# Patient Record
Sex: MALE | Race: WHITE | Employment: FULL TIME | URBAN - NONMETROPOLITAN AREA
[De-identification: names, ages, dates, MRNs, and addresses within clinical notes are randomized per-mention and may not be internally consistent; named-entity substitution may affect disease eponyms.]

---

## 2017-09-27 ENCOUNTER — HOSPITAL ENCOUNTER (INPATIENT)
Age: 47
LOS: 5 days | Discharge: HOME | DRG: 885 | End: 2017-10-02
Attending: FAMILY MEDICINE | Admitting: PSYCHIATRY & NEUROLOGY
Payer: COMMERCIAL

## 2017-09-27 DIAGNOSIS — T42.4X2A BENZODIAZEPINE OVERDOSE, INTENTIONAL SELF-HARM, INITIAL ENCOUNTER (HCC): Primary | ICD-10-CM

## 2017-09-27 DIAGNOSIS — F32.A DEPRESSION, UNSPECIFIED DEPRESSION TYPE: ICD-10-CM

## 2017-09-27 DIAGNOSIS — F10.920 ACUTE ALCOHOLIC INTOXICATION, UNCOMPLICATED (HCC): ICD-10-CM

## 2017-09-27 LAB
ACETAMINOPHEN LEVEL: <15 UG/ML
ALBUMIN SERPL-MCNC: 3.8 G/DL (ref 3.5–5.2)
ALP BLD-CCNC: 89 U/L (ref 40–130)
ALT SERPL-CCNC: 77 U/L (ref 5–41)
AMPHETAMINE SCREEN, URINE: NEGATIVE
ANION GAP SERPL CALCULATED.3IONS-SCNC: 10 MMOL/L (ref 7–19)
APTT: 32.7 SEC (ref 26–36.2)
AST SERPL-CCNC: 59 U/L (ref 5–40)
BARBITURATE SCREEN URINE: NEGATIVE
BASOPHILS ABSOLUTE: 0 K/UL (ref 0–0.2)
BASOPHILS RELATIVE PERCENT: 0.6 % (ref 0–1)
BENZODIAZEPINE SCREEN, URINE: POSITIVE
BILIRUB SERPL-MCNC: 0.3 MG/DL (ref 0.2–1.2)
BILIRUBIN URINE: NEGATIVE
BLOOD, URINE: NEGATIVE
BUN BLDV-MCNC: 12 MG/DL (ref 6–20)
CALCIUM SERPL-MCNC: 8.3 MG/DL (ref 8.6–10)
CANNABINOID SCREEN URINE: NEGATIVE
CHLORIDE BLD-SCNC: 106 MMOL/L (ref 98–111)
CLARITY: ABNORMAL
CO2: 26 MMOL/L (ref 22–29)
COCAINE METABOLITE SCREEN URINE: NEGATIVE
COLOR: YELLOW
CREAT SERPL-MCNC: 0.9 MG/DL (ref 0.5–1.2)
EOSINOPHILS ABSOLUTE: 0.1 K/UL (ref 0–0.6)
EOSINOPHILS RELATIVE PERCENT: 2.4 % (ref 0–5)
ETHANOL: 118 MG/DL (ref 0–0.08)
ETHANOL: 177 MG/DL (ref 0–0.08)
ETHANOL: 56 MG/DL (ref 0–0.08)
GFR NON-AFRICAN AMERICAN: >60
GLUCOSE BLD-MCNC: 105 MG/DL (ref 74–109)
GLUCOSE URINE: NEGATIVE MG/DL
HCT VFR BLD CALC: 39.7 % (ref 42–52)
HEMOGLOBIN: 13.3 G/DL (ref 14–18)
INR BLD: 1.05 (ref 0.88–1.18)
KETONES, URINE: NEGATIVE MG/DL
LEUKOCYTE ESTERASE, URINE: NEGATIVE
LYMPHOCYTES ABSOLUTE: 2.4 K/UL (ref 1.1–4.5)
LYMPHOCYTES RELATIVE PERCENT: 44.9 % (ref 20–40)
Lab: ABNORMAL
MCH RBC QN AUTO: 30.2 PG (ref 27–31)
MCHC RBC AUTO-ENTMCNC: 33.5 G/DL (ref 33–37)
MCV RBC AUTO: 90.2 FL (ref 80–94)
MONOCYTES ABSOLUTE: 0.3 K/UL (ref 0–0.9)
MONOCYTES RELATIVE PERCENT: 6 % (ref 0–10)
NEUTROPHILS ABSOLUTE: 2.5 K/UL (ref 1.5–7.5)
NEUTROPHILS RELATIVE PERCENT: 45.9 % (ref 50–65)
NITRITE, URINE: NEGATIVE
OPIATE SCREEN URINE: NEGATIVE
OSMOLALITY: 332 MOSM/KG (ref 275–300)
PDW BLD-RTO: 12.9 % (ref 11.5–14.5)
PH UA: 5.5
PLATELET # BLD: 197 K/UL (ref 130–400)
PMV BLD AUTO: 9.3 FL (ref 9.4–12.4)
POTASSIUM SERPL-SCNC: 4.6 MMOL/L (ref 3.5–5)
PROTEIN UA: NEGATIVE MG/DL
PROTHROMBIN TIME: 13.6 SEC (ref 12–14.6)
RBC # BLD: 4.4 M/UL (ref 4.7–6.1)
SALICYLATE, SERUM: <3 MG/DL (ref 3–10)
SODIUM BLD-SCNC: 142 MMOL/L (ref 136–145)
SPECIFIC GRAVITY UA: 1
TOTAL PROTEIN: 6.4 G/DL (ref 6.6–8.7)
UROBILINOGEN, URINE: 0.2 E.U./DL
WBC # BLD: 5.3 K/UL (ref 4.8–10.8)

## 2017-09-27 PROCEDURE — 99285 EMERGENCY DEPT VISIT HI MDM: CPT | Performed by: EMERGENCY MEDICINE

## 2017-09-27 PROCEDURE — 99285 EMERGENCY DEPT VISIT HI MDM: CPT

## 2017-09-27 PROCEDURE — 96374 THER/PROPH/DIAG INJ IV PUSH: CPT

## 2017-09-27 PROCEDURE — 80053 COMPREHEN METABOLIC PANEL: CPT

## 2017-09-27 PROCEDURE — G0480 DRUG TEST DEF 1-7 CLASSES: HCPCS

## 2017-09-27 PROCEDURE — 2500000003 HC RX 250 WO HCPCS: Performed by: FAMILY MEDICINE

## 2017-09-27 PROCEDURE — 2580000003 HC RX 258: Performed by: EMERGENCY MEDICINE

## 2017-09-27 PROCEDURE — 1240000000 HC EMOTIONAL WELLNESS R&B

## 2017-09-27 PROCEDURE — 85025 COMPLETE CBC W/AUTO DIFF WBC: CPT

## 2017-09-27 PROCEDURE — 36415 COLL VENOUS BLD VENIPUNCTURE: CPT

## 2017-09-27 PROCEDURE — 85730 THROMBOPLASTIN TIME PARTIAL: CPT

## 2017-09-27 PROCEDURE — 93005 ELECTROCARDIOGRAM TRACING: CPT

## 2017-09-27 PROCEDURE — 80307 DRUG TEST PRSMV CHEM ANLYZR: CPT

## 2017-09-27 PROCEDURE — 6370000000 HC RX 637 (ALT 250 FOR IP): Performed by: FAMILY MEDICINE

## 2017-09-27 PROCEDURE — 81003 URINALYSIS AUTO W/O SCOPE: CPT

## 2017-09-27 PROCEDURE — 83930 ASSAY OF BLOOD OSMOLALITY: CPT

## 2017-09-27 PROCEDURE — 85610 PROTHROMBIN TIME: CPT

## 2017-09-27 RX ORDER — ACETAMINOPHEN 325 MG/1
650 TABLET ORAL EVERY 4 HOURS PRN
Status: DISCONTINUED | OUTPATIENT
Start: 2017-09-27 | End: 2017-10-02 | Stop reason: HOSPADM

## 2017-09-27 RX ORDER — NICOTINE 21 MG/24HR
1 PATCH, TRANSDERMAL 24 HOURS TRANSDERMAL DAILY
Status: DISCONTINUED | OUTPATIENT
Start: 2017-09-27 | End: 2017-09-27 | Stop reason: SDUPTHER

## 2017-09-27 RX ORDER — FLUMAZENIL 0.1 MG/ML
0.5 INJECTION, SOLUTION INTRAVENOUS ONCE
Status: COMPLETED | OUTPATIENT
Start: 2017-09-27 | End: 2017-09-27

## 2017-09-27 RX ORDER — ALPRAZOLAM 0.25 MG/1
0.25 TABLET ORAL 3 TIMES DAILY
Status: ON HOLD | COMMUNITY
End: 2017-10-02 | Stop reason: HOSPADM

## 2017-09-27 RX ORDER — NICOTINE 21 MG/24HR
1 PATCH, TRANSDERMAL 24 HOURS TRANSDERMAL DAILY
Status: DISCONTINUED | OUTPATIENT
Start: 2017-09-27 | End: 2017-10-02 | Stop reason: HOSPADM

## 2017-09-27 RX ORDER — 0.9 % SODIUM CHLORIDE 0.9 %
1000 INTRAVENOUS SOLUTION INTRAVENOUS ONCE
Status: COMPLETED | OUTPATIENT
Start: 2017-09-27 | End: 2017-09-27

## 2017-09-27 RX ADMIN — FLUMAZENIL 0.5 MG: 0.1 INJECTION, SOLUTION INTRAVENOUS at 07:25

## 2017-09-27 RX ADMIN — SODIUM CHLORIDE 1000 ML: 9 INJECTION, SOLUTION INTRAVENOUS at 07:15

## 2017-09-27 ASSESSMENT — SLEEP AND FATIGUE QUESTIONNAIRES
DIFFICULTY FALLING ASLEEP: NO
DO YOU HAVE DIFFICULTY SLEEPING: NO
DIFFICULTY STAYING ASLEEP: NO
RESTFUL SLEEP: YES
SLEEP PATTERN: NORMAL
DIFFICULTY ARISING: NO
DO YOU USE A SLEEP AID: YES
AVERAGE NUMBER OF SLEEP HOURS: 8

## 2017-09-27 ASSESSMENT — ENCOUNTER SYMPTOMS
ABDOMINAL PAIN: 0
HYPERVENTILATION: 0

## 2017-09-27 ASSESSMENT — LIFESTYLE VARIABLES: HISTORY_ALCOHOL_USE: YES

## 2017-09-27 NOTE — ED NOTES
72 hour hold sent upstairs with pt. Dr. James Robertson talked with [address]. Bubba Dodd will take care of court order.       Nick Lovell RN  09/27/17 [id number]

## 2017-09-27 NOTE — IP AVS SNAPSHOT
50,000 Units on 9/29/2017  1:48 PM     10/6/17    9:00                   folic acid 1 MG tablet   Commonly known as:  FOLVITE   Take 1 tablet by mouth daily                1 mg on 10/2/2017  8:24 AM     10/3/17    9:00                   naltrexone 50 MG tablet   Commonly known as:  DEPADE   Take 0.5 tablets by mouth daily (with breakfast)   Start taking on:  10/3/2017                50 mg on 10/1/2017  9:31 AM     10/3/17    8:00                   nicotine 21 MG/24HR   Commonly known as:  NICODERM CQ   Place 1 patch onto the skin daily                1 patch on 10/2/2017  8:24 AM     10/3/17    9:00                   thiamine 100 MG tablet   Take 1 tablet by mouth daily                100 mg on 10/2/2017  8:24 AM     10/3/17    9:00 am                     You told us you were taking these medications at home, but the amount or how often you take this medication has CHANGED        Last Dose    Next Dose Due AM NOON PM NIGHT    traZODone 150 MG tablet   Commonly known as:  DESYREL   Take 1 tablet by mouth nightly as needed for Sleep   What changed:    - how to take this  - reasons to take this                100 mg on 10/1/2017  9:20 PM     10/2/17            9:00           VORTIoxetine HBr 20 MG Tabs tablet   Commonly known as:  TRINTELLIX   Take 20 mg by mouth daily   What changed:  how much to take                20 mg on 10/1/2017  9:20 PM     10/3/17    9:00                     These are medications you told us you were taking at home, CONTINUE taking them after you leave the hospital        Last Dose    Next Dose Due AM NOON PM NIGHT    diclofenac 75 MG EC tablet   Commonly known as:  VOLTAREN   Take 75 mg by mouth 2 times daily                  10/2/17                       metoprolol tartrate 50 MG tablet   Commonly known as:  LOPRESSOR   Take 25 mg by mouth 2 times daily                  10/2/17                       OLANZapine 10 MG tablet   Commonly known as:  ZYPREXA   Take 10 mg by mouth daily VIGRAPLEX PO   Take by mouth as needed                                           These are the medications you have told us you were taking at home, STOP taking them after you leave the hospital     ALPRAZolam 0.25 MG tablet   Commonly known as:  XANAX       amLODIPine 10 MG tablet   Commonly known as:  NORVASC       escitalopram 20 MG tablet   Commonly known as:  Maudie Phoenix            Where to Get Your Medications      You can get these medications from any pharmacy     Bring a paper prescription for each of these medications     ergocalciferol 69868 units capsule    folic acid 1 MG tablet    naltrexone 50 MG tablet    nicotine 21 MG/24HR    thiamine 100 MG tablet    traZODone 150 MG tablet    VORTIoxetine HBr 20 MG Tabs tablet               Allergies as of 10/2/2017     No Known Allergies      Immunizations as of 10/2/2017     No immunizations on file. MyChart Signup     [employer] allows you to send messages to your doctor, view your test results, renew your prescriptions, schedule appointments, view visit notes, and more. How Do I Sign Up? 1. In your Internet browser, go to [email or web address]  2. Click on the Sign Up Now link in the Sign In box. You will see the New Member Sign Up page. 3. Enter your [employer] Access Code exactly as it appears below. You will not need to use this code after youve completed the sign-up process. If you do not sign up before the expiration date, you must request a new code. [employer] Access Code: [email or web address]  Expires: 11/26/2017  7:39 AM    4. Enter your Social Security Number ([id number]) and Date of Birth (mm/dd/yyyy) as indicated and click Submit. You will be taken to the next sign-up page. 5. Create a [employer] ID. This will be your [employer] login ID and cannot be changed, so think of one that is secure and easy to remember. 6. Create a [employer] password. You can change your password at any time. 7. Enter your Password Reset Question and Answer. This can be used at a later time if you forget your password. 8. Enter your e-mail address. You will receive e-mail notification when new information is available in [address] 9. Click Sign Up. You can now view your medical record. Additional Information  If you have questions, please contact the physician practice where you receive care. Remember, [employer] is NOT to be used for urgent needs. For medical emergencies, dial 911. For questions regarding your [employer] account call [phone]. If you have a clinical question, please call your doctor's office. View your information online  ? Review your current list of  medications, immunization, and allergies. ? Review your future test results online . ? Review your discharge instructions provided by your caregivers at discharge    Certain functionality such as prescription refills, scheduling appointments or sending messages to your provider are not activated if your provider does not use [employer] in his/her office    For questions regarding your MyChart account call [phone]. If you have a clinical question, please call your doctor's office.

## 2017-09-27 NOTE — ED NOTES
Pt being uncooperative. Pt wanting to go smoke. I told him this is a nonsmoking campus. He told me \"No one can stop me from going to smoke. \" I told the patient if he was going to be uncooperative then I would have to call security. Pt stated \"call security. I'm a big boy, I can take them. \" Pt keeps taking off monitor leads. Pt was given urinal to per request. PCA at bedside for observation. Patient intentionally urinated on PCA.      Oneyda Waddell RN  09/27/17 [facility] Annalee Hewitt RN  09/27/17 9324

## 2017-09-27 NOTE — ED PROVIDER NOTES
emergency physician with the below findings:        No orders to display           LABS:  Labs Reviewed   CBC WITH AUTO DIFFERENTIAL - Abnormal; Notable for the following:        Result Value    RBC 4.40 (*)     Hemoglobin 13.3 (*)     Hematocrit 39.7 (*)     MPV 9.3 (*)     Neutrophils % 45.9 (*)     Lymphocytes % 44.9 (*)     All other components within normal limits   COMPREHENSIVE METABOLIC PANEL - Abnormal; Notable for the following:     Calcium 8.3 (*)     Total Protein 6.4 (*)     ALT 77 (*)     AST 59 (*)     All other components within normal limits   OSMOLALITY, SERUM - Abnormal; Notable for the following:     Osmolality 332 (*)     All other components within normal limits   URINALYSIS - Abnormal; Notable for the following:     Clarity, UA CLOUDY (*)     All other components within normal limits   URINE DRUG SCREEN - Abnormal; Notable for the following:     Benzodiazepine Screen, Urine Positive (*)     All other components within normal limits   ACETAMINOPHEN LEVEL   APTT   ETHANOL   PROTIME-INR   SALICYLATE LEVEL   ETHANOL   ETHANOL       All other labs were within normal range or not returned as of this dictation. EMERGENCY DEPARTMENT COURSE and DIFFERENTIAL DIAGNOSIS/MDM:   Vitals:    Vitals:    09/27/17 0727 09/27/17 0734 09/27/17 1608 09/27/17 1646   BP: 110/76 132/85 (!) 148/94 (!) 143/100   Pulse: 85 80 80 85   Resp:   16 16   Temp:   98.2 °F (36.8 °C) 98.3 °F (36.8 °C)   TempSrc:       SpO2:  99% 96%    Weight:       Height:           MDM  Number of Diagnoses or Management Options  Acute alcoholic intoxication, uncomplicated ([facility] [address]):   Benzodiazepine overdose, intentional self-harm, initial encounter:   Depression, unspecified depression type:   Diagnosis management comments: Patient signed out to me by Dr. Charles Holt. At 1600 hrs. I reviewed the labs on the patient was signed out. I ordered a repeat alcohol level. This was then found be 56. The patient is currently not intoxicated.  He overdosed on

## 2017-09-27 NOTE — IP AVS SNAPSHOT
After Visit Summary    This summary was created for you. Thank you for entrusting your care to us. The following information includes details about your hospital/visit stay along with steps you should take to help with your recovery once you leave the hospital.  In this packet, you will find information about the topics listed below:    · Instructions about your medications including a list of your home medications  · A summary of your hospital visit  · Follow-up appointments once you have left the hospital  · Your care plan at home      You may receive a survey regarding the care you received during your stay. Your input is valuable to us. We encourage you to complete and return your survey in the envelope provided. We hope you will choose us in the future for your healthcare needs. Basic Information     Date Of Birth Sex Race Ethnicity Preferred Language    1970 Male White Non-/Non  English      Vital Signs     Blood Pressure Pulse Temperature Respirations Height Weight    156/88 89 97.2 °F (36.2 °C) (Temporal) 20 6' 4\" (1.93 m) 185 lb (83.9 kg)    Oxygen Saturation Body Mass Index Smoking Status             100% 22.52 kg/m2 Current Every Day Smoker         Care Provided at:     Name Address Phone       [address] [facility] [phone]       Psychiatric Advance Directive          Most Recent Value    Psychiatric Advance Directive  No    Power of  for Health Care             Your Visit    Here you will find information about your visit, including the reason for your visit. Please take this sheet with you when you visit your doctor or other health care provider in the future. It will help determine the best possible medical care for you at that time. If you have any questions once you leave the hospital, please call the department phone number listed below.         Why you were here those caring for you know how to best care for your health needs at home. This section may also include educational information about certain health topics that may be of help to you. Diet Instructions     ? Good nutrition is important when healing from an illness, injury, or surgery. Follow any nutrition recommendations given to you during your hospital stay. ? If you were given an oral nutrition supplement while in the hospital, continue to take this supplement at home. You can take it with meals, in-between meals, and/or before bedtime. These supplements can be purchased at most local grocery stores, pharmacies, and [employer]. ? If you have any questions about your diet or nutrition, call the hospital and ask for the dietitian. Activity Instructions     May resume normal activity             Discharge Instructions       Vitamin D level----3 mths---forward to PCP         Important Information    If your condition worsens or if you have any concerns, call your doctor or seek emergency medical services (dial 9-1-1) as needed. If you have any of the following symptoms/conditions, call your doctor. Call your primary care physician to obtain results of outstanding lab tests, cultures, x-rays, or other tests. Important Information if you smoke or are exposed to smoking       SMOKING: QUIT SMOKING. THIS IS THE MOST IMPORTANT ACTION YOU CAN TAKE TO IMPROVE YOUR CURRENT AND FUTURE HEALTH. Call the [facility] NOW ([phone])    Smoking harms nonsmokers. When nonsmokers are around people who smoke, they absorb nicotine, carbon monoxide, and other ingredients of tobacco smoke.      DO NOT SMOKE AROUND CHILDREN     Children exposed to secondhand smoke are at an increased risk of:  Sudden Infant Death Syndrome (SIDS), acute respiratory infections, inflammation of the middle ear, and severe asthma. Over a longer time, it causes heart disease and lung cancer. There is no safe level of exposure to secondhand smoke. The information on all pages of the After Visit Summary has been reviewed with me, the patient and/or responsible adult, by my health care provider(s). I had the opportunity to ask questions regarding this information. I understand I should dispose of my armband safely at home to protect my health information. A complete copy of the After Visit Summary has been given to me, the patient and/or responsible adult. Patient Signature/Responsible Adult:  ____________________________________________________________    Date/Time:  ____________________________________________________________                 Clinician Signature:  ____________________________________________________________    Date/Time:  ____________________________________________________________                 Transmitted to next level of Care:  ____________________________________________________________    Date/Time/Signature:  ____________________________________________________________            After Visit Summary  (Discharge Instructions)    Medication List for Home    Based on the information you provided to us as well as any changes during this visit, the following is your updated medication list.  Compare this with your prescription bottles at home. If you have any questions or concerns, contact your primary care physician's office. Daily Medication List (This medication list can be shared with any healthcare provider who is helping you manage your medications)      There are NEW medications for you.  START taking them after you leave the hospital        Last Dose    Next Dose Due AM NOON PM NIGHT    ergocalciferol 24403 units capsule   Commonly known as:  ERGOCALCIFEROL   Take 1 capsule by mouth once a week for 11 doses 50,000 Units on 9/29/2017  1:48 PM     10/6/17    9:00                   folic acid 1 MG tablet   Commonly known as:  FOLVITE   Take 1 tablet by mouth daily                1 mg on 10/2/2017  8:24 AM     10/3/17    9:00                   naltrexone 50 MG tablet   Commonly known as:  DEPADE   Take 0.5 tablets by mouth daily (with breakfast)   Start taking on:  10/3/2017                50 mg on 10/1/2017  9:31 AM     10/3/17    8:00                   nicotine 21 MG/24HR   Commonly known as:  NICODERM CQ   Place 1 patch onto the skin daily                1 patch on 10/2/2017  8:24 AM     10/3/17    9:00                   thiamine 100 MG tablet   Take 1 tablet by mouth daily                100 mg on 10/2/2017  8:24 AM     10/3/17    9:00 am                     You told us you were taking these medications at home, but the amount or how often you take this medication has CHANGED        Last Dose    Next Dose Due AM NOON PM NIGHT    traZODone 150 MG tablet   Commonly known as:  DESYREL   Take 1 tablet by mouth nightly as needed for Sleep   What changed:    - how to take this  - reasons to take this                100 mg on 10/1/2017  9:20 PM     10/2/17            9:00           VORTIoxetine HBr 20 MG Tabs tablet   Commonly known as:  TRINTELLIX   Take 20 mg by mouth daily   What changed:  how much to take                20 mg on 10/1/2017  9:20 PM     10/3/17    9:00                     These are medications you told us you were taking at home, CONTINUE taking them after you leave the hospital        Last Dose    Next Dose Due AM NOON PM NIGHT    diclofenac 75 MG EC tablet   Commonly known as:  VOLTAREN   Take 75 mg by mouth 2 times daily                  10/2/17                       metoprolol tartrate 50 MG tablet   Commonly known as:  LOPRESSOR   Take 25 mg by mouth 2 times daily                  10/2/17                       OLANZapine 10 MG tablet   Commonly known as:  ZYPREXA   Take 10 mg by mouth daily VIGRAPLEX PO   Take by mouth as needed                                           These are the medications you have told us you were taking at home, STOP taking them after you leave the hospital     ALPRAZolam 0.25 MG tablet   Commonly known as:  XANAX       amLODIPine 10 MG tablet   Commonly known as:  NORVASC       escitalopram 20 MG tablet   Commonly known as:  Flora Whittington            Where to Get Your Medications      You can get these medications from any pharmacy     Bring a paper prescription for each of these medications     ergocalciferol 32591 units capsule    folic acid 1 MG tablet    naltrexone 50 MG tablet    nicotine 21 MG/24HR    thiamine 100 MG tablet    traZODone 150 MG tablet    VORTIoxetine HBr 20 MG Tabs tablet               Allergies as of 10/2/2017     No Known Allergies      Immunizations as of 10/2/2017     No immunizations on file. ITChart Signup     [employer] allows you to send messages to your doctor, view your test results, renew your prescriptions, schedule appointments, view visit notes, and more. How Do I Sign Up? 1. In your Internet browser, go to [email or web address]  2. Click on the Sign Up Now link in the Sign In box. You will see the New Member Sign Up page. 3. Enter your [employer] Access Code exactly as it appears below. You will not need to use this code after youve completed the sign-up process. If you do not sign up before the expiration date, you must request a new code. [employer] Access Code: [email or web address]  Expires: 11/26/2017  7:39 AM    4. Enter your Social Security Number ([id number]) and Date of Birth (mm/dd/yyyy) as indicated and click Submit. You will be taken to the next sign-up page. 5. Create a [employer] ID. This will be your [employer] login ID and cannot be changed, so think of one that is secure and easy to remember. 6. Create a getuppt password. You can change your password at any time.

## 2017-09-27 NOTE — ED PROVIDER NOTES
[facility] EMERGENCY DEPT  eMERGENCY dEPARTMENT eNCOUnter      Pt Name: Arelis Bowen  MRN: [id number]  [place] 1970  Date of evaluation: 9/27/2017  Provider: Hilario Wall MD    [address] [place] [address]       Chief Complaint   Patient presents with    Drug Overdose     took 90 xanax at 0500 yesterday morning         HISTORY OF PRESENT ILLNESS   (Location/Symptom, Timing/Onset, Context/Setting, Quality, Duration, Modifying Factors, Severity)  Note limiting factors. Arelis Bowen is a 50 y.o. male who presents to the emergency department with suicide attempt via xanax ingestion last night. Denies alcohol use. Has previous suicide attempts in past and has been to [facility] and [facility] for psychiatric concerns. Threatened to hurt his father during verbal altercation last night. Patient is a 50 y.o. male presenting with mental health disorder. The history is provided by the patient and a parent. No  was used.    Mental Health Problem   Presenting symptoms: agitation, homicidal ideas, suicidal thoughts and suicidal threats    Presenting symptoms: no aggressive behavior, no bizarre behavior, no delusions, no depression, no disorganized speech, no disorganized thought process, no hallucinations, no paranoid behavior, no self-mutilation and no suicide attempt    Patient accompanied by:  Family member and parent  Degree of incapacity (severity):  Severe  Onset quality:  Unable to specify  Timing:  Constant  Progression:  Worsening  Context: alcohol use, drug abuse, medication and noncompliance    Context: not recent medication change and not stressful life event    Treatment compliance:  Some of the time  Relieved by:  Nothing  Worsened by:  Nothing  Ineffective treatments:  None tried  Associated symptoms: irritability    Associated symptoms: no abdominal pain, no anhedonia, no anxiety, no appetite change, no chest pain, no decreased need for sleep, not distractible, no euphoric mood, no fatigue, no feelings of worthlessness, no headaches, no hypersomnia, no hyperventilation, no insomnia, no poor judgment, no school problems and no weight change    Risk factors: no family hx of mental illness, no family violence, no hx of mental illness, no hx of suicide attempts, no neurological disease and no recent psychiatric admission        Nursing Notes were reviewed. REVIEW OF SYSTEMS    (2-9 systems for level 4, 10 or more for level 5)     Review of Systems   Constitutional: Positive for irritability. Negative for appetite change and fatigue. Cardiovascular: Negative for chest pain. Gastrointestinal: Negative for abdominal pain. Neurological: Negative for headaches. Psychiatric/Behavioral: Positive for agitation, homicidal ideas and suicidal ideas. Negative for hallucinations, paranoia and self-injury. The patient is not nervous/anxious and does not have insomnia. All other systems reviewed and are negative. PAST MEDICAL HISTORY     Past Medical History:   Diagnosis Date    Chest pain 12/12/2011    Cigarette smoker 12/12/2011    Hypertension 12/12/2011         SURGICAL HISTORY       Past Surgical History:   Procedure Laterality Date    APPENDECTOMY      CHOLECYSTECTOMY  5/18/2006    Stigall    NECK SURGERY  7/15/2008    Hadi         CURRENT MEDICATIONS       Previous Medications    ALPRAZOLAM (XANAX) 0.25 MG TABLET    Take 0.25 mg by mouth nightly as needed for Sleep    AMLODIPINE (NORVASC) 10 MG TABLET    Take 10 mg by mouth daily     DICLOFENAC (VOLTAREN) 75 MG EC TABLET    Take 75 mg by mouth 2 times daily    ESCITALOPRAM (LEXAPRO) 20 MG TABLET    Take 20 mg by mouth daily    METOPROLOL (LOPRESSOR) 50 MG TABLET    Take 25 mg by mouth 2 times daily     NUTRITIONAL SUPPLEMENTS (VIGRAPLEX PO)    Take by mouth as needed    TRAZODONE (DESYREL) 150 MG TABLET           ALLERGIES     Review of patient's allergies indicates no known allergies.     FAMILY HISTORY       Family History Problem Relation Age of Onset    Osteoarthritis Mother     Thyroid Disease Mother     Heart Failure Father     Heart Failure Maternal Grandmother     Heart Failure Paternal Grandmother     Cancer Maternal Grandfather           SOCIAL HISTORY       Social History     Social History    Marital status:      Spouse name: N/A    Number of children: N/A    Years of education: N/A     Social History Main Topics    Smoking status: Current Every Day Smoker     Packs/day: 1.00     Types: Cigarettes    Smokeless tobacco: None    Alcohol use No    Drug use: No    Sexual activity: Not Asked     Other Topics Concern    None     Social History Narrative       SCREENINGS             PHYSICAL EXAM    (up to 7 for level 4, 8 or more for level 5)   ED Triage Vitals   BP Temp Temp Source Pulse Resp SpO2 Height Weight   09/27/17 0641 09/27/17 0641 09/27/17 0641 09/27/17 0641 09/27/17 0641 09/27/17 0641 09/27/17 0641 09/27/17 0641   110/76 98.7 °F (37.1 °C) Oral 93 16 96 % 6' 4\" (1.93 m) 185 lb (83.9 kg)       Physical Exam   Constitutional: He is oriented to person, place, and time. He appears well-developed and well-nourished. No distress. HENT:   Head: Normocephalic and atraumatic. Right Ear: External ear normal.   Left Ear: External ear normal.   Mouth/Throat: No oropharyngeal exudate. Eyes: Conjunctivae and EOM are normal. Right eye exhibits no discharge. Left eye exhibits no discharge. Neck: Normal range of motion. No tracheal deviation present. Cardiovascular: Normal rate, regular rhythm and normal heart sounds. Exam reveals no gallop and no friction rub. No murmur heard. Pulmonary/Chest: Effort normal. No stridor. No respiratory distress. He has no wheezes. He has no rales. Abdominal: Soft. He exhibits no distension. There is no tenderness. There is no rebound and no guarding. Musculoskeletal: Normal range of motion. He exhibits no edema, tenderness or deformity.    Neurological: He is

## 2017-09-27 NOTE — ED NOTES
Bed: 02-A  Expected date: 9/27/17  Expected time: 6:35 AM  Means of arrival: [facility]  Comments:  Hector Cole RN  09/27/17 0258

## 2017-09-28 PROBLEM — F33.2 SEVERE EPISODE OF RECURRENT MAJOR DEPRESSIVE DISORDER, WITHOUT PSYCHOTIC FEATURES (HCC): Status: ACTIVE | Noted: 2017-09-28

## 2017-09-28 PROCEDURE — 6370000000 HC RX 637 (ALT 250 FOR IP): Performed by: PSYCHIATRY & NEUROLOGY

## 2017-09-28 PROCEDURE — 1240000000 HC EMOTIONAL WELLNESS R&B

## 2017-09-28 PROCEDURE — 6370000000 HC RX 637 (ALT 250 FOR IP): Performed by: NURSE PRACTITIONER

## 2017-09-28 PROCEDURE — 6370000000 HC RX 637 (ALT 250 FOR IP): Performed by: FAMILY MEDICINE

## 2017-09-28 PROCEDURE — 90792 PSYCH DIAG EVAL W/MED SRVCS: CPT | Performed by: NURSE PRACTITIONER

## 2017-09-28 RX ORDER — NALTREXONE HYDROCHLORIDE 50 MG/1
50 TABLET, FILM COATED ORAL
Status: DISCONTINUED | OUTPATIENT
Start: 2017-09-29 | End: 2017-10-02

## 2017-09-28 RX ORDER — CLONIDINE HYDROCHLORIDE 0.1 MG/1
0.1 TABLET ORAL EVERY 4 HOURS PRN
Status: DISCONTINUED | OUTPATIENT
Start: 2017-09-28 | End: 2017-10-02 | Stop reason: HOSPADM

## 2017-09-28 RX ORDER — THIAMINE MONONITRATE (VIT B1) 100 MG
100 TABLET ORAL DAILY
Status: DISCONTINUED | OUTPATIENT
Start: 2017-09-28 | End: 2017-10-02 | Stop reason: HOSPADM

## 2017-09-28 RX ORDER — BUSPIRONE HYDROCHLORIDE 10 MG/1
10 TABLET ORAL 3 TIMES DAILY
Status: DISCONTINUED | OUTPATIENT
Start: 2017-09-28 | End: 2017-09-29

## 2017-09-28 RX ORDER — CHLORDIAZEPOXIDE HYDROCHLORIDE 25 MG/1
25 CAPSULE, GELATIN COATED ORAL 4 TIMES DAILY PRN
Status: DISCONTINUED | OUTPATIENT
Start: 2017-09-28 | End: 2017-10-02 | Stop reason: HOSPADM

## 2017-09-28 RX ORDER — OLANZAPINE 10 MG/1
10 TABLET ORAL DAILY
Status: ON HOLD | COMMUNITY
End: 2018-06-29 | Stop reason: HOSPADM

## 2017-09-28 RX ORDER — FOLIC ACID 1 MG/1
1 TABLET ORAL DAILY
Status: DISCONTINUED | OUTPATIENT
Start: 2017-09-28 | End: 2017-10-02 | Stop reason: HOSPADM

## 2017-09-28 RX ADMIN — FOLIC ACID 1 MG: 1 TABLET ORAL at 15:21

## 2017-09-28 RX ADMIN — VORTIOXETINE 20 MG: 20 TABLET, FILM COATED ORAL at 21:32

## 2017-09-28 RX ADMIN — ACETAMINOPHEN 650 MG: 325 TABLET, FILM COATED ORAL at 21:31

## 2017-09-28 RX ADMIN — TRAZODONE HYDROCHLORIDE 150 MG: 100 TABLET ORAL at 21:31

## 2017-09-28 RX ADMIN — Medication 100 MG: at 15:21

## 2017-09-28 RX ADMIN — BUSPIRONE HYDROCHLORIDE 10 MG: 10 TABLET ORAL at 21:31

## 2017-09-28 RX ADMIN — BUSPIRONE HYDROCHLORIDE 10 MG: 10 TABLET ORAL at 15:21

## 2017-09-28 ASSESSMENT — PAIN SCALES - GENERAL: PAINLEVEL_OUTOF10: 4

## 2017-09-28 NOTE — PLAN OF CARE
Problem: Discharge Planning:  Goal: Discharged to appropriate level of care  Discharged to appropriate level of care   Outcome: Ongoing    Problem: Mood - Altered:  Goal: Mood stable  Mood stable   Outcome: Ongoing    Problem: Coping - Ineffective, Individual:  Goal: Ability to identify and develop effective coping behavior will improve  Ability to identify and develop effective coping behavior will improve   Outcome: Ongoing

## 2017-09-28 NOTE — H&P
[facility] [place]    Psychiatric Initial Evaluation    Date of Evaluation:  9/28/2017  Session Type:  35677 Psychiatric Diagnostic Interview Exam   Name:  Veverly Goldmann  Age:  50 y.o. Sex:  male  Ethnicity:   Primary Care Physician:  Shyann Burciaga   Patient Care Team:  Patient Care Team:  Shyann Burciaga as PCP - Priscila Whittaker MD as Consulting Physician (Cardiology)  Chief Complaint: overdose on Xanax    History of Present Illness    Patient is a 49 y/o c/m who presents after an overdose of Xanax. Patient reports that he took about 79. For continuity of care the patients prescriber of the Xanax was informed at [facility]. Patient denies HI or psychosis. Patient has had one previous psychiatric hospitalization at [facility] several years ago. Patient reports that he had started drinking again after being sober for three years. Patient reports that his PCP prescribed Xanax and that is when his drinking became out of control. Patient reports that he got away from [place] [address] and has not attended since February, was feeling ran down from his job at [employer] working 150 hours every 2 weeks. Patient reports that he sleeps well as long as he has Trazodone. Patient reports appetite as fair. Patient reports that he has been drinking 12-36 beers per day since July. Patient states, \"It has gotten out of control again. \" Patient is wanting to try Naltrexone. Patient reports that he does not need inpatient treatment for his alcohol abuse. Patient reports that he has been to rehab about 7 times. Patient reports being in [address]. Patient states, \" To be honest when I was younger I would go to rehab to get out of legal problems. \" Patient reports he began drinking at age 10 and he began abusing it in his 27's. Patient reports that his family is very supportive. Patient reports that he works for [employer].  Patient reports that he is

## 2017-09-29 LAB
TSH SERPL DL<=0.05 MIU/L-ACNC: 0.76 UIU/ML (ref 0.27–4.2)
VITAMIN B-12: 473 PG/ML (ref 211–946)
VITAMIN D 25-HYDROXY: 22.8 NG/ML

## 2017-09-29 PROCEDURE — 82306 VITAMIN D 25 HYDROXY: CPT

## 2017-09-29 PROCEDURE — 84443 ASSAY THYROID STIM HORMONE: CPT

## 2017-09-29 PROCEDURE — 6370000000 HC RX 637 (ALT 250 FOR IP): Performed by: PSYCHIATRY & NEUROLOGY

## 2017-09-29 PROCEDURE — 6370000000 HC RX 637 (ALT 250 FOR IP): Performed by: NURSE PRACTITIONER

## 2017-09-29 PROCEDURE — 1240000000 HC EMOTIONAL WELLNESS R&B

## 2017-09-29 PROCEDURE — 36415 COLL VENOUS BLD VENIPUNCTURE: CPT

## 2017-09-29 PROCEDURE — 6370000000 HC RX 637 (ALT 250 FOR IP): Performed by: FAMILY MEDICINE

## 2017-09-29 PROCEDURE — 82607 VITAMIN B-12: CPT

## 2017-09-29 RX ORDER — RISPERIDONE 1 MG/1
1 TABLET, FILM COATED ORAL NIGHTLY
Status: DISCONTINUED | OUTPATIENT
Start: 2017-09-29 | End: 2017-10-01

## 2017-09-29 RX ORDER — ERGOCALCIFEROL 1.25 MG/1
50000 CAPSULE ORAL WEEKLY
Status: DISCONTINUED | OUTPATIENT
Start: 2017-09-29 | End: 2017-10-02 | Stop reason: HOSPADM

## 2017-09-29 RX ORDER — ERGOCALCIFEROL (VITAMIN D2) 1250 MCG
50000 CAPSULE ORAL WEEKLY
Qty: 11 CAPSULE | Refills: 0 | Status: SHIPPED | OUTPATIENT
Start: 2017-09-29 | End: 2018-07-29

## 2017-09-29 RX ORDER — TRAZODONE HYDROCHLORIDE 100 MG/1
100 TABLET ORAL NIGHTLY PRN
Status: DISCONTINUED | OUTPATIENT
Start: 2017-09-29 | End: 2017-10-02 | Stop reason: HOSPADM

## 2017-09-29 RX ADMIN — RISPERIDONE 1 MG: 1 TABLET, FILM COATED ORAL at 20:47

## 2017-09-29 RX ADMIN — Medication 100 MG: at 09:20

## 2017-09-29 RX ADMIN — BUSPIRONE HYDROCHLORIDE 10 MG: 10 TABLET ORAL at 09:20

## 2017-09-29 RX ADMIN — FOLIC ACID 1 MG: 1 TABLET ORAL at 13:49

## 2017-09-29 RX ADMIN — CHLORDIAZEPOXIDE HYDROCHLORIDE 25 MG: 25 CAPSULE ORAL at 21:03

## 2017-09-29 RX ADMIN — ERGOCALCIFEROL 50000 UNITS: 1.25 CAPSULE ORAL at 13:48

## 2017-09-29 RX ADMIN — TRAZODONE HYDROCHLORIDE 100 MG: 100 TABLET ORAL at 22:05

## 2017-09-29 RX ADMIN — NALTREXONE HYDROCHLORIDE 50 MG: 50 TABLET, FILM COATED ORAL at 09:20

## 2017-09-29 RX ADMIN — VORTIOXETINE 20 MG: 20 TABLET, FILM COATED ORAL at 20:47

## 2017-09-29 RX ADMIN — CHLORDIAZEPOXIDE HYDROCHLORIDE 25 MG: 25 CAPSULE ORAL at 13:48

## 2017-09-29 NOTE — PLAN OF CARE
Problem: Altered Mood, Depressive Behavior  Goal: STG-Knowledge of positive coping patterns  Outcome: Ongoing                                                                      Group Therapy Note     Date: 9/29/2017  Start Time: 1430  End Time:  9495  Number of Participants: 13     Type of Group: Cognitive Skills     Wellness Binder Information  Module Name:  Emotional wellness  Session Number:  5     Patient's Goal:  Obstacles to emotional wellness     Notes:  Pt acknowledged negative thoughts and negative behavior as obstacles to emotional wellness.      Status After Intervention:  Improved     Participation Level: Interactive     Participation Quality: Appropriate, Attentive and Sharing        Speech:  normal        Thought Process/Content: Logical        Affective Functioning: Congruent        Mood: congruent        Level of consciousness:  Alert, Oriented x4 and Attentive        Response to Learning: Able to verbalize current knowledge/experience        Endings: None Reported     Modes of Intervention: Education        Discipline Responsible: Psychoeducational Specialist        Signature:  Rosita Johnson

## 2017-09-29 NOTE — H&P
HISTORY and PHYSICAL      CHIEF COMPLAINT:  Depression, SA    Reason for Admission:  Depression, SA    History Obtained From:  patient    HISTORY OF PRESENT ILLNESS:      The patient is a 50 y.o. male who is admitted to the [place] [address] unit with worsening mood issues. He has no c/o CP or SOA. NO abdominal pain or N/V. No dysuria. He denies any current pain. No recent illnesses or fevers. Past Medical History:        Diagnosis Date    Chest pain 12/12/2011    Cigarette smoker 12/12/2011    Hypertension 12/12/2011     Past Surgical History:        Procedure Laterality Date    APPENDECTOMY      CHOLECYSTECTOMY  5/18/2006    [facility]    NECK SURGERY  7/15/2008    Hadi         Medications Prior to Admission:    Prescriptions Prior to Admission: ALPRAZolam (XANAX) 0.25 MG tablet, Take 0.25 mg by mouth three times daily   amLODIPine (NORVASC) 10 MG tablet, Take 10 mg by mouth daily   traZODone (DESYREL) 150 MG tablet, 150 mg nightly as needed   metoprolol (LOPRESSOR) 50 MG tablet, Take 25 mg by mouth 2 times daily   escitalopram (LEXAPRO) 20 MG tablet, Take 20 mg by mouth daily  OLANZapine (ZYPREXA) 10 MG tablet, Take 10 mg by mouth daily  VORTIoxetine HBr (TRINTELLIX) 20 MG TABS tablet, Take 10 mg by mouth daily  diclofenac (VOLTAREN) 75 MG EC tablet, Take 75 mg by mouth 2 times daily  Nutritional Supplements (VIGRAPLEX PO), Take by mouth as needed    Allergies:  Review of patient's allergies indicates no known allergies. Social History:   TOBACCO:   reports that he has been smoking Cigarettes. He has been smoking about 1.00 pack per day. He does not have any smokeless tobacco history on file. ETOH:   reports that he does not drink alcohol. DRUGS:   reports that he does not use illicit drugs.   MARITAL STATUS:    OCCUPATION:  Works at [employer]  Patient currently lives alone      Family History:       Problem Relation Age of Onset    Osteoarthritis Mother     Thyroid Disease Mother     Heart Failure Father     Heart Failure Maternal Grandmother     Heart Failure Paternal Grandmother     Cancer Maternal Grandfather      REVIEW OF SYSTEMS:  Constitutional: neg  CV: neg  Pulmonary: neg  GI: neg  : neg  Psych: depression, SA  Neuro: neg  Skin: neg  MusculoSkeletal: neg  HEENT: neg  Joints: neg    Vitals:  /80  Pulse 74  Temp 98.5 °F (36.9 °C) (Temporal)   Resp 20  Ht 6' 4\" (1.93 m)  Wt 185 lb (83.9 kg)  SpO2 100%  BMI 22.52 kg/m2    PHYSICAL EXAM:  Gen: NAD, alert  HEENT: WNL  Lymph: no LAD  Neck: no JVD or masses  Chest: CTA bilat  CV: RRR  Abdomen: NT/ND  Extrem: no C/C/E  Neuro: non focal  Skin: no rashes  Joints: no redness    DATA:  I have reviewed the admission labs and imaging tests.     ASSESSMENT AND PLAN:      Patient Active Hospital Problem List:   Severe episode of recurrent major depressive disorder, without psychotic features---follow with Psych   Cigarette smoker----nicotine replacement   Benzodiazepine overdose   ETOH Abuse--follow for withdrawal   HTN---monitor BP  Elevated LFT's   Anemia        Lorena Lyons MD  9:23 PM 9/28/2017

## 2017-09-29 NOTE — PLAN OF CARE
Problem: Altered Mood, Depressive Behavior  Goal: STG-Knowledge of positive coping patterns  Outcome: Ongoing                                                                      Group Therapy Note     Date: 9/29/2017  Start Time: 1100  End Time:  5592  Number of Participants: 14     Type of Group: Psychoeducation     Wellness Binder Information  Module Name:  Emotional wellness  Session Number:  4     Patient's Goal:  Self-esteem     Notes:  Pt was verbally prompted to attend group. Pt refused. Information about self-esteem was provided. Status After Intervention:       Participation Level:      Participation Quality:         Speech:           Thought Process/Content:         Affective Functioning:         Mood:         Level of consciousness:          Response to Learning:         Endings:      Modes of Intervention:         Discipline Responsible: Psychoeducational Specialist        Signature:  Ulisses Mera

## 2017-09-30 LAB
CHOLESTEROL, TOTAL: 177 MG/DL (ref 160–199)
HBA1C MFR BLD: 5.8 %
HDLC SERPL-MCNC: 55 MG/DL (ref 55–121)
LDL CHOLESTEROL CALCULATED: 98 MG/DL
TRIGL SERPL-MCNC: 119 MG/DL (ref 150–199)

## 2017-09-30 PROCEDURE — 80061 LIPID PANEL: CPT

## 2017-09-30 PROCEDURE — 6370000000 HC RX 637 (ALT 250 FOR IP): Performed by: FAMILY MEDICINE

## 2017-09-30 PROCEDURE — 83036 HEMOGLOBIN GLYCOSYLATED A1C: CPT

## 2017-09-30 PROCEDURE — 6370000000 HC RX 637 (ALT 250 FOR IP): Performed by: NURSE PRACTITIONER

## 2017-09-30 PROCEDURE — 1240000000 HC EMOTIONAL WELLNESS R&B

## 2017-09-30 PROCEDURE — 36415 COLL VENOUS BLD VENIPUNCTURE: CPT

## 2017-09-30 PROCEDURE — 6370000000 HC RX 637 (ALT 250 FOR IP): Performed by: PSYCHIATRY & NEUROLOGY

## 2017-09-30 RX ADMIN — Medication 100 MG: at 10:28

## 2017-09-30 RX ADMIN — VORTIOXETINE 20 MG: 20 TABLET, FILM COATED ORAL at 21:38

## 2017-09-30 RX ADMIN — CHLORDIAZEPOXIDE HYDROCHLORIDE 25 MG: 25 CAPSULE ORAL at 15:24

## 2017-09-30 RX ADMIN — NALTREXONE HYDROCHLORIDE 50 MG: 50 TABLET, FILM COATED ORAL at 10:28

## 2017-09-30 RX ADMIN — FOLIC ACID 1 MG: 1 TABLET ORAL at 10:28

## 2017-09-30 RX ADMIN — RISPERIDONE 1 MG: 1 TABLET, FILM COATED ORAL at 21:38

## 2017-09-30 RX ADMIN — TRAZODONE HYDROCHLORIDE 100 MG: 100 TABLET ORAL at 21:38

## 2017-09-30 NOTE — PLAN OF CARE
Problem: Altered Mood, Depressive Behavior  Goal: LTG-Able to verbalize acceptance of life and situations over which he or she has no control  Outcome: Ongoing                                                                      Group Therapy Note     Date: 9/30/2017  Start Time: 1030  End Time:  1120  Number of Participants: 11     Type of Group: Psychoeducation     Wellness Binder Information  Module Name:  Emotional wellness  Session Number:  3     Patient's Goal:  Happiness     Notes:  MARKK I invited and encouraged pt to attend group, pt refused/declined. MARKK I gently reminded pt that group is a part of treatment, pt continued to decline, and MARKK I informed nursing staff of non-compliance. Alternative therapeutic educational coping skills activity was provided.       Discipline Responsible: /Counselor     Signature:  Francisca Rosales

## 2017-10-01 PROCEDURE — 6370000000 HC RX 637 (ALT 250 FOR IP): Performed by: PSYCHIATRY & NEUROLOGY

## 2017-10-01 PROCEDURE — 1240000000 HC EMOTIONAL WELLNESS R&B

## 2017-10-01 PROCEDURE — 6360000002 HC RX W HCPCS: Performed by: PSYCHIATRY & NEUROLOGY

## 2017-10-01 PROCEDURE — 6370000000 HC RX 637 (ALT 250 FOR IP): Performed by: NURSE PRACTITIONER

## 2017-10-01 PROCEDURE — 99231 SBSQ HOSP IP/OBS SF/LOW 25: CPT | Performed by: PSYCHIATRY & NEUROLOGY

## 2017-10-01 PROCEDURE — 6370000000 HC RX 637 (ALT 250 FOR IP): Performed by: FAMILY MEDICINE

## 2017-10-01 RX ORDER — ONDANSETRON 4 MG/1
8 TABLET, FILM COATED ORAL ONCE
Status: COMPLETED | OUTPATIENT
Start: 2017-10-01 | End: 2017-10-01

## 2017-10-01 RX ORDER — RISPERIDONE 1 MG/1
1 TABLET, FILM COATED ORAL DAILY
Status: DISCONTINUED | OUTPATIENT
Start: 2017-10-02 | End: 2017-10-02 | Stop reason: HOSPADM

## 2017-10-01 RX ADMIN — VORTIOXETINE 20 MG: 20 TABLET, FILM COATED ORAL at 21:20

## 2017-10-01 RX ADMIN — NALTREXONE HYDROCHLORIDE 50 MG: 50 TABLET, FILM COATED ORAL at 09:31

## 2017-10-01 RX ADMIN — CHLORDIAZEPOXIDE HYDROCHLORIDE 25 MG: 25 CAPSULE ORAL at 00:32

## 2017-10-01 RX ADMIN — TRAZODONE HYDROCHLORIDE 100 MG: 100 TABLET ORAL at 21:20

## 2017-10-01 RX ADMIN — ONDANSETRON 8 MG: 4 TABLET, FILM COATED ORAL at 23:50

## 2017-10-01 RX ADMIN — FOLIC ACID 1 MG: 1 TABLET ORAL at 09:31

## 2017-10-01 RX ADMIN — ACETAMINOPHEN 650 MG: 325 TABLET, FILM COATED ORAL at 00:32

## 2017-10-01 RX ADMIN — ACETAMINOPHEN 650 MG: 325 TABLET, FILM COATED ORAL at 21:20

## 2017-10-01 RX ADMIN — CHLORDIAZEPOXIDE HYDROCHLORIDE 25 MG: 25 CAPSULE ORAL at 11:46

## 2017-10-01 RX ADMIN — Medication 100 MG: at 09:31

## 2017-10-01 RX ADMIN — ACETAMINOPHEN 650 MG: 325 TABLET, FILM COATED ORAL at 15:17

## 2017-10-01 ASSESSMENT — PAIN SCALES - GENERAL
PAINLEVEL_OUTOF10: 1
PAINLEVEL_OUTOF10: 5
PAINLEVEL_OUTOF10: 5
PAINLEVEL_OUTOF10: 4

## 2017-10-01 NOTE — PLAN OF CARE
Problem: Altered Mood, Depressive Behavior  Goal: STG-Knowledge of positive coping patterns  Outcome: Ongoing                                                                      Group Therapy Note     Date: 10/1/2017  Start Time: 1430  End Time:  1500  Number of Participants: 17     Type of Group: Cognitive Skills     Wellness Binder Information  Module Name:  Staying Well  Session Number:  1. Patient's Goal: Daily Maintenance and Coping Skills     Notes:  Pt learned about positive and negative ways to cope with stress, pain and life changes. Status After Intervention:  Improved     Participation Level:  Active Listener     Participation Quality: Appropriate and Attentive        Speech:  normal        Thought Process/Content: Logical        Affective Functioning: Congruent        Mood: depressed        Level of consciousness:  Alert, Oriented x4 and Attentive        Response to Learning: Able to verbalize current knowledge/experience, Able to retain information and Progressing to goal        Endings: None Reported     Modes of Intervention: Education and Support        Discipline Responsible: Psychoeducational Specialist        Signature:  Anthony Talbert

## 2017-10-02 VITALS
WEIGHT: 185 LBS | TEMPERATURE: 97.2 F | RESPIRATION RATE: 20 BRPM | HEART RATE: 89 BPM | DIASTOLIC BLOOD PRESSURE: 88 MMHG | BODY MASS INDEX: 22.53 KG/M2 | SYSTOLIC BLOOD PRESSURE: 156 MMHG | OXYGEN SATURATION: 100 % | HEIGHT: 76 IN

## 2017-10-02 PROCEDURE — 6370000000 HC RX 637 (ALT 250 FOR IP): Performed by: NURSE PRACTITIONER

## 2017-10-02 PROCEDURE — 6370000000 HC RX 637 (ALT 250 FOR IP): Performed by: PSYCHIATRY & NEUROLOGY

## 2017-10-02 PROCEDURE — 5130000000 HC BRIDGE APPOINTMENT

## 2017-10-02 PROCEDURE — 99238 HOSP IP/OBS DSCHRG MGMT 30/<: CPT | Performed by: NURSE PRACTITIONER

## 2017-10-02 PROCEDURE — 6370000000 HC RX 637 (ALT 250 FOR IP): Performed by: FAMILY MEDICINE

## 2017-10-02 RX ORDER — NALTREXONE HYDROCHLORIDE 50 MG/1
25 TABLET, FILM COATED ORAL
Qty: 15 TABLET | Refills: 0 | Status: SHIPPED | OUTPATIENT
Start: 2017-10-03 | End: 2018-07-29

## 2017-10-02 RX ORDER — LANOLIN ALCOHOL/MO/W.PET/CERES
100 CREAM (GRAM) TOPICAL DAILY
Qty: 30 TABLET | Refills: 0 | Status: ON HOLD | OUTPATIENT
Start: 2017-10-02 | End: 2018-06-29 | Stop reason: HOSPADM

## 2017-10-02 RX ORDER — FOLIC ACID 1 MG/1
1 TABLET ORAL DAILY
Qty: 30 TABLET | Refills: 0 | Status: ON HOLD | OUTPATIENT
Start: 2017-10-02 | End: 2018-06-29 | Stop reason: HOSPADM

## 2017-10-02 RX ORDER — NICOTINE 21 MG/24HR
1 PATCH, TRANSDERMAL 24 HOURS TRANSDERMAL DAILY
Qty: 30 PATCH | Refills: 0 | Status: ON HOLD | OUTPATIENT
Start: 2017-10-02 | End: 2018-06-29 | Stop reason: HOSPADM

## 2017-10-02 RX ORDER — NALTREXONE HYDROCHLORIDE 50 MG/1
25 TABLET, FILM COATED ORAL
Status: DISCONTINUED | OUTPATIENT
Start: 2017-10-03 | End: 2017-10-02 | Stop reason: HOSPADM

## 2017-10-02 RX ORDER — TRAZODONE HYDROCHLORIDE 150 MG/1
150 TABLET ORAL NIGHTLY PRN
Qty: 30 TABLET | Refills: 0 | Status: ON HOLD | OUTPATIENT
Start: 2017-10-02 | End: 2018-06-29 | Stop reason: HOSPADM

## 2017-10-02 RX ADMIN — METOPROLOL TARTRATE 25 MG: 25 TABLET ORAL at 14:30

## 2017-10-02 RX ADMIN — RISPERIDONE 1 MG: 1 TABLET, FILM COATED ORAL at 08:24

## 2017-10-02 RX ADMIN — Medication 100 MG: at 08:24

## 2017-10-02 RX ADMIN — FOLIC ACID 1 MG: 1 TABLET ORAL at 08:24

## 2017-10-02 NOTE — PLAN OF CARE
Problem: Altered Mood, Depressive Behavior  Goal: STG-Knowledge of positive coping patterns  Outcome: Ongoing                                                                      Group Therapy Note     Date: 10/2/2017  Start Time: 1000  End Time:  7806  Number of Participants: 18     Type of Group: Psychoeducation     Wellness Binder Information  Module Name:  Staying well  Session Number:  1     Patient's Goal:  Daily maintenance and coping skills     Notes:  Pt acknowledged use of positive coping skills daily to help maintain wellness.      Status After Intervention:  Improved     Participation Level: Interactive     Participation Quality: Appropriate, Attentive and Sharing        Speech:  normal        Thought Process/Content: Logical        Affective Functioning: Congruent        Mood: congruent        Level of consciousness:  Alert, Oriented x4 and Attentive        Response to Learning: Able to verbalize current knowledge/experience        Endings: None Reported     Modes of Intervention: Education        Discipline Responsible: Psychoeducational Specialist        Signature:  Iesha Rodas

## 2017-10-02 NOTE — PROGRESS NOTES
Admission Note      Reason for admission/Target Symptom: increasing depression and SI  Diagnoses:  UDS: Negative- Benzo- Opiate- Amphetamines- THC- Cocaine- Barbs  BAL: Negative     SW met with treatment team to discuss patient treatment and follow up care plans. Pt was admitted to [facility]. Treatment team has  identified patients discharge needs as medication management and therapy with [address] [place] [address]. Pt validated need for appointments. Pt was also provided a handout of contact information for drug and alcohol treatment centers and other community support service such as [facility] .
Chart check completed, legal documents and orders reviewed at this time.     Saulo Brown RN
Chart check completed, legal documents and orders reviewed at this time.     Umberto Escobar RN
Collateral obtained from: patients mom [phone]    Immediate Stressors & Time Episode Began: patients has been on a downward spiral for the last few weeks and had gotten a car and was going to his parents house. Patient reported to his mother that he had 80 xanax and his mother was very concerned but she didn't believe that the patient was being honest with his report. Patient finally fell asleep and was laying on the couch and about 5 am the patient fell into a glass coffee table and his parents called 911. Patient is currently work at [employer] and has been working odd hours. Diagnosis/Hx of compliance with meds: Diagnosed with Depression and Anxiety and has not been taking his medication as directed. Tx Hx/Past hospitalizations:  Previous admission to [facility] 3 previous times    Family hx of psychiatric issues:     Substance Abuse: alcohol since the age of 10, patient has been to [place]    Pending Legal: Several DUI's and is convicted felon    Safety Issues (Weapons? Hx of attempts): no guns are in the house    Support system/Medication Managed by: The importance of medication management and locking extra medication in a secured location was explained and reccommended to collateral.     Additional Info: patient lives alone and lives near his parents there are weapons are in his parents are very supportive.
Dr Viv Johnson called while I was in the ER to inquire about the status of Franco Larios admission and decision not to transfer to [facility]. I phoned [facility] office and they advised they had no paperwork regarding patient. I advised them after the orders had been signed, there was a change in admission plans and wanted them to be aware pt was not transferred.
Group Therapy Note    Date: 10/1/17Start Time: 2100  End Time:  2130  Number of Participants: 14    Type of Group: Wrap-Up    Wellness Binder Information  Module Name:    Session Number:      Patient's Goal:  See self inventory    Notes:  Pt participated in group and filled out wrap up sheet    Status After Intervention:  Improved    Participation Level:  Active Listener    Participation Quality: Appropriate and Attentive      Speech:  normal      Thought Process/Content: Logical      Affective Functioning: Congruent      Mood: fine      Level of consciousness:  Alert and Attentive      Response to Learning: Able to retain information and Capable of insight      Endings: None Reported    Modes of Intervention: Education, Support and Socialization      Discipline Responsible: Behavorial Health Tech      Signature:  Bimal Mckinney
Group Therapy Note    Date: 10/2/2017  Start Time: 0900  End Time:  0930  Number of Participants: 19    Type of Group: Community Meeting    Patient's Goal:  none    Notes:  None     Participation Level:  Active Listener    Participation Quality: Appropriate      Speech:  normal      Thought Process/Content: Logical      Affective Functioning: Congruent      Mood: euthymic      Level of consciousness:  Alert      Response to Learning: Progressing to goal      Discipline Responsible: [facility] [address] [employer]      Signature:  Eliezer Cason
Group Therapy Note    Date: 9/30/17Start Time: 2100  End Time:  2130  Number of Participants: 14    Type of Group: Wrap-Up    Wellness Binder Information  Module Name:    Session Number:      Patient's Goal:  See self inventory    Notes:  Pt participated in group and filled out wrap up sheet    Status After Intervention:  Improved    Participation Level:  Active Listener    Participation Quality: Appropriate and Attentive      Speech:  normal      Thought Process/Content: Logical      Affective Functioning: Congruent      Mood: fine      Level of consciousness:  Alert and Attentive      Response to Learning: Able to retain information and Capable of insight      Endings: None Reported    Modes of Intervention: Education, Support and Socialization      Discipline Responsible: Behavorial Health Tech      Signature:  Fernandez Knowles
Group Therapy Note    Start Time: 0930  End Time:  1000  Number of Participants: 16    Type of Group: Community Meeting      Patient's Goal:  \"Sleep exit plan\"    Notes:  Pt. Was Cooperative and Attentive      Participation Level:  Active Listener    Participation Quality: Appropriate and Attentive      Speech:  normal      Thought Process/Content: Logical      Affective Functioning: Congruent      Mood: calm      Level of consciousness:  Alert      Modes of Intervention: Support      Discipline Responsible:Behavioral Health Tech I      Signature:  Canary End
Group Therapy Note:    Date: 09/29/17  Time: 20:00 to 20:30    Type of Group:  Wrap-Up Group    Patient Goals:  Patient did not participate in group session. Reported to nurse patient did not participate in group session. Encouragement given per staff.       Notes: N/A      [facility]
PATIENT IS UP AND DRESSED FOR BREAKFAST, GROUP AND MEDICATION. HIS HYGIENE AND GROOMING IS GOOD. HE DENIES SUICIDAL AND HOMICIDAL IDEATION. HE REPORTS HIS DEPRESSION AND ANXIETY ARE IMPROVING. HE REPORTS HE SLEPT LAST NIGHT. HE IS HOPING TO BE DISCHARGED Monday. HE REPORTS HE IS HAS TO GET [facility] Tuesday. HE IS CALM AND COOPERATIVE. HE IS SOCIAL WITH STAFF AND PEERS. HE TALKS ABOUT HIS BOUTS WITH HIS ALCOHOLISM AND HIS RELAPSES.
Patient is A/O x 4, is pleasant and cooperative, content to sit in dayroom interacting well with peers, made jokes with this author during the mental health interview, reported decreasing detox symptoms, CIWA rated 8, patient requested PRN Librium for anxiety level, patient reported depression rated 4/10 and anxiety 6/10, denied SI/HI/HA, expressed optimism for medication regiment to control his depression level, patient reported mild nausea about 30 minutes after administration of PRN Librium, patient requested PRN sleep aid of Trazodone for insomnia, q15min observation in place, will continue to monitor.
Progress Note  Eduardo Mishra  10/1/2017 9:56 PM  Subjective:   Admit Date:   9/27/2017      CC/ADMIT DX:       Interval History:   Reviewed overnight events and nursing notes. He is c/o stomach cramping. No N/V or issues with BM's. I have reviewed all labs/diagnostics from the last 24hrs. ROS:   I have done a 10 point ROS and all are negative, except what is mentioned in the HPI. DIET GENERAL;    Medications:       [START ON 10/2/2017] risperiDONE  1 mg Oral Daily    vitamin D  50,000 Units Oral Weekly    VORTIoxetine HBr  20 mg Oral Daily    naltrexone  50 mg Oral Daily with breakfast    folic acid  1 mg Oral Daily    thiamine  100 mg Oral Daily    nicotine  1 patch Transdermal Daily           Objective:   Vitals: BP (!) 147/88  Pulse 78  Temp 98.6 °F (37 °C) (Temporal)   Resp 20  Ht 6' 4\" (1.93 m)  Wt 185 lb (83.9 kg)  SpO2 100%  BMI 22.52 kg/m2 No intake or output data in the 24 hours ending 10/01/17 2156  General appearance: alert and cooperative with exam  Lungs: clear to auscultation bilaterally  Heart: RRR  Abdomen: soft, non-tender; bowel sounds normal; no masses,  no organomegaly  Extremities: extremities normal, atraumatic, no cyanosis or edema  Neurologic:  No obvious focal neurologic deficits. Assessment and Plan:   Principal Problem:    Severe episode of recurrent major depressive disorder, without psychotic features ([facility] [address] [facility]  Active Problems:    Cigarette smoker    Benzodiazepine overdose    Vit D Def   Elevated BP   Abdominal Cramping    Plan:  1. Continue present medication(s)   2. Supportive care for GI symptoms  3. Follow with Psych      Discharge planning:   home     Reviewed treatment plans with the patient and/or family.              Electronically signed by Polina Marie MD on 10/1/2017 at 9:56 PM
Pt awoke and up in day area for water. Spoke with pt at this time, he reports that he is feeling tired, but \"better. \" Denies current si, hi, avh. Pt reports he sleeps okay at night. Pt has a blunted affect, but cooperative and calm. Pt then went back to bed.
Pt had vomited moderate amount of undigested food. Reports he has no warning of feeling nauseated at all. Now feels okay, denies pain, denies nausea.
Pt has been sleeping, no distress noted.
Pt is resting in bed with eyes closed. NO distress noted. Pt had reported he was very tired and wanted to sleep.
normal  Language: Naming: intact; Word Finding: intact  Conversation no evidence of delusions  Behavior:  Cooperative  Mood: improved  Affect: congruent with mood and full range  Thought Content: negative delusions, hallucinations, obsessions, homicidal and suicidal  Thought Process: linear, goal directed and coherent  Judgement Insight:  improved and appropriate  Gait and Station:normal gait and station   Musculoskeletal:    Assesment:   1. Benzodiazepine overdose, intentional self-harm, initial encounter    2. Depression, unspecified depression type    3. Acute alcoholic intoxication, uncomplicated ([facility]        Plan:  1. The patient continues to need, on a daily basis, active treatment furnished directly by or requiring the supervision of inpatient psychiatric facility personnel. 2. Same medications for now. 3. OK to change Risperdal to the morning  4.  Supportive therapy offered    Meenu Clayton       [unfilled]

## 2017-10-02 NOTE — DISCHARGE SUMMARY
and Concentration:  Adequate        Patient Instructions:   Discharge Medication List as of 10/2/2017  2:44 PM      START taking these medications    Details   naltrexone (DEPADE) 50 MG tablet Take 0.5 tablets by mouth daily (with breakfast), Disp-15 tablet, [id number]      folic acid (FOLVITE) 1 MG tablet Take 1 tablet by mouth daily, Disp-30 tablet, R-0Print      vitamin B-1 100 MG tablet Take 1 tablet by mouth daily, Disp-30 tablet, R-0Print      nicotine (NICODERM CQ) 21 MG/24HR Place 1 patch onto the skin daily, Disp-30 patch, R-0Print      vitamin D (ERGOCALCIFEROL) 06310 units capsule Take 1 capsule by mouth once a week for 11 doses, Disp-11 capsule, R-0Print         CONTINUE these medications which have CHANGED    Details   traZODone (DESYREL) 150 MG tablet Take 1 tablet by mouth nightly as needed for Sleep, Disp-30 tablet, R-0Print      VORTIoxetine HBr (TRINTELLIX) 20 MG TABS tablet Take 20 mg by mouth daily, Disp-30 tablet, R-0Print         CONTINUE these medications which have NOT CHANGED    Details   OLANZapine (ZYPREXA) 10 MG tablet Take 10 mg by mouth dailyHistorical Med      metoprolol (LOPRESSOR) 50 MG tablet Take 25 mg by mouth 2 times daily Historical Med      diclofenac (VOLTAREN) 75 MG EC tablet Take 75 mg by mouth 2 times dailyHistorical Med      Nutritional Supplements (VIGRAPLEX PO) Take by mouth as neededHistorical Med         STOP taking these medications       ALPRAZolam (XANAX) 0.25 MG tablet Comments:   Reason for Stopping:         amLODIPine (NORVASC) 10 MG tablet Comments:   Reason for Stopping:         escitalopram (LEXAPRO) 20 MG tablet Comments:   Reason for Stopping:             Activity: activity as tolerated  Diet: regular diet  Wound Care: none needed    Follow-up with   PCP in 2 weeks.   [facility] 10/4/17 AT 8AM AND 10/5/17 AT 11AM    Time worked: Less than 30 minutes    Participation:good    Electronically signed by Vonne Gottron, APRN on 10/2/2017 at 4:58 PM

## 2017-10-04 LAB
EKG P AXIS: 76 DEGREES
EKG P-R INTERVAL: 184 MS
EKG Q-T INTERVAL: 396 MS
EKG QRS DURATION: 90 MS
EKG QTC CALCULATION (BAZETT): 427 MS
EKG T AXIS: 58 DEGREES

## 2017-11-07 PROCEDURE — 88305 TISSUE EXAM BY PATHOLOGIST: CPT | Performed by: INTERNAL MEDICINE

## 2017-11-08 ENCOUNTER — LAB REQUISITION (OUTPATIENT)
Dept: LAB | Facility: HOSPITAL | Age: 47
End: 2017-11-08

## 2017-11-08 DIAGNOSIS — Z00.00 ROUTINE GENERAL MEDICAL EXAMINATION AT A HEALTH CARE FACILITY: ICD-10-CM

## 2017-11-10 LAB
CYTO UR: NORMAL
LAB AP CASE REPORT: NORMAL
LAB AP CLINICAL INFORMATION: NORMAL
Lab: NORMAL
PATH REPORT.FINAL DX SPEC: NORMAL
PATH REPORT.GROSS SPEC: NORMAL

## 2017-12-10 ENCOUNTER — APPOINTMENT (OUTPATIENT)
Dept: CT IMAGING | Age: 47
End: 2017-12-10
Payer: COMMERCIAL

## 2017-12-10 ENCOUNTER — HOSPITAL ENCOUNTER (EMERGENCY)
Age: 47
Discharge: HOME | End: 2017-12-10
Attending: EMERGENCY MEDICINE
Payer: COMMERCIAL

## 2017-12-10 VITALS
DIASTOLIC BLOOD PRESSURE: 84 MMHG | OXYGEN SATURATION: 98 % | RESPIRATION RATE: 16 BRPM | WEIGHT: 175 LBS | BODY MASS INDEX: 21.31 KG/M2 | HEART RATE: 98 BPM | SYSTOLIC BLOOD PRESSURE: 134 MMHG | HEIGHT: 76 IN | TEMPERATURE: 98.2 F

## 2017-12-10 DIAGNOSIS — R11.2 NON-INTRACTABLE VOMITING WITH NAUSEA, UNSPECIFIED VOMITING TYPE: Primary | ICD-10-CM

## 2017-12-10 DIAGNOSIS — K29.00 ACUTE GASTRITIS, PRESENCE OF BLEEDING UNSPECIFIED, UNSPECIFIED GASTRITIS TYPE: ICD-10-CM

## 2017-12-10 DIAGNOSIS — F10.10 ALCOHOL ABUSE: ICD-10-CM

## 2017-12-10 LAB
ALBUMIN SERPL-MCNC: 5 G/DL (ref 3.5–5.2)
ALP BLD-CCNC: 133 U/L (ref 40–130)
ALT SERPL-CCNC: 48 U/L (ref 5–41)
AMYLASE: 29 U/L (ref 28–100)
ANION GAP SERPL CALCULATED.3IONS-SCNC: 11 MMOL/L (ref 7–19)
APTT: 33.6 SEC (ref 26–36.2)
AST SERPL-CCNC: 42 U/L (ref 5–40)
BASOPHILS ABSOLUTE: 0 K/UL (ref 0–0.2)
BASOPHILS RELATIVE PERCENT: 0.5 % (ref 0–1)
BILIRUB SERPL-MCNC: 0.5 MG/DL (ref 0.2–1.2)
BILIRUBIN URINE: NEGATIVE
BLOOD, URINE: NEGATIVE
BUN BLDV-MCNC: 14 MG/DL (ref 6–20)
CALCIUM SERPL-MCNC: 9.8 MG/DL (ref 8.6–10)
CHLORIDE BLD-SCNC: 95 MMOL/L (ref 98–111)
CLARITY: CLEAR
CO2: 30 MMOL/L (ref 22–29)
COLOR: YELLOW
CREAT SERPL-MCNC: 1.2 MG/DL (ref 0.5–1.2)
EOSINOPHILS ABSOLUTE: 0 K/UL (ref 0–0.6)
EOSINOPHILS RELATIVE PERCENT: 0.7 % (ref 0–5)
ETHANOL: 40 MG/DL (ref 0–0.08)
GFR NON-AFRICAN AMERICAN: >60
GLUCOSE BLD-MCNC: 125 MG/DL (ref 74–109)
GLUCOSE URINE: NEGATIVE MG/DL
HCT VFR BLD CALC: 48.9 % (ref 42–52)
HEMOGLOBIN: 16.5 G/DL (ref 14–18)
INR BLD: 0.96 (ref 0.88–1.18)
KETONES, URINE: NEGATIVE MG/DL
LEUKOCYTE ESTERASE, URINE: NEGATIVE
LIPASE: 24 U/L (ref 13–60)
LYMPHOCYTES ABSOLUTE: 1.1 K/UL (ref 1.1–4.5)
LYMPHOCYTES RELATIVE PERCENT: 25.3 % (ref 20–40)
MCH RBC QN AUTO: 30.9 PG (ref 27–31)
MCHC RBC AUTO-ENTMCNC: 33.7 G/DL (ref 33–37)
MCV RBC AUTO: 91.6 FL (ref 80–94)
MONOCYTES ABSOLUTE: 0.6 K/UL (ref 0–0.9)
MONOCYTES RELATIVE PERCENT: 14.4 % (ref 0–10)
NEUTROPHILS ABSOLUTE: 2.6 K/UL (ref 1.5–7.5)
NEUTROPHILS RELATIVE PERCENT: 58.9 % (ref 50–65)
NITRITE, URINE: NEGATIVE
PDW BLD-RTO: 13.6 % (ref 11.5–14.5)
PH UA: 6
PLATELET # BLD: 224 K/UL (ref 130–400)
PMV BLD AUTO: 9.1 FL (ref 9.4–12.4)
POTASSIUM SERPL-SCNC: 3.6 MMOL/L (ref 3.5–5)
PROTEIN UA: ABNORMAL MG/DL
PROTHROMBIN TIME: 12.7 SEC (ref 12–14.6)
RBC # BLD: 5.34 M/UL (ref 4.7–6.1)
SODIUM BLD-SCNC: 136 MMOL/L (ref 136–145)
SPECIFIC GRAVITY UA: 1.04
TOTAL PROTEIN: 8.1 G/DL (ref 6.6–8.7)
UROBILINOGEN, URINE: 0.2 E.U./DL
WBC # BLD: 4.4 K/UL (ref 4.8–10.8)

## 2017-12-10 PROCEDURE — 80053 COMPREHEN METABOLIC PANEL: CPT

## 2017-12-10 PROCEDURE — 6370000000 HC RX 637 (ALT 250 FOR IP): Performed by: EMERGENCY MEDICINE

## 2017-12-10 PROCEDURE — 81003 URINALYSIS AUTO W/O SCOPE: CPT

## 2017-12-10 PROCEDURE — 6360000004 HC RX CONTRAST MEDICATION: Performed by: EMERGENCY MEDICINE

## 2017-12-10 PROCEDURE — 85025 COMPLETE CBC W/AUTO DIFF WBC: CPT

## 2017-12-10 PROCEDURE — 36415 COLL VENOUS BLD VENIPUNCTURE: CPT

## 2017-12-10 PROCEDURE — 82150 ASSAY OF AMYLASE: CPT

## 2017-12-10 PROCEDURE — G0480 DRUG TEST DEF 1-7 CLASSES: HCPCS

## 2017-12-10 PROCEDURE — 83690 ASSAY OF LIPASE: CPT

## 2017-12-10 PROCEDURE — 6360000002 HC RX W HCPCS: Performed by: EMERGENCY MEDICINE

## 2017-12-10 PROCEDURE — 85610 PROTHROMBIN TIME: CPT

## 2017-12-10 PROCEDURE — 74177 CT ABD & PELVIS W/CONTRAST: CPT

## 2017-12-10 PROCEDURE — 2500000003 HC RX 250 WO HCPCS: Performed by: EMERGENCY MEDICINE

## 2017-12-10 PROCEDURE — 96374 THER/PROPH/DIAG INJ IV PUSH: CPT

## 2017-12-10 PROCEDURE — 85730 THROMBOPLASTIN TIME PARTIAL: CPT

## 2017-12-10 PROCEDURE — 99284 EMERGENCY DEPT VISIT MOD MDM: CPT | Performed by: EMERGENCY MEDICINE

## 2017-12-10 PROCEDURE — 96375 TX/PRO/DX INJ NEW DRUG ADDON: CPT

## 2017-12-10 PROCEDURE — 2580000003 HC RX 258: Performed by: EMERGENCY MEDICINE

## 2017-12-10 PROCEDURE — 99284 EMERGENCY DEPT VISIT MOD MDM: CPT

## 2017-12-10 RX ORDER — FAMOTIDINE 20 MG/1
20 TABLET, FILM COATED ORAL 2 TIMES DAILY
Qty: 60 TABLET | Refills: 3 | Status: SHIPPED | OUTPATIENT
Start: 2017-12-10 | End: 2018-07-29

## 2017-12-10 RX ORDER — MORPHINE SULFATE 10 MG/ML
4 INJECTION, SOLUTION INTRAMUSCULAR; INTRAVENOUS ONCE
Status: COMPLETED | OUTPATIENT
Start: 2017-12-10 | End: 2017-12-10

## 2017-12-10 RX ORDER — 0.9 % SODIUM CHLORIDE 0.9 %
1000 INTRAVENOUS SOLUTION INTRAVENOUS ONCE
Status: DISCONTINUED | OUTPATIENT
Start: 2017-12-10 | End: 2017-12-10 | Stop reason: HOSPADM

## 2017-12-10 RX ORDER — ONDANSETRON 4 MG/1
4 TABLET, ORALLY DISINTEGRATING ORAL EVERY 8 HOURS PRN
Qty: 15 TABLET | Refills: 0 | Status: ON HOLD | OUTPATIENT
Start: 2017-12-10 | End: 2018-06-29 | Stop reason: HOSPADM

## 2017-12-10 RX ORDER — SUCRALFATE ORAL 1 G/10ML
1 SUSPENSION ORAL 4 TIMES DAILY
Qty: 1200 ML | Refills: 3 | Status: SHIPPED | OUTPATIENT
Start: 2017-12-10 | End: 2018-07-29

## 2017-12-10 RX ORDER — ONDANSETRON 2 MG/ML
4 INJECTION INTRAMUSCULAR; INTRAVENOUS ONCE
Status: COMPLETED | OUTPATIENT
Start: 2017-12-10 | End: 2017-12-10

## 2017-12-10 RX ORDER — PROMETHAZINE HYDROCHLORIDE 25 MG/1
25 TABLET ORAL EVERY 6 HOURS PRN
Qty: 15 TABLET | Refills: 0 | Status: SHIPPED | OUTPATIENT
Start: 2017-12-10 | End: 2017-12-17

## 2017-12-10 RX ADMIN — IOPAMIDOL 90 ML: 755 INJECTION, SOLUTION INTRAVENOUS at 12:27

## 2017-12-10 RX ADMIN — MORPHINE SULFATE 4 MG: 10 INJECTION INTRAVENOUS at 11:31

## 2017-12-10 RX ADMIN — ONDANSETRON 4 MG: 2 INJECTION INTRAMUSCULAR; INTRAVENOUS at 11:31

## 2017-12-10 RX ADMIN — FOLIC ACID: 5 INJECTION, SOLUTION INTRAMUSCULAR; INTRAVENOUS; SUBCUTANEOUS at 11:56

## 2017-12-10 RX ADMIN — Medication 30 ML: at 11:31

## 2017-12-10 ASSESSMENT — ENCOUNTER SYMPTOMS
DIARRHEA: 1
BACK PAIN: 0
NAUSEA: 0
SORE THROAT: 0
SHORTNESS OF BREATH: 0
ABDOMINAL PAIN: 1
VOMITING: 1
RHINORRHEA: 0

## 2017-12-10 ASSESSMENT — PAIN SCALES - GENERAL
PAINLEVEL_OUTOF10: 6
PAINLEVEL_OUTOF10: 6

## 2017-12-10 NOTE — ED PROVIDER NOTES
is normal.   Nursing note and vitals reviewed. DIAGNOSTIC RESULTS     EKG: All EKG's are interpreted by the Emergency Department Physician who either signs or Co-signs this chart in the absence of a cardiologist.      RADIOLOGY:   Non-plain film images such as CT, Ultrasound and MRI are read by the radiologist. Plain radiographic images are visualized and preliminarily interpreted by the emergency physician with the below findings:      Interpretation per the Radiologist below, if available at the time of this note:    CT ABDOMEN PELVIS W IV CONTRAST Additional Contrast? None   Final Result   1. Mild fluid distention of the stomach, significance uncertain. Minimal diverticulosis. No CT evidence of acute bowel pathology   otherwise. 2. Probable nephrogenic cysts. 3. Mild hepatic steatosis questioned. Signed by Dr Temo Aguillon on 12/10/2017 12:55 PM            ED BEDSIDE ULTRASOUND:   Performed by ED Physician - none    LABS:  Labs Reviewed   CBC WITH AUTO DIFFERENTIAL - Abnormal; Notable for the following:        Result Value    WBC 4.4 (*)     MPV 9.1 (*)     Monocytes % 14.4 (*)     All other components within normal limits   COMPREHENSIVE METABOLIC PANEL - Abnormal; Notable for the following:     Chloride 95 (*)     CO2 30 (*)     Glucose 125 (*)     Alkaline Phosphatase 133 (*)     ALT 48 (*)     AST 42 (*)     All other components within normal limits   URINALYSIS - Abnormal; Notable for the following:     Protein, UA TRACE (*)     All other components within normal limits   AMYLASE   LIPASE   APTT   PROTIME-INR   ETHANOL   URINE DRUG SCREEN       All other labs were within normal range or not returned as of this dictation.     EMERGENCY DEPARTMENT COURSE and DIFFERENTIAL DIAGNOSIS/MDM:   Vitals:    Vitals:    12/10/17 1102 12/10/17 1136 12/10/17 1302 12/10/17 1457   BP: (!) 181/106 (!) 148/105 (!) 141/95 134/84   Pulse: 113 108 100 98   Resp: 18 20 16 16   Temp: 98.2 °F (36.8 °C)      SpO2: 98% 96% 98%    Weight: 175 lb (79.4 kg)      Height: 6' 4\" (1.93 m)          MDM  Given pt's intermittent black stool and recent egd showing what sounds like gastritis, will start some h2 blocker and carafate in addition to the PPI that he is already taking. Pt states he has already d/c the voltaren. Counseled alcohol use makes gastritis worse. Pt feels much better after GI cocktail. His hemoglobin is stable and no melena on exam here today. CONSULTS:  None    PROCEDURES:  Unless otherwise noted below, none     Procedures    FINAL IMPRESSION      1. Non-intractable vomiting with nausea, unspecified vomiting type    2. Acute gastritis, presence of bleeding unspecified, unspecified gastritis type    3. Alcohol abuse          DISPOSITION/PLAN   DISPOSITION Decision to Discharge    PATIENT REFERRED TO:  No follow-up provider specified.     DISCHARGE MEDICATIONS:  Discharge Medication List as of 12/10/2017  2:35 PM      START taking these medications    Details   sucralfate (CARAFATE) 1 GM/10ML suspension Take 10 mLs by mouth 4 times daily, Disp-1200 mL, R-3Print      famotidine (PEPCID) 20 MG tablet Take 1 tablet by mouth 2 times daily, Disp-60 tablet, R-3Print      promethazine (PHENERGAN) 25 MG tablet Take 1 tablet by mouth every 6 hours as needed for Nausea, Disp-15 tablet, R-0Print      ondansetron (ZOFRAN ODT) 4 MG disintegrating tablet Take 1 tablet by mouth every 8 hours as needed for Nausea or Vomiting, Disp-15 tablet, R-0Print                (Please note that portions of this note were completed with a voice recognition program.  Efforts were made to edit the dictations but occasionally words are mis-transcribed.)    Marisol Carrillo MD (electronically signed)  Attending Emergency Physician         Marisol Carrillo MD  12/10/17 1831

## 2018-06-08 ENCOUNTER — TELEPHONE (OUTPATIENT)
Dept: NEUROSURGERY | Age: 48
End: 2018-06-08

## 2018-06-26 ENCOUNTER — HOSPITAL ENCOUNTER (INPATIENT)
Age: 48
LOS: 2 days | Discharge: HOME | DRG: 885 | End: 2018-06-29
Attending: EMERGENCY MEDICINE | Admitting: PSYCHIATRY & NEUROLOGY
Payer: MEDICAID

## 2018-06-26 DIAGNOSIS — R45.851 SUICIDAL IDEATION: Primary | ICD-10-CM

## 2018-06-26 DIAGNOSIS — F10.920 ACUTE ALCOHOLIC INTOXICATION WITHOUT COMPLICATION (HCC): ICD-10-CM

## 2018-06-26 LAB
ALBUMIN SERPL-MCNC: 4.4 G/DL (ref 3.5–5.2)
ALP BLD-CCNC: 89 U/L (ref 40–130)
ALT SERPL-CCNC: 68 U/L (ref 5–41)
AMPHETAMINE SCREEN, URINE: NEGATIVE
ANION GAP SERPL CALCULATED.3IONS-SCNC: 14 MMOL/L (ref 7–19)
AST SERPL-CCNC: 45 U/L (ref 5–40)
BARBITURATE SCREEN URINE: NEGATIVE
BASOPHILS ABSOLUTE: 0 K/UL (ref 0–0.2)
BASOPHILS RELATIVE PERCENT: 0.4 % (ref 0–1)
BENZODIAZEPINE SCREEN, URINE: NEGATIVE
BILIRUB SERPL-MCNC: <0.2 MG/DL (ref 0.2–1.2)
BILIRUBIN URINE: NEGATIVE
BLOOD, URINE: NEGATIVE
BUN BLDV-MCNC: 5 MG/DL (ref 6–20)
CALCIUM SERPL-MCNC: 8.5 MG/DL (ref 8.6–10)
CANNABINOID SCREEN URINE: NEGATIVE
CHLORIDE BLD-SCNC: 92 MMOL/L (ref 98–111)
CLARITY: CLEAR
CO2: 25 MMOL/L (ref 22–29)
COCAINE METABOLITE SCREEN URINE: NEGATIVE
COLOR: YELLOW
CREAT SERPL-MCNC: 0.7 MG/DL (ref 0.5–1.2)
EOSINOPHILS ABSOLUTE: 0 K/UL (ref 0–0.6)
EOSINOPHILS RELATIVE PERCENT: 0.4 % (ref 0–5)
ETHANOL: 302 MG/DL (ref 0–0.08)
GFR NON-AFRICAN AMERICAN: >60
GLUCOSE BLD-MCNC: 103 MG/DL (ref 74–109)
GLUCOSE URINE: NEGATIVE MG/DL
HCT VFR BLD CALC: 38.7 % (ref 42–52)
HEMOGLOBIN: 13.4 G/DL (ref 14–18)
KETONES, URINE: NEGATIVE MG/DL
LEUKOCYTE ESTERASE, URINE: NEGATIVE
LYMPHOCYTES ABSOLUTE: 1.2 K/UL (ref 1.1–4.5)
LYMPHOCYTES RELATIVE PERCENT: 25.2 % (ref 20–40)
Lab: NORMAL
MCH RBC QN AUTO: 31.6 PG (ref 27–31)
MCHC RBC AUTO-ENTMCNC: 34.6 G/DL (ref 33–37)
MCV RBC AUTO: 91.3 FL (ref 80–94)
MONOCYTES ABSOLUTE: 0.3 K/UL (ref 0–0.9)
MONOCYTES RELATIVE PERCENT: 7 % (ref 0–10)
NEUTROPHILS ABSOLUTE: 3.1 K/UL (ref 1.5–7.5)
NEUTROPHILS RELATIVE PERCENT: 66.6 % (ref 50–65)
NITRITE, URINE: NEGATIVE
OPIATE SCREEN URINE: NEGATIVE
PDW BLD-RTO: 13.2 % (ref 11.5–14.5)
PH UA: 6
PLATELET # BLD: 248 K/UL (ref 130–400)
PMV BLD AUTO: 8.6 FL (ref 9.4–12.4)
POTASSIUM SERPL-SCNC: 4.3 MMOL/L (ref 3.5–5)
PROTEIN UA: NEGATIVE MG/DL
RBC # BLD: 4.24 M/UL (ref 4.7–6.1)
SODIUM BLD-SCNC: 131 MMOL/L (ref 136–145)
SPECIFIC GRAVITY UA: 1.01
TOTAL PROTEIN: 6.9 G/DL (ref 6.6–8.7)
URINE REFLEX TO CULTURE: NORMAL
UROBILINOGEN, URINE: 0.2 E.U./DL
WBC # BLD: 4.6 K/UL (ref 4.8–10.8)

## 2018-06-26 PROCEDURE — 6360000002 HC RX W HCPCS: Performed by: EMERGENCY MEDICINE

## 2018-06-26 PROCEDURE — 2580000003 HC RX 258: Performed by: EMERGENCY MEDICINE

## 2018-06-26 PROCEDURE — 99285 EMERGENCY DEPT VISIT HI MDM: CPT

## 2018-06-26 PROCEDURE — 80307 DRUG TEST PRSMV CHEM ANLYZR: CPT

## 2018-06-26 PROCEDURE — 2500000003 HC RX 250 WO HCPCS: Performed by: EMERGENCY MEDICINE

## 2018-06-26 PROCEDURE — 36415 COLL VENOUS BLD VENIPUNCTURE: CPT

## 2018-06-26 PROCEDURE — G0480 DRUG TEST DEF 1-7 CLASSES: HCPCS

## 2018-06-26 PROCEDURE — 80053 COMPREHEN METABOLIC PANEL: CPT

## 2018-06-26 PROCEDURE — 85025 COMPLETE CBC W/AUTO DIFF WBC: CPT

## 2018-06-26 PROCEDURE — 96375 TX/PRO/DX INJ NEW DRUG ADDON: CPT

## 2018-06-26 PROCEDURE — 96365 THER/PROPH/DIAG IV INF INIT: CPT

## 2018-06-26 PROCEDURE — 81003 URINALYSIS AUTO W/O SCOPE: CPT

## 2018-06-26 RX ORDER — LORAZEPAM 2 MG/ML
1 INJECTION INTRAMUSCULAR ONCE
Status: COMPLETED | OUTPATIENT
Start: 2018-06-26 | End: 2018-06-26

## 2018-06-26 RX ORDER — ONDANSETRON 2 MG/ML
4 INJECTION INTRAMUSCULAR; INTRAVENOUS ONCE
Status: COMPLETED | OUTPATIENT
Start: 2018-06-26 | End: 2018-06-26

## 2018-06-26 RX ADMIN — LORAZEPAM 1 MG: 2 INJECTION INTRAMUSCULAR; INTRAVENOUS at 21:13

## 2018-06-26 RX ADMIN — ONDANSETRON HYDROCHLORIDE 4 MG: 2 INJECTION, SOLUTION INTRAMUSCULAR; INTRAVENOUS at 21:13

## 2018-06-26 RX ADMIN — FOLIC ACID: 5 INJECTION, SOLUTION INTRAMUSCULAR; INTRAVENOUS; SUBCUTANEOUS at 13:02

## 2018-06-26 NOTE — ED PROVIDER NOTES
[facility] 6 ADULT [facility]  eMERGENCY dEPARTMENT eNCOUnter      Pt Name: Geraldine Savage  MRN: [id number]  [place] 1970  Date of evaluation: 6/26/2018  Provider: Sarabjit Forbes MD    CHIEF COMPLAINT       Chief Complaint   Patient presents with    Mental Health Problem     Patient states \"I'm drunk. \" states he drinks everyday; having thoughts of SI with no plan, denies HI    Alcohol Intoxication         HISTORY OF PRESENT ILLNESS   (Location/Symptom, Timing/Onset, Context/Setting, Quality, Duration, Modifying Factors, Severity)  Note limiting factors. Geraldine Savage is a 46 y.o. male who presents to the emergency department      The history is provided by the patient. Mental Health Problem   Presenting symptoms: depression and suicidal thoughts (no plan)    Presenting symptoms: no hallucinations    Patient accompanied by:  Parent  Degree of incapacity (severity):  Severe  Onset quality:  Unable to specify  Timing:  Constant  Progression:  Worsening  Chronicity:  Recurrent  Context: alcohol use (daily for decades, \"2 pints\" last 24 hrs)    Context: not drug abuse, not noncompliant and not recent medication change    Relieved by: Antidepressants and antipsychotics  Worsened by:  Alcohol  Ineffective treatments:  None tried  Risk factors: hx of mental illness (depression) and hx of suicide attempts (past OD)        Nursing Notes were reviewed. REVIEW OF SYSTEMS    (2-9 systems for level 4, 10 or more for level 5)     Review of Systems   Psychiatric/Behavioral: Positive for suicidal ideas (no plan). Negative for hallucinations. All other systems reviewed and are negative. Except as noted above the remainder of the review of systems was reviewed and negative.        PAST MEDICAL HISTORY     Past Medical History:   Diagnosis Date    Chest pain 12/12/2011    Cigarette smoker 12/12/2011    Hypertension 12/12/2011         SURGICAL HISTORY       Past Surgical History:   Procedure Laterality Date    APPENDECTOMY      CHOLECYSTECTOMY  5/18/2006    Stigall    NECK SURGERY  7/15/2008    Hadi         CURRENT MEDICATIONS       Current Discharge Medication List      CONTINUE these medications which have NOT CHANGED    Details   sucralfate (CARAFATE) 1 GM/10ML suspension Take 10 mLs by mouth 4 times daily  Qty: 1200 mL, Refills: 3      famotidine (PEPCID) 20 MG tablet Take 1 tablet by mouth 2 times daily  Qty: 60 tablet, Refills: 3      ondansetron (ZOFRAN ODT) 4 MG disintegrating tablet Take 1 tablet by mouth every 8 hours as needed for Nausea or Vomiting  Qty: 15 tablet, Refills: 0      traZODone (DESYREL) 150 MG tablet Take 1 tablet by mouth nightly as needed for Sleep  Qty: 30 tablet, Refills: 0      VORTIoxetine HBr (TRINTELLIX) 20 MG TABS tablet Take 20 mg by mouth daily  Qty: 30 tablet, Refills: 0      naltrexone (DEPADE) 50 MG tablet Take 0.5 tablets by mouth daily (with breakfast)  Qty: 15 tablet, Refills: 0      folic acid (FOLVITE) 1 MG tablet Take 1 tablet by mouth daily  Qty: 30 tablet, Refills: 0      vitamin B-1 100 MG tablet Take 1 tablet by mouth daily  Qty: 30 tablet, Refills: 0      nicotine (NICODERM CQ) 21 MG/24HR Place 1 patch onto the skin daily  Qty: 30 patch, Refills: 0      vitamin D (ERGOCALCIFEROL) 37100 units capsule Take 1 capsule by mouth once a week for 11 doses  Qty: 11 capsule, Refills: 0      OLANZapine (ZYPREXA) 10 MG tablet Take 10 mg by mouth daily      metoprolol (LOPRESSOR) 50 MG tablet Take 25 mg by mouth 2 times daily       diclofenac (VOLTAREN) 75 MG EC tablet Take 75 mg by mouth 2 times daily      Nutritional Supplements (VIGRAPLEX PO) Take by mouth as needed             ALLERGIES     Patient has no known allergies.     FAMILY HISTORY       Family History   Problem Relation Age of Onset    Osteoarthritis Mother     Thyroid Disease Mother     Heart Failure Father     Heart Failure Maternal Grandmother     Heart Failure Paternal Grandmother     Cancer Maternal Grandfather           SOCIAL HISTORY       Social History     Social History    Marital status:      Spouse name: N/A    Number of children: N/A    Years of education: N/A     Social History Main Topics    Smoking status: Current Every Day Smoker     Packs/day: 1.00     Types: Cigarettes    Smokeless tobacco: Never Used    Alcohol use 0.0 oz/week      Comment: every day per patient, all types of alcohol    Drug use: No    Sexual activity: Not Asked     Other Topics Concern    None     Social History Narrative    None       SCREENINGS             PHYSICAL EXAM    (up to 7 for level 4, 8 or more for level 5)     ED Triage Vitals [06/26/18 1214]   BP Temp Temp src Pulse Resp SpO2 Height Weight   132/77 98.2 °F (36.8 °C) -- 89 20 95 % 6' 4\" (1.93 m) 180 lb (81.6 kg)       Physical Exam   Constitutional: He is oriented to person, place, and time. He appears well-developed and well-nourished. No distress. Smells of EtOH   HENT:   Head: Normocephalic and atraumatic. Mouth/Throat: Oropharynx is clear and moist.   Neck: Normal range of motion. Neck supple. Cardiovascular: Normal rate, regular rhythm and normal heart sounds. Pulmonary/Chest: Effort normal and breath sounds normal.   Musculoskeletal: He exhibits no edema. Neurological: He is alert and oriented to person, place, and time. No cranial nerve deficit. Apparent moderate intoxication, sl. slurred   Skin: Skin is warm and dry. He is not diaphoretic. Psychiatric:   Sad affect   Nursing note and vitals reviewed.       DIAGNOSTIC RESULTS     EKG: All EKG's are interpreted by the Emergency Department Physician who either signs or Co-signs this chart in the absence of a cardiologist.        RADIOLOGY:   Non-plain film images such as CT, Ultrasound and MRI are read by the radiologist. Plain radiographic images are visualized and preliminarily interpreted by the emergency physician with the below findings:        Interpretation per the Radiologist below, if available at the time of this note:    No orders to display         ED BEDSIDE ULTRASOUND:   Performed by ED Physician - none    LABS:  Labs Reviewed   CBC WITH AUTO DIFFERENTIAL - Abnormal; Notable for the following:        Result Value    WBC 4.6 (*)     RBC 4.24 (*)     Hemoglobin 13.4 (*)     Hematocrit 38.7 (*)     MCH 31.6 (*)     MPV 8.6 (*)     Neutrophils % 66.6 (*)     All other components within normal limits   COMPREHENSIVE METABOLIC PANEL - Abnormal; Notable for the following:     Sodium 131 (*)     Chloride 92 (*)     BUN 5 (*)     Calcium 8.5 (*)     ALT 68 (*)     AST 45 (*)     All other components within normal limits   ETHANOL   URINE DRUG SCREEN   URINE RT REFLEX TO CULTURE   ETHANOL       All other labs were within normal range or not returned as of this dictation. EMERGENCY DEPARTMENT COURSE and DIFFERENTIAL DIAGNOSIS/MDM:   Vitals:    Vitals:    06/27/18 0156 06/27/18 0423 06/27/18 0731 06/27/18 1206   BP:  (!) 162/90 125/67 130/85   Pulse:  100 80 71   Resp: 16 20 16 20   Temp: 97.2 °F (36.2 °C) 97 °F (36.1 °C) 98 °F (36.7 °C) 98.1 °F (36.7 °C)   TempSrc: Temporal Temporal Temporal Temporal   SpO2:  100% 99% 100%   Weight: 175 lb 6.4 oz (79.6 kg)      Height:               MDM  Number of Diagnoses or Management Options  Diagnosis management comments: Endorsed to Dr. Ridge Roberson. Needs sober interview. CRITICAL CARE TIME   Total Critical Care time was 0 minutes, excluding separately reportable procedures. There was a high probability of clinically significant/life threatening deterioration in the patient's condition which required my urgent intervention. CONSULTS:  IP CONSULT TO PSYCHIATRY    PROCEDURES:  Unless otherwise noted below, none     Procedures    FINAL IMPRESSION      1. Suicidal ideation    2.  Acute alcoholic intoxication without complication [facility])          DISPOSITION/PLAN   DISPOSITION        PATIENT REFERRED TO:  Gordon Sage  [address] [place] [address]

## 2018-06-26 NOTE — ED NOTES
Belongings collected and given to security, patient changed into scrubs. Parents of patient at bedside.      [address] [place] [address]  06/26/18 7826

## 2018-06-27 PROBLEM — F31.5 BIPOLAR DISORDER, CURR EPISODE DEPRESSED, SEVERE, W/PSYCHOTIC FEATURES (HCC): Status: ACTIVE | Noted: 2018-06-27

## 2018-06-27 LAB — ETHANOL: 62 MG/DL (ref 0–0.08)

## 2018-06-27 PROCEDURE — 99285 EMERGENCY DEPT VISIT HI MDM: CPT | Performed by: EMERGENCY MEDICINE

## 2018-06-27 PROCEDURE — 1240000000 HC EMOTIONAL WELLNESS R&B

## 2018-06-27 PROCEDURE — 6360000002 HC RX W HCPCS: Performed by: EMERGENCY MEDICINE

## 2018-06-27 PROCEDURE — 6370000000 HC RX 637 (ALT 250 FOR IP): Performed by: PSYCHIATRY & NEUROLOGY

## 2018-06-27 PROCEDURE — 6360000002 HC RX W HCPCS: Performed by: PSYCHIATRY & NEUROLOGY

## 2018-06-27 PROCEDURE — G0480 DRUG TEST DEF 1-7 CLASSES: HCPCS

## 2018-06-27 PROCEDURE — 96376 TX/PRO/DX INJ SAME DRUG ADON: CPT

## 2018-06-27 PROCEDURE — 99222 1ST HOSP IP/OBS MODERATE 55: CPT | Performed by: PSYCHIATRY & NEUROLOGY

## 2018-06-27 PROCEDURE — 36415 COLL VENOUS BLD VENIPUNCTURE: CPT

## 2018-06-27 RX ORDER — CHLORDIAZEPOXIDE HYDROCHLORIDE 25 MG/1
50 CAPSULE, GELATIN COATED ORAL EVERY 4 HOURS PRN
Status: DISCONTINUED | OUTPATIENT
Start: 2018-06-27 | End: 2018-06-29 | Stop reason: HOSPADM

## 2018-06-27 RX ORDER — THIAMINE MONONITRATE (VIT B1) 100 MG
100 TABLET ORAL DAILY
Status: DISCONTINUED | OUTPATIENT
Start: 2018-06-27 | End: 2018-06-29 | Stop reason: HOSPADM

## 2018-06-27 RX ORDER — LORAZEPAM 2 MG/ML
1 INJECTION INTRAMUSCULAR ONCE
Status: COMPLETED | OUTPATIENT
Start: 2018-06-27 | End: 2018-06-27

## 2018-06-27 RX ORDER — QUETIAPINE FUMARATE 100 MG/1
100 TABLET, FILM COATED ORAL NIGHTLY PRN
Status: DISCONTINUED | OUTPATIENT
Start: 2018-06-27 | End: 2018-06-28

## 2018-06-27 RX ORDER — CYANOCOBALAMIN 1000 UG/ML
1000 INJECTION INTRAMUSCULAR; SUBCUTANEOUS ONCE
Status: COMPLETED | OUTPATIENT
Start: 2018-06-27 | End: 2018-06-27

## 2018-06-27 RX ORDER — NICOTINE 21 MG/24HR
1 PATCH, TRANSDERMAL 24 HOURS TRANSDERMAL DAILY
Status: DISCONTINUED | OUTPATIENT
Start: 2018-06-27 | End: 2018-06-29 | Stop reason: HOSPADM

## 2018-06-27 RX ORDER — MULTIVITAMIN WITH FOLIC ACID 400 MCG
1 TABLET ORAL DAILY
Status: DISCONTINUED | OUTPATIENT
Start: 2018-06-27 | End: 2018-06-29 | Stop reason: HOSPADM

## 2018-06-27 RX ORDER — FOLIC ACID 1 MG/1
1 TABLET ORAL DAILY
Status: DISCONTINUED | OUTPATIENT
Start: 2018-06-27 | End: 2018-06-29 | Stop reason: HOSPADM

## 2018-06-27 RX ORDER — SODIUM CHLORIDE 0.9 % (FLUSH) 0.9 %
10 SYRINGE (ML) INJECTION EVERY 12 HOURS SCHEDULED
Status: DISCONTINUED | OUTPATIENT
Start: 2018-06-27 | End: 2018-06-27

## 2018-06-27 RX ORDER — ONDANSETRON 4 MG/1
4 TABLET, ORALLY DISINTEGRATING ORAL EVERY 8 HOURS PRN
Status: DISCONTINUED | OUTPATIENT
Start: 2018-06-27 | End: 2018-06-29 | Stop reason: HOSPADM

## 2018-06-27 RX ORDER — NALTREXONE HYDROCHLORIDE 50 MG/1
25 TABLET, FILM COATED ORAL
Status: DISCONTINUED | OUTPATIENT
Start: 2018-06-28 | End: 2018-06-29 | Stop reason: HOSPADM

## 2018-06-27 RX ORDER — ERGOCALCIFEROL 1.25 MG/1
50000 CAPSULE ORAL WEEKLY
Status: DISCONTINUED | OUTPATIENT
Start: 2018-06-27 | End: 2018-06-29 | Stop reason: HOSPADM

## 2018-06-27 RX ORDER — QUETIAPINE FUMARATE 100 MG/1
100 TABLET, FILM COATED ORAL NIGHTLY
Status: DISCONTINUED | OUTPATIENT
Start: 2018-06-27 | End: 2018-06-28

## 2018-06-27 RX ORDER — CHLORDIAZEPOXIDE HYDROCHLORIDE 25 MG/1
25 CAPSULE, GELATIN COATED ORAL EVERY 4 HOURS PRN
Status: DISCONTINUED | OUTPATIENT
Start: 2018-06-27 | End: 2018-06-29 | Stop reason: HOSPADM

## 2018-06-27 RX ORDER — SUCRALFATE 1 G/1
1 TABLET ORAL EVERY 6 HOURS SCHEDULED
Status: DISCONTINUED | OUTPATIENT
Start: 2018-06-27 | End: 2018-06-29 | Stop reason: HOSPADM

## 2018-06-27 RX ORDER — LAMOTRIGINE 25 MG/1
25 TABLET ORAL DAILY
Status: DISCONTINUED | OUTPATIENT
Start: 2018-06-27 | End: 2018-06-29 | Stop reason: HOSPADM

## 2018-06-27 RX ADMIN — SUCRALFATE 1 G: 1 TABLET ORAL at 17:08

## 2018-06-27 RX ADMIN — FOLIC ACID 1 MG: 1 TABLET ORAL at 11:38

## 2018-06-27 RX ADMIN — METOPROLOL TARTRATE 25 MG: 25 TABLET ORAL at 11:39

## 2018-06-27 RX ADMIN — CHLORDIAZEPOXIDE HYDROCHLORIDE 50 MG: 25 CAPSULE ORAL at 08:52

## 2018-06-27 RX ADMIN — LAMOTRIGINE 25 MG: 25 TABLET ORAL at 11:38

## 2018-06-27 RX ADMIN — SUCRALFATE 1 G: 1 TABLET ORAL at 11:38

## 2018-06-27 RX ADMIN — Medication 100 MG: at 11:38

## 2018-06-27 RX ADMIN — LORAZEPAM 1 MG: 2 INJECTION INTRAMUSCULAR; INTRAVENOUS at 00:40

## 2018-06-27 RX ADMIN — ONDANSETRON 4 MG: 4 TABLET, ORALLY DISINTEGRATING ORAL at 21:19

## 2018-06-27 RX ADMIN — THERA TABS 1 TABLET: TAB at 11:38

## 2018-06-27 RX ADMIN — METOPROLOL TARTRATE 25 MG: 25 TABLET ORAL at 21:15

## 2018-06-27 RX ADMIN — ERGOCALCIFEROL 50000 UNITS: 1.25 CAPSULE ORAL at 11:38

## 2018-06-27 RX ADMIN — CHLORDIAZEPOXIDE HYDROCHLORIDE 25 MG: 25 CAPSULE ORAL at 21:19

## 2018-06-27 RX ADMIN — QUETIAPINE FUMARATE 100 MG: 100 TABLET ORAL at 21:15

## 2018-06-27 RX ADMIN — CHLORDIAZEPOXIDE HYDROCHLORIDE 50 MG: 25 CAPSULE ORAL at 13:34

## 2018-06-27 RX ADMIN — CYANOCOBALAMIN 1000 MCG: 1000 INJECTION, SOLUTION INTRAMUSCULAR at 11:38

## 2018-06-27 RX ADMIN — CHLORDIAZEPOXIDE HYDROCHLORIDE 50 MG: 25 CAPSULE ORAL at 04:24

## 2018-06-27 ASSESSMENT — SLEEP AND FATIGUE QUESTIONNAIRES
DIFFICULTY FALLING ASLEEP: YES
AVERAGE NUMBER OF SLEEP HOURS: 2
DO YOU USE A SLEEP AID: YES
DIFFICULTY ARISING: NO
DIFFICULTY STAYING ASLEEP: YES
SLEEP PATTERN: DIFFICULTY FALLING ASLEEP;DISTURBED/INTERRUPTED SLEEP;RESTLESSNESS
DO YOU HAVE DIFFICULTY SLEEPING: YES
RESTFUL SLEEP: NO

## 2018-06-27 ASSESSMENT — PATIENT HEALTH QUESTIONNAIRE - PHQ9: SUM OF ALL RESPONSES TO PHQ QUESTIONS 1-9: 20

## 2018-06-27 ASSESSMENT — LIFESTYLE VARIABLES: HISTORY_ALCOHOL_USE: YES

## 2018-06-27 NOTE — ED PROVIDER NOTES
reviewed. DIAGNOSTIC RESULTS         No orders to display           LABS:  Labs Reviewed   CBC WITH AUTO DIFFERENTIAL - Abnormal; Notable for the following:        Result Value    WBC 4.6 (*)     RBC 4.24 (*)     Hemoglobin 13.4 (*)     Hematocrit 38.7 (*)     MCH 31.6 (*)     MPV 8.6 (*)     Neutrophils % 66.6 (*)     All other components within normal limits   COMPREHENSIVE METABOLIC PANEL - Abnormal; Notable for the following:     Sodium 131 (*)     Chloride 92 (*)     BUN 5 (*)     Calcium 8.5 (*)     ALT 68 (*)     AST 45 (*)     All other components within normal limits   ETHANOL   URINE DRUG SCREEN   URINE RT REFLEX TO CULTURE   ETHANOL       All other labs were within normal range or not returned as of this dictation. EMERGENCY DEPARTMENT COURSE and DIFFERENTIAL DIAGNOSIS/MDM:   Vitals:    Vitals:    06/26/18 2102 06/26/18 2132 06/26/18 2201 06/27/18 0022   BP: (!) 150/83 132/86 139/81 (!) 141/78   Pulse: 85 82 86 89   Resp: 18 16 16 22   Temp:   97.7 °F (36.5 °C)    TempSrc:   Oral    SpO2: 94% 93% 92% 98%   Weight:       Height:           MDM  Number of Diagnoses or Management Options     Amount and/or Complexity of Data Reviewed  Clinical lab tests: reviewed        Repeat etoh 2300, suicidal no plan initially now tells me he is no longer having those thoughts, pending mental health eval 2204    Pt tells shawn he is suicidal and depressed, will be admitted voluntarily 0125    CONSULTS:  Stephaniefort:  Unless otherwise noted below, none     Procedures    FINAL IMPRESSION      1. Suicidal ideation    2. Acute alcoholic intoxication without complication ([facility] [address]          DISPOSITION/PLAN   DISPOSITION Decision To Admit    PATIENT REFERRED TO:  No follow-up provider specified.     DISCHARGE MEDICATIONS:  New Prescriptions    No medications on file          (Please note that portions of this note were completed with a voice recognition program.  Efforts were made to edit the dictations but occasionally words are mis-transcribed.)    Thompson Ahumada, MD (electronically signed)  Attending Emergency Physician         Trung Garcia MD  06/27/18 0275

## 2018-06-27 NOTE — PROGRESS NOTES
Patient admitted to [facility] from ED. Patient calm and cooperative. Search performed, no contraband found. All legal documents signed. Patient oriented to unit and is resting in his room.  Will continue to monitor

## 2018-06-27 NOTE — PROGRESS NOTES
Patient is cooperative with care and medication. Patient at this time denies SI, HI & AVH. Patient is not social, appetite is fair. Patient is alert and oriented to person time place and situation.  Electronically signed by Lauren Paez RN on 6/27/2018 at 1:25 PM

## 2018-06-27 NOTE — ED NOTES
Pt is shaky and requesting something for his withdrawls. MD notified.       Delia Betancur RN  06/27/18 9268

## 2018-06-27 NOTE — BH NOTE
Risk taking behaviors: yes   If yes explain:ETOH abuse  Level of function outside hospital decreased: no   If yes explain:       History of Psychiatric Treatment:     Previous Outpatient therapy: Yes  If yes where & when: Currently with Dr. Effie Park  Are you compliant with appointments: Yes  Psychiatric Hospitalizations: Yes   Where & When: [facility] 09/27/2017  Previous Diagnosis:  yes, ETOH abuse, Depression and Anxiety  Previous psychiatric medications: Yes   If yes list examples: Wilhemenia Figures, Trazodone  Are you compliant with medications: No     Violence and Trauma History:     History of violence by patient: no   If yes explain:   History of Trauma: no    If yes explain:   History of Abuse: no   If yes explain/by whom:       Family History:    Family history of mental illness: yes   Family member & Diagnosis:  yes and Depression mom  Family members with suicide attempt: no   If yes explain:   Family members who completed suicide: no  If yes explain:       Substance Abuse History:     SBIRT Completed:Yes     Current ETOH LEVELS:   1333          302  0004           62    ETOH Abuse:   Age of first drink:  13   Date of last drink: 06/26/2018  Amount drinking daily: heavy and history of blackouts   Years of use:  30+  Longest period of sobriety: more than two years  ETOH treatment history: yes   If yes describe: 10 times, at least.  History of seizures, blackouts, etc. due to ETOH abuse: yes   Family history of ETOH abuse: no   Legal consequences of chemical use: yes     Substance/Chemical Abuse/Recreational Drug History:  Patient denies    Tobacco use:Yes If yes frequency 1 PPD /duration: 37 years    Opiates: It was discussed with pt they would not be receiving opiates unless they were within 3 days post surgery/acute injury. Patient voiced understanding and agreed.        Psychiatric Review Of Systems:     Recent Sleep changes: yes   Average hours per night: 2  With sleep aid: yes   Restful sleep: no Difficulty falling asleep: yes   Difficulty staying asleep: yes   Difficulty awakening: no     Recent appetite changes: yes   Recent weight changes/Pounds gained (+) or lost (-): no        Energy level changes:  yes   Interest/pleasure/anhedonia:  yes     Medical History:     Medical Diagnosis/Issues:   CT today in ED:no  Use of 02 or CPAP: no  Ambulatory: yes  Independent Self Care: yes  Use of OTC: no   Somatic symptoms: no     PCP: Cass Gross     Current Medications:   Scheduled Meds: No current facility-administered medications for this encounter.      Current Outpatient Prescriptions:     sucralfate (CARAFATE) 1 GM/10ML suspension, Take 10 mLs by mouth 4 times daily, Disp: 1200 mL, Rfl: 3    famotidine (PEPCID) 20 MG tablet, Take 1 tablet by mouth 2 times daily, Disp: 60 tablet, Rfl: 3    ondansetron (ZOFRAN ODT) 4 MG disintegrating tablet, Take 1 tablet by mouth every 8 hours as needed for Nausea or Vomiting, Disp: 15 tablet, Rfl: 0    traZODone (DESYREL) 150 MG tablet, Take 1 tablet by mouth nightly as needed for Sleep, Disp: 30 tablet, Rfl: 0    VORTIoxetine HBr (TRINTELLIX) 20 MG TABS tablet, Take 20 mg by mouth daily, Disp: 30 tablet, Rfl: 0    naltrexone (DEPADE) 50 MG tablet, Take 0.5 tablets by mouth daily (with breakfast), Disp: 15 tablet, Rfl: 0    folic acid (FOLVITE) 1 MG tablet, Take 1 tablet by mouth daily, Disp: 30 tablet, Rfl: 0    vitamin B-1 100 MG tablet, Take 1 tablet by mouth daily, Disp: 30 tablet, Rfl: 0    nicotine (NICODERM CQ) 21 MG/24HR, Place 1 patch onto the skin daily, Disp: 30 patch, Rfl: 0    vitamin D (ERGOCALCIFEROL) 85232 units capsule, Take 1 capsule by mouth once a week for 11 doses, Disp: 11 capsule, Rfl: 0    OLANZapine (ZYPREXA) 10 MG tablet, Take 10 mg by mouth daily, Disp: , Rfl:     metoprolol (LOPRESSOR) 50 MG tablet, Take 25 mg by mouth 2 times daily , Disp: , Rfl:     diclofenac (VOLTAREN) 75 MG EC tablet, Take 75 mg by mouth 2 times daily, Disp: ,

## 2018-06-27 NOTE — ED NOTES
Patient is nauseated and starting to shake, he states he is starting to have withdrawal symptoms. MD notified. Verbal orders given.       Alber Pryor RN  06/26/18 3064

## 2018-06-27 NOTE — PROGRESS NOTES
Matak completed psychosocial and CSSR-S on this date. Pt long and short term goals discussed. Pt voiced understanding. Treatment sheet signed. In the last 6 months has the pt been danger to self: Yes  In the last 6 months has the pt been danger to others:  No     Provided pt with [employer] handout entitled \"Quitting Smoking,\" reviewed handout with pt addressing dangers of smoking, developing coping skills, and providing basic information about quitting. Patient declined practical counseling of tobacco practical counseling during the hospital stay.     Electronically signed by Dea Dennis on 6/27/2018 at 10:40 AM

## 2018-06-27 NOTE — H&P
mmol/L    Anion Gap 14 7 - 19 mmol/L    Glucose 103 74 - 109 mg/dL    BUN 5 (L) 6 - 20 mg/dL    CREATININE 0.7 0.5 - 1.2 mg/dL    GFR Non-African American >60 >60    Calcium 8.5 (L) 8.6 - 10.0 mg/dL    Total Protein 6.9 6.6 - 8.7 g/dL    Alb 4.4 3.5 - 5.2 g/dL    Total Bilirubin <0.2 0.2 - 1.2 mg/dL    Alkaline Phosphatase 89 40 - 130 U/L    ALT 68 (H) 5 - 41 U/L    AST 45 (H) 5 - 40 U/L   Ethanol    Collection Time: 06/26/18  1:03 PM   Result Value Ref Range    Ethanol Lvl 302 mg/dL   Urine Drug Screen    Collection Time: 06/26/18  3:01 PM   Result Value Ref Range    Amphetamine Screen, Urine Negative Negative <1000 ng/mL    Barbiturate Screen, Ur Negative Negative < 200 ng/mL    Benzodiazepine Screen, Urine Negative Negative <100 ng/mL    Cannabinoid Scrn, Ur Negative Negative <50 ng/mL    COCAINE METABOLITE SCREEN URINE Negative Negative <300 ng/mL    Opiate Scrn, Ur Negative Negative < 300 ng/mL    Drug Screen Comment: see below    Urinalysis Reflex to Culture    Collection Time: 06/26/18  3:01 PM   Result Value Ref Range    Color, UA YELLOW Straw/Yellow    Clarity, UA Clear Clear    Glucose, Ur Negative Negative mg/dL    Bilirubin Urine Negative Negative    Ketones, Urine Negative Negative mg/dL    Specific Gravity, UA 1.007 1.005 - 1.030    Blood, Urine Negative Negative    pH, UA 6.0 5.0 - 8.0    Protein, UA Negative Negative mg/dL    Urobilinogen, Urine 0.2 <2.0 E.U./dL    Nitrite, Urine Negative Negative    Leukocyte Esterase, Urine Negative Negative    Urine Reflex to Culture Not Indicated    Ethanol    Collection Time: 06/26/18 11:00 PM   Result Value Ref Range    Ethanol Lvl 62 mg/dL       DIAGNOSES:    Bipolar Disorder, depressed. Alcohol Dependence. IMPRESSIONS:    The patient is a 46 y.o. who has carried many diagnosis in the past, most likely with bipolar disorder. TREATMENT PLAN:    * Legal status:  Voluntary  * Chart reviewed. Case discussed with nursing staff.   * Depression: Begin

## 2018-06-28 LAB
TSH SERPL DL<=0.05 MIU/L-ACNC: 1.33 UIU/ML (ref 0.27–4.2)
VITAMIN B-12: >2000 PG/ML (ref 211–946)
VITAMIN D 25-HYDROXY: 27.3 NG/ML

## 2018-06-28 PROCEDURE — 99232 SBSQ HOSP IP/OBS MODERATE 35: CPT | Performed by: PSYCHIATRY & NEUROLOGY

## 2018-06-28 PROCEDURE — 1240000000 HC EMOTIONAL WELLNESS R&B

## 2018-06-28 PROCEDURE — 84443 ASSAY THYROID STIM HORMONE: CPT

## 2018-06-28 PROCEDURE — 6370000000 HC RX 637 (ALT 250 FOR IP): Performed by: PSYCHIATRY & NEUROLOGY

## 2018-06-28 PROCEDURE — 82607 VITAMIN B-12: CPT

## 2018-06-28 PROCEDURE — 82306 VITAMIN D 25 HYDROXY: CPT

## 2018-06-28 PROCEDURE — 36415 COLL VENOUS BLD VENIPUNCTURE: CPT

## 2018-06-28 RX ORDER — QUETIAPINE FUMARATE 100 MG/1
200 TABLET, FILM COATED ORAL NIGHTLY
Status: DISCONTINUED | OUTPATIENT
Start: 2018-06-28 | End: 2018-06-29 | Stop reason: HOSPADM

## 2018-06-28 RX ADMIN — SUCRALFATE 1 G: 1 TABLET ORAL at 01:29

## 2018-06-28 RX ADMIN — SUCRALFATE 1 G: 1 TABLET ORAL at 12:54

## 2018-06-28 RX ADMIN — NALTREXONE HYDROCHLORIDE 25 MG: 50 TABLET, FILM COATED ORAL at 08:46

## 2018-06-28 RX ADMIN — SUCRALFATE 1 G: 1 TABLET ORAL at 06:17

## 2018-06-28 RX ADMIN — SUCRALFATE 1 G: 1 TABLET ORAL at 17:28

## 2018-06-28 RX ADMIN — QUETIAPINE FUMARATE 200 MG: 100 TABLET ORAL at 20:58

## 2018-06-28 RX ADMIN — CHLORDIAZEPOXIDE HYDROCHLORIDE 25 MG: 25 CAPSULE ORAL at 20:58

## 2018-06-28 RX ADMIN — METOPROLOL TARTRATE 25 MG: 25 TABLET ORAL at 20:58

## 2018-06-28 RX ADMIN — Medication 100 MG: at 08:45

## 2018-06-28 RX ADMIN — METOPROLOL TARTRATE 25 MG: 25 TABLET ORAL at 08:46

## 2018-06-28 RX ADMIN — THERA TABS 1 TABLET: TAB at 08:46

## 2018-06-28 RX ADMIN — QUETIAPINE FUMARATE 100 MG: 100 TABLET ORAL at 01:29

## 2018-06-28 RX ADMIN — LAMOTRIGINE 25 MG: 25 TABLET ORAL at 08:45

## 2018-06-28 RX ADMIN — FOLIC ACID 1 MG: 1 TABLET ORAL at 08:46

## 2018-06-28 NOTE — PROGRESS NOTES
Group Therapy Note    Date: 6/28/18  Start Time: 0900  End Time:  0930  Number of Participants: 13    Type of Group: Community Meeting    Patient's Goal:  Self esteem - depression-evaluate where to go from here in life    Notes:    Patient attended group, filled out menu and goals sheet. Continue to monitor for safety. Status After Intervention:  Improved    Participation Level:  Active Listener    Participation Quality: Appropriate    Discipline Responsible: [employer]      Signature:  Sheryl Cazares

## 2018-06-28 NOTE — PROGRESS NOTES
ADMIT DATE: 6/26/2018  Day 1 Day 3     PROGRESS NOTE    CHIEF COMPLAINT    \"Actually much better! \"      SUBJECTIVE / INTERVAL HX    Ravindra Figueroa  indicates he really likes lamotrigine and has never taken it before. He feels like his mood is suddenly much more stable, (which other people say about the initiation of low dose Lamictal as well.) Making plans after discharge including getting connected with AA. Feeling older and maybe like it might be harder to get a job or a partner. Taking things more seriously, but also edwin optimistically. Participating in groups today -- yesterday felt too anxious due to his detox. OBJECTIVE    Vitals:    06/28/18 0758   BP: 122/78   Pulse: 75   Resp: 20   Temp: 96.7 °F (35.9 °C)   SpO2: 100%       MENTAL STATUS EXAM:    Neatly and casually groomed, Well engaged. Speech normal in prosody and flow. No evidence of although disorder. Cognitive exam grossly intact. Still hopeless but less so. Affect is less flat.     LABS:  Recent Results (from the past 24 hour(s))   Vitamin D 25 Hydroxy    Collection Time: 06/28/18  5:57 AM   Result Value Ref Range    Vit D, 25-Hydroxy 27.3 (L) >=30 ng/mL   Vitamin B12    Collection Time: 06/28/18  5:57 AM   Result Value Ref Range    Vitamin B-12 >2000 (H) 211 - 946 pg/mL   TSH without Reflex    Collection Time: 06/28/18  5:57 AM   Result Value Ref Range    TSH 1.330 0.270 - 4.200 uIU/mL         CURRENT HOSPITAL MEDICATIONS    Current Facility-Administered Medications   Medication Dose Route Frequency Provider Last Rate Last Dose    chlordiazePOXIDE (LIBRIUM) capsule 25 mg  25 mg Oral Q4H PRN Susu Blanco MD   25 mg at 06/27/18 2119    chlordiazePOXIDE (LIBRIUM) capsule 50 mg  50 mg Oral Q4H PRN Susu Blanco MD   50 mg at 45/94/60 9523    folic acid (FOLVITE) tablet 1 mg  1 mg Oral Daily Fabian Rivera MD   1 mg at 06/28/18 0858    metoprolol tartrate (LOPRESSOR) tablet 25 mg  25 mg Oral BID Fabian Rivera MD   25 mg at 06/28/18 0672

## 2018-06-28 NOTE — PROGRESS NOTES
Patient is calm and cooperative with staff and peers. States he is hungry but has some nausea. TEEWA was a 6 so received 25 of librium. Patient rated depression 4, anxiety 4, and denies SI, HI, and AVH. States he just feels panicky. Will continue to monitor.

## 2018-06-28 NOTE — PROGRESS NOTES
Group Therapy Note    Date: 6/28/2018  Start Time: 1600  End Time:  1700  Number of Participants: 17    Type of Group: Psychoeducation    Status After Intervention:  Unchanged    Participation Level: Interactive    Participation Quality: Appropriate      Speech:  normal      Thought Process/Content: Logical  Linear      Affective Functioning: Congruent      Mood: anxious and depressed      Level of consciousness:  Oriented x4      Response to Learning: Able to verbalize current knowledge/experience          Signature:  Sandra Chou RN

## 2018-06-28 NOTE — PROGRESS NOTES
Group Therapy Note    Date: 6/27/2018  Start Time: 2030  End Time:  2100  Number of Participants: 14    Type of Group: Wrap-Up    Wellness Binder Information  Module Name:    Session Number:      Patient's Goal:      Notes:  Filled out wrap up sheet    Status After Intervention:      Participation Level:     Participation Quality:       Speech:         Thought Process/Content:       Affective Functioning:       Mood:       Level of consciousness:        Response to Learning:       Endings:     Modes of Intervention:       Discipline Responsible: [address] [place] [address]      Signature:  Radha Maciel

## 2018-06-29 VITALS
HEART RATE: 82 BPM | RESPIRATION RATE: 18 BRPM | OXYGEN SATURATION: 98 % | DIASTOLIC BLOOD PRESSURE: 72 MMHG | WEIGHT: 175 LBS | HEIGHT: 76 IN | TEMPERATURE: 98 F | BODY MASS INDEX: 21.31 KG/M2 | SYSTOLIC BLOOD PRESSURE: 118 MMHG

## 2018-06-29 PROBLEM — F31.9 BIPOLAR 1 DISORDER, DEPRESSED (HCC): Status: ACTIVE | Noted: 2018-06-29

## 2018-06-29 PROCEDURE — 6370000000 HC RX 637 (ALT 250 FOR IP): Performed by: PSYCHIATRY & NEUROLOGY

## 2018-06-29 PROCEDURE — 99238 HOSP IP/OBS DSCHRG MGMT 30/<: CPT | Performed by: PSYCHIATRY & NEUROLOGY

## 2018-06-29 PROCEDURE — 5130000000 HC BRIDGE APPOINTMENT

## 2018-06-29 RX ORDER — LAMOTRIGINE 25 MG/1
25 TABLET ORAL DAILY
Qty: 60 TABLET | Refills: 0 | Status: ON HOLD | OUTPATIENT
Start: 2018-06-30 | End: 2019-02-21 | Stop reason: HOSPADM

## 2018-06-29 RX ORDER — QUETIAPINE FUMARATE 200 MG/1
200 TABLET, FILM COATED ORAL NIGHTLY
Qty: 60 TABLET | Refills: 3 | Status: SHIPPED | OUTPATIENT
Start: 2018-06-29 | End: 2018-09-30 | Stop reason: ALTCHOICE

## 2018-06-29 RX ADMIN — METOPROLOL TARTRATE 25 MG: 25 TABLET ORAL at 08:40

## 2018-06-29 RX ADMIN — LAMOTRIGINE 25 MG: 25 TABLET ORAL at 08:40

## 2018-06-29 RX ADMIN — FOLIC ACID 1 MG: 1 TABLET ORAL at 08:40

## 2018-06-29 RX ADMIN — SUCRALFATE 1 G: 1 TABLET ORAL at 06:06

## 2018-06-29 RX ADMIN — NALTREXONE HYDROCHLORIDE 25 MG: 50 TABLET, FILM COATED ORAL at 08:40

## 2018-06-29 RX ADMIN — Medication 100 MG: at 08:40

## 2018-06-29 RX ADMIN — THERA TABS 1 TABLET: TAB at 08:40

## 2018-06-29 RX ADMIN — SUCRALFATE 1 G: 1 TABLET ORAL at 00:22

## 2018-06-29 RX ADMIN — SUCRALFATE 1 G: 1 TABLET ORAL at 11:58

## 2018-06-29 NOTE — DISCHARGE SUMMARY
Patient ID:  Dank Anguiano  [id number]  80 y.o.  1970    Admit date: 6/26/2018  Discharge date: 6/29/2018    Admitting Physician: Kole Pozo MD   Attending Physician: Kole Pozo MD  Discharge Physician: Rossy Tripp MD    Admission Diagnoses: Bipolar disorder, curr episode depressed, severe, w/psychotic features ([facility] [F31.5]  Bipolar 1 disorder, depressed ([facility] [F31.9]    Discharge Diagnoses: Bipolar Disorder. Alcohol Dependence    Admission Condition: poor    Discharged Condition: fair    Indication for Admission: Dank Anguiano is a 46 y.o.  white male with one previous psych admission who is brought in by his parents (with whom he lives) for depression and suicidal ideation. Patient has been drinking 20 drinks per day recently. He told his parents he wanted to kill himself. He has been in treatment for alcohol a few times. He has a lot of anxiety and panic. He becomes so anxious when he is sober that he can't stand it and starts drinking again. He cannot name any particular stresses. Hospital Course: A H&P and lab scan revealed ETOH Abuse--monitor for withdrawal and   HTN---follow with BP,  Elevated LFT's, Anemia, Leukopenia. He had discontinued his previous medications thinking they were not helping, and he was started on lamotrigine and Seroquel. He felt the lamotrigine improve his mood  immediately. He went through detox without incident. He participated in group and milieu therapy. His mood improved quickly. On 6/29/18 he was considered to be ready for discharge. Participation:good    Discharge Exam:  Neatly groomed, well engaged. Mood \"Doing good! \" with slightly flat affect. He denies SI and HI. No evidence of a thought disorder. Cognitive exam grossly intact.     LABS:  Recent Results (from the past 72 hour(s))   Urine Drug Screen    Collection Time: 06/26/18  3:01 PM   Result Value Ref Range    Amphetamine Screen, Urine Negative Negative <1000 ng/mL    Barbiturate Screen, Ur Negative Negative < 200 ng/mL    Benzodiazepine Screen, Urine Negative Negative <100 ng/mL    Cannabinoid Scrn, Ur Negative Negative <50 ng/mL    COCAINE METABOLITE SCREEN URINE Negative Negative <300 ng/mL    Opiate Scrn, Ur Negative Negative < 300 ng/mL    Drug Screen Comment: see below    Urinalysis Reflex to Culture    Collection Time: 06/26/18  3:01 PM   Result Value Ref Range    Color, UA YELLOW Straw/Yellow    Clarity, UA Clear Clear    Glucose, Ur Negative Negative mg/dL    Bilirubin Urine Negative Negative    Ketones, Urine Negative Negative mg/dL    Specific Gravity, UA 1.007 1.005 - 1.030    Blood, Urine Negative Negative    pH, UA 6.0 5.0 - 8.0    Protein, UA Negative Negative mg/dL    Urobilinogen, Urine 0.2 <2.0 E.U./dL    Nitrite, Urine Negative Negative    Leukocyte Esterase, Urine Negative Negative    Urine Reflex to Culture Not Indicated    Ethanol    Collection Time: 06/26/18 11:00 PM   Result Value Ref Range    Ethanol Lvl 62 mg/dL   Vitamin D 25 Hydroxy    Collection Time: 06/28/18  5:57 AM   Result Value Ref Range    Vit D, 25-Hydroxy 27.3 (L) >=30 ng/mL   Vitamin B12    Collection Time: 06/28/18  5:57 AM   Result Value Ref Range    Vitamin B-12 >2000 (H) 211 - 946 pg/mL   TSH without Reflex    Collection Time: 06/28/18  5:57 AM   Result Value Ref Range    TSH 1.330 0.270 - 4.200 uIU/mL       Consults: Internal medicine. Disposition: home    Patient Instructions:   Current Discharge Medication List      START taking these medications    Details   lamoTRIgine (LAMICTAL) 25 MG tablet Take 1 tablet by mouth daily X 12 more days, then 2 po q day x 14 days, then 4 po q day.   Qty: 60 tablet, Refills: 0      QUEtiapine (SEROQUEL) 200 MG tablet Take 1 tablet by mouth nightly  Qty: 60 tablet, Refills: 3         CONTINUE these medications which have NOT CHANGED    Details   sucralfate (CARAFATE) 1 GM/10ML suspension Take 10 mLs by mouth 4 times daily  Qty: 1200 mL, Refills: 3      famotidine (PEPCID)

## 2018-06-29 NOTE — PLAN OF CARE
Problem: Altered Mood, Depressive Behavior:  Goal: Able to verbalize and/or display a decrease in depressive symptoms  Able to verbalize and/or display a decrease in depressive symptoms  Outcome: Ongoing  Group Therapy Note     Date: 6/28/2018  Start Time: 1130  End Time:  1215  Number of Participants: 13     Type of Group: Psychoeducation     Wellness Binder Information  Module Name:  Staying Well   Session Number:  1     Patient's Goal:  Daily maintenance and coping skills      Notes:  Pt participated in group as scheduled. Pt able to discuss ways of relieving anxiety in stressful situations   Status After Intervention:  Improved     Participation Level: Active Listener and Interactive     Participation Quality: Appropriate, Attentive, Sharing and Supportive        Speech:  normal        Thought Process/Content: Logical        Affective Functioning: Congruent        Mood: congruent         Level of consciousness:  Alert, Oriented x4 and Attentive        Response to Learning: Able to verbalize current knowledge/experience, Able to verbalize/acknowledge new learning and Able to retain information        Endings: None Reported     Modes of Intervention: Education, Support and Socialization        Discipline Responsible: Psychoeducational Specialist        Signature:   Michael Workman
Problem: Substance Abuse:  Goal: Absence of drug withdrawal signs and symptoms  Absence of drug withdrawal signs and symptoms   Outcome: Ongoing    Goal: Participates in care planning  Participates in care planning   Outcome: Ongoing    Goal: Patient specific goal  Patient specific goal   Outcome: Ongoing
Problem: Substance Abuse:  Goal: Absence of drug withdrawal signs and symptoms  Absence of drug withdrawal signs and symptoms   Outcome: Ongoing    Goal: Participates in care planning  Participates in care planning   Outcome: Ongoing    Goal: Patient specific goal  Patient specific goal   Outcome: Ongoing
Problem: Substance Abuse:  Goal: Absence of drug withdrawal signs and symptoms  Absence of drug withdrawal signs and symptoms   Outcome: Ongoing    Goal: Participates in care planning  Participates in care planning   Outcome: Ongoing    Goal: Patient specific goal  Patient specific goal   Outcome: Ongoing      Problem: Altered Mood, Depressive Behavior:  Goal: Able to verbalize acceptance of life and situations over which he or she has no control  Able to verbalize acceptance of life and situations over which he or she has no control   Outcome: Ongoing    Goal: Able to verbalize and/or display a decrease in depressive symptoms  Able to verbalize and/or display a decrease in depressive symptoms   Outcome: Ongoing    Goal: Ability to disclose and discuss suicidal ideas will improve  Ability to disclose and discuss suicidal ideas will improve   Outcome: Ongoing    Goal: Able to verbalize support systems  Able to verbalize support systems   Outcome: Ongoing    Goal: Absence of self-harm  Absence of self-harm   Outcome: Ongoing
Problem: Substance Abuse:  Intervention: Assess coping skills and behavior                                                                      Group Therapy Note    Date: 6/29/2018  Start Time: 1100  End Time:  9950  Number of Participants: 16    Type of Group: Psychoeducation    Wellness Binder Information  Module Name:  Emotional wellness  Session Number:  5    Patient's Goal:  Obstacles to emotional wellness    Notes:  Pt acknowledged negative thinking and negative behavior as obstacles to emotional wellness. Status After Intervention:  Improved    Participation Level: Interactive    Participation Quality: Appropriate, Attentive and Sharing      Speech:  normal      Thought Process/Content: Logical      Affective Functioning: Congruent      Mood: congruent      Level of consciousness:  Alert, Oriented x4 and Attentive      Response to Learning: Able to verbalize current knowledge/experience      Endings: None Reported    Modes of Intervention: Education      Discipline Responsible: Psychoeducational Specialist      Signature:  Linda Wooten      Problem: Altered Mood, Depressive Behavior:  Intervention: Assess coping skills and behavior                                                                      Group Therapy Note    Date: 6/29/2018  Start Time: 1100  End Time:  0857  Number of Participants: 16    Type of Group: Psychoeducation    Wellness Binder Information  Module Name:  Emotional wellness  Session Number:  5    Patient's Goal:  Obstacles to emotional wellness    Notes:  Pt acknowledged negative thinking and negative behavior as obstacles to emotional wellness.     Status After Intervention:  Improved    Participation Level: Interactive    Participation Quality: Appropriate, Attentive and Sharing      Speech:  normal      Thought Process/Content: Logical      Affective Functioning: Congruent      Mood: congruent      Level of consciousness:  Alert, Oriented x4 and Attentive      Response to Learning:
Problem: Substance Abuse:  Intervention: Assess coping skills and behavior                                                                      Group Therapy Note    Date: 6/29/2018  Start Time: 1430  End Time:  2211  Number of Participants: 18    Type of Group: Cognitive Skills    Wellness Binder Information  Module Name:  Emotional wellness  Session Number:  4    Patient's Goal:  Self-esteem    Notes:  Pt acknowledged use of positive self talk to help improve self-esteem. Status After Intervention:  Improved    Participation Level: Interactive    Participation Quality: Appropriate, Attentive and Sharing      Speech:  normal      Thought Process/Content: Logical      Affective Functioning: Congruent      Mood: congruent      Level of consciousness:  Alert, Oriented x4 and Attentive      Response to Learning: Able to verbalize current knowledge/experience      Endings: None Reported    Modes of Intervention: Education      Discipline Responsible: Psychoeducational Specialist      Signature:  Rae Gaspar      Problem: Altered Mood, Depressive Behavior:  Intervention: Assess coping skills and behavior                                                                      Group Therapy Note    Date: 6/29/2018  Start Time: 1430  End Time:  1515  Number of Participants: 18    Type of Group: Cognitive Skills    Wellness Binder Information  Module Name:  Emotional wellness  Session Number:  4    Patient's Goal:  Self-esteem    Notes:  Pt acknowledged use of positive self talk to help improve self-esteem.     Status After Intervention:  Improved    Participation Level: Interactive    Participation Quality: Appropriate, Attentive and Sharing      Speech:  normal      Thought Process/Content: Logical      Affective Functioning: Congruent      Mood: congruent      Level of consciousness:  Alert, Oriented x4 and Attentive      Response to Learning: Able to verbalize current knowledge/experience      Endings: None
knowledge/experience      Endings: None Reported    Modes of Intervention: Education      Discipline Responsible: Psychoeducational Specialist      Signature:  Mayelin Anders

## 2018-06-29 NOTE — PROGRESS NOTES
Group Therapy Note    Date: 6/28/2018  Start Time: 2030  End Time:  2045  Number of Participants: 15    Type of Group: Wrap-Up    Wellness Binder Information  Module Name:    Session Number:      Patient's Goal:      Notes:  Filled out wrap up sheet    Status After Intervention:      Participation Level:     Participation Quality:       Speech:        Thought Process/Content:       Affective Functioning:       Mood:       Level of consciousness:        Response to Learning:       Endings:     Modes of Intervention:       Discipline Responsible: [address] [place] [address]      Signature:  Kandace Felix

## 2018-06-29 NOTE — PROGRESS NOTES
Patient is calm and cooperative with care. Patient is social and attends groups. Patient is compliant with medications. Patient completed ADLs. Patient's appetite is good and he slept well. Patient rates D:3, A:3, and denies SI, HI, and AVH.

## 2018-06-29 NOTE — PROGRESS NOTES
SW gave patient the number to [facility] in [place] to call for  Intake screening for admission to 30 day treatment. Patient called to see if he could be admitted.

## 2018-06-29 NOTE — PROGRESS NOTES
Group Therapy Note    Date: 6/29/18  Start Time: 0900  End Time:  0930  Number of Participants: 21    Type of Group: Community Meeting    Patient's Goal:  Call rehab/ work on setting up right with behavioral health    Notes:    Patient attended group, filled out menu and goals sheet. Continue to monitor for safety. Status After Intervention:  Improved    Participation Level:  Active Listener    Participation Quality: Appropriate    Discipline Responsible: [employer]      Signature:  Terry Bey

## 2018-06-29 NOTE — PROGRESS NOTES
Progress Note  Demarco Cassidy  6/29/2018 12:00 AM  Subjective:   Admit Date:   6/26/2018      CC/ADMIT DX:       Interval History:   Reviewed overnight events and nursing notes. No new physical complaints. I have reviewed all labs/diagnostics from the last 24hrs. ROS:   I have done a 10 point ROS and all are negative, except what is mentioned in the HPI. DIET GENERAL;    Medications:      QUEtiapine  200 mg Oral Nightly    folic acid  1 mg Oral Daily    metoprolol tartrate  25 mg Oral BID    naltrexone  25 mg Oral Daily with breakfast    nicotine  1 patch Transdermal Daily    thiamine  100 mg Oral Daily    vitamin D  50,000 Units Oral Weekly    sucralfate  1 g Oral 4 times per day    multivitamin  1 tablet Oral Daily    lamoTRIgine  25 mg Oral Daily           Objective:   Vitals: BP (!) 149/96   Pulse 81   Temp 98 °F (36.7 °C) (Temporal)   Resp 18   Ht 6' 4\" (1.93 m)   Wt 175 lb (79.4 kg)   SpO2 97%   BMI 21.30 kg/m²  No intake or output data in the 24 hours ending 06/29/18 0000  General appearance: alert and cooperative with exam  Lungs: clear to auscultation bilaterally  Heart: regular rate and rhythm, S1, S2 normal, no murmur, click, rub or gallop  Abdomen: soft, non-tender; bowel sounds normal; no masses,  no organomegaly  Extremities: extremities normal, atraumatic, no cyanosis or edema  Neurologic:  No obvious focal neurologic deficits. Assessment and Plan: Active Problems:    Bipolar disorder, curr episode depressed, severe, w/psychotic features ([address]  Resolved Problems:    * No resolved hospital problems. *    Vit D Def    GERD    Plan:  1. Continue present medication(s)   2. Replace Vit D  3. Follow with Psych      Discharge planning:   home     Reviewed treatment plans with the patient and/or family.              Electronically signed by Adore Mauro MD on 6/29/2018 at 12:00 AM

## 2018-06-30 NOTE — PROGRESS NOTES
Progress Note  Jasmine Connolly  6/30/2018 12:06 AM  Subjective:   Admit Date:   6/26/2018      CC/ADMIT DX:       Interval History:   Reviewed overnight events and nursing notes. He has had no new medical complaints. I have reviewed all labs/diagnostics from the last 24hrs. ROS:   I have done a 10 point ROS and all are negative, except what is mentioned in the HPI. Medications:             Objective:   Vitals: /72   Pulse 82   Temp 98 °F (36.7 °C) (Temporal)   Resp 18   Ht 6' 4\" (1.93 m)   Wt 175 lb (79.4 kg)   SpO2 98%   BMI 21.30 kg/m²  No intake or output data in the 24 hours ending 06/30/18 0006  General appearance: alert and cooperative with exam  Lungs: clear to auscultation bilaterally  Heart: regular rate and rhythm, S1, S2 normal, no murmur, click, rub or gallop  Abdomen: soft, non-tender; bowel sounds normal; no masses,  no organomegaly  Extremities: extremities normal, atraumatic, no cyanosis or edema  Neurologic:  No obvious focal neurologic deficits. Assessment and Plan: Active Problems:    Bipolar disorder, curr episode depressed, severe, w/psychotic features ([address]    Bipolar 1 disorder, depressed ([address]  Resolved Problems:    * No resolved hospital problems. *    Vit D Def    GERD    Plan:  1. Continue present medication(s)   2. He is medically stable. I will monitor for any changes or concerns. 3.  Follow with Psych      Discharge planning:   home     Reviewed treatment plans with the patient and/or family.              Electronically signed by Eun Dhaliwal MD on 6/30/2018 at 12:06 AM

## 2018-07-29 ENCOUNTER — HOSPITAL ENCOUNTER (EMERGENCY)
Age: 48
Discharge: HOME | End: 2018-07-30
Payer: MEDICAID

## 2018-07-29 DIAGNOSIS — L55.9 SUNBURN: ICD-10-CM

## 2018-07-29 DIAGNOSIS — L03.116 CELLULITIS OF LEFT LOWER EXTREMITY: Primary | ICD-10-CM

## 2018-07-29 PROCEDURE — 96372 THER/PROPH/DIAG INJ SC/IM: CPT

## 2018-07-29 PROCEDURE — 99282 EMERGENCY DEPT VISIT SF MDM: CPT

## 2018-07-29 PROCEDURE — 6360000002 HC RX W HCPCS: Performed by: NURSE PRACTITIONER

## 2018-07-29 RX ORDER — GABAPENTIN 600 MG/1
600 TABLET ORAL 3 TIMES DAILY
Status: ON HOLD | COMMUNITY
End: 2018-10-01 | Stop reason: HOSPADM

## 2018-07-29 RX ORDER — TRIAMCINOLONE ACETONIDE 40 MG/ML
40 INJECTION, SUSPENSION INTRA-ARTICULAR; INTRAMUSCULAR ONCE
Status: COMPLETED | OUTPATIENT
Start: 2018-07-29 | End: 2018-07-29

## 2018-07-29 RX ORDER — SULFAMETHOXAZOLE AND TRIMETHOPRIM 800; 160 MG/1; MG/1
1 TABLET ORAL 2 TIMES DAILY
Qty: 20 TABLET | Refills: 0 | Status: SHIPPED | OUTPATIENT
Start: 2018-07-29 | End: 2018-08-08

## 2018-07-29 RX ADMIN — TRIAMCINOLONE ACETONIDE 40 MG: 40 INJECTION, SUSPENSION INTRA-ARTICULAR; INTRAMUSCULAR at 23:45

## 2018-07-30 VITALS
OXYGEN SATURATION: 98 % | RESPIRATION RATE: 18 BRPM | DIASTOLIC BLOOD PRESSURE: 80 MMHG | TEMPERATURE: 98.1 F | BODY MASS INDEX: 22.41 KG/M2 | SYSTOLIC BLOOD PRESSURE: 157 MMHG | HEIGHT: 76 IN | WEIGHT: 184 LBS | HEART RATE: 85 BPM

## 2018-07-30 PROCEDURE — 99283 EMERGENCY DEPT VISIT LOW MDM: CPT | Performed by: NURSE PRACTITIONER

## 2018-07-30 NOTE — ED PROVIDER NOTES
Temp: 98.1 °F (36.7 °C)   TempSrc: Oral   SpO2: 98%   Weight: 184 lb (83.5 kg)   Height: 6' 4\" (1.93 m)           MDM      CONSULTS:  None    PROCEDURES:  Unless otherwise noted below, none     Procedures    FINAL IMPRESSION      1. Cellulitis of left lower extremity    2.  Sunburn          DISPOSITION/PLAN   DISPOSITION Decision To Discharge    PATIENT REFERRED TO:  [facility] [id number]  [facility] [id number]  [phone]            DISCHARGE MEDICATIONS:  New Prescriptions    SULFAMETHOXAZOLE-TRIMETHOPRIM (BACTRIM DS) 800-160 MG PER TABLET    Take 1 tablet by mouth 2 times daily for 10 days          (Please note that portions of this note were completed with a voice recognition program.  Efforts were made to edit the dictations but occasionally words are mis-transcribed.)    KWASI Garcia (electronically signed)          KWASI Garcia  07/30/18 8917

## 2018-09-05 ENCOUNTER — APPOINTMENT (OUTPATIENT)
Dept: GENERAL RADIOLOGY | Age: 48
End: 2018-09-05
Payer: MEDICAID

## 2018-09-05 ENCOUNTER — HOSPITAL ENCOUNTER (EMERGENCY)
Age: 48
Discharge: HOME | End: 2018-09-05
Attending: EMERGENCY MEDICINE
Payer: MEDICAID

## 2018-09-05 VITALS
SYSTOLIC BLOOD PRESSURE: 127 MMHG | HEART RATE: 75 BPM | TEMPERATURE: 98.3 F | BODY MASS INDEX: 21.92 KG/M2 | DIASTOLIC BLOOD PRESSURE: 80 MMHG | OXYGEN SATURATION: 98 % | WEIGHT: 180 LBS | HEIGHT: 76 IN | RESPIRATION RATE: 16 BRPM

## 2018-09-05 DIAGNOSIS — S92.901A CLOSED FRACTURE OF RIGHT FOOT, INITIAL ENCOUNTER: Primary | ICD-10-CM

## 2018-09-05 PROCEDURE — 99284 EMERGENCY DEPT VISIT MOD MDM: CPT | Performed by: EMERGENCY MEDICINE

## 2018-09-05 PROCEDURE — 6370000000 HC RX 637 (ALT 250 FOR IP)

## 2018-09-05 PROCEDURE — 99283 EMERGENCY DEPT VISIT LOW MDM: CPT

## 2018-09-05 PROCEDURE — 29515 APPLICATION SHORT LEG SPLINT: CPT | Performed by: NURSE PRACTITIONER

## 2018-09-05 PROCEDURE — 73630 X-RAY EXAM OF FOOT: CPT

## 2018-09-05 PROCEDURE — 29515 APPLICATION SHORT LEG SPLINT: CPT

## 2018-09-05 RX ORDER — NAPROXEN 500 MG/1
500 TABLET ORAL 2 TIMES DAILY
Qty: 20 TABLET | Refills: 0 | Status: SHIPPED | OUTPATIENT
Start: 2018-09-05 | End: 2018-09-30 | Stop reason: ALTCHOICE

## 2018-09-05 RX ORDER — IBUPROFEN 200 MG
600 TABLET ORAL ONCE
Status: COMPLETED | OUTPATIENT
Start: 2018-09-05 | End: 2018-09-05

## 2018-09-05 RX ORDER — IBUPROFEN 200 MG
TABLET ORAL
Status: COMPLETED
Start: 2018-09-05 | End: 2018-09-05

## 2018-09-05 RX ADMIN — IBUPROFEN 600 MG: 200 TABLET, FILM COATED ORAL at 03:26

## 2018-09-05 RX ADMIN — Medication 600 MG: at 03:26

## 2018-09-05 ASSESSMENT — PAIN DESCRIPTION - PROGRESSION: CLINICAL_PROGRESSION: GRADUALLY WORSENING

## 2018-09-05 ASSESSMENT — PAIN DESCRIPTION - ONSET: ONSET: ON-GOING

## 2018-09-05 ASSESSMENT — PAIN DESCRIPTION - DESCRIPTORS: DESCRIPTORS: SHOOTING

## 2018-09-05 ASSESSMENT — PAIN SCALES - GENERAL
PAINLEVEL_OUTOF10: 10
PAINLEVEL_OUTOF10: 0
PAINLEVEL_OUTOF10: 1
PAINLEVEL_OUTOF10: 6

## 2018-09-05 ASSESSMENT — PAIN DESCRIPTION - LOCATION: LOCATION: FOOT

## 2018-09-05 ASSESSMENT — PAIN DESCRIPTION - FREQUENCY: FREQUENCY: CONTINUOUS

## 2018-09-05 ASSESSMENT — PAIN DESCRIPTION - PAIN TYPE: TYPE: ACUTE PAIN

## 2018-09-05 ASSESSMENT — PAIN DESCRIPTION - ORIENTATION: ORIENTATION: RIGHT

## 2018-09-05 NOTE — ED NOTES
Assisted with posterior splint application.   Crutch walking instructions given     Disha Thompson RN  09/05/18 [id number]

## 2018-09-05 NOTE — ED PROVIDER NOTES
Splint Application  Date/Time: 9/5/2018 3:14 AM  Performed by: Nayely Roque  Authorized by: Christie Morin     Consent:     Consent obtained:  Verbal    Consent given by:  Patient    Risks discussed:  Discoloration    Alternatives discussed:  No treatment  Pre-procedure details:     Sensation:  Normal  Procedure details:     Laterality:  Right    Location:  Foot    Foot:  R foot    Strapping: yes      Splint type:  Short leg    Supplies:  Ortho-Glass, elastic bandage and cotton padding  Post-procedure details:     Pain:  Improved    Sensation:  Normal    Patient tolerance of procedure:   Tolerated well, no immediate complications         KWASI Robles  09/05/18 9044

## 2018-09-05 NOTE — ED PROVIDER NOTES
[address] [facility]  eMERGENCY dEPARTMENT eNCOUnter      Pt Name: Saintclair Board  MRN: [id number]  Yazmingfhermelinda 1970  Date of evaluation: 9/5/2018  Provider: Mikhail Perez MD    CHIEF COMPLAINT       Chief Complaint   Patient presents with    Foot Injury     Pt kicked the end table because the dog was irritating him. HISTORY OF PRESENT ILLNESS   (Location/Symptom, Timing/Onset, Context/Setting, Quality, Duration, Modifying Factors, Severity)  Note limiting factors. Saintclair Board is a 46 y.o. male who presents to the emergency department with R foot pain kicked a table trying to get the dog. Has pain on top of R foot only, able to walk but limps. Appears intoxicated smells of etoh, states has had 4 beers. Makes exam more challenging but is isolated dorsum foot trauma no other injuries, pt awake and talking and interactive. Here with significant other. The history is provided by the patient and a significant other. Nursing Notes were reviewed. REVIEW OF SYSTEMS    (2-9 systems for level 4, 10 or more for level 5)     Review of Systems   Musculoskeletal: Positive for gait problem. Skin: Negative for wound. A complete review of systems was performed and is negative except as noted above in the HPI. PAST MEDICAL HISTORY     Past Medical History:   Diagnosis Date    Chest pain 12/12/2011    Cigarette smoker 12/12/2011    Depression     Hypertension 12/12/2011         SURGICAL HISTORY       Past Surgical History:   Procedure Laterality Date    APPENDECTOMY      CHOLECYSTECTOMY  5/18/2006    [facility]    NECK SURGERY  7/15/2008    Hadi         CURRENT MEDICATIONS       Discharge Medication List as of 9/5/2018  3:19 AM      CONTINUE these medications which have NOT CHANGED    Details   gabapentin (NEURONTIN) 600 MG tablet Take 600 mg by mouth 3 times daily. [facility]      lamoTRIgine (LAMICTAL) 25 MG tablet Take 1 tablet by mouth daily X 12 more days, then 2 po q day x 14 days, then 4 po q day., Disp-60 tablet, R-0Normal      QUEtiapine (SEROQUEL) 200 MG tablet Take 1 tablet by mouth nightly, Disp-60 tablet, R-3Normal             ALLERGIES     Patient has no known allergies. FAMILY HISTORY       Family History   Problem Relation Age of Onset    Osteoarthritis Mother     Thyroid Disease Mother     Heart Failure Father     Heart Failure Maternal Grandmother     Heart Failure Paternal Grandmother     Cancer Maternal Grandfather           SOCIAL HISTORY       Social History     Social History    Marital status:      Spouse name: N/A    Number of children: N/A    Years of education: N/A     Social History Main Topics    Smoking status: Current Every Day Smoker     Packs/day: 1.00     Types: Cigarettes    Smokeless tobacco: Never Used    Alcohol use 0.0 oz/week      Comment: every day per patient, all types of alcohol    Drug use: No    Sexual activity: Yes     Partners: Female     Other Topics Concern    None     Social History Narrative    None       SCREENINGS    Jaime Coma Scale  Eye Opening: Spontaneous  Best Verbal Response: Oriented  Best Motor Response: Obeys commands  Jaime Coma Scale Score: 15        PHYSICAL EXAM    (up to 7 for level 4, 8 or more for level 5)     ED Triage Vitals [09/05/18 0149]   BP Temp Temp Source Pulse Resp SpO2 Height Weight   123/87 98.9 °F (37.2 °C) Oral 94 18 96 % 6' 4\" (1.93 m) 180 lb (81.6 kg)       Physical Exam   Constitutional: He is oriented to person, place, and time. He appears well-developed and well-nourished. Smells of etoh   Musculoskeletal: He exhibits tenderness. Tenderness of the Dorsum of the R foot, no obvious wound, there is no deficit of the foot with normal strength and sensation and has been walking on it with limp    No pain in ankle or more prox in leg no other injuries. Neurological: He is alert and oriented to person, place, and time. Skin: Skin is warm and dry.    Nursing note and vitals

## 2018-09-30 ENCOUNTER — HOSPITAL ENCOUNTER (OUTPATIENT)
Age: 48
Setting detail: OBSERVATION
Discharge: HOME | End: 2018-10-01
Attending: EMERGENCY MEDICINE | Admitting: HOSPITALIST
Payer: MEDICAID

## 2018-09-30 DIAGNOSIS — T50.902A INTENTIONAL DRUG OVERDOSE, INITIAL ENCOUNTER (HCC): Primary | ICD-10-CM

## 2018-09-30 DIAGNOSIS — R45.851 SUICIDAL IDEATION: ICD-10-CM

## 2018-09-30 PROBLEM — F19.920 INTOXICATION BY DRUG, UNCOMPLICATED (HCC): Status: ACTIVE | Noted: 2018-09-30

## 2018-09-30 LAB
ACETAMINOPHEN LEVEL: <15 UG/ML
ALBUMIN SERPL-MCNC: 4.7 G/DL (ref 3.5–5.2)
ALP BLD-CCNC: 123 U/L (ref 40–130)
ALT SERPL-CCNC: 44 U/L (ref 5–41)
AMPHETAMINE SCREEN, URINE: NEGATIVE
ANION GAP SERPL CALCULATED.3IONS-SCNC: 16 MMOL/L (ref 7–19)
APTT: 31.8 SEC (ref 26–36.2)
AST SERPL-CCNC: 46 U/L (ref 5–40)
BARBITURATE SCREEN URINE: NEGATIVE
BASOPHILS ABSOLUTE: 0.1 K/UL (ref 0–0.2)
BASOPHILS RELATIVE PERCENT: 0.9 % (ref 0–1)
BENZODIAZEPINE SCREEN, URINE: NEGATIVE
BILIRUB SERPL-MCNC: <0.2 MG/DL (ref 0.2–1.2)
BUN BLDV-MCNC: 11 MG/DL (ref 6–20)
CALCIUM SERPL-MCNC: 8.9 MG/DL (ref 8.6–10)
CANNABINOID SCREEN URINE: NEGATIVE
CHLORIDE BLD-SCNC: 104 MMOL/L (ref 98–111)
CO2: 23 MMOL/L (ref 22–29)
COCAINE METABOLITE SCREEN URINE: NEGATIVE
CREAT SERPL-MCNC: 1 MG/DL (ref 0.5–1.2)
EOSINOPHILS ABSOLUTE: 0.1 K/UL (ref 0–0.6)
EOSINOPHILS RELATIVE PERCENT: 1.9 % (ref 0–5)
ETHANOL: 329 MG/DL (ref 0–0.08)
GFR NON-AFRICAN AMERICAN: >60
GLUCOSE BLD-MCNC: 175 MG/DL (ref 74–109)
HCT VFR BLD CALC: 47.4 % (ref 42–52)
HEMOGLOBIN: 15.7 G/DL (ref 14–18)
INR BLD: 0.93 (ref 0.88–1.18)
LYMPHOCYTES ABSOLUTE: 2.1 K/UL (ref 1.1–4.5)
LYMPHOCYTES RELATIVE PERCENT: 36.6 % (ref 20–40)
Lab: NORMAL
MCH RBC QN AUTO: 31.2 PG (ref 27–31)
MCHC RBC AUTO-ENTMCNC: 33.1 G/DL (ref 33–37)
MCV RBC AUTO: 94.2 FL (ref 80–94)
MONOCYTES ABSOLUTE: 0.3 K/UL (ref 0–0.9)
MONOCYTES RELATIVE PERCENT: 5.3 % (ref 0–10)
NEUTROPHILS ABSOLUTE: 3.2 K/UL (ref 1.5–7.5)
NEUTROPHILS RELATIVE PERCENT: 55 % (ref 50–65)
OPIATE SCREEN URINE: NEGATIVE
PDW BLD-RTO: 14.2 % (ref 11.5–14.5)
PLATELET # BLD: 265 K/UL (ref 130–400)
PMV BLD AUTO: 8.6 FL (ref 9.4–12.4)
POTASSIUM SERPL-SCNC: 3.7 MMOL/L (ref 3.5–5)
PROTHROMBIN TIME: 12.4 SEC (ref 12–14.6)
RBC # BLD: 5.03 M/UL (ref 4.7–6.1)
SALICYLATE, SERUM: <3 MG/DL (ref 3–10)
SODIUM BLD-SCNC: 143 MMOL/L (ref 136–145)
TOTAL PROTEIN: 8 G/DL (ref 6.6–8.7)
WBC # BLD: 5.8 K/UL (ref 4.8–10.8)

## 2018-09-30 PROCEDURE — G0378 HOSPITAL OBSERVATION PER HR: HCPCS

## 2018-09-30 PROCEDURE — 96376 TX/PRO/DX INJ SAME DRUG ADON: CPT

## 2018-09-30 PROCEDURE — 6370000000 HC RX 637 (ALT 250 FOR IP): Performed by: INTERNAL MEDICINE

## 2018-09-30 PROCEDURE — 2500000003 HC RX 250 WO HCPCS: Performed by: INTERNAL MEDICINE

## 2018-09-30 PROCEDURE — 2580000003 HC RX 258: Performed by: EMERGENCY MEDICINE

## 2018-09-30 PROCEDURE — 96375 TX/PRO/DX INJ NEW DRUG ADDON: CPT

## 2018-09-30 PROCEDURE — G0480 DRUG TEST DEF 1-7 CLASSES: HCPCS

## 2018-09-30 PROCEDURE — 93005 ELECTROCARDIOGRAM TRACING: CPT

## 2018-09-30 PROCEDURE — 80307 DRUG TEST PRSMV CHEM ANLYZR: CPT

## 2018-09-30 PROCEDURE — 36415 COLL VENOUS BLD VENIPUNCTURE: CPT

## 2018-09-30 PROCEDURE — 2580000003 HC RX 258: Performed by: INTERNAL MEDICINE

## 2018-09-30 PROCEDURE — 96374 THER/PROPH/DIAG INJ IV PUSH: CPT

## 2018-09-30 PROCEDURE — 85025 COMPLETE CBC W/AUTO DIFF WBC: CPT

## 2018-09-30 PROCEDURE — 6360000002 HC RX W HCPCS: Performed by: INTERNAL MEDICINE

## 2018-09-30 PROCEDURE — 99285 EMERGENCY DEPT VISIT HI MDM: CPT | Performed by: EMERGENCY MEDICINE

## 2018-09-30 PROCEDURE — 6370000000 HC RX 637 (ALT 250 FOR IP): Performed by: EMERGENCY MEDICINE

## 2018-09-30 PROCEDURE — 80053 COMPREHEN METABOLIC PANEL: CPT

## 2018-09-30 PROCEDURE — 6360000002 HC RX W HCPCS: Performed by: EMERGENCY MEDICINE

## 2018-09-30 PROCEDURE — 99220 PR INITIAL OBSERVATION CARE/DAY 70 MINUTES: CPT | Performed by: INTERNAL MEDICINE

## 2018-09-30 PROCEDURE — 99285 EMERGENCY DEPT VISIT HI MDM: CPT

## 2018-09-30 PROCEDURE — 99225 PR SBSQ OBSERVATION CARE/DAY 25 MINUTES: CPT | Performed by: HOSPITALIST

## 2018-09-30 PROCEDURE — 96372 THER/PROPH/DIAG INJ SC/IM: CPT

## 2018-09-30 PROCEDURE — 85610 PROTHROMBIN TIME: CPT

## 2018-09-30 PROCEDURE — 85730 THROMBOPLASTIN TIME PARTIAL: CPT

## 2018-09-30 PROCEDURE — 2500000003 HC RX 250 WO HCPCS: Performed by: EMERGENCY MEDICINE

## 2018-09-30 RX ORDER — LORAZEPAM 2 MG/ML
1 INJECTION INTRAMUSCULAR
Status: DISCONTINUED | OUTPATIENT
Start: 2018-09-30 | End: 2018-10-01 | Stop reason: HOSPADM

## 2018-09-30 RX ORDER — ONDANSETRON 2 MG/ML
4 INJECTION INTRAMUSCULAR; INTRAVENOUS EVERY 6 HOURS PRN
Status: DISCONTINUED | OUTPATIENT
Start: 2018-09-30 | End: 2018-10-01 | Stop reason: HOSPADM

## 2018-09-30 RX ORDER — LORAZEPAM 1 MG/1
1 TABLET ORAL
Status: DISCONTINUED | OUTPATIENT
Start: 2018-09-30 | End: 2018-10-01 | Stop reason: HOSPADM

## 2018-09-30 RX ORDER — SERTRALINE HYDROCHLORIDE 100 MG/1
100 TABLET, FILM COATED ORAL DAILY
Status: ON HOLD | COMMUNITY
End: 2019-06-13 | Stop reason: HOSPADM

## 2018-09-30 RX ORDER — LAMOTRIGINE 25 MG/1
50 TABLET ORAL DAILY
Status: DISCONTINUED | OUTPATIENT
Start: 2018-09-30 | End: 2018-10-01 | Stop reason: HOSPADM

## 2018-09-30 RX ORDER — SERTRALINE HYDROCHLORIDE 100 MG/1
100 TABLET, FILM COATED ORAL DAILY
Status: DISCONTINUED | OUTPATIENT
Start: 2018-09-30 | End: 2018-10-01 | Stop reason: HOSPADM

## 2018-09-30 RX ORDER — LORAZEPAM 1 MG/1
3 TABLET ORAL
Status: DISCONTINUED | OUTPATIENT
Start: 2018-09-30 | End: 2018-10-01 | Stop reason: HOSPADM

## 2018-09-30 RX ORDER — LORAZEPAM 1 MG/1
4 TABLET ORAL
Status: DISCONTINUED | OUTPATIENT
Start: 2018-09-30 | End: 2018-10-01 | Stop reason: HOSPADM

## 2018-09-30 RX ORDER — LORAZEPAM 2 MG/ML
3 INJECTION INTRAMUSCULAR
Status: DISCONTINUED | OUTPATIENT
Start: 2018-09-30 | End: 2018-10-01 | Stop reason: HOSPADM

## 2018-09-30 RX ORDER — SODIUM CHLORIDE 0.9 % (FLUSH) 0.9 %
10 SYRINGE (ML) INJECTION EVERY 12 HOURS SCHEDULED
Status: DISCONTINUED | OUTPATIENT
Start: 2018-09-30 | End: 2018-10-01 | Stop reason: HOSPADM

## 2018-09-30 RX ORDER — SODIUM CHLORIDE 0.9 % (FLUSH) 0.9 %
10 SYRINGE (ML) INJECTION PRN
Status: DISCONTINUED | OUTPATIENT
Start: 2018-09-30 | End: 2018-10-01 | Stop reason: HOSPADM

## 2018-09-30 RX ORDER — LORAZEPAM 1 MG/1
2 TABLET ORAL
Status: DISCONTINUED | OUTPATIENT
Start: 2018-09-30 | End: 2018-10-01 | Stop reason: HOSPADM

## 2018-09-30 RX ORDER — NICOTINE 21 MG/24HR
1 PATCH, TRANSDERMAL 24 HOURS TRANSDERMAL ONCE
Status: COMPLETED | OUTPATIENT
Start: 2018-09-30 | End: 2018-10-01

## 2018-09-30 RX ORDER — LORAZEPAM 2 MG/ML
4 INJECTION INTRAMUSCULAR
Status: DISCONTINUED | OUTPATIENT
Start: 2018-09-30 | End: 2018-10-01 | Stop reason: HOSPADM

## 2018-09-30 RX ORDER — LORAZEPAM 2 MG/ML
2 INJECTION INTRAMUSCULAR
Status: DISCONTINUED | OUTPATIENT
Start: 2018-09-30 | End: 2018-10-01 | Stop reason: HOSPADM

## 2018-09-30 RX ADMIN — FOLIC ACID: 5 INJECTION, SOLUTION INTRAMUSCULAR; INTRAVENOUS; SUBCUTANEOUS at 03:53

## 2018-09-30 RX ADMIN — FOLIC ACID: 5 INJECTION, SOLUTION INTRAMUSCULAR; INTRAVENOUS; SUBCUTANEOUS at 07:58

## 2018-09-30 RX ADMIN — ONDANSETRON 4 MG: 2 INJECTION INTRAMUSCULAR; INTRAVENOUS at 16:35

## 2018-09-30 RX ADMIN — LORAZEPAM 2 MG: 2 INJECTION INTRAMUSCULAR; INTRAVENOUS at 13:18

## 2018-09-30 RX ADMIN — LAMOTRIGINE 50 MG: 25 TABLET ORAL at 07:58

## 2018-09-30 RX ADMIN — SERTRALINE HYDROCHLORIDE 100 MG: 100 TABLET ORAL at 07:59

## 2018-09-30 RX ADMIN — LORAZEPAM 2 MG: 2 INJECTION INTRAMUSCULAR; INTRAVENOUS at 16:34

## 2018-09-30 RX ADMIN — LORAZEPAM 2 MG: 2 INJECTION INTRAMUSCULAR; INTRAVENOUS at 06:20

## 2018-09-30 RX ADMIN — LORAZEPAM 1 MG: 1 TABLET ORAL at 20:13

## 2018-09-30 RX ADMIN — ENOXAPARIN SODIUM 40 MG: 40 INJECTION SUBCUTANEOUS at 07:58

## 2018-09-30 ASSESSMENT — PAIN SCALES - GENERAL: PAINLEVEL_OUTOF10: 0

## 2018-09-30 ASSESSMENT — ENCOUNTER SYMPTOMS
VOMITING: 0
COUGH: 0
DIARRHEA: 0
RHINORRHEA: 0
SORE THROAT: 0
BACK PAIN: 0
SHORTNESS OF BREATH: 0
NAUSEA: 0
ABDOMINAL PAIN: 0

## 2018-09-30 NOTE — ED NOTES
[employer] and spoke with Anabela. She stated to watch out for hypertension initially and then hypotension, jerking movement, respiratory distress, drowsiness. She stated to monitor him for 6 hours after ingestion and to give fluids. She stated the alcohol could mask any symptoms of Neurotin overdose.       Sunil Damon RN  09/30/18 [id number]

## 2018-09-30 NOTE — ED NOTES
[place], from [facility] on the phone. Updated on pt's status.       Susan Gr RN  09/30/18 0021

## 2018-09-30 NOTE — PLAN OF CARE
Problem: Suicide risk  Goal: Provide patient with safe environment  Provide patient with safe environment   Outcome: Ongoing      Problem: Falls - Risk of:  Goal: Will remain free from falls  Will remain free from falls   Outcome: Ongoing    Goal: Absence of physical injury  Absence of physical injury   Outcome: Ongoing

## 2018-09-30 NOTE — H&P
days, then 2 po q day x 14 days, then 4 po q day. Patient taking differently: Take 100 mg by mouth daily X 12 more days, then 2 po q day x 14 days, then 4 po q day. 6/30/18  Yes Latonia Ovalle MD       Allergies:  Patient has no known allergies. Social History:   TOBACCO:   reports that he has been smoking Cigarettes. He has been smoking about 1.00 pack per day. He has never used smokeless tobacco.  ETOH:   reports that he drinks alcohol. Family History:   Family History   Problem Relation Age of Onset    Osteoarthritis Mother     Thyroid Disease Mother     Heart Failure Father     Heart Failure Maternal Grandmother     Heart Failure Paternal Grandmother     Cancer Maternal Grandfather            Physical Exam:    Vitals: BP (!) 135/91   Pulse 111   Temp 97.8 °F (36.6 °C)   Resp 16   Ht 6' 4\" (1.93 m)   Wt 187 lb (84.8 kg)   SpO2 93%   BMI 22.76 kg/m²   24HR INTAKE/OUTPUT:  No intake or output data in the 24 hours ending 09/30/18 0525  General appearance: alert and cooperative with exam  HEENT: atraumatic, sclera injected  Lungs: no increased work of breathing, clear to auscultation bilaterally without rales, rhonchi or wheezes  Heart: regular rate and rhythm, S1, S2 normal, no murmur, click, rub or gallop  Abdomen: soft, non-tender; bowel sounds normal; no masses,  no organomegaly  Extremities: extremities normal without clubbing, atraumatic, no cyanosis or edema  Neurologic: No focal neurologic deficits, normal sensation, alert and oriented, affect and mood appropriate. Skin: no rashes, nodules. CBC:   Recent Labs      09/30/18   0242   WBC  5.8   HGB  15.7   PLT  265     BMP:    Recent Labs      09/30/18   0242   NA  143   K  3.7   CL  104   CO2  23   BUN  11   CREATININE  1.0   GLUCOSE  175*     Hepatic:   Recent Labs      09/30/18   0242   AST  46*   ALT  44*   BILITOT  <0.2   ALKPHOS  123     Troponin T: No results for input(s): TROPONINI in the last 72 hours.   Pro-BNP: No results

## 2018-09-30 NOTE — ED PROVIDER NOTES
sounds. No murmur heard. Pulmonary/Chest: Breath sounds normal. He has no wheezes. He has no rales. Abdominal: Soft. There is no tenderness. There is no rebound and no guarding. Musculoskeletal: Normal range of motion. He exhibits no edema. Neurological: He is alert and oriented to person, place, and time. Skin: Skin is warm and dry. He is not diaphoretic. Psychiatric: He is not actively hallucinating and not combative. Thought content is not paranoid and not delusional. He expresses suicidal ideation. He expresses no homicidal ideation. Vitals reviewed. DIAGNOSTIC RESULTS     EKG: All EKG's are interpreted by the Emergency Department Physician who either signs or Co-signs this chart in the absence of a cardiologist.    102, sinus tachycardia, no prolonged QT, nondiagnostic EKG          No orders to display           LABS:  Labs Reviewed   CBC WITH AUTO DIFFERENTIAL - Abnormal; Notable for the following:        Result Value    MCV 94.2 (*)     MCH 31.2 (*)     MPV 8.6 (*)     All other components within normal limits   COMPREHENSIVE METABOLIC PANEL - Abnormal; Notable for the following:     Glucose 175 (*)     ALT 44 (*)     AST 46 (*)     All other components within normal limits   ETHANOL   URINE DRUG SCREEN   SALICYLATE LEVEL   ACETAMINOPHEN LEVEL   APTT   PROTIME-INR       All other labs were within normal range or not returned as of this dictation.     EMERGENCY DEPARTMENT COURSE and DIFFERENTIAL DIAGNOSIS/MDM:   Vitals:    Vitals:    09/30/18 0235   BP: (!) 142/88   Pulse: 104   Resp: 19   Temp: 97.8 °F (36.6 °C)   SpO2: 90%   Weight: 187 lb (84.8 kg)   Height: 6' 4\" (1.93 m)       MDM  Number of Diagnoses or Management Options     Amount and/or Complexity of Data Reviewed  Clinical lab tests: ordered and reviewed  Discuss the patient with other providers: yes      VSS, pt smiling and joking while in room, intentional OD of gabapentin and etoh, Carloz spoke with poison control recommend 6

## 2018-10-01 VITALS
HEART RATE: 63 BPM | SYSTOLIC BLOOD PRESSURE: 166 MMHG | DIASTOLIC BLOOD PRESSURE: 86 MMHG | BODY MASS INDEX: 22.77 KG/M2 | TEMPERATURE: 98.3 F | OXYGEN SATURATION: 98 % | HEIGHT: 76 IN | RESPIRATION RATE: 18 BRPM | WEIGHT: 187 LBS

## 2018-10-01 PROBLEM — F10.920 ALCOHOLIC INTOXICATION WITHOUT COMPLICATION (HCC): Status: ACTIVE | Noted: 2018-10-01

## 2018-10-01 LAB
ALBUMIN SERPL-MCNC: 3.8 G/DL (ref 3.5–5.2)
ALP BLD-CCNC: 95 U/L (ref 40–130)
ALT SERPL-CCNC: 28 U/L (ref 5–41)
ANION GAP SERPL CALCULATED.3IONS-SCNC: 11 MMOL/L (ref 7–19)
AST SERPL-CCNC: 24 U/L (ref 5–40)
BASOPHILS ABSOLUTE: 0 K/UL (ref 0–0.2)
BASOPHILS RELATIVE PERCENT: 0.9 % (ref 0–1)
BILIRUB SERPL-MCNC: 0.5 MG/DL (ref 0.2–1.2)
BUN BLDV-MCNC: 9 MG/DL (ref 6–20)
CALCIUM SERPL-MCNC: 8.5 MG/DL (ref 8.6–10)
CHLORIDE BLD-SCNC: 102 MMOL/L (ref 98–111)
CO2: 27 MMOL/L (ref 22–29)
CREAT SERPL-MCNC: 0.9 MG/DL (ref 0.5–1.2)
EOSINOPHILS ABSOLUTE: 0.1 K/UL (ref 0–0.6)
EOSINOPHILS RELATIVE PERCENT: 1.7 % (ref 0–5)
GFR NON-AFRICAN AMERICAN: >60
GLUCOSE BLD-MCNC: 87 MG/DL (ref 74–109)
HCT VFR BLD CALC: 37.2 % (ref 42–52)
HEMOGLOBIN: 12.4 G/DL (ref 14–18)
LYMPHOCYTES ABSOLUTE: 1 K/UL (ref 1.1–4.5)
LYMPHOCYTES RELATIVE PERCENT: 22.5 % (ref 20–40)
MCH RBC QN AUTO: 31.1 PG (ref 27–31)
MCHC RBC AUTO-ENTMCNC: 33.3 G/DL (ref 33–37)
MCV RBC AUTO: 93.2 FL (ref 80–94)
MONOCYTES ABSOLUTE: 0.6 K/UL (ref 0–0.9)
MONOCYTES RELATIVE PERCENT: 13 % (ref 0–10)
NEUTROPHILS ABSOLUTE: 2.8 K/UL (ref 1.5–7.5)
NEUTROPHILS RELATIVE PERCENT: 61.5 % (ref 50–65)
PDW BLD-RTO: 13.2 % (ref 11.5–14.5)
PLATELET # BLD: 189 K/UL (ref 130–400)
PMV BLD AUTO: 9 FL (ref 9.4–12.4)
POTASSIUM REFLEX MAGNESIUM: 3.7 MMOL/L (ref 3.5–5)
RBC # BLD: 3.99 M/UL (ref 4.7–6.1)
SODIUM BLD-SCNC: 140 MMOL/L (ref 136–145)
TOTAL PROTEIN: 6.1 G/DL (ref 6.6–8.7)
WBC # BLD: 4.6 K/UL (ref 4.8–10.8)

## 2018-10-01 PROCEDURE — 6360000002 HC RX W HCPCS: Performed by: INTERNAL MEDICINE

## 2018-10-01 PROCEDURE — 36415 COLL VENOUS BLD VENIPUNCTURE: CPT

## 2018-10-01 PROCEDURE — G0378 HOSPITAL OBSERVATION PER HR: HCPCS

## 2018-10-01 PROCEDURE — 2580000003 HC RX 258: Performed by: INTERNAL MEDICINE

## 2018-10-01 PROCEDURE — 6370000000 HC RX 637 (ALT 250 FOR IP): Performed by: INTERNAL MEDICINE

## 2018-10-01 PROCEDURE — 96376 TX/PRO/DX INJ SAME DRUG ADON: CPT

## 2018-10-01 PROCEDURE — 80053 COMPREHEN METABOLIC PANEL: CPT

## 2018-10-01 PROCEDURE — 6370000000 HC RX 637 (ALT 250 FOR IP): Performed by: HOSPITALIST

## 2018-10-01 PROCEDURE — 85025 COMPLETE CBC W/AUTO DIFF WBC: CPT

## 2018-10-01 PROCEDURE — 96372 THER/PROPH/DIAG INJ SC/IM: CPT

## 2018-10-01 PROCEDURE — 99217 PR OBSERVATION CARE DISCHARGE MANAGEMENT: CPT | Performed by: HOSPITALIST

## 2018-10-01 PROCEDURE — 2500000003 HC RX 250 WO HCPCS: Performed by: INTERNAL MEDICINE

## 2018-10-01 RX ORDER — NICOTINE 21 MG/24HR
1 PATCH, TRANSDERMAL 24 HOURS TRANSDERMAL DAILY
Status: DISCONTINUED | OUTPATIENT
Start: 2018-10-01 | End: 2018-10-01 | Stop reason: HOSPADM

## 2018-10-01 RX ADMIN — ENOXAPARIN SODIUM 40 MG: 40 INJECTION SUBCUTANEOUS at 08:26

## 2018-10-01 RX ADMIN — FOLIC ACID: 5 INJECTION, SOLUTION INTRAMUSCULAR; INTRAVENOUS; SUBCUTANEOUS at 07:29

## 2018-10-01 RX ADMIN — LORAZEPAM 2 MG: 1 TABLET ORAL at 01:09

## 2018-10-01 RX ADMIN — SERTRALINE HYDROCHLORIDE 100 MG: 100 TABLET ORAL at 08:26

## 2018-10-01 RX ADMIN — LAMOTRIGINE 50 MG: 25 TABLET ORAL at 08:26

## 2018-10-01 NOTE — DISCHARGE SUMMARY
HISTORY: Kicked table Three views of the right foot are obtained. The tarsal, metatarsal and phalanges appear intact as visualized with no evidence of fracture. Negative right foot. Signed by Dr Maria M Dacosta on 9/5/2018 7:40 AM      Disposition:  Home     Discharge Instructions/Follow-up:    PCP 3 days   [facility] ([facility]) [facility] psychiatry this week    Code Status:  Full Code     Activity: activity as tolerated    Diet: regular diet    Labs: For convenience and continuity at follow-up the following most recent labs are provided:  CBC:   Recent Labs      09/30/18   0242  10/01/18   0314   WBC  5.8  4.6*   HGB  15.7  12.4*   HCT  47.4  37.2*   PLT  265  189       Renal:   Recent Labs      09/30/18   0242  10/01/18   0314   NA  143  140   K  3.7  3.7   CL  104  102   CO2  23  27   BUN  11  9   CREATININE  1.0  0.9   GLUCOSE  175*  87   CALCIUM  8.9  8.5*        Other:  Lab Results   Component Value Date    LABA1C 5.8 09/30/2017     No results found for: EAG  Lab Results   Component Value Date    TSH 1.330 06/28/2018     Lab Results   Component Value Date    CHOL 177 09/30/2017     Lab Results   Component Value Date    TRIG 119 (L) 09/30/2017     Lab Results   Component Value Date    HDL 55 09/30/2017     Lab Results   Component Value Date    LDLCALC 98 09/30/2017     No results found for: LABVLDL, VLDL  No results found for: CHOLHDLRATIO    ABGs:   No results found for: PHART, PO2ART, WTW9GXB    Culture:   No results for input(s): BC, BLOODCULT2, ORG in the last 72 hours. Discharge Medications:   Current Discharge Medication List           Details   sertraline (ZOLOFT) 100 MG tablet Take 100 mg by mouth daily      lamoTRIgine (LAMICTAL) 25 MG tablet Take 1 tablet by mouth daily X 12 more days, then 2 po q day x 14 days, then 4 po q day. Qty: 60 tablet, Refills: 0               Time Spent on discharge is more than 20 minutes in the examination, evaluation, counseling and review of medications and discharge plan.     Almas Llanes

## 2018-10-01 NOTE — PLAN OF CARE
Problem: Falls - Risk of:  Goal: Will remain free from falls  Will remain free from falls   Outcome: Ongoing    Goal: Absence of physical injury  Absence of physical injury   Outcome: Ongoing      Problem: Discharge Planning:  Goal: Discharged to appropriate level of care  Discharged to appropriate level of care  Outcome: Ongoing      Problem: Fluid Volume - Deficit:  Goal: Absence of fluid volume deficit signs and symptoms  Absence of fluid volume deficit signs and symptoms  Outcome: Ongoing      Problem: Nutrition Deficit:  Goal: Ability to achieve adequate nutritional intake will improve  Ability to achieve adequate nutritional intake will improve  Outcome: Ongoing      Problem: Sleep Pattern Disturbance:  Goal: Appears well-rested  Appears well-rested  Outcome: Ongoing      Problem: Violence - Risk of, Self/Other-Directed:  Goal: Knowledge of developmental care interventions  Absence of violence  Outcome: Ongoing

## 2018-10-02 LAB
EKG P AXIS: 75 DEGREES
EKG P-R INTERVAL: 182 MS
EKG Q-T INTERVAL: 344 MS
EKG QRS DURATION: 98 MS
EKG QTC CALCULATION (BAZETT): 417 MS
EKG T AXIS: 45 DEGREES

## 2018-11-05 ENCOUNTER — APPOINTMENT (OUTPATIENT)
Dept: CT IMAGING | Age: 48
End: 2018-11-05
Payer: MEDICAID

## 2018-11-05 ENCOUNTER — HOSPITAL ENCOUNTER (EMERGENCY)
Age: 48
Discharge: HOME | End: 2018-11-05
Attending: EMERGENCY MEDICINE
Payer: MEDICAID

## 2018-11-05 VITALS
BODY MASS INDEX: 22.53 KG/M2 | RESPIRATION RATE: 18 BRPM | HEIGHT: 76 IN | WEIGHT: 185 LBS | TEMPERATURE: 97.8 F | HEART RATE: 98 BPM | OXYGEN SATURATION: 98 % | SYSTOLIC BLOOD PRESSURE: 143 MMHG | DIASTOLIC BLOOD PRESSURE: 98 MMHG

## 2018-11-05 DIAGNOSIS — S00.83XA CONTUSION OF FACE, INITIAL ENCOUNTER: ICD-10-CM

## 2018-11-05 DIAGNOSIS — S01.81XA FACIAL LACERATION, INITIAL ENCOUNTER: Primary | ICD-10-CM

## 2018-11-05 PROCEDURE — 72125 CT NECK SPINE W/O DYE: CPT

## 2018-11-05 PROCEDURE — 99283 EMERGENCY DEPT VISIT LOW MDM: CPT

## 2018-11-05 PROCEDURE — 99284 EMERGENCY DEPT VISIT MOD MDM: CPT | Performed by: EMERGENCY MEDICINE

## 2018-11-05 PROCEDURE — 2500000003 HC RX 250 WO HCPCS: Performed by: NURSE PRACTITIONER

## 2018-11-05 PROCEDURE — 70450 CT HEAD/BRAIN W/O DYE: CPT

## 2018-11-05 PROCEDURE — 70486 CT MAXILLOFACIAL W/O DYE: CPT

## 2018-11-05 PROCEDURE — 12011 RPR F/E/E/N/L/M 2.5 CM/<: CPT

## 2018-11-05 PROCEDURE — 12011 RPR F/E/E/N/L/M 2.5 CM/<: CPT | Performed by: NURSE PRACTITIONER

## 2018-11-05 RX ORDER — ONDANSETRON 4 MG/1
4 TABLET, ORALLY DISINTEGRATING ORAL EVERY 8 HOURS PRN
Qty: 15 TABLET | Refills: 0 | Status: SHIPPED | OUTPATIENT
Start: 2018-11-05 | End: 2018-11-05 | Stop reason: CLARIF

## 2018-11-05 RX ORDER — LIDOCAINE HYDROCHLORIDE 10 MG/ML
5 INJECTION, SOLUTION EPIDURAL; INFILTRATION; INTRACAUDAL; PERINEURAL ONCE
Status: COMPLETED | OUTPATIENT
Start: 2018-11-05 | End: 2018-11-05

## 2018-11-05 RX ADMIN — LIDOCAINE HYDROCHLORIDE 5 ML: 10 INJECTION, SOLUTION EPIDURAL; INFILTRATION; INTRACAUDAL; PERINEURAL at 21:55

## 2018-11-05 ASSESSMENT — ENCOUNTER SYMPTOMS
VOMITING: 0
ABDOMINAL PAIN: 0
COUGH: 0
FACIAL SWELLING: 1

## 2018-11-05 ASSESSMENT — PAIN DESCRIPTION - LOCATION: LOCATION: FACE

## 2018-11-05 ASSESSMENT — PAIN SCALES - GENERAL: PAINLEVEL_OUTOF10: 7

## 2018-12-04 ENCOUNTER — HOSPITAL ENCOUNTER (EMERGENCY)
Age: 48
Discharge: HOME | End: 2018-12-05
Attending: EMERGENCY MEDICINE
Payer: MEDICAID

## 2018-12-04 DIAGNOSIS — R45.851 SUICIDAL IDEATION: Primary | ICD-10-CM

## 2018-12-04 DIAGNOSIS — F10.920 ACUTE ALCOHOLIC INTOXICATION WITHOUT COMPLICATION (HCC): ICD-10-CM

## 2018-12-04 LAB
ACETAMINOPHEN LEVEL: <15 UG/ML
ALBUMIN SERPL-MCNC: 4 G/DL (ref 3.5–5.2)
ALP BLD-CCNC: 88 U/L (ref 40–130)
ALT SERPL-CCNC: 27 U/L (ref 5–41)
AMPHETAMINE SCREEN, URINE: NEGATIVE
ANION GAP SERPL CALCULATED.3IONS-SCNC: 12 MMOL/L (ref 7–19)
AST SERPL-CCNC: 21 U/L (ref 5–40)
BARBITURATE SCREEN URINE: NEGATIVE
BASOPHILS ABSOLUTE: 0 K/UL (ref 0–0.2)
BASOPHILS RELATIVE PERCENT: 0.3 % (ref 0–1)
BENZODIAZEPINE SCREEN, URINE: NEGATIVE
BILIRUB SERPL-MCNC: <0.2 MG/DL (ref 0.2–1.2)
BUN BLDV-MCNC: 8 MG/DL (ref 6–20)
CALCIUM SERPL-MCNC: 8.6 MG/DL (ref 8.6–10)
CANNABINOID SCREEN URINE: NEGATIVE
CHLORIDE BLD-SCNC: 100 MMOL/L (ref 98–111)
CO2: 25 MMOL/L (ref 22–29)
COCAINE METABOLITE SCREEN URINE: NEGATIVE
CREAT SERPL-MCNC: 0.8 MG/DL (ref 0.5–1.2)
EOSINOPHILS ABSOLUTE: 0.1 K/UL (ref 0–0.6)
EOSINOPHILS RELATIVE PERCENT: 1.4 % (ref 0–5)
ETHANOL: 285 MG/DL (ref 0–0.08)
GFR NON-AFRICAN AMERICAN: >60
GLUCOSE BLD-MCNC: 101 MG/DL (ref 74–109)
HCT VFR BLD CALC: 40.6 % (ref 42–52)
HEMOGLOBIN: 13.5 G/DL (ref 14–18)
LYMPHOCYTES ABSOLUTE: 2.4 K/UL (ref 1.1–4.5)
LYMPHOCYTES RELATIVE PERCENT: 32.3 % (ref 20–40)
Lab: NORMAL
MCH RBC QN AUTO: 30.8 PG (ref 27–31)
MCHC RBC AUTO-ENTMCNC: 33.3 G/DL (ref 33–37)
MCV RBC AUTO: 92.7 FL (ref 80–94)
MONOCYTES ABSOLUTE: 0.5 K/UL (ref 0–0.9)
MONOCYTES RELATIVE PERCENT: 6.5 % (ref 0–10)
NEUTROPHILS ABSOLUTE: 4.4 K/UL (ref 1.5–7.5)
NEUTROPHILS RELATIVE PERCENT: 59.2 % (ref 50–65)
OPIATE SCREEN URINE: NEGATIVE
PDW BLD-RTO: 12.7 % (ref 11.5–14.5)
PLATELET # BLD: 244 K/UL (ref 130–400)
PMV BLD AUTO: 8.8 FL (ref 9.4–12.4)
POTASSIUM SERPL-SCNC: 4.5 MMOL/L (ref 3.5–5)
RBC # BLD: 4.38 M/UL (ref 4.7–6.1)
SALICYLATE, SERUM: <3 MG/DL (ref 3–10)
SODIUM BLD-SCNC: 137 MMOL/L (ref 136–145)
TOTAL PROTEIN: 7.3 G/DL (ref 6.6–8.7)
WBC # BLD: 7.4 K/UL (ref 4.8–10.8)

## 2018-12-04 PROCEDURE — G0480 DRUG TEST DEF 1-7 CLASSES: HCPCS

## 2018-12-04 PROCEDURE — 36415 COLL VENOUS BLD VENIPUNCTURE: CPT

## 2018-12-04 PROCEDURE — 80053 COMPREHEN METABOLIC PANEL: CPT

## 2018-12-04 PROCEDURE — 99285 EMERGENCY DEPT VISIT HI MDM: CPT

## 2018-12-04 PROCEDURE — 2580000003 HC RX 258: Performed by: EMERGENCY MEDICINE

## 2018-12-04 PROCEDURE — 2500000003 HC RX 250 WO HCPCS: Performed by: EMERGENCY MEDICINE

## 2018-12-04 PROCEDURE — 96366 THER/PROPH/DIAG IV INF ADDON: CPT

## 2018-12-04 PROCEDURE — 96365 THER/PROPH/DIAG IV INF INIT: CPT

## 2018-12-04 PROCEDURE — 80307 DRUG TEST PRSMV CHEM ANLYZR: CPT

## 2018-12-04 PROCEDURE — 6360000002 HC RX W HCPCS: Performed by: EMERGENCY MEDICINE

## 2018-12-04 PROCEDURE — 85025 COMPLETE CBC W/AUTO DIFF WBC: CPT

## 2018-12-04 RX ADMIN — FOLIC ACID: 5 INJECTION, SOLUTION INTRAMUSCULAR; INTRAVENOUS; SUBCUTANEOUS at 20:22

## 2018-12-05 VITALS
TEMPERATURE: 98.1 F | SYSTOLIC BLOOD PRESSURE: 122 MMHG | DIASTOLIC BLOOD PRESSURE: 76 MMHG | RESPIRATION RATE: 18 BRPM | WEIGHT: 180 LBS | OXYGEN SATURATION: 98 % | HEART RATE: 86 BPM | BODY MASS INDEX: 24.38 KG/M2 | HEIGHT: 72 IN

## 2018-12-05 LAB
ETHANOL: 25 MG/DL (ref 0–0.08)
ETHANOL: 96 MG/DL (ref 0–0.08)

## 2018-12-05 PROCEDURE — 6370000000 HC RX 637 (ALT 250 FOR IP): Performed by: EMERGENCY MEDICINE

## 2018-12-05 PROCEDURE — G0480 DRUG TEST DEF 1-7 CLASSES: HCPCS

## 2018-12-05 PROCEDURE — 36415 COLL VENOUS BLD VENIPUNCTURE: CPT

## 2018-12-05 PROCEDURE — 99285 EMERGENCY DEPT VISIT HI MDM: CPT | Performed by: EMERGENCY MEDICINE

## 2018-12-05 RX ORDER — QUETIAPINE FUMARATE 200 MG/1
200 TABLET, FILM COATED ORAL ONCE
Status: COMPLETED | OUTPATIENT
Start: 2018-12-05 | End: 2018-12-05

## 2018-12-05 RX ADMIN — QUETIAPINE FUMARATE 200 MG: 200 TABLET ORAL at 08:37

## 2018-12-05 NOTE — ED TRIAGE NOTES
Pt has suicidal ideation. \"i will put a gun to my head\"  Pt doesn't have access to firearms    ETOH on board. Pt is unable to put clear thoughts together.

## 2018-12-05 NOTE — ED PROVIDER NOTES
Failure Paternal Grandmother     Cancer Maternal Grandfather           SOCIAL HISTORY       Social History     Social History    Marital status:      Spouse name: N/A    Number of children: N/A    Years of education: N/A     Social History Main Topics    Smoking status: Current Every Day Smoker     Packs/day: 1.00     Types: Cigarettes    Smokeless tobacco: Never Used    Alcohol use 0.0 oz/week      Comment: every day per patient, all types of alcohol    Drug use: No    Sexual activity: Yes     Partners: Female     Other Topics Concern    Not on file     Social History Narrative    No narrative on file       SCREENINGS             PHYSICAL EXAM    (up to 7 for level 4, 8 or more for level 5)     ED Triage Vitals [12/04/18 1905]   BP Temp Temp src Pulse Resp SpO2 Height Weight   -- 98.1 °F (36.7 °C) -- 84 18 99 % 6' (1.829 m) 180 lb (81.6 kg)       Physical Exam   Constitutional: He appears well-developed and well-nourished. No distress. HENT:   Head: Normocephalic and atraumatic. Mouth/Throat: Oropharynx is clear and moist.   Eyes: Pupils are equal, round, and reactive to light. Conjunctivae and EOM are normal.   Neck: Normal range of motion. Neck supple. Cardiovascular: Normal rate, regular rhythm and normal heart sounds. No murmur heard. Pulmonary/Chest: Effort normal and breath sounds normal. He has no wheezes. He has no rales. Abdominal: Soft. Bowel sounds are normal. He exhibits no distension. There is no tenderness. There is no rebound and no guarding. Musculoskeletal: Normal range of motion. He exhibits no edema. Neurological: He is alert. He is disoriented. No cranial nerve deficit. Patient oriented to self, but unwilling or unable to answer any further questions. He moves all extremities. Do not suspect neurologic deficit. Skin: Skin is warm and dry. Capillary refill takes less than 2 seconds.    Psychiatric:    UNABLE TO ASSESS SECONDARY TO ALTERED MENTAL STATUS FROM noted below, none     Procedures    FINAL IMPRESSION      1. Suicidal ideation    2. Acute alcoholic intoxication without complication ([facility] [address]          DISPOSITION/PLAN   DISPOSITION Ed Observation 12/05/2018 07:17:19 AM      PATIENT REFERRED TO:  No follow-up provider specified.     DISCHARGE MEDICATIONS:  New Prescriptions    No medications on file          (Please note that portions of this note were completed with a voice recognition program.  Efforts were made to edit thedictations but occasionally words are mis-transcribed.)    Karen Gipson MD (electronically signed)  Attending Emergency Physician          Joann Alex MD  12/05/18 [id number]

## 2018-12-05 NOTE — ED NOTES
Psych called @ 0800, waiting for labs to come back for alcohol level     Lala Martinez  12/05/18 0864

## 2018-12-05 NOTE — BH NOTE
currently. Are you compliant with appointments: Yes  Psychiatric Hospitalizations: Yes   Where & When: [facility]   Previous Diagnosis:  Depression  Previous psychiatric medications: Yes   If yes list examples: Lamictal and Zoloft  Are you compliant with medications: Yes     Violence and Trauma History:     History of violence by patient: no   If yes explain:   History of Trauma: no    If yes explain:   History of Abuse: no   If yes explain/by whom:     Family History:  Family History   Problem Relation Age of Onset    Osteoarthritis Mother     Thyroid Disease Mother     Heart Failure Father     Heart Failure Maternal Grandmother     Heart Failure Paternal Grandmother     Cancer Maternal Grandfather      Family history of mental illness: yes   Family member & Diagnosis: Mother,  Depression  Family members with suicide attempt: no   If yes explain:   Family members who completed suicide: no  If yes explain:       Substance Abuse History:     SBIRT Completed: yes    Current ETOH LEVELS: 1957= 285,  0515=96, 0903=25    ETOH Abuse: Yes  Age of first drink:  13   Date of last drink: Yesterday  Amount drinking daily: minimizes use, states he has been in [place] and staying sober the majority of the year. Years of use:  30+  Longest period of sobriety: more than two years  ETOH treatment history: yes   If yes describe: Several   History of seizures, blackouts, etc. due to ETOH abuse: yes   Family history of ETOH abuse: yes   Legal consequences of chemical use: yes     Substance/Chemical Abuse/Recreational Drug History: Denies  Age of first substance use:    Substance used:   Date of last substance use:    Substance treatment history:   Family history of substance abuse:     Tobacco use:Yes If yes frequency 1 PPD /duration: 37 years    Opiates: It was discussed with pt they would not be receiving opiates unless they were within 3 days post surgery/acute injury. Patient voiced understanding and agreed.        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 Decision to admit to :no    If yes, which unit Adult or Geriatric Unit:    Is patient voluntary:   If no has a 72 hold been initiated:   Does the patient have a guardian:   Has the guardian been notified:   Admission Diagnosis:     2. Referral to IOP/PHP:      3. Decision to Discharge:   Does not meet criteria for acceptance to   unit due to: Pt denies SI, HI, AVH. Pt is not paranoid or delusional.  Pt has already called his AA sponsor and told him of the incident. Pt to f/u with his sponsor and AA. 4. Transferred:       Patient was transferred due to: Other follow up information provided:      Checklist for [facility] AN AFFILIATE OF [facility] staff:   Legal signed:    Admission completed except as noted: Insurance Precert:       Harshil Bond RN

## 2019-01-25 ENCOUNTER — APPOINTMENT (OUTPATIENT)
Dept: GENERAL RADIOLOGY | Age: 49
DRG: 896 | End: 2019-01-25
Payer: MEDICAID

## 2019-01-25 ENCOUNTER — HOSPITAL ENCOUNTER (INPATIENT)
Age: 49
LOS: 2 days | Discharge: HOME | DRG: 896 | End: 2019-01-28
Attending: EMERGENCY MEDICINE | Admitting: HOSPITALIST
Payer: MEDICAID

## 2019-01-25 DIAGNOSIS — R06.81 APNEIC SPELL: ICD-10-CM

## 2019-01-25 DIAGNOSIS — R45.851 SUICIDAL IDEATIONS: Primary | ICD-10-CM

## 2019-01-25 DIAGNOSIS — E87.20 METABOLIC ACIDOSIS: ICD-10-CM

## 2019-01-25 DIAGNOSIS — R41.82 ALTERED MENTAL STATUS, UNSPECIFIED ALTERED MENTAL STATUS TYPE: ICD-10-CM

## 2019-01-25 DIAGNOSIS — F10.920 ACUTE ALCOHOLIC INTOXICATION WITHOUT COMPLICATION (HCC): ICD-10-CM

## 2019-01-25 DIAGNOSIS — R09.02 HYPOXEMIA: ICD-10-CM

## 2019-01-25 LAB
ACETAMINOPHEN LEVEL: <15 UG/ML
ALBUMIN SERPL-MCNC: 4.3 G/DL (ref 3.5–5.2)
ALP BLD-CCNC: 93 U/L (ref 40–130)
ALT SERPL-CCNC: 42 U/L (ref 5–41)
AMPHETAMINE SCREEN, URINE: NEGATIVE
ANION GAP SERPL CALCULATED.3IONS-SCNC: 13 MMOL/L (ref 7–19)
AST SERPL-CCNC: 37 U/L (ref 5–40)
BARBITURATE SCREEN URINE: NEGATIVE
BASE EXCESS ARTERIAL: -2.7 MMOL/L (ref -2–2)
BASOPHILS ABSOLUTE: 0 K/UL (ref 0–0.2)
BASOPHILS RELATIVE PERCENT: 0.3 % (ref 0–1)
BENZODIAZEPINE SCREEN, URINE: POSITIVE
BILIRUB SERPL-MCNC: <0.2 MG/DL (ref 0.2–1.2)
BILIRUBIN URINE: NEGATIVE
BLOOD, URINE: NEGATIVE
BUN BLDV-MCNC: 15 MG/DL (ref 6–20)
CALCIUM SERPL-MCNC: 8.7 MG/DL (ref 8.6–10)
CANNABINOID SCREEN URINE: POSITIVE
CARBOXYHEMOGLOBIN ARTERIAL: 5.5 % (ref 0–5)
CHLORIDE BLD-SCNC: 99 MMOL/L (ref 98–111)
CLARITY: CLEAR
CO2: 25 MMOL/L (ref 22–29)
COCAINE METABOLITE SCREEN URINE: NEGATIVE
COLOR: YELLOW
CREAT SERPL-MCNC: 1 MG/DL (ref 0.5–1.2)
EOSINOPHILS ABSOLUTE: 0.2 K/UL (ref 0–0.6)
EOSINOPHILS RELATIVE PERCENT: 1.7 % (ref 0–5)
ETHANOL: 283 MG/DL (ref 0–0.08)
GFR NON-AFRICAN AMERICAN: >60
GLUCOSE BLD-MCNC: 95 MG/DL (ref 74–109)
GLUCOSE URINE: NEGATIVE MG/DL
HCO3 ARTERIAL: 26.2 MMOL/L (ref 22–26)
HCT VFR BLD CALC: 39.5 % (ref 42–52)
HEMOGLOBIN, ART, EXTENDED: 12.7 G/DL (ref 14–18)
HEMOGLOBIN: 13.2 G/DL (ref 14–18)
KETONES, URINE: NEGATIVE MG/DL
LEUKOCYTE ESTERASE, URINE: NEGATIVE
LYMPHOCYTES ABSOLUTE: 2.9 K/UL (ref 1.1–4.5)
LYMPHOCYTES RELATIVE PERCENT: 30.8 % (ref 20–40)
Lab: ABNORMAL
MCH RBC QN AUTO: 30.8 PG (ref 27–31)
MCHC RBC AUTO-ENTMCNC: 33.4 G/DL (ref 33–37)
MCV RBC AUTO: 92.1 FL (ref 80–94)
METHEMOGLOBIN ARTERIAL: 0.7 %
MONOCYTES ABSOLUTE: 0.7 K/UL (ref 0–0.9)
MONOCYTES RELATIVE PERCENT: 7.1 % (ref 0–10)
NEUTROPHILS ABSOLUTE: 5.6 K/UL (ref 1.5–7.5)
NEUTROPHILS RELATIVE PERCENT: 59.8 % (ref 50–65)
NITRITE, URINE: NEGATIVE
O2 CONTENT ARTERIAL: 16.6 ML/DL
O2 SAT, ARTERIAL: 92.3 %
O2 THERAPY: ABNORMAL
OPIATE SCREEN URINE: NEGATIVE
PCO2 ARTERIAL: 64 MMHG (ref 35–45)
PDW BLD-RTO: 12.6 % (ref 11.5–14.5)
PH ARTERIAL: 7.22 (ref 7.35–7.45)
PH UA: 5.5
PLATELET # BLD: 264 K/UL (ref 130–400)
PMV BLD AUTO: 9 FL (ref 9.4–12.4)
PO2 ARTERIAL: 98 MMHG (ref 80–100)
POTASSIUM SERPL-SCNC: 4.3 MMOL/L (ref 3.5–5)
POTASSIUM, WHOLE BLOOD: 3.7
PROTEIN UA: NEGATIVE MG/DL
RBC # BLD: 4.29 M/UL (ref 4.7–6.1)
SALICYLATE, SERUM: <3 MG/DL (ref 3–10)
SODIUM BLD-SCNC: 137 MMOL/L (ref 136–145)
SPECIFIC GRAVITY UA: 1.02
TOTAL PROTEIN: 7.2 G/DL (ref 6.6–8.7)
UROBILINOGEN, URINE: 0.2 E.U./DL
WBC # BLD: 9.3 K/UL (ref 4.8–10.8)

## 2019-01-25 PROCEDURE — 81003 URINALYSIS AUTO W/O SCOPE: CPT

## 2019-01-25 PROCEDURE — 31500 INSERT EMERGENCY AIRWAY: CPT | Performed by: EMERGENCY MEDICINE

## 2019-01-25 PROCEDURE — 99285 EMERGENCY DEPT VISIT HI MDM: CPT | Performed by: EMERGENCY MEDICINE

## 2019-01-25 PROCEDURE — 31500 INSERT EMERGENCY AIRWAY: CPT

## 2019-01-25 PROCEDURE — G0480 DRUG TEST DEF 1-7 CLASSES: HCPCS

## 2019-01-25 PROCEDURE — 71045 X-RAY EXAM CHEST 1 VIEW: CPT

## 2019-01-25 PROCEDURE — 93005 ELECTROCARDIOGRAM TRACING: CPT

## 2019-01-25 PROCEDURE — 82803 BLOOD GASES ANY COMBINATION: CPT

## 2019-01-25 PROCEDURE — 84132 ASSAY OF SERUM POTASSIUM: CPT

## 2019-01-25 PROCEDURE — 2700000000 HC OXYGEN THERAPY PER DAY

## 2019-01-25 PROCEDURE — 2580000003 HC RX 258: Performed by: EMERGENCY MEDICINE

## 2019-01-25 PROCEDURE — 80053 COMPREHEN METABOLIC PANEL: CPT

## 2019-01-25 PROCEDURE — 36415 COLL VENOUS BLD VENIPUNCTURE: CPT

## 2019-01-25 PROCEDURE — 94002 VENT MGMT INPAT INIT DAY: CPT

## 2019-01-25 PROCEDURE — 85025 COMPLETE CBC W/AUTO DIFF WBC: CPT

## 2019-01-25 PROCEDURE — 99285 EMERGENCY DEPT VISIT HI MDM: CPT

## 2019-01-25 PROCEDURE — 36600 WITHDRAWAL OF ARTERIAL BLOOD: CPT

## 2019-01-25 PROCEDURE — 80307 DRUG TEST PRSMV CHEM ANLYZR: CPT

## 2019-01-25 RX ORDER — PROPOFOL 10 MG/ML
INJECTION, EMULSION INTRAVENOUS
Status: DISPENSED
Start: 2019-01-25 | End: 2019-01-26

## 2019-01-25 RX ORDER — 0.9 % SODIUM CHLORIDE 0.9 %
1000 INTRAVENOUS SOLUTION INTRAVENOUS ONCE
Status: COMPLETED | OUTPATIENT
Start: 2019-01-25 | End: 2019-01-26

## 2019-01-25 RX ORDER — PROPOFOL 10 MG/ML
10 INJECTION, EMULSION INTRAVENOUS
Status: DISCONTINUED | OUTPATIENT
Start: 2019-01-25 | End: 2019-01-26

## 2019-01-25 RX ADMIN — SODIUM CHLORIDE 1000 ML: 9 INJECTION, SOLUTION INTRAVENOUS at 22:15

## 2019-01-25 ASSESSMENT — PULMONARY FUNCTION TESTS: PIF_VALUE: 22.5

## 2019-01-26 ENCOUNTER — APPOINTMENT (OUTPATIENT)
Dept: GENERAL RADIOLOGY | Age: 49
DRG: 896 | End: 2019-01-26
Payer: MEDICAID

## 2019-01-26 ENCOUNTER — APPOINTMENT (OUTPATIENT)
Dept: CT IMAGING | Age: 49
DRG: 896 | End: 2019-01-26
Payer: MEDICAID

## 2019-01-26 PROBLEM — T14.91XA SUICIDAL BEHAVIOR WITH ATTEMPTED SELF-INJURY (HCC): Status: ACTIVE | Noted: 2019-01-26

## 2019-01-26 LAB
APTT: 30.9 SEC (ref 26–36.2)
BASE EXCESS ARTERIAL: -0.8 MMOL/L (ref -2–2)
CARBOXYHEMOGLOBIN ARTERIAL: 2.6 % (ref 0–5)
GLUCOSE BLD-MCNC: 112 MG/DL (ref 70–99)
GLUCOSE BLD-MCNC: 118 MG/DL (ref 70–99)
GLUCOSE BLD-MCNC: 68 MG/DL (ref 70–99)
GLUCOSE BLD-MCNC: 74 MG/DL (ref 70–99)
GLUCOSE BLD-MCNC: 77 MG/DL (ref 70–99)
HCO3 ARTERIAL: 24.3 MMOL/L (ref 22–26)
HEMOGLOBIN, ART, EXTENDED: 12.1 G/DL (ref 14–18)
INR BLD: 1.1 (ref 0.88–1.18)
LACTIC ACID: 2.5 MMOL/L (ref 0.5–1.9)
METHEMOGLOBIN ARTERIAL: 1.2 %
O2 CONTENT ARTERIAL: 16.5 ML/DL
O2 SAT, ARTERIAL: 95.5 %
O2 THERAPY: ABNORMAL
PCO2 ARTERIAL: 41 MMHG (ref 35–45)
PERFORMED ON: ABNORMAL
PERFORMED ON: NORMAL
PERFORMED ON: NORMAL
PH ARTERIAL: 7.38 (ref 7.35–7.45)
PO2 ARTERIAL: 153 MMHG (ref 80–100)
POTASSIUM, WHOLE BLOOD: 4.1
PROTHROMBIN TIME: 13.6 SEC (ref 12–14.6)

## 2019-01-26 PROCEDURE — 99291 CRITICAL CARE FIRST HOUR: CPT | Performed by: INTERNAL MEDICINE

## 2019-01-26 PROCEDURE — 2000000000 HC ICU R&B

## 2019-01-26 PROCEDURE — 85730 THROMBOPLASTIN TIME PARTIAL: CPT

## 2019-01-26 PROCEDURE — 82948 REAGENT STRIP/BLOOD GLUCOSE: CPT

## 2019-01-26 PROCEDURE — 82803 BLOOD GASES ANY COMBINATION: CPT

## 2019-01-26 PROCEDURE — 85610 PROTHROMBIN TIME: CPT

## 2019-01-26 PROCEDURE — 2580000003 HC RX 258: Performed by: INTERNAL MEDICINE

## 2019-01-26 PROCEDURE — 36600 WITHDRAWAL OF ARTERIAL BLOOD: CPT

## 2019-01-26 PROCEDURE — 2500000003 HC RX 250 WO HCPCS: Performed by: INTERNAL MEDICINE

## 2019-01-26 PROCEDURE — 6360000002 HC RX W HCPCS: Performed by: EMERGENCY MEDICINE

## 2019-01-26 PROCEDURE — 70450 CT HEAD/BRAIN W/O DYE: CPT

## 2019-01-26 PROCEDURE — 71045 X-RAY EXAM CHEST 1 VIEW: CPT

## 2019-01-26 PROCEDURE — 5A1945Z RESPIRATORY VENTILATION, 24-96 CONSECUTIVE HOURS: ICD-10-PCS | Performed by: EMERGENCY MEDICINE

## 2019-01-26 PROCEDURE — 94003 VENT MGMT INPAT SUBQ DAY: CPT

## 2019-01-26 PROCEDURE — 6360000002 HC RX W HCPCS: Performed by: INTERNAL MEDICINE

## 2019-01-26 PROCEDURE — 36415 COLL VENOUS BLD VENIPUNCTURE: CPT

## 2019-01-26 PROCEDURE — 6370000000 HC RX 637 (ALT 250 FOR IP): Performed by: INTERNAL MEDICINE

## 2019-01-26 PROCEDURE — 2700000000 HC OXYGEN THERAPY PER DAY

## 2019-01-26 PROCEDURE — 84132 ASSAY OF SERUM POTASSIUM: CPT

## 2019-01-26 PROCEDURE — 83605 ASSAY OF LACTIC ACID: CPT

## 2019-01-26 PROCEDURE — 94640 AIRWAY INHALATION TREATMENT: CPT

## 2019-01-26 PROCEDURE — 87081 CULTURE SCREEN ONLY: CPT

## 2019-01-26 PROCEDURE — 0BH18EZ INSERTION OF ENDOTRACHEAL AIRWAY INTO TRACHEA, VIA NATURAL OR ARTIFICIAL OPENING ENDOSCOPIC: ICD-10-PCS | Performed by: EMERGENCY MEDICINE

## 2019-01-26 RX ORDER — CHLORHEXIDINE GLUCONATE 0.12 MG/ML
15 RINSE ORAL 2 TIMES DAILY
Status: DISCONTINUED | OUTPATIENT
Start: 2019-01-26 | End: 2019-01-27

## 2019-01-26 RX ORDER — IPRATROPIUM BROMIDE AND ALBUTEROL SULFATE 2.5; .5 MG/3ML; MG/3ML
1 SOLUTION RESPIRATORY (INHALATION) 4 TIMES DAILY
Status: DISCONTINUED | OUTPATIENT
Start: 2019-01-26 | End: 2019-01-27

## 2019-01-26 RX ORDER — MAGNESIUM SULFATE 1 G/100ML
1 INJECTION INTRAVENOUS PRN
Status: DISCONTINUED | OUTPATIENT
Start: 2019-01-26 | End: 2019-01-28 | Stop reason: HOSPADM

## 2019-01-26 RX ORDER — NICOTINE POLACRILEX 4 MG
15 LOZENGE BUCCAL PRN
Status: DISCONTINUED | OUTPATIENT
Start: 2019-01-26 | End: 2019-01-28 | Stop reason: HOSPADM

## 2019-01-26 RX ORDER — POTASSIUM CHLORIDE 7.45 MG/ML
10 INJECTION INTRAVENOUS PRN
Status: DISCONTINUED | OUTPATIENT
Start: 2019-01-26 | End: 2019-01-28 | Stop reason: HOSPADM

## 2019-01-26 RX ORDER — SODIUM CHLORIDE 0.9 % (FLUSH) 0.9 %
10 SYRINGE (ML) INJECTION EVERY 12 HOURS SCHEDULED
Status: DISCONTINUED | OUTPATIENT
Start: 2019-01-26 | End: 2019-01-28 | Stop reason: HOSPADM

## 2019-01-26 RX ORDER — POTASSIUM CHLORIDE 29.8 MG/ML
20 INJECTION INTRAVENOUS PRN
Status: DISCONTINUED | OUTPATIENT
Start: 2019-01-26 | End: 2019-01-26 | Stop reason: SDUPTHER

## 2019-01-26 RX ORDER — DEXTROSE MONOHYDRATE 50 MG/ML
100 INJECTION, SOLUTION INTRAVENOUS PRN
Status: DISCONTINUED | OUTPATIENT
Start: 2019-01-26 | End: 2019-01-28 | Stop reason: HOSPADM

## 2019-01-26 RX ORDER — PROPOFOL 10 MG/ML
10 INJECTION, EMULSION INTRAVENOUS
Status: DISCONTINUED | OUTPATIENT
Start: 2019-01-26 | End: 2019-01-27

## 2019-01-26 RX ORDER — SODIUM CHLORIDE 0.9 % (FLUSH) 0.9 %
10 SYRINGE (ML) INJECTION PRN
Status: DISCONTINUED | OUTPATIENT
Start: 2019-01-26 | End: 2019-01-28 | Stop reason: HOSPADM

## 2019-01-26 RX ORDER — DEXTROSE MONOHYDRATE 25 G/50ML
12.5 INJECTION, SOLUTION INTRAVENOUS PRN
Status: DISCONTINUED | OUTPATIENT
Start: 2019-01-26 | End: 2019-01-28 | Stop reason: HOSPADM

## 2019-01-26 RX ADMIN — IPRATROPIUM BROMIDE AND ALBUTEROL SULFATE 1 AMPULE: .5; 3 SOLUTION RESPIRATORY (INHALATION) at 18:14

## 2019-01-26 RX ADMIN — PROPOFOL 25 MCG/KG/MIN: 10 INJECTION, EMULSION INTRAVENOUS at 04:41

## 2019-01-26 RX ADMIN — IPRATROPIUM BROMIDE AND ALBUTEROL SULFATE 1 AMPULE: .5; 3 SOLUTION RESPIRATORY (INHALATION) at 06:47

## 2019-01-26 RX ADMIN — DEXTROSE MONOHYDRATE 100 ML/HR: 50 INJECTION, SOLUTION INTRAVENOUS at 12:15

## 2019-01-26 RX ADMIN — Medication 10 ML: at 22:08

## 2019-01-26 RX ADMIN — DEXTROSE MONOHYDRATE 100 ML/HR: 50 INJECTION, SOLUTION INTRAVENOUS at 18:58

## 2019-01-26 RX ADMIN — CHLORHEXIDINE GLUCONATE 15 ML: 1.2 RINSE ORAL at 09:23

## 2019-01-26 RX ADMIN — CHLORHEXIDINE GLUCONATE 15 ML: 1.2 RINSE ORAL at 22:07

## 2019-01-26 RX ADMIN — FOLIC ACID: 5 INJECTION, SOLUTION INTRAMUSCULAR; INTRAVENOUS; SUBCUTANEOUS at 02:30

## 2019-01-26 RX ADMIN — Medication 10 ML: at 09:19

## 2019-01-26 RX ADMIN — PROPOFOL 20 MCG/KG/MIN: 10 INJECTION, EMULSION INTRAVENOUS at 00:02

## 2019-01-26 RX ADMIN — DEXMEDETOMIDINE 0.2 MCG/KG/HR: 100 INJECTION, SOLUTION, CONCENTRATE INTRAVENOUS at 18:51

## 2019-01-26 RX ADMIN — DEXTROSE MONOHYDRATE 12.5 G: 25 INJECTION, SOLUTION INTRAVENOUS at 09:22

## 2019-01-26 RX ADMIN — PROPOFOL 45 MCG/KG/MIN: 10 INJECTION, EMULSION INTRAVENOUS at 09:22

## 2019-01-26 RX ADMIN — IPRATROPIUM BROMIDE AND ALBUTEROL SULFATE 1 AMPULE: .5; 3 SOLUTION RESPIRATORY (INHALATION) at 14:57

## 2019-01-26 RX ADMIN — ENOXAPARIN SODIUM 40 MG: 40 INJECTION SUBCUTANEOUS at 09:22

## 2019-01-26 RX ADMIN — IPRATROPIUM BROMIDE AND ALBUTEROL SULFATE 1 AMPULE: .5; 3 SOLUTION RESPIRATORY (INHALATION) at 10:50

## 2019-01-26 ASSESSMENT — PAIN SCALES - GENERAL
PAINLEVEL_OUTOF10: 0

## 2019-01-26 ASSESSMENT — PULMONARY FUNCTION TESTS
PIF_VALUE: 16.6
PIF_VALUE: 20.2
PIF_VALUE: 19.3
PIF_VALUE: 16.9
PIF_VALUE: 20.4
PIF_VALUE: 20.9
PIF_VALUE: 43.8
PIF_VALUE: 20.2
PIF_VALUE: 16.4
PIF_VALUE: 22
PIF_VALUE: 19.7
PIF_VALUE: 19.7
PIF_VALUE: 16.5
PIF_VALUE: 19.6
PIF_VALUE: 18.8
PIF_VALUE: 18.5
PIF_VALUE: 19.8
PIF_VALUE: 17.8
PIF_VALUE: 49
PIF_VALUE: 20.8
PIF_VALUE: 19.7
PIF_VALUE: 17.5
PIF_VALUE: 24.5
PIF_VALUE: 18.7
PIF_VALUE: 20.4

## 2019-01-27 ENCOUNTER — APPOINTMENT (OUTPATIENT)
Dept: GENERAL RADIOLOGY | Age: 49
DRG: 896 | End: 2019-01-27
Payer: MEDICAID

## 2019-01-27 PROBLEM — F10.929 ALCOHOLIC INTOXICATION WITH COMPLICATION (HCC): Status: ACTIVE | Noted: 2018-10-01

## 2019-01-27 LAB
ALBUMIN SERPL-MCNC: 3.6 G/DL (ref 3.5–5.2)
ALP BLD-CCNC: 79 U/L (ref 40–130)
ALT SERPL-CCNC: 28 U/L (ref 5–41)
ANION GAP SERPL CALCULATED.3IONS-SCNC: 14 MMOL/L (ref 7–19)
AST SERPL-CCNC: 24 U/L (ref 5–40)
BASE EXCESS ARTERIAL: 9.2 MMOL/L (ref -2–2)
BASOPHILS ABSOLUTE: 0 K/UL (ref 0–0.2)
BASOPHILS RELATIVE PERCENT: 0.3 % (ref 0–1)
BILIRUB SERPL-MCNC: 0.5 MG/DL (ref 0.2–1.2)
BUN BLDV-MCNC: 14 MG/DL (ref 6–20)
CALCIUM SERPL-MCNC: 8.3 MG/DL (ref 8.6–10)
CARBOXYHEMOGLOBIN ARTERIAL: 1.6 % (ref 0–5)
CHLORIDE BLD-SCNC: 99 MMOL/L (ref 98–111)
CHOLESTEROL, TOTAL: 148 MG/DL (ref 160–199)
CO2: 28 MMOL/L (ref 22–29)
CREAT SERPL-MCNC: 0.9 MG/DL (ref 0.5–1.2)
EOSINOPHILS ABSOLUTE: 0.1 K/UL (ref 0–0.6)
EOSINOPHILS RELATIVE PERCENT: 0.8 % (ref 0–5)
GFR NON-AFRICAN AMERICAN: >60
GLUCOSE BLD-MCNC: 108 MG/DL (ref 70–99)
GLUCOSE BLD-MCNC: 108 MG/DL (ref 74–109)
GLUCOSE BLD-MCNC: 90 MG/DL (ref 70–99)
GLUCOSE BLD-MCNC: 99 MG/DL (ref 70–99)
HCO3 ARTERIAL: 32.6 MMOL/L (ref 22–26)
HCT VFR BLD CALC: 34.7 % (ref 42–52)
HDLC SERPL-MCNC: 44 MG/DL (ref 55–121)
HEMOGLOBIN, ART, EXTENDED: 11.6 G/DL (ref 14–18)
HEMOGLOBIN: 11.8 G/DL (ref 14–18)
LACTIC ACID: 0.8 MMOL/L (ref 0.5–1.9)
LDL CHOLESTEROL CALCULATED: 84 MG/DL
LYMPHOCYTES ABSOLUTE: 1.5 K/UL (ref 1.1–4.5)
LYMPHOCYTES RELATIVE PERCENT: 16 % (ref 20–40)
MAGNESIUM: 1.9 MG/DL (ref 1.6–2.6)
MCH RBC QN AUTO: 30.7 PG (ref 27–31)
MCHC RBC AUTO-ENTMCNC: 34 G/DL (ref 33–37)
MCV RBC AUTO: 90.4 FL (ref 80–94)
METHEMOGLOBIN ARTERIAL: 1.2 %
MONOCYTES ABSOLUTE: 0.8 K/UL (ref 0–0.9)
MONOCYTES RELATIVE PERCENT: 9 % (ref 0–10)
MRSA CULTURE ONLY: ABNORMAL
MRSA CULTURE ONLY: ABNORMAL
NEUTROPHILS ABSOLUTE: 6.8 K/UL (ref 1.5–7.5)
NEUTROPHILS RELATIVE PERCENT: 73.6 % (ref 50–65)
O2 CONTENT ARTERIAL: 15.4 ML/DL
O2 SAT, ARTERIAL: 94.4 %
O2 THERAPY: ABNORMAL
ORGANISM: ABNORMAL
PCO2 ARTERIAL: 39 MMHG (ref 35–45)
PDW BLD-RTO: 12.4 % (ref 11.5–14.5)
PERFORMED ON: ABNORMAL
PERFORMED ON: NORMAL
PERFORMED ON: NORMAL
PH ARTERIAL: 7.53 (ref 7.35–7.45)
PLATELET # BLD: 196 K/UL (ref 130–400)
PMV BLD AUTO: 9.7 FL (ref 9.4–12.4)
PO2 ARTERIAL: 72 MMHG (ref 80–100)
POTASSIUM REFLEX MAGNESIUM: 3.4 MMOL/L (ref 3.5–5)
POTASSIUM, WHOLE BLOOD: 2.9
RBC # BLD: 3.84 M/UL (ref 4.7–6.1)
SODIUM BLD-SCNC: 141 MMOL/L (ref 136–145)
TOTAL PROTEIN: 6 G/DL (ref 6.6–8.7)
TRIGL SERPL-MCNC: 99 MG/DL (ref 0–149)
WBC # BLD: 9.2 K/UL (ref 4.8–10.8)

## 2019-01-27 PROCEDURE — 99291 CRITICAL CARE FIRST HOUR: CPT | Performed by: INTERNAL MEDICINE

## 2019-01-27 PROCEDURE — 2000000000 HC ICU R&B

## 2019-01-27 PROCEDURE — 85025 COMPLETE CBC W/AUTO DIFF WBC: CPT

## 2019-01-27 PROCEDURE — 71045 X-RAY EXAM CHEST 1 VIEW: CPT

## 2019-01-27 PROCEDURE — 82948 REAGENT STRIP/BLOOD GLUCOSE: CPT

## 2019-01-27 PROCEDURE — 84132 ASSAY OF SERUM POTASSIUM: CPT

## 2019-01-27 PROCEDURE — 83605 ASSAY OF LACTIC ACID: CPT

## 2019-01-27 PROCEDURE — 6360000002 HC RX W HCPCS: Performed by: INTERNAL MEDICINE

## 2019-01-27 PROCEDURE — 36600 WITHDRAWAL OF ARTERIAL BLOOD: CPT

## 2019-01-27 PROCEDURE — 99233 SBSQ HOSP IP/OBS HIGH 50: CPT | Performed by: INTERNAL MEDICINE

## 2019-01-27 PROCEDURE — 2700000000 HC OXYGEN THERAPY PER DAY

## 2019-01-27 PROCEDURE — 80061 LIPID PANEL: CPT

## 2019-01-27 PROCEDURE — 2500000003 HC RX 250 WO HCPCS: Performed by: INTERNAL MEDICINE

## 2019-01-27 PROCEDURE — 6370000000 HC RX 637 (ALT 250 FOR IP): Performed by: INTERNAL MEDICINE

## 2019-01-27 PROCEDURE — 2580000003 HC RX 258: Performed by: INTERNAL MEDICINE

## 2019-01-27 PROCEDURE — 83735 ASSAY OF MAGNESIUM: CPT

## 2019-01-27 PROCEDURE — 94640 AIRWAY INHALATION TREATMENT: CPT

## 2019-01-27 PROCEDURE — 94003 VENT MGMT INPAT SUBQ DAY: CPT

## 2019-01-27 PROCEDURE — 36415 COLL VENOUS BLD VENIPUNCTURE: CPT

## 2019-01-27 PROCEDURE — 82803 BLOOD GASES ANY COMBINATION: CPT

## 2019-01-27 PROCEDURE — 80053 COMPREHEN METABOLIC PANEL: CPT

## 2019-01-27 PROCEDURE — 6360000002 HC RX W HCPCS: Performed by: EMERGENCY MEDICINE

## 2019-01-27 RX ORDER — ACETAMINOPHEN 325 MG/1
650 TABLET ORAL EVERY 4 HOURS PRN
Status: DISCONTINUED | OUTPATIENT
Start: 2019-01-27 | End: 2019-01-28 | Stop reason: HOSPADM

## 2019-01-27 RX ORDER — IPRATROPIUM BROMIDE AND ALBUTEROL SULFATE 2.5; .5 MG/3ML; MG/3ML
1 SOLUTION RESPIRATORY (INHALATION) EVERY 4 HOURS PRN
Status: DISCONTINUED | OUTPATIENT
Start: 2019-01-27 | End: 2019-01-28 | Stop reason: HOSPADM

## 2019-01-27 RX ORDER — SODIUM CHLORIDE 9 MG/ML
INJECTION, SOLUTION INTRAVENOUS CONTINUOUS
Status: DISCONTINUED | OUTPATIENT
Start: 2019-01-27 | End: 2019-01-27

## 2019-01-27 RX ORDER — SERTRALINE HYDROCHLORIDE 100 MG/1
100 TABLET, FILM COATED ORAL DAILY
Status: DISCONTINUED | OUTPATIENT
Start: 2019-01-27 | End: 2019-01-28 | Stop reason: HOSPADM

## 2019-01-27 RX ORDER — LAMOTRIGINE 100 MG/1
100 TABLET ORAL DAILY
Status: DISCONTINUED | OUTPATIENT
Start: 2019-01-27 | End: 2019-01-28 | Stop reason: HOSPADM

## 2019-01-27 RX ORDER — QUETIAPINE FUMARATE 200 MG/1
200 TABLET, FILM COATED ORAL NIGHTLY
Status: DISCONTINUED | OUTPATIENT
Start: 2019-01-27 | End: 2019-01-28 | Stop reason: HOSPADM

## 2019-01-27 RX ADMIN — METRONIDAZOLE 500 MG: 500 INJECTION, SOLUTION INTRAVENOUS at 18:28

## 2019-01-27 RX ADMIN — SODIUM CHLORIDE: 9 INJECTION, SOLUTION INTRAVENOUS at 16:55

## 2019-01-27 RX ADMIN — SERTRALINE HYDROCHLORIDE 100 MG: 100 TABLET ORAL at 21:01

## 2019-01-27 RX ADMIN — PROPOFOL 15 MCG/KG/MIN: 10 INJECTION, EMULSION INTRAVENOUS at 18:08

## 2019-01-27 RX ADMIN — IPRATROPIUM BROMIDE AND ALBUTEROL SULFATE 1 AMPULE: .5; 3 SOLUTION RESPIRATORY (INHALATION) at 19:33

## 2019-01-27 RX ADMIN — LAMOTRIGINE 100 MG: 100 TABLET ORAL at 21:01

## 2019-01-27 RX ADMIN — CHLORHEXIDINE GLUCONATE 15 ML: 1.2 RINSE ORAL at 07:46

## 2019-01-27 RX ADMIN — SODIUM CHLORIDE: 9 INJECTION, SOLUTION INTRAVENOUS at 10:51

## 2019-01-27 RX ADMIN — POTASSIUM CHLORIDE 10 MEQ: 7.46 INJECTION, SOLUTION INTRAVENOUS at 05:05

## 2019-01-27 RX ADMIN — METRONIDAZOLE 500 MG: 500 INJECTION, SOLUTION INTRAVENOUS at 10:50

## 2019-01-27 RX ADMIN — LANSOPRAZOLE 15 MG: 15 TABLET, ORALLY DISINTEGRATING, DELAYED RELEASE ORAL at 07:45

## 2019-01-27 RX ADMIN — IPRATROPIUM BROMIDE AND ALBUTEROL SULFATE 1 AMPULE: .5; 3 SOLUTION RESPIRATORY (INHALATION) at 14:57

## 2019-01-27 RX ADMIN — Medication 10 ML: at 07:45

## 2019-01-27 RX ADMIN — IPRATROPIUM BROMIDE AND ALBUTEROL SULFATE 1 AMPULE: .5; 3 SOLUTION RESPIRATORY (INHALATION) at 06:46

## 2019-01-27 RX ADMIN — Medication 10 ML: at 21:00

## 2019-01-27 RX ADMIN — DEXMEDETOMIDINE 0.9 MCG/KG/HR: 100 INJECTION, SOLUTION, CONCENTRATE INTRAVENOUS at 14:09

## 2019-01-27 RX ADMIN — IPRATROPIUM BROMIDE AND ALBUTEROL SULFATE 1 AMPULE: .5; 3 SOLUTION RESPIRATORY (INHALATION) at 11:07

## 2019-01-27 RX ADMIN — POTASSIUM CHLORIDE 10 MEQ: 7.46 INJECTION, SOLUTION INTRAVENOUS at 06:07

## 2019-01-27 RX ADMIN — SODIUM CHLORIDE: 9 INJECTION, SOLUTION INTRAVENOUS at 16:56

## 2019-01-27 RX ADMIN — POTASSIUM CHLORIDE 10 MEQ: 7.46 INJECTION, SOLUTION INTRAVENOUS at 07:11

## 2019-01-27 RX ADMIN — DEXMEDETOMIDINE 0.9 MCG/KG/HR: 100 INJECTION, SOLUTION, CONCENTRATE INTRAVENOUS at 19:25

## 2019-01-27 RX ADMIN — ENOXAPARIN SODIUM 40 MG: 40 INJECTION SUBCUTANEOUS at 07:45

## 2019-01-27 RX ADMIN — DEXMEDETOMIDINE 0.5 MCG/KG/HR: 100 INJECTION, SOLUTION, CONCENTRATE INTRAVENOUS at 07:11

## 2019-01-27 RX ADMIN — POTASSIUM CHLORIDE 10 MEQ: 7.46 INJECTION, SOLUTION INTRAVENOUS at 08:02

## 2019-01-27 RX ADMIN — CEFTRIAXONE 1 G: 1 INJECTION, POWDER, FOR SOLUTION INTRAMUSCULAR; INTRAVENOUS at 10:04

## 2019-01-27 ASSESSMENT — PULMONARY FUNCTION TESTS
PIF_VALUE: 18.5
PIF_VALUE: 18.1
PIF_VALUE: 19.1
PIF_VALUE: 18.5
PIF_VALUE: 19.2
PIF_VALUE: 25.2
PIF_VALUE: 19.1
PIF_VALUE: 18.7
PIF_VALUE: 18.4
PIF_VALUE: 22.1
PIF_VALUE: 20.8
PIF_VALUE: 18.8
PIF_VALUE: 19.5
PIF_VALUE: 18.4
PIF_VALUE: 18.2
PIF_VALUE: 16.6
PIF_VALUE: 18.5
PIF_VALUE: 16.7
PIF_VALUE: 21.2
PIF_VALUE: 10.3
PIF_VALUE: 18.6
PIF_VALUE: 18.4
PIF_VALUE: 18.2
PIF_VALUE: 18.3

## 2019-01-27 ASSESSMENT — PAIN SCALES - GENERAL
PAINLEVEL_OUTOF10: 0

## 2019-01-28 VITALS
HEART RATE: 89 BPM | SYSTOLIC BLOOD PRESSURE: 153 MMHG | DIASTOLIC BLOOD PRESSURE: 78 MMHG | HEIGHT: 76 IN | BODY MASS INDEX: 21.24 KG/M2 | OXYGEN SATURATION: 95 % | WEIGHT: 174.4 LBS | RESPIRATION RATE: 22 BRPM | TEMPERATURE: 99.6 F

## 2019-01-28 PROBLEM — F10.20 ALCOHOLISM (HCC): Status: ACTIVE | Noted: 2019-01-28

## 2019-01-28 LAB
ALBUMIN SERPL-MCNC: 3.5 G/DL (ref 3.5–5.2)
ALP BLD-CCNC: 86 U/L (ref 40–130)
ALT SERPL-CCNC: 28 U/L (ref 5–41)
ANION GAP SERPL CALCULATED.3IONS-SCNC: 12 MMOL/L (ref 7–19)
AST SERPL-CCNC: 22 U/L (ref 5–40)
BASOPHILS ABSOLUTE: 0 K/UL (ref 0–0.2)
BASOPHILS RELATIVE PERCENT: 0.3 % (ref 0–1)
BILIRUB SERPL-MCNC: 0.7 MG/DL (ref 0.2–1.2)
BUN BLDV-MCNC: 11 MG/DL (ref 6–20)
CALCIUM SERPL-MCNC: 8.5 MG/DL (ref 8.6–10)
CHLORIDE BLD-SCNC: 101 MMOL/L (ref 98–111)
CO2: 24 MMOL/L (ref 22–29)
CREAT SERPL-MCNC: 0.8 MG/DL (ref 0.5–1.2)
EOSINOPHILS ABSOLUTE: 0.1 K/UL (ref 0–0.6)
EOSINOPHILS RELATIVE PERCENT: 1.4 % (ref 0–5)
GFR NON-AFRICAN AMERICAN: >60
GLUCOSE BLD-MCNC: 128 MG/DL (ref 74–109)
HCT VFR BLD CALC: 34 % (ref 42–52)
HEMOGLOBIN: 11.7 G/DL (ref 14–18)
LYMPHOCYTES ABSOLUTE: 1.1 K/UL (ref 1.1–4.5)
LYMPHOCYTES RELATIVE PERCENT: 13.2 % (ref 20–40)
MAGNESIUM: 1.7 MG/DL (ref 1.6–2.6)
MCH RBC QN AUTO: 31 PG (ref 27–31)
MCHC RBC AUTO-ENTMCNC: 34.4 G/DL (ref 33–37)
MCV RBC AUTO: 89.9 FL (ref 80–94)
MONOCYTES ABSOLUTE: 0.6 K/UL (ref 0–0.9)
MONOCYTES RELATIVE PERCENT: 7.3 % (ref 0–10)
NEUTROPHILS ABSOLUTE: 6.2 K/UL (ref 1.5–7.5)
NEUTROPHILS RELATIVE PERCENT: 77.5 % (ref 50–65)
PDW BLD-RTO: 12.1 % (ref 11.5–14.5)
PLATELET # BLD: 182 K/UL (ref 130–400)
PMV BLD AUTO: 9.3 FL (ref 9.4–12.4)
POTASSIUM REFLEX MAGNESIUM: 3.5 MMOL/L (ref 3.5–5)
RBC # BLD: 3.78 M/UL (ref 4.7–6.1)
SODIUM BLD-SCNC: 137 MMOL/L (ref 136–145)
TOTAL PROTEIN: 6.2 G/DL (ref 6.6–8.7)
WBC # BLD: 8 K/UL (ref 4.8–10.8)

## 2019-01-28 PROCEDURE — 6360000002 HC RX W HCPCS: Performed by: INTERNAL MEDICINE

## 2019-01-28 PROCEDURE — 83735 ASSAY OF MAGNESIUM: CPT

## 2019-01-28 PROCEDURE — 99253 IP/OBS CNSLTJ NEW/EST LOW 45: CPT | Performed by: PSYCHIATRY & NEUROLOGY

## 2019-01-28 PROCEDURE — 85025 COMPLETE CBC W/AUTO DIFF WBC: CPT

## 2019-01-28 PROCEDURE — 80053 COMPREHEN METABOLIC PANEL: CPT

## 2019-01-28 PROCEDURE — 2700000000 HC OXYGEN THERAPY PER DAY

## 2019-01-28 PROCEDURE — 99239 HOSP IP/OBS DSCHRG MGMT >30: CPT | Performed by: HOSPITALIST

## 2019-01-28 PROCEDURE — 6370000000 HC RX 637 (ALT 250 FOR IP): Performed by: INTERNAL MEDICINE

## 2019-01-28 PROCEDURE — 2580000003 HC RX 258: Performed by: INTERNAL MEDICINE

## 2019-01-28 PROCEDURE — 6370000000 HC RX 637 (ALT 250 FOR IP): Performed by: HOSPITALIST

## 2019-01-28 PROCEDURE — 2500000003 HC RX 250 WO HCPCS: Performed by: INTERNAL MEDICINE

## 2019-01-28 PROCEDURE — 36415 COLL VENOUS BLD VENIPUNCTURE: CPT

## 2019-01-28 RX ORDER — NALTREXONE HYDROCHLORIDE 50 MG/1
50 TABLET, FILM COATED ORAL
Qty: 30 TABLET | Refills: 1 | Status: SHIPPED | OUTPATIENT
Start: 2019-01-29 | End: 2019-01-28

## 2019-01-28 RX ORDER — POTASSIUM CHLORIDE 20 MEQ/1
40 TABLET, EXTENDED RELEASE ORAL ONCE
Status: COMPLETED | OUTPATIENT
Start: 2019-01-28 | End: 2019-01-28

## 2019-01-28 RX ORDER — QUETIAPINE FUMARATE 200 MG/1
200 TABLET, FILM COATED ORAL NIGHTLY
Qty: 60 TABLET | Refills: 3 | Status: ON HOLD | OUTPATIENT
Start: 2019-01-28 | End: 2019-02-21 | Stop reason: HOSPADM

## 2019-01-28 RX ORDER — CIPROFLOXACIN 500 MG/1
500 TABLET, FILM COATED ORAL 2 TIMES DAILY
Qty: 14 TABLET | Refills: 0 | Status: SHIPPED | OUTPATIENT
Start: 2019-01-28 | End: 2019-02-04

## 2019-01-28 RX ORDER — NALTREXONE HYDROCHLORIDE 50 MG/1
50 TABLET, FILM COATED ORAL
Status: DISCONTINUED | OUTPATIENT
Start: 2019-01-29 | End: 2019-01-28 | Stop reason: HOSPADM

## 2019-01-28 RX ORDER — NALTREXONE HYDROCHLORIDE 50 MG/1
50 TABLET, FILM COATED ORAL
Qty: 30 TABLET | Refills: 1 | Status: ON HOLD | OUTPATIENT
Start: 2019-01-29 | End: 2019-02-21 | Stop reason: HOSPADM

## 2019-01-28 RX ORDER — CIPROFLOXACIN 500 MG/1
500 TABLET, FILM COATED ORAL 2 TIMES DAILY
Qty: 14 TABLET | Refills: 0 | Status: SHIPPED | OUTPATIENT
Start: 2019-01-28 | End: 2019-01-28

## 2019-01-28 RX ORDER — METRONIDAZOLE 500 MG/1
500 TABLET ORAL 3 TIMES DAILY
Qty: 21 TABLET | Refills: 0 | Status: SHIPPED | OUTPATIENT
Start: 2019-01-28 | End: 2019-01-28

## 2019-01-28 RX ORDER — METRONIDAZOLE 500 MG/1
500 TABLET ORAL 3 TIMES DAILY
Qty: 21 TABLET | Refills: 0 | Status: SHIPPED | OUTPATIENT
Start: 2019-01-28 | End: 2019-02-07

## 2019-01-28 RX ADMIN — METRONIDAZOLE 500 MG: 500 INJECTION, SOLUTION INTRAVENOUS at 10:05

## 2019-01-28 RX ADMIN — METRONIDAZOLE 500 MG: 500 INJECTION, SOLUTION INTRAVENOUS at 01:33

## 2019-01-28 RX ADMIN — SERTRALINE HYDROCHLORIDE 100 MG: 100 TABLET ORAL at 09:20

## 2019-01-28 RX ADMIN — QUETIAPINE FUMARATE 200 MG: 200 TABLET ORAL at 00:10

## 2019-01-28 RX ADMIN — CEFTRIAXONE 1 G: 1 INJECTION, POWDER, FOR SOLUTION INTRAMUSCULAR; INTRAVENOUS at 09:20

## 2019-01-28 RX ADMIN — ENOXAPARIN SODIUM 40 MG: 40 INJECTION SUBCUTANEOUS at 09:20

## 2019-01-28 RX ADMIN — LAMOTRIGINE 100 MG: 100 TABLET ORAL at 09:20

## 2019-01-28 RX ADMIN — Medication 10 ML: at 09:20

## 2019-01-28 RX ADMIN — ACETAMINOPHEN 650 MG: 325 TABLET ORAL at 00:10

## 2019-01-28 RX ADMIN — POTASSIUM CHLORIDE 40 MEQ: 20 TABLET, EXTENDED RELEASE ORAL at 10:45

## 2019-01-28 ASSESSMENT — PAIN SCALES - GENERAL
PAINLEVEL_OUTOF10: 0

## 2019-01-30 LAB
EKG P AXIS: 54 DEGREES
EKG P-R INTERVAL: 188 MS
EKG Q-T INTERVAL: 358 MS
EKG QRS DURATION: 90 MS
EKG QTC CALCULATION (BAZETT): 424 MS
EKG T AXIS: 51 DEGREES

## 2019-02-15 ENCOUNTER — HOSPITAL ENCOUNTER (INPATIENT)
Age: 49
LOS: 1 days | Discharge: PSYCHIATRIC OR SUBSTANCE USE FACILITY | DRG: 897 | End: 2019-02-17
Attending: EMERGENCY MEDICINE | Admitting: INTERNAL MEDICINE
Payer: MEDICAID

## 2019-02-15 DIAGNOSIS — R45.851 DEPRESSION WITH SUICIDAL IDEATION: Primary | ICD-10-CM

## 2019-02-15 DIAGNOSIS — F10.10 ALCOHOL ABUSE: ICD-10-CM

## 2019-02-15 DIAGNOSIS — F32.A DEPRESSION WITH SUICIDAL IDEATION: Primary | ICD-10-CM

## 2019-02-15 LAB
ALBUMIN SERPL-MCNC: 4.7 G/DL (ref 3.5–5.2)
ALP BLD-CCNC: 110 U/L (ref 40–130)
ALT SERPL-CCNC: 55 U/L (ref 5–41)
AMPHETAMINE SCREEN, URINE: NEGATIVE
ANION GAP SERPL CALCULATED.3IONS-SCNC: 15 MMOL/L (ref 7–19)
AST SERPL-CCNC: 52 U/L (ref 5–40)
BACTERIA: NEGATIVE /HPF
BARBITURATE SCREEN URINE: NEGATIVE
BASOPHILS ABSOLUTE: 0 K/UL (ref 0–0.2)
BASOPHILS RELATIVE PERCENT: 0.4 % (ref 0–1)
BENZODIAZEPINE SCREEN, URINE: NEGATIVE
BILIRUB SERPL-MCNC: 0.4 MG/DL (ref 0.2–1.2)
BILIRUBIN URINE: NEGATIVE
BLOOD, URINE: ABNORMAL
BUN BLDV-MCNC: 8 MG/DL (ref 6–20)
CALCIUM SERPL-MCNC: 8.9 MG/DL (ref 8.6–10)
CANNABINOID SCREEN URINE: NEGATIVE
CHLORIDE BLD-SCNC: 95 MMOL/L (ref 98–111)
CLARITY: CLEAR
CO2: 28 MMOL/L (ref 22–29)
COCAINE METABOLITE SCREEN URINE: NEGATIVE
COLOR: YELLOW
CREAT SERPL-MCNC: 0.8 MG/DL (ref 0.5–1.2)
EOSINOPHILS ABSOLUTE: 0 K/UL (ref 0–0.6)
EOSINOPHILS RELATIVE PERCENT: 0.3 % (ref 0–5)
EPITHELIAL CELLS, UA: 0 /HPF (ref 0–5)
ETHANOL: 209 MG/DL (ref 0–0.08)
ETHANOL: 209 MG/DL (ref 0–0.08)
ETHANOL: 93 MG/DL (ref 0–0.08)
GFR NON-AFRICAN AMERICAN: >60
GLUCOSE BLD-MCNC: 134 MG/DL (ref 74–109)
GLUCOSE URINE: NEGATIVE MG/DL
HCT VFR BLD CALC: 46.1 % (ref 42–52)
HEMOGLOBIN: 15.2 G/DL (ref 14–18)
HYALINE CASTS: 0 /HPF (ref 0–8)
KETONES, URINE: NEGATIVE MG/DL
LEUKOCYTE ESTERASE, URINE: NEGATIVE
LYMPHOCYTES ABSOLUTE: 2.4 K/UL (ref 1.1–4.5)
LYMPHOCYTES RELATIVE PERCENT: 25.6 % (ref 20–40)
Lab: NORMAL
MAGNESIUM: 2.3 MG/DL (ref 1.6–2.6)
MCH RBC QN AUTO: 30 PG (ref 27–31)
MCHC RBC AUTO-ENTMCNC: 33 G/DL (ref 33–37)
MCV RBC AUTO: 90.9 FL (ref 80–94)
MONOCYTES ABSOLUTE: 0.5 K/UL (ref 0–0.9)
MONOCYTES RELATIVE PERCENT: 4.9 % (ref 0–10)
NEUTROPHILS ABSOLUTE: 6.5 K/UL (ref 1.5–7.5)
NEUTROPHILS RELATIVE PERCENT: 68.5 % (ref 50–65)
NITRITE, URINE: NEGATIVE
OPIATE SCREEN URINE: NEGATIVE
PDW BLD-RTO: 13.2 % (ref 11.5–14.5)
PH UA: 6.5
PLATELET # BLD: 307 K/UL (ref 130–400)
PMV BLD AUTO: 8.9 FL (ref 9.4–12.4)
POTASSIUM REFLEX MAGNESIUM: 3.5 MMOL/L (ref 3.5–5)
PROTEIN UA: ABNORMAL MG/DL
RBC # BLD: 5.07 M/UL (ref 4.7–6.1)
RBC UA: 0 /HPF (ref 0–4)
SODIUM BLD-SCNC: 138 MMOL/L (ref 136–145)
SPECIFIC GRAVITY UA: 1.01
TOTAL PROTEIN: 7.9 G/DL (ref 6.6–8.7)
URINE REFLEX TO CULTURE: ABNORMAL
UROBILINOGEN, URINE: 0.2 E.U./DL
WBC # BLD: 9.5 K/UL (ref 4.8–10.8)
WBC UA: 1 /HPF (ref 0–5)

## 2019-02-15 PROCEDURE — 6360000002 HC RX W HCPCS

## 2019-02-15 PROCEDURE — 81001 URINALYSIS AUTO W/SCOPE: CPT

## 2019-02-15 PROCEDURE — G0480 DRUG TEST DEF 1-7 CLASSES: HCPCS

## 2019-02-15 PROCEDURE — 6370000000 HC RX 637 (ALT 250 FOR IP)

## 2019-02-15 PROCEDURE — 6370000000 HC RX 637 (ALT 250 FOR IP): Performed by: FAMILY MEDICINE

## 2019-02-15 PROCEDURE — 36415 COLL VENOUS BLD VENIPUNCTURE: CPT

## 2019-02-15 PROCEDURE — 99285 EMERGENCY DEPT VISIT HI MDM: CPT

## 2019-02-15 PROCEDURE — 6370000000 HC RX 637 (ALT 250 FOR IP): Performed by: EMERGENCY MEDICINE

## 2019-02-15 PROCEDURE — 80307 DRUG TEST PRSMV CHEM ANLYZR: CPT

## 2019-02-15 PROCEDURE — 96372 THER/PROPH/DIAG INJ SC/IM: CPT

## 2019-02-15 PROCEDURE — 85025 COMPLETE CBC W/AUTO DIFF WBC: CPT

## 2019-02-15 PROCEDURE — 80053 COMPREHEN METABOLIC PANEL: CPT

## 2019-02-15 PROCEDURE — 83735 ASSAY OF MAGNESIUM: CPT

## 2019-02-15 PROCEDURE — 99285 EMERGENCY DEPT VISIT HI MDM: CPT | Performed by: FAMILY MEDICINE

## 2019-02-15 RX ORDER — ONDANSETRON 4 MG/1
4 TABLET, ORALLY DISINTEGRATING ORAL ONCE
Status: COMPLETED | OUTPATIENT
Start: 2019-02-15 | End: 2019-02-15

## 2019-02-15 RX ORDER — LORAZEPAM 2 MG/ML
1 INJECTION INTRAMUSCULAR ONCE
Status: COMPLETED | OUTPATIENT
Start: 2019-02-15 | End: 2019-02-15

## 2019-02-15 RX ORDER — LORAZEPAM 1 MG/1
2 TABLET ORAL ONCE
Status: COMPLETED | OUTPATIENT
Start: 2019-02-15 | End: 2019-02-15

## 2019-02-15 RX ORDER — ONDANSETRON 4 MG/1
TABLET, ORALLY DISINTEGRATING ORAL
Status: COMPLETED
Start: 2019-02-15 | End: 2019-02-15

## 2019-02-15 RX ORDER — QUETIAPINE FUMARATE 200 MG/1
200 TABLET, FILM COATED ORAL 2 TIMES DAILY
Status: DISCONTINUED | OUTPATIENT
Start: 2019-02-16 | End: 2019-02-16

## 2019-02-15 RX ORDER — CHLORDIAZEPOXIDE HYDROCHLORIDE 25 MG/1
25 CAPSULE, GELATIN COATED ORAL ONCE
Status: COMPLETED | OUTPATIENT
Start: 2019-02-15 | End: 2019-02-15

## 2019-02-15 RX ORDER — LORAZEPAM 2 MG/ML
INJECTION INTRAMUSCULAR
Status: COMPLETED
Start: 2019-02-15 | End: 2019-02-15

## 2019-02-15 RX ADMIN — LORAZEPAM 1 MG: 2 INJECTION INTRAMUSCULAR; INTRAVENOUS at 20:20

## 2019-02-15 RX ADMIN — LORAZEPAM 1 MG: 2 INJECTION INTRAMUSCULAR at 20:20

## 2019-02-15 RX ADMIN — CHLORDIAZEPOXIDE HYDROCHLORIDE 25 MG: 25 CAPSULE ORAL at 19:33

## 2019-02-15 RX ADMIN — ONDANSETRON 4 MG: 4 TABLET, ORALLY DISINTEGRATING ORAL at 20:11

## 2019-02-15 RX ADMIN — LORAZEPAM 2 MG: 1 TABLET ORAL at 15:40

## 2019-02-16 PROBLEM — F10.939 ALCOHOL WITHDRAWAL SYNDROME, WITH UNSPECIFIED COMPLICATION (HCC): Status: ACTIVE | Noted: 2019-02-16

## 2019-02-16 LAB
ANION GAP SERPL CALCULATED.3IONS-SCNC: 12 MMOL/L (ref 7–19)
BUN BLDV-MCNC: 7 MG/DL (ref 6–20)
CALCIUM SERPL-MCNC: 8.5 MG/DL (ref 8.6–10)
CHLORIDE BLD-SCNC: 96 MMOL/L (ref 98–111)
CO2: 28 MMOL/L (ref 22–29)
CREAT SERPL-MCNC: 0.8 MG/DL (ref 0.5–1.2)
ETHANOL: 56 MG/DL (ref 0–0.08)
GFR NON-AFRICAN AMERICAN: >60
GLUCOSE BLD-MCNC: 97 MG/DL (ref 74–109)
POTASSIUM REFLEX MAGNESIUM: 4 MMOL/L (ref 3.5–5)
SODIUM BLD-SCNC: 136 MMOL/L (ref 136–145)

## 2019-02-16 PROCEDURE — 6370000000 HC RX 637 (ALT 250 FOR IP): Performed by: PSYCHIATRY & NEUROLOGY

## 2019-02-16 PROCEDURE — G0480 DRUG TEST DEF 1-7 CLASSES: HCPCS

## 2019-02-16 PROCEDURE — 6370000000 HC RX 637 (ALT 250 FOR IP): Performed by: FAMILY MEDICINE

## 2019-02-16 PROCEDURE — 1210000000 HC MED SURG R&B

## 2019-02-16 PROCEDURE — 6370000000 HC RX 637 (ALT 250 FOR IP): Performed by: INTERNAL MEDICINE

## 2019-02-16 PROCEDURE — 80048 BASIC METABOLIC PNL TOTAL CA: CPT

## 2019-02-16 PROCEDURE — 36415 COLL VENOUS BLD VENIPUNCTURE: CPT

## 2019-02-16 PROCEDURE — 6360000002 HC RX W HCPCS: Performed by: INTERNAL MEDICINE

## 2019-02-16 PROCEDURE — 99222 1ST HOSP IP/OBS MODERATE 55: CPT | Performed by: INTERNAL MEDICINE

## 2019-02-16 PROCEDURE — 2500000003 HC RX 250 WO HCPCS: Performed by: INTERNAL MEDICINE

## 2019-02-16 PROCEDURE — 2580000003 HC RX 258: Performed by: INTERNAL MEDICINE

## 2019-02-16 RX ORDER — LOSARTAN POTASSIUM 100 MG/1
100 TABLET ORAL DAILY
Status: ON HOLD | COMMUNITY
End: 2022-04-05 | Stop reason: SDUPTHER

## 2019-02-16 RX ORDER — SODIUM CHLORIDE 0.9 % (FLUSH) 0.9 %
10 SYRINGE (ML) INJECTION EVERY 12 HOURS SCHEDULED
Status: DISCONTINUED | OUTPATIENT
Start: 2019-02-16 | End: 2019-02-17 | Stop reason: HOSPADM

## 2019-02-16 RX ORDER — LORAZEPAM 1 MG/1
4 TABLET ORAL
Status: DISCONTINUED | OUTPATIENT
Start: 2019-02-16 | End: 2019-02-17 | Stop reason: HOSPADM

## 2019-02-16 RX ORDER — LORAZEPAM 2 MG/ML
4 INJECTION INTRAMUSCULAR
Status: DISCONTINUED | OUTPATIENT
Start: 2019-02-16 | End: 2019-02-17 | Stop reason: HOSPADM

## 2019-02-16 RX ORDER — THIAMINE MONONITRATE (VIT B1) 100 MG
100 TABLET ORAL DAILY
Status: DISCONTINUED | OUTPATIENT
Start: 2019-02-16 | End: 2019-02-17 | Stop reason: HOSPADM

## 2019-02-16 RX ORDER — LORAZEPAM 2 MG/ML
1 INJECTION INTRAMUSCULAR
Status: DISCONTINUED | OUTPATIENT
Start: 2019-02-16 | End: 2019-02-17 | Stop reason: HOSPADM

## 2019-02-16 RX ORDER — LORAZEPAM 2 MG/ML
2 INJECTION INTRAMUSCULAR
Status: DISCONTINUED | OUTPATIENT
Start: 2019-02-16 | End: 2019-02-17 | Stop reason: HOSPADM

## 2019-02-16 RX ORDER — SODIUM CHLORIDE 0.9 % (FLUSH) 0.9 %
10 SYRINGE (ML) INJECTION PRN
Status: DISCONTINUED | OUTPATIENT
Start: 2019-02-16 | End: 2019-02-17 | Stop reason: HOSPADM

## 2019-02-16 RX ORDER — LAMOTRIGINE 100 MG/1
100 TABLET ORAL DAILY
Status: DISCONTINUED | OUTPATIENT
Start: 2019-02-17 | End: 2019-02-17 | Stop reason: HOSPADM

## 2019-02-16 RX ORDER — LAMOTRIGINE 25 MG/1
25 TABLET ORAL DAILY
Status: DISCONTINUED | OUTPATIENT
Start: 2019-02-16 | End: 2019-02-16

## 2019-02-16 RX ORDER — CHLORDIAZEPOXIDE HYDROCHLORIDE 25 MG/1
25 CAPSULE, GELATIN COATED ORAL 3 TIMES DAILY
Status: DISCONTINUED | OUTPATIENT
Start: 2019-02-16 | End: 2019-02-17 | Stop reason: HOSPADM

## 2019-02-16 RX ORDER — NICOTINE 21 MG/24HR
1 PATCH, TRANSDERMAL 24 HOURS TRANSDERMAL DAILY
Status: DISCONTINUED | OUTPATIENT
Start: 2019-02-16 | End: 2019-02-17 | Stop reason: HOSPADM

## 2019-02-16 RX ORDER — LORAZEPAM 1 MG/1
3 TABLET ORAL
Status: DISCONTINUED | OUTPATIENT
Start: 2019-02-16 | End: 2019-02-17 | Stop reason: HOSPADM

## 2019-02-16 RX ORDER — MULTIVITAMIN WITH FOLIC ACID 400 MCG
1 TABLET ORAL DAILY
Status: DISCONTINUED | OUTPATIENT
Start: 2019-02-16 | End: 2019-02-17 | Stop reason: HOSPADM

## 2019-02-16 RX ORDER — LORAZEPAM 1 MG/1
1 TABLET ORAL
Status: DISCONTINUED | OUTPATIENT
Start: 2019-02-16 | End: 2019-02-17 | Stop reason: HOSPADM

## 2019-02-16 RX ORDER — CHLORDIAZEPOXIDE HYDROCHLORIDE 25 MG/1
25 CAPSULE, GELATIN COATED ORAL ONCE
Status: COMPLETED | OUTPATIENT
Start: 2019-02-16 | End: 2019-02-16

## 2019-02-16 RX ORDER — LORAZEPAM 2 MG/ML
3 INJECTION INTRAMUSCULAR
Status: DISCONTINUED | OUTPATIENT
Start: 2019-02-16 | End: 2019-02-17 | Stop reason: HOSPADM

## 2019-02-16 RX ORDER — LOSARTAN POTASSIUM 50 MG/1
100 TABLET ORAL DAILY
Status: DISCONTINUED | OUTPATIENT
Start: 2019-02-16 | End: 2019-02-17 | Stop reason: HOSPADM

## 2019-02-16 RX ORDER — LORAZEPAM 1 MG/1
2 TABLET ORAL
Status: DISCONTINUED | OUTPATIENT
Start: 2019-02-16 | End: 2019-02-17 | Stop reason: HOSPADM

## 2019-02-16 RX ORDER — ONDANSETRON 2 MG/ML
4 INJECTION INTRAMUSCULAR; INTRAVENOUS EVERY 6 HOURS PRN
Status: DISCONTINUED | OUTPATIENT
Start: 2019-02-16 | End: 2019-02-17 | Stop reason: HOSPADM

## 2019-02-16 RX ADMIN — THERA TABS 1 TABLET: TAB at 08:38

## 2019-02-16 RX ADMIN — CHLORDIAZEPOXIDE HYDROCHLORIDE 25 MG: 25 CAPSULE ORAL at 02:34

## 2019-02-16 RX ADMIN — QUETIAPINE FUMARATE 150 MG: 100 TABLET ORAL at 20:34

## 2019-02-16 RX ADMIN — Medication 10 ML: at 08:38

## 2019-02-16 RX ADMIN — CHLORDIAZEPOXIDE HYDROCHLORIDE 25 MG: 25 CAPSULE ORAL at 08:38

## 2019-02-16 RX ADMIN — LAMOTRIGINE 25 MG: 25 TABLET ORAL at 08:38

## 2019-02-16 RX ADMIN — CHLORDIAZEPOXIDE HYDROCHLORIDE 25 MG: 25 CAPSULE ORAL at 12:59

## 2019-02-16 RX ADMIN — LORAZEPAM 1 MG: 1 TABLET ORAL at 10:53

## 2019-02-16 RX ADMIN — CHLORDIAZEPOXIDE HYDROCHLORIDE 25 MG: 25 CAPSULE ORAL at 20:34

## 2019-02-16 RX ADMIN — LOSARTAN POTASSIUM 100 MG: 50 TABLET ORAL at 08:38

## 2019-02-16 RX ADMIN — SERTRALINE HYDROCHLORIDE 100 MG: 50 TABLET ORAL at 08:38

## 2019-02-16 RX ADMIN — LORAZEPAM 1 MG: 1 TABLET ORAL at 15:39

## 2019-02-16 RX ADMIN — QUETIAPINE FUMARATE 200 MG: 200 TABLET ORAL at 00:06

## 2019-02-16 RX ADMIN — FOLIC ACID: 5 INJECTION, SOLUTION INTRAMUSCULAR; INTRAVENOUS; SUBCUTANEOUS at 08:38

## 2019-02-16 RX ADMIN — Medication 100 MG: at 08:38

## 2019-02-16 RX ADMIN — Medication 10 ML: at 20:34

## 2019-02-16 ASSESSMENT — ENCOUNTER SYMPTOMS
NAUSEA: 1
SHORTNESS OF BREATH: 0
ABDOMINAL PAIN: 0
COUGH: 0
VOMITING: 0
COLOR CHANGE: 0

## 2019-02-17 ENCOUNTER — HOSPITAL ENCOUNTER (INPATIENT)
Age: 49
LOS: 2 days | Discharge: HOME | DRG: 885 | End: 2019-02-19
Attending: PSYCHIATRY & NEUROLOGY | Admitting: PSYCHIATRY & NEUROLOGY
Payer: MEDICAID

## 2019-02-17 VITALS
OXYGEN SATURATION: 96 % | BODY MASS INDEX: 21.3 KG/M2 | DIASTOLIC BLOOD PRESSURE: 87 MMHG | RESPIRATION RATE: 16 BRPM | HEART RATE: 84 BPM | TEMPERATURE: 98.7 F | HEIGHT: 76 IN | SYSTOLIC BLOOD PRESSURE: 139 MMHG | WEIGHT: 174.9 LBS

## 2019-02-17 PROCEDURE — 6370000000 HC RX 637 (ALT 250 FOR IP): Performed by: INTERNAL MEDICINE

## 2019-02-17 PROCEDURE — 1240000000 HC EMOTIONAL WELLNESS R&B

## 2019-02-17 PROCEDURE — 99238 HOSP IP/OBS DSCHRG MGMT 30/<: CPT | Performed by: INTERNAL MEDICINE

## 2019-02-17 PROCEDURE — 99223 1ST HOSP IP/OBS HIGH 75: CPT | Performed by: PSYCHIATRY & NEUROLOGY

## 2019-02-17 PROCEDURE — 6370000000 HC RX 637 (ALT 250 FOR IP): Performed by: PSYCHIATRY & NEUROLOGY

## 2019-02-17 PROCEDURE — 6360000002 HC RX W HCPCS: Performed by: INTERNAL MEDICINE

## 2019-02-17 RX ORDER — THIAMINE MONONITRATE (VIT B1) 100 MG
100 TABLET ORAL DAILY
Status: CANCELLED | OUTPATIENT
Start: 2019-02-18

## 2019-02-17 RX ORDER — IBUPROFEN 600 MG/1
600 TABLET ORAL EVERY 6 HOURS PRN
Status: DISCONTINUED | OUTPATIENT
Start: 2019-02-17 | End: 2019-02-19 | Stop reason: HOSPADM

## 2019-02-17 RX ORDER — MULTIVITAMIN WITH FOLIC ACID 400 MCG
1 TABLET ORAL DAILY
Status: CANCELLED | OUTPATIENT
Start: 2019-02-18

## 2019-02-17 RX ORDER — SERTRALINE HYDROCHLORIDE 100 MG/1
100 TABLET, FILM COATED ORAL DAILY
Status: DISCONTINUED | OUTPATIENT
Start: 2019-02-17 | End: 2019-02-19 | Stop reason: HOSPADM

## 2019-02-17 RX ORDER — QUETIAPINE FUMARATE 100 MG/1
200 TABLET, FILM COATED ORAL NIGHTLY
Status: DISCONTINUED | OUTPATIENT
Start: 2019-02-17 | End: 2019-02-19 | Stop reason: HOSPADM

## 2019-02-17 RX ORDER — NALTREXONE HYDROCHLORIDE 50 MG/1
50 TABLET, FILM COATED ORAL
Status: DISCONTINUED | OUTPATIENT
Start: 2019-02-18 | End: 2019-02-19

## 2019-02-17 RX ORDER — LOSARTAN POTASSIUM 50 MG/1
100 TABLET ORAL DAILY
Status: CANCELLED | OUTPATIENT
Start: 2019-02-18

## 2019-02-17 RX ORDER — QUETIAPINE FUMARATE 100 MG/1
200 TABLET, FILM COATED ORAL NIGHTLY
Status: DISCONTINUED | OUTPATIENT
Start: 2019-02-18 | End: 2019-02-17

## 2019-02-17 RX ORDER — FUROSEMIDE 20 MG/1
20 TABLET ORAL SEE ADMIN INSTRUCTIONS
Status: ON HOLD | COMMUNITY
End: 2019-06-12

## 2019-02-17 RX ORDER — LAMOTRIGINE 100 MG/1
100 TABLET ORAL DAILY
Status: CANCELLED | OUTPATIENT
Start: 2019-02-18

## 2019-02-17 RX ORDER — QUETIAPINE FUMARATE 50 MG/1
50 TABLET, FILM COATED ORAL 2 TIMES DAILY WITH MEALS
Status: DISCONTINUED | OUTPATIENT
Start: 2019-02-17 | End: 2019-02-19 | Stop reason: HOSPADM

## 2019-02-17 RX ORDER — LAMOTRIGINE 100 MG/1
100 TABLET ORAL 2 TIMES DAILY
Status: DISCONTINUED | OUTPATIENT
Start: 2019-02-17 | End: 2019-02-19 | Stop reason: HOSPADM

## 2019-02-17 RX ORDER — QUETIAPINE FUMARATE 100 MG/1
200 TABLET, FILM COATED ORAL 2 TIMES DAILY
Status: DISCONTINUED | OUTPATIENT
Start: 2019-02-17 | End: 2019-02-17

## 2019-02-17 RX ORDER — LORAZEPAM 1 MG/1
1 TABLET ORAL EVERY 6 HOURS PRN
Status: DISCONTINUED | OUTPATIENT
Start: 2019-02-17 | End: 2019-02-19

## 2019-02-17 RX ORDER — NICOTINE 21 MG/24HR
1 PATCH, TRANSDERMAL 24 HOURS TRANSDERMAL DAILY
Status: CANCELLED | OUTPATIENT
Start: 2019-02-18

## 2019-02-17 RX ORDER — ACETAMINOPHEN 325 MG/1
650 TABLET ORAL EVERY 4 HOURS PRN
Status: DISCONTINUED | OUTPATIENT
Start: 2019-02-17 | End: 2019-02-19 | Stop reason: HOSPADM

## 2019-02-17 RX ADMIN — LOSARTAN POTASSIUM 100 MG: 50 TABLET ORAL at 08:52

## 2019-02-17 RX ADMIN — LAMOTRIGINE 100 MG: 100 TABLET ORAL at 15:27

## 2019-02-17 RX ADMIN — LAMOTRIGINE 100 MG: 100 TABLET ORAL at 08:52

## 2019-02-17 RX ADMIN — THERA TABS 1 TABLET: TAB at 08:52

## 2019-02-17 RX ADMIN — ENOXAPARIN SODIUM 40 MG: 40 INJECTION SUBCUTANEOUS at 08:52

## 2019-02-17 RX ADMIN — QUETIAPINE FUMARATE 50 MG: 50 TABLET ORAL at 15:33

## 2019-02-17 RX ADMIN — LAMOTRIGINE 100 MG: 100 TABLET ORAL at 20:57

## 2019-02-17 RX ADMIN — CHLORDIAZEPOXIDE HYDROCHLORIDE 25 MG: 25 CAPSULE ORAL at 08:52

## 2019-02-17 RX ADMIN — SERTRALINE HYDROCHLORIDE 100 MG: 50 TABLET ORAL at 08:52

## 2019-02-17 RX ADMIN — QUETIAPINE FUMARATE 200 MG: 100 TABLET ORAL at 21:09

## 2019-02-17 RX ADMIN — QUETIAPINE FUMARATE 150 MG: 100 TABLET ORAL at 08:52

## 2019-02-17 RX ADMIN — SERTRALINE HYDROCHLORIDE 100 MG: 100 TABLET ORAL at 15:27

## 2019-02-17 RX ADMIN — Medication 100 MG: at 08:52

## 2019-02-17 ASSESSMENT — PATIENT HEALTH QUESTIONNAIRE - PHQ9: SUM OF ALL RESPONSES TO PHQ QUESTIONS 1-9: 20

## 2019-02-17 ASSESSMENT — SLEEP AND FATIGUE QUESTIONNAIRES
RESTFUL SLEEP: NO
DO YOU USE A SLEEP AID: YES
SLEEP PATTERN: RESTLESSNESS;DISTURBED/INTERRUPTED SLEEP;DIFFICULTY FALLING ASLEEP
DIFFICULTY STAYING ASLEEP: YES
AVERAGE NUMBER OF SLEEP HOURS: 8
DO YOU HAVE DIFFICULTY SLEEPING: COMMENT
DIFFICULTY ARISING: NO
DIFFICULTY FALLING ASLEEP: YES

## 2019-02-17 ASSESSMENT — LIFESTYLE VARIABLES: HISTORY_ALCOHOL_USE: YES

## 2019-02-18 PROCEDURE — 1240000000 HC EMOTIONAL WELLNESS R&B

## 2019-02-18 PROCEDURE — 6370000000 HC RX 637 (ALT 250 FOR IP): Performed by: FAMILY MEDICINE

## 2019-02-18 PROCEDURE — 99232 SBSQ HOSP IP/OBS MODERATE 35: CPT | Performed by: PSYCHIATRY & NEUROLOGY

## 2019-02-18 PROCEDURE — 6370000000 HC RX 637 (ALT 250 FOR IP): Performed by: PSYCHIATRY & NEUROLOGY

## 2019-02-18 RX ORDER — NICOTINE 21 MG/24HR
1 PATCH, TRANSDERMAL 24 HOURS TRANSDERMAL DAILY
Status: DISCONTINUED | OUTPATIENT
Start: 2019-02-18 | End: 2019-02-19 | Stop reason: HOSPADM

## 2019-02-18 RX ADMIN — LAMOTRIGINE 100 MG: 100 TABLET ORAL at 08:54

## 2019-02-18 RX ADMIN — QUETIAPINE FUMARATE 200 MG: 100 TABLET ORAL at 20:51

## 2019-02-18 RX ADMIN — SERTRALINE HYDROCHLORIDE 100 MG: 100 TABLET ORAL at 08:55

## 2019-02-18 RX ADMIN — LAMOTRIGINE 100 MG: 100 TABLET ORAL at 20:52

## 2019-02-18 RX ADMIN — QUETIAPINE FUMARATE 50 MG: 50 TABLET ORAL at 18:39

## 2019-02-18 RX ADMIN — QUETIAPINE FUMARATE 50 MG: 50 TABLET ORAL at 08:54

## 2019-02-18 RX ADMIN — IBUPROFEN 600 MG: 600 TABLET ORAL at 09:04

## 2019-02-18 RX ADMIN — NALTREXONE HYDROCHLORIDE 50 MG: 50 TABLET, FILM COATED ORAL at 08:54

## 2019-02-18 ASSESSMENT — PAIN SCALES - GENERAL: PAINLEVEL_OUTOF10: 6

## 2019-02-19 ENCOUNTER — HOSPITAL ENCOUNTER (INPATIENT)
Age: 49
LOS: 2 days | Discharge: HOME | DRG: 885 | End: 2019-02-21
Attending: PSYCHIATRY & NEUROLOGY | Admitting: PSYCHIATRY & NEUROLOGY
Payer: MEDICAID

## 2019-02-19 VITALS
HEIGHT: 76 IN | SYSTOLIC BLOOD PRESSURE: 144 MMHG | TEMPERATURE: 98.2 F | BODY MASS INDEX: 23.14 KG/M2 | RESPIRATION RATE: 18 BRPM | OXYGEN SATURATION: 96 % | HEART RATE: 61 BPM | DIASTOLIC BLOOD PRESSURE: 73 MMHG | WEIGHT: 190 LBS

## 2019-02-19 LAB
TSH SERPL DL<=0.05 MIU/L-ACNC: 1.08 UIU/ML (ref 0.27–4.2)
VITAMIN B-12: 848 PG/ML (ref 211–946)
VITAMIN D 25-HYDROXY: 15 NG/ML

## 2019-02-19 PROCEDURE — 6370000000 HC RX 637 (ALT 250 FOR IP): Performed by: FAMILY MEDICINE

## 2019-02-19 PROCEDURE — 82306 VITAMIN D 25 HYDROXY: CPT

## 2019-02-19 PROCEDURE — 99232 SBSQ HOSP IP/OBS MODERATE 35: CPT | Performed by: PSYCHIATRY & NEUROLOGY

## 2019-02-19 PROCEDURE — 84443 ASSAY THYROID STIM HORMONE: CPT

## 2019-02-19 PROCEDURE — 6370000000 HC RX 637 (ALT 250 FOR IP): Performed by: PSYCHIATRY & NEUROLOGY

## 2019-02-19 PROCEDURE — 6370000000 HC RX 637 (ALT 250 FOR IP): Performed by: INTERNAL MEDICINE

## 2019-02-19 PROCEDURE — 1240000000 HC EMOTIONAL WELLNESS R&B

## 2019-02-19 PROCEDURE — 82607 VITAMIN B-12: CPT

## 2019-02-19 PROCEDURE — 36415 COLL VENOUS BLD VENIPUNCTURE: CPT

## 2019-02-19 RX ORDER — MULTIVITAMIN WITH FOLIC ACID 400 MCG
1 TABLET ORAL DAILY
Status: DISCONTINUED | OUTPATIENT
Start: 2019-02-19 | End: 2019-02-21 | Stop reason: HOSPADM

## 2019-02-19 RX ORDER — LAMOTRIGINE 100 MG/1
100 TABLET ORAL DAILY
Status: DISCONTINUED | OUTPATIENT
Start: 2019-02-20 | End: 2019-02-20

## 2019-02-19 RX ORDER — SERTRALINE HYDROCHLORIDE 100 MG/1
100 TABLET, FILM COATED ORAL DAILY
Status: DISCONTINUED | OUTPATIENT
Start: 2019-02-20 | End: 2019-02-21 | Stop reason: HOSPADM

## 2019-02-19 RX ORDER — ERGOCALCIFEROL (VITAMIN D2) 1250 MCG
50000 CAPSULE ORAL WEEKLY
Qty: 11 CAPSULE | Refills: 0 | Status: SHIPPED | OUTPATIENT
Start: 2019-02-19 | End: 2019-09-09

## 2019-02-19 RX ORDER — ERGOCALCIFEROL 1.25 MG/1
50000 CAPSULE ORAL WEEKLY
Status: DISCONTINUED | OUTPATIENT
Start: 2019-02-19 | End: 2019-02-19 | Stop reason: HOSPADM

## 2019-02-19 RX ORDER — LOSARTAN POTASSIUM 100 MG/1
100 TABLET ORAL DAILY
Status: DISCONTINUED | OUTPATIENT
Start: 2019-02-19 | End: 2019-02-21 | Stop reason: HOSPADM

## 2019-02-19 RX ORDER — THIAMINE MONONITRATE (VIT B1) 100 MG
100 TABLET ORAL DAILY
Status: DISCONTINUED | OUTPATIENT
Start: 2019-02-19 | End: 2019-02-21 | Stop reason: HOSPADM

## 2019-02-19 RX ORDER — NICOTINE 21 MG/24HR
1 PATCH, TRANSDERMAL 24 HOURS TRANSDERMAL DAILY
Status: DISCONTINUED | OUTPATIENT
Start: 2019-02-20 | End: 2019-02-21 | Stop reason: HOSPADM

## 2019-02-19 RX ADMIN — SERTRALINE HYDROCHLORIDE 100 MG: 100 TABLET ORAL at 09:34

## 2019-02-19 RX ADMIN — NALTREXONE HYDROCHLORIDE 50 MG: 50 TABLET, FILM COATED ORAL at 09:34

## 2019-02-19 RX ADMIN — Medication 100 MG: at 21:06

## 2019-02-19 RX ADMIN — QUETIAPINE FUMARATE 50 MG: 50 TABLET ORAL at 17:08

## 2019-02-19 RX ADMIN — QUETIAPINE FUMARATE 50 MG: 50 TABLET ORAL at 09:33

## 2019-02-19 RX ADMIN — LAMOTRIGINE 100 MG: 100 TABLET ORAL at 09:34

## 2019-02-19 RX ADMIN — QUETIAPINE FUMARATE 150 MG: 100 TABLET ORAL at 21:06

## 2019-02-19 RX ADMIN — LOSARTAN POTASSIUM 100 MG: 100 TABLET ORAL at 21:12

## 2019-02-19 RX ADMIN — THERA TABS 1 TABLET: TAB at 21:06

## 2019-02-19 ASSESSMENT — SLEEP AND FATIGUE QUESTIONNAIRES
SLEEP PATTERN: RESTLESSNESS;DIFFICULTY FALLING ASLEEP;DISTURBED/INTERRUPTED SLEEP
DIFFICULTY FALLING ASLEEP: YES
RESTFUL SLEEP: NO
DIFFICULTY STAYING ASLEEP: YES
DO YOU USE A SLEEP AID: YES
DO YOU HAVE DIFFICULTY SLEEPING: YES
DIFFICULTY ARISING: NO
AVERAGE NUMBER OF SLEEP HOURS: 8

## 2019-02-20 PROBLEM — F31.4 BIPOLAR I DISORDER, MOST RECENT EPISODE DEPRESSED, SEVERE WITHOUT PSYCHOTIC FEATURES (HCC): Status: ACTIVE | Noted: 2019-02-20

## 2019-02-20 LAB — HBA1C MFR BLD: 5 % (ref 4–6)

## 2019-02-20 PROCEDURE — 6370000000 HC RX 637 (ALT 250 FOR IP): Performed by: PSYCHIATRY & NEUROLOGY

## 2019-02-20 PROCEDURE — 99223 1ST HOSP IP/OBS HIGH 75: CPT | Performed by: PSYCHIATRY & NEUROLOGY

## 2019-02-20 PROCEDURE — 1240000000 HC EMOTIONAL WELLNESS R&B

## 2019-02-20 PROCEDURE — 6370000000 HC RX 637 (ALT 250 FOR IP): Performed by: INTERNAL MEDICINE

## 2019-02-20 PROCEDURE — 36415 COLL VENOUS BLD VENIPUNCTURE: CPT

## 2019-02-20 PROCEDURE — 83036 HEMOGLOBIN GLYCOSYLATED A1C: CPT

## 2019-02-20 RX ORDER — QUETIAPINE FUMARATE 25 MG/1
25 TABLET, FILM COATED ORAL 2 TIMES DAILY
Status: DISCONTINUED | OUTPATIENT
Start: 2019-02-20 | End: 2019-02-20

## 2019-02-20 RX ORDER — QUETIAPINE FUMARATE 50 MG/1
50 TABLET, FILM COATED ORAL 2 TIMES DAILY
Status: DISCONTINUED | OUTPATIENT
Start: 2019-02-20 | End: 2019-02-21 | Stop reason: HOSPADM

## 2019-02-20 RX ORDER — QUETIAPINE FUMARATE 200 MG/1
200 TABLET, FILM COATED ORAL NIGHTLY
Status: DISCONTINUED | OUTPATIENT
Start: 2019-02-20 | End: 2019-02-21 | Stop reason: HOSPADM

## 2019-02-20 RX ORDER — IBUPROFEN 600 MG/1
600 TABLET ORAL EVERY 6 HOURS PRN
Status: DISCONTINUED | OUTPATIENT
Start: 2019-02-20 | End: 2019-02-21 | Stop reason: HOSPADM

## 2019-02-20 RX ORDER — LAMOTRIGINE 100 MG/1
100 TABLET ORAL 2 TIMES DAILY
Status: DISCONTINUED | OUTPATIENT
Start: 2019-02-20 | End: 2019-02-21 | Stop reason: HOSPADM

## 2019-02-20 RX ADMIN — QUETIAPINE FUMARATE 200 MG: 200 TABLET ORAL at 20:49

## 2019-02-20 RX ADMIN — THERA TABS 1 TABLET: TAB at 09:02

## 2019-02-20 RX ADMIN — LOSARTAN POTASSIUM 100 MG: 100 TABLET ORAL at 09:02

## 2019-02-20 RX ADMIN — Medication 100 MG: at 09:02

## 2019-02-20 RX ADMIN — SERTRALINE HYDROCHLORIDE 100 MG: 100 TABLET ORAL at 09:02

## 2019-02-20 RX ADMIN — QUETIAPINE FUMARATE 50 MG: 50 TABLET ORAL at 11:10

## 2019-02-20 RX ADMIN — LAMOTRIGINE 100 MG: 100 TABLET ORAL at 09:03

## 2019-02-20 RX ADMIN — IBUPROFEN 600 MG: 600 TABLET ORAL at 14:04

## 2019-02-20 RX ADMIN — QUETIAPINE FUMARATE 50 MG: 50 TABLET ORAL at 17:41

## 2019-02-20 RX ADMIN — LAMOTRIGINE 100 MG: 100 TABLET ORAL at 20:49

## 2019-02-20 ASSESSMENT — PATIENT HEALTH QUESTIONNAIRE - PHQ9: SUM OF ALL RESPONSES TO PHQ QUESTIONS 1-9: 20

## 2019-02-20 ASSESSMENT — PAIN SCALES - GENERAL
PAINLEVEL_OUTOF10: 3
PAINLEVEL_OUTOF10: 0
PAINLEVEL_OUTOF10: 3

## 2019-02-20 ASSESSMENT — LIFESTYLE VARIABLES: HISTORY_ALCOHOL_USE: YES

## 2019-02-21 VITALS
SYSTOLIC BLOOD PRESSURE: 141 MMHG | OXYGEN SATURATION: 94 % | RESPIRATION RATE: 18 BRPM | HEART RATE: 59 BPM | DIASTOLIC BLOOD PRESSURE: 86 MMHG | WEIGHT: 174.6 LBS | HEIGHT: 76 IN | BODY MASS INDEX: 21.26 KG/M2 | TEMPERATURE: 98.3 F

## 2019-02-21 PROCEDURE — 6370000000 HC RX 637 (ALT 250 FOR IP): Performed by: PSYCHIATRY & NEUROLOGY

## 2019-02-21 PROCEDURE — 6370000000 HC RX 637 (ALT 250 FOR IP): Performed by: INTERNAL MEDICINE

## 2019-02-21 PROCEDURE — 99239 HOSP IP/OBS DSCHRG MGMT >30: CPT | Performed by: PSYCHIATRY & NEUROLOGY

## 2019-02-21 PROCEDURE — 5130000000 HC BRIDGE APPOINTMENT

## 2019-02-21 RX ORDER — LAMOTRIGINE 100 MG/1
100 TABLET ORAL 2 TIMES DAILY
Qty: 60 TABLET | Refills: 3 | Status: ON HOLD | OUTPATIENT
Start: 2019-02-21 | End: 2019-06-13 | Stop reason: HOSPADM

## 2019-02-21 RX ORDER — LANOLIN ALCOHOL/MO/W.PET/CERES
100 CREAM (GRAM) TOPICAL DAILY
Qty: 30 TABLET | Refills: 3 | Status: SHIPPED | OUTPATIENT
Start: 2019-02-22 | End: 2019-09-09

## 2019-02-21 RX ORDER — QUETIAPINE FUMARATE 200 MG/1
200 TABLET, FILM COATED ORAL NIGHTLY
Qty: 30 TABLET | Refills: 3 | Status: ON HOLD | OUTPATIENT
Start: 2019-02-21 | End: 2019-06-13 | Stop reason: HOSPADM

## 2019-02-21 RX ORDER — QUETIAPINE FUMARATE 50 MG/1
50 TABLET, FILM COATED ORAL 2 TIMES DAILY
Qty: 60 TABLET | Refills: 3 | Status: SHIPPED | OUTPATIENT
Start: 2019-02-21 | End: 2019-09-09

## 2019-02-21 RX ADMIN — LOSARTAN POTASSIUM 100 MG: 100 TABLET ORAL at 08:49

## 2019-02-21 RX ADMIN — SERTRALINE HYDROCHLORIDE 100 MG: 100 TABLET ORAL at 08:49

## 2019-02-21 RX ADMIN — THERA TABS 1 TABLET: TAB at 08:49

## 2019-02-21 RX ADMIN — LAMOTRIGINE 100 MG: 100 TABLET ORAL at 08:49

## 2019-02-21 RX ADMIN — Medication 100 MG: at 08:49

## 2019-02-21 RX ADMIN — QUETIAPINE FUMARATE 50 MG: 50 TABLET ORAL at 08:50

## 2019-03-01 PROBLEM — F10.20 ALCOHOL USE DISORDER, SEVERE, DEPENDENCE (HCC): Status: ACTIVE | Noted: 2019-03-01

## 2019-06-11 ENCOUNTER — HOSPITAL ENCOUNTER (INPATIENT)
Age: 49
LOS: 1 days | Discharge: HOME | DRG: 885 | End: 2019-06-13
Attending: EMERGENCY MEDICINE | Admitting: PSYCHIATRY & NEUROLOGY
Payer: MEDICAID

## 2019-06-11 DIAGNOSIS — F32.A DEPRESSION WITH SUICIDAL IDEATION: Primary | ICD-10-CM

## 2019-06-11 DIAGNOSIS — R45.851 DEPRESSION WITH SUICIDAL IDEATION: Primary | ICD-10-CM

## 2019-06-11 LAB
ACETAMINOPHEN LEVEL: <15 UG/ML
ALBUMIN SERPL-MCNC: 4.6 G/DL (ref 3.5–5.2)
ALP BLD-CCNC: 93 U/L (ref 40–130)
ALT SERPL-CCNC: 28 U/L (ref 5–41)
AMPHETAMINE SCREEN, URINE: NEGATIVE
ANION GAP SERPL CALCULATED.3IONS-SCNC: 14 MMOL/L (ref 7–19)
AST SERPL-CCNC: 18 U/L (ref 5–40)
BARBITURATE SCREEN URINE: NEGATIVE
BASOPHILS ABSOLUTE: 0 K/UL (ref 0–0.2)
BASOPHILS RELATIVE PERCENT: 0.1 % (ref 0–1)
BENZODIAZEPINE SCREEN, URINE: NEGATIVE
BILIRUB SERPL-MCNC: 0.4 MG/DL (ref 0.2–1.2)
BUN BLDV-MCNC: 15 MG/DL (ref 6–20)
CALCIUM SERPL-MCNC: 9 MG/DL (ref 8.6–10)
CANNABINOID SCREEN URINE: NEGATIVE
CHLORIDE BLD-SCNC: 97 MMOL/L (ref 98–111)
CO2: 25 MMOL/L (ref 22–29)
COCAINE METABOLITE SCREEN URINE: NEGATIVE
CREAT SERPL-MCNC: 0.9 MG/DL (ref 0.5–1.2)
EOSINOPHILS ABSOLUTE: 0 K/UL (ref 0–0.6)
EOSINOPHILS RELATIVE PERCENT: 0.1 % (ref 0–5)
ETHANOL: 187 MG/DL (ref 0–0.08)
GFR NON-AFRICAN AMERICAN: >60
GLUCOSE BLD-MCNC: 117 MG/DL (ref 74–109)
HCT VFR BLD CALC: 39.2 % (ref 42–52)
HEMOGLOBIN: 13.2 G/DL (ref 14–18)
LYMPHOCYTES ABSOLUTE: 1 K/UL (ref 1.1–4.5)
LYMPHOCYTES RELATIVE PERCENT: 11.1 % (ref 20–40)
Lab: NORMAL
MCH RBC QN AUTO: 30.3 PG (ref 27–31)
MCHC RBC AUTO-ENTMCNC: 33.7 G/DL (ref 33–37)
MCV RBC AUTO: 90.1 FL (ref 80–94)
MONOCYTES ABSOLUTE: 0.6 K/UL (ref 0–0.9)
MONOCYTES RELATIVE PERCENT: 6.2 % (ref 0–10)
NEUTROPHILS ABSOLUTE: 7.4 K/UL (ref 1.5–7.5)
NEUTROPHILS RELATIVE PERCENT: 82.2 % (ref 50–65)
OPIATE SCREEN URINE: NEGATIVE
PDW BLD-RTO: 14 % (ref 11.5–14.5)
PLATELET # BLD: 170 K/UL (ref 130–400)
PMV BLD AUTO: 9.3 FL (ref 9.4–12.4)
POTASSIUM REFLEX MAGNESIUM: 4.4 MMOL/L (ref 3.5–5)
RBC # BLD: 4.35 M/UL (ref 4.7–6.1)
SALICYLATE, SERUM: <3 MG/DL (ref 3–10)
SODIUM BLD-SCNC: 136 MMOL/L (ref 136–145)
TOTAL PROTEIN: 6.8 G/DL (ref 6.6–8.7)
WBC # BLD: 9 K/UL (ref 4.8–10.8)

## 2019-06-11 PROCEDURE — G0480 DRUG TEST DEF 1-7 CLASSES: HCPCS

## 2019-06-11 PROCEDURE — 80307 DRUG TEST PRSMV CHEM ANLYZR: CPT

## 2019-06-11 PROCEDURE — 99285 EMERGENCY DEPT VISIT HI MDM: CPT

## 2019-06-11 PROCEDURE — 36415 COLL VENOUS BLD VENIPUNCTURE: CPT

## 2019-06-11 PROCEDURE — 80053 COMPREHEN METABOLIC PANEL: CPT

## 2019-06-11 PROCEDURE — 85025 COMPLETE CBC W/AUTO DIFF WBC: CPT

## 2019-06-11 ASSESSMENT — PAIN DESCRIPTION - LOCATION: LOCATION: GENERALIZED

## 2019-06-11 ASSESSMENT — PAIN DESCRIPTION - PAIN TYPE: TYPE: CHRONIC PAIN

## 2019-06-11 ASSESSMENT — PAIN SCALES - GENERAL: PAINLEVEL_OUTOF10: 7

## 2019-06-12 PROBLEM — R45.851 SUICIDAL IDEATION: Status: ACTIVE | Noted: 2019-06-12

## 2019-06-12 LAB — ETHANOL: 67 MG/DL (ref 0–0.08)

## 2019-06-12 PROCEDURE — 99223 1ST HOSP IP/OBS HIGH 75: CPT | Performed by: PSYCHIATRY & NEUROLOGY

## 2019-06-12 PROCEDURE — 99285 EMERGENCY DEPT VISIT HI MDM: CPT | Performed by: EMERGENCY MEDICINE

## 2019-06-12 PROCEDURE — G0480 DRUG TEST DEF 1-7 CLASSES: HCPCS

## 2019-06-12 PROCEDURE — 36415 COLL VENOUS BLD VENIPUNCTURE: CPT

## 2019-06-12 PROCEDURE — 6370000000 HC RX 637 (ALT 250 FOR IP): Performed by: PSYCHIATRY & NEUROLOGY

## 2019-06-12 PROCEDURE — 1240000000 HC EMOTIONAL WELLNESS R&B

## 2019-06-12 RX ORDER — LOSARTAN POTASSIUM 100 MG/1
100 TABLET ORAL DAILY
Status: DISCONTINUED | OUTPATIENT
Start: 2019-06-12 | End: 2019-06-13 | Stop reason: HOSPADM

## 2019-06-12 RX ORDER — QUETIAPINE FUMARATE 100 MG/1
100 TABLET, FILM COATED ORAL NIGHTLY
Status: DISCONTINUED | OUTPATIENT
Start: 2019-06-12 | End: 2019-06-13 | Stop reason: HOSPADM

## 2019-06-12 RX ORDER — HYDROXYZINE HYDROCHLORIDE 25 MG/1
25 TABLET, FILM COATED ORAL EVERY 4 HOURS PRN
Status: DISCONTINUED | OUTPATIENT
Start: 2019-06-12 | End: 2019-06-13 | Stop reason: HOSPADM

## 2019-06-12 RX ORDER — ACETAMINOPHEN 325 MG/1
650 TABLET ORAL EVERY 4 HOURS PRN
Status: DISCONTINUED | OUTPATIENT
Start: 2019-06-12 | End: 2019-06-13 | Stop reason: HOSPADM

## 2019-06-12 RX ORDER — QUETIAPINE FUMARATE 50 MG/1
50 TABLET, FILM COATED ORAL 2 TIMES DAILY
Status: DISCONTINUED | OUTPATIENT
Start: 2019-06-12 | End: 2019-06-13 | Stop reason: HOSPADM

## 2019-06-12 RX ORDER — FOLIC ACID 1 MG/1
1 TABLET ORAL DAILY
Status: DISCONTINUED | OUTPATIENT
Start: 2019-06-13 | End: 2019-06-13 | Stop reason: HOSPADM

## 2019-06-12 RX ORDER — ERGOCALCIFEROL 1.25 MG/1
50000 CAPSULE ORAL WEEKLY
Status: DISCONTINUED | OUTPATIENT
Start: 2019-06-12 | End: 2019-06-13 | Stop reason: HOSPADM

## 2019-06-12 RX ORDER — ACETAMINOPHEN 325 MG/1
650 TABLET ORAL EVERY 4 HOURS PRN
Status: DISCONTINUED | OUTPATIENT
Start: 2019-06-12 | End: 2019-06-12

## 2019-06-12 RX ORDER — LAMOTRIGINE 25 MG/1
25 TABLET ORAL 2 TIMES DAILY
Status: DISCONTINUED | OUTPATIENT
Start: 2019-06-12 | End: 2019-06-13 | Stop reason: HOSPADM

## 2019-06-12 RX ORDER — CHLORDIAZEPOXIDE HYDROCHLORIDE 25 MG/1
25 CAPSULE, GELATIN COATED ORAL EVERY 4 HOURS PRN
Status: DISCONTINUED | OUTPATIENT
Start: 2019-06-12 | End: 2019-06-13

## 2019-06-12 RX ORDER — THIAMINE MONONITRATE (VIT B1) 100 MG
100 TABLET ORAL DAILY
Status: DISCONTINUED | OUTPATIENT
Start: 2019-06-12 | End: 2019-06-13 | Stop reason: HOSPADM

## 2019-06-12 RX ORDER — CHLORDIAZEPOXIDE HYDROCHLORIDE 25 MG/1
50 CAPSULE, GELATIN COATED ORAL EVERY 4 HOURS PRN
Status: DISCONTINUED | OUTPATIENT
Start: 2019-06-12 | End: 2019-06-13

## 2019-06-12 RX ORDER — NICOTINE 21 MG/24HR
1 PATCH, TRANSDERMAL 24 HOURS TRANSDERMAL DAILY
Status: DISCONTINUED | OUTPATIENT
Start: 2019-06-12 | End: 2019-06-13 | Stop reason: HOSPADM

## 2019-06-12 RX ADMIN — ERGOCALCIFEROL 50000 UNITS: 1.25 CAPSULE ORAL at 10:53

## 2019-06-12 RX ADMIN — LAMOTRIGINE 25 MG: 25 TABLET ORAL at 10:53

## 2019-06-12 RX ADMIN — QUETIAPINE FUMARATE 50 MG: 50 TABLET ORAL at 10:54

## 2019-06-12 RX ADMIN — CHLORDIAZEPOXIDE HYDROCHLORIDE 25 MG: 25 CAPSULE ORAL at 10:54

## 2019-06-12 RX ADMIN — CHLORDIAZEPOXIDE HYDROCHLORIDE 25 MG: 25 CAPSULE ORAL at 10:56

## 2019-06-12 RX ADMIN — CHLORDIAZEPOXIDE HYDROCHLORIDE 25 MG: 25 CAPSULE ORAL at 04:36

## 2019-06-12 RX ADMIN — SERTRALINE HYDROCHLORIDE 50 MG: 50 TABLET ORAL at 10:54

## 2019-06-12 RX ADMIN — HYDROXYZINE HYDROCHLORIDE 25 MG: 25 TABLET, FILM COATED ORAL at 10:54

## 2019-06-12 RX ADMIN — CHLORDIAZEPOXIDE HYDROCHLORIDE 50 MG: 25 CAPSULE ORAL at 20:16

## 2019-06-12 RX ADMIN — LAMOTRIGINE 25 MG: 25 TABLET ORAL at 20:16

## 2019-06-12 RX ADMIN — Medication 100 MG: at 10:53

## 2019-06-12 RX ADMIN — QUETIAPINE FUMARATE 100 MG: 100 TABLET ORAL at 20:17

## 2019-06-12 RX ADMIN — LOSARTAN POTASSIUM 100 MG: 100 TABLET ORAL at 10:53

## 2019-06-12 RX ADMIN — QUETIAPINE FUMARATE 50 MG: 50 TABLET ORAL at 17:15

## 2019-06-12 ASSESSMENT — SLEEP AND FATIGUE QUESTIONNAIRES
SLEEP PATTERN: DIFFICULTY FALLING ASLEEP;RESTLESSNESS
RESTFUL SLEEP: NO
DIFFICULTY FALLING ASLEEP: YES
DIFFICULTY STAYING ASLEEP: YES
DIFFICULTY ARISING: NO
AVERAGE NUMBER OF SLEEP HOURS: 4
DO YOU HAVE DIFFICULTY SLEEPING: YES
DO YOU USE A SLEEP AID: YES

## 2019-06-12 ASSESSMENT — ENCOUNTER SYMPTOMS
EYE DISCHARGE: 0
CHEST TIGHTNESS: 0
SINUS PAIN: 0
COLOR CHANGE: 0
CONSTIPATION: 0
SHORTNESS OF BREATH: 0
DIARRHEA: 0
ABDOMINAL DISTENTION: 0
RECTAL PAIN: 0
SORE THROAT: 0
ABDOMINAL PAIN: 0
BLOOD IN STOOL: 0
WHEEZING: 0
PHOTOPHOBIA: 0
STRIDOR: 0
BACK PAIN: 0
EYE REDNESS: 0
EYE PAIN: 0
APNEA: 0
VOMITING: 0
NAUSEA: 0

## 2019-06-12 ASSESSMENT — LIFESTYLE VARIABLES: HISTORY_ALCOHOL_USE: YES

## 2019-06-12 ASSESSMENT — PATIENT HEALTH QUESTIONNAIRE - PHQ9: SUM OF ALL RESPONSES TO PHQ QUESTIONS 1-9: 27

## 2019-06-12 NOTE — H&P
Department of Psychiatry  Attending History and Physical - Adult       CHIEF COMPLAINT: Alcohol intoxication, suicidal ideations, treatment noncompliance    History obtained from:  patient    HISTORY OF PRESENT ILLNESS:          The patient is a 44 y.o. male with previous psychiatric history of alcohol use disorder, bipolar disorder, who has been admitted to psychiatric unit, secondary to alcohol intoxication, with blood alcohol level 187, treatment noncompliance and suicidal ideations with plan. Patient is well known to psychiatry due to multiple previous admissions with alcohol intoxications and bipolar disorder. Patient has been discharged from psychiatric unit last time in February 2019 in stable condition. He reported that he was doing fine until end of April, when his fiancé left his house and then got EPO against the patient. Patient stated when police informed him about active EPO, he tried to text his fiancée \"I wanted to know what is going on\", but it was violation of EPO and he has been jailed for 30 days without getting any of prescribed medications. Patient stated that he was released from custodial on June 1, 2019, a relapsed in drinking alcohol. He reported that he was drinking 12 pack of the beers and a pint of vodka every other day, stated that last time when he was drinking alcohol is \"yesterday, on the hospital's parking lot\". The patient reported that, in custodial and after release from custodial, he started to have frequent suicidal ideations with plan to shoot himself. Today during the evaluation, patient continues to report suicidal ideations but denies having any suicidal plans. He denies any homicidal ideations. He endorses withdrawal symptoms from alcohol -  body shakes, hand tremor, increased level of anxiety, general weakness, decreased quality of sleep.     In regards of her affective symptomatology, patient endorses depressed mood, decreased appetite, fatigue, low energy level, low motivation, low concentration, inability to focus, decreased pleasure in previously enjoyed activities, frequent mood swings, racing thoughts, increased irritability. He denies any auditory or visual hallucinations. PSYCHIATRIC HISTORY:    Diagnoses: Alcohol use disorder, Pulley disorder  Suicide attempts/gestures: Denies   Prior hospitalizations: Multiple to [facility], last admission in February 2019  Medication trials: Lamictal, Seroquel, Zoloft, naltrexone, Vivitrol  Mental health contact: Lost to follow-up   Head trauma: Denies    Past Medical History:        Diagnosis Date    CAD (coronary artery disease)     Chest pain 12/12/2011    Cigarette smoker 12/12/2011    Depression     Hypertension 12/12/2011    Seizures ([address]      Past Surgical History:        Procedure Laterality Date    APPENDECTOMY      [facility]18/[facility]    COLONOSCOPY      ENDOSCOPY, COLON, DIAGNOSTIC      KNEE ARTHROSCOPY Right     NECK SURGERY  7/15/2008    [facility]     Medications Prior to Admission:   Medications Prior to Admission: lamoTRIgine (LAMICTAL) 100 MG tablet, Take 1 tablet by mouth 2 times daily  QUEtiapine (SEROQUEL) 200 MG tablet, Take 1 tablet by mouth nightly  QUEtiapine (SEROQUEL) 50 MG tablet, Take 1 tablet by mouth 2 times daily  vitamin B-1 100 MG tablet, Take 1 tablet by mouth daily  vitamin D (ERGOCALCIFEROL) 43838 units capsule, Take 1 capsule by mouth once a week for 11 doses  furosemide (LASIX) 20 MG tablet, Take 20 mg by mouth See Admin Instructions  losartan (COZAAR) 100 MG tablet, Take 100 mg by mouth daily  sertraline (ZOLOFT) 100 MG tablet, Take 100 mg by mouth daily    Allergies:  Patient has no known allergies.     Social History:  Smoking -1 PPD for 35 years  Alcohol -as described above in HPI  Illicit drugs -denies    Family History:       Problem Relation Age of Onset    Osteoarthritis Mother     Thyroid Disease Mother     Heart Failure Father     Heart Failure Maternal Grandmother     Heart Failure Paternal Grandmother     Cancer Maternal Grandfather      Psychiatric Family History  Mother has been diagnosed with depression. Father and cousins were diagnosed with alcohol use disorder. Patient denies any family history of suicidal attempts or completed suicide. REVIEW OF SYSTEMS:  General: Endorses decreased quality of sleep. No fevers, chills, night sweats, no recent weight loss or gain. Head: No headache, no migraine. Eyes: No recent visual changes. Ears: No recent hearing changes. Nose: No increased congestion or change in sense of smell. Throat: No sore throat, no trouble swallowing. Cardiovascular: No chest pain or palpitations, or dizziness. Respiratory: No cough, wheezes, congestion, or shortness of breath. Gastrointestinal: No abdominal pain, nausea or vomiting, no diarrhea or constipation. Musculo-skeletal: No edema, deformities, or loss of functions. Neurological: Endorses general muscle weakness and hand tremor. No loss of consciousness, abnormal sensations. Skin: No rash, abrasions or bruises. PHYSICAL EXAM:    Vitals:  BP (!) 147/83   Pulse 80   Temp 97.5 °F (36.4 °C) (Temporal)   Resp 20   Ht 6' 4\" (1.93 m)   Wt 165 lb 9.6 oz (75.1 kg)   SpO2 97%   BMI 20.16 kg/m²     Mental Status Examination:    Appearance: Poorly groomed and in hospital attire. Made limited eye   contact. Behavior: Anxious, cooperative, and socially appropriate. No psychomotor retardation/agitation appreciated. Gait unremarkable. Speech: High in tone, hyperverbal, mildly pressured speech noted. Mood: \"Anxious\"   Affect: Mood congruent. Range is intense. Thought Process: Appears linear and goal oriented. Thought Content: Patient does not have any current active suicidal and   homicidal ideations. No overt delusions or paranoia appreciated. Perceptions: Seems patient does not have any auditory or visual hallucinations at present time.  Patient did not appear to be responding to internal stimuli. No overt psychosis. Executive Functions: Appear mildly impaired  Concentration: Decreased. Reasoning: Appears impaired based on interaction from interview   Orientation: to person, place, date, and situation. Alert. Language: Intact. Fund of information: Intact. Memory: recent and remote appear intact. Impulsivity: Limited. Neurovegitative: Fair appetite, decreased sleep. Insight: Impaired. Judgment: Impaired. Physical Exam:  GENERAL APPEARANCE: 44y.o. year-old male in NAD   HEAD: Normocephalic, atraumatic. THROAT: No erythema, exudates, lesions. No tongue laceration. CARDIOVASCULAR: PMI nondisplaced. Regular rhythm and rate. Normal S1 and S2.  PULMONARY: Clear to auscultation bilaterally, no tenderness to palpation. ABDOMEN: Soft, nontender, nondistended. MUSCULOSKELTAL: No obvious deformities, clubbing, cyanosis or edema, no spinous process or paraspinous tenderness, normal ROM, normal gait, distal pulses intact symmetric 1+ bilaterally. NEUROLOGICAL: Alert, oriented x 4, CN II-XII grossly intact, motor strength 4/5 all muscle groups, DTR 2+ intact and symmetric, sensation intact to sharp and dull. Mild hand tremor bilaterally.   SKIN: Warm, dry, intact, no rash, abrasions bruises    DATA:  Labs:    CBC with Differential:    Lab Results   Component Value Date    WBC 9.0 06/11/2019    RBC 4.35 06/11/2019    HGB 13.2 06/11/2019    HCT 39.2 06/11/2019    HCT 41.0 12/19/2011     06/11/2019     12/19/2011    MCV 90.1 06/11/2019    MCH 30.3 06/11/2019    MCHC 33.7 06/11/2019    RDW 14.0 06/11/2019    LYMPHOPCT 11.1 06/11/2019    MONOPCT 6.2 06/11/2019    EOSPCT 0.9 12/19/2011    BASOPCT 0.1 06/11/2019    MONOSABS 0.60 06/11/2019    LYMPHSABS 1.0 06/11/2019    EOSABS 0.00 06/11/2019    BASOSABS 0.00 06/11/2019     CMP:    Lab Results   Component Value Date     06/11/2019     12/19/2011    K 4.4 06/11/2019    K 3.8 12/19/2011    CL 97 06/11/2019     12/19/2011    CO2 25 06/11/2019    BUN 15 06/11/2019    CREATININE 0.9 06/11/2019    CREATININE 1.0 12/19/2011    LABGLOM >60 06/11/2019    GLUCOSE 117 06/11/2019    PROT 6.8 06/11/2019    PROT 7.5 12/19/2011    LABALBU 4.6 06/11/2019    LABALBU 4.4 12/19/2011    CALCIUM 9.0 06/11/2019    BILITOT 0.4 06/11/2019    ALKPHOS 93 06/11/2019    ALKPHOS 97 12/19/2011    AST 18 06/11/2019    ALT 28 06/11/2019       DSM 5 DIAGNOSIS:  Alcohol use disorder, severe   Alcohol induced mood disorder  Bipolar disorder, most recent episode depressed, without psychotic features   Treatment noncompliance  Suicidal ideations     Plan:   -Admit to [facility] adult Unit and monitor on 15 minute checks  -Will Broom reviewed. -Gather collateral information from family with release  -Medical monitoring to be performed by Dr. Ely Starr and associates  -Acclimate to the unit.    -Encourage participation in groups and therapeutic activities as appropriate.  -Medications:    Dose of Zoloft will be decreased to initial dose of 50 mg once a day for depression  Dose of Lamictal will be decreased to 25 mg twice a day for mood stabilization  Dose of Seroquel will will be decreased to 100 mg at bedtime for mood stabilization and augmentation effects of Lamictal and will be titrated up as indicated and tolerated by patient  Patient will be placed on CIWA protocol with Librium for alcohol withdrawal symptoms.   -The risks, benefits, side effects, indications, contraindications, and adverse effects of the medications have been discussed.  -The patient has verbalized understanding and has capacity to give informed consent.  - help evaluating home environment.   -Discuss with treatment team.

## 2019-06-12 NOTE — PLAN OF CARE
Group Therapy Note    Date: 6/12/2019  Start Time: 1100  End Time:  3249  Number of Participants: 9    Type of Group: Psychoeducation    Wellness Binder Information  Module Name:  staying well  Session Number:  1    Patient's Goal:  daily maintenance and coping skills    Notes:  pt was verbally prompted to attend group. Pt refused. Information about coping skills was provided. Status After Intervention:      Participation Level:     Participation Quality:       Speech:         Thought Process/Content:       Affective Functioning:       Mood:       Level of consciousness:        Response to Learning:       Endings:     Modes of Intervention:       Discipline Responsible: Psychoeducational Specialist      Signature:  Jaspal Gomez

## 2019-06-12 NOTE — BH NOTE
BLAKE INITIAL INTAKE ASSESSMENT     6/12/19  MRN:  [id number]    Baron Dang ,a 44 y.o. male, presents to the ED for a psychiatric assessment. ED Arrival time: Tamsen.Puneet  ED physician: CHRIS Sanderson  [facility] AN AFFILIATE OF [facility] Notification time: 46  BLAKE Assessment start time: 0300  Psychiatrist call time: 0330  Spoke with Dr. Kimberlyn Sutherland    Patient is referred by: self. Patient drove himself to the ED. Reason for visit to ED - Presenting problem:     PT states reason for ED visit, \"It started with my ex wife to break up with me when [address] and she came home 5 days later and filed an EPO on me. I violated it by texting her. I spent 30 days in detention. They didn't give me my meds. My mind is not clear. I can't focus. I just feel like I don't want to be here anymore. I've been thinking that I would shoot myself. I've been out of detention for 10 or 11 days. I probably have been drinking about a 6 pack of beer and a pint of vodka about every other day. \"  patient denies HI and AVH at this time. Patient states that he has no guns but his parents do. He states that they are locked in a safe. ER PA reports:  Andrey Mclain a 44 y. o. male who presents to the emergency department for evaluation of worsening depression and suicidal ideation. Patient says he was incarcerated for thirty days and was not given any of his mental health medications during the 30 day incarceration. Says he was released on 5/31/19 and has had worsening depression since that time. Says he lost his job, his home, and his fiance during this time and reports significant life changes. Says his meds were prescribed the last time he was admitted to this facility and he has not had follow up with mental health. Denies any specific plan or attempts but has had suicidal thoughts. Denies homicidal ideation. Denies any auditory or visual hallucinations.  Patient says he has had three beers today but denies any illict drug use.        Duration of symptoms: Worsening over the last 30 days. Current Stressors: family, financial, legal, marital and drug and alcohol    Current living arrangement:stays at his parents house    C-SSRS Completed: yes    SI:  admits to   Plan: yes   Past SI attempts: no   If yes, when and how many times:  Currently able to contract for safety outside hospital: no   Describe: patient states that he is SI  HI: denies  If yes describe:   Delusions: denies  If yes describe:   Hallucinations: denies   If yes describe:   Risk of Harm to self: yes   If yes explain: see above  Was it within the past 6 months: yes   Risk of Harm to others: no   If yes explain:   Was it within the past 6 months: no   Anxiety 1-10:  9  Explain if increased:   Depression 1-10:  9  Explain if increased:   Level of function outside hospital decreased: yes   If yes explain: isolating, decreased ADL's    Psychiatric Hospitalizations: Yes   Where & When: [facility] multiple times  Outpatient Psychiatric Treatment: no    Family History:    Family history of mental illness: no   Family members with suicide attempt: no   If yes explain:     Substance Abuse History:     SBIRT Completed: yes    Current ETOH LEVELS:   2006          187  0114            67    ETOH Usage:     Amount drinking daily: heavy, blackouts  Date of last drink: 06/11/2019    Substance/Chemical Abuse/Recreational Drug History:  Substance used: alcohol and marijuana  Date of last substance use: over 1 month     Opiates: It was discussed with pt they would not be receiving opiates unless they were within 3 days post surgery/acute injury. Patient voiced understanding and agreed.      Psychiatric Review Of Systems:     Recent Sleep changes: yes sleeping too much  Recent appetite changes: no   Recent weight changes/Pounds gained (+) or lost (-): no      Medical History:     Medical Diagnosis/Issues:   CT today in ED:no  Use of 02 or CPAP: yes  Supposed to use CPAP but has not used in 45 days  Ambulatory: yes  Independent or Need assistance with Self Care:     PCP: Constantine Baeza     Current Medications:   Scheduled Meds: No current facility-administered medications for this encounter. Current Outpatient Medications:     lamoTRIgine (LAMICTAL) 100 MG tablet, Take 1 tablet by mouth 2 times daily, Disp: 60 tablet, Rfl: 3    QUEtiapine (SEROQUEL) 200 MG tablet, Take 1 tablet by mouth nightly, Disp: 30 tablet, Rfl: 3    QUEtiapine (SEROQUEL) 50 MG tablet, Take 1 tablet by mouth 2 times daily, Disp: 60 tablet, Rfl: 3    vitamin B-1 100 MG tablet, Take 1 tablet by mouth daily, Disp: 30 tablet, Rfl: 3    vitamin D (ERGOCALCIFEROL) 66711 units capsule, Take 1 capsule by mouth once a week for 11 doses, Disp: 11 capsule, Rfl: 0    furosemide (LASIX) 20 MG tablet, Take 20 mg by mouth See Admin Instructions, Disp: , Rfl:     losartan (COZAAR) 100 MG tablet, Take 100 mg by mouth daily, Disp: , Rfl:     sertraline (ZOLOFT) 100 MG tablet, Take 100 mg by mouth daily, Disp: , Rfl:      Mental Status Evaluation:     Appearance:  disheveled, older than stated age and tattooed   Behavior:  Restless & fidgety   Speech:  normal pitch and normal volume   Mood:  anxious   Affect:  normal and mood-congruent   Thought Process:  circumstantial   Thought Content:  suicidal   Sensorium:  person, place, time/date, situation, day of week, month of year and year   Cognition:  grossly intact   Insight:  good       Collateral Information:     Name: Russel Ayoub  Relationship: mother and father  Phone Number: [phone]  Collateral:     Disposition:     1.  Decision to admit to [address] [place] [address]:  yes    If yes, which unit Adult or Geriatric Unit:  Adult  Is patient voluntary: yes  If no has a 72 hold been initiated:   Does the patient have a guardian:   Has the guardian been notified:   Admission Diagnosis: suicidal ideations and ETOH abuse      All contraband is removed from patient's room by ER staff.  Education is documented by [facility] AN AFFILIATE OF [facility].     Patrizia Gordon RN

## 2019-06-12 NOTE — PROGRESS NOTES
Treatment Team Note:    SW met with [facility] team to discuss Pts [address] plans.      Progress/Behavior/Group Attendance: TBD    Target Symptoms/Reason for admission: Patient admitted to [facility] due to visit, \"It started with my ex wife to break up with me when I went to [address] [facility] [address] and she came home 5 days later and filed an EPO on me.  I violated it by texting her.  I spent 30 days in skilled nursing. [facility] didn't give me my meds.  My mind is not clear.  I can't focus.  I just feel like I don't want to be here anymore       Diagnoses: Alcohol use disorder, severe , Alcohol induced mood disorder, Bipolar disorder, most recent episode depressed, without psychotic features , Treatment noncompliance, Suicidal ideations    UDS: Neg-      BAL: 187    AftercarePlan: [address] lives with: parents    Collateral obtained from: mother  On:    Family Session: TBA    Misc:

## 2019-06-12 NOTE — PLAN OF CARE
Group Therapy Note     Date: 6/12/2019  Start Time: 6869  End Time:  1600  Number of Participants: 8     Type of Group: Recovery     Wellness Binder Information  Module Name:  relapse prevention  Session Number:  2     Patient's Goal:  identifying early warning signs     Notes:  pt was verbally prompted to attend group. Pt refused. Information about relapse prevention was provided. Status After Intervention:       Participation Level:      Participation Quality:         Speech:           Thought Process/Content:         Affective Functioning:         Mood:         Level of consciousness:          Response to Learning:         Endings:      Modes of Intervention:         Discipline Responsible: Psychoeducational Specialist        Signature:  Gale Esparza

## 2019-06-12 NOTE — PLAN OF CARE
Problem: Depressive Behavior With or Without Suicide Precautions:  Goal: Able to verbalize acceptance of life and situations over which he or she has no control  Outcome: Ongoing  Goal: Able to verbalize and/or display a decrease in depressive symptoms  Outcome: Ongoing  Goal: Ability to disclose and discuss suicidal ideas will improve  Outcome: Ongoing  Goal: Able to verbalize support systems  Outcome: Ongoing  Goal: Absence of self-harm  Outcome: Ongoing  Goal: Patient specific goal  Outcome: Ongoing  Goal: Participates in care planning  Outcome: Ongoing     Problem: Risk of Harm:  Goal: Ability to remain free from injury will improve  Outcome: Ongoing     Problem: Suicide risk  Goal: Provide patient with safe environment  Outcome: Ongoing     Problem: Coping - Ineffective, Individual:  Goal: Ability to identify and develop effective coping behavior will improve  Outcome: Ongoing     Problem: Mood - Altered:  Goal: Mood stable  Outcome: Ongoing     Problem: Violence - Risk of, Self/Other-Directed:  Goal: Knowledge of developmental care interventions  Outcome: Ongoing

## 2019-06-12 NOTE — PROGRESS NOTES
Group Therapy Note     Date: 6/12/2019  Start Time: 1600  End Time:  1630  Number of Participants: 11     Type of Group: Healthy Living/Wellness     Notes:  Medication education     Status After Intervention:  Improved     Participation Level:  Active Listener and Interactive     Participation Quality: Appropriate, Attentive, Sharing and Supportive        Speech:  normal        Thought Process/Content: Logical  Linear        Affective Functioning: Congruent        Mood: anxious        Level of consciousness:  Oriented x4        Response to Learning: Able to verbalize current knowledge/experience, Able to verbalize/acknowledge new learning, Able to retain information and Capable of insight        Modes of Intervention: Education        Discipline Responsible: Registered Nurse        Signature:  Nile Moseley RN

## 2019-06-12 NOTE — PLAN OF CARE
Group Therapy Note     Date: 6/12/2019  Start Time: 1430  End Time:  5611  Number of Participants: 8     Type of Group: Cognitive Skills     Wellness Binder Information  Module Name:  emotional wellness  Session Number:  1     Patient's Goal:  validation of feelings     Notes:  pt was verbally prompted to attend group. Pt refused. Information about emotional wellness was provided. Status After Intervention:       Participation Level:      Participation Quality:         Speech:           Thought Process/Content:         Affective Functioning:         Mood:         Level of consciousness:          Response to Learning:         Endings:      Modes of Intervention:         Discipline Responsible: Psychoeducational Specialist        Signature:  Mariya Herron

## 2019-06-12 NOTE — ED PROVIDER NOTES
[facility] 6 ADULT [facility]  eMERGENCYdEPARTMENT eNCOUnter      Pt Name: Deb Brannon  MRN: [id number]  Yazmingfhermelinda 1970  Date of evaluation: 6/11/2019  KWASI Luna - CNP    CHIEF COMPLAINT       Chief Complaint   Patient presents with   [address] Problem     c/o restlessness and increase in stress x40 days with little sleep, states \"turnmoil in my head\". States \"worn out. \" States obsessive. SI         HISTORY OF PRESENT ILLNESS  (Location/Symptom, Timing/Onset, Context/Setting, Quality, Duration, Modifying Factors, Severity.)   Deb Brannon is a 44 y.o. male who presents to the emergency department for evaluation of worsening depression and suicidal ideation. Patient says he was incarcerated for thirty days and was not given any of his mental health medications during the 30 day incarceration. Says he was released on 5/31/19 and has had worsening depression since that time. Says he lost his job, his home, and his fiance during this time and reports significant life changes. Says his meds were prescribed the last time he was admitted to this facility and he has not had follow up with mental health. Denies any specific plan or attempts but has had suicidal thoughts. Denies homicidal ideation. Denies any auditory or visual hallucinations. Patient says he has had three beers today but denies any illict drug use. The history is provided by the patient. Nursing Notes were reviewed and I agree. REVIEW OF SYSTEMS    (2-9 systems for level 4, 10 or more for level 5)     Review of Systems   Constitutional: Negative for activity change, appetite change, chills, diaphoresis, fatigue and fever. HENT: Negative for congestion, ear pain, nosebleeds, sinus pain and sore throat. Eyes: Negative for photophobia, pain, discharge and redness. Respiratory: Negative for apnea, chest tightness, shortness of breath, wheezing and stridor.     Cardiovascular: Negative for chest pain, palpitations and leg swelling. Gastrointestinal: Negative for abdominal distention, abdominal pain, blood in stool, constipation, diarrhea, nausea, rectal pain and vomiting. Endocrine: Negative for cold intolerance, heat intolerance, polydipsia, polyphagia and polyuria. Genitourinary: Negative for decreased urine volume, difficulty urinating, dysuria, flank pain and urgency. Musculoskeletal: Negative for arthralgias, back pain, joint swelling, neck pain and neck stiffness. Skin: Negative for color change, pallor, rash and wound. Allergic/Immunologic: Negative for immunocompromised state. Neurological: Negative for dizziness, syncope, numbness and headaches. Hematological: Negative for adenopathy. Does not bruise/bleed easily. Psychiatric/Behavioral: Positive for sleep disturbance (insomnia) and suicidal ideas. Negative for agitation, behavioral problems, confusion, hallucinations and self-injury. The patient is nervous/anxious (reports racing thoughts). The patient is not hyperactive. Except as noted above the remainder of the review of systems was reviewed and negative. PAST MEDICAL HISTORY     Past Medical History:   Diagnosis Date    CAD (coronary artery disease)     Chest pain 12/12/2011    Cigarette smoker 12/12/2011    Depression     Hypertension 12/12/2011    Seizures ([facility] [address]          SURGICAL HISTORY       Past Surgical History:   Procedure Laterality Date    APPENDECTOMY      CHOLECYSTECTOMY  5/18/2006    \[facility] [place] [facility]\""    COLONOSCOPY      ENDOSCOPY, COLON, DIAGNOSTIC      KNEE ARTHROSCOPY Right     NECK SURGERY  7/15/2008    Hadi         CURRENT MEDICATIONS       Current Discharge Medication List      CONTINUE these medications which have NOT CHANGED    Details   lamoTRIgine (LAMICTAL) 100 MG tablet Take 1 tablet by mouth 2 times daily  Qty: 60 tablet, Refills: 3      !! QUEtiapine (SEROQUEL) 200 MG tablet Take 1 tablet by mouth nightly  Qty: 30 tablet, Refills: 3      !!  QUEtiapine Attends meetings of clubs or organizations: None     Relationship status: None    Intimate partner violence:     Fear of current or ex partner: None     Emotionally abused: None     Physically abused: None     Forced sexual activity: None   Other Topics Concern    None   Social History Narrative    None       SCREENINGS    Jaime Coma Scale  Eye Opening: Spontaneous  Best Verbal Response: Oriented  Best Motor Response: Obeys commands  Jaime Coma Scale Score: 15      PHYSICAL EXAM    (up to 7 forlevel 4, 8 or more for level 5)     ED Triage Vitals   BP Temp Temp Source Pulse Resp SpO2 Height Weight   06/11/19 1927 06/11/19 1927 06/11/19 1927 06/11/19 1927 06/11/19 1927 06/11/19 1927 06/11/19 1924 06/11/19 1924   128/78 97.2 °F (36.2 °C) Temporal 105 18 96 % 6' 4\" (1.93 m) 190 lb (86.2 kg)       Physical Exam   Constitutional: He is oriented to person, place, and time. He appears well-developed and well-nourished. No distress. HENT:   Head: Normocephalic and atraumatic. Nose: Nose normal.   Mouth/Throat: Oropharynx is clear and moist.   Eyes: Pupils are equal, round, and reactive to light. Conjunctivae and EOM are normal. Right eye exhibits no discharge. Left eye exhibits no discharge. No scleral icterus. Neck: Normal range of motion. Neck supple. No JVD present. No tracheal deviation present. Cardiovascular: Normal rate, regular rhythm, normal heart sounds and intact distal pulses. Exam reveals no gallop and no friction rub. No murmur heard. Pulmonary/Chest: Effort normal and breath sounds normal. No stridor. No respiratory distress. He has no wheezes. He has no rales. He exhibits no tenderness. Abdominal: Soft. Bowel sounds are normal. He exhibits no distension and no mass. There is no tenderness. There is no rebound and no guarding. No hernia. Musculoskeletal: Normal range of motion. He exhibits no edema, tenderness or deformity.    Neurological: He is alert and oriented to person, place, and time. No cranial nerve deficit. Coordination normal.   Skin: Skin is warm and dry. Capillary refill takes less than 2 seconds. No rash noted. He is not diaphoretic. No erythema. Psychiatric: His speech is normal and behavior is normal. Thought content normal. His mood appears anxious. Cognition and memory are normal.   Nursing note and vitals reviewed. DIAGNOSTIC RESULTS     RADIOLOGY:   Non-plain film images such as CT, Ultrasound and MRI are read by the radiologist. Plain radiographic images are visualized and preliminarilyinterpreted by Tayler Bautista MD with the below findings:        Interpretation per the Radiologist below, if available at the time of this note:    No orders to display       LABS:  Labs Reviewed   CBC WITH AUTO DIFFERENTIAL - Abnormal; Notable for the following components:       Result Value    RBC 4.35 (*)     Hemoglobin 13.2 (*)     Hematocrit 39.2 (*)     MPV 9.3 (*)     Neutrophils % 82.2 (*)     Lymphocytes % 11.1 (*)     Lymphocytes # 1.0 (*)     All other components within normal limits   COMPREHENSIVE METABOLIC PANEL W/ REFLEX TO MG FOR LOW K - Abnormal; Notable for the following components:    Chloride 97 (*)     Glucose 117 (*)     All other components within normal limits   ETHANOL   SALICYLATE LEVEL   ACETAMINOPHEN LEVEL   URINE DRUG SCREEN   ETHANOL       All other labs were within normal range or notreturned as of this dictation.     RE-ASSESSMENT          EMERGENCY DEPARTMENT COURSE and DIFFERENTIAL DIAGNOSIS/MDM:   Vitals:    Vitals:    06/12/19 0359 06/12/19 0415 06/12/19 0746 06/12/19 1206   BP: 134/62 (!) 150/89 (!) 147/83 137/85   Pulse: 78 100 80 87   Resp: 20 20 20 20   Temp: 98 °F (36.7 °C) 98 °F (36.7 °C) 97.5 °F (36.4 °C) 99.1 °F (37.3 °C)   TempSrc:  Temporal Temporal Temporal   SpO2: 98% 99% 97% 97%   Weight:  165 lb 9.6 oz (75.1 kg)     Height:               MDM  Number of Diagnoses or Management Options  Diagnosis management comments: Patient presented

## 2019-06-12 NOTE — PROGRESS NOTES
Admission Note      Reason for admission/Target Symptom: Patient admitted to [facility] due to visit, \"It started with my ex wife to break up with me when I went to [address] and she came home 5 days later and filed an EPO on me. I violated it by texting her. I spent 30 days in long term. They didn't give me my meds. My mind is not clear. I can't focus. I just feel like I don't want to be here anymore      Diagnoses: Depression NOS  UDS: Neg  BAL: 80      SW met with treatment team to discuss patient's treatment including care planning, discharge planning, and follow-up needs. Pt has been admitted to [facility]. Treatment team has identified patient's discharge needs as medication management and outpatient therapy/counseling. Pt confirmed  the need for ongoing treatment post inpatient stay. Pt was also provided a handout of contact information for drug and alcohol treatment centers and other community support service such as [facility], AA, and [facility] .

## 2019-06-12 NOTE — ED PROVIDER NOTES
Attending Supervisory Note/Shared Visit   I have personally performed a face to face diagnostic evaluation on this patient. I have reviewed the mid-levels findings and agree. Briefly, patient presents the ED with complaints of depression with suicidal thoughts. States he has been off of his medications and due to significant challenges in his life related to finances in his home he has become more depressed and had thoughts about wanting to take his life. Patient is alert and speaking. He is in no respiratory distress. I have reviewed his laboratory studies. These are unremarkable and he is medically cleared to undergo psychiatric evaluation at this time. Patient was seen and evaluated by mental health professional and after discussion with on-call psychiatry, Dr. Jethro Salas, patient was accepted for inpatient admission to the [facility]. This is a voluntary admission. FINAL IMPRESSION      1.  Depression with suicidal ideation          Julio César Dunn MD  Attending Emergency Physician       Julio César Dunn MD  06/12/19 3952

## 2019-06-12 NOTE — PROGRESS NOTES
Collateral obtained from: patient mom Basilia Mcneil, [phone]    Immediate Stressors & Time Episode Began: Patient was acting really weird. Patient thought that someone was trying to hurt him. Patient was engaged and his girlfriend left to go out of town and was suppose to come back last Saturday around noon. Patient girlfriend never returned and patient was very anxious for 3 days. Patient girlfriend filed and EPO against patient and patient went to court on May 1st. And then patient was arrested while in court and spent 30 days in snf. Been drinking since he was 14. Patient has not been able to work because of his drinking. Diagnosis/Hx of compliance with meds: No issues    Tx Hx/Past hospitalizations:  Previous admissions    Family hx of psychiatric issues:     Substance Abuse: Alcohol, has been several rehabs. Not going to AA    Pending Legal: EPO, has several DUI's    Safety Issues (Weapons? Hx of attempts): Guns are in a gun case. Support system/Medication Managed by:  The importance of medication management and locking extra medication in a secured location was explained and reccommended to collateral.     Additional Info: Patient is living with his parents

## 2019-06-12 NOTE — PROGRESS NOTES
[facility] Daily Shift Assessment  Nursing Progress Note    Room: 0609/609-01 Name: Hernandez Reina Age: 44 y.o. Gender: male   Dx: <principal problem not specified>  Precautions: suicide risk  Target Symptoms:   Accu-Chek: NoSleep: Yes,Sleep Quality Good SI no plan AVH none [facility]  ADLs: No Speech: normal Depression: 8 Anxiety: 10   Participation LevelNone    Participation QualityResistant    Complaints:     Notes: alert and oriented x4. Pleasant and calm. Appearance and attire is disheveled. Thought processes are linear, logical, and goal directed. Affect is flat and congruent. Isolative, withdrawn, evasive. Not social and refusing to go to groups. Medication compliant. Reports good sleep. Skipped breakfast and layed in bed all morning.     Signature: samara valencia rn

## 2019-06-13 VITALS
SYSTOLIC BLOOD PRESSURE: 115 MMHG | TEMPERATURE: 97.3 F | HEIGHT: 76 IN | RESPIRATION RATE: 20 BRPM | DIASTOLIC BLOOD PRESSURE: 80 MMHG | HEART RATE: 80 BPM | OXYGEN SATURATION: 100 % | BODY MASS INDEX: 20.16 KG/M2 | WEIGHT: 165.6 LBS

## 2019-06-13 LAB
HBA1C MFR BLD: 5.4 % (ref 4–6)
TSH REFLEX FT4: 0.6 UIU/ML (ref 0.35–5.5)
VITAMIN B-12: 513 PG/ML (ref 211–946)
VITAMIN D 25-HYDROXY: 31.1 NG/ML

## 2019-06-13 PROCEDURE — 83036 HEMOGLOBIN GLYCOSYLATED A1C: CPT

## 2019-06-13 PROCEDURE — 6370000000 HC RX 637 (ALT 250 FOR IP): Performed by: PSYCHIATRY & NEUROLOGY

## 2019-06-13 PROCEDURE — 6370000000 HC RX 637 (ALT 250 FOR IP): Performed by: FAMILY MEDICINE

## 2019-06-13 PROCEDURE — 84443 ASSAY THYROID STIM HORMONE: CPT

## 2019-06-13 PROCEDURE — 36415 COLL VENOUS BLD VENIPUNCTURE: CPT

## 2019-06-13 PROCEDURE — 99239 HOSP IP/OBS DSCHRG MGMT >30: CPT | Performed by: PSYCHIATRY & NEUROLOGY

## 2019-06-13 PROCEDURE — 82607 VITAMIN B-12: CPT

## 2019-06-13 PROCEDURE — 5130000000 HC BRIDGE APPOINTMENT

## 2019-06-13 PROCEDURE — 82306 VITAMIN D 25 HYDROXY: CPT

## 2019-06-13 RX ORDER — HYDROXYZINE HYDROCHLORIDE 25 MG/1
25 TABLET, FILM COATED ORAL EVERY 6 HOURS PRN
Qty: 120 TABLET | Refills: 0 | Status: SHIPPED | OUTPATIENT
Start: 2019-06-13 | End: 2019-07-10 | Stop reason: SDUPTHER

## 2019-06-13 RX ORDER — FOLIC ACID 1 MG/1
1 TABLET ORAL DAILY
Qty: 30 TABLET | Refills: 1 | Status: SHIPPED | OUTPATIENT
Start: 2019-06-14 | End: 2019-09-09

## 2019-06-13 RX ORDER — LAMOTRIGINE 25 MG/1
25 TABLET ORAL 2 TIMES DAILY
Qty: 60 TABLET | Refills: 1 | Status: ON HOLD | OUTPATIENT
Start: 2019-06-13 | End: 2019-09-12 | Stop reason: HOSPADM

## 2019-06-13 RX ORDER — QUETIAPINE FUMARATE 100 MG/1
100 TABLET, FILM COATED ORAL NIGHTLY
Qty: 30 TABLET | Refills: 1 | Status: ON HOLD | OUTPATIENT
Start: 2019-06-13 | End: 2019-09-12 | Stop reason: HOSPADM

## 2019-06-13 RX ADMIN — Medication 100 MG: at 08:27

## 2019-06-13 RX ADMIN — FOLIC ACID 1 MG: 1 TABLET ORAL at 08:27

## 2019-06-13 RX ADMIN — SERTRALINE HYDROCHLORIDE 50 MG: 50 TABLET ORAL at 08:27

## 2019-06-13 RX ADMIN — QUETIAPINE FUMARATE 50 MG: 50 TABLET ORAL at 08:27

## 2019-06-13 RX ADMIN — LAMOTRIGINE 25 MG: 25 TABLET ORAL at 08:27

## 2019-06-13 RX ADMIN — LOSARTAN POTASSIUM 100 MG: 100 TABLET ORAL at 08:28

## 2019-06-13 NOTE — PROGRESS NOTES
Discharge Note     Pt discharging on this date. Pt denies SI, HI, and AVH at this time. Pt reports improvement in behavior and is leaving unit in overall good condition. SW and pt discussed pt's follow up appointments and importance of attending appointments as scheduled, pt voiced understanding and agreement. Pt and SW also discussed pt safety plan and pt able to verbally identify: warning signs, coping strategies, places and people that help make the pt feel better/distract negative thoughts, friends/family/agencies/professionals the pt can reach out to in a crisis, and something that is important to the pt/worth living for. Pt provided the national suicide prevention hotline number (9-875-299-516-594-0678) as well as local community behavioral health [facility]) crisis number for emergencies (1-233.944.6052). Pt to follow up with:  [facility] with in one week of discharge.      Referral to out patient tobacco cessation counseling treatment:    Patient refused tobacco cessation counseling    SW offered to assist pt with transportation, pt reports that he has transportation

## 2019-06-13 NOTE — PROGRESS NOTES
Group Therapy Note    Start Time: 755  End Time:  641  Number of Participants: 11    Type of Group: Community Meeting       Patient's Goal:  \"staying positive\"      Notes:      Participation Level:  Active Listener       Participation Quality: Appropriate      Thought Process/Content: Logical      Affective Functioning: Congruent      Mood: calm      Level of consciousness:  Alert      Modes of Intervention: Support      Discipline Responsible: Behavioral Health Tech II      Signature:  Onesimo Liao

## 2019-06-13 NOTE — PROGRESS NOTES
Treatment Team Note:     SW met with [address] team to discuss Pts TX and DC plans.      Progress/Behavior/Group Attendance: attending group     Target Symptoms/Reason for admission: Patient admitted to [facility] due to visit, \"It started with my ex wife to break up with me when I went to [address] and she came home 5 days later and filed an EPO on me.  I violated it by texting her.  I spent 30 days in [facility]. Rj Nolan didn't give me my meds.  My mind is not clear.  I can't focus.  I just feel like I don't want to be here anymore        Diagnoses: Alcohol use disorder, severe , Alcohol induced mood disorder, Bipolar disorder, most recent episode depressed, without psychotic features , Treatment noncompliance, Suicidal ideations     UDS: Neg-            BAL: 187     AftercarePlan: [address] lives with: parents     Collateral obtained from: mother  On:     Family Session: YAYA     Misc:

## 2019-06-13 NOTE — PLAN OF CARE
Problem: Depressive Behavior With or Without Suicide Precautions:  Goal: Able to verbalize acceptance of life and situations over which he or she has no control  Description  Able to verbalize acceptance of life and situations over which he or she has no control  Outcome: Completed  Goal: Able to verbalize and/or display a decrease in depressive symptoms  Description  Able to verbalize and/or display a decrease in depressive symptoms  Outcome: Completed  Goal: Ability to disclose and discuss suicidal ideas will improve  Description  Ability to disclose and discuss suicidal ideas will improve  Outcome: Completed  Goal: Able to verbalize support systems  Description  Able to verbalize support systems  Outcome: Completed  Goal: Absence of self-harm  Description  Absence of self-harm  Outcome: Completed  Goal: Patient specific goal  Description  Patient specific goal  Outcome: Completed  Goal: Participates in care planning  Description  Participates in care planning  Outcome: Completed     Problem: Risk of Harm:  Goal: Ability to remain free from injury will improve  Description  Ability to remain free from injury will improve  Outcome: Completed     Problem: Suicide risk  Goal: Provide patient with safe environment  Description  Provide patient with safe environment  Outcome: Completed     Problem: Coping - Ineffective, Individual:  Goal: Ability to identify and develop effective coping behavior will improve  Description  Ability to identify and develop effective coping behavior will improve  Outcome: Completed     Problem: Mood - Altered:  Goal: Mood stable  Description  Mood stable  Outcome: Completed     Problem: Violence - Risk of, Self/Other-Directed:  Goal: Knowledge of developmental care interventions  Description  Absence of violence  Outcome: Completed

## 2019-06-13 NOTE — H&P
HISTORY and PHYSICAL      CHIEF COMPLAINT:  Depression, SI, ETOH Abuse    Reason for Admission:  Depression, SI, ETOH Abuse    History Obtained From:  patient    HISTORY OF PRESENT ILLNESS:      The patient is a 44 y.o. male who is admitted to the [place] [address] unit with worsening mood issues. He has had no c/o CP or SOA. No abdominal pain or N/V. No dysuria. No new pain issues. No fevers. No HA or dizziness. Past Medical History:        Diagnosis Date    CAD (coronary artery disease)     Chest pain 12/12/2011    Cigarette smoker 12/12/2011    Depression     Hypertension 12/12/2011    Seizures ([address]      Past Surgical History:        Procedure Laterality Date    APPENDECTOMY      [facility]    COLONOSCOPY      ENDOSCOPY, COLON, DIAGNOSTIC      KNEE ARTHROSCOPY Right     NECK SURGERY  7/15/2008    [facility]         Medications Prior to Admission:    Medications Prior to Admission: lamoTRIgine (LAMICTAL) 100 MG tablet, Take 1 tablet by mouth 2 times daily  QUEtiapine (SEROQUEL) 200 MG tablet, Take 1 tablet by mouth nightly  QUEtiapine (SEROQUEL) 50 MG tablet, Take 1 tablet by mouth 2 times daily  vitamin B-1 100 MG tablet, Take 1 tablet by mouth daily  vitamin D (ERGOCALCIFEROL) 72635 units capsule, Take 1 capsule by mouth once a week for 11 doses  [DISCONTINUED] furosemide (LASIX) 20 MG tablet, Take 20 mg by mouth See Admin Instructions  losartan (COZAAR) 100 MG tablet, Take 100 mg by mouth daily  sertraline (ZOLOFT) 100 MG tablet, Take 100 mg by mouth daily    Allergies:  Patient has no known allergies. Social History:   TOBACCO:   reports that he has been smoking cigarettes. He has a 35.00 pack-year smoking history. His smokeless tobacco use includes snuff. ETOH:   reports that he drinks alcohol. DRUGS:   reports that he has current or past drug history. Drug: Marijuana.   MARITAL STATUS:    Patient currently lives alone      Family History:       Problem Relation Age of Onset    Osteoarthritis Mother     Thyroid Disease Mother     Heart Failure Father     Heart Failure Maternal Grandmother     Heart Failure Paternal Grandmother     Cancer Maternal Grandfather      REVIEW OF SYSTEMS:  Constitutional: neg  CV: neg  Pulmonary: neg  GI: neg  : neg  Psych: depression, SI  Neuro: HA  Skin: neg  MusculoSkeletal: neg  HEENT: neg  Joints: neg    Vitals:  /81   Pulse 90   Temp 97.2 °F (36.2 °C)   Resp 16   Ht 6' 4\" (1.93 m)   Wt 165 lb 9.6 oz (75.1 kg)   SpO2 97%   BMI 20.16 kg/m²     PHYSICAL EXAM:  Gen: NAD, alert, in bed  HEENT: WNL  Lymph: no LAD  Neck: no JVD or masses  Chest: CTA bilat  CV: RRR  Abdomen: NT/ND  Extrem: no C/C/E  Neuro: non focal  Skin: no rashes  Joints: no redness    DATA:  I have reviewed the admission labs and imaging tests.     ASSESSMENT AND PLAN:      Patient Active Hospital Problem List:   Severe episode of recurrent major depressive disorder, without psychotic features---follow with Psych   ETOH Abuse--follow for any withdrawal issues   HTN---monitor BP   Anemia   Hyperglycemia--check Damián Craig MD  11:16 PM 6/12/2019

## 2019-06-13 NOTE — PROGRESS NOTES
Medications:   Medication Summary Provided. I understand that I should take only the medications on my list.   --why and when I need to take each medication. --which side effects to watch for.   --that I should carry my medication information at all times in case of emergency    Situations. --I will take all medications until discontinued by physician. I will take all my medications to follow up appointments. --check with my physician or pharmacist before taking any new medication, over    the counter product or drink alcohol.   --ask about food, drug and dietary supplement interactions. --discard old lists and update records with medication providers. Behavior Health Follow Up:  Original Referral Source: ED  Discharge Diagnosis:   Patient Active Problem List   Diagnosis    Chest pain    Cigarette smoker    Hypertension    Bradycardia    Abnormal EKG    SOB (shortness of breath)    Benzodiazepine overdose    Severe episode of recurrent major depressive disorder, without psychotic features ([address]    Bipolar disorder, curr episode depressed, severe, w/psychotic features ([address]    Bipolar 1 disorder, depressed ([address]    Intoxication by drug, uncomplicated ([address]    Acute alcoholic intoxication without complication ([address]    Intentional drug overdose ([address]    Suicidal behavior with attempted self-injury ([address]    Acute respiratory failure ([address]    Alcoholism ([address]    Suicidal ideations    Alcohol withdrawal syndrome, with unspecified complication ([address]    Depression with suicidal ideation    Alcohol abuse    Bipolar I disorder, most recent episode depressed, severe without psychotic features ([address]    Alcohol use disorder, severe, dependence ([address]    Suicidal ideation     Recomendations for Level of Care:  Follow Up  Patient Status at Discharge: Stable  [facility] Hospital  was: [facility]  Aftercare plan faxed:    Faxed by: Social Work staff   Date: 06/13/19   Time:  Prescriptions sent with

## 2019-06-13 NOTE — PROGRESS NOTES
BHI Daily Shift Assessment  Nursing Progress Note    Room: 0609/609-01 Name: Basim Mcconnell Age: 44 y.o. Gender: male   Dx: <principal problem not specified>  Precautions: suicide risk  Target Symptoms:   Accu-Chek: NoSleep: Yes,Sleep Quality Good SI no plan [facility] NO   [facility]  ADLs: Yes Speech: normal Depression: better Anxiety: better   Participation LevelActive Listener  Visitation: No   Participation QualityAppropriate    Complaints:    Notes: MET WITH HIS PROVIDE AND DISCHARGE IS NOTED. VIVITROL 380 MG GIVEN. AWAITING HIS RIDE . HE IS TO FOLLOW UP AT [facility] DR. ORNELAS. SCRIPT SENT TO HIS PHARMACY IN [place], [place]. HE DENIES ANY ACTIVE SUICIDAL THOUGHTS AT DISCHARGE TIME.     Signature: Sumeet Vargas RN

## 2019-06-13 NOTE — DISCHARGE SUMMARY
Discharge Summary     Patient ID:  Liv Partida  [id number]  49 y.o.  1970    Admit date: 6/11/2019  Discharge date: 6/13/2019    Admitting Physician: Georges Torres MD   Attending Physician: Georges Torres MD  Discharge Provider: Bob Trinidad     Admission Diagnoses: Suicidal ideation [R45.851]  Alcohol use disorder, severe, dependence ([facility] [F10.20]     Discharge Diagnoses: Alcohol use disorder, severe   Alcohol induced mood disorder  Bipolar disorder, most recent episode depressed, without psychotic features   Treatment noncompliance  Suicidal ideations    Admission Condition: poor    Discharged Condition: stable    Indication for Admission: Alcohol intoxication, suicidal ideations, treatment noncompliance    HPI:  The patient is a 44 y.o. male with previous psychiatric history of alcohol use disorder, bipolar disorder, who has been admitted to psychiatric unit, secondary to alcohol intoxication, with blood alcohol level 187, treatment noncompliance and suicidal ideations with plan.     Patient is well known to psychiatry due to multiple previous admissions with alcohol intoxications and bipolar disorder. Patient has been discharged from psychiatric unit last time in February 2019 in stable condition. He reported that he was doing fine until end of April, when his fiancé left his house and then got EPO against the patient. Patient stated when police informed him about active EPO, he tried to text his fiancée \"I wanted to know what is going on\", but it was violation of EPO and he has been jailed for 30 days without getting any of prescribed medications. Patient stated that he was released from detention on June 1, 2019, a relapsed in drinking alcohol. He reported that he was drinking 12 pack of the beers and a pint of vodka every other day, stated that last time when he was drinking alcohol is \"yesterday, on the hospital's parking lot\".   The patient reported that, in detention and after release from detention, he started to have frequent suicidal ideations with plan to shoot himself.     Today during the evaluation, patient continues to report suicidal ideations but denies having any suicidal plans. He denies any homicidal ideations. He endorses withdrawal symptoms from alcohol -  body shakes, hand tremor, increased level of anxiety, general weakness, decreased quality of sleep.     In regards of her affective symptomatology, patient endorses depressed mood, decreased appetite, fatigue, low energy level, low motivation, low concentration, inability to focus, decreased pleasure in previously enjoyed activities, frequent mood swings, racing thoughts, increased irritability. He denies any auditory or visual hallucinations.          PSYCHIATRIC HISTORY:    Diagnoses: Alcohol use disorder, Pulley disorder  Suicide attempts/gestures: Denies   Prior hospitalizations: Multiple to [facility], last admission in February 2019  Medication trials: Lamictal, Seroquel, Zoloft, naltrexone, Vivitrol  Mental health contact: Lost to follow-up   Head trauma: Denies      Hospital Course:  Patient was admitted to the adult-psych behavioral health floor and was acclimated to the floor. Labs were reviewed and physical exam was completed by Dr. Morena Pope and associates. Home medications were reconciled. MAULIK was obtained and reviewed. Medication changes were made and patient tolerated well with no side effects. During the hospital stay patient's home medications have been restarted but doses have been decreased to initial dose due to the patient's treatment noncompliance. Zoloft restarted at a dose of 50 mg once a day for depression, Lamictal at the dose 25 mg twice a day for mood stabilization. Seroquel 50 mg twice a day and 100 mg at bedtime have been restarted for mood stabilization and augmentation effects of Lamictal.  Patient has been placed on [facility] protocol with Librium for alcohol withdrawal symptoms.   Patient attended and participated in groups. The patient did interact well with other patients and staff on the unit. Behaviorally he was not a problem. Patient was compliant with his medications. Patient was sleeping through the night. Withdrawal symptoms from alcohol were appropriately managed with prescribed Librium, no withdrawal symptoms of time of discharge. At the day of discharge Vivitrol injection  mg monthly has been prescribed to patient for relapse prevention to alcohol. This patient is not suicidal, homicidal or psychotic. He does not present a danger to self or others. With the above mentioned medications changes as well as psychotherapeutic interventions, the patient reported considerable improvement in his condition. On 06/13/19 it was therefore decided to discharge the patient, as it was felt that he received maximal benefit from his hospitalization.     Number of antipsychotic medication prescribed at discharge: one, Seroquel  IF MORE THAN ONE IS USED: NA    History of greater than 3 failed multiple monotherapy trials: NA  Monotherapy taper plan/ cross taper in progress: NA  Augmentation of Clozapine: NA    Referral to addiction treatment: Patient refused    Prescription for Alcohol or Drug Disorder Medication: Patient refused    Prescription for Tobacco Cessation medication: Patient refused    If no prescriptions for Tobacco Cessation must document why: Patient refused    Consults: Internal medicine    Significant Diagnostic Studies:   Recent Labs     06/11/19 1938   WBC 9.0   RBC 4.35*   HGB 13.2*   HCT 39.2*   MCV 90.1   MCH 30.3   MCHC 33.7   RDW 14.0      MPV 9.3*       Recent Labs     06/11/19 1938      K 4.4   CL 97*   CO2 25   BUN 15   CREATININE 0.9   GLUCOSE 117*   CALCIUM 9.0   PROT 6.8   LABALBU 4.6   BILITOT 0.4   ALKPHOS 93   AST 18   ALT 28       Recent Labs     06/11/19 1940   BARBSCNU Negative   LABBENZ Negative        Treatments: therapies: RN and SW    Alert, Oriented X 4  Appearance:  Proper Grooming and Hygiene  Speech with Regular Rate and Rhythm  Eye Contact:  Good  No Psychomotor Agitation/Retardation Noted  Attitude:  Cooperative  Mood:  \"Good\"  Affective: Congruent, appropriate to the situation, with a normal range and intensity  Thought Processes:  Coherently communicated, logical and goal oriented  Thought Content:  No Suicidal Ideation, No Homicidal Ideation, No Auditory or Visual Hallucinations, NO Overt Delusions  Insight:  Present  Judgement:  Normal  Memory is intact for both remote and recent  Intellectual Functioning:  Within the [address] of Knowledge:  Adequate  Attention and Concentration:  Adequate      Discharge Exam:  Please, see medical note    Disposition: home    Patient Instructions:   Current Discharge Medication List      START taking these medications    Details   naltrexone (VIVITROL) 380 MG injection Inject 380 mg into the muscle once for 1 dose  Qty: 1.2 each, Refills: 0      folic acid (FOLVITE) 1 MG tablet Take 1 tablet by mouth daily  Qty: 30 tablet, Refills: 1      hydrOXYzine (ATARAX) 25 MG tablet Take 1 tablet by mouth every 6 hours as needed for Anxiety  Qty: 120 tablet, Refills: 0         CONTINUE these medications which have CHANGED    Details   lamoTRIgine (LAMICTAL) 25 MG tablet Take 1 tablet by mouth 2 times daily  Qty: 60 tablet, Refills: 1      sertraline (ZOLOFT) 50 MG tablet Take 1 tablet by mouth daily  Qty: 30 tablet, Refills: 1      !! QUEtiapine (SEROQUEL) 100 MG tablet Take 1 tablet by mouth nightly  Qty: 30 tablet, Refills: 1       !! - Potential duplicate medications found. Please discuss with provider.       CONTINUE these medications which have NOT CHANGED    Details   !! QUEtiapine (SEROQUEL) 50 MG tablet Take 1 tablet by mouth 2 times daily  Qty: 60 tablet, Refills: 3      vitamin B-1 100 MG tablet Take 1 tablet by mouth daily  Qty: 30 tablet, Refills: 3      vitamin D (ERGOCALCIFEROL) 24844 units capsule Take 1 capsule by mouth once a week for 11 doses  Qty: 11 capsule, Refills: 0      losartan (COZAAR) 100 MG tablet Take 100 mg by mouth daily       ! ! - Potential duplicate medications found. Please discuss with provider. STOP taking these medications       furosemide (LASIX) 20 MG tablet Comments:   Reason for Stopping:             Activity: activity as tolerated  Diet: regular diet  Wound Care: none needed    Follow-up with [facility] provider in 2 weeks.     Time worked: More than 31 minutes    Participation: good    Electronically signed by General Ry MD on 6/13/2019 at 10:26 AM

## 2019-06-13 NOTE — PROGRESS NOTES
[facility]  Discharge Note  Bridge Appointment completed: Reviewed Discharge Instructions with patient. Patient verbalizes understanding and agreement with the discharge plan using the teachback method. Referral for Outpatient Tobacco Cessation Counseling, upon discharge (nirmala X if applicable and completed):    ( )  Hospital staff assisted patient to call Quit Line or faxed referral                                   during hospitalization                  ( )  Recognizing danger situations (included triggers and roadblocks), if not completed on admission                    ( )  Coping skills (new ways to manage stress, exercise, relaxation techniques, changing routine, distraction), if not completed on admission                                                           ( )  Basic information about quitting (benefits of quitting, techniques in how to quit, available resources, if not completed on admission  ( ) Referral for counseling faxed to [facility]   ( ) Patient refused referral  (X ) Patient refused counseling  ( X) Patient refused smoking cessation medication upon discharge    Vaccinations (nirmala X if applicable and completed):  ( ) Patient states already received influenza vaccine elsewhere  ( X) Patient received influenza vaccine during this hospitalization  ( X) Patient refused influenza vaccine at this time      Pt discharged with followings belongings:   Dentures: None  Vision - Corrective Lenses: None  Hearing Aid: None  Jewelry: Other (Comment)(see belongings document)  Body Piercings Removed: N/A  Clothing: Other (Comment)(see belongings document)  Were All Patient Medications Collected?: Not Applicable  Other Valuables: Other (Comment)(see belongings document)   Valuables sent home with PATIENT. Valuables retrieved from safe and returned to patient.  YESPatient left department with   via AMBULATORY, discharged to HOME Patient education on aftercare instructions: YES Patient verbalize understanding of AVS:  YES. SI? no plan AVH? NO HI?  Negative for homicidal ideation       Status EXAM upon discharge:  Status and Exam  Normal: Yes  Facial Expression: Brightened  Affect: Appropriate  Level of Consciousness: Alert  Mood:Normal: Yes  Mood: (REPORTS HIS MOOD AS \" GOOD\")  Motor Activity:Normal: Yes  Motor Activity: Increased  Interview Behavior: Cooperative  Preception: [place] to Person, [place] to Time, [place] to Place, [place] to Situation  Attention:Normal: Yes  Attention: (ADEQUATE)  Thought Processes: (LOGICAL AND GOAL DIRECTED)  Thought Content:Normal: Yes  Thought Content: (DENIES SUICDIAL IDEATION)  Hallucinations: None  Delusions: No  Memory:Normal: Yes  Memory: (INTACT FOR RECENT AND REMOTE)  Insight and Judgment: Yes  Insight and Judgment: (PRESENT AND NORMAL)  Present Suicidal Ideation: No  Present Homicidal Ideation: No

## 2019-06-14 NOTE — PROGRESS NOTES
Progress Note  T[facility] [place]  6/14/2019 12:04 AM  Subjective:   Admit Date:   6/11/2019      CC/ADMIT DX:       Interval History:   Reviewed overnight events and nursing notes. No new physical complaints. I have reviewed all labs/diagnostics from the last 24hrs. ROS:   I have done a 10 point ROS and all are negative, except what is mentioned in the HPI. No diet orders on file    Medications:             Objective:   Vitals: /80   Pulse 80   Temp 97.3 °F (36.3 °C) (Temporal)   Resp 20   Ht 6' 4\" (1.93 m)   Wt 165 lb 9.6 oz (75.1 kg)   SpO2 100%   BMI 20.16 kg/m²  No intake or output data in the 24 hours ending 06/14/19 0004  General appearance: alert and cooperative with exam  Lungs: clear to auscultation bilaterally  Heart: RRR  Abdomen: soft, non-tender; bowel sounds normal; no masses,  no organomegaly  Extremities: extremities normal, atraumatic, no cyanosis or edema  Neurologic:  No obvious focal neurologic deficits. Assessment and Plan: Active Problems:    Alcohol use disorder, severe, dependence (HCC)    Suicidal ideation  Resolved Problems:    * No resolved hospital problems. *      Plan:  1. Continue present medication(s)   2. He is medically stable. I will follow for any changes or concerns. 3.  Follow with Psych      Discharge planning:   home     Reviewed treatment plans with the patient and/or family.              Electronically signed by Guru Foster MD on 6/14/2019 at 12:04 AM

## 2019-06-17 NOTE — PROGRESS NOTES
Follow up call completed. Writer was not able to speak with the patient. Placed call to number provided, recording stated that number was not a working number.     Electronically signed by ROSE Davey Arm on 6/17/2019 at 3:44 PM

## 2019-06-20 ENCOUNTER — HOSPITAL ENCOUNTER (INPATIENT)
Age: 49
LOS: 1 days | Discharge: LEFT BEFORE DISCHARGE | DRG: 894 | End: 2019-06-21
Attending: EMERGENCY MEDICINE | Admitting: INTERNAL MEDICINE
Payer: MEDICAID

## 2019-06-20 DIAGNOSIS — R45.851 DEPRESSION WITH SUICIDAL IDEATION: Primary | ICD-10-CM

## 2019-06-20 DIAGNOSIS — F32.A DEPRESSION WITH SUICIDAL IDEATION: Primary | ICD-10-CM

## 2019-06-20 DIAGNOSIS — F10.10 ALCOHOL ABUSE: ICD-10-CM

## 2019-06-20 LAB
ACETAMINOPHEN LEVEL: <15 UG/ML
ALBUMIN SERPL-MCNC: 4.2 G/DL (ref 3.5–5.2)
ALP BLD-CCNC: 93 U/L (ref 40–130)
ALT SERPL-CCNC: 27 U/L (ref 5–41)
ANION GAP SERPL CALCULATED.3IONS-SCNC: 10 MMOL/L (ref 7–19)
AST SERPL-CCNC: 22 U/L (ref 5–40)
BASOPHILS ABSOLUTE: 0.1 K/UL (ref 0–0.2)
BASOPHILS RELATIVE PERCENT: 0.7 % (ref 0–1)
BILIRUB SERPL-MCNC: <0.2 MG/DL (ref 0.2–1.2)
BUN BLDV-MCNC: 17 MG/DL (ref 6–20)
CALCIUM SERPL-MCNC: 8.5 MG/DL (ref 8.6–10)
CHLORIDE BLD-SCNC: 103 MMOL/L (ref 98–111)
CO2: 30 MMOL/L (ref 22–29)
CREAT SERPL-MCNC: 1.6 MG/DL (ref 0.5–1.2)
EOSINOPHILS ABSOLUTE: 0.4 K/UL (ref 0–0.6)
EOSINOPHILS RELATIVE PERCENT: 5.9 % (ref 0–5)
ETHANOL: 352 MG/DL (ref 0–0.08)
GFR NON-AFRICAN AMERICAN: 46
GLUCOSE BLD-MCNC: 100 MG/DL (ref 74–109)
HCT VFR BLD CALC: 39.6 % (ref 42–52)
HEMOGLOBIN: 13 G/DL (ref 14–18)
LYMPHOCYTES ABSOLUTE: 3 K/UL (ref 1.1–4.5)
LYMPHOCYTES RELATIVE PERCENT: 39.6 % (ref 20–40)
MCH RBC QN AUTO: 30.4 PG (ref 27–31)
MCHC RBC AUTO-ENTMCNC: 32.8 G/DL (ref 33–37)
MCV RBC AUTO: 92.7 FL (ref 80–94)
MONOCYTES ABSOLUTE: 0.5 K/UL (ref 0–0.9)
MONOCYTES RELATIVE PERCENT: 7.1 % (ref 0–10)
NEUTROPHILS ABSOLUTE: 3.5 K/UL (ref 1.5–7.5)
NEUTROPHILS RELATIVE PERCENT: 46.3 % (ref 50–65)
PDW BLD-RTO: 15 % (ref 11.5–14.5)
PLATELET # BLD: 259 K/UL (ref 130–400)
PMV BLD AUTO: 8.5 FL (ref 9.4–12.4)
POTASSIUM SERPL-SCNC: 4.2 MMOL/L (ref 3.5–5)
RBC # BLD: 4.27 M/UL (ref 4.7–6.1)
SALICYLATE, SERUM: <3 MG/DL (ref 3–10)
SODIUM BLD-SCNC: 143 MMOL/L (ref 136–145)
TOTAL PROTEIN: 6.9 G/DL (ref 6.6–8.7)
WBC # BLD: 7.5 K/UL (ref 4.8–10.8)

## 2019-06-20 PROCEDURE — G0480 DRUG TEST DEF 1-7 CLASSES: HCPCS

## 2019-06-20 PROCEDURE — 6360000002 HC RX W HCPCS: Performed by: HOSPITALIST

## 2019-06-20 PROCEDURE — 99285 EMERGENCY DEPT VISIT HI MDM: CPT | Performed by: EMERGENCY MEDICINE

## 2019-06-20 PROCEDURE — 36415 COLL VENOUS BLD VENIPUNCTURE: CPT

## 2019-06-20 PROCEDURE — G0378 HOSPITAL OBSERVATION PER HR: HCPCS

## 2019-06-20 PROCEDURE — 6370000000 HC RX 637 (ALT 250 FOR IP): Performed by: HOSPITALIST

## 2019-06-20 PROCEDURE — 87081 CULTURE SCREEN ONLY: CPT

## 2019-06-20 PROCEDURE — 2500000003 HC RX 250 WO HCPCS: Performed by: HOSPITALIST

## 2019-06-20 PROCEDURE — 99223 1ST HOSP IP/OBS HIGH 75: CPT | Performed by: HOSPITALIST

## 2019-06-20 PROCEDURE — 99285 EMERGENCY DEPT VISIT HI MDM: CPT

## 2019-06-20 PROCEDURE — 85025 COMPLETE CBC W/AUTO DIFF WBC: CPT

## 2019-06-20 PROCEDURE — 2000000000 HC ICU R&B

## 2019-06-20 PROCEDURE — 2580000003 HC RX 258: Performed by: HOSPITALIST

## 2019-06-20 PROCEDURE — 80053 COMPREHEN METABOLIC PANEL: CPT

## 2019-06-20 PROCEDURE — 96374 THER/PROPH/DIAG INJ IV PUSH: CPT

## 2019-06-20 RX ORDER — CHLORDIAZEPOXIDE HYDROCHLORIDE 25 MG/1
25 CAPSULE, GELATIN COATED ORAL 4 TIMES DAILY
Status: DISCONTINUED | OUTPATIENT
Start: 2019-06-20 | End: 2019-06-21

## 2019-06-20 RX ORDER — SODIUM CHLORIDE 0.9 % (FLUSH) 0.9 %
10 SYRINGE (ML) INJECTION PRN
Status: DISCONTINUED | OUTPATIENT
Start: 2019-06-20 | End: 2019-06-22 | Stop reason: HOSPADM

## 2019-06-20 RX ORDER — POTASSIUM CHLORIDE 7.45 MG/ML
10 INJECTION INTRAVENOUS PRN
Status: DISCONTINUED | OUTPATIENT
Start: 2019-06-20 | End: 2019-06-22 | Stop reason: HOSPADM

## 2019-06-20 RX ORDER — SODIUM CHLORIDE 0.9 % (FLUSH) 0.9 %
10 SYRINGE (ML) INJECTION EVERY 12 HOURS SCHEDULED
Status: DISCONTINUED | OUTPATIENT
Start: 2019-06-20 | End: 2019-06-22 | Stop reason: HOSPADM

## 2019-06-20 RX ORDER — ONDANSETRON 2 MG/ML
4 INJECTION INTRAMUSCULAR; INTRAVENOUS EVERY 6 HOURS PRN
Status: DISCONTINUED | OUTPATIENT
Start: 2019-06-20 | End: 2019-06-22 | Stop reason: HOSPADM

## 2019-06-20 RX ORDER — MAGNESIUM SULFATE 1 G/100ML
1 INJECTION INTRAVENOUS PRN
Status: DISCONTINUED | OUTPATIENT
Start: 2019-06-20 | End: 2019-06-21

## 2019-06-20 RX ORDER — NICOTINE 21 MG/24HR
1 PATCH, TRANSDERMAL 24 HOURS TRANSDERMAL DAILY
Status: DISCONTINUED | OUTPATIENT
Start: 2019-06-20 | End: 2019-06-22 | Stop reason: HOSPADM

## 2019-06-20 RX ORDER — POTASSIUM CHLORIDE 20 MEQ/1
40 TABLET, EXTENDED RELEASE ORAL PRN
Status: DISCONTINUED | OUTPATIENT
Start: 2019-06-20 | End: 2019-06-22 | Stop reason: HOSPADM

## 2019-06-20 RX ORDER — ACETAMINOPHEN 325 MG/1
650 TABLET ORAL EVERY 4 HOURS PRN
Status: DISCONTINUED | OUTPATIENT
Start: 2019-06-20 | End: 2019-06-21

## 2019-06-20 RX ORDER — LORAZEPAM 2 MG/ML
2 INJECTION INTRAMUSCULAR EVERY 4 HOURS PRN
Status: DISCONTINUED | OUTPATIENT
Start: 2019-06-20 | End: 2019-06-21

## 2019-06-20 RX ADMIN — Medication 10 ML: at 21:29

## 2019-06-20 RX ADMIN — CHLORDIAZEPOXIDE HYDROCHLORIDE 25 MG: 25 CAPSULE ORAL at 21:29

## 2019-06-20 RX ADMIN — FOLIC ACID: 5 INJECTION, SOLUTION INTRAMUSCULAR; INTRAVENOUS; SUBCUTANEOUS at 21:34

## 2019-06-20 ASSESSMENT — ENCOUNTER SYMPTOMS
SHORTNESS OF BREATH: 0
TROUBLE SWALLOWING: 0
VOMITING: 0
CHEST TIGHTNESS: 0
ABDOMINAL PAIN: 0
ABDOMINAL DISTENTION: 0
RHINORRHEA: 0
BLOOD IN STOOL: 0
NAUSEA: 0
SORE THROAT: 0
COUGH: 0
DIARRHEA: 0
BACK PAIN: 0
CONSTIPATION: 0

## 2019-06-21 VITALS
HEIGHT: 76 IN | RESPIRATION RATE: 18 BRPM | SYSTOLIC BLOOD PRESSURE: 149 MMHG | DIASTOLIC BLOOD PRESSURE: 89 MMHG | HEART RATE: 83 BPM | WEIGHT: 174.1 LBS | OXYGEN SATURATION: 98 % | TEMPERATURE: 97.6 F | BODY MASS INDEX: 21.2 KG/M2

## 2019-06-21 LAB
AMPHETAMINE SCREEN, URINE: NEGATIVE
ANION GAP SERPL CALCULATED.3IONS-SCNC: 12 MMOL/L (ref 7–19)
BARBITURATE SCREEN URINE: NEGATIVE
BENZODIAZEPINE SCREEN, URINE: NEGATIVE
BILIRUBIN URINE: NEGATIVE
BLOOD, URINE: NEGATIVE
BUN BLDV-MCNC: 23 MG/DL (ref 6–20)
CALCIUM SERPL-MCNC: 8.1 MG/DL (ref 8.6–10)
CANNABINOID SCREEN URINE: NEGATIVE
CHLORIDE BLD-SCNC: 103 MMOL/L (ref 98–111)
CLARITY: CLEAR
CO2: 28 MMOL/L (ref 22–29)
COCAINE METABOLITE SCREEN URINE: NEGATIVE
COLOR: YELLOW
CREAT SERPL-MCNC: 2.3 MG/DL (ref 0.5–1.2)
ETHANOL: 176 MG/DL (ref 0–0.08)
GFR NON-AFRICAN AMERICAN: 30
GLUCOSE BLD-MCNC: 100 MG/DL (ref 74–109)
GLUCOSE URINE: NEGATIVE MG/DL
HCT VFR BLD CALC: 34.4 % (ref 42–52)
HEMOGLOBIN: 11.3 G/DL (ref 14–18)
KETONES, URINE: NEGATIVE MG/DL
LEUKOCYTE ESTERASE, URINE: NEGATIVE
Lab: NORMAL
MCH RBC QN AUTO: 29.9 PG (ref 27–31)
MCHC RBC AUTO-ENTMCNC: 32.8 G/DL (ref 33–37)
MCV RBC AUTO: 91 FL (ref 80–94)
NITRITE, URINE: NEGATIVE
OPIATE SCREEN URINE: NEGATIVE
PDW BLD-RTO: 14.9 % (ref 11.5–14.5)
PH UA: 6 (ref 5–8)
PLATELET # BLD: 209 K/UL (ref 130–400)
PMV BLD AUTO: 8.8 FL (ref 9.4–12.4)
POTASSIUM REFLEX MAGNESIUM: 4.3 MMOL/L (ref 3.5–5)
PROTEIN UA: NEGATIVE MG/DL
RBC # BLD: 3.78 M/UL (ref 4.7–6.1)
SODIUM BLD-SCNC: 143 MMOL/L (ref 136–145)
SPECIFIC GRAVITY UA: 1.01 (ref 1–1.03)
URINE REFLEX TO CULTURE: NORMAL
UROBILINOGEN, URINE: 0.2 E.U./DL
WBC # BLD: 7.8 K/UL (ref 4.8–10.8)

## 2019-06-21 PROCEDURE — 99221 1ST HOSP IP/OBS SF/LOW 40: CPT | Performed by: PSYCHIATRY & NEUROLOGY

## 2019-06-21 PROCEDURE — 36415 COLL VENOUS BLD VENIPUNCTURE: CPT

## 2019-06-21 PROCEDURE — 96375 TX/PRO/DX INJ NEW DRUG ADDON: CPT

## 2019-06-21 PROCEDURE — 80307 DRUG TEST PRSMV CHEM ANLYZR: CPT

## 2019-06-21 PROCEDURE — 99406 BEHAV CHNG SMOKING 3-10 MIN: CPT | Performed by: HOSPITALIST

## 2019-06-21 PROCEDURE — 6370000000 HC RX 637 (ALT 250 FOR IP): Performed by: HOSPITALIST

## 2019-06-21 PROCEDURE — 2580000003 HC RX 258: Performed by: HOSPITALIST

## 2019-06-21 PROCEDURE — 80048 BASIC METABOLIC PNL TOTAL CA: CPT

## 2019-06-21 PROCEDURE — 99238 HOSP IP/OBS DSCHRG MGMT 30/<: CPT | Performed by: HOSPITALIST

## 2019-06-21 PROCEDURE — G0378 HOSPITAL OBSERVATION PER HR: HCPCS

## 2019-06-21 PROCEDURE — 6360000002 HC RX W HCPCS: Performed by: HOSPITALIST

## 2019-06-21 PROCEDURE — G0480 DRUG TEST DEF 1-7 CLASSES: HCPCS

## 2019-06-21 PROCEDURE — 96376 TX/PRO/DX INJ SAME DRUG ADON: CPT

## 2019-06-21 PROCEDURE — 2500000003 HC RX 250 WO HCPCS: Performed by: HOSPITALIST

## 2019-06-21 PROCEDURE — 85027 COMPLETE CBC AUTOMATED: CPT

## 2019-06-21 PROCEDURE — 96372 THER/PROPH/DIAG INJ SC/IM: CPT

## 2019-06-21 PROCEDURE — 81003 URINALYSIS AUTO W/O SCOPE: CPT

## 2019-06-21 RX ORDER — SODIUM CHLORIDE, SODIUM LACTATE, POTASSIUM CHLORIDE, CALCIUM CHLORIDE 600; 310; 30; 20 MG/100ML; MG/100ML; MG/100ML; MG/100ML
INJECTION, SOLUTION INTRAVENOUS CONTINUOUS
Status: DISCONTINUED | OUTPATIENT
Start: 2019-06-21 | End: 2019-06-22 | Stop reason: HOSPADM

## 2019-06-21 RX ORDER — LORAZEPAM 2 MG/ML
3 INJECTION INTRAMUSCULAR
Status: DISCONTINUED | OUTPATIENT
Start: 2019-06-21 | End: 2019-06-22 | Stop reason: HOSPADM

## 2019-06-21 RX ORDER — LORAZEPAM 2 MG/ML
1 INJECTION INTRAMUSCULAR
Status: DISCONTINUED | OUTPATIENT
Start: 2019-06-21 | End: 2019-06-22 | Stop reason: HOSPADM

## 2019-06-21 RX ORDER — THIAMINE MONONITRATE (VIT B1) 100 MG
100 TABLET ORAL DAILY
Status: DISCONTINUED | OUTPATIENT
Start: 2019-06-22 | End: 2019-06-22 | Stop reason: HOSPADM

## 2019-06-21 RX ORDER — MAGNESIUM SULFATE 1 G/100ML
1 INJECTION INTRAVENOUS PRN
Status: DISCONTINUED | OUTPATIENT
Start: 2019-06-21 | End: 2019-06-22 | Stop reason: HOSPADM

## 2019-06-21 RX ORDER — LORAZEPAM 2 MG/ML
4 INJECTION INTRAMUSCULAR
Status: DISCONTINUED | OUTPATIENT
Start: 2019-06-21 | End: 2019-06-22 | Stop reason: HOSPADM

## 2019-06-21 RX ORDER — M-VIT,TX,IRON,MINS/CALC/FOLIC 27MG-0.4MG
1 TABLET ORAL DAILY
Status: DISCONTINUED | OUTPATIENT
Start: 2019-06-22 | End: 2019-06-22 | Stop reason: HOSPADM

## 2019-06-21 RX ORDER — LORAZEPAM 1 MG/1
3 TABLET ORAL
Status: DISCONTINUED | OUTPATIENT
Start: 2019-06-21 | End: 2019-06-22 | Stop reason: HOSPADM

## 2019-06-21 RX ORDER — LORAZEPAM 1 MG/1
2 TABLET ORAL
Status: DISCONTINUED | OUTPATIENT
Start: 2019-06-21 | End: 2019-06-22 | Stop reason: HOSPADM

## 2019-06-21 RX ORDER — ACETAMINOPHEN 325 MG/1
650 TABLET ORAL EVERY 4 HOURS PRN
Status: DISCONTINUED | OUTPATIENT
Start: 2019-06-21 | End: 2019-06-22 | Stop reason: HOSPADM

## 2019-06-21 RX ORDER — FOLIC ACID 1 MG/1
1 TABLET ORAL DAILY
Status: DISCONTINUED | OUTPATIENT
Start: 2019-06-22 | End: 2019-06-22 | Stop reason: HOSPADM

## 2019-06-21 RX ORDER — LORAZEPAM 1 MG/1
1 TABLET ORAL
Status: DISCONTINUED | OUTPATIENT
Start: 2019-06-21 | End: 2019-06-22 | Stop reason: HOSPADM

## 2019-06-21 RX ORDER — LORAZEPAM 2 MG/ML
2 INJECTION INTRAMUSCULAR
Status: DISCONTINUED | OUTPATIENT
Start: 2019-06-21 | End: 2019-06-22 | Stop reason: HOSPADM

## 2019-06-21 RX ORDER — LORAZEPAM 1 MG/1
4 TABLET ORAL
Status: DISCONTINUED | OUTPATIENT
Start: 2019-06-21 | End: 2019-06-22 | Stop reason: HOSPADM

## 2019-06-21 RX ADMIN — LORAZEPAM 3 MG: 1 TABLET ORAL at 16:48

## 2019-06-21 RX ADMIN — LORAZEPAM 2 MG: 2 INJECTION INTRAMUSCULAR; INTRAVENOUS at 09:42

## 2019-06-21 RX ADMIN — CHLORDIAZEPOXIDE HYDROCHLORIDE 25 MG: 25 CAPSULE ORAL at 07:24

## 2019-06-21 RX ADMIN — LORAZEPAM 2 MG: 2 INJECTION INTRAMUSCULAR; INTRAVENOUS at 07:24

## 2019-06-21 RX ADMIN — FOLIC ACID: 5 INJECTION, SOLUTION INTRAMUSCULAR; INTRAVENOUS; SUBCUTANEOUS at 05:15

## 2019-06-21 RX ADMIN — LORAZEPAM 1 MG: 1 TABLET ORAL at 12:44

## 2019-06-21 RX ADMIN — ENOXAPARIN SODIUM 40 MG: 40 INJECTION SUBCUTANEOUS at 07:24

## 2019-06-21 RX ADMIN — LORAZEPAM 2 MG: 2 INJECTION INTRAMUSCULAR; INTRAVENOUS at 01:53

## 2019-06-21 RX ADMIN — SODIUM CHLORIDE, POTASSIUM CHLORIDE, SODIUM LACTATE AND CALCIUM CHLORIDE: 600; 310; 30; 20 INJECTION, SOLUTION INTRAVENOUS at 16:47

## 2019-06-21 ASSESSMENT — PAIN SCALES - GENERAL
PAINLEVEL_OUTOF10: 0

## 2019-06-21 NOTE — PROGRESS NOTES
4 Eyes Skin Assessment    [facility]: [facility]    I agree that I, Ane Favre, along with Tatiana Basilio RN (either 2 RN's or 1 LPN and 1 RN) have performed a thorough Head to Toe Skin Assessment on the patient. ALL assessment sites listed below have been assessed. Areas assessed by both nurses:     [x]   Head, Face, and Ears   [x]   Shoulders, Back, and Chest  [x]   Arms, Elbows, and Hands   [x]   Coccyx, Sacrum, and Ischium  [x]   Legs, Feet, and Heels    Does the Patient have Skin Breakdown?  No    Wu Prevention initiated: NA  Wound Care Orders initiated: NA    [facility] nurse consulted for Pressure Injury (Stage 3,4, Unstageable, DTI, NWPT, and Complex wounds) and New or Established Ostomies: NA        Primary Nurse eSignature: Ane Favre, RN on 6/21/2019 at 3:35 AM      Co-Signer eSignature: Electronically signed by Demond Holland RN on 6/21/19 at 3:36 AM

## 2019-06-21 NOTE — PROGRESS NOTES
Deb Brannon transferred to [phone] from [facility] via wheelchair. Reason for transfer: lower level of care   Explained reason for transfer to Patient. Belongings: None with public safety . Soft chart transferred with patient: Yes. Telemetry box number  transferred with patient: yes. Report given to: [facility], via telephone.       Electronically signed by Yrn Hernandez RN on 6/21/2019 at 11:44 AM

## 2019-06-21 NOTE — PLAN OF CARE
Problem: Falls - Risk of:  Goal: Will remain free from falls  Description  Will remain free from falls  Outcome: Ongoing  Goal: Absence of physical injury  Description  Absence of physical injury  Outcome: Ongoing     Problem: Discharge Planning:  Goal: Discharged to appropriate level of care  Description  Discharged to appropriate level of care  Outcome: Ongoing     Problem: Fluid Volume - Deficit:  Goal: Absence of fluid volume deficit signs and symptoms  Description  Absence of fluid volume deficit signs and symptoms  Outcome: Ongoing     Problem: Nutrition Deficit:  Goal: Ability to achieve adequate nutritional intake will improve  Description  Ability to achieve adequate nutritional intake will improve  Outcome: Ongoing     Problem: Sleep Pattern Disturbance:  Goal: Appears well-rested  Description  Appears well-rested  Outcome: Ongoing

## 2019-06-21 NOTE — PROGRESS NOTES
Subjective:   Critical Care Daily Progress Note: 6/21/2019 9:35 AM    Interval History:  [id number]:  Mr. Chelsey Moore is a pleasant 44year old  american gentleman from home. He had been referred to  for admission under the hospitalist service as he stated while drunk that he wished to harm himself. He has a history of alcohol abuse, and has stated that on three prior occasions he was \"just the alcohol talking\" and that he does not really want to harm himself. He denies ever having attempted to harm himself or anyone else. He understands that he needs a few days IVF for his BRIGITTE to resolve, and that this is due to dehydration and possibly direct cellular toxicity from the alcohol he is drinking. [id number]: (copied from Dr. Jose Manuel Guidry's admission note)  \"The patient is a 44 y.o. 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  States he has been suicidal for the past 2 days.  Has a long-standing history of depression.  Is under the care of Dr. Neeraj Terry from psychiatry. [facility] that his medications are not working. Franco Law that he wants to kill himself by shooting himself with a gun. Margy Guzmánang brought here by his father due to concerns for his well-being.  Has no somatic complaints\"    Review of Systems  He endorses: night sweats, weakness, fatigue, malaise, nausea, and diaphoresis. He denies: fever, chills, chest pain, dyspnea, near syncope, confusion, homicidal ideation, and suicidal ideation.     Scheduled Meds:   [START ON 6/22/2019] therapeutic multivitamin-minerals  1 tablet Oral Daily    [START ON 1/38/1318] folic acid  1 mg Oral Daily    [START ON 6/22/2019] thiamine  100 mg Oral Daily    sodium chloride flush  10 mL Intravenous 2 times per day    enoxaparin  40 mg Subcutaneous Daily  nicotine  1 patch Transdermal Daily     Continuous Infusions:   IV infusion builder 125 mL/hr at 06/21/19 0515   last Banana bag is to be today    PRN Meds:magnesium sulfate, LORazepam **OR** LORazepam **OR** LORazepam **OR** LORazepam **OR** LORazepam **OR** LORazepam **OR** LORazepam **OR** LORazepam, sodium chloride flush, magnesium hydroxide, ondansetron, acetaminophen, potassium chloride **OR** potassium alternative oral replacement **OR** potassium chloride, potassium chloride, magnesium sulfate        Objective:     I/O last 3 completed shifts: In: 1454.2 [P.O.:400; I.V.:1054.2]  Out: 650 [Urine:650]  I/O this shift:  In: 350 [P.O.:100; I.V.:250]  Out: 0     /75   Pulse 80   Temp 98.3 °F (36.8 °C) (Temporal)   Resp (!) 58   Ht 6' 4\" (1.93 m)   Wt 174 lb 1.6 oz (79 kg)   SpO2 97%   BMI 21.19 kg/m²     Physical Exam   Constitutional: He is oriented to person, place, and time. He appears well-developed and well-nourished. No distress. HENT:   Head: Normocephalic and atraumatic. Eyes: Right eye exhibits no discharge. Left eye exhibits no discharge. Neck: Neck supple. No tracheal deviation present. Cardiovascular: Normal rate and regular rhythm. Exam reveals no gallop and no friction rub. No murmur heard. Pulmonary/Chest: Effort normal and breath sounds normal. No stridor. No respiratory distress. He has no decreased breath sounds. He has no wheezes. He has no rhonchi. He has no rales. He exhibits no tenderness. Abdominal: Soft. Bowel sounds are normal. He exhibits no distension. There is no tenderness. There is no rebound and no guarding. Musculoskeletal: He exhibits no edema, tenderness or deformity. Neurological: He is alert and oriented to person, place, and time. Skin: Skin is warm. He is not diaphoretic. Psychiatric: He has a normal mood and affect. His behavior is normal.   Vitals reviewed.     LABS:  Results for Dusty Mota (MRN [id number]) as of 6/21/2019 09:44 Ref. Range 6/13/2019 05:18 6/20/2019 18:03 6/20/2019 22:01 6/21/2019 01:42 6/21/2019 02:00   Sodium Latest Ref Range: 136 - 145 mmol/L  143  143    Potassium Latest Ref Range: 3.5 - 5.0 mmol/L  4.2  4.3    Chloride Latest Ref Range: 98 - 111 mmol/L  103  103    CO2 Latest Ref Range: 22 - 29 mmol/L  30 (H)  28    BUN Latest Ref Range: 6 - 20 mg/dL  17  23 (H)    Creatinine Latest Ref Range: 0.5 - 1.2 mg/dL  1.6 (H)  2.3 (H)    Anion Gap Latest Ref Range: 7 - 19 mmol/L  10  12    GFR Non- Latest Ref Range: >60   46 (A)  30 (A)    Glucose Latest Ref Range: 74 - 109 mg/dL  100  100    Calcium Latest Ref Range: 8.6 - 10.0 mg/dL  8.5 (L)  8.1 (L)    Total Protein Latest Ref Range: 6.6 - 8.7 g/dL  6.9      Albumin Latest Ref Range: 3.5 - 5.2 g/dL  4.2      Alk Phos Latest Ref Range: 40 - 130 U/L  93      ALT Latest Ref Range: 5 - 41 U/L  27      AST Latest Ref Range: 5 - 40 U/L  22      Bilirubin Latest Ref Range: 0.2 - 1.2 mg/dL  <0.2      Hemoglobin A1C Latest Ref Range: 4.0 - 6.0 % 5.4       TSH Reflex FT4 Latest Ref Range: 0.35 - 5.50 uIU/mL 0.60       Ethanol Lvl Latest Units: mg/dL  352  176    Amphetamine Screen, Urine Latest Ref Range: Negative <1000 ng/mL      Negative   Barbiturate Screen, Ur Latest Ref Range: Negative < 200 ng/mL      Negative   Benzodiazepine Screen, Urine Latest Ref Range: Negative <100 ng/mL      NEGATIVE   Cannabinoid Scrn, Ur Latest Ref Range: Negative <50 ng/mL      Negative   Opiate Scrn, Ur Latest Ref Range: Negative < 300 ng/mL      Negative   Cocaine Metabolite Screen, Urine Latest Ref Range: Negative <300 ng/mL      Negative   Drug Screen Comment: Unknown     see below   Vit D, 25-Hydroxy Latest Ref Range: >=30 ng/mL 31.1       Acetaminophen Level Latest Units: ug/mL  <10      Salicylate, Serum Latest Ref Range: 3.0 - 10.0 mg/dL  <3.0      WBC Latest Ref Range: 4.8 - 10.8 K/uL  7.5  7.8    RBC Latest Ref Range: 4.70 - 6.10 M/uL  4.27 (L)  3.78 (L)    Hemoglobin Quant Latest Ref Range: 14.0 - 18.0 g/dL  13.0 (L)  11.3 (L)    Hematocrit Latest Ref Range: 42.0 - 52.0 %  39.6 (L)  34.4 (L)    MCV Latest Ref Range: 80.0 - 94.0 fL  92.7  91.0    MCH Latest Ref Range: 27.0 - 31.0 pg  30.4  29.9    MCHC Latest Ref Range: 33.0 - 37.0 g/dL  32.8 (L)  32.8 (L)    MPV Latest Ref Range: 9.4 - 12.4 fL  8.5 (L)  8.8 (L)    RDW Latest Ref Range: 11.5 - 14.5 %  15.0 (H)  14.9 (H)    Platelet Count Latest Ref Range: 130 - 400 K/uL  259  209    Neutrophils % Latest Ref Range: 50.0 - 65.0 %  46.3 (L)      Lymphocyte % Latest Ref Range: 20.0 - 40.0 %  39.6      Monocytes % Latest Ref Range: 0.0 - 10.0 %  7.1      Eosinophils % Latest Ref Range: 0.0 - 5.0 %  5.9 (H)      Basophils % Latest Ref Range: 0.0 - 1.0 %  0.7      Neutrophils # Latest Ref Range: 1.5 - 7.5 K/uL  3.5      Lymphocytes # Latest Ref Range: 1.1 - 4.5 K/uL  3.0      Monocytes # Latest Ref Range: 0.00 - 0.90 K/uL  0.50      Eosinophils # Latest Ref Range: 0.00 - 0.60 K/uL  0.40      Basophils # Latest Ref Range: 0.00 - 0.20 K/uL  0.10      Vitamin B-12 Latest Ref Range: 211 - 946 pg/mL 513       MRSA SCREENING CULTURE ONLY Unknown   Rpt     Urine Reflex to Culture Unknown     Not Indicated   Color, UA Latest Ref Range: Straw/Yellow      YELLOW   Clarity, UA Latest Ref Range: Clear      Clear   Glucose, UA Latest Ref Range: Negative mg/dL     Negative   Bilirubin, Urine Latest Ref Range: Negative      Negative   Ketones, Urine Latest Ref Range: Negative mg/dL     Negative   Specific Gravity, UA Latest Ref Range: 1.005 - 1.030      1.008   Blood, Urine Latest Ref Range: Negative      Negative   pH, UA Latest Ref Range: 5.0 - 8.0      6.0   Protein, UA Latest Ref Range: Negative mg/dL     Negative   Urobilinogen, Urine Latest Ref Range: <2.0 E.U./dL     0.2   Nitrite, Urine Latest Ref Range: Negative      Negative   Leukocyte Esterase, Urine Latest Ref Range: Negative      Negative   URINE RT REFLEX TO CULTURE Unknown     Rpt Assessment:     Active Problems:    Suicidal ideations  Resolved Problems:    * No resolved hospital problems. *        Plan:     BRIGITTE (baseline Cr 0.8):  Worsened from 1.6 on arrival labs to 2.3  IVF of LR at 125cc/h to start after Banana bag is done    Suicidal Gesturing likely without Intent (resolved?) secondary to Alcohol Intoxication (resolved) in setting of Ongoing Alcohol Abuse:  Ativan as per CIWA protocol  Thiamine and Folic Acid and Multi Vitamin PO QDay  Magnesium and Potassium Protocol  Mag Hydroxide PRN  Zofran PRN  Psych consult  1:1 until / if cleared  No taper as not drinking regulalry  Patient states that he gets injected form of naltrexone, does not want PO version    Continuous Tobacco Abuse with Dependence:  curenntly Smokes 1 PPD, since age 13 ha averaged 1 PPD  Counseled for tobacco smoking cessation >3 minutes      DVT PPx: Lovenox 40mg SQ QDay      Care time of >35 minutes

## 2019-06-21 NOTE — PLAN OF CARE
Problem: Falls - Risk of:  Goal: Will remain free from falls  Description  Will remain free from falls  6/21/2019 0817 by April D Naoma Schaumann, RN  Outcome: Ongoing  6/20/2019 2351 by Williams Sands RN  Outcome: Ongoing  6/20/2019 2350 by Williams Sands RN  Outcome: Ongoing  Goal: Absence of physical injury  Description  Absence of physical injury  6/21/2019 0817 by April D Naoma Schaumann, RN  Outcome: Ongoing  6/20/2019 2351 by Williams Sands RN  Outcome: Ongoing  6/20/2019 2350 by Williams Sands RN  Outcome: Ongoing     Problem: Discharge Planning:  Goal: Discharged to appropriate level of care  Description  Discharged to appropriate level of care  6/21/2019 0817 by April D Naoma Schaumann, RN  Outcome: Ongoing  6/20/2019 2351 by Williams Sands RN  Outcome: Ongoing  6/20/2019 2350 by Williams Sands RN  Outcome: Ongoing     Problem: Fluid Volume - Deficit:  Goal: Absence of fluid volume deficit signs and symptoms  Description  Absence of fluid volume deficit signs and symptoms  6/21/2019 0817 by April D Naoma Schaumann, RN  Outcome: Ongoing  6/20/2019 2351 by Williams Sands RN  Outcome: Ongoing  6/20/2019 2350 by Williams Sands RN  Outcome: Ongoing     Problem: Nutrition Deficit:  Goal: Ability to achieve adequate nutritional intake will improve  Description  Ability to achieve adequate nutritional intake will improve  6/21/2019 0817 by April D Naoma Schaumann, RN  Outcome: Ongoing  6/20/2019 2351 by Williams Sands RN  Outcome: Ongoing  6/20/2019 2350 by Williams Sands RN  Outcome: Ongoing     Problem: Sleep Pattern Disturbance:  Goal: Appears well-rested  Description  Appears well-rested  6/21/2019 0817 by April D Naoma Schaumann, RN  Outcome: Ongoing  6/20/2019 2351 by Williams Sands RN  Outcome: Ongoing  6/20/2019 2350 by Williams Sands RN  Outcome: Ongoing     Problem: Violence - Risk of, Self/Other-Directed:  Goal: Knowledge of developmental care interventions  Description  Absence of violence  6/21/2019 0817 by April D Naoma Schaumann, RN  Outcome: Ongoing  6/20/2019 2351 by Esthela Lubin RN  Outcome: Ongoing  6/20/2019 2350 by Esthela Lubin RN  Outcome: Ongoing     Problem: Suicide risk  Goal: Provide patient with safe environment  Description  Provide patient with safe environment  6/21/2019 0817 by Deneen Chau RN  Outcome: Ongoing  6/20/2019 2351 by Esthela Lubin RN  Outcome: Ongoing

## 2019-06-21 NOTE — CONSULTS
[facility]  Psychiatry Consult    Reason for Consult: Concern   Suicidal ideations    The primary source(s) of information include(s):  Patient    The patient is a 44 y.o. male with previous psychiatric history of alcohol use disorder, bipolar disorder, who has been admitted to medical services secondary to alcohol intoxication, with blood alcohol level 352, and suicidal ideations. Patient is well known to psychiatry due to multiple previous admissions with alcohol intoxication and bipolar disorder. Last time patient has been discharged from the hospital 1 week ago, after he has been admitted with alcohol intoxication and suicidal ideations, and psychotropic medications adjustment have been done. Patient reported that after the discharge from the hospital he started to drink a few days later and was drinking 2 pints of vodka daily. Patient stated that he realized that he will not stop drinking by himself and decided to come to the hospital and ask for help. At time of interview patient denies any withdrawal symptoms from alcohol, which were properly managed by prescribed medications. Today during the interview, patient denies any affective symptomatology, denies any depression, anxiety and psychosis. Patient denies any current active suicidal or homicidal ideations, denies any plans. When I asked he patient about making suicidal statement in the emergency department at time of admission, he responded \"you know, I am always suicidal when I am intoxicated, but I will never hurt myself and I am not suicidal today\". He also denies any of the auditory and visual hallucinations  He endorses treatment noncompliance with psychotropic medications prescribed.       PSYCHIATRIC HISTORY:  Diagnoses: Alcohol use disorder, Pulley disorder  Suicide attempts/gestures: Denies   Prior hospitalizations: Multiple to [facility], last admission in February 2019  Medication trials: Lamictal, Seroquel, Zoloft, naltrexone, Vivitrol  Mental health contact: Lost to follow-up   Head trauma: Denies    Allergies:  Patient has no known allergies. Social History:  Smoking -1 PPD for 35 years  Alcohol -patient prefers to drink beer or vodka, drinks every other day. Patient stated that he drinks up to 12 pack of the beers and a pint of vodka at once. Illicit drugs -denies    Mental Status  Appearance: Appropriately groomed and in hospital attire. Made good eye contact. Behavior: Calm, cooperative, and socially appropriate. No psychomotor retardation/agitation appreciated. Speech: Normal in tone, volume, and quality. No slurring, dysarthria or   pressured speech noted. Mood: \"better\"   Affect: Mood congruent. Range is full. Thought Process: Appears linear and goal oriented. Thought Content: Patient does not have any current active suicidal and   homicidal ideations. No overt delusions or paranoia appreciated. Perceptions: Seems patient does not have any auditory or visual hallucinations at present time. Patient did not appear to be responding to internal stimuli. No overt psychosis. Executive Functions: Appear intact. Concentration: Intact. Reasoning: Appears mildly impaired based on interaction from interview   Orientation: to person, place, date, and situation. Alert. Language: Intact. Fund of information: Intact. Memory: recent and remote appear intact. Impulsivity: Limited. Neurovegitative: Fair appetite, fair sleep. Insight: Impaired. Judgment: Fair. Assessment  DSM 5 DIAGNOSIS:  Alcohol use disorder, severe  Alcohol-induced mood disorder  Bipolar disorder  History of treatment noncompliance    Recommendations  1. Currently patient is not suicidal, homicidal or psychotic. He denies having any withdrawal symptoms from alcohol. He does not meet criteria for psychiatric hospitalization  2. Patient is competent at this time.   3. Patient does not present imminent danger to self or others. 4.  Commended to discontinue one-to-one sitter. 5.  Please, continue patient's psychotropic medications as it was previously prescribed and recommended. No adjustment with dose of psychotropic medications is necessary at this time. 6.  Patient can be discharged home when he is medically stable.         Jw Robert MD

## 2019-06-21 NOTE — H&P
[facility]      [facility]ist - History & Physical      PCP: Harsh Vanegas    Date of Admission: 6/20/2019    Date of Service: 6/20/2019    Chief Complaint:  Suicidal ideations    History Of Present Illness: The patient is a 44 y.o. 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  States he has been suicidal for the past 2 days. Has a long-standing history of depression. Is under the care of Dr. Fátima Ayon from psychiatry. Feels that his medications are not working. Dates that he wants to kill himself by shooting himself with a gun. Was brought here by his father due to concerns for his well-being. Has no somatic complaints        Past Medical History:        Diagnosis Date    CAD (coronary artery disease)     Chest pain 12/12/2011    Cigarette smoker 12/12/2011    Depression     Hypertension 12/12/2011    Seizures ([address]        Past Surgical History:        Procedure Laterality Date    APPENDECTOMY      [facility]LE[facility]18/[facility]    COLONOSCOPY      ENDOSCOPY, COLON, DIAGNOSTIC      KNEE ARTHROSCOPY Right     NECK SURGERY  7/15/2008    [facility]       Home Medications:  Prior to Admission medications    Medication Sig Start Date End Date Taking?  Authorizing Provider   lamoTRIgine (LAMICTAL) 25 MG tablet Take 1 tablet by mouth 2 times daily 6/13/19  Yes Lizette Soulier, MD   sertraline (ZOLOFT) 50 MG tablet Take 1 tablet by mouth daily 6/14/19  Yes Lizette Soulier, MD   naltrexone (VIVITROL) 380 MG injection Inject 380 mg into the muscle once for 1 dose 7/11/19 7/11/19 Yes Lizette Soulier, MD   QUEtiapine (SEROQUEL) 100 MG tablet Take 1 tablet by mouth nightly 6/13/19  Yes Lizette Soulier, MD   folic acid (FOLVITE) 1 MG tablet Take 1 tablet by mouth daily 6/14/19  Yes Lizette Soulier, MD hydrOXYzine (ATARAX) 25 MG tablet Take 1 tablet by mouth every 6 hours as needed for Anxiety 6/13/19 6/23/19 Yes Bianca Bess MD   QUEtiapine (SEROQUEL) 50 MG tablet Take 1 tablet by mouth 2 times daily 2/21/19  Yes Bianca Bess MD   vitamin B-1 100 MG tablet Take 1 tablet by mouth daily 2/22/19  Yes Bianca Bess MD   losartan (COZAAR) 100 MG tablet Take 100 mg by mouth daily   Yes Historical Provider, MD   vitamin D (ERGOCALCIFEROL) 06477 units capsule Take 1 capsule by mouth once a week for 11 doses 2/19/19 6/11/19  Kelby Khoury MD       Allergies:    Patient has no known allergies. Social History:      Tobacco:   reports that he has been smoking cigarettes. He has a 35.00 pack-year smoking history. His smokeless tobacco use includes snuff. Alcohol:   reports that he drinks alcohol. Illicit Drugs: no     Family History:      Problem Relation Age of Onset    Osteoarthritis Mother     Thyroid Disease Mother     Heart Failure Father     Heart Failure Maternal Grandmother     Heart Failure Paternal Grandmother     Cancer Maternal Grandfather        Review of Systems:   Constitutional / general:  No fever / chills / sweats  HEENT: No sore throat / hoarseness / vision changes  CV:  No chest pain / palpitations/ orthopnea   Respiratory:  No cough / shortness of breath / sputum / hemoptysis  GI: No nausea / vomiting / abdominal pain / diarrhea / constipation  :  No dysuria / hesitancy / urgency / hematuria   Neuro: No muscle weakness / dysphagia / headache / paresthesias  Musculoskeletal:  No edema / cyanosis / pain  Psychiatric: +ve depression / +ve anxiety / insomnia  Skin:  No new rashes / lesions    Physical Examination:     BP (!) 105/54   Pulse 96   Temp 98.2 °F (36.8 °C)   Resp 18   Ht 6' 4\" (1.93 m)   Wt 165 lb (74.8 kg)   SpO2 95%   BMI 20.08 kg/m²   General appearance:lethargic   HEENT: Normal cephalic, atraumatic without obvious deformity.  Pupils equal, round, and reactive to light.  Extra ocular muscles intact. Conjunctivae/corneas clear. Normal ears and nose. .  Neck: Supple, with full range of motion. No jugular venous distention. Trachea midline. Thyroid no masses noted. Respiratory: Normal respiratory effort. Clear to auscultation bilaterally, without Rales/Wheezes/Rhonchi. Cardiovascular: Regular rate and rhythm with normal S1/S2 without murmurs, rubs or gallops. Abdomen: Soft, non-tender, non-distended with normal bowel sounds. .  Musculoskeletal: No clubbing, cyanosis or edema bilaterally. Full range of motion without deformity in 4 extremities. Neurologic:  Neurovascularly intact without any focal sensory/motor deficits. Cranial nerves: II-XII intact, grossly non-focal.  Psychiatric: lethargic     Diagnostic Data:  Imaging:   No orders to display     CBC:  Recent Labs     06/20/19  1803   WBC 7.5   HGB 13.0*   HCT 39.6*        BMP:  Recent Labs     06/20/19  1803      K 4.2      CO2 30*   BUN 17   CREATININE 1.6*   CALCIUM 8.5*     Recent Labs     06/20/19  1803   AST 22   ALT 27   BILITOT <0.2   ALKPHOS 93     Coag Panel: No results for input(s): INR, PROTIME, APTT in the last 72 hours. Cardiac Enzymes: No results for input(s): Willard Dusky in the last 72 hours. ABGs:  Lab Results   Component Value Date    PHART 7.530 01/27/2019    PO2ART 72.0 01/27/2019    SVL8GOD 39.0 01/27/2019     Urinalysis:  Lab Results   Component Value Date    NITRU Negative 02/15/2019    WBCUA 1 02/15/2019    BACTERIA NEGATIVE 02/15/2019    RBCUA 0 02/15/2019    BLOODU LARGE 02/15/2019    SPECGRAV 1.011 02/15/2019    GLUCOSEU Negative 02/15/2019       Active Hospital Problems    Diagnosis Date Noted    Suicidal ideations [R45.851]        Assessment and Plan: Active Problems:    Suicidal ideations  Resolved Problems:    * No resolved hospital problems.  *    Admit to ICU suicidal precautions, repeat ETOH level in am thiamine MVI folate, librium ativan PRN , now he tells me he no longer is suicidal   Nicotine patch     [facility]           [facility]  6/20/2019  7:45 PM

## 2019-06-21 NOTE — PLAN OF CARE
Problem: Falls - Risk of:  Goal: Will remain free from falls  Description  Will remain free from falls  6/21/2019 1751 by Darnell Castro RN  Outcome: Ongoing  6/21/2019 1429 by Darnell Castro RN  Outcome: Ongoing  6/21/2019 0817 by Deneen Burch RN  Outcome: Ongoing  Goal: Absence of physical injury  Description  Absence of physical injury  6/21/2019 1751 by Darnell Castro RN  Outcome: Ongoing  6/21/2019 1429 by Darnell Castro RN  Outcome: Ongoing  6/21/2019 0817 by Deneen Viera RN  Outcome: Ongoing     Problem: Discharge Planning:  Goal: Discharged to appropriate level of care  Description  Discharged to appropriate level of care  6/21/2019 1751 by Darenll Castro RN  Outcome: Ongoing  6/21/2019 1429 by Darnell Castro RN  Outcome: Ongoing  6/21/2019 0817 by Deneen Burch RN  Outcome: Ongoing     Problem: Fluid Volume - Deficit:  Goal: Absence of fluid volume deficit signs and symptoms  Description  Absence of fluid volume deficit signs and symptoms  6/21/2019 1751 by Darnell Castro RN  Outcome: Ongoing  6/21/2019 1429 by Darnell Castro RN  Outcome: Ongoing  6/21/2019 0817 by Deneen Burch RN  Outcome: Ongoing     Problem: Nutrition Deficit:  Goal: Ability to achieve adequate nutritional intake will improve  Description  Ability to achieve adequate nutritional intake will improve  6/21/2019 1751 by Darnell Castro RN  Outcome: Ongoing  6/21/2019 1429 by Darnell Castro RN  Outcome: Ongoing  6/21/2019 0817 by Deneen Viera RN  Outcome: Ongoing     Problem: Sleep Pattern Disturbance:  Goal: Appears well-rested  Description  Appears well-rested  6/21/2019 1751 by Darnell Castro RN  Outcome: Ongoing  6/21/2019 1429 by Darnell Castro RN  Outcome: Ongoing  6/21/2019 0817 by Deneen Burch RN  Outcome: Ongoing     Problem: Violence - Risk of, Self/Other-Directed:  Goal: Knowledge of developmental care interventions  Description  Absence of violence  6/21/2019 1751 by Darnell Castro RN  Outcome: Ongoing  6/21/2019 1429 by Jessica Monahan RN  Outcome: Ongoing  6/21/2019 0817 by Deneen Lynn RN  Outcome: Ongoing     Problem: Suicide risk  Goal: Provide patient with safe environment  Description  Provide patient with safe environment  6/21/2019 1751 by Jessica Monahan RN  Outcome: Ongoing  6/21/2019 1429 by Jessica Monahan RN  Outcome: Ongoing  6/21/2019 0817 by Deneen Ontiveros RN  Outcome: Ongoing

## 2019-06-21 NOTE — ED PROVIDER NOTES
[facility] ICU  EMERGENCY DEPARTMENT ENCOUNTER      Pt Name: Mark Lugo  MRN: [id number]  [place] 1970  Date of evaluation: 6/20/2019  Provider: Casey Billingsley MD    CHIEF COMPLAINT       Chief Complaint   Patient presents with   [address] Problem     presents with c/o si, states drank 12 -14 beers today        HISTORY OF PRESENT ILLNESS   (Location/Symptom, Timing/Onset, Context/Setting, Quality, Duration, Modifying Factors, Severity)  Note limiting factors. Mark Lugo is a 44 y.o. male who presents to the emergency department with     Suicidal ideations. Patient states that he drinks daily, a pint of vodka, for the past year. 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  States he has been suicidal for the past 2 days. Has a long-standing history of depression. Is under the care of Dr. Levy Bell from psychiatry. Feels that his medications are not working. Dates that he wants to kill himself by shooting himself with a gun. Was brought here by his father due to concerns for his well-being. Has no somatic complaints. Nursing Notes were reviewed. REVIEW OF SYSTEMS    (2-9 systems for level 4,10 or more for level 5)     Review of Systems   Constitutional: Negative for activity change, appetite change, chills and fever. HENT: Negative for congestion, ear pain, nosebleeds, rhinorrhea, sore throat and trouble swallowing. Respiratory: Negative for cough, chest tightness and shortness of breath. Cardiovascular: Negative for chest pain and palpitations. Gastrointestinal: Negative for abdominal distention, abdominal pain, blood in stool, constipation, diarrhea, nausea and vomiting. Genitourinary: Negative for difficulty urinating, dysuria and hematuria. Musculoskeletal: Negative for arthralgias, back pain, joint swelling, myalgias and neck pain. Skin: Negative for rash.    Neurological: Negative for dizziness, weakness, light-headedness and headaches. Psychiatric/Behavioral: Positive for dysphoric mood, sleep disturbance and suicidal ideas. Negative for confusion, hallucinations and self-injury. PAST MEDICAL HISTORY     Past Medical History:   Diagnosis Date    CAD (coronary artery disease)     Chest pain 12/12/2011    Cigarette smoker 12/12/2011    Depression     Hypertension 12/12/2011    Seizures ([facility] [address]        SURGICAL HISTORY       Past Surgical History:   Procedure Laterality Date    APPENDECTOMY      CHOLECYSTECTOMY  5/18/2006    Stigall    COLONOSCOPY      ENDOSCOPY, COLON, DIAGNOSTIC      KNEE ARTHROSCOPY Right     NECK SURGERY  7/15/2008    Hadi       CURRENT MEDICATIONS       Current Discharge Medication List      CONTINUE these medications which have NOT CHANGED    Details   lamoTRIgine (LAMICTAL) 25 MG tablet Take 1 tablet by mouth 2 times daily  Qty: 60 tablet, Refills: 1      sertraline (ZOLOFT) 50 MG tablet Take 1 tablet by mouth daily  Qty: 30 tablet, Refills: 1      naltrexone (VIVITROL) 380 MG injection Inject 380 mg into the muscle once for 1 dose  Qty: 1.2 each, Refills: 0      !! QUEtiapine (SEROQUEL) 100 MG tablet Take 1 tablet by mouth nightly  Qty: 30 tablet, Refills: 1      folic acid (FOLVITE) 1 MG tablet Take 1 tablet by mouth daily  Qty: 30 tablet, Refills: 1      hydrOXYzine (ATARAX) 25 MG tablet Take 1 tablet by mouth every 6 hours as needed for Anxiety  Qty: 120 tablet, Refills: 0      !! QUEtiapine (SEROQUEL) 50 MG tablet Take 1 tablet by mouth 2 times daily  Qty: 60 tablet, Refills: 3      vitamin B-1 100 MG tablet Take 1 tablet by mouth daily  Qty: 30 tablet, Refills: 3      losartan (COZAAR) 100 MG tablet Take 100 mg by mouth daily      vitamin D (ERGOCALCIFEROL) 84706 units capsule Take 1 capsule by mouth once a week for 11 doses  Qty: 11 capsule, Refills: 0       !! - Potential duplicate medications found. Please discuss with provider.           ALLERGIES     Patient has no known allergies. FAMILY HISTORY       Family History   Problem Relation Age of Onset    Osteoarthritis Mother     Thyroid Disease Mother     Heart Failure Father     Heart Failure Maternal Grandmother     Heart Failure Paternal Grandmother     Cancer Maternal Grandfather        SOCIAL HISTORY       Social History     Socioeconomic History    Marital status:      Spouse name: None    Number of children: None    Years of education: None    Highest education level: None   Occupational History    None   Social Needs    Financial resource strain: None    Food insecurity:     Worry: None     Inability: None    Transportation needs:     Medical: None     Non-medical: None   Tobacco Use    Smoking status: Current Every Day Smoker     Packs/day: 1.00     Years: 35.00     Pack years: 35.00     Types: Cigarettes    Smokeless tobacco: Current User     Types: Snuff   Substance and Sexual Activity    Alcohol use:  Yes     Alcohol/week: 0.0 oz     Comment: all day every day, pt states he drinks as much ad he can get    Drug use: Yes     Types: Marijuana    Sexual activity: Yes     Partners: Female   Lifestyle    Physical activity:     Days per week: None     Minutes per session: None    Stress: None   Relationships    Social connections:     Talks on phone: None     Gets together: None     Attends Ep[employer] service: None     Active member of club or organization: None     Attends meetings of clubs or organizations: None     Relationship status: None    Intimate partner violence:     Fear of current or ex partner: None     Emotionally abused: None     Physically abused: None     Forced sexual activity: None   Other Topics Concern    None   Social History Narrative    None       SCREENINGS    Jaime Coma Scale  Eye Opening: Spontaneous  Best Verbal Response: Oriented  Best Motor Response: Obeys commands  Jaime Coma Scale Score: 15      PHYSICAL EXAM    (up to 7 for level 4, 8 or more for level 5) ED Triage Vitals [06/20/19 1748]   BP Temp Temp src Pulse Resp SpO2 Height Weight   (!) 105/54 98.2 °F (36.8 °C) -- 96 18 95 % 6' 4\" (1.93 m) 165 lb (74.8 kg)       Physical Exam   Constitutional: He is oriented to person, place, and time. He appears well-developed and well-nourished. No distress. Alcohol like odor on breath   HENT:   Head: Normocephalic and atraumatic. Mouth/Throat: Oropharynx is clear and moist.   Eyes: Pupils are equal, round, and reactive to light. EOM are normal. No scleral icterus. Neck: Normal range of motion. Neck supple. No tracheal deviation present. Cardiovascular: Normal rate, regular rhythm, normal heart sounds and intact distal pulses. Exam reveals no gallop and no friction rub. No murmur heard. Pulmonary/Chest: Effort normal and breath sounds normal. No respiratory distress. He has no wheezes. He has no rales. He exhibits no tenderness. Abdominal: Soft. He exhibits no distension and no mass. There is no tenderness. There is no rebound and no guarding. Musculoskeletal: Normal range of motion. He exhibits no edema, tenderness or deformity. Neurological: He is alert and oriented to person, place, and time. No cranial nerve deficit. He exhibits normal muscle tone. Coordination normal.   Skin: Skin is warm and dry. No rash noted. Psychiatric: His affect is blunt. His speech is delayed. He is slowed. Thought content is not paranoid and not delusional. Cognition and memory are normal. He expresses impulsivity. He expresses suicidal ideation. He expresses no homicidal ideation. He expresses suicidal plans (Plans to kill himself with a firearm). He expresses no homicidal plans. Poor insight He is inattentive. Nursing note and vitals reviewed.       DIAGNOSTIC RESULTS     EKG:All EKG's are interpreted by the Emergency Department Physician who either signs or Co-signs this chart in the absence of a cardiologist.        RADIOLOGY:   Non-plain film images such as CT, Ultrasound and MRI are read by theradiologist. Plain radiographic images are visualized and preliminarily interpreted by the emergency physician with the below findings:      No orders to display       LABS:  Labs Reviewed   CBC WITH AUTO DIFFERENTIAL - Abnormal; Notable for the following components:       Result Value    RBC 4.27 (*)     Hemoglobin 13.0 (*)     Hematocrit 39.6 (*)     MCHC 32.8 (*)     RDW 15.0 (*)     MPV 8.5 (*)     Neutrophils % 46.3 (*)     Eosinophils % 5.9 (*)     All other components within normal limits   COMPREHENSIVE METABOLIC PANEL - Abnormal; Notable for the following components:    CO2 30 (*)     CREATININE 1.6 (*)     GFR Non-African American 46 (*)     Calcium 8.5 (*)     All other components within normal limits   MRSA SCREENING CULTURE ONLY   ETHANOL   ACETAMINOPHEN LEVEL   SALICYLATE LEVEL   URINE RT REFLEX TO CULTURE   URINE DRUG SCREEN   ETHANOL   BASIC METABOLIC PANEL W/ REFLEX TO MG FOR LOW K   CBC     other labs were within normal range or not returned as of this dictation. EMERGENCY DEPARTMENT COURSE and DIFFERENTIAL DIAGNOSIS/MDM:   Vitals:    Vitals:    06/20/19 2111 06/20/19 2122 06/20/19 2200 06/20/19 2300   BP: 99/61 (!) 98/56 102/60 95/60   Pulse: 85 92 82 80   Resp: 19  14 13   Temp: 98.5 °F (36.9 °C)      TempSrc: Temporal      SpO2: 96%  95% 94%   Weight: 174 lb 1.6 oz (79 kg)      Height: 6' 4\" (1.93 m)          MDM  Number of Diagnoses or Management Options  Alcohol abuse:   Depression with suicidal ideation:   Diagnosis management comments: 42-year-old male with a history of depression, on antidepressants with 48 hours or more of suicidal ideation, chronic drinker, history of with withdrawal hallucinations. Plan for medical evaluation clearance and psychiatric consult. ED Course  Patient seen and evaluated. Blood drawn. Patient found to have alcohol concentration of approximately 350 mg a deciliter.   Spoke with the hospitalist service, patient will be admitted to the hospital alcohol detox, patient is also at a higher risk for alcohol withdrawal given his history of alcoholic hallucinations from stopping. Patient admitted in stable but guarded condition. CONSULTS:  IP CONSULT TO HOSPITALIST    PROCEDURES:  Unless otherwise noted below, n     Procedures    FINAL IMPRESSION      1. Depression with suicidal ideation    2.  Alcohol abuse          DISPOSITION/PLAN   DISPOSITION Admitted 06/20/2019 07:43:49 PM      PATIENT REFERRED TO:  [facility] [id number]  [address] [address]  [phone]            DISCHARGE MEDICATIONS:  Current Discharge Medication List             (Pleasenote that portions of this note were completed with a voice recognition program.  Efforts were made to edit the dictations but occasionally words are mis-transcribed.)    Marcelino Espinosa MD (electronically signed)  Attending Emergency Physician          Marcelino Espinosa MD  06/20/19 2644
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
## 2019-06-22 LAB — MRSA CULTURE ONLY: NORMAL

## 2019-06-22 NOTE — PROGRESS NOTES
PT at nurses station and says he is going AMA,spoke to hospitalist and informed him of pts decision. Notes have been reviewed and psych has cleared pt.

## 2019-06-22 NOTE — DISCHARGE SUMMARY
TIME AND DATE ARE NOT ABLE TO BE CORRECTED  THIS EMR OVERWRITES ATTEMPT AT ACCURATELY REFLECTING THAT TIME  I became aware at 11:30pm that patient had left AMA, not began at ~6:45 am    Physician Discharge Summary     Patient ID:  Ravindra Figueroa  [id number]  10 y.o.  1970    Admit date: 6/20/2019    Discharge date and time: 6/21/2019  9:06 PM     Admitting Physician: Veronica Whitehead MD    Discharge Physician: Veronica Whitehead MD    DOCUMENTING PHYSICIAN: Yessenia Thomas    Admission Diagnoses: Suicidal ideations [R45.851]    Discharge Diagnoses: Alcohol Abuse with Intoxication    Admission Condition: critical    Discharged Condition: poor    Indication for Admission: Suicidality suspected secondary to Suicidal Gesturing secondary to Alcohol Abuse and Acute Intoxication    Hospital Course:   [id number]: late night as per signout from RN team and EMR documentation  Patient decided to leave AMA despite being heavily counseled by me that he has injured kidneys that may fail or only partially recover without continued IVF administration  [id number]:  Mr. Ravindra Figueroa is a pleasant 44year old  [place] gentleman from home. He had been referred to IM for admission under the hospitalist service as he stated while drunk that he wished to harm himself. He has a history of alcohol abuse, and has stated that on three prior occasions he was \"just the alcohol talking\" and that he does not really want to harm himself. He denies ever having attempted to harm himself or anyone else. He understands that he needs a few days IVF for his BRIGITTE to resolve, and that this is due to dehydration and possibly direct cellular toxicity from the alcohol he is drinking. [id number]: (copied from Dr. Otilio Guidry's admission note)  \"The patient is a 52 y. o. male alcoholic with PMH of depression, smoking, HTN, seizure who presented to ER with suicidal ideation, ETOH level was 352 , drinks daily, smokes, and had h/o Dts before , in ER he Screen, Urine Latest Ref Range: Negative <100 ng/mL          NEGATIVE   Cannabinoid Scrn, Ur Latest Ref Range: Negative <50 ng/mL          Negative   Opiate Scrn, Ur Latest Ref Range: Negative < 300 ng/mL          Negative   Cocaine Metabolite Screen, Urine Latest Ref Range: Negative <300 ng/mL          Negative   Drug Screen Comment: Unknown         see below   Vit D, 25-Hydroxy Latest Ref Range: >=30 ng/mL 31.1           Acetaminophen Level Latest Units: ug/mL   <41         Salicylate, Serum Latest Ref Range: 3.0 - 10.0 mg/dL   <3.0         WBC Latest Ref Range: 4.8 - 10.8 K/uL   7.5   7.8     RBC Latest Ref Range: 4.70 - 6.10 M/uL   4.27 (L)   3.78 (L)     Hemoglobin Quant Latest Ref Range: 14.0 - 18.0 g/dL   13.0 (L)   11.3 (L)     Hematocrit Latest Ref Range: 42.0 - 52.0 %   39.6 (L)   34.4 (L)     MCV Latest Ref Range: 80.0 - 94.0 fL   92. 7   91. 0     MCH Latest Ref Range: 27.0 - 31.0 pg   30.4   29.9     MCHC Latest Ref Range: 33.0 - 37.0 g/dL   32.8 (L)   32.8 (L)     MPV Latest Ref Range: 9.4 - 12.4 fL   8.5 (L)   8.8 (L)     RDW Latest Ref Range: 11.5 - 14.5 %   15.0 (H)   14.9 (H)     Platelet Count Latest Ref Range: 130 - 400 K/uL   259   209     Neutrophils % Latest Ref Range: 50.0 - 65.0 %   46.3 (L)         Lymphocyte % Latest Ref Range: 20.0 - 40.0 %   39. 6         Monocytes % Latest Ref Range: 0.0 - 10.0 %   7.1         Eosinophils % Latest Ref Range: 0.0 - 5.0 %   5.9 (H)         Basophils % Latest Ref Range: 0.0 - 1.0 %   0.7         Neutrophils # Latest Ref Range: 1.5 - 7.5 K/uL   3.5         Lymphocytes # Latest Ref Range: 1.1 - 4.5 K/uL   3.0         Monocytes # Latest Ref Range: 0.00 - 0.90 K/uL   0.50         Eosinophils # Latest Ref Range: 0.00 - 0.60 K/uL   0.40         Basophils # Latest Ref Range: 0.00 - 0.20 K/uL   0.10         Vitamin B-12 Latest Ref Range: 211 - 946 pg/mL 513           MRSA SCREENING CULTURE ONLY Unknown     Rpt       Urine Reflex to Culture Unknown         Not Indicated   Color, UA Latest Ref Range: Straw/Yellow          YELLOW   Clarity, UA Latest Ref Range: Clear          Clear   Glucose, UA Latest Ref Range: Negative mg/dL         Negative   Bilirubin, Urine Latest Ref Range: Negative          Negative   Ketones, Urine Latest Ref Range: Negative mg/dL         Negative   Specific Gravity, UA Latest Ref Range: 1.005 - 1.030          1.008   Blood, Urine Latest Ref Range: Negative          Negative   pH, UA Latest Ref Range: 5.0 - 8.0          6.0   Protein, UA Latest Ref Range: Negative mg/dL         Negative   Urobilinogen, Urine Latest Ref Range: <2.0 E.U./dL         0.2   Nitrite, Urine Latest Ref Range: Negative          Negative   Leukocyte Esterase, Urine Latest Ref Range: Negative          Negative   URINE RT REFLEX TO CULTURE Unknown         Rpt       Outstanding Order Results     Date and Time Order Name Status Description    6/20/2019 2201 MRSA Screening Culture Only In process           Treatments:   ICU Monioring  Counseling  IVF Administration  Electrolyte and Vitmain with Mineral Supplementation  Psychiatric Evaluation     Discharge Exam:  Not possible as per AMA, was seen earlier same calendar day by me, please refer to my progress note from earlier same day    Disposition: AMA to home    Patient Instructions: Activity: activity as tolerated and no driving for today  Diet: regular diet  Wound Care: none needed    Follow-up with PCP within two weeks please.     Signed:  Terrie Sinclair  6/22/2019  6:50 AM       Care time for discharging (Gabriel Smith documentation completion in EMR) <30 minutes

## 2019-07-15 ENCOUNTER — TELEPHONE (OUTPATIENT)
Dept: PSYCHOLOGY | Age: 49
End: 2019-07-15

## 2019-07-15 RX ORDER — HYDROXYZINE HYDROCHLORIDE 25 MG/1
25 TABLET, FILM COATED ORAL EVERY 6 HOURS PRN
Qty: 120 TABLET | Refills: 0 | Status: SHIPPED | OUTPATIENT
Start: 2019-07-15 | End: 2019-07-25

## 2019-08-01 ENCOUNTER — TELEPHONE (OUTPATIENT)
Dept: PSYCHIATRY | Age: 49
End: 2019-08-01

## 2019-08-08 ENCOUNTER — TELEPHONE (OUTPATIENT)
Dept: PSYCHIATRY | Age: 49
End: 2019-08-08

## 2019-09-09 ENCOUNTER — HOSPITAL ENCOUNTER (INPATIENT)
Age: 49
LOS: 3 days | Discharge: HOME | DRG: 885 | End: 2019-09-12
Attending: PSYCHIATRY & NEUROLOGY | Admitting: PSYCHIATRY & NEUROLOGY
Payer: MEDICAID

## 2019-09-09 ENCOUNTER — APPOINTMENT (OUTPATIENT)
Dept: GENERAL RADIOLOGY | Age: 49
DRG: 885 | End: 2019-09-09
Payer: MEDICAID

## 2019-09-09 DIAGNOSIS — R45.851 SUICIDAL IDEATION: Primary | ICD-10-CM

## 2019-09-09 PROBLEM — F32.2 SEVERE MAJOR DEPRESSION WITHOUT PSYCHOTIC FEATURES (HCC): Status: ACTIVE | Noted: 2019-09-09

## 2019-09-09 LAB
ACETAMINOPHEN LEVEL: <15 UG/ML
ALBUMIN SERPL-MCNC: 4.6 G/DL (ref 3.5–5.2)
ALP BLD-CCNC: 96 U/L (ref 40–130)
ALT SERPL-CCNC: 25 U/L (ref 5–41)
AMPHETAMINE SCREEN, URINE: NEGATIVE
ANION GAP SERPL CALCULATED.3IONS-SCNC: 9 MMOL/L (ref 7–19)
AST SERPL-CCNC: 18 U/L (ref 5–40)
BARBITURATE SCREEN URINE: NEGATIVE
BASOPHILS ABSOLUTE: 0 K/UL (ref 0–0.2)
BASOPHILS RELATIVE PERCENT: 0.5 % (ref 0–1)
BENZODIAZEPINE SCREEN, URINE: NEGATIVE
BILIRUB SERPL-MCNC: <0.2 MG/DL (ref 0.2–1.2)
BILIRUBIN URINE: NEGATIVE
BLOOD, URINE: NEGATIVE
BUN BLDV-MCNC: 20 MG/DL (ref 6–20)
CALCIUM SERPL-MCNC: 9.7 MG/DL (ref 8.6–10)
CANNABINOID SCREEN URINE: NEGATIVE
CHLORIDE BLD-SCNC: 100 MMOL/L (ref 98–111)
CLARITY: CLEAR
CO2: 24 MMOL/L (ref 22–29)
COCAINE METABOLITE SCREEN URINE: NEGATIVE
COLOR: YELLOW
CREAT SERPL-MCNC: 1.1 MG/DL (ref 0.5–1.2)
EOSINOPHILS ABSOLUTE: 0.2 K/UL (ref 0–0.6)
EOSINOPHILS RELATIVE PERCENT: 2 % (ref 0–5)
ETHANOL: 56 MG/DL (ref 0–0.08)
GFR NON-AFRICAN AMERICAN: >60
GLUCOSE BLD-MCNC: 115 MG/DL (ref 74–109)
GLUCOSE URINE: NEGATIVE MG/DL
HCT VFR BLD CALC: 38.5 % (ref 42–52)
HEMOGLOBIN: 13 G/DL (ref 14–18)
IMMATURE GRANULOCYTES #: 0 K/UL
KETONES, URINE: NEGATIVE MG/DL
LEUKOCYTE ESTERASE, URINE: NEGATIVE
LYMPHOCYTES ABSOLUTE: 1.7 K/UL (ref 1.1–4.5)
LYMPHOCYTES RELATIVE PERCENT: 20.7 % (ref 20–40)
Lab: NORMAL
MCH RBC QN AUTO: 30.7 PG (ref 27–31)
MCHC RBC AUTO-ENTMCNC: 33.8 G/DL (ref 33–37)
MCV RBC AUTO: 90.8 FL (ref 80–94)
MONOCYTES ABSOLUTE: 0.8 K/UL (ref 0–0.9)
MONOCYTES RELATIVE PERCENT: 9.4 % (ref 0–10)
NEUTROPHILS ABSOLUTE: 5.4 K/UL (ref 1.5–7.5)
NEUTROPHILS RELATIVE PERCENT: 66.9 % (ref 50–65)
NITRITE, URINE: NEGATIVE
OPIATE SCREEN URINE: NEGATIVE
PDW BLD-RTO: 12.4 % (ref 11.5–14.5)
PH UA: 5.5 (ref 5–8)
PLATELET # BLD: 238 K/UL (ref 130–400)
PMV BLD AUTO: 8.6 FL (ref 9.4–12.4)
POTASSIUM SERPL-SCNC: 5 MMOL/L (ref 3.5–5)
PROTEIN UA: NEGATIVE MG/DL
RBC # BLD: 4.24 M/UL (ref 4.7–6.1)
SALICYLATE, SERUM: <3 MG/DL (ref 3–10)
SODIUM BLD-SCNC: 133 MMOL/L (ref 136–145)
SPECIFIC GRAVITY UA: 1.01 (ref 1–1.03)
TOTAL PROTEIN: 7.6 G/DL (ref 6.6–8.7)
URINE REFLEX TO CULTURE: NORMAL
UROBILINOGEN, URINE: 0.2 E.U./DL
WBC # BLD: 8 K/UL (ref 4.8–10.8)

## 2019-09-09 PROCEDURE — 99285 EMERGENCY DEPT VISIT HI MDM: CPT

## 2019-09-09 PROCEDURE — 81003 URINALYSIS AUTO W/O SCOPE: CPT

## 2019-09-09 PROCEDURE — 80053 COMPREHEN METABOLIC PANEL: CPT

## 2019-09-09 PROCEDURE — 36415 COLL VENOUS BLD VENIPUNCTURE: CPT

## 2019-09-09 PROCEDURE — 93005 ELECTROCARDIOGRAM TRACING: CPT

## 2019-09-09 PROCEDURE — G0480 DRUG TEST DEF 1-7 CLASSES: HCPCS

## 2019-09-09 PROCEDURE — 1240000000 HC EMOTIONAL WELLNESS R&B

## 2019-09-09 PROCEDURE — 80307 DRUG TEST PRSMV CHEM ANLYZR: CPT

## 2019-09-09 PROCEDURE — 71046 X-RAY EXAM CHEST 2 VIEWS: CPT

## 2019-09-09 PROCEDURE — 96374 THER/PROPH/DIAG INJ IV PUSH: CPT

## 2019-09-09 PROCEDURE — 6370000000 HC RX 637 (ALT 250 FOR IP): Performed by: NURSE PRACTITIONER

## 2019-09-09 PROCEDURE — 6360000002 HC RX W HCPCS: Performed by: PHYSICIAN ASSISTANT

## 2019-09-09 PROCEDURE — 85025 COMPLETE CBC W/AUTO DIFF WBC: CPT

## 2019-09-09 RX ORDER — ACETAMINOPHEN 325 MG/1
650 TABLET ORAL EVERY 4 HOURS PRN
Status: DISCONTINUED | OUTPATIENT
Start: 2019-09-09 | End: 2019-09-12 | Stop reason: HOSPADM

## 2019-09-09 RX ORDER — FOLIC ACID 1 MG/1
1 TABLET ORAL DAILY
Status: DISCONTINUED | OUTPATIENT
Start: 2019-09-09 | End: 2019-09-12 | Stop reason: HOSPADM

## 2019-09-09 RX ORDER — CLONIDINE HYDROCHLORIDE 0.1 MG/1
0.1 TABLET ORAL EVERY 4 HOURS PRN
Status: DISCONTINUED | OUTPATIENT
Start: 2019-09-09 | End: 2019-09-12 | Stop reason: HOSPADM

## 2019-09-09 RX ORDER — QUETIAPINE FUMARATE 100 MG/1
100 TABLET, FILM COATED ORAL NIGHTLY
Status: DISCONTINUED | OUTPATIENT
Start: 2019-09-09 | End: 2019-09-10

## 2019-09-09 RX ORDER — NICOTINE 21 MG/24HR
1 PATCH, TRANSDERMAL 24 HOURS TRANSDERMAL DAILY
Status: DISCONTINUED | OUTPATIENT
Start: 2019-09-09 | End: 2019-09-12 | Stop reason: HOSPADM

## 2019-09-09 RX ORDER — LAMOTRIGINE 25 MG/1
25 TABLET ORAL 2 TIMES DAILY
Status: DISCONTINUED | OUTPATIENT
Start: 2019-09-09 | End: 2019-09-10

## 2019-09-09 RX ORDER — LORAZEPAM 1 MG/1
1 TABLET ORAL EVERY 4 HOURS PRN
Status: DISCONTINUED | OUTPATIENT
Start: 2019-09-09 | End: 2019-09-11

## 2019-09-09 RX ORDER — THIAMINE MONONITRATE (VIT B1) 100 MG
100 TABLET ORAL DAILY
Status: DISCONTINUED | OUTPATIENT
Start: 2019-09-09 | End: 2019-09-12 | Stop reason: HOSPADM

## 2019-09-09 RX ORDER — LORAZEPAM 2 MG/ML
0.5 INJECTION INTRAMUSCULAR ONCE
Status: COMPLETED | OUTPATIENT
Start: 2019-09-09 | End: 2019-09-09

## 2019-09-09 RX ADMIN — FOLIC ACID 1 MG: 1 TABLET ORAL at 16:06

## 2019-09-09 RX ADMIN — Medication 100 MG: at 16:05

## 2019-09-09 RX ADMIN — ACETAMINOPHEN 650 MG: 325 TABLET ORAL at 21:03

## 2019-09-09 RX ADMIN — LAMOTRIGINE 25 MG: 25 TABLET ORAL at 20:39

## 2019-09-09 RX ADMIN — QUETIAPINE FUMARATE 100 MG: 100 TABLET ORAL at 20:39

## 2019-09-09 RX ADMIN — LORAZEPAM 0.5 MG: 2 INJECTION INTRAMUSCULAR; INTRAVENOUS at 13:59

## 2019-09-09 RX ADMIN — SERTRALINE HYDROCHLORIDE 50 MG: 50 TABLET ORAL at 16:06

## 2019-09-09 RX ADMIN — LORAZEPAM 1 MG: 1 TABLET ORAL at 21:03

## 2019-09-09 ASSESSMENT — ENCOUNTER SYMPTOMS
EYE DISCHARGE: 0
PHOTOPHOBIA: 0
COUGH: 1
COLOR CHANGE: 0
EYE ITCHING: 0
APNEA: 0
SHORTNESS OF BREATH: 0
BACK PAIN: 0

## 2019-09-09 ASSESSMENT — SLEEP AND FATIGUE QUESTIONNAIRES
DIFFICULTY FALLING ASLEEP: NO
DIFFICULTY ARISING: YES
RESTFUL SLEEP: NO
DO YOU HAVE DIFFICULTY SLEEPING: NO
DO YOU USE A SLEEP AID: YES
DIFFICULTY STAYING ASLEEP: NO

## 2019-09-09 ASSESSMENT — PAIN DESCRIPTION - ORIENTATION: ORIENTATION: ANTERIOR

## 2019-09-09 ASSESSMENT — PAIN DESCRIPTION - FREQUENCY: FREQUENCY: INTERMITTENT

## 2019-09-09 ASSESSMENT — PAIN DESCRIPTION - PROGRESSION: CLINICAL_PROGRESSION: RESOLVED

## 2019-09-09 ASSESSMENT — PAIN SCALES - GENERAL
PAINLEVEL_OUTOF10: 0
PAINLEVEL_OUTOF10: 8

## 2019-09-09 ASSESSMENT — LIFESTYLE VARIABLES: HISTORY_ALCOHOL_USE: YES

## 2019-09-09 ASSESSMENT — PAIN DESCRIPTION - PAIN TYPE: TYPE: CHRONIC PAIN

## 2019-09-09 ASSESSMENT — PAIN DESCRIPTION - DESCRIPTORS: DESCRIPTORS: HEADACHE

## 2019-09-09 ASSESSMENT — PAIN DESCRIPTION - ONSET: ONSET: ON-GOING

## 2019-09-09 ASSESSMENT — PAIN DESCRIPTION - LOCATION: LOCATION: HEAD

## 2019-09-09 ASSESSMENT — PAIN - FUNCTIONAL ASSESSMENT: PAIN_FUNCTIONAL_ASSESSMENT: ACTIVITIES ARE NOT PREVENTED

## 2019-09-09 ASSESSMENT — PATIENT HEALTH QUESTIONNAIRE - PHQ9: SUM OF ALL RESPONSES TO PHQ QUESTIONS 1-9: 27

## 2019-09-09 NOTE — ED PROVIDER NOTES
Diagnosis Date    CAD (coronary artery disease)     Chest pain 12/12/2011    Cigarette smoker 12/12/2011    Depression     Hypertension 12/12/2011    Respiratory failure (HCC)     intubation    Seizures ([facility] [address]          SURGICAL HISTORY       Past Surgical History:   Procedure Laterality Date    APPENDECTOMY      CHOLECYSTECTOMY  5/18/2006    Stigall    COLONOSCOPY      ENDOSCOPY, COLON, DIAGNOSTIC      KNEE ARTHROSCOPY Right     NECK SURGERY  7/15/2008    Hadi         CURRENT MEDICATIONS       Previous Medications    LAMOTRIGINE (LAMICTAL) 25 MG TABLET    Take 1 tablet by mouth 2 times daily    LOSARTAN (COZAAR) 100 MG TABLET    Take 100 mg by mouth daily    QUETIAPINE (SEROQUEL) 100 MG TABLET    Take 1 tablet by mouth nightly    SERTRALINE (ZOLOFT) 50 MG TABLET    Take 1 tablet by mouth daily       ALLERGIES     Patient has no known allergies. FAMILY HISTORY       Family History   Problem Relation Age of Onset    Osteoarthritis Mother     Thyroid Disease Mother     Heart Failure Father     Heart Failure Maternal Grandmother     Heart Failure Paternal Grandmother     Cancer Maternal Grandfather           SOCIAL HISTORY       Social History     Socioeconomic History    Marital status:      Spouse name: Not on file    Number of children: Not on file    Years of education: Not on file    Highest education level: Not on file   Occupational History     Employer: DISABLED   Social Needs    Financial resource strain: Not on file    Food insecurity:     Worry: Not on file     Inability: Not on file    Transportation needs:     Medical: Not on file     Non-medical: Not on file   Tobacco Use    Smoking status: Current Every Day Smoker     Packs/day: 1.00     Years: 35.00     Pack years: 35.00     Types: Cigarettes    Smokeless tobacco: Current User     Types: Snuff   Substance and Sexual Activity    Alcohol use:  Yes     Alcohol/week: 0.0 standard drinks     Comment: all day every day,

## 2019-09-09 NOTE — BH NOTE
not be receiving opiates unless they were within 3 days post surgery/acute injury. Patient voiced understanding and agreed. Psychiatric Review Of Systems:     Recent Sleep changes: Yes   Recent appetite changes: Yes   Recent weight changes/Pounds gained (+) or lost (-):       Medical History:     Medical Diagnosis/Issues:   CT today in ED:No  Use of 02 or CPAP: Has but hasn't had since April  Ambulatory: yes  Independent or Need assistance with Self Care:     PCP: Jolanta Valdez     Current Medications:   Scheduled Meds: No current facility-administered medications for this encounter. Current Outpatient Medications:     lamoTRIgine (LAMICTAL) 25 MG tablet, Take 1 tablet by mouth 2 times daily (Patient taking differently: Take 50 mg by mouth 2 times daily ), Disp: 60 tablet, Rfl: 1    sertraline (ZOLOFT) 50 MG tablet, Take 1 tablet by mouth daily, Disp: 30 tablet, Rfl: 1    QUEtiapine (SEROQUEL) 100 MG tablet, Take 1 tablet by mouth nightly (Patient taking differently: Take 200 mg by mouth nightly ), Disp: 30 tablet, Rfl: 1    losartan (COZAAR) 100 MG tablet, Take 100 mg by mouth daily, Disp: , Rfl:      Mental Status Evaluation:     Appearance:  age appropriate and disheveled   Behavior:  Within Normal Limits   Speech:  normal pitch and normal volume   Mood:  anxious   Affect:  mood-congruent   Thought Process:  circumstantial   Thought Content:  suicidal   Sensorium:  person, place, time/date, situation, day of week, month of year and year   Cognition:  grossly intact   Insight:  limited       Collateral Information:     Name:   Relationship:   Phone Number:   Collateral:     Disposition:     Choose one of the four options below for   disposition:     1. Decision to admit to :yes    If yes, which unit Adult or Geriatric Unit:  Adult  Is patient voluntary: yes  If no has a 72 hold been initiated:   Does the patient have a guardian:   Has the guardian been notified:   Admission Diagnosis: Suicidal    2. Referral to IOP/PHP: no     3. Decision to Discharge:   Does not meet criteria for acceptance to   unit due to:     4. Transferred:       Patient was transferred due to: Other follow up information provided:       Kaleb Quinones RN

## 2019-09-09 NOTE — PROGRESS NOTES
Admission Note      Reason for admission/Target Symptom: Patient admitted to [facility] due to states reason for ED visit,Depression out of control x 3 weeks-\"God awful out of hand\" and having anxiety attacks. PT SI, would use a gun if he were going to do anything. Denies HI, AVH  Diagnoses: Depression NOS  UDS: Neg  BAL:  46    SW met with treatment team to discuss patient's treatment including care planning, discharge planning, and follow-up needs. Pt has been admitted to [facility]. Treatment team has identified patient's discharge needs as medication management and outpatient therapy/counseling. Pt confirmed  the need for ongoing treatment post inpatient stay. Pt was also provided a handout of contact information for drug and alcohol treatment centers and other community support service such as [facility], AA, and [facility] .

## 2019-09-10 PROBLEM — F31.63 BIPOLAR I DISORDER, MOST RECENT EPISODE MIXED, SEVERE WITHOUT PSYCHOTIC FEATURES (HCC): Status: ACTIVE | Noted: 2019-09-10

## 2019-09-10 LAB
EKG P AXIS: 61 DEGREES
EKG P-R INTERVAL: 196 MS
EKG Q-T INTERVAL: 366 MS
EKG QRS DURATION: 88 MS
EKG QTC CALCULATION (BAZETT): 401 MS
EKG T AXIS: 64 DEGREES

## 2019-09-10 PROCEDURE — 1240000000 HC EMOTIONAL WELLNESS R&B

## 2019-09-10 PROCEDURE — 6370000000 HC RX 637 (ALT 250 FOR IP): Performed by: NURSE PRACTITIONER

## 2019-09-10 PROCEDURE — 6370000000 HC RX 637 (ALT 250 FOR IP): Performed by: PSYCHIATRY & NEUROLOGY

## 2019-09-10 PROCEDURE — 99223 1ST HOSP IP/OBS HIGH 75: CPT | Performed by: PSYCHIATRY & NEUROLOGY

## 2019-09-10 RX ORDER — LAMOTRIGINE 25 MG/1
50 TABLET ORAL 2 TIMES DAILY
Status: DISCONTINUED | OUTPATIENT
Start: 2019-09-10 | End: 2019-09-12 | Stop reason: HOSPADM

## 2019-09-10 RX ORDER — SERTRALINE HYDROCHLORIDE 100 MG/1
100 TABLET, FILM COATED ORAL DAILY
Status: DISCONTINUED | OUTPATIENT
Start: 2019-09-11 | End: 2019-09-12 | Stop reason: HOSPADM

## 2019-09-10 RX ORDER — QUETIAPINE FUMARATE 200 MG/1
200 TABLET, FILM COATED ORAL NIGHTLY
Status: DISCONTINUED | OUTPATIENT
Start: 2019-09-10 | End: 2019-09-12 | Stop reason: HOSPADM

## 2019-09-10 RX ORDER — LANOLIN ALCOHOL/MO/W.PET/CERES
3 CREAM (GRAM) TOPICAL NIGHTLY
Status: DISCONTINUED | OUTPATIENT
Start: 2019-09-10 | End: 2019-09-12 | Stop reason: HOSPADM

## 2019-09-10 RX ADMIN — SERTRALINE HYDROCHLORIDE 50 MG: 50 TABLET ORAL at 08:11

## 2019-09-10 RX ADMIN — FOLIC ACID 1 MG: 1 TABLET ORAL at 08:11

## 2019-09-10 RX ADMIN — Medication 100 MG: at 08:11

## 2019-09-10 RX ADMIN — LAMOTRIGINE 50 MG: 25 TABLET ORAL at 21:15

## 2019-09-10 RX ADMIN — LAMOTRIGINE 25 MG: 25 TABLET ORAL at 08:11

## 2019-09-10 RX ADMIN — MELATONIN 3 MG: at 21:15

## 2019-09-10 RX ADMIN — ACETAMINOPHEN 650 MG: 325 TABLET ORAL at 18:41

## 2019-09-10 RX ADMIN — QUETIAPINE FUMARATE 200 MG: 200 TABLET ORAL at 21:15

## 2019-09-10 ASSESSMENT — PAIN SCALES - GENERAL: PAINLEVEL_OUTOF10: 6

## 2019-09-10 NOTE — PROGRESS NOTES
Treatment Team Note:     SW met with [address] team to discuss Pts TX and DC plans.      Progress/Behavior/Group Attendance: TBD     Target Symptoms/Reason for admission: Patient admitted to [facility] due to states reason for ED visit,Depression out of control x 3 weeks-\"God awful out of hand\" and having anxiety attacks.  PT SI, would use a gun if he were going to do anything.  Denies HI, AVH     Diagnoses: Depression NOS     UDS: Neg             BAL: 56     AftercarePlan: [address] [employer] lives with: parents     Collateral obtained from: mother  On:9/9/19     Family Session: YAYA     Misc:

## 2019-09-10 NOTE — PROGRESS NOTES
Nutrition Assessment    Type and Reason for Visit: Initial, Positive Nutrition Screen    Nutrition Recommendations: ONS TID    Nutrition Assessment: Pt is nutritionally compromised AEB reported decrease in appetite and unintended wt loss. Pt is at risk for further compromise d/t mental state. Will start ONS TID. Malnutrition Assessment:  · Malnutrition Status: At risk for malnutrition  · Context: Social or environmental circumstances  · Findings of the 6 clinical characteristics of malnutrition (Minimum of 2 out of 6 clinical characteristics is required to make the diagnosis of moderate or severe Protein Calorie Malnutrition based on AND/ASPEN Guidelines):  1. Energy Intake-Unable to assess, Unable to assess    2. Weight Loss-No significant weight loss,    3. Fat Loss-No significant subcutaneous fat loss,    4. Muscle Loss-No significant muscle mass loss,    5. Fluid Accumulation-No significant fluid accumulation,    6.  Strength-Not measured    Nutrition Risk Level:  Moderate    Nutrient Needs:  · Estimated Daily Total Kcal: 4942-9162 kcals/day  · Estimated Daily Protein (g):  g/PRO/day  · Estimated Daily Total Fluid (ml/day): 2601-2285 mL/day    Nutrition Diagnosis:   · Problem: Inadequate oral intake  · Etiology: related to Psychological cause/life stress     Signs and symptoms:  as evidenced by Patient report of    Objective Information:  · Current Nutrition Therapies:  · Oral Diet Orders: General   · Anthropometric Measures:  · Ht: 6' 4\" (193 cm)   · Current Body Wt: 185 lb (83.9 kg)  · BMI Classification: BMI 18.5 - 24.9 Normal Weight    Nutrition Interventions:   Continue current diet, Start ONS  Continued Inpatient Monitoring    Nutrition Evaluation:   · Evaluation: Goals set   · Goals: Pt will consume 50% or more of meals and supplements    · Monitoring: Nutrition Progression, Meal Intake, Supplement Intake, Weight, Pertinent Labs      Electronically signed by Won Ferro, MS, RD, LD on 9/10/19 at 1:18 PM    Contact Number: [phone]

## 2019-09-10 NOTE — H&P
concentration, frequent mood swings and racing thoughts, frequent episodes of the panic attacks. He denies current active suicidal or homicidal ideations, denies any plans. He also denies any auditory visual hallucinations. Patient reported that he has been compliant with prescribed medications until he relapsed in drinking alcohol. Patient stated that he did not feel effect of prescribed medications anymore and decided to self medicate himself with alcohol. Patient stated that he feels that the dose of prescribed medications need to be increased. PSYCHIATRIC HISTORY:    Diagnoses: Alcohol use disorder, bipolar disorder  Suicide attempts/gestures: Denies   Prior hospitalizations: Multiple to [facility] with last admission in June 2019  Medication trials: Lamictal, Seroquel, Zoloft, naltrexone, Vivitrol  Mental health contact: Lost to follow-up   Head trauma: Denies    Past Medical History:        Diagnosis Date    CAD (coronary artery disease)     Chest pain 12/12/2011    Cigarette smoker 12/12/2011    Depression     Hypertension 12/12/2011    Respiratory failure ([facility]     intubation    Seizures ([facility]      Past Surgical History:        Procedure Laterality Date    APPENDECTOMY      [facility]/18/2006    [facility]    COLONOSCOPY      ENDOSCOPY, COLON, DIAGNOSTIC      KNEE ARTHROSCOPY Right     NECK SURGERY  7/15/2008    [facility]     Medications Prior to Admission:   Medications Prior to Admission: lamoTRIgine (LAMICTAL) 25 MG tablet, Take 1 tablet by mouth 2 times daily (Patient taking differently: Take 50 mg by mouth 2 times daily )  sertraline (ZOLOFT) 50 MG tablet, Take 1 tablet by mouth daily  QUEtiapine (SEROQUEL) 100 MG tablet, Take 1 tablet by mouth nightly (Patient taking differently: Take 200 mg by mouth nightly )  losartan (COZAAR) 100 MG tablet, Take 100 mg by mouth daily    Allergies:  Patient has no known allergies.     Social History:  Smoking -1 PPD for 35 years  Alcohol delusions or paranoia appreciated. Perceptions: Seems patient does not have any auditory or visual hallucinations at present time. Patient did not appear to be responding to internal stimuli. No overt psychosis. Executive Functions: Appear mildly impaired. Concentration: Decreased. Reasoning: Appears mildly impaired based on interaction from interview   Orientation: to person, place, date, and situation. Alert. Language: Intact. Fund of information: Intact. Memory: recent and remote appear intact. Impulsivity: Limited. Neurovegitative: Fair appetite, decreased sleep. Insight: Impaired. Judgment: Impaired. Physical Exam:  GENERAL APPEARANCE: 44y.o. year-old male in NAD   HEAD: Normocephalic, atraumatic. THROAT: No erythema, exudates, lesions. No tongue laceration. CARDIOVASCULAR: PMI nondisplaced. Regular rhythm and rate. Normal S1 and S2.  PULMONARY: Clear to auscultation bilaterally, no tenderness to palpation. ABDOMEN: Soft, nontender, nondistended. MUSCULOSKELTAL: No obvious deformities, clubbing, cyanosis or edema, no spinous process or paraspinous tenderness, normal ROM, normal gait, distal pulses intact symmetric 2+ bilaterally. NEUROLOGICAL: Alert, oriented x 4, CN II-XII grossly intact, motor strength 4/5 all muscle groups, DTR 1+ intact and symmetric, sensation intact to sharp and dull. No abnormal movements or tremors.    SKIN: Warm, dry, intact, no rash, abrasions or bruises    DATA:  Labs:    CBC:   Lab Results   Component Value Date    WBC 8.0 09/09/2019    RBC 4.24 09/09/2019    HGB 13.0 09/09/2019    HCT 38.5 09/09/2019    HCT 41.0 12/19/2011    MCV 90.8 09/09/2019    MCH 30.7 09/09/2019    MCHC 33.8 09/09/2019    RDW 12.4 09/09/2019     09/09/2019     12/19/2011    MPV 8.6 09/09/2019     CMP:    Lab Results   Component Value Date     09/09/2019     12/19/2011    K 5.0 09/09/2019    K 4.3 06/21/2019    K 3.8 12/19/2011     09/09/2019

## 2019-09-11 PROCEDURE — 99233 SBSQ HOSP IP/OBS HIGH 50: CPT | Performed by: PSYCHIATRY & NEUROLOGY

## 2019-09-11 PROCEDURE — 6370000000 HC RX 637 (ALT 250 FOR IP): Performed by: NURSE PRACTITIONER

## 2019-09-11 PROCEDURE — 1240000000 HC EMOTIONAL WELLNESS R&B

## 2019-09-11 PROCEDURE — 6370000000 HC RX 637 (ALT 250 FOR IP): Performed by: PSYCHIATRY & NEUROLOGY

## 2019-09-11 RX ADMIN — QUETIAPINE FUMARATE 200 MG: 200 TABLET ORAL at 21:13

## 2019-09-11 RX ADMIN — Medication 100 MG: at 10:04

## 2019-09-11 RX ADMIN — LAMOTRIGINE 50 MG: 25 TABLET ORAL at 21:13

## 2019-09-11 RX ADMIN — LAMOTRIGINE 50 MG: 25 TABLET ORAL at 10:04

## 2019-09-11 RX ADMIN — FOLIC ACID 1 MG: 1 TABLET ORAL at 10:04

## 2019-09-11 RX ADMIN — MELATONIN 3 MG: at 21:13

## 2019-09-11 RX ADMIN — SERTRALINE HYDROCHLORIDE 100 MG: 100 TABLET ORAL at 10:04

## 2019-09-11 RX ADMIN — ACETAMINOPHEN 650 MG: 325 TABLET ORAL at 16:27

## 2019-09-11 ASSESSMENT — PAIN SCALES - GENERAL
PAINLEVEL_OUTOF10: 3
PAINLEVEL_OUTOF10: 6

## 2019-09-11 NOTE — PLAN OF CARE
Problem: Discharge Planning:  Goal: Discharged to appropriate level of care  Description  Discharged to appropriate level of care  Outcome: Ongoing     Problem: Health Maintenance - Impaired:  Goal: Ability to perform activities of daily living will improve  Description  Ability to perform activities of daily living will improve  Outcome: Ongoing  Goal: Able to sleep without medication for appropriate length of time  Description  Able to sleep without medication for appropriate length of time  Outcome: Ongoing  Goal: Maintenance of adequate nutrition will improve  Description  Maintenance of adequate nutrition will improve  Outcome: Ongoing     Problem: Mood - Altered:  Goal: Mood stable  Description  Mood stable  Outcome: Ongoing     Problem: Self-Esteem - Low:  Goal: Demonstrates positive self-esteem  Description  Demonstrates positive self-esteem  Outcome: Ongoing     Problem: Violence - Risk of, Self/Other-Directed:  Goal: Knowledge of developmental care interventions  Description  Absence of violence  Outcome: Ongoing     Problem: Pain:  Description  Pain management should include both nonpharmacologic and pharmacologic interventions.   Goal: Pain level will decrease  Description  Pain level will decrease  Outcome: Ongoing  Goal: Control of acute pain  Description  Control of acute pain  Outcome: Ongoing  Goal: Control of chronic pain  Description  Control of chronic pain  Outcome: Ongoing     Problem: Nutrition  Goal: Optimal nutrition therapy  Description  Nutrition Problem: Inadequate oral intake  Intervention: Food and/or Nutrient Delivery: Continue current diet, Start ONS  Nutritional Goals: Pt will consume 50% or more of meals and supplements     Outcome: Ongoing

## 2019-09-11 NOTE — PLAN OF CARE
Problem: Mood - Altered:  Goal: Mood stable  9/11/2019 1557 by Jennifer Guerrier  Outcome: Ongoing   Group Therapy Note     Date: 9/11/2019  Start Time: 1430  End Time:  8088  Number of Participants: 6     Type of Group: Cognitive Skills     Wellness Binder Information  Module Name:  emotional wellness  Session Number:  1     Patient's Goal:  validation of feelings     Notes:  pt acknowledged to have feelings validated it may be necessary to share feelings with others.      Status After Intervention:  Improved     Participation Level: Interactive     Participation Quality: Appropriate, Attentive and Sharing        Speech:  normal        Thought Process/Content: Logical        Affective Functioning: Congruent        Mood: congruent        Level of consciousness:  Alert, Oriented x4 and Attentive        Response to Learning: Able to verbalize current knowledge/experience        Endings: None Reported     Modes of Intervention: Education        Discipline Responsible: Psychoeducational Specialist        Signature:  Jennifer Guerrier

## 2019-09-11 NOTE — PROGRESS NOTES
Treatment Team Note:     SW met with [address] team to discuss Pts [place] and DC plans.      Progress/Behavior/Group Attendance: attending group     Target Symptoms/Reason for admission: Patient admitted to [facility] due to states reason for ED visit,Depression out of control x 3 weeks-\"God awful out of hand\" and having anxiety attacks.  PT SI, would use a gun if he were going to do anything.  Denies HI, AVH     Diagnoses: Bipolar disorder, most recent episode mixed, without psychotic features Alcohol use disorder, severe currently in withdrawal  Alcohol induced mood disorder  History of treatment noncompliance      UDS: Neg             BAL: 56     AftercarePlan: [facility]     Pt lives with: parents     Collateral obtained from: mother  On:9/9/19     Family Session: YAYA     Misc:                 larry

## 2019-09-12 VITALS
TEMPERATURE: 97.8 F | SYSTOLIC BLOOD PRESSURE: 126 MMHG | OXYGEN SATURATION: 95 % | RESPIRATION RATE: 20 BRPM | HEART RATE: 98 BPM | DIASTOLIC BLOOD PRESSURE: 81 MMHG | WEIGHT: 185 LBS | BODY MASS INDEX: 22.53 KG/M2 | HEIGHT: 76 IN

## 2019-09-12 PROCEDURE — 6370000000 HC RX 637 (ALT 250 FOR IP): Performed by: PSYCHIATRY & NEUROLOGY

## 2019-09-12 PROCEDURE — 6370000000 HC RX 637 (ALT 250 FOR IP): Performed by: NURSE PRACTITIONER

## 2019-09-12 PROCEDURE — 5130000000 HC BRIDGE APPOINTMENT

## 2019-09-12 PROCEDURE — 99239 HOSP IP/OBS DSCHRG MGMT >30: CPT | Performed by: PSYCHIATRY & NEUROLOGY

## 2019-09-12 RX ORDER — SERTRALINE HYDROCHLORIDE 100 MG/1
100 TABLET, FILM COATED ORAL DAILY
Qty: 30 TABLET | Refills: 1 | Status: ON HOLD | OUTPATIENT
Start: 2019-09-13 | End: 2020-09-09 | Stop reason: HOSPADM

## 2019-09-12 RX ORDER — FOLIC ACID 1 MG/1
1 TABLET ORAL DAILY
Qty: 30 TABLET | Refills: 3 | Status: ON HOLD | OUTPATIENT
Start: 2019-09-12 | End: 2020-09-09 | Stop reason: SDUPTHER

## 2019-09-12 RX ORDER — LAMOTRIGINE 100 MG/1
100 TABLET ORAL 2 TIMES DAILY
Qty: 60 TABLET | Refills: 1 | Status: ON HOLD | OUTPATIENT
Start: 2019-09-12 | End: 2020-09-09 | Stop reason: HOSPADM

## 2019-09-12 RX ORDER — QUETIAPINE FUMARATE 200 MG/1
200 TABLET, FILM COATED ORAL NIGHTLY
Qty: 30 TABLET | Refills: 1 | Status: ON HOLD | OUTPATIENT
Start: 2019-09-12 | End: 2020-09-09 | Stop reason: HOSPADM

## 2019-09-12 RX ORDER — NICOTINE 21 MG/24HR
1 PATCH, TRANSDERMAL 24 HOURS TRANSDERMAL DAILY
Qty: 30 PATCH | Refills: 3 | Status: ON HOLD | OUTPATIENT
Start: 2019-09-13 | End: 2020-09-09 | Stop reason: HOSPADM

## 2019-09-12 RX ORDER — LANOLIN ALCOHOL/MO/W.PET/CERES
3 CREAM (GRAM) TOPICAL NIGHTLY
Qty: 30 TABLET | Refills: 1 | Status: ON HOLD | OUTPATIENT
Start: 2019-09-12 | End: 2020-09-09 | Stop reason: SDUPTHER

## 2019-09-12 RX ORDER — ERGOCALCIFEROL (VITAMIN D2) 1250 MCG
50000 CAPSULE ORAL WEEKLY
Qty: 5 CAPSULE | Refills: 0 | Status: SHIPPED | OUTPATIENT
Start: 2019-09-12 | End: 2021-09-08

## 2019-09-12 RX ORDER — LANOLIN ALCOHOL/MO/W.PET/CERES
100 CREAM (GRAM) TOPICAL DAILY
Qty: 30 TABLET | Refills: 3 | Status: ON HOLD | OUTPATIENT
Start: 2019-09-12 | End: 2020-09-09 | Stop reason: SDUPTHER

## 2019-09-12 RX ADMIN — Medication 100 MG: at 08:55

## 2019-09-12 RX ADMIN — LAMOTRIGINE 50 MG: 25 TABLET ORAL at 08:55

## 2019-09-12 RX ADMIN — SERTRALINE HYDROCHLORIDE 100 MG: 100 TABLET ORAL at 08:55

## 2019-09-12 RX ADMIN — FOLIC ACID 1 MG: 1 TABLET ORAL at 08:55

## 2019-09-12 NOTE — DISCHARGE SUMMARY
Discharge Summary     Patient ID:  Ninfa Shahid  [id number]  10 y.o.  1970    Admit date: 9/9/2019  Discharge date: 9/12/2019    Admitting Physician: Destin Up MD   Attending Physician: Destin Up MD  Discharge Provider: Shamar Jacobson     Admission Diagnoses: Severe major depression without psychotic features ([facility] [F32.2]  Bipolar I disorder, most recent episode mixed, severe without psychotic features [facility]) [F31.63]    Discharge Diagnoses: Bipolar disorder, most recent episode mixed, without psychotic features  Alcohol use disorder, severe currently in withdrawal  Alcohol induced mood disorder  History of treatment noncompliance    Admission Condition: poor    Discharged Condition: good    Indication for Admission: Alcohol intoxication, depression, treatment noncompliance, suicidal ideations    HPI:  The patient is a 44 y.o. male with previous psychiatric history of alcohol use disorder, bipolar disorder, who has been admitted to psychiatric unit secondary to alcohol intoxications with blood alcohol level 56, treatment noncompliance, worsening of depression and suicidal ideations without plans.     Patient is well known to psychiatry due to multiple previous admissions to psychiatric unit with alcohol intoxication, suicidal ideations and bipolar disorder. Patient has been discharged from psychiatric unit last time in June 2019, but a few weeks later has been readmitted to medical service with alcohol intoxications and suicidal ideations, again.     Patient has been seen in treatment team room today. He reported that after last discharge from the hospital 2.5 months ago he was doing good initially, but during the last 3-week started to feel depressed again. Patient stated that during the last week he started to have suicidal ideations and was thinking if he would have a gun he would use it.   Patient stated that he relapsed in drinking alcohol and has been drinking 8% beer up to 6 packs at once,

## 2019-09-12 NOTE — PLAN OF CARE
Problem: Discharge Planning:  Goal: Discharged to appropriate level of care  Outcome: Ongoing     Problem: Health Maintenance - Impaired:  Goal: Ability to perform activities of daily living will improve  Outcome: Ongoing  Goal: Able to sleep without medication for appropriate length of time  Outcome: Ongoing  Goal: Maintenance of adequate nutrition will improve  Outcome: Ongoing     Problem: Mood - Altered:  Goal: Mood stable  Outcome: Ongoing     Problem: Self-Esteem - Low:  Goal: Demonstrates positive self-esteem  Outcome: Ongoing     Problem: Violence - Risk of, Self/Other-Directed:  Goal: Knowledge of developmental care interventions  Outcome: Ongoing     Problem: Pain:  Goal: Pain level will decrease  Outcome: Ongoing  Goal: Control of acute pain  Outcome: Ongoing  Goal: Control of chronic pain  Outcome: Ongoing     Problem: Nutrition  Goal: Optimal nutrition therapy  Outcome: Ongoing

## 2019-09-12 NOTE — PROGRESS NOTES
Progress Note  Sara Roland  9/12/2019 12:15 AM  Subjective:   Admit Date:   9/9/2019      CC/ADMIT DX:       Interval History:   Reviewed overnight events and nursing notes. No new physical complaints. I have reviewed all labs/diagnostics from the last 24hrs. ROS:   I have done a 10 point ROS and all are negative, except what is mentioned in the HPI. DIET GENERAL;  Dietary Nutrition Supplements: Standard High Calorie Oral Supplement    Medications:      lamoTRIgine  50 mg Oral BID    QUEtiapine  200 mg Oral Nightly    sertraline  100 mg Oral Daily    melatonin  3 mg Oral Nightly    thiamine  100 mg Oral Daily    folic acid  1 mg Oral Daily    nicotine  1 patch Transdermal Daily           Objective:   Vitals: BP (!) 119/93   Pulse 97   Temp 97.2 °F (36.2 °C) (Temporal)   Resp 18   Ht 6' 4\" (1.93 m)   Wt 185 lb (83.9 kg)   SpO2 98%   BMI 22.52 kg/m²  No intake or output data in the 24 hours ending 09/12/19 0015  General appearance: alert and cooperative with exam  Lungs: clear to auscultation bilaterally  Heart: regular rate and rhythm, S1, S2 normal, no murmur, click, rub or gallop  Abdomen: soft, non-tender; bowel sounds normal; no masses,  no organomegaly  Extremities: extremities normal, atraumatic, no cyanosis or edema  Neurologic:  No obvious focal neurologic deficits. Assessment and Plan: Active Problems:    Severe major depression without psychotic features (HCC)    Bipolar I disorder, most recent episode mixed, severe without psychotic features ([address]  Resolved Problems:    * No resolved hospital problems. *      Plan:  1. Continue present medication(s)   2. Follow with Psych  3. He is medically stable. I will monitor for any changes or concerns       Discharge planning:   home     Reviewed treatment plans with the patient and/or family.              Electronically signed by Helen Connolly MD on 9/12/2019 at 12:15 AM

## 2019-09-12 NOTE — PROGRESS NOTES
Discharge Note     Pt discharging on this date. Pt denies SI, HI, and AVH at this time. Pt reports improvement in behavior and is leaving unit in overall good condition. SW and pt discussed pt's follow up appointments and importance of attending appointments as scheduled, pt voiced understanding and agreement. Pt and SW also discussed pt safety plan and pt able to verbally identify: warning signs, coping strategies, places and people that help make the pt feel better/distract negative thoughts, friends/family/agencies/professionals the pt can reach out to in a crisis, and something that is important to the pt/worth living for. Pt provided the national suicide prevention hotline number (8-398-264-962-147-2357) as well as local community behavioral health [facility]) crisis number for emergencies (5-007-865-661.812.3601). [facility]  [address] [facility] on 09__/18__/19 @ 10:00am. Patient will follow up with KWASI Sharma at [facility] for medication management, patient will be seen on 09__/19__/19 @ 10:00am for the med management appt. Referral to out patient tobacco cessation counseling treatment:    Patient refused tobacco cessation counseling    SW offered to assist pt with transportation, pt reports that he has transportation.

## 2019-09-12 NOTE — PROGRESS NOTES
Group Therapy Note    Start Time: 900  End Time:  216  Number of Participants: 6    Type of Group: Community Meeting       Patient's Goal:  \"staying positive\"      Notes:      Participation Level:  Active Listener       Participation Quality: Appropriate      Thought Process/Content: Logical      Affective Functioning: Congruent      Mood: calm      Level of consciousness:  Alert      Modes of Intervention: Support      Discipline Responsible: Behavioral Health Tech II      Signature:  Leila Grover

## 2020-09-04 ENCOUNTER — HOSPITAL ENCOUNTER (INPATIENT)
Age: 50
LOS: 3 days | Discharge: PSYCHIATRIC OR SUBSTANCE USE FACILITY | DRG: 897 | End: 2020-09-07
Attending: EMERGENCY MEDICINE | Admitting: STUDENT IN AN ORGANIZED HEALTH CARE EDUCATION/TRAINING PROGRAM
Payer: MEDICAID

## 2020-09-04 PROBLEM — F10.129 ALCOHOL INTOXICATION WITH BLOOD LEVEL OVER 0.3 (HCC): Status: ACTIVE | Noted: 2020-09-04

## 2020-09-04 LAB
ALBUMIN SERPL-MCNC: 4.2 G/DL (ref 3.5–5.2)
ALP BLD-CCNC: 88 U/L (ref 40–130)
ALT SERPL-CCNC: 65 U/L (ref 5–41)
AMPHETAMINE SCREEN, URINE: NEGATIVE
ANION GAP SERPL CALCULATED.3IONS-SCNC: 14 MMOL/L (ref 7–19)
AST SERPL-CCNC: 42 U/L (ref 5–40)
BARBITURATE SCREEN URINE: NEGATIVE
BASOPHILS ABSOLUTE: 0 K/UL (ref 0–0.2)
BASOPHILS RELATIVE PERCENT: 0.4 % (ref 0–1)
BENZODIAZEPINE SCREEN, URINE: NEGATIVE
BILIRUB SERPL-MCNC: <0.2 MG/DL (ref 0.2–1.2)
BILIRUBIN URINE: NEGATIVE
BLOOD, URINE: NEGATIVE
BUN BLDV-MCNC: 12 MG/DL (ref 6–20)
CALCIUM SERPL-MCNC: 8.4 MG/DL (ref 8.6–10)
CANNABINOID SCREEN URINE: NEGATIVE
CHLORIDE BLD-SCNC: 103 MMOL/L (ref 98–111)
CLARITY: CLEAR
CO2: 24 MMOL/L (ref 22–29)
COCAINE METABOLITE SCREEN URINE: NEGATIVE
COLOR: YELLOW
CREAT SERPL-MCNC: 0.9 MG/DL (ref 0.5–1.2)
EOSINOPHILS ABSOLUTE: 0.1 K/UL (ref 0–0.6)
EOSINOPHILS RELATIVE PERCENT: 1.5 % (ref 0–5)
ETHANOL: 389 MG/DL (ref 0–0.08)
GFR AFRICAN AMERICAN: >59
GFR NON-AFRICAN AMERICAN: >60
GLUCOSE BLD-MCNC: 119 MG/DL (ref 74–109)
GLUCOSE URINE: NEGATIVE MG/DL
HCT VFR BLD CALC: 37.7 % (ref 42–52)
HEMOGLOBIN: 12.8 G/DL (ref 14–18)
IMMATURE GRANULOCYTES #: 0 K/UL
KETONES, URINE: NEGATIVE MG/DL
LEUKOCYTE ESTERASE, URINE: NEGATIVE
LYMPHOCYTES ABSOLUTE: 2.3 K/UL (ref 1.1–4.5)
LYMPHOCYTES RELATIVE PERCENT: 33.8 % (ref 20–40)
Lab: NORMAL
MCH RBC QN AUTO: 30.4 PG (ref 27–31)
MCHC RBC AUTO-ENTMCNC: 34 G/DL (ref 33–37)
MCV RBC AUTO: 89.5 FL (ref 80–94)
MONOCYTES ABSOLUTE: 0.4 K/UL (ref 0–0.9)
MONOCYTES RELATIVE PERCENT: 5.4 % (ref 0–10)
NEUTROPHILS ABSOLUTE: 3.9 K/UL (ref 1.5–7.5)
NEUTROPHILS RELATIVE PERCENT: 58.8 % (ref 50–65)
NITRITE, URINE: NEGATIVE
OPIATE SCREEN URINE: NEGATIVE
PDW BLD-RTO: 14.3 % (ref 11.5–14.5)
PH UA: 5.5 (ref 5–8)
PLATELET # BLD: 218 K/UL (ref 130–400)
PMV BLD AUTO: 8.9 FL (ref 9.4–12.4)
POTASSIUM REFLEX MAGNESIUM: 4.1 MMOL/L (ref 3.5–5)
PROTEIN UA: NEGATIVE MG/DL
RBC # BLD: 4.21 M/UL (ref 4.7–6.1)
SODIUM BLD-SCNC: 141 MMOL/L (ref 136–145)
SPECIFIC GRAVITY UA: 1.01 (ref 1–1.03)
TOTAL PROTEIN: 6.6 G/DL (ref 6.6–8.7)
UROBILINOGEN, URINE: 0.2 E.U./DL
WBC # BLD: 6.7 K/UL (ref 4.8–10.8)

## 2020-09-04 PROCEDURE — 96372 THER/PROPH/DIAG INJ SC/IM: CPT

## 2020-09-04 PROCEDURE — 6360000002 HC RX W HCPCS: Performed by: EMERGENCY MEDICINE

## 2020-09-04 PROCEDURE — 6370000000 HC RX 637 (ALT 250 FOR IP): Performed by: STUDENT IN AN ORGANIZED HEALTH CARE EDUCATION/TRAINING PROGRAM

## 2020-09-04 PROCEDURE — G0378 HOSPITAL OBSERVATION PER HR: HCPCS

## 2020-09-04 PROCEDURE — 99283 EMERGENCY DEPT VISIT LOW MDM: CPT

## 2020-09-04 PROCEDURE — 85025 COMPLETE CBC W/AUTO DIFF WBC: CPT

## 2020-09-04 PROCEDURE — 36415 COLL VENOUS BLD VENIPUNCTURE: CPT

## 2020-09-04 PROCEDURE — 80053 COMPREHEN METABOLIC PANEL: CPT

## 2020-09-04 PROCEDURE — 80307 DRUG TEST PRSMV CHEM ANLYZR: CPT

## 2020-09-04 PROCEDURE — 96366 THER/PROPH/DIAG IV INF ADDON: CPT

## 2020-09-04 PROCEDURE — 1210000000 HC MED SURG R&B

## 2020-09-04 PROCEDURE — 6370000000 HC RX 637 (ALT 250 FOR IP): Performed by: PHYSICIAN ASSISTANT

## 2020-09-04 PROCEDURE — 81003 URINALYSIS AUTO W/O SCOPE: CPT

## 2020-09-04 PROCEDURE — 6360000002 HC RX W HCPCS: Performed by: PHYSICIAN ASSISTANT

## 2020-09-04 PROCEDURE — 96365 THER/PROPH/DIAG IV INF INIT: CPT

## 2020-09-04 PROCEDURE — 2580000003 HC RX 258: Performed by: PHYSICIAN ASSISTANT

## 2020-09-04 PROCEDURE — 2580000003 HC RX 258: Performed by: EMERGENCY MEDICINE

## 2020-09-04 PROCEDURE — G0480 DRUG TEST DEF 1-7 CLASSES: HCPCS

## 2020-09-04 PROCEDURE — 2500000003 HC RX 250 WO HCPCS: Performed by: EMERGENCY MEDICINE

## 2020-09-04 RX ORDER — SODIUM CHLORIDE 0.9 % (FLUSH) 0.9 %
10 SYRINGE (ML) INJECTION EVERY 12 HOURS SCHEDULED
Status: DISCONTINUED | OUTPATIENT
Start: 2020-09-04 | End: 2020-09-07 | Stop reason: HOSPADM

## 2020-09-04 RX ORDER — POTASSIUM CHLORIDE 20 MEQ/1
40 TABLET, EXTENDED RELEASE ORAL PRN
Status: DISCONTINUED | OUTPATIENT
Start: 2020-09-04 | End: 2020-09-07 | Stop reason: HOSPADM

## 2020-09-04 RX ORDER — NICOTINE 21 MG/24HR
1 PATCH, TRANSDERMAL 24 HOURS TRANSDERMAL DAILY
Status: DISCONTINUED | OUTPATIENT
Start: 2020-09-04 | End: 2020-09-07 | Stop reason: HOSPADM

## 2020-09-04 RX ORDER — FAMOTIDINE 20 MG/1
20 TABLET, FILM COATED ORAL 2 TIMES DAILY
Status: DISCONTINUED | OUTPATIENT
Start: 2020-09-04 | End: 2020-09-05

## 2020-09-04 RX ORDER — ACETAMINOPHEN 325 MG/1
650 TABLET ORAL EVERY 6 HOURS PRN
Status: DISCONTINUED | OUTPATIENT
Start: 2020-09-04 | End: 2020-09-07 | Stop reason: HOSPADM

## 2020-09-04 RX ORDER — LORAZEPAM 2 MG/ML
3 INJECTION INTRAMUSCULAR
Status: DISCONTINUED | OUTPATIENT
Start: 2020-09-04 | End: 2020-09-07 | Stop reason: HOSPADM

## 2020-09-04 RX ORDER — POLYETHYLENE GLYCOL 3350 17 G/17G
17 POWDER, FOR SOLUTION ORAL DAILY PRN
Status: DISCONTINUED | OUTPATIENT
Start: 2020-09-04 | End: 2020-09-07 | Stop reason: HOSPADM

## 2020-09-04 RX ORDER — LORAZEPAM 1 MG/1
3 TABLET ORAL
Status: DISCONTINUED | OUTPATIENT
Start: 2020-09-04 | End: 2020-09-07 | Stop reason: HOSPADM

## 2020-09-04 RX ORDER — MULTIVITAMIN WITH IRON
1 TABLET ORAL DAILY
Status: DISCONTINUED | OUTPATIENT
Start: 2020-09-04 | End: 2020-09-07 | Stop reason: HOSPADM

## 2020-09-04 RX ORDER — UREA 10 %
3 LOTION (ML) TOPICAL NIGHTLY
Status: DISCONTINUED | OUTPATIENT
Start: 2020-09-04 | End: 2020-09-07 | Stop reason: HOSPADM

## 2020-09-04 RX ORDER — QUETIAPINE FUMARATE 50 MG/1
200 TABLET, FILM COATED ORAL NIGHTLY
Status: DISCONTINUED | OUTPATIENT
Start: 2020-09-04 | End: 2020-09-04

## 2020-09-04 RX ORDER — LORAZEPAM 1 MG/1
1 TABLET ORAL
Status: DISCONTINUED | OUTPATIENT
Start: 2020-09-04 | End: 2020-09-07 | Stop reason: HOSPADM

## 2020-09-04 RX ORDER — SODIUM CHLORIDE 0.9 % (FLUSH) 0.9 %
10 SYRINGE (ML) INJECTION PRN
Status: DISCONTINUED | OUTPATIENT
Start: 2020-09-04 | End: 2020-09-07 | Stop reason: HOSPADM

## 2020-09-04 RX ORDER — SODIUM CHLORIDE 9 MG/ML
INJECTION, SOLUTION INTRAVENOUS CONTINUOUS
Status: DISCONTINUED | OUTPATIENT
Start: 2020-09-04 | End: 2020-09-05

## 2020-09-04 RX ORDER — THIAMINE MONONITRATE (VIT B1) 100 MG
100 TABLET ORAL DAILY
Status: DISCONTINUED | OUTPATIENT
Start: 2020-09-04 | End: 2020-09-04

## 2020-09-04 RX ORDER — SERTRALINE HYDROCHLORIDE 100 MG/1
100 TABLET, FILM COATED ORAL DAILY
Status: DISCONTINUED | OUTPATIENT
Start: 2020-09-04 | End: 2020-09-04

## 2020-09-04 RX ORDER — POTASSIUM CHLORIDE 7.45 MG/ML
10 INJECTION INTRAVENOUS PRN
Status: DISCONTINUED | OUTPATIENT
Start: 2020-09-04 | End: 2020-09-07 | Stop reason: HOSPADM

## 2020-09-04 RX ORDER — ONDANSETRON 2 MG/ML
4 INJECTION INTRAMUSCULAR; INTRAVENOUS EVERY 6 HOURS PRN
Status: DISCONTINUED | OUTPATIENT
Start: 2020-09-04 | End: 2020-09-07 | Stop reason: HOSPADM

## 2020-09-04 RX ORDER — LORAZEPAM 1 MG/1
4 TABLET ORAL
Status: DISCONTINUED | OUTPATIENT
Start: 2020-09-04 | End: 2020-09-07 | Stop reason: HOSPADM

## 2020-09-04 RX ORDER — LORAZEPAM 2 MG/ML
4 INJECTION INTRAMUSCULAR
Status: DISCONTINUED | OUTPATIENT
Start: 2020-09-04 | End: 2020-09-07 | Stop reason: HOSPADM

## 2020-09-04 RX ORDER — LAMOTRIGINE 100 MG/1
100 TABLET ORAL 2 TIMES DAILY
Status: DISCONTINUED | OUTPATIENT
Start: 2020-09-04 | End: 2020-09-04

## 2020-09-04 RX ORDER — THIAMINE MONONITRATE (VIT B1) 100 MG
100 TABLET ORAL DAILY
Status: DISCONTINUED | OUTPATIENT
Start: 2020-09-04 | End: 2020-09-07 | Stop reason: HOSPADM

## 2020-09-04 RX ORDER — ACETAMINOPHEN 650 MG/1
650 SUPPOSITORY RECTAL EVERY 6 HOURS PRN
Status: DISCONTINUED | OUTPATIENT
Start: 2020-09-04 | End: 2020-09-07 | Stop reason: HOSPADM

## 2020-09-04 RX ORDER — LORAZEPAM 1 MG/1
2 TABLET ORAL
Status: DISCONTINUED | OUTPATIENT
Start: 2020-09-04 | End: 2020-09-07 | Stop reason: HOSPADM

## 2020-09-04 RX ORDER — LORAZEPAM 2 MG/ML
1 INJECTION INTRAMUSCULAR
Status: DISCONTINUED | OUTPATIENT
Start: 2020-09-04 | End: 2020-09-07 | Stop reason: HOSPADM

## 2020-09-04 RX ORDER — MAGNESIUM SULFATE IN WATER 40 MG/ML
2 INJECTION, SOLUTION INTRAVENOUS PRN
Status: DISCONTINUED | OUTPATIENT
Start: 2020-09-04 | End: 2020-09-07 | Stop reason: HOSPADM

## 2020-09-04 RX ORDER — FOLIC ACID 1 MG/1
1 TABLET ORAL DAILY
Status: DISCONTINUED | OUTPATIENT
Start: 2020-09-04 | End: 2020-09-07 | Stop reason: HOSPADM

## 2020-09-04 RX ORDER — PROMETHAZINE HYDROCHLORIDE 25 MG/1
12.5 TABLET ORAL EVERY 6 HOURS PRN
Status: DISCONTINUED | OUTPATIENT
Start: 2020-09-04 | End: 2020-09-07 | Stop reason: HOSPADM

## 2020-09-04 RX ORDER — LORAZEPAM 2 MG/ML
2 INJECTION INTRAMUSCULAR
Status: DISCONTINUED | OUTPATIENT
Start: 2020-09-04 | End: 2020-09-07 | Stop reason: HOSPADM

## 2020-09-04 RX ADMIN — SODIUM CHLORIDE: 9 INJECTION, SOLUTION INTRAVENOUS at 20:35

## 2020-09-04 RX ADMIN — FOLIC ACID 1 MG: 1 TABLET ORAL at 20:36

## 2020-09-04 RX ADMIN — THIAMINE HCL TAB 100 MG 100 MG: 100 TAB at 20:36

## 2020-09-04 RX ADMIN — Medication 10 ML: at 20:36

## 2020-09-04 RX ADMIN — THERA TABS 1 TABLET: TAB at 20:36

## 2020-09-04 RX ADMIN — Medication 3 MG: at 20:36

## 2020-09-04 RX ADMIN — ENOXAPARIN SODIUM 40 MG: 40 INJECTION SUBCUTANEOUS at 20:35

## 2020-09-04 RX ADMIN — FAMOTIDINE 20 MG: 20 TABLET ORAL at 20:36

## 2020-09-04 RX ADMIN — FOLIC ACID: 5 INJECTION, SOLUTION INTRAMUSCULAR; INTRAVENOUS; SUBCUTANEOUS at 16:21

## 2020-09-04 ASSESSMENT — ENCOUNTER SYMPTOMS
SORE THROAT: 0
EYE PAIN: 0
SHORTNESS OF BREATH: 0
WHEEZING: 0
DIARRHEA: 0
CHEST TIGHTNESS: 0
ABDOMINAL DISTENTION: 0
EYE ITCHING: 0
BLOOD IN STOOL: 0
PHOTOPHOBIA: 0
CHOKING: 0
FACIAL SWELLING: 0
COUGH: 0
VOICE CHANGE: 0
SINUS PRESSURE: 0
APNEA: 0
CONSTIPATION: 0
SINUS PAIN: 0
ABDOMINAL PAIN: 0
VOMITING: 0
EYE DISCHARGE: 0
NAUSEA: 0

## 2020-09-04 NOTE — H&P
[facility] - History & Physical      PCP: May Perry    Date of Admission: 9/4/2020    Date of Service: 9/4/2020    Chief Complaint:  Suicidal Ideation    History Of Present Illness: The patient is a 44 y.o. male with PMH of bipolar 1 disorder, HTN, seizures, tobacco and alcohol abuse who presented to [address] [place] [address] ED complaining of suicidal ideation. Pt has performed suicidal acts in the past. Pt is resting in bed eating and in no acute distress. Pt has been drinking and admits to suicidal thoughts for about a month. Pt does not admit to a specific plan. He denies any homicidal ideation. Pt denies any chest pain, sob, nausea, vomiting or diarrhea. Upon arrival pt is afebrile and normotensive. ETOH 389. UDS negative. Pt is initiated on banana bag in ED. Patient is admitted to Hospital medicine to monitor for withdrawal and for Psych consultation once medically stable. Past Medical History:        Diagnosis Date    CAD (coronary artery disease)     Chest pain 12/12/2011    Cigarette smoker 12/12/2011    Depression     Hypertension 12/12/2011    Respiratory failure ([facility]     intubation    Seizures ([facility]        Past Surgical History:        Procedure Laterality Date    APPENDECTOMY      [facility]  5/18/2006    [facility]    COLONOSCOPY      ENDOSCOPY, COLON, DIAGNOSTIC      KNEE ARTHROSCOPY Right     NECK SURGERY  7/15/2008    [facility]       Home Medications:  Prior to Admission medications    Medication Sig Start Date End Date Taking?  Authorizing Provider   lamoTRIgine (LAMICTAL) 100 MG tablet Take 1 tablet by mouth 2 times daily 9/12/19   Armand Hernadez MD   sertraline (ZOLOFT) 100 MG tablet Take 1 tablet by mouth daily 9/13/19   Armand Hernadez MD   QUEtiapine (SEROQUEL) 200 MG tablet Take 1 tablet by mouth nightly 9/12/19   Armand Hernadez MD   folic acid (FOLVITE) 1 MG tablet Take 1 tablet by mouth daily 9/12/19   Armand Hernadez MD   melatonin 3 MG TABS tablet Take 1 tablet by mouth nightly 9/12/19   Gavin Hong MD   nicotine (NICODERM CQ) 21 MG/24HR Place 1 patch onto the skin daily 9/13/19   Gavin Hong MD   thiamine 100 MG tablet Take 1 tablet by mouth daily 9/12/19   Gavin Hong MD   ergocalciferol (ERGOCALCIFEROL) 34160 units capsule Take 1 capsule by mouth once a week for 5 doses 9/12/19 10/11/19  Gavin Hong MD   losartan (COZAAR) 100 MG tablet Take 100 mg by mouth daily    Historical Provider, MD       Allergies:    Patient has no known allergies. Social History:    Tobacco:   reports that he has been smoking cigarettes. He has a 35.00 pack-year smoking history. His smokeless tobacco use includes snuff. Alcohol:   reports current alcohol use. Family History:      Problem Relation Age of Onset    Osteoarthritis Mother     Thyroid Disease Mother     Heart Failure Father     Heart Failure Maternal Grandmother     Heart Failure Paternal Grandmother     Cancer Maternal Grandfather        Review of Systems:   Review of Systems   Constitutional: Negative for chills, diaphoresis, fatigue and fever. HENT: Negative for congestion, ear pain, sinus pressure, sinus pain and sore throat. Eyes: Negative for photophobia, pain and itching. Respiratory: Negative for cough, chest tightness, shortness of breath and wheezing. Cardiovascular: Negative for chest pain, palpitations and leg swelling. Gastrointestinal: Negative for abdominal distention, abdominal pain, diarrhea, nausea and vomiting. Endocrine: Negative for cold intolerance and heat intolerance. Genitourinary: Negative for difficulty urinating, dysuria, flank pain, frequency and urgency. Musculoskeletal: Negative for arthralgias, joint swelling and myalgias. Neurological: Negative for dizziness, tremors, syncope, weakness, light-headedness, numbness and headaches. Hematological: Does not bruise/bleed easily. Psychiatric/Behavioral: Positive for dysphoric mood and suicidal ideas.  Negative for agitation, confusion, hallucinations and self-injury. Physical Examination:  VITALS: /72   Pulse 84   Temp 98.7 °F (37.1 °C)   Resp 18   SpO2 95%   Physical Exam  Constitutional:       General: He is not in acute distress. Appearance: Normal appearance. He is not ill-appearing, toxic-appearing or diaphoretic. HENT:      Head: Normocephalic and atraumatic. Right Ear: External ear normal.      Left Ear: External ear normal.      Nose: Nose normal.      Mouth/Throat:      Mouth: Mucous membranes are moist.      Pharynx: Oropharynx is clear. Eyes:      General: No scleral icterus. Extraocular Movements: Extraocular movements intact. Conjunctiva/sclera: Conjunctivae normal.   Neck:      Musculoskeletal: Normal range of motion and neck supple. Cardiovascular:      Rate and Rhythm: Normal rate and regular rhythm. Pulses: Normal pulses. Heart sounds: Normal heart sounds. No murmur. No friction rub. No gallop. Pulmonary:      Effort: Pulmonary effort is normal.      Breath sounds: Normal breath sounds. No stridor. No wheezing or rhonchi. Abdominal:      General: Abdomen is flat. Bowel sounds are normal. There is no distension. Palpations: Abdomen is soft. There is no mass. Tenderness: There is no abdominal tenderness. Hernia: No hernia is present. Musculoskeletal:         General: No swelling or signs of injury. Right lower leg: No edema. Left lower leg: No edema. Skin:     General: Skin is warm and dry. Coloration: Skin is not jaundiced or pale. Findings: No erythema, lesion or rash. Neurological:      General: No focal deficit present. Mental Status: He is alert and oriented to person, place, and time. Cranial Nerves: No cranial nerve deficit. Sensory: No sensory deficit. Motor: No weakness.    Psychiatric:         Mood and Affect: Mood normal.         Behavior: Behavior normal.         Thought Content: Thought content normal.         Judgment: Judgment normal.          Diagnostic Data:  CBC:  Recent Labs     09/04/20  1605   WBC 6.7   HGB 12.8*   HCT 37.7*        BMP:  Recent Labs     09/04/20  1605      K 4.1      CO2 24   BUN 12   CREATININE 0.9   CALCIUM 8.4*     Recent Labs     09/04/20  1605   AST 42*   ALT 65*   BILITOT <0.2   ALKPHOS 88     ABGs:  Lab Results   Component Value Date    PHART 7.530 01/27/2019    PO2ART 72.0 01/27/2019    IVC0HZD 39.0 01/27/2019     Urinalysis:  Lab Results   Component Value Date    NITRU Negative 09/04/2020    WBCUA 1 02/15/2019    BACTERIA NEGATIVE 02/15/2019    RBCUA 0 02/15/2019    BLOODU Negative 09/04/2020    SPECGRAV 1.008 09/04/2020    GLUCOSEU Negative 09/04/2020       Assessment/Plan:  Principal Problem:    Alcohol intoxication with blood level over 0.3- CIWA, banana bag, sitter at bedside, fall and seizure precautions. Active Problems:   Suicidal ideations- psych to evaluate once medically stable. Hypertension- monitor closely. Bipolar I disorder, most recent episode depressed, severe without psychotic features ([facility] [address]- hold home meds.  Unsure of how long pt has not been taking medications      Tobacco abuse disorder- Nicotine patch as needed       DVT prophylaxis- Lovenox     Signed:  Jj Argueta PA-C  9/4/2020, 6:21 PM

## 2020-09-04 NOTE — ED PROVIDER NOTES
[facility] EMERGENCY DEPT  eMERGENCY dEPARTMENT eNCOUnter      Pt Name: Danielle Montes  MRN: [id number]  [place] 1970  Date of evaluation: 9/4/2020  Provider: Marley Ortega MD    CHIEF COMPLAINT       Chief Complaint   Patient presents with    Mental Health Problem    Alcohol Intoxication         HISTORY OF PRESENT ILLNESS   (Location/Symptom, Timing/Onset,Context/Setting, Quality, Duration, Modifying Factors, Severity)  Note limiting factors. Danielle Montes is a 44 y.o. male who presents to the emergency department mental health evaluation acute alcohol intoxication. 68-year-old male chronic alcoholic. Presents for mental health evaluation. He is here he states voluntarily. He has been drinking. Review of records show it is been a minute since he is been here but has had several admissions for suicidal ideation and alcohol related mental health issues. He states he has been doing okay up till this point. He does admit to having suicidal thoughts but no plan. He states he has not done anything to harm himself. He states she is not using drugs. But drinking heavily. He is cooperative. He denies any recent infections or cold exposure. He denies any other physical health issues such as diabetes cirrhosis or hepatitis. The history is provided by the patient and medical records. NursingNotes were reviewed. REVIEW OF SYSTEMS    (2-9 systems for level 4, 10 or more for level 5)     Review of Systems   Constitutional: Negative for chills and fever. HENT: Negative for congestion, drooling, facial swelling, nosebleeds, sinus pressure, sore throat and voice change. Eyes: Negative for discharge. Respiratory: Negative for apnea, choking and shortness of breath. Cardiovascular: Negative for chest pain and leg swelling. Gastrointestinal: Negative for abdominal pain, blood in stool, constipation, diarrhea and nausea. Genitourinary: Negative for dysuria and enuresis.    Musculoskeletal: Negative for joint swelling. Skin: Negative for rash and wound. Neurological: Negative for seizures and syncope. Psychiatric/Behavioral: Positive for suicidal ideas. Negative for behavioral problems and hallucinations. All other systems reviewed and are negative. A complete review of systems was performed and is negative except as noted above in the HPI. PAST MEDICAL HISTORY     Past Medical History:   Diagnosis Date    CAD (coronary artery disease)     Chest pain 12/12/2011    Cigarette smoker 12/12/2011    Depression     Hypertension 12/12/2011    Respiratory failure ([facility] [address]     intubation    Seizures ([facility] [address]          SURGICAL HISTORY       Past Surgical History:   Procedure Laterality Date    APPENDECTOMY      [facility]18/[facility]    COLONOSCOPY      ENDOSCOPY, COLON, DIAGNOSTIC      KNEE ARTHROSCOPY Right     NECK SURGERY  7/15/2008    [facility]         CURRENT MEDICATIONS       Previous Medications    ERGOCALCIFEROL (ERGOCALCIFEROL) 79654 UNITS CAPSULE    Take 1 capsule by mouth once a week for 5 doses    FOLIC ACID (FOLVITE) 1 MG TABLET    Take 1 tablet by mouth daily    LAMOTRIGINE (LAMICTAL) 100 MG TABLET    Take 1 tablet by mouth 2 times daily    LOSARTAN (COZAAR) 100 MG TABLET    Take 100 mg by mouth daily    MELATONIN 3 MG TABS TABLET    Take 1 tablet by mouth nightly    NICOTINE (NICODERM CQ) 21 MG/24HR    Place 1 patch onto the skin daily    QUETIAPINE (SEROQUEL) 200 MG TABLET    Take 1 tablet by mouth nightly    SERTRALINE (ZOLOFT) 100 MG TABLET    Take 1 tablet by mouth daily    THIAMINE 100 MG TABLET    Take 1 tablet by mouth daily       ALLERGIES     Patient has no known allergies.     FAMILY HISTORY       Family History   Problem Relation Age of Onset    Osteoarthritis Mother     Thyroid Disease Mother     Heart Failure Father     Heart Failure Maternal Grandmother     Heart Failure Paternal Grandmother     Cancer Maternal Grandfather           SOCIAL HISTORY       Social History     Socioeconomic History    Marital status:      Spouse name: None    Number of children: None    Years of education: None    Highest education level: None   Occupational History     Employer: DISABLED   Social Needs    Financial resource strain: None    Food insecurity     Worry: None     Inability: None    Transportation needs     Medical: None     Non-medical: None   Tobacco Use    Smoking status: Current Every Day Smoker     Packs/day: 1.00     Years: 35.00     Pack years: 35.00     Types: Cigarettes    Smokeless tobacco: Current User     Types: Snuff   Substance and Sexual Activity    Alcohol use: Yes     Alcohol/week: 0.0 standard drinks     Comment: all day every day, pt states he drinks as much ad he can get    Drug use: Yes     Types: Marijuana    Sexual activity: Yes     Partners: Female   Lifestyle    Physical activity     Days per week: None     Minutes per session: None    Stress: None   Relationships    Social connections     Talks on phone: None     Gets together: None     Attends Denominational service: None     Active member of club or organization: None     Attends meetings of clubs or organizations: None     Relationship status: None    Intimate partner violence     Fear of current or ex partner: None     Emotionally abused: None     Physically abused: None     Forced sexual activity: None   Other Topics Concern    None   Social History Narrative    None       SCREENINGS             PHYSICAL EXAM    (up to 7 for level 4, 8 or more for level 5)     ED Triage Vitals [09/04/20 1536]   BP Temp Temp src Pulse Resp SpO2 Height Weight   -- 98.7 °F (37.1 °C) -- 116 22 96 % -- --       Physical Exam  Vitals signs and nursing note reviewed. Constitutional:       General: He is not in acute distress. Appearance: He is well-developed. Comments: Strong smell of alcohol   HENT:      Head: Normocephalic and atraumatic.       Right Ear: External ear normal.      Left Ear: External ear normal.   Eyes:      General: No scleral icterus. Conjunctiva/sclera: Conjunctivae normal.      Pupils: Pupils are equal, round, and reactive to light. Neck:      Musculoskeletal: Normal range of motion and neck supple. Cardiovascular:      Rate and Rhythm: Normal rate and regular rhythm. Pulses: Normal pulses. Heart sounds: Normal heart sounds. Pulmonary:      Effort: Pulmonary effort is normal.      Breath sounds: Normal breath sounds. Abdominal:      General: Bowel sounds are normal.      Palpations: Abdomen is soft. There is no mass. Tenderness: There is no abdominal tenderness. Comments: I did not appreciate any tenderness or organomegaly. Musculoskeletal: Normal range of motion. Skin:     General: Skin is warm and dry. Coloration: Skin is not jaundiced or pale. Neurological:      General: No focal deficit present. Mental Status: He is alert and oriented to person, place, and time. Psychiatric:         Mood and Affect: Affect is flat. Behavior: Behavior is cooperative. Thought Content: Thought content is not paranoid or delusional. Thought content includes suicidal ideation. Thought content does not include homicidal ideation. Thought content does not include suicidal plan.          DIAGNOSTIC RESULTS     EKG: All EKG's are interpreted by the Emergency Department Physician who either signs or Co-signs this chart in the absence of a cardiologist.        RADIOLOGY:   Non-plain film images such as CT, Ultrasound and MRI are read by the radiologist. Lincoln Fester images are visualized and preliminarily interpreted by the emergency physician with the below findings:        Interpretation per the Radiologist below, if available at the time of this note:    No orders to display         ED BEDSIDE ULTRASOUND:   Performed by ED Physician - none    LABS:  Labs Reviewed   CBC WITH AUTO DIFFERENTIAL - Abnormal; Notable for the following components:       Result Value    RBC 4.21 (*)     Hemoglobin 12.8 (*)     Hematocrit 37.7 (*)     MPV 8.9 (*)     All other components within normal limits   COMPREHENSIVE METABOLIC PANEL W/ REFLEX TO MG FOR LOW K - Abnormal; Notable for the following components:    Glucose 119 (*)     Calcium 8.4 (*)     ALT 65 (*)     AST 42 (*)     All other components within normal limits   ETHANOL   URINE RT REFLEX TO CULTURE   URINE DRUG SCREEN       All other labs were within normal range or not returned as of this dictation. EMERGENCY DEPARTMENT COURSE and DIFFERENTIALDIAGNOSIS/MDM:   Vitals:    Vitals:    09/04/20 1712 09/04/20 1713 09/04/20 1732 09/04/20 1801   BP: 112/73  109/72 116/72   Pulse:  85 86 84   Resp:  15 18 18   Temp:       SpO2: 94% 94% 93% 95%       MDM  Number of Diagnoses or Management Options  Acute alcoholic intoxication with complication ([address]):   History of suicide attempt:   Suicidal ideation:   Diagnosis management comments: Patient's alcohol level is very high. To be hours before it comes down and can be evaluated by psychiatry. I am sure with his chronic alcoholism when he starts to withdrawal he will become symptomatic but currently at this level he seems comfortable. I discussed the case with hospitalist service for medical admission and detox and psychiatric consultation because of his voicing suicidal ideation. I spoke with . CONSULTS:  IP CONSULT TO HOSPITALIST    PROCEDURES:  Unless otherwise notedbelow, none     Procedures    FINAL IMPRESSION     1. Acute alcoholic intoxication with complication ([address]    2. Suicidal ideation    3.  History of suicide attempt          DISPOSITION/PLAN   DISPOSITION Decision To Admit 09/04/2020 05:39:04 PM      PATIENT REFERRED TO:  @FUP@    DISCHARGE MEDICATIONS:  New Prescriptions    No medications on file          (Please note that portions of this note were completed with a voice recognition program.  Efforts were made to edit the dictations butoccasionally words are mis-transcribed.)    Marley Ortega MD (electronically signed)  AttendingEmergency Physician          Kamari Bah MD  09/04/20 7879

## 2020-09-05 LAB
ALBUMIN SERPL-MCNC: 3.7 G/DL (ref 3.5–5.2)
ALP BLD-CCNC: 73 U/L (ref 40–130)
ALT SERPL-CCNC: 56 U/L (ref 5–41)
ANION GAP SERPL CALCULATED.3IONS-SCNC: 12 MMOL/L (ref 7–19)
AST SERPL-CCNC: 36 U/L (ref 5–40)
BILIRUB SERPL-MCNC: <0.2 MG/DL (ref 0.2–1.2)
BUN BLDV-MCNC: 12 MG/DL (ref 6–20)
CALCIUM SERPL-MCNC: 8.2 MG/DL (ref 8.6–10)
CHLORIDE BLD-SCNC: 108 MMOL/L (ref 98–111)
CO2: 24 MMOL/L (ref 22–29)
CREAT SERPL-MCNC: 1 MG/DL (ref 0.5–1.2)
ETHANOL: 22 MG/DL (ref 0–0.08)
GFR AFRICAN AMERICAN: >59
GFR NON-AFRICAN AMERICAN: >60
GLUCOSE BLD-MCNC: 106 MG/DL (ref 74–109)
POTASSIUM REFLEX MAGNESIUM: 4.5 MMOL/L (ref 3.5–5)
SODIUM BLD-SCNC: 144 MMOL/L (ref 136–145)
TOTAL PROTEIN: 5.6 G/DL (ref 6.6–8.7)

## 2020-09-05 PROCEDURE — 6370000000 HC RX 637 (ALT 250 FOR IP): Performed by: PHYSICIAN ASSISTANT

## 2020-09-05 PROCEDURE — 6370000000 HC RX 637 (ALT 250 FOR IP): Performed by: STUDENT IN AN ORGANIZED HEALTH CARE EDUCATION/TRAINING PROGRAM

## 2020-09-05 PROCEDURE — G0480 DRUG TEST DEF 1-7 CLASSES: HCPCS

## 2020-09-05 PROCEDURE — 2580000003 HC RX 258: Performed by: PHYSICIAN ASSISTANT

## 2020-09-05 PROCEDURE — 96375 TX/PRO/DX INJ NEW DRUG ADDON: CPT

## 2020-09-05 PROCEDURE — G0378 HOSPITAL OBSERVATION PER HR: HCPCS

## 2020-09-05 PROCEDURE — 1210000000 HC MED SURG R&B

## 2020-09-05 PROCEDURE — 6360000002 HC RX W HCPCS: Performed by: PHYSICIAN ASSISTANT

## 2020-09-05 PROCEDURE — 99253 IP/OBS CNSLTJ NEW/EST LOW 45: CPT | Performed by: PSYCHIATRY & NEUROLOGY

## 2020-09-05 PROCEDURE — 96372 THER/PROPH/DIAG INJ SC/IM: CPT

## 2020-09-05 PROCEDURE — 80053 COMPREHEN METABOLIC PANEL: CPT

## 2020-09-05 PROCEDURE — 36415 COLL VENOUS BLD VENIPUNCTURE: CPT

## 2020-09-05 RX ORDER — FAMOTIDINE 20 MG/1
20 TABLET, FILM COATED ORAL DAILY
Status: DISCONTINUED | OUTPATIENT
Start: 2020-09-05 | End: 2020-09-07 | Stop reason: HOSPADM

## 2020-09-05 RX ADMIN — LORAZEPAM 2 MG: 1 TABLET ORAL at 02:53

## 2020-09-05 RX ADMIN — LORAZEPAM 2 MG: 1 TABLET ORAL at 00:54

## 2020-09-05 RX ADMIN — PROMETHAZINE HYDROCHLORIDE 12.5 MG: 25 TABLET ORAL at 04:21

## 2020-09-05 RX ADMIN — LORAZEPAM 2 MG: 1 TABLET ORAL at 04:21

## 2020-09-05 RX ADMIN — THERA TABS 1 TABLET: TAB at 09:42

## 2020-09-05 RX ADMIN — Medication 10 ML: at 09:40

## 2020-09-05 RX ADMIN — THIAMINE HCL TAB 100 MG 100 MG: 100 TAB at 09:40

## 2020-09-05 RX ADMIN — ONDANSETRON 4 MG: 2 INJECTION INTRAMUSCULAR; INTRAVENOUS at 14:58

## 2020-09-05 RX ADMIN — LORAZEPAM 1 MG: 1 TABLET ORAL at 09:48

## 2020-09-05 RX ADMIN — ENOXAPARIN SODIUM 40 MG: 40 INJECTION SUBCUTANEOUS at 20:01

## 2020-09-05 RX ADMIN — LORAZEPAM 1 MG: 1 TABLET ORAL at 20:01

## 2020-09-05 RX ADMIN — FOLIC ACID 1 MG: 1 TABLET ORAL at 09:40

## 2020-09-05 RX ADMIN — SODIUM CHLORIDE: 9 INJECTION, SOLUTION INTRAVENOUS at 04:13

## 2020-09-05 RX ADMIN — Medication 10 ML: at 20:04

## 2020-09-05 RX ADMIN — FAMOTIDINE 20 MG: 20 TABLET ORAL at 09:40

## 2020-09-05 RX ADMIN — Medication 3 MG: at 20:00

## 2020-09-05 ASSESSMENT — ENCOUNTER SYMPTOMS
SINUS PRESSURE: 0
ABDOMINAL PAIN: 0
CHEST TIGHTNESS: 0
EYE PAIN: 0
COUGH: 0
PHOTOPHOBIA: 0
ABDOMINAL DISTENTION: 0
NAUSEA: 0
VOMITING: 0
SHORTNESS OF BREATH: 0
WHEEZING: 0
EYE ITCHING: 0
DIARRHEA: 0
SINUS PAIN: 0
SORE THROAT: 0

## 2020-09-05 ASSESSMENT — PAIN SCALES - GENERAL: PAINLEVEL_OUTOF10: 0

## 2020-09-05 NOTE — PLAN OF CARE
Problem: Falls - Risk of:  Goal: Will remain free from falls  Description: Will remain free from falls  9/4/2020 2318 by Jake Alba RN  Outcome: Ongoing  9/4/2020 2316 by Jake Alba RN  Outcome: Ongoing  Goal: Absence of physical injury  Description: Absence of physical injury  9/4/2020 2318 by Jake Alba RN  Outcome: Ongoing  9/4/2020 2316 by Jake Alba RN  Outcome: Ongoing

## 2020-09-05 NOTE — CONSULTS
[facility]  Psychiatry Consult    Reason for Consult: Suicidal ideation    47-year-old white male with history of bipolar disorder, alcohol abuse, known to our service, who presented to the ER with alcohol intoxication and suicidal ideation. , UDS negative. Patient is currently on the medical floor, being treated for alcohol withdrawal.  Last CIWA score 9. Mildly agitated and tremulous this morning as per RN. Patient presents with dysphoric affect when seen this morning. Sitter is at bedside. Patient endorses suicidal ideation. States he is unable to think of any particular triggers. \"It is just my life. I am 50 and I feel that I wasted my life. \"  We processed his feelings, supported psychotherapy provided. Patient states he has been struggling with alcohol addiction for a long time. Most recently binge-drinking, 1/5 for several days in a row. Reports depressed mood, anhedonia, poor sleep and appetite. Voices hopelessness and helplessness. Reports occasional mood swings and racing thoughts. He denies auditory and visual hallucinations. He denies paranoia. He has poor social support system. He feels that he would benefit from inpatient psychiatric treatment. Currently reports tremor and mild headache. Psychiatric History:    Diagnoses: Alcohol use disorder, bipolar disorder  Suicide attempts/gestures: Denies   Prior hospitalizations: Multiple to [facility] with last admission in Sep 2019  Medication trials: Lamictal, Seroquel, Zoloft, naltrexone, Vivitrol  Mental health contact: Lost to follow-up   Head trauma: Denies    Substance Use History:  Patient has been drinking alcohol all baseline. Most recently binge-drinking. States he has no history of w/d seizures, however, seizures documented in the chart. Denies illicit drug use. Smokes 1 PPD. Allergies:  Patient has no known allergies.     Medical History:  Past Medical History:   Diagnosis Date    CAD (coronary artery disease)     Chest pain 12/12/2011    Cigarette smoker 12/12/2011    Depression     Hypertension 12/12/2011    Respiratory failure (HCC)     intubation    Seizures (HCC)        Family History:  Family History   Problem Relation Age of Onset    Osteoarthritis Mother     Thyroid Disease Mother     Heart Failure Father     Heart Failure Maternal Grandmother     Heart Failure Paternal Grandmother     Cancer Maternal Grandfather      Mother has been diagnosed with depression. Father and cousins were diagnosed with alcohol use disorder. Patient denies any family history of suicidal attempts or completed suicide. Social History:  Patient is single and has no children. He lives with his father. Unemployed for 1 month    Review of Systems: 14 point review of systems negative except as described above    Vitals:    09/05/20 0641   BP: 117/69   Pulse: 80   Resp: 18   Temp: 97.5 °F (36.4 °C)   SpO2: 96%       Mental Status  Appearance: Disheveled and in hospital attire. Made good eye contact. Behavior: Calm, cooperative. Psychomotor retardation appreciated. Speech: Normal in tone, volume, and quality. No slurring, dysarthria or pressured speech noted. Mood: \"Depressed\"   Affect: Mood congruent. Thought Process: Appears linear. Thought Content: Endorses suicidal.  Denies homicidal ideation. No overt delusions or paranoia appreciated. Perceptions: Denies auditory or visual hallucinations at present time. Not responding to internal stimuli. Concentration: Intact. Orientation: to person, place, date, and situation. Language: Intact. Fund of information: Intact. Memory: Recent and remote appear intact. Impulsivity: Questionable. Neurovegitative: Poor appetite and sleep. Insight: Impaired. Judgment: Impaired.     Assessment  DSM-5 DIAGNOSIS:  Impression   Depression unspecified  Suicidal ideation  Alcohol withdrawal  Alcohol use disorder, severe  Tobacco use disorder    Patient endorses suicidal ideation and is meeting the criteria for inpatient psychiatric treatment. Still actively withdrawing from alcohol. Continue on one-to-one today. Manage withdrawal.  We will reassess for transfer to psychiatry tomorrow morning. Thank you for consulting our service. Please call us with questions.       Cal Morgan MD

## 2020-09-05 NOTE — PROGRESS NOTES
[facility] Hospitalists      Patient:  Govind Fry  YOB: 1970  Date of Service: 9/5/2020  MRN: [id number]   Acct: [de-identified]   Primary Care Physician: Dakota Cooney  Advance Directive: Full Code  Admit Date: 9/4/2020       Hospital Day: 1  Portions of this note have been copied forward, however, have been updated to reflect the most current status of this patient. CHIEF COMPLAINT Suicidal Ideation    SUBJECTIVE:     Pt is laying in bed in no acute distress. He is pleasant and admits to anxiety, racing thoughts, feeling shaky, and nauseous. He denies any visual or auditory hallucinations. He denies any shortness of breath or chest pain. RRR and lungs clear bilaterally. He admits to continued suicidal ideation. Cumulative Hospital Course:   9/04/2020   The patient is a 44 y.o. male with PMH of bipolar 1 disorder, HTN, seizures, tobacco and alcohol abuse who presented to [address] [place] [address] ED complaining of suicidal ideation. Pt has performed suicidal acts in the past. Pt is resting in bed eating and in no acute distress. Pt has been drinking and admits to suicidal thoughts for about a month. Pt does not admit to a specific plan. He denies any homicidal ideation. Pt denies any chest pain, sob, nausea, vomiting or diarrhea. Upon arrival pt is afebrile and normotensive. ETOH 389. UDS negative. Pt is initiated on banana bag in ED. Patient is admitted to Hospital medicine to monitor for withdrawal and for Psych consultation once medically stable. 9/05/2020 pt with alcohol withdrawal symptoms. CIWA continued as well as sitter. Psych consulted. Labs and vitals are stable. Review of Systems:   Review of Systems   Constitutional: Negative for chills, diaphoresis, fatigue and fever. HENT: Negative for congestion, ear pain, sinus pressure, sinus pain and sore throat. Eyes: Negative for photophobia, pain and itching. Respiratory: Negative for cough, chest tightness, shortness of breath and wheezing. Cardiovascular: Negative for chest pain, palpitations and leg swelling. Gastrointestinal: Negative for abdominal distention, abdominal pain, diarrhea, nausea and vomiting. Endocrine: Negative for cold intolerance and heat intolerance. Genitourinary: Negative for difficulty urinating, dysuria, flank pain, frequency and urgency. Musculoskeletal: Negative for arthralgias, joint swelling and myalgias. Neurological: Negative for dizziness, tremors, syncope, weakness, light-headedness, numbness and headaches. Hematological: Does not bruise/bleed easily. Psychiatric/Behavioral: Positive for decreased concentration, dysphoric mood and suicidal ideas. Negative for agitation, confusion, hallucinations and self-injury. The patient is nervous/anxious. Racing thoughts          Objective:   VITALS:  /69   Pulse 80   Temp 97.5 °F (36.4 °C) (Temporal)   Resp 18   Ht 6' 4\" (1.93 m)   Wt 171 lb (77.6 kg)   SpO2 96%   BMI 20.81 kg/m²   24HR INTAKE/OUTPUT:      Intake/Output Summary (Last 24 hours) at 9/5/2020 0954  Last data filed at 9/5/2020 0751  Gross per 24 hour   Intake 2589.58 ml   Output --   Net 2589.58 ml       Physical Exam  Constitutional:       General: He is not in acute distress. Appearance: Normal appearance. He is not ill-appearing, toxic-appearing or diaphoretic. HENT:      Head: Normocephalic and atraumatic. Right Ear: External ear normal.      Left Ear: External ear normal.      Nose: Nose normal.      Mouth/Throat:      Mouth: Mucous membranes are moist.      Pharynx: Oropharynx is clear. Eyes:      General: No scleral icterus. Extraocular Movements: Extraocular movements intact. Conjunctiva/sclera: Conjunctivae normal.   Neck:      Musculoskeletal: Normal range of motion and neck supple. Cardiovascular:      Rate and Rhythm: Normal rate and regular rhythm. Pulses: Normal pulses. Heart sounds: Normal heart sounds. No murmur. No friction rub.  No 12 12   CREATININE 0.9 1.0   GLUCOSE 119* 106     Recent Labs     09/04/20  1605 09/05/20  0154   AST 42* 36   ALT 65* 56*   BILITOT <0.2 <0.2   ALKPHOS 88 73     UA:  Recent Labs     09/04/20  1758   COLORU YELLOW   PHUR 5.5   CLARITYU Clear   SPECGRAV 1.008   LEUKOCYTESUR Negative   UROBILINOGEN 0.2   BILIRUBINUR Negative   BLOODU Negative   GLUCOSEU Negative         Assessment/Plan     Principal Problem:    Alcohol intoxication with blood level over 0.3- banana bag completed. Continue CIWA, sitter at bedside, fall and seizure precautions. ETOH this at 22. Thiamine, folic acid and multivitamin.      Active Problems:   Suicidal ideations- psych to evaluate.        Hypertension- monitor closely.       Bipolar I disorder, most recent episode depressed, severe without psychotic features ([facility] [address]- hold home meds. Unsure of how long pt has not been taking medications.  Will defer to Psych for continuation of medications.        Tobacco abuse disorder- Nicotine patch as needed        DVT prophylaxis- Lovenox     Robson Adan PA-C  9/5/2020, 9:54 AM

## 2020-09-05 NOTE — PROGRESS NOTES
4 Eyes Skin Assessment    Richi Gomez is being assessed upon: Admission    I agree that I, Jordy Leach, along with Alyce Monge RN (either 2 RN's or 1 LPN and 1 RN) have performed a thorough Head to Toe Skin Assessment on the patient. ALL assessment sites listed below have been assessed. Areas assessed by both nurses:     [x]   Head, Face, and Ears   [x]   Shoulders, Back, and Chest  [x]   Arms, Elbows, and Hands   []   Coccyx, Sacrum, and Ischium  [x]   Legs, Feet, and Heels    Does the Patient have Skin Breakdown?  No    Wu Prevention initiated: No  Wound Care Orders initiated: No    WO nurse consulted for Pressure Injury (Stage 3,4, Unstageable, DTI, NWPT, and Complex wounds) and New or Established Ostomies: No        Primary Nurse eSignature: Jordy Leach RN on 9/4/2020 at 8:17 PM      Co-Signer eSignature: Electronically signed by Alyce Monge RN on 9/4/20 at 8:47 PM CDT

## 2020-09-06 PROCEDURE — 2580000003 HC RX 258: Performed by: PHYSICIAN ASSISTANT

## 2020-09-06 PROCEDURE — 6360000002 HC RX W HCPCS: Performed by: PHYSICIAN ASSISTANT

## 2020-09-06 PROCEDURE — 1210000000 HC MED SURG R&B

## 2020-09-06 PROCEDURE — G0378 HOSPITAL OBSERVATION PER HR: HCPCS

## 2020-09-06 PROCEDURE — 6370000000 HC RX 637 (ALT 250 FOR IP): Performed by: STUDENT IN AN ORGANIZED HEALTH CARE EDUCATION/TRAINING PROGRAM

## 2020-09-06 PROCEDURE — 6370000000 HC RX 637 (ALT 250 FOR IP): Performed by: PHYSICIAN ASSISTANT

## 2020-09-06 PROCEDURE — 96372 THER/PROPH/DIAG INJ SC/IM: CPT

## 2020-09-06 RX ADMIN — THIAMINE HCL TAB 100 MG 100 MG: 100 TAB at 08:33

## 2020-09-06 RX ADMIN — FAMOTIDINE 20 MG: 20 TABLET ORAL at 08:33

## 2020-09-06 RX ADMIN — Medication 3 MG: at 20:11

## 2020-09-06 RX ADMIN — Medication 10 ML: at 08:34

## 2020-09-06 RX ADMIN — ENOXAPARIN SODIUM 40 MG: 40 INJECTION SUBCUTANEOUS at 20:11

## 2020-09-06 RX ADMIN — THERA TABS 1 TABLET: TAB at 08:33

## 2020-09-06 RX ADMIN — Medication 10 ML: at 20:11

## 2020-09-06 RX ADMIN — FOLIC ACID 1 MG: 1 TABLET ORAL at 08:33

## 2020-09-06 RX ADMIN — LORAZEPAM 2 MG: 1 TABLET ORAL at 20:11

## 2020-09-06 RX ADMIN — LORAZEPAM 1 MG: 1 TABLET ORAL at 11:34

## 2020-09-06 ASSESSMENT — ENCOUNTER SYMPTOMS
EYE ITCHING: 0
NAUSEA: 0
PHOTOPHOBIA: 0
SINUS PAIN: 0
DIARRHEA: 0
VOMITING: 0
SORE THROAT: 0
SHORTNESS OF BREATH: 0
EYE PAIN: 0
COUGH: 0
SINUS PRESSURE: 0
WHEEZING: 0
ABDOMINAL PAIN: 0
ABDOMINAL DISTENTION: 0
CHEST TIGHTNESS: 0

## 2020-09-06 ASSESSMENT — PAIN SCALES - GENERAL: PAINLEVEL_OUTOF10: 0

## 2020-09-06 NOTE — PLAN OF CARE
Psychiatric on floor to evaluate patient for [facility] as voluntary. Patient is agreeable for admission to [facility] voluntary. Continues to have CIWA scale of 9 this afternoon. Ativan 1 mg given.   Will continue to monitor

## 2020-09-06 NOTE — PROGRESS NOTES
Spoke to Dale Madison RN on 3rd to see how the Pt was doing this morning and she said his CIWA was a 3. She stated Pt was up and showered, ate breakfast, had some anxiety and mild tremors. I informed her that [facility] would accept the patient to [facility] as long as his CIWA was down and his symptoms have improved. She said he was doing better today and night shift documented his CIWA a 0. When I went down to get his voluntary papers signed for admission, Pt was c/o of some anxiety. His nurse was getting him some Ativan and they were waiting for the Doctor to come see him. Dale Madison, his nurse stated she didn't know if he would be cleared to come up to the unit today. She thought probably tomorrow.

## 2020-09-06 NOTE — PROGRESS NOTES
[facility] Hospitalists      Patient:  Amara Llanes  YOB: 1970  Date of Service: 9/6/2020  MRN: [id number]   Acct: [de-identified]   Primary Care Physician: Lionel Dueñas  Advance Directive: Full Code  Admit Date: 9/4/2020       Hospital Day: 2  Portions of this note have been copied forward, however, have been updated to reflect the most current status of this patient. CHIEF COMPLAINT Suicidal Ideation    SUBJECTIVE:     Pt is sitting up in bed in no acute distress. He states he is having anxiety and continued racing thoughts. Also with notable tremors. Continued suicidal ideation. Cumulative Hospital Course:   9/04/2020   The patient is a 44 y.o. male with PMH of bipolar 1 disorder, HTN, seizures, tobacco and alcohol abuse who presented to [address] [place] [address] ED complaining of suicidal ideation. Pt has performed suicidal acts in the past. Pt is resting in bed eating and in no acute distress. Pt has been drinking and admits to suicidal thoughts for about a month. Pt does not admit to a specific plan. He denies any homicidal ideation. Pt denies any chest pain, sob, nausea, vomiting or diarrhea. Upon arrival pt is afebrile and normotensive. ETOH 389. UDS negative. Pt is initiated on banana bag in ED. Patient is admitted to Hospital medicine to monitor for withdrawal and for Psych consultation once medically stable. 9/05/2020 pt with alcohol withdrawal symptoms. CIWA continued as well as sitter. Psych consulted. Labs and vitals are stable. 9/06/2020 Pt continuing to require ativan for withdrawal symptoms. Tremors and anxiety. Psych evaluated and will admit patient to their service once no longer actively withdrawing. Review of Systems:   Review of Systems   Constitutional: Negative for chills, diaphoresis, fatigue and fever. HENT: Negative for congestion, ear pain, sinus pressure, sinus pain and sore throat. Eyes: Negative for photophobia, pain and itching.    Respiratory: Negative for cough, chest tightness, shortness of breath and wheezing. Cardiovascular: Negative for chest pain, palpitations and leg swelling. Gastrointestinal: Negative for abdominal distention, abdominal pain, diarrhea, nausea and vomiting. Endocrine: Negative for cold intolerance and heat intolerance. Genitourinary: Negative for difficulty urinating, dysuria, flank pain, frequency and urgency. Musculoskeletal: Negative for arthralgias, joint swelling and myalgias. Neurological: Negative for dizziness, tremors, syncope, weakness, light-headedness, numbness and headaches. Hematological: Does not bruise/bleed easily. Psychiatric/Behavioral: Positive for dysphoric mood and suicidal ideas. Negative for agitation, confusion, decreased concentration, hallucinations and self-injury. The patient is nervous/anxious. Racing thoughts          Objective:   VITALS:  BP (!) 142/90   Pulse 77   Temp 97.7 °F (36.5 °C) (Temporal)   Resp 16   Ht 6' 4\" (1.93 m)   Wt 170 lb (77.1 kg)   SpO2 96%   BMI 20.69 kg/m²   24HR INTAKE/OUTPUT:      Intake/Output Summary (Last 24 hours) at 9/6/2020 1257  Last data filed at 9/6/2020 0130  Gross per 24 hour   Intake 991 ml   Output --   Net 991 ml       Physical Exam  Constitutional:       General: He is not in acute distress. Appearance: Normal appearance. He is not ill-appearing, toxic-appearing or diaphoretic. HENT:      Head: Normocephalic and atraumatic. Right Ear: External ear normal.      Left Ear: External ear normal.      Nose: Nose normal.      Mouth/Throat:      Mouth: Mucous membranes are moist.      Pharynx: Oropharynx is clear. Eyes:      General: No scleral icterus. Extraocular Movements: Extraocular movements intact. Conjunctiva/sclera: Conjunctivae normal.   Neck:      Musculoskeletal: Normal range of motion and neck supple. Cardiovascular:      Rate and Rhythm: Normal rate and regular rhythm. Pulses: Normal pulses.       Heart sounds: Normal heart sounds. No murmur. No friction rub. No gallop. Pulmonary:      Effort: Pulmonary effort is normal.      Breath sounds: Normal breath sounds. No stridor. No wheezing or rhonchi. Abdominal:      General: Abdomen is flat. Bowel sounds are normal. There is no distension. Palpations: Abdomen is soft. There is no mass. Tenderness: There is no abdominal tenderness. Hernia: No hernia is present. Musculoskeletal:         General: No swelling or signs of injury. Right lower leg: No edema. Left lower leg: No edema. Comments: Tremors    Skin:     General: Skin is warm and dry. Coloration: Skin is not jaundiced or pale. Findings: No erythema, lesion or rash. Neurological:      General: No focal deficit present. Mental Status: He is alert and oriented to person, place, and time. Cranial Nerves: No cranial nerve deficit. Sensory: No sensory deficit. Motor: No weakness. Psychiatric:         Mood and Affect: Mood normal.         Behavior: Behavior normal.         Thought Content:  Thought content normal.         Judgment: Judgment normal.            Medications:      famotidine  20 mg Oral Daily    folic acid  1 mg Oral Daily    melatonin  3 mg Oral Nightly    nicotine  1 patch Transdermal Daily    thiamine  100 mg Oral Daily    sodium chloride flush  10 mL Intravenous 2 times per day    multivitamin  1 tablet Oral Daily    enoxaparin  40 mg Subcutaneous Q24H     sodium chloride flush, potassium chloride **OR** potassium alternative oral replacement **OR** potassium chloride, magnesium sulfate, acetaminophen **OR** acetaminophen, polyethylene glycol, promethazine **OR** ondansetron, LORazepam **OR** LORazepam **OR** LORazepam **OR** LORazepam **OR** LORazepam **OR** LORazepam **OR** LORazepam **OR** LORazepam  DIET CARDIAC;     Lab and other Data:     Recent Labs     09/04/20  1605   WBC 6.7   HGB 12.8*        Recent Labs 09/04/20  1605 09/05/20  0154    144   K 4.1 4.5    108   CO2 24 24   BUN 12 12   CREATININE 0.9 1.0   GLUCOSE 119* 106     Recent Labs     09/04/20  1605 09/05/20  0154   AST 42* 36   ALT 65* 56*   BILITOT <0.2 <0.2   ALKPHOS 88 73     UA:  Recent Labs     09/04/20  1758   COLORU YELLOW   PHUR 5.5   CLARITYU Clear   SPECGRAV 1.008   LEUKOCYTESUR Negative   UROBILINOGEN 0.2   BILIRUBINUR Negative   BLOODU Negative   GLUCOSEU Negative         Assessment/Plan     Principal Problem:    Alcohol intoxication with blood level over 0.3- banana bag completed. Continue CIWA, sitter at bedside, fall and seizure precautions. Thiamine, folic acid and multivitamin. Pt continuing to have withdrawal symptoms.      Active Problems:   Suicidal ideations- Psych evaluated and patient will be discharged to their service once no longer in active withdrawal.        Hypertension- monitor closely.       Bipolar I disorder, most recent episode depressed, severe without psychotic features ([facility] [address]- hold home meds. Unsure of how long pt has not been taking medications.  Will defer to Psych for continuation of medications.        Tobacco abuse disorder- Nicotine patch as needed        DVT prophylaxis- Lovenox     Rodrick Pallas, PA-C  9/6/2020, 12:57 PM

## 2020-09-07 ENCOUNTER — HOSPITAL ENCOUNTER (INPATIENT)
Age: 50
LOS: 2 days | Discharge: HOME | DRG: 885 | End: 2020-09-09
Attending: PSYCHIATRY & NEUROLOGY | Admitting: PSYCHIATRY & NEUROLOGY
Payer: MEDICAID

## 2020-09-07 VITALS
RESPIRATION RATE: 16 BRPM | WEIGHT: 170 LBS | TEMPERATURE: 98.3 F | DIASTOLIC BLOOD PRESSURE: 84 MMHG | HEART RATE: 82 BPM | OXYGEN SATURATION: 98 % | SYSTOLIC BLOOD PRESSURE: 137 MMHG | BODY MASS INDEX: 20.7 KG/M2 | HEIGHT: 76 IN

## 2020-09-07 PROCEDURE — 6370000000 HC RX 637 (ALT 250 FOR IP): Performed by: PSYCHIATRY & NEUROLOGY

## 2020-09-07 PROCEDURE — G0378 HOSPITAL OBSERVATION PER HR: HCPCS

## 2020-09-07 PROCEDURE — 6370000000 HC RX 637 (ALT 250 FOR IP): Performed by: PHYSICIAN ASSISTANT

## 2020-09-07 PROCEDURE — 1240000000 HC EMOTIONAL WELLNESS R&B

## 2020-09-07 PROCEDURE — 2580000003 HC RX 258: Performed by: PHYSICIAN ASSISTANT

## 2020-09-07 PROCEDURE — 6370000000 HC RX 637 (ALT 250 FOR IP): Performed by: STUDENT IN AN ORGANIZED HEALTH CARE EDUCATION/TRAINING PROGRAM

## 2020-09-07 RX ORDER — FOLIC ACID 1 MG/1
1 TABLET ORAL DAILY
Status: DISCONTINUED | OUTPATIENT
Start: 2020-09-08 | End: 2020-09-07 | Stop reason: SDUPTHER

## 2020-09-07 RX ORDER — UREA 10 %
3 LOTION (ML) TOPICAL NIGHTLY
Status: CANCELLED | OUTPATIENT
Start: 2020-09-07

## 2020-09-07 RX ORDER — PROMETHAZINE HYDROCHLORIDE 12.5 MG/1
12.5 TABLET ORAL EVERY 6 HOURS PRN
Status: DISCONTINUED | OUTPATIENT
Start: 2020-09-07 | End: 2020-09-07 | Stop reason: SDUPTHER

## 2020-09-07 RX ORDER — POLYETHYLENE GLYCOL 3350 17 G/17G
17 POWDER, FOR SOLUTION ORAL DAILY PRN
Status: DISCONTINUED | OUTPATIENT
Start: 2020-09-07 | End: 2020-09-07 | Stop reason: SDUPTHER

## 2020-09-07 RX ORDER — FAMOTIDINE 20 MG/1
20 TABLET, FILM COATED ORAL DAILY
Status: CANCELLED | OUTPATIENT
Start: 2020-09-08

## 2020-09-07 RX ORDER — NICOTINE 21 MG/24HR
1 PATCH, TRANSDERMAL 24 HOURS TRANSDERMAL DAILY
Status: DISCONTINUED | OUTPATIENT
Start: 2020-09-07 | End: 2020-09-09 | Stop reason: HOSPADM

## 2020-09-07 RX ORDER — ACETAMINOPHEN 650 MG/1
650 SUPPOSITORY RECTAL EVERY 6 HOURS PRN
Status: DISCONTINUED | OUTPATIENT
Start: 2020-09-07 | End: 2020-09-07 | Stop reason: SDUPTHER

## 2020-09-07 RX ORDER — ACETAMINOPHEN 325 MG/1
650 TABLET ORAL EVERY 6 HOURS PRN
Status: CANCELLED | OUTPATIENT
Start: 2020-09-07

## 2020-09-07 RX ORDER — LANOLIN ALCOHOL/MO/W.PET/CERES
3 CREAM (GRAM) TOPICAL NIGHTLY
Status: DISCONTINUED | OUTPATIENT
Start: 2020-09-07 | End: 2020-09-09 | Stop reason: HOSPADM

## 2020-09-07 RX ORDER — NICOTINE 21 MG/24HR
1 PATCH, TRANSDERMAL 24 HOURS TRANSDERMAL DAILY
Status: DISCONTINUED | OUTPATIENT
Start: 2020-09-07 | End: 2020-09-07

## 2020-09-07 RX ORDER — ONDANSETRON 2 MG/ML
4 INJECTION INTRAMUSCULAR; INTRAVENOUS EVERY 6 HOURS PRN
Status: CANCELLED | OUTPATIENT
Start: 2020-09-07

## 2020-09-07 RX ORDER — THIAMINE MONONITRATE (VIT B1) 100 MG
100 TABLET ORAL DAILY
Status: CANCELLED | OUTPATIENT
Start: 2020-09-08

## 2020-09-07 RX ORDER — MULTIVITAMIN WITH IRON
1 TABLET ORAL DAILY
Status: CANCELLED | OUTPATIENT
Start: 2020-09-08

## 2020-09-07 RX ORDER — THIAMINE MONONITRATE (VIT B1) 100 MG
100 TABLET ORAL DAILY
Status: DISCONTINUED | OUTPATIENT
Start: 2020-09-08 | End: 2020-09-07 | Stop reason: SDUPTHER

## 2020-09-07 RX ORDER — ACETAMINOPHEN 650 MG/1
650 SUPPOSITORY RECTAL EVERY 6 HOURS PRN
Status: CANCELLED | OUTPATIENT
Start: 2020-09-07

## 2020-09-07 RX ORDER — FOLIC ACID 1 MG/1
1 TABLET ORAL DAILY
Status: CANCELLED | OUTPATIENT
Start: 2020-09-08

## 2020-09-07 RX ORDER — ACETAMINOPHEN 325 MG/1
650 TABLET ORAL EVERY 6 HOURS PRN
Status: DISCONTINUED | OUTPATIENT
Start: 2020-09-07 | End: 2020-09-07 | Stop reason: SDUPTHER

## 2020-09-07 RX ORDER — POLYETHYLENE GLYCOL 3350 17 G/17G
17 POWDER, FOR SOLUTION ORAL DAILY PRN
Status: DISCONTINUED | OUTPATIENT
Start: 2020-09-07 | End: 2020-09-09 | Stop reason: HOSPADM

## 2020-09-07 RX ORDER — PROMETHAZINE HYDROCHLORIDE 25 MG/1
12.5 TABLET ORAL EVERY 6 HOURS PRN
Status: CANCELLED | OUTPATIENT
Start: 2020-09-07

## 2020-09-07 RX ORDER — MULTIVITAMIN WITH IRON
1 TABLET ORAL DAILY
Status: DISCONTINUED | OUTPATIENT
Start: 2020-09-08 | End: 2020-09-07 | Stop reason: SDUPTHER

## 2020-09-07 RX ORDER — NICOTINE 21 MG/24HR
1 PATCH, TRANSDERMAL 24 HOURS TRANSDERMAL DAILY
Status: CANCELLED | OUTPATIENT
Start: 2020-09-08

## 2020-09-07 RX ORDER — ONDANSETRON 2 MG/ML
4 INJECTION INTRAMUSCULAR; INTRAVENOUS EVERY 6 HOURS PRN
Status: DISCONTINUED | OUTPATIENT
Start: 2020-09-07 | End: 2020-09-07 | Stop reason: SDUPTHER

## 2020-09-07 RX ORDER — FAMOTIDINE 20 MG/1
20 TABLET, FILM COATED ORAL DAILY
Status: DISCONTINUED | OUTPATIENT
Start: 2020-09-08 | End: 2020-09-07 | Stop reason: SDUPTHER

## 2020-09-07 RX ORDER — POLYETHYLENE GLYCOL 3350 17 G/17G
17 POWDER, FOR SOLUTION ORAL DAILY PRN
Status: CANCELLED | OUTPATIENT
Start: 2020-09-07

## 2020-09-07 RX ORDER — ACETAMINOPHEN 325 MG/1
650 TABLET ORAL EVERY 4 HOURS PRN
Status: DISCONTINUED | OUTPATIENT
Start: 2020-09-07 | End: 2020-09-07

## 2020-09-07 RX ORDER — ACETAMINOPHEN 325 MG/1
650 TABLET ORAL EVERY 4 HOURS PRN
Status: DISCONTINUED | OUTPATIENT
Start: 2020-09-07 | End: 2020-09-09 | Stop reason: HOSPADM

## 2020-09-07 RX ORDER — NICOTINE 21 MG/24HR
1 PATCH, TRANSDERMAL 24 HOURS TRANSDERMAL DAILY
Status: DISCONTINUED | OUTPATIENT
Start: 2020-09-08 | End: 2020-09-07 | Stop reason: SDUPTHER

## 2020-09-07 RX ADMIN — THIAMINE HCL TAB 100 MG 100 MG: 100 TAB at 08:40

## 2020-09-07 RX ADMIN — Medication 10 ML: at 08:39

## 2020-09-07 RX ADMIN — THERA TABS 1 TABLET: TAB at 08:40

## 2020-09-07 RX ADMIN — FAMOTIDINE 20 MG: 20 TABLET ORAL at 08:40

## 2020-09-07 RX ADMIN — FOLIC ACID 1 MG: 1 TABLET ORAL at 08:40

## 2020-09-07 RX ADMIN — Medication 3 MG: at 21:18

## 2020-09-07 ASSESSMENT — PAIN SCALES - GENERAL
PAINLEVEL_OUTOF10: 0

## 2020-09-07 ASSESSMENT — LIFESTYLE VARIABLES: HISTORY_ALCOHOL_USE: YES

## 2020-09-07 ASSESSMENT — SLEEP AND FATIGUE QUESTIONNAIRES: AVERAGE NUMBER OF SLEEP HOURS: 7

## 2020-09-07 NOTE — PLAN OF CARE
Problem: Falls - Risk of:  Goal: Will remain free from falls  Description: Will remain free from falls  Outcome: Ongoing  Goal: Absence of physical injury  Description: Absence of physical injury  Outcome: Ongoing     Problem: Bleeding:  Goal: Will show no signs and symptoms of excessive bleeding  Description: Will show no signs and symptoms of excessive bleeding  Outcome: Ongoing     Problem: ABCDS Injury Assessment  Goal: Absence of physical injury  Outcome: Ongoing     Problem: Discharge Planning:  Goal: Discharged to appropriate level of care  Description: Discharged to appropriate level of care  Outcome: Ongoing     Problem: SAFETY  Goal: Free from accidental physical injury  Outcome: Ongoing  Goal: Free from intentional harm  Outcome: Ongoing     Problem: SAFETY  Goal: Free from accidental physical injury  Outcome: Ongoing  Goal: Free from intentional harm  Outcome: Ongoing     Problem: DAILY CARE  Goal: Daily care needs are met  Outcome: Ongoing     Problem: PAIN  Goal: Patient's pain/discomfort is manageable  Outcome: Ongoing     Problem: SKIN INTEGRITY  Goal: Skin integrity is maintained or improved  Outcome: Ongoing     Problem: KNOWLEDGE DEFICIT  Goal: Patient/S.O. demonstrates understanding of disease process, treatment plan, medications, and discharge instructions.   Outcome: Ongoing     Problem: DISCHARGE BARRIERS  Goal: Patient's continuum of care needs are met  Outcome: Ongoing     Problem: Fluid Volume - Deficit:  Goal: Absence of fluid volume deficit signs and symptoms  Description: Absence of fluid volume deficit signs and symptoms  Outcome: Ongoing     Problem: Nutrition Deficit:  Goal: Ability to achieve adequate nutritional intake will improve  Description: Ability to achieve adequate nutritional intake will improve  Outcome: Ongoing     Problem: Sleep Pattern Disturbance:  Goal: Appears well-rested  Description: Appears well-rested  Outcome: Ongoing     Problem: Violence - Risk of,

## 2020-09-07 NOTE — DISCHARGE SUMMARY
Sudha Connolly  :  1970  MRN:  [id number]    Admit date:  2020  Discharge date:  2020    Discharging Physician:  Jxaon Mcgovern MD    Advance Directive: Full Code    Consults: Dr. Saima Ricks     Primary Care Physician:  Glenny Amaro    Discharge Diagnoses:    Principal Problem:    Alcohol intoxication with blood level over 0.3  Active Problems:    Hypertension    Alcoholism ([facility] [address]    Suicidal ideations    Alcohol withdrawal syndrome with complication ([facility] [address]    Depression    Alcohol abuse    Bipolar I disorder, most recent episode depressed, severe without psychotic features ([facility] [address]    Tobacco abuse disorder  Resolved Problems:    * No resolved hospital problems. *    Portions of this note have been copied forward, however, changed to reflect the most current clinical status of this patient. Hospital Course:    Mr. Vida Thakkar is a 44year old male with PMH Bipolar I disorder, HTN, tobacco/alcohol abuse who presented to [facility] ED intoxicated and reporting suicidal ideation. He admits to binge drinking. Drinks about \"a fifth\" of hard liquor for several days in a row. Stated he is unhappy with his life and has had suicidal thoughts for about a month. ETOH 389 upon arrival. He received a banana bag and was placed on CIWA protocol. He did receive ativan for withdrawal symptoms, however, at this time he is showing no further signs of withdrawal and has not received Ativan since yesterday evening. At this time Mr. Vida Thakkar is stable for discharge to inpatient Psychiatry unit. Significant Diagnostic Studies:   No results found.     Pertinent Labs:   CBC:   Recent Labs     20  1605   WBC 6.7   HGB 12.8*        BMP:    Recent Labs     20  1605 20  0154    144   K 4.1 4.5    108   CO2 24 24   BUN 12 12   CREATININE 0.9 1.0   GLUCOSE 119* 106     Physical Exam:  Vital Signs: /84   Pulse 82   Temp 98.3 °F (36.8 °C)   Resp 16   Ht 6' 4\" (1.93 m)   Wt 170 lb (77.1 kg) SpO2 98%   BMI 20.69 kg/m²   General appearance:. Alert and Cooperative   HEENT: Normocephalic. Chest: clear to auscultation bilaterally without wheezes or rhonchi. Cardiac: Normal heart tones with regular rate and rhythm, S1, S2 normal. No murmurs, gallops, or rubs auscultated. Abdomen:soft, non-tender; non-distended normal bowel sounds no masses, no organomegaly. Extremities: No clubbing or cyanosis. No peripheral edema. Peripheral pulses palpable. Neurologic: Grossly intact. Discharge Medications:      See medication reconciliation    Discharge Instructions: Follow up with admitting provider upon arrival.  Take medications as directed. Resume activity as tolerated. Diet: DIET CARDIAC; Disposition: Patient is medically stable and will be discharged to inpatient Psychiatry. Time spent on discharge 35 minutes spent in assessing patient, reviewing medications, discussion with nursing, confirming safe discharge plan and preparation of discharge summary.     Signed:  Gio Cary PA-C

## 2020-09-07 NOTE — CARE COORDINATION
Pt to transfer to [facility] when medically stable; SW on [facility] can address rehab option.     Electronically signed by GEREMIAS Alanis on 9/7/2020 at 1:09 PM

## 2020-09-08 PROCEDURE — 1240000000 HC EMOTIONAL WELLNESS R&B

## 2020-09-08 PROCEDURE — 6370000000 HC RX 637 (ALT 250 FOR IP): Performed by: PSYCHIATRY & NEUROLOGY

## 2020-09-08 PROCEDURE — 99233 SBSQ HOSP IP/OBS HIGH 50: CPT | Performed by: PSYCHIATRY & NEUROLOGY

## 2020-09-08 RX ORDER — HYDROXYZINE HYDROCHLORIDE 25 MG/1
25 TABLET, FILM COATED ORAL 3 TIMES DAILY PRN
Status: DISCONTINUED | OUTPATIENT
Start: 2020-09-08 | End: 2020-09-09 | Stop reason: HOSPADM

## 2020-09-08 RX ORDER — MULTIVITAMIN WITH IRON
1 TABLET ORAL DAILY
Status: DISCONTINUED | OUTPATIENT
Start: 2020-09-08 | End: 2020-09-09 | Stop reason: HOSPADM

## 2020-09-08 RX ORDER — TRAZODONE HYDROCHLORIDE 50 MG/1
50 TABLET ORAL NIGHTLY
Status: DISCONTINUED | OUTPATIENT
Start: 2020-09-08 | End: 2020-09-09 | Stop reason: HOSPADM

## 2020-09-08 RX ORDER — LAMOTRIGINE 25 MG/1
25 TABLET ORAL 2 TIMES DAILY
Status: DISCONTINUED | OUTPATIENT
Start: 2020-09-08 | End: 2020-09-09 | Stop reason: HOSPADM

## 2020-09-08 RX ORDER — LOSARTAN POTASSIUM 50 MG/1
100 TABLET ORAL DAILY
Status: DISCONTINUED | OUTPATIENT
Start: 2020-09-08 | End: 2020-09-09 | Stop reason: HOSPADM

## 2020-09-08 RX ORDER — FOLIC ACID 1 MG/1
1 TABLET ORAL DAILY
Status: DISCONTINUED | OUTPATIENT
Start: 2020-09-08 | End: 2020-09-09 | Stop reason: HOSPADM

## 2020-09-08 RX ORDER — THIAMINE MONONITRATE (VIT B1) 100 MG
100 TABLET ORAL DAILY
Status: DISCONTINUED | OUTPATIENT
Start: 2020-09-08 | End: 2020-09-09 | Stop reason: HOSPADM

## 2020-09-08 RX ORDER — NALTREXONE HYDROCHLORIDE 50 MG/1
50 TABLET, FILM COATED ORAL
Status: DISCONTINUED | OUTPATIENT
Start: 2020-09-09 | End: 2020-09-09 | Stop reason: HOSPADM

## 2020-09-08 RX ADMIN — THIAMINE HCL TAB 100 MG 100 MG: 100 TAB at 10:42

## 2020-09-08 RX ADMIN — LAMOTRIGINE 25 MG: 25 TABLET ORAL at 21:00

## 2020-09-08 RX ADMIN — TRAZODONE HYDROCHLORIDE 50 MG: 50 TABLET ORAL at 21:01

## 2020-09-08 RX ADMIN — ACETAMINOPHEN 650 MG: 325 TABLET, FILM COATED ORAL at 17:00

## 2020-09-08 RX ADMIN — FOLIC ACID 1 MG: 1 TABLET ORAL at 10:42

## 2020-09-08 RX ADMIN — LOSARTAN POTASSIUM 100 MG: 50 TABLET ORAL at 10:42

## 2020-09-08 RX ADMIN — Medication 3 MG: at 21:01

## 2020-09-08 RX ADMIN — HYDROXYZINE HYDROCHLORIDE 25 MG: 25 TABLET, FILM COATED ORAL at 10:42

## 2020-09-08 RX ADMIN — HYDROXYZINE HYDROCHLORIDE 25 MG: 25 TABLET, FILM COATED ORAL at 21:00

## 2020-09-08 RX ADMIN — LAMOTRIGINE 25 MG: 25 TABLET ORAL at 10:42

## 2020-09-08 RX ADMIN — MULTIVITAMIN TABLET 1 TABLET: TABLET at 10:42

## 2020-09-08 ASSESSMENT — PAIN SCALES - GENERAL
PAINLEVEL_OUTOF10: 0
PAINLEVEL_OUTOF10: 4

## 2020-09-08 NOTE — PROGRESS NOTES
Collateral obtained from: pt's mom Russel Merritt [phone] [facility] signed)     Immediate Stressors & Time Episode Began: Mom reported pt was in [place] recently and they (her and pt's dad) went to pick pt up and he was at their house for three weeks. Mom reported pt had an attitude and they thought pt was drinking alcohol. Mom reported pt was hiding it but they found the alcohol and brought it to pt's attention. Mom reported pt got mad. Mom reported they told pt he would have to leave. Mom reported pt said he couldn't leave because he was drunk. Mom reported they waited until morning and pt left but then pt called her. Mom reported she told him that he needed some help and needed to go to the hospital. Mom reported pt agreed and they brought him to hospital. Mom reported their daughter and grandson wont have anything to do with them anymore because they don't stand up to pt about his drinking. Mom reported pt's drinking has torn the family apart. Mom reported when pt drinks, pt turns into a totally different person. Diagnosis/Hx of compliance with meds: Mom reported bipolar and depression and pt is not compliant with his meds due to drinking. Tx Hx/Past hospitalizations: Mom reported [facility]. Family hx of psychiatric issues: Mom reported none. Substance Abuse: Mom reported pt drinks alcohol heavily and has for many years. Pending Legal: Mom reported pt recently spent time in [place] due to a DUI. Safety Issues (Weapons? Hx of attempts): Mom reported guns in home but they are all locked in a gun safe. The importance of locking weapons in a secured location or removing weapons from home was explained. Mom voiced understanding. Support system/Medication Managed by: The importance of medication management and locking extra medication in a secured location was explained and reccommended to collateral. Mom reported pt manages own meds.      Who does the pt live with: Mom reported pt was staying with them. Additional Info: Mom reported they are going to bring pt's car to hospital and when pt discharges they are not sure where pt will go. Mom reported pt was working at [employer] but pt got fired.      Electronically signed by Tawny Singletary [facility] on 9/8/2020 at 9:34 AM

## 2020-09-08 NOTE — PROGRESS NOTES
SW completed psychosocial and CSSR-S on this date. Pt long and short term goals discussed. Pt voiced understanding. Treatment plan sheet signed. Pt verbalized understanding of the treatment plan. Pt participated in goals and objectives of the treatment plan. Pt completed safety plan with , pt received copy of plan, and original was placed into pt's chart. It was identified that pt will require outpatient follow up appointments at local community behavioral health facility such as[address]. Pt validated need for appointments. Pt was also provided a handout of contact information for drug and alcohol treatment centers and other community support service such as [facility] and [employer]. In the last 6 months has the pt been danger to self: Yes  In the last 6 months has the pt been danger to others: No  Legal Guardian/POA: Yes      Provided pt with [employer] handout entitled \"Quitting Smoking,\" reviewed handout with pt addressing dangers of smoking, developing coping skills, and providing basic information about quitting. Patient received all components / Patient refused/declined practical counseling of tobacco practical counseling during the hospital stay.      Electronically signed by Pritesh Brooks, [id number] on 9/8/2020 at 8:57 AM

## 2020-09-08 NOTE — PLAN OF CARE
Problem: Discharge Planning:  Goal: Discharged to appropriate level of care  Description: Discharged to appropriate level of care  Outcome: Ongoing     Problem: Health Maintenance - Impaired:  Goal: Ability to perform activities of daily living will improve  Description: Ability to perform activities of daily living will improve  Outcome: Ongoing     Problem: Mood - Altered:  Goal: Mood stable  Description: Mood stable  Outcome: Ongoing     Problem: Self-Esteem - Low:  Goal: Demonstrates positive self-esteem  Description: Demonstrates positive self-esteem  Outcome: Ongoing

## 2020-09-08 NOTE — H&P
Department of Psychiatry  Attending History and Physical        CHIEF COMPLAINT:  \"I'm better\"     History obtained from: patient, chart    HISTORY OF PRESENT ILLNESS:    77-year-old white male with history of bipolar disorder, alcohol abuse, known to our service, who presented to the ER with alcohol intoxication and suicidal ideation. , UDS negative. Patient was treated for alcohol withdrawal on the medical floor. He endorsed suicidal ideation, and was transferred to psychiatry for further management.     Patient endorsed suicidal ideation when seen on the medical floor by our consult service. Stated he is unable to think of any particular triggers. \"It is just my life. I am 50 and I feel that I wasted my life. \"  Patient  has been struggling with alcohol addiction for a long time. Most recently binge-drinking, 1/5 for several days in a row. Reported depressed mood, anhedonia, poor sleep and appetite. Voiced hopelessness and helplessness. Reported occasional mood swings and racing thoughts. He denied auditory and visual hallucinations. He denied paranoia. Patient is observed resting in bed this morning. He presents with brighter affect today. He denies withdrawal symptoms. States he slept poorly due to having a nicotine patch which gave him nightmares. Appetite improved. He denies suicidal ideation and reports improvement in his mood. Rates depression and anxiety are low. States she is planning to go to [place] [address] and call with his sponsor when he leaves the hospital.  Admits to noncompliance with his medication regimen at home. States he previously did well on Lamictal and trazodone. Patient was admitted with prolonged the past.  Agreed to take naltrexone.   Patient claims he talked to his family on the phone and they are supportive.     Psychiatric History:    Diagnoses: Alcohol use disorder, bipolar disorder  Suicide attempts/gestures: Denies   Prior hospitalizations: Multiple to [facility] with last admission in Sep 2019  Medication trials: Lamictal, Seroquel, Zoloft, Trazodone, naltrexone, Vivitrol  Mental health contact: Lost to follow-up   Head trauma: Denies     Substance Use History:  Patient has been drinking alcohol all baseline. Most recently binge-drinking, 1/5 vodka daily. States he has no history of w/d seizures, however, seizures documented in the chart. Denies illicit drug use. Smokes 1 PPD. Past Medical History:    Past Medical History:   Diagnosis Date    CAD (coronary artery disease)     Chest pain 12/12/2011    Cigarette smoker 12/12/2011    Depression     Hypertension 12/12/2011    Respiratory failure ([facility]     intubation    Seizures ([facility]        Past Surgical History:    Past Surgical History:   Procedure Laterality Date    APPENDECTOMY      CHOLECYSTECTOMY  5/18/2006    hospitals    COLONOSCOPY      ENDOSCOPY, COLON, DIAGNOSTIC      KNEE ARTHROSCOPY Right     NECK SURGERY  7/15/2008    [facility]       Medications Prior to Admission:   Prior to Admission medications    Medication Sig Start Date End Date Taking?  Authorizing Provider   QUEtiapine (SEROQUEL) 200 MG tablet Take 1 tablet by mouth nightly 9/12/19  Yes Yuliana Cervantes MD   folic acid (FOLVITE) 1 MG tablet Take 1 tablet by mouth daily 9/12/19  Yes Yuliana Cervantes MD   melatonin 3 MG TABS tablet Take 1 tablet by mouth nightly 9/12/19  Yes Yuliana Cervantes MD   losartan (COZAAR) 100 MG tablet Take 100 mg by mouth daily   Yes Historical Provider, MD   lamoTRIgine (LAMICTAL) 100 MG tablet Take 1 tablet by mouth 2 times daily 9/12/19   Yuliana Cervantes MD   sertraline (ZOLOFT) 100 MG tablet Take 1 tablet by mouth daily 9/13/19   Yuliana Cervantes MD   nicotine (NICODERM CQ) 21 MG/24HR Place 1 patch onto the skin daily 9/13/19   Yuliana Cervantes MD   thiamine 100 MG tablet Take 1 tablet by mouth daily 9/12/19   Yuliana Cervantes MD   ergocalciferol (ERGOCALCIFEROL) 22747 units capsule Take 1 capsule by mouth once a week for 5 doses 9/12/19 10/11/19  Aneta Galeazzi, MD       Allergies:  Patient has no known allergies. Social History:  Patient is single and has no children. He lives with his parents. Unemployed for 1 month. Family History:   Family History   Problem Relation Age of Onset    Osteoarthritis Mother     Thyroid Disease Mother     Heart Failure Father     Heart Failure Maternal Grandmother     Heart Failure Paternal Grandmother     Cancer Maternal Grandfather      Mother has been diagnosed with depression. Father and cousins were diagnosed with alcohol use disorder. Patient denies any family history of suicidal attempts or completed suicide. REVIEW OF SYSTEMS:  General: No fevers, chills, night sweats, no recent weight loss or gain. Head: No headache, no migraine. Eyes: No recent visual changes. Ears: No recent hearing changes. Nose: No increased congestion or change in sense of smell. Throat: No sore throat, no trouble swallowing. Cardiovascular: No chest pain or palpitations, or dizziness. Respiratory: No cough, wheezes, congestion, or shortness of breath. Gastrointestinal: No abdominal pain, nausea or vomiting, no diarrhea or constipation. Musculo-skeletal: No edema, deformities, or loss of functions. Neurological: No loss of consciousness, abnormal sensations, or weakness. Skin: No rash, abrasions or bruises. PHYSICAL EXAM:  On observation  GENERAL APPEARANCE: Stated age, in NAD. HEAD: Normocephalic, atraumatic. CHEST: No deformities. MUSCULOSKELTAL: No obvious deformities, clubbing, cyanosis or edema. NEUROLOGICAL: Alert, oriented x 3, CN II-XII grossly intact. No abnormal movements or tremors. Gait stable. SKIN: Warm, dry, intact, no rash, abrasions, bruises.     Vitals:  /68   Pulse 77   Temp 98.4 °F (36.9 °C) (Temporal)   Resp 18   Ht 6' 3\" (1.905 m)   Wt 169 lb 8 oz (76.9 kg)   SpO2 96%   BMI 21.19 kg/m²     Mental Status Examination:    Appearance: Stated age, in hospital attire  Behavior: Calm, cooperative  Speech: Normal in tone, volume, and quality. No slurring, dysarthria or pressured speech noted. Mood: \" Better\"   Affect: Mood congruent. Thought Process: Appears linear. Thought Content: Denies suicidal and homicidal ideation. No overt delusions or paranoia appreciated. Perceptions: Denies auditory or visual hallucinations at present time. Not responding to internal stimuli. Concentration: Intact. Orientation: to person, place, date, and situation. Language: Intact. Fund of information: Intact. Memory: Recent and remote appear intact. Neurovegitative: Improved appetite and poor sleep. Insight: Improving. Judgment: Improving. DATA:  Lab Results   Component Value Date    WBC 6.7 09/04/2020    HGB 12.8 (L) 09/04/2020    HCT 37.7 (L) 09/04/2020    MCV 89.5 09/04/2020     09/04/2020     Lab Results   Component Value Date     09/05/2020    K 4.5 09/05/2020     09/05/2020    CO2 24 09/05/2020    BUN 12 09/05/2020    CREATININE 1.0 09/05/2020    GLUCOSE 106 09/05/2020    CALCIUM 8.2 (L) 09/05/2020    PROT 5.6 (L) 09/05/2020    LABALBU 3.7 09/05/2020    BILITOT <0.2 09/05/2020    ALKPHOS 73 09/05/2020    AST 36 09/05/2020    ALT 56 (H) 09/05/2020    LABGLOM >60 09/05/2020    GFRAA >59 09/05/2020       ASSESSMENT AND PLAN:  DSM-5 DIAGNOSIS:   Impression:  Mood disorder  unspecified  Alcohol use disorder, severe  Tobacco use disorder    Pertinent medical issues  Anemia  HTN    Patient with risk factors for suicide, recently expressed suicidal ideation. He is meeting the criteria for inpatient psychiatric treatment. Plan:   -Admit to [facility] Unit and monitor on 15 minute checks. Suicide precautions.  Danny Champion reviewed. -Gather collateral information from family with release.  -Medical monitoring to be performed by Dr. Faith Cochran and associates. Check thyroid function and vitamin levels.   Vitamin supplementation.  -Acclimate to the unit. Provide supportive psychotherapy.  -Encourage participation in groups and therapeutic activities as appropriate. Work on coping skills. -Medications:    Restart Lamictal for mood stabilization. Restart trazodone for insomnia.   Restart naltrexone for alcohol cravings.  -The risks, benefits, side effects, indications, contraindications, and adverse effects of the medications have been discussed.  -The patient has verbalized understanding and has capacity to give informed consent.  -SW help evaluate home environment and provide outpatient resources.  -Discuss with treatment team.

## 2020-09-08 NOTE — PLAN OF CARE
Problem: Health Maintenance - Impaired:  Goal: Ability to perform activities of daily living will improve  Description: Ability to perform activities of daily living will improve  Outcome: Ongoing     Problem: Mood - Altered:  Goal: Mood stable  Description: Mood stable  Outcome: Ongoing     Problem: Self-Esteem - Low:  Goal: Demonstrates positive self-esteem  Description: Demonstrates positive self-esteem  Outcome: Ongoing     Problem: Depressive Behavior With or Without Suicide Precautions:  Goal: Able to verbalize acceptance of life and situations over which he or she has no control  Description: Able to verbalize acceptance of life and situations over which he or she has no control  Outcome: Ongoing  Goal: Able to verbalize and/or display a decrease in depressive symptoms  Description: Able to verbalize and/or display a decrease in depressive symptoms  Outcome: Ongoing  Goal: Ability to disclose and discuss suicidal ideas will improve  Description: Ability to disclose and discuss suicidal ideas will improve  Outcome: Ongoing  Goal: Able to verbalize support systems  Description: Able to verbalize support systems  Outcome: Ongoing  Goal: Absence of self-harm  Description: Absence of self-harm  Outcome: Ongoing  Goal: Participates in care planning  Description: Participates in care planning  Outcome: Ongoing

## 2020-09-08 NOTE — PROGRESS NOTES
Pt's mom Russel Ayoub (PHI signed) called and reported they found a lot of alcohol in pt's car. Mom reported her  told her that he was just going to bring it to hospital and give to pt because \"he is going to drink anyway\". Mom reported she thought it needed to be gotten rid of but wanted to call this writer to see what this writer thought. SW voiced agreement about getting rid of alcohol. Mom reported \"I am glad you agree and I will tell my \". Dr and nursing notified.      Electronically signed by José Messer [place] on 9/8/2020 at 9:54 AM

## 2020-09-08 NOTE — PROGRESS NOTES
SW met with treatment team to discuss pt's progress and setbacks. SW 2 was present. Pt was admitted, voluntary, for depression, SI, without a plan, alcohol intoxication, has hx of Bipolar DO, hx of SA, non-compliant with medications, reports anxiety, racing thoughts, denies HI/AVH. Since admission, pt reportedly was cooperative with admission process, alert/oriented x 4, slept fair last night, with medications, appetite is good, attends scheduled group activities, social with peers/staff, independent with ADL's, compliant with medications, reports mild depression/anxiety, denies SI/HI/AVH, continue current treatment plan.

## 2020-09-08 NOTE — PROGRESS NOTES
WRAP UP GROUP NOTE:     Patient's Goal:  Providing feedback as to their own progress in the care-plan provided. Pt's have an opportunity to explore self-reflective skills and share any additional cares and concerns not yet addressed. Pt effectively participated. Energy level:  Normal   Appetite:  Improving   Concentration:   Improving   Hallucinations:  Gone   Depression:  Improved   Anxiety:  Improved   How I worked today:  Blank   What helps me sleep:  Blank   Any questions/complaints/comments:  Blank  Group/activities that helped me today were:  Nursing education. Neurology Follow up note    Name  GHADA ROPER    HPI:  55 year old owman with hx of myasthenia gravis on mestonin, prednisone daily, finished a 5 day IVIG regimen last week presents to the ED with increasing dyspnea and weakness. [facility] when she could not walk up 2 flights of stairs. Patient with hematuria noted since Sunday. Denies any chest pain, no sob, no fever, chills. (11 Jun 2020 18:30)      Interval History:    stable   patient feels stronger post plex x 4  egd for today      Vital Signs Last 24 Hrs  T(C): 36.6 (18 Jun 2020 03:10), Max: 36.9 (17 Jun 2020 16:04)  T(F): 97.8 (18 Jun 2020 03:10), Max: 98.4 (17 Jun 2020 16:04)  HR: 50 (18 Jun 2020 05:00) (45 - 89)  BP: 97/58 (18 Jun 2020 05:00) (92/55 - 124/59)  BP(mean): 73 (18 Jun 2020 05:00) (69 - 86)  RR: 15 (18 Jun 2020 05:10) (12 - 33)  SpO2: 99% (18 Jun 2020 05:10) (97% - 100%)    Neurological Exam:   Mental status: Awake, alert and oriented x3.  Recent and remote memory intact.  Naming, repetition and comprehension intact.  Attention/concentration intact.  No dysarthria, no aphasia.  Fund of knowledge appropriate.    Cranial nerves: Pupils equally round and reactive to light, visual fields full, no nystagmus, extraocular muscles intact, V1 through V3 intact bilaterally and symmetric, face symmetric, hearing intact to finger rub, palate elevation symmetric, tongue was midline.  Motor:  MRC grading 5/5 b/l UE/LE.   strength 5/5.  Normal tone and bulk.  No abnormal movements.    Sensation: Intact to light touch, proprioception, and pinprick.   Coordination: No dysmetria on finger-to-nose and heel-to-shin.  No dysdiadokinesia.  Reflexes: 2+ in bilateral UE/LE, downgoing toes bilaterally. (-) Lucas.  full jaw clench, neck flex and ext    Medications  acetaminophen   Tablet .. 650 milliGRAM(s) Oral every 6 hours PRN  aspirin  chewable 81 milliGRAM(s) Oral daily  atorvastatin 20 milliGRAM(s) Oral at bedtime  azaTHIOprine 50 milliGRAM(s) Oral <User Schedule>  budesonide  80 MICROgram(s)/formoterol 4.5 MICROgram(s) Inhaler 2 Puff(s) Inhalation two times a day  chlorhexidine 4% Liquid 1 Application(s) Topical two times a day  enoxaparin Injectable 40 milliGRAM(s) SubCutaneous daily  famotidine    Tablet 40 milliGRAM(s) Oral at bedtime  levothyroxine 50 MICROGram(s) Oral daily  ondansetron Injectable 4 milliGRAM(s) IV Push every 8 hours  pantoprazole    Tablet 40 milliGRAM(s) Oral before breakfast  polyethylene glycol 3350 17 Gram(s) Oral daily  predniSONE   Tablet 50 milliGRAM(s) Oral daily  pyridostigmine 90 milliGRAM(s) Oral four times a day      Lab  06-18    140  |  105  |  22<H>  ----------------------------<  100<H>  4.1   |  30  |  0.8    Ca    9.1      18 Jun 2020 05:26    TPro  5.0<L>  /  Alb  4.6  /  TBili  0.6  /  DBili  <0.2  /  AST  16  /  ALT  10  /  AlkPhos  28<L>  06-17                          11.2   10.02 )-----------( 243      ( 18 Jun 2020 05:26 )             34.4     LIVER FUNCTIONS - ( 17 Jun 2020 05:25 )  Alb: 4.6 g/dL / Pro: 5.0 g/dL / ALK PHOS: 28 U/L / ALT: 10 U/L / AST: 16 U/L / GGT: x                   Radiology      Assessment:  mg exacerbation  Plan:  patient is doing well  continue current management including last PLEX TOMORROW  PT will need rehab  will begin steroid taper as outpatient

## 2020-09-08 NOTE — PROGRESS NOTES
Group Therapy Note    Date: 09/08/20  Start Time: 0830  End Time: 0930    Number of Participants: 3    Type of Group: Community/unit rules    Patient's Goal:  none    Notes:  Pt calm and appropriate during group    Status After Intervention:  Improved    Participation Level: Minimal    Participation Quality: Appropriate    Speech:  normal    Thought Process/Content: Logical    Affective Functioning: Congruent    Mood: anxious    Level of consciousness:  Alert    Response to Learning: Able to verbalize current knowledge/experience, Able to verbalize/acknowledge new learning, Able to retain information, Capable of insight and Able to change behavior    Modes of Intervention: Education and Support    Discipline Responsible: Registered Nurse    Signature:  Chalino Caruso RN

## 2020-09-08 NOTE — PROGRESS NOTES
Admission Note      Reason for admission/Target Symptom: Patient admitted to [facility] due to suicidal thoughts with no plan, alcohol use, stopped taking his meds, anxiety, racing thoughts, denies HI/AVH. Diagnoses: Depression unspecified, Suicidal ideation, Alcohol withdrawal, Alcohol use disorder, severe, Tobacco use disorder  UDS: Negative  BAL:  1    SW met with treatment team to discuss patient's treatment including care planning, discharge planning, and follow-up needs. Pt has been admitted to [facility]. Treatment team has identified patient's discharge needs as medication management and outpatient therapy/counseling. Pt confirmed  the need for ongoing treatment post inpatient stay. Pt was also provided a handout of contact information for drug and alcohol treatment centers and other community support service such as [facility], AA, and [facility].     Electronically signed by José Messer, [id number] on 9/8/2020 at 8:30 AM

## 2020-09-08 NOTE — PROGRESS NOTES
[facility] Daily Shift Assessment-Geriatric Unit  Nursing Progress Note          Room: [address] [place] [address]-   Name: Anjana Landis   Age: 44 y.o. Gender: male   Dx: <principal problem not specified>  Precautions: close watch, fall risk and seizure precautions  Inpatient Status: voluntary     SLEEP:    Sleep: Yes,   Sleep Quality Fair   Hours Slept:    Sleep Medications: Yes  PRN Sleep Meds: No       MEDICAL:      Other PRN Meds: No   Med Compliant: Yes   Accu-Chek: No   Oxygen/CPAP/BiPAP: No  CIWA/CINA: No   PAIN Assessment: none  Side Effects from medication: No      PSYCH:     SI denies suicidal ideation    HI Negative for homicidal ideation        AVH:Absent      Depression: 2-3 Anxiety: 2-3       GENERAL:      Appetite: good  Social: Yes Speech: normal   Appearance:appropriately dressed  Assistive Devices: noneLevel of Assist: Independent      GROUP:    Group Participation: Yes  Participation LevelActive Listener    Participation QualityAppropriate    Notes:   Patient has been up this evening in the television area. He has a flat affect. He reports that he normally takes Seroquel at bedtime. NO SI, HI or AVH.          Electronically signed by Rere Ng RN on 9/8/20 at 1:38 AM CDT

## 2020-09-09 VITALS
SYSTOLIC BLOOD PRESSURE: 97 MMHG | BODY MASS INDEX: 21.07 KG/M2 | RESPIRATION RATE: 18 BRPM | HEIGHT: 75 IN | WEIGHT: 169.5 LBS | HEART RATE: 65 BPM | DIASTOLIC BLOOD PRESSURE: 54 MMHG | TEMPERATURE: 98.7 F | OXYGEN SATURATION: 96 %

## 2020-09-09 PROBLEM — F39 UNSPECIFIED MOOD (AFFECTIVE) DISORDER (HCC): Status: ACTIVE | Noted: 2020-09-09

## 2020-09-09 LAB
ALBUMIN SERPL-MCNC: 3.8 G/DL (ref 3.5–5.2)
ALP BLD-CCNC: 71 U/L (ref 40–130)
ALT SERPL-CCNC: 56 U/L (ref 5–41)
ANION GAP SERPL CALCULATED.3IONS-SCNC: 10 MMOL/L (ref 7–19)
AST SERPL-CCNC: 27 U/L (ref 5–40)
BASOPHILS ABSOLUTE: 0 K/UL (ref 0–0.2)
BASOPHILS RELATIVE PERCENT: 0.2 % (ref 0–1)
BILIRUB SERPL-MCNC: <0.2 MG/DL (ref 0.2–1.2)
BUN BLDV-MCNC: 21 MG/DL (ref 6–20)
CALCIUM SERPL-MCNC: 9 MG/DL (ref 8.6–10)
CHLORIDE BLD-SCNC: 104 MMOL/L (ref 98–111)
CHOLESTEROL, TOTAL: 189 MG/DL (ref 160–199)
CO2: 26 MMOL/L (ref 22–29)
CREAT SERPL-MCNC: 1 MG/DL (ref 0.5–1.2)
EOSINOPHILS ABSOLUTE: 0.1 K/UL (ref 0–0.6)
EOSINOPHILS RELATIVE PERCENT: 2.4 % (ref 0–5)
GFR AFRICAN AMERICAN: >59
GFR NON-AFRICAN AMERICAN: >60
GLUCOSE BLD-MCNC: 116 MG/DL (ref 74–109)
HBA1C MFR BLD: 5.2 % (ref 4–6)
HCT VFR BLD CALC: 37.1 % (ref 42–52)
HDLC SERPL-MCNC: 50 MG/DL (ref 55–121)
HEMOGLOBIN: 12.6 G/DL (ref 14–18)
IMMATURE GRANULOCYTES #: 0 K/UL
LDL CHOLESTEROL CALCULATED: 103 MG/DL
LYMPHOCYTES ABSOLUTE: 1.9 K/UL (ref 1.1–4.5)
LYMPHOCYTES RELATIVE PERCENT: 35.7 % (ref 20–40)
MCH RBC QN AUTO: 30.5 PG (ref 27–31)
MCHC RBC AUTO-ENTMCNC: 34 G/DL (ref 33–37)
MCV RBC AUTO: 89.8 FL (ref 80–94)
MONOCYTES ABSOLUTE: 0.4 K/UL (ref 0–0.9)
MONOCYTES RELATIVE PERCENT: 7.7 % (ref 0–10)
NEUTROPHILS ABSOLUTE: 2.9 K/UL (ref 1.5–7.5)
NEUTROPHILS RELATIVE PERCENT: 53.4 % (ref 50–65)
PDW BLD-RTO: 13.8 % (ref 11.5–14.5)
PLATELET # BLD: 212 K/UL (ref 130–400)
PMV BLD AUTO: 9.2 FL (ref 9.4–12.4)
POTASSIUM SERPL-SCNC: 4 MMOL/L (ref 3.5–5)
RBC # BLD: 4.13 M/UL (ref 4.7–6.1)
SODIUM BLD-SCNC: 140 MMOL/L (ref 136–145)
TOTAL PROTEIN: 6.1 G/DL (ref 6.6–8.7)
TRIGL SERPL-MCNC: 178 MG/DL (ref 0–149)
TSH REFLEX FT4: 0.95 UIU/ML (ref 0.35–5.5)
VITAMIN B-12: 585 PG/ML (ref 211–946)
VITAMIN D 25-HYDROXY: 18.7 NG/ML
WBC # BLD: 5.4 K/UL (ref 4.8–10.8)

## 2020-09-09 PROCEDURE — 83036 HEMOGLOBIN GLYCOSYLATED A1C: CPT

## 2020-09-09 PROCEDURE — 99239 HOSP IP/OBS DSCHRG MGMT >30: CPT | Performed by: PSYCHIATRY & NEUROLOGY

## 2020-09-09 PROCEDURE — 80061 LIPID PANEL: CPT

## 2020-09-09 PROCEDURE — 85025 COMPLETE CBC W/AUTO DIFF WBC: CPT

## 2020-09-09 PROCEDURE — 82607 VITAMIN B-12: CPT

## 2020-09-09 PROCEDURE — 84443 ASSAY THYROID STIM HORMONE: CPT

## 2020-09-09 PROCEDURE — 80053 COMPREHEN METABOLIC PANEL: CPT

## 2020-09-09 PROCEDURE — 82306 VITAMIN D 25 HYDROXY: CPT

## 2020-09-09 PROCEDURE — 6370000000 HC RX 637 (ALT 250 FOR IP): Performed by: PSYCHIATRY & NEUROLOGY

## 2020-09-09 PROCEDURE — 36415 COLL VENOUS BLD VENIPUNCTURE: CPT

## 2020-09-09 RX ORDER — LANOLIN ALCOHOL/MO/W.PET/CERES
3 CREAM (GRAM) TOPICAL NIGHTLY
Qty: 30 TABLET | Refills: 1 | Status: SHIPPED | OUTPATIENT
Start: 2020-09-09 | End: 2021-09-08

## 2020-09-09 RX ORDER — LANOLIN ALCOHOL/MO/W.PET/CERES
100 CREAM (GRAM) TOPICAL DAILY
Qty: 30 TABLET | Refills: 2 | Status: SHIPPED | OUTPATIENT
Start: 2020-09-09 | End: 2020-10-23

## 2020-09-09 RX ORDER — NALTREXONE HYDROCHLORIDE 50 MG/1
50 TABLET, FILM COATED ORAL
Qty: 30 TABLET | Refills: 1 | Status: ON HOLD | OUTPATIENT
Start: 2020-09-09 | End: 2021-09-13 | Stop reason: HOSPADM

## 2020-09-09 RX ORDER — LAMOTRIGINE 25 MG/1
25 TABLET ORAL 2 TIMES DAILY
Qty: 60 TABLET | Refills: 1 | Status: ON HOLD | OUTPATIENT
Start: 2020-09-09 | End: 2021-09-13 | Stop reason: HOSPADM

## 2020-09-09 RX ORDER — FOLIC ACID 1 MG/1
1 TABLET ORAL DAILY
Qty: 30 TABLET | Refills: 2 | Status: SHIPPED | OUTPATIENT
Start: 2020-09-09 | End: 2021-09-08

## 2020-09-09 RX ORDER — TRAZODONE HYDROCHLORIDE 50 MG/1
50 TABLET ORAL NIGHTLY
Qty: 30 TABLET | Refills: 1 | Status: SHIPPED | OUTPATIENT
Start: 2020-09-09 | End: 2020-10-23

## 2020-09-09 RX ORDER — MULTIVITAMIN WITH IRON
1 TABLET ORAL DAILY
Qty: 90 TABLET | Refills: 0 | Status: SHIPPED | OUTPATIENT
Start: 2020-09-09 | End: 2021-09-08

## 2020-09-09 RX ORDER — HYDROXYZINE HYDROCHLORIDE 25 MG/1
25 TABLET, FILM COATED ORAL 3 TIMES DAILY PRN
Qty: 90 TABLET | Refills: 1 | Status: ON HOLD | OUTPATIENT
Start: 2020-09-09 | End: 2022-04-05 | Stop reason: HOSPADM

## 2020-09-09 RX ADMIN — THIAMINE HCL TAB 100 MG 100 MG: 100 TAB at 09:07

## 2020-09-09 RX ADMIN — FOLIC ACID 1 MG: 1 TABLET ORAL at 09:07

## 2020-09-09 RX ADMIN — LOSARTAN POTASSIUM 100 MG: 50 TABLET ORAL at 09:07

## 2020-09-09 RX ADMIN — MULTIVITAMIN TABLET 1 TABLET: TABLET at 09:07

## 2020-09-09 RX ADMIN — NALTREXONE HYDROCHLORIDE 50 MG: 50 TABLET, FILM COATED ORAL at 09:06

## 2020-09-09 RX ADMIN — LAMOTRIGINE 25 MG: 25 TABLET ORAL at 09:09

## 2020-09-09 NOTE — DISCHARGE INSTR - DIET
 Good nutrition is important when healing from an illness, injury, or surgery. Follow any nutrition recommendations given to you during your hospital stay.  If you were given an oral nutrition supplement while in the hospital, continue to take this supplement at home. You can take it with meals, in-between meals, and/or before bedtime. These supplements can be purchased at most local grocery stores, pharmacies, and chain super-stores.  If you have any questions about your diet or nutrition, call the hospital and ask for the dietitian. Learning About Dietary Guidelines  What are the Dietary Guidelines for Americans? Dietary Guidelines for Americans provide tips for eating well and staying healthy. This helps reduce the risk for long-term (chronic) diseases. These adult guidelines from the [place] recommend that you:  · Eat lots of fruits, vegetables, whole grains, and low-fat or nonfat dairy products. · Try to balance your eating with your activity. This helps you stay at a healthy weight. · Drink alcohol in moderation, if at all. · Limit foods high in salt, saturated fat, trans fat, and added sugar. What is MyPlate? MyPlate is the U.[place] government's food guide. It can help you make your own well-balanced eating plan. A balanced eating plan means that you eat enough, but not too much, and that your food gives you the nutrients you need to stay healthy. MyPlate focuses on eating plenty of whole grains, fruits, and vegetables, and on limiting fat and sugar. It is available online at www. ChooseMyPlate.gov. How can you get started? If you're trying to eat healthier, you can slowly change your eating habits over time. You don't have to make big changes all at once. Start by adding one or two healthy foods to your meals each day. Grains  Choose whole-grain breads and cereals and whole-wheat pasta and whole-grain crackers. Vegetables  Eat a variety of vegetables every day.  They have lots of nutrients and are part of a heart-healthy diet. Fruits  Eat a variety of fruits every day. Fruits contain lots of nutrients. Choose fresh fruit instead of fruit juice. Protein foods  Choose fish and lean poultry more often. Eat red meat and fried meats less often. Dried beans, tofu, and nuts are also good sources of protein. Dairy  Choose low-fat or fat-free products from this food group. If you have problems digesting milk, try eating cheese or yogurt instead. Fats and oils  Limit fats and oils if you're trying to cut calories. Choose healthy fats when you cook. These include canola oil and olive oil. Where can you learn more? Go to https://"[employer]. org and sign in to your [employer] account. Enter N629 in the [employer] box to learn more about \"Learning About Dietary Guidelines. \"     If you do not have an account, please click on the \"Sign Up Now\" link. Current as of: August 22, 2019               Content Version: 12.5  © 3423-8146 Healthwise, Incorporated. Care instructions adapted under license by [facility] ([facility]). If you have questions about a medical condition or this instruction, always ask your healthcare professional. [place] [address] any warranty or liability for your use of this information.

## 2020-09-09 NOTE — PROGRESS NOTES
Discharge Note    Pt discharging on this date. Pt denies SI, HI, and AVH at this time. Pt reports improvement in behavior and is leaving unit in overall good condition. SW and pt discussed pt's follow up appointments and importance of attending appointments as scheduled, pt voiced understanding and agreement. Pt able to verbally identify: warning signs, coping strategies, places and people that help make the pt feel better/distract negative thoughts, friends/family/agencies/professionals the pt can reach out to in a crisis, and something that is important to the pt/worth living for. Pt provided the national suicide prevention hotline number (7-944-399-805-751-4484) as well as local community behavioral health [facility]) crisis number for emergencies (0-683-223-296-630-4370). Pt to follow up with Matthew Tran for a residential appointment on 9/11/2020 at 11 AM, a therapy appointment on 9/14/2020 at 3 PM with Robbin, and a med management appointment on 9/16/2020 at 9:30 AM with Seamus Muñoz. SW offered to assist pt with transportation, pt reports having transportation. Referral to out patient tobacco cessation counseling treatment: Patient refused tobacco cessation counseling. SW spoke with pt's mom Jorgito Fernandez about weapons in home. Mom reported guns in piotr but they are locked in a gun safe. The importance of locking weapons in a secured location or removing weapons from home was explained. Mom voiced understanding. Pt reported use of substances. Referral refused/declined.      Electronically signed by Robert Espinal, [address] on 9/9/2020 at 9:43 AM

## 2020-09-09 NOTE — DISCHARGE INSTR - COC
Continuity of Care Form    Patient Name: Govind Fry   :  1970  MRN:  [id number]    Admit date:  2020  Discharge date:  ***    Code Status Order: Full Code   Advance Directives:   Advance Care Flowsheet Documentation     Date/Time Healthcare Directive Type of Healthcare Directive Copy in [address] Agent's Name Healthcare Agent's Phone Number    20 [phone]  No, patient does not have an advance directive for healthcare treatment -- -- -- -- --          Admitting Physician:  Yuliana Cervantes MD  PCP: Dakota Cooney    Discharging Nurse: [facility]/Room#: 0621/621-01  Discharging Unit Phone Number: ***    Emergency Contact:   Extended Emergency Contact Information  Primary Emergency Contact: Francesca Gottlieb  Address: [id number] [place] [address] Phone: [phone]  Mobile Phone: [phone]  Relation: Parent  Secondary Emergency Contact: Russel Ayoub  Address: [id number] [place] [address] [address] [place] [address] Phone: [phone]  Relation: Parent    Past Surgical History:  Past Surgical History:   Procedure Laterality Date    APPENDECTOMY      [facility]  2006    [facility]    COLONOSCOPY      ENDOSCOPY, COLON, DIAGNOSTIC      KNEE ARTHROSCOPY Right     NECK SURGERY  7/15/2008    [facility]       Immunization History: There is no immunization history on file for this patient.     Active Problems:  Patient Active Problem List   Diagnosis Code    Chest pain R07.9    Cigarette smoker F17.210    Hypertension I10    Bradycardia R00.1    Abnormal EKG R94.31    SOB (shortness of breath) R06.02    Benzodiazepine overdose T42.4X1A    Severe episode of recurrent major depressive disorder, without psychotic features ([address] F33.2    Bipolar disorder, curr episode depressed, severe, w/psychotic features (HCC) F31.5    Bipolar 1 disorder, depressed ([address] F31.9    Intoxication by drug, uncomplicated ([address] F96.745    Acute alcoholic intoxication without complication ([address] A81.952    Intentional drug overdose ([address] T50.902A    Suicidal behavior with attempted self-injury ([address] T14.91XA    Acute respiratory failure ([address] J96.00    Alcoholism ([address] F10.20    Suicidal ideations R45.851    Alcohol withdrawal syndrome with complication ([address] U10.593    Depression F32.9    Alcohol abuse F10.10    Bipolar I disorder, most recent episode depressed, severe without psychotic features ([address] F31.4    Alcohol use disorder, severe, dependence ([address] F10.20    Suicidal ideation R45.851    Tobacco abuse disorder Z72.0    Severe major depression without psychotic features ([address] F32.2    Bipolar I disorder, most recent episode mixed, severe without psychotic features ([address] F31.63    Alcohol intoxication with blood level over 0.3 R78.0    Unspecified mood (affective) disorder ([facility]) F39       Isolation/Infection:   Isolation          No Isolation        Patient Infection Status     Infection Onset Added Last Indicated Last Indicated By Review Planned Expiration Resolved Resolved By    MRSA 01/26/19 01/27/19 01/26/19 MRSA Screening Culture Only        1/27/2019 MRSA Screening culture: MRSA          Nurse Assessment:  Last Vital Signs: BP (!) 97/54   Pulse 65   Temp 98.7 °F (37.1 °C) (Temporal)   Resp 18   Ht 6' 3\" (1.905 m)   Wt 169 lb 8 oz (76.9 kg)   SpO2 96%   BMI 21.19 kg/m²     Last documented pain score (0-10 scale): Pain Level: 0  Last Weight:   Wt Readings from Last 1 Encounters:   09/07/20 169 lb 8 oz (76.9 kg)     Mental Status:  oriented and alert    IV Access:  - None    Nursing Mobility/ADLs:  Walking   Independent  Transfer  Independent  Bathing  Independent  Dressing  Independent  Toileting  Independent  Feeding  Independent  Med Admin  Independent  Med Delivery   whole    Wound Care Documentation and Therapy:        Elimination:  Continence:   · Bowel: Yes  · Bladder: Yes  Urinary Catheter: None   Colostomy/Ileostomy/Ileal Conduit: No       Date of Last BM: Independent. No intake or output data in the 24 hours ending 09/09/20 1002  No intake/output data recorded. Safety Concerns:     Alcohol abuse. Impairments/Disabilities:      None    Nutrition Therapy:  Current Nutrition Therapy:   - Oral Diet:  General    Routes of Feeding: Oral  Liquids: No Restrictions  Daily Fluid Restriction: no  Last Modified Barium Swallow with Video (Video Swallowing Test): not done    Treatments at the Time of Hospital Discharge:   Respiratory Treatments: n/a  Oxygen Therapy:  is not on home oxygen therapy. Ventilator:    - No ventilator support    Rehab Therapies: N/A  Weight Bearing Status/Restrictions: No weight bearing restirctions  Other Medical Equipment (for information only, NOT a DME order):  N/A  Other Treatments: none. Patient's personal belongings (please select all that are sent with patient): All belongings returned to patient, and sent with him. RN SIGNATURE:  Alfredo Alvarado. RN    CASE MANAGEMENT/SOCIAL WORK SECTION    Inpatient Status Date: 9/7/2020    Readmission Risk Assessment Score:  Readmission Risk              Risk of Unplanned Readmission:        8           Discharging to Facility/ Agency   · Name:   · Address:  · Phone:  · Fax:    Dialysis Facility (if applicable)   · Name:  · Address:  · Dialysis Schedule:  · Phone:  · Fax:    / signature: {Mary Annature:[id number]}    PHYSICIAN SECTION  Medications:   Medication Summary Provided. I understand that I should take only the medications on my list.   --why and when I need to take each medication. --which side effects to watch for.   --that I should carry my medication information at all times in case of emergency    Situations. --I will take all medications until discontinued by physician. I will take all my medications to follow up appointments.    --check with my physician or pharmacist before taking any new medication, over    the counter product or drink alcohol.   --ask about food, drug and dietary supplement interactions. --discard old lists and update records with medication providers. Behavior Health Follow Up:  Original Referral Source: ED  Discharge Diagnosis:   Patient Active Problem List   Diagnosis    Chest pain    Cigarette smoker    Hypertension    Bradycardia    Abnormal EKG    SOB (shortness of breath)    Benzodiazepine overdose    Severe episode of recurrent major depressive disorder, without psychotic features ([address]    Bipolar disorder, curr episode depressed, severe, w/psychotic features ([address]    Bipolar 1 disorder, depressed ([address]    Intoxication by drug, uncomplicated ([address]    Acute alcoholic intoxication without complication ([address]    Intentional drug overdose ([address]    Suicidal behavior with attempted self-injury ([address]    Acute respiratory failure ([address]    Alcoholism ([address]    Suicidal ideations    Alcohol withdrawal syndrome with complication ([address]    Depression    Alcohol abuse    Bipolar I disorder, most recent episode depressed, severe without psychotic features ([address]    Alcohol use disorder, severe, dependence ([address]    Suicidal ideation    Tobacco abuse disorder    Severe major depression without psychotic features ([address]    Bipolar I disorder, most recent episode mixed, severe without psychotic features ([address]    Alcohol intoxication with blood level over 0.3    Unspecified mood (affective) disorder ([address]     Recomendations for Level of Care: Follow Up  Patient Status at Discharge: Stable  My Hospital  was: [facility]  Aftercare plan faxed:    Faxed by: Social Work staff   Date: 20   Time: ***  Prescriptions sent with pt.     General Information:   Questions regarding your bill: Call [phone]   Suicide Hotline (Rescue Crisis) [phone]   To obtain results of pending studies call Medical Records at: [phone]   For emergencies and 24 hour/7 days a week contact information: [phone]  Medications:   Medication Summary Provided. I understand that I should take only the medications on my list.   --why and when I need to take each medication. --which side effects to watch for.   --that I should carry my medication information at all times in case of emergency    Situations. --I will take all medications until discontinued by physician. I will take all my medications to follow up appointments. --check with my physician or pharmacist before taking any new medication, over    the counter product or drink alcohol.   --ask about food, drug and dietary supplement interactions. --discard old lists and update records with medication providers.     Behavior Health Follow Up:  Original Referral Source: ED  Discharge Diagnosis:   Patient Active Problem List   Diagnosis    Chest pain    Cigarette smoker    Hypertension    Bradycardia    Abnormal EKG    SOB (shortness of breath)    Benzodiazepine overdose    Severe episode of recurrent major depressive disorder, without psychotic features ([address]    Bipolar disorder, curr episode depressed, severe, w/psychotic features ([address]    Bipolar 1 disorder, depressed ([address]    Intoxication by drug, uncomplicated ([address]    Acute alcoholic intoxication without complication ([address]    Intentional drug overdose ([address]    Suicidal behavior with attempted self-injury ([address]    Acute respiratory failure ([address]    Alcoholism ([address]    Suicidal ideations    Alcohol withdrawal syndrome with complication ([address]    Depression    Alcohol abuse    Bipolar I disorder, most recent episode depressed, severe without psychotic features ([address]    Alcohol use disorder, severe, dependence ([address]    Suicidal ideation    Tobacco abuse disorder    Severe major depression without psychotic features ([address]    Bipolar I disorder, most recent episode mixed, severe without psychotic features [facility]    Alcohol intoxication with blood level over 0.3    Unspecified mood (affective) disorder ([facility]     Recomendations for Level of Care: Follow Up  Patient Status at Discharge: Stable  My Hospital  was: [facility]  Aftercare plan faxed:    Faxed by: Social Work staff   Date: 20   Time: ***  Prescriptions sent with pt.     General Information:   Questions regarding your bill: Call [phone]   Suicide Hotline (Rescue Crisis) [phone]   To obtain results of pending studies call Medical Records at: [phone]   For emergencies and 24 hour/7 days a week contact information: [phone]

## 2020-09-09 NOTE — PLAN OF CARE
Problem: Discharge Planning:  Goal: Discharged to appropriate level of care  Description: Discharged to appropriate level of care  9/9/2020 0259 by Kiah Zamora RN  Outcome: Ongoing  9/8/2020 1426 by Sonya Stoll RN  Outcome: Ongoing     Problem: Health Maintenance - Impaired:  Goal: Ability to perform activities of daily living will improve  Description: Ability to perform activities of daily living will improve  9/9/2020 0259 by Kiah Zamora RN  Outcome: Ongoing  9/8/2020 1426 by Sonya Stoll RN  Outcome: Ongoing     Problem: Mood - Altered:  Goal: Mood stable  Description: Mood stable  9/9/2020 0259 by Kiah Zamora RN  Outcome: Ongoing  9/8/2020 1426 by Sonya Stoll RN  Outcome: Ongoing     Problem: Self-Esteem - Low:  Goal: Demonstrates positive self-esteem  Description: Demonstrates positive self-esteem  9/9/2020 0259 by Kiah Zamora RN  Outcome: Ongoing  9/8/2020 1426 by Sonya Stoll RN  Outcome: Ongoing     Problem: Depressive Behavior With or Without Suicide Precautions:  Goal: Able to verbalize acceptance of life and situations over which he or she has no control  Description: Able to verbalize acceptance of life and situations over which he or she has no control  9/9/2020 0259 by Kiah Zamora RN  Outcome: Ongoing  9/8/2020 1426 by Sonya Stoll RN  Outcome: Ongoing  Goal: Able to verbalize and/or display a decrease in depressive symptoms  Description: Able to verbalize and/or display a decrease in depressive symptoms  9/9/2020 0259 by Kiah Zamora RN  Outcome: Ongoing  9/8/2020 1426 by Sonya Stoll RN  Outcome: Ongoing  Goal: Ability to disclose and discuss suicidal ideas will improve  Description: Ability to disclose and discuss suicidal ideas will improve  9/9/2020 0259 by Kiah Zamora RN  Outcome: Ongoing  9/8/2020 1426 by Sonya Stoll RN  Outcome: Ongoing  Goal: Able to verbalize support systems  Description: Able to verbalize support systems  9/9/2020 0259 by Benito Zelaya

## 2020-09-09 NOTE — PROGRESS NOTES
pt performed ADL, showered this evening and is med compliant. Will continue to monitor closely for safety. Pt denies pain or any discomfort this shift. Pt slept well this night, interm awakenings, able to fall back to sleep.   Electronically signed by Tony Bryan RN on 9/9/20 at 3:06 AM CDT

## 2020-09-09 NOTE — PROGRESS NOTES
WRAP UP GROUP NOTE:     Patient's Goal:  Providing feedback as to their own progress in the care-plan provided. Pt's have an opportunity to explore self-reflective skills and share any additional cares and concerns not yet addressed. Pt effectively participated.      Energy level:NORMAL  Appetite:IMPROVING  Concentration: NORMAL  Hallucinations:GONE  Depression:IMPROVED  Anxiety:IMPROVED  How I worked today:TRIED A LITTLE  What helps me sleep:RELAXATION  Any questions/complaints/comments:N/A

## 2020-09-09 NOTE — DISCHARGE INSTR - ACTIVITY
Learning About Physical Activity  What is physical activity? Physical activity is any kind of activity that gets your body moving. The types of physical activity that can help you get fit and stay healthy include:  · Aerobic or \"cardio\" activities that make your heart beat faster and make you breathe harder, such as brisk walking, riding a bike, or running. Aerobic activities strengthen your heart and lungs and build up your endurance. · Strength training activities that make your muscles work against, or \"resist,\" something, such as lifting weights or doing push-ups. These activities help tone and strengthen your muscles. · Stretches that allow you to move your joints and muscles through their full range of motion. Stretching helps you be more flexible and avoid injury. What are the benefits of physical activity? Being active is one of the best things you can do for your health. It helps you to:  · Feel stronger and have more energy to do all the things you like to do. · Focus better at school or work. · Feel, think, and sleep better. · Reach and stay at a healthy weight. · Lose fat and build lean muscle. · Lower your risk for serious health problems. · Keep your bones, muscles, and joints strong. How can you make physical activity part of your life? Get at least 30 minutes of exercise on most days of the week. Walking is a good choice. You also may want to do other activities, such as running, swimming, cycling, or playing tennis or team sports. Pick activities that you like--ones that make your heart beat faster, your muscles stronger, and your muscles and joints more flexible. If you find more than one thing you like doing, do them all. You don't have to do the same thing every day. Get your heart pumping every day. Any activity that makes your heart beat faster and keeps it at that rate for a while counts.   Here are some great ways to get your heart beating faster:  · Go for a brisk Enter T784 in the [facility] box to learn more about \"Learning About Physical Activity. \"     If you do not have an account, please click on the \"Sign Up Now\" link. Current as of: January 16, 2020               Content Version: 12.5  © 2006-2020 Healthwise, Incorporated. Care instructions adapted under license by [facility] ([facility]). If you have questions about a medical condition or this instruction, always ask your healthcare professional. [address] any warranty or liability for your use of this information. Learning About Alcohol Use Disorder  What is alcohol use disorder? Alcohol use disorder means that a person drinks alcohol even though it causes harm to themselves or others. It can range from mild to severe. The more signs of this disorder you have, the more severe it may be. Moderate to severe alcohol use disorder is sometimes called addiction. People who have it may find it hard to control their use of alcohol. People who have this disorder may argue with others about how much they're drinking. Their job may be affected because of drinking. They may drink when it's dangerous or illegal, such as when they drive. They also may have a strong need, or craving, to drink. They may feel like they must drink just to get by. Their drinking may increase their risk of getting hurt or being in a car crash. Over time, drinking too much alcohol may cause health problems. These may include high blood pressure, liver problems, or problems with digestion. What are the signs? Maybe you've wondered about your alcohol habits, or how to tell if your drinking is becoming a problem. Here are some of the signs of alcohol use disorder. You may have it if you have two or more of the following signs:  · You drink larger amounts of alcohol than you ever meant to. Or you've been drinking for a longer time than you ever meant to. · You can't cut down or control your use.  Or you SAD. This therapy uses a special kind of lamp. You let the lamp shine on you at certain times, usually in the morning. This may help your symptoms during the months when there is less sunlight. Healthy lifestyle  Healthy lifestyle changes may help you feel better. · Be active often. You might try walking or strength training. · Eat a healthy diet. Include fruits, vegetables, lean proteins, and whole grains in your diet each day. · Keep a regular sleep schedule. Try for 8 hours of sleep a night. · Find ways to manage stress, such as relaxation exercises. · Avoid alcohol and illegal drugs. Follow-up care is a key part of your treatment and safety. Be sure to make and go to all appointments, and call your doctor if you are having problems. It's also a good idea to know your test results and keep a list of the medicines you take. Where can you learn more? Go to https://[facility].[employer]. org and sign in to your [employer] account. Enter L922 in the [employer] box to learn more about \"Learning About Mood Disorders. \"     If you do not have an account, please click on the \"Sign Up Now\" link. Current as of: January 31, 2020               Content Version: 12.5  © 4200-0011 Healthwise, Incorporated. Care instructions adapted under license by [facility] ([facility]). If you have questions about a medical condition or this instruction, always ask your healthcare professional. [address] any warranty or liability for your use of this information. Learning About Coronavirus [phone])  Coronavirus [phone]): Overview  What is coronavirus (COVID-19)? The coronavirus disease (COVID-19) is caused by a virus. It is an illness that was first found in [place], [place], in December 2019. It has since spread worldwide. The virus can cause fever, cough, and trouble breathing. In severe cases, it can cause pneumonia and make it hard to breathe without help.  It can cause death.  Coronaviruses are a large group of viruses. They cause the common cold. They also cause more serious illnesses like Middle East respiratory syndrome (MERS) and severe acute respiratory syndrome (SARS). COVID-19 is caused by a novel coronavirus. That means it's a new type that has not been seen in people before. This virus spreads person-to-person through droplets from coughing and sneezing. It can also spread when you are close to someone who is infected. And it can spread when you touch something that has the virus on it, such as a doorknob or a tabletop. What can you do to protect yourself from coronavirus (COVID-19)? The best way to protect yourself from getting sick is to:  · Avoid areas where there is an outbreak. · Avoid contact with people who may be infected. · Wash your hands often with soap or alcohol-based hand sanitizers. · Avoid crowds and try to stay at least 6 feet away from other people. · Wash your hands often, especially after you cough or sneeze. Use soap and water, and scrub for at least 20 seconds. If soap and water aren't available, use an alcohol-based hand . · Avoid touching your mouth, nose, and eyes. What can you do to avoid spreading the virus to others? To help avoid spreading the virus to others:  · Cover your mouth with a tissue when you cough or sneeze. Then throw the tissue in the trash. · Use a disinfectant to clean things that you touch often. · Wear a cloth face cover if you have to go to public areas. · Stay home if you are sick or have been exposed to the virus. Don't go to school, work, or public areas. And don't use public transportation, ride-shares, or taxis unless you have no choice. · If you are sick:  ? Leave your home only if you need to get medical care. But call the doctor's office first so they know you're coming. And wear a face cover. ? Wear the face cover whenever you're around other people.  It can help stop the spread of the virus when you cough or sneeze. ? Clean and disinfect your home every day. Use household  and disinfectant wipes or sprays. Take special care to clean things that you grab with your hands. These include doorknobs, remote controls, phones, and handles on your refrigerator and microwave. And don't forget countertops, tabletops, bathrooms, and computer keyboards. When to call for help  [address] anytime you think you may need emergency care. For example, call if:  · You have severe trouble breathing. (You can't talk at all.)  · You have constant chest pain or pressure. · You are severely dizzy or lightheaded. · You are confused or can't think clearly. · Your face and lips have a blue color. · You pass out (lose consciousness) or are very hard to wake up. Call your doctor now if you develop symptoms such as:  · Shortness of breath. · Fever. · Cough. If you need to get care, call ahead to the doctor's office for instructions before you go. Make sure you wear a face cover to prevent exposing other people to the virus. Where can you get the latest information? The following health organizations are tracking and studying this virus. Their websites contain the most up-to-date information. Susy Anthony also learn what to do if you think you may have been exposed to the virus. · U.S. Centers for Disease Control and Prevention (CDC): The CDC provides updated news about the disease and travel advice. The website also tells you how to prevent the spread of infection. www.cdc.gov  · World Health Organization [facility]): WHO offers information about the virus outbreaks. WHO also has travel advice. www.who.int  Current as of: May 8, 2020               Content Version: 12.5  © 8805-6593 Healthwise, Incorporated. Care instructions adapted under license by [facility] ([facility]).  If you have questions about a medical condition or this instruction, always ask your healthcare professional. Yaniv Gracia disclaims any warranty or liability for your use of this information.

## 2020-09-09 NOTE — DISCHARGE SUMMARY
Patient ID:  Oscar Jaramillo  [id number]  65 y.o.  1970    Admit date: 9/7/2020  Discharge date: 9/9/2020    Admitting Physician: Yumiko Loyola MD   Attending Physician: Yumiko Loyola MD  Discharge Provider: Rosendo Castaneda     Admission Diagnoses: Unspecified mood (affective) disorder ([facility] [F39]    Discharge Diagnoses: Mood disorder  unspecified  Alcohol use disorder, severe  Tobacco use disorder     Pertinent medical issues  Anemia  HTN    Admission Condition: fair    Discharged Condition: good    Indication for Admission: Suicidal ideations    HPI:  57-year-old white male with history of bipolar disorder, alcohol abuse, known to our service, who presented to the ER with alcohol intoxication and suicidal ideation.  , UDS negative.  Patient was treated for alcohol withdrawal on the medical floor. He endorsed suicidal ideation, and was transferred to psychiatry for further management.     Patient endorsed suicidal ideation when seen on the medical floor by our consult service.  Stated he is unable to think of any particular triggers.  \"It is just my life.  I am 44 and I feel that I wasted my life. \"  Patient  has been struggling with alcohol addiction for a long time.  Most recently binge-drinking, 1/5 for several days in a row.  Reported depressed mood, anhedonia, poor sleep and appetite.  Voiced hopelessness and helplessness.  Reported occasional mood swings and racing thoughts. Sultana Salazar denied auditory and visual hallucinations.  He denied paranoia.       Patient is observed resting in bed this morning. He presents with brighter affect today. He denies withdrawal symptoms. States he slept poorly due to having a nicotine patch which gave him nightmares. Appetite improved. He denies suicidal ideation and reports improvement in his mood. Rates depression and anxiety are low.   States she is planning to go to [place] [id number] and call with his sponsor when he leaves the hospital.  Admits to noncompliance with his that he received maximal benefit from his hospitalization.       Number of antipsychotic medication prescribed at discharge: NOne  IF MORE THAN ONE IS USED: NA    History of greater than 3 failed multiple monotherapy trials: NA  Monotherapy taper plan/ cross taper in progress: JAZMYN  Augmentation of Clozapine: JAZMYN    Referral to addiction treatment: Patient refused    Prescription for Alcohol or Drug Disorder Medication: Naltrexone for alcohol use disorder    Prescription for Tobacco Cessation medication: Patient refused    If no prescriptions for Tobacco Cessation must document why: Patient refused    Consults: Internal medicine    Significant Diagnostic Studies:   Recent Labs     09/09/20  0609   WBC 5.4   RBC 4.13*   HGB 12.6*   HCT 37.1*   MCV 89.8   MCH 30.5   MCHC 34.0   RDW 13.8      MPV 9.2*       Recent Labs     09/09/20  0609      K 4.0      CO2 26   BUN 21*   CREATININE 1.0   GLUCOSE 116*   CALCIUM 9.0   PROT 6.1*   LABALBU 3.8   BILITOT <0.2   ALKPHOS 71   AST 27   ALT 56*       Lab Results   Component Value Date    TSHFT4 0.95 09/09/2020    TSH 1.080 02/19/2019     Lab Results   Component Value Date    VVDPLNRP30 585 09/09/2020     Lab Results   Component Value Date    VITD25 18.7 (L) 09/09/2020       Treatments: therapies: RN and SW    Mental Status Examination:  Alert, Oriented X 4  Appearance:  Proper Grooming and Hygiene  Speech with Regular Rate and Rhythm  Eye Contact:  Good  No Psychomotor Agitation/Retardation Noted  Attitude:  Cooperative  Mood:  \"Good\"  Affective: Congruent, appropriate to the situation, with a normal range and intensity  Thought Processes:  Coherently communicated, logical and goal oriented  Thought Content:  No Suicidal Ideation, No Homicidal Ideation, No Auditory or Visual Hallucinations, No Overt Delusions  Insight:  Present  Judgement:  Normal  Memory is intact for both remote and recent  Intellectual Functioning:  Within the [employer] of Knowledge:  Adequate  Attention and Concentration:  Adequate      Discharge Exam:  Please, see medical note    Disposition: home    Patient Instructions:   Current Discharge Medication List      START taking these medications    Details   traZODone (DESYREL) 50 MG tablet Take 1 tablet by mouth nightly  Qty: 30 tablet, Refills: 1      naltrexone (DEPADE) 50 MG tablet Take 1 tablet by mouth daily (with breakfast)  Qty: 30 tablet, Refills: 1      Multiple Vitamin (MULTIVITAMIN) TABS tablet Take 1 tablet by mouth daily  Qty: 90 tablet, Refills: 0      hydrOXYzine (ATARAX) 25 MG tablet Take 1 tablet by mouth 3 times daily as needed for Anxiety  Qty: 90 tablet, Refills: 1         CONTINUE these medications which have CHANGED    Details   lamoTRIgine (LAMICTAL) 25 MG tablet Take 1 tablet by mouth 2 times daily  Qty: 60 tablet, Refills: 1      thiamine 100 MG tablet Take 1 tablet by mouth daily  Qty: 30 tablet, Refills: 2      melatonin 3 MG TABS tablet Take 1 tablet by mouth nightly  Qty: 30 tablet, Refills: 1      folic acid (FOLVITE) 1 MG tablet Take 1 tablet by mouth daily  Qty: 30 tablet, Refills: 2         CONTINUE these medications which have NOT CHANGED    Details   losartan (COZAAR) 100 MG tablet Take 100 mg by mouth daily      ergocalciferol (ERGOCALCIFEROL) 41280 units capsule Take 1 capsule by mouth once a week for 5 doses  Qty: 5 capsule, Refills: 0         STOP taking these medications       sertraline (ZOLOFT) 100 MG tablet Comments:   Reason for Stopping:         QUEtiapine (SEROQUEL) 200 MG tablet Comments:   Reason for Stopping:         nicotine (NICODERM CQ) 21 MG/24HR Comments:   Reason for Stopping:             Activity: activity as tolerated  Diet: regular diet  Wound Care: none needed    Follow-up with [facility] provider in 2 weeks.     Time worked: More than 31 minutes    Participation: good    Electronically signed by Osmin Pierre MD on 9/9/2020 at 10:12 AM

## 2020-09-09 NOTE — PROGRESS NOTES
[facility]  Discharge Note  Bridge Appointment completed: Reviewed Discharge Instructions with patient. Patient verbalizes understanding and agreement with the discharge plan using the teachback method. Referral for Outpatient Tobacco Cessation Counseling, upon discharge (nirmala X if applicable and completed):    ( )  Hospital staff assisted patient to call Quit Line or faxed referral                                   during hospitalization                  (X )  Recognizing danger situations (included triggers and roadblocks), if not completed on admission                    (X )  Coping skills (new ways to manage stress, exercise, relaxation techniques, changing routine, distraction), if not completed on admission                                                           (X )  Basic information about quitting (benefits of quitting, techniques in how to quit, available resources, if not completed on admission  ( ) Referral for counseling faxed to [facility]   (X ) Patient refused referral  ( X) Patient refused counseling  ( ) Patient refused smoking cessation medication upon discharge    Vaccinations (nirmala X if applicable and completed):  ( ) Patient states already received influenza vaccine elsewhere  ( ) Patient received influenza vaccine during this hospitalization  (X ) Patient refused influenza vaccine at this time      Pt discharged with followings belongings:   Dentures: None  Vision - Corrective Lenses: None  Hearing Aid: None  Jewelry: None  Body Piercings Removed: N/A  Clothing: Footwear, Shirt, Socks  Were All Patient Medications Collected?: Not Applicable  Other Valuables: Wallet, Money (Comment)   Valuables sent home with Patient. . Valuables retrieved from safe and returned to patient. Patient left department with   via  , discharged to  . Patient education on aftercare instructions: yes  Patient verbalize understanding of AVS:  Yes. .  Suicidal Ideations? No AVH?

## 2020-09-09 NOTE — PROGRESS NOTES
SW met with treatment team to discuss pt's progress and setbacks. Pt reportedly slept good last night with medications, appetite has increased, participation in scheduled group activities, social with peers/staff, independent with ADL's, compliant with medications, behavior has been cooperative, reports depression 2, anxiety 2, denies SI/HI/AVH, discharge today, follow up appointments will be provided, continue current treatment plan.     Electronically signed by Mayo Goss on 9/9/2020 at 8:38 AM

## 2020-09-10 NOTE — CARE COORDINATION
Follow up call completed. Writer was not able to speak with the patient. Received voicemail.      Electronically signed by Kwesi Nix, [address] on 9/10/2020 at 8:23 AM

## 2020-09-15 ENCOUNTER — HOSPITAL ENCOUNTER (INPATIENT)
Age: 50
LOS: 1 days | Discharge: HOME | DRG: 897 | End: 2020-09-16
Attending: EMERGENCY MEDICINE | Admitting: HOSPITALIST
Payer: MEDICAID

## 2020-09-15 PROBLEM — F10.930 ALCOHOL WITHDRAWAL SYNDROME, UNCOMPLICATED (HCC): Status: ACTIVE | Noted: 2020-09-15

## 2020-09-15 LAB
ACETAMINOPHEN LEVEL: <15 UG/ML
ALBUMIN SERPL-MCNC: 4.3 G/DL (ref 3.5–5.2)
ALP BLD-CCNC: 85 U/L (ref 40–130)
ALT SERPL-CCNC: 74 U/L (ref 5–41)
AMPHETAMINE SCREEN, URINE: NEGATIVE
ANION GAP SERPL CALCULATED.3IONS-SCNC: 11 MMOL/L (ref 7–19)
AST SERPL-CCNC: 45 U/L (ref 5–40)
BARBITURATE SCREEN URINE: NEGATIVE
BASOPHILS ABSOLUTE: 0 K/UL (ref 0–0.2)
BASOPHILS RELATIVE PERCENT: 0.7 % (ref 0–1)
BENZODIAZEPINE SCREEN, URINE: NEGATIVE
BILIRUB SERPL-MCNC: 0.4 MG/DL (ref 0.2–1.2)
BILIRUBIN URINE: NEGATIVE
BLOOD, URINE: NEGATIVE
BUN BLDV-MCNC: 17 MG/DL (ref 6–20)
CALCIUM SERPL-MCNC: 8.6 MG/DL (ref 8.6–10)
CANNABINOID SCREEN URINE: NEGATIVE
CHLORIDE BLD-SCNC: 92 MMOL/L (ref 98–111)
CLARITY: CLEAR
CO2: 30 MMOL/L (ref 22–29)
COCAINE METABOLITE SCREEN URINE: NEGATIVE
COLOR: YELLOW
CREAT SERPL-MCNC: 1 MG/DL (ref 0.5–1.2)
EOSINOPHILS ABSOLUTE: 0 K/UL (ref 0–0.6)
EOSINOPHILS RELATIVE PERCENT: 0.5 % (ref 0–5)
ETHANOL: 131 MG/DL (ref 0–0.08)
ETHANOL: 206 MG/DL (ref 0–0.08)
ETHANOL: 69 MG/DL (ref 0–0.08)
GFR AFRICAN AMERICAN: >59
GFR NON-AFRICAN AMERICAN: >60
GLUCOSE BLD-MCNC: 144 MG/DL (ref 74–109)
GLUCOSE URINE: NEGATIVE MG/DL
HCT VFR BLD CALC: 40.5 % (ref 42–52)
HEMOGLOBIN: 13.6 G/DL (ref 14–18)
IMMATURE GRANULOCYTES #: 0 K/UL
KETONES, URINE: NEGATIVE MG/DL
LEUKOCYTE ESTERASE, URINE: NEGATIVE
LYMPHOCYTES ABSOLUTE: 2 K/UL (ref 1.1–4.5)
LYMPHOCYTES RELATIVE PERCENT: 34.1 % (ref 20–40)
Lab: NORMAL
MCH RBC QN AUTO: 30.6 PG (ref 27–31)
MCHC RBC AUTO-ENTMCNC: 33.6 G/DL (ref 33–37)
MCV RBC AUTO: 91 FL (ref 80–94)
MONOCYTES ABSOLUTE: 0.7 K/UL (ref 0–0.9)
MONOCYTES RELATIVE PERCENT: 12.6 % (ref 0–10)
NEUTROPHILS ABSOLUTE: 3 K/UL (ref 1.5–7.5)
NEUTROPHILS RELATIVE PERCENT: 51.8 % (ref 50–65)
NITRITE, URINE: NEGATIVE
OPIATE SCREEN URINE: NEGATIVE
PDW BLD-RTO: 13.9 % (ref 11.5–14.5)
PH UA: 7 (ref 5–8)
PLATELET # BLD: 248 K/UL (ref 130–400)
PMV BLD AUTO: 8.6 FL (ref 9.4–12.4)
POTASSIUM SERPL-SCNC: 3.6 MMOL/L (ref 3.5–5)
PROTEIN UA: NEGATIVE MG/DL
RBC # BLD: 4.45 M/UL (ref 4.7–6.1)
SALICYLATE, SERUM: <3 MG/DL (ref 3–10)
SODIUM BLD-SCNC: 133 MMOL/L (ref 136–145)
SPECIFIC GRAVITY UA: 1.01 (ref 1–1.03)
TOTAL PROTEIN: 6.9 G/DL (ref 6.6–8.7)
UROBILINOGEN, URINE: 0.2 E.U./DL
WBC # BLD: 5.8 K/UL (ref 4.8–10.8)

## 2020-09-15 PROCEDURE — 6360000002 HC RX W HCPCS: Performed by: EMERGENCY MEDICINE

## 2020-09-15 PROCEDURE — 1210000000 HC MED SURG R&B

## 2020-09-15 PROCEDURE — 6370000000 HC RX 637 (ALT 250 FOR IP): Performed by: HOSPITALIST

## 2020-09-15 PROCEDURE — 99285 EMERGENCY DEPT VISIT HI MDM: CPT

## 2020-09-15 PROCEDURE — 80053 COMPREHEN METABOLIC PANEL: CPT

## 2020-09-15 PROCEDURE — G0378 HOSPITAL OBSERVATION PER HR: HCPCS

## 2020-09-15 PROCEDURE — 2580000003 HC RX 258: Performed by: EMERGENCY MEDICINE

## 2020-09-15 PROCEDURE — 85025 COMPLETE CBC W/AUTO DIFF WBC: CPT

## 2020-09-15 PROCEDURE — G0480 DRUG TEST DEF 1-7 CLASSES: HCPCS

## 2020-09-15 PROCEDURE — 96375 TX/PRO/DX INJ NEW DRUG ADDON: CPT

## 2020-09-15 PROCEDURE — 99999 PR OFFICE/OUTPT VISIT,PROCEDURE ONLY: CPT | Performed by: EMERGENCY MEDICINE

## 2020-09-15 PROCEDURE — 80307 DRUG TEST PRSMV CHEM ANLYZR: CPT

## 2020-09-15 PROCEDURE — 6360000002 HC RX W HCPCS: Performed by: HOSPITALIST

## 2020-09-15 PROCEDURE — 2580000003 HC RX 258: Performed by: HOSPITALIST

## 2020-09-15 PROCEDURE — 96372 THER/PROPH/DIAG INJ SC/IM: CPT

## 2020-09-15 PROCEDURE — 96376 TX/PRO/DX INJ SAME DRUG ADON: CPT

## 2020-09-15 PROCEDURE — 96365 THER/PROPH/DIAG IV INF INIT: CPT

## 2020-09-15 PROCEDURE — 36415 COLL VENOUS BLD VENIPUNCTURE: CPT

## 2020-09-15 PROCEDURE — 6360000002 HC RX W HCPCS

## 2020-09-15 PROCEDURE — 81003 URINALYSIS AUTO W/O SCOPE: CPT

## 2020-09-15 PROCEDURE — 96366 THER/PROPH/DIAG IV INF ADDON: CPT

## 2020-09-15 PROCEDURE — 2500000003 HC RX 250 WO HCPCS: Performed by: EMERGENCY MEDICINE

## 2020-09-15 RX ORDER — LORAZEPAM 2 MG/ML
1 INJECTION INTRAMUSCULAR ONCE
Status: COMPLETED | OUTPATIENT
Start: 2020-09-15 | End: 2020-09-15

## 2020-09-15 RX ORDER — NICOTINE 21 MG/24HR
1 PATCH, TRANSDERMAL 24 HOURS TRANSDERMAL DAILY
Status: DISCONTINUED | OUTPATIENT
Start: 2020-09-15 | End: 2020-09-16 | Stop reason: HOSPADM

## 2020-09-15 RX ORDER — SODIUM CHLORIDE, SODIUM LACTATE, POTASSIUM CHLORIDE, CALCIUM CHLORIDE 600; 310; 30; 20 MG/100ML; MG/100ML; MG/100ML; MG/100ML
INJECTION, SOLUTION INTRAVENOUS CONTINUOUS
Status: DISCONTINUED | OUTPATIENT
Start: 2020-09-15 | End: 2020-09-16 | Stop reason: HOSPADM

## 2020-09-15 RX ORDER — MAGNESIUM SULFATE IN WATER 40 MG/ML
2 INJECTION, SOLUTION INTRAVENOUS PRN
Status: DISCONTINUED | OUTPATIENT
Start: 2020-09-15 | End: 2020-09-16 | Stop reason: HOSPADM

## 2020-09-15 RX ORDER — ACETAMINOPHEN 650 MG/1
650 SUPPOSITORY RECTAL EVERY 6 HOURS PRN
Status: DISCONTINUED | OUTPATIENT
Start: 2020-09-15 | End: 2020-09-16 | Stop reason: HOSPADM

## 2020-09-15 RX ORDER — ACETAMINOPHEN 325 MG/1
650 TABLET ORAL EVERY 6 HOURS PRN
Status: DISCONTINUED | OUTPATIENT
Start: 2020-09-15 | End: 2020-09-16 | Stop reason: HOSPADM

## 2020-09-15 RX ORDER — LORAZEPAM 2 MG/ML
1 INJECTION INTRAMUSCULAR
Status: DISCONTINUED | OUTPATIENT
Start: 2020-09-15 | End: 2020-09-16 | Stop reason: HOSPADM

## 2020-09-15 RX ORDER — CHLORDIAZEPOXIDE HYDROCHLORIDE 25 MG/1
25 CAPSULE, GELATIN COATED ORAL 3 TIMES DAILY
Status: DISCONTINUED | OUTPATIENT
Start: 2020-09-15 | End: 2020-09-16 | Stop reason: HOSPADM

## 2020-09-15 RX ORDER — LAMOTRIGINE 25 MG/1
25 TABLET ORAL 2 TIMES DAILY
Status: DISCONTINUED | OUTPATIENT
Start: 2020-09-15 | End: 2020-09-16 | Stop reason: HOSPADM

## 2020-09-15 RX ORDER — LORAZEPAM 1 MG/1
2 TABLET ORAL
Status: DISCONTINUED | OUTPATIENT
Start: 2020-09-15 | End: 2020-09-16 | Stop reason: HOSPADM

## 2020-09-15 RX ORDER — LORAZEPAM 2 MG/ML
4 INJECTION INTRAMUSCULAR
Status: DISCONTINUED | OUTPATIENT
Start: 2020-09-15 | End: 2020-09-16 | Stop reason: HOSPADM

## 2020-09-15 RX ORDER — LOSARTAN POTASSIUM 100 MG/1
100 TABLET ORAL DAILY
Status: DISCONTINUED | OUTPATIENT
Start: 2020-09-15 | End: 2020-09-16 | Stop reason: HOSPADM

## 2020-09-15 RX ORDER — LORAZEPAM 1 MG/1
1 TABLET ORAL
Status: DISCONTINUED | OUTPATIENT
Start: 2020-09-15 | End: 2020-09-16 | Stop reason: HOSPADM

## 2020-09-15 RX ORDER — POTASSIUM CHLORIDE 7.45 MG/ML
10 INJECTION INTRAVENOUS PRN
Status: DISCONTINUED | OUTPATIENT
Start: 2020-09-15 | End: 2020-09-16 | Stop reason: HOSPADM

## 2020-09-15 RX ORDER — LORAZEPAM 1 MG/1
4 TABLET ORAL
Status: DISCONTINUED | OUTPATIENT
Start: 2020-09-15 | End: 2020-09-16 | Stop reason: HOSPADM

## 2020-09-15 RX ORDER — MULTIVITAMIN WITH IRON
1 TABLET ORAL DAILY
Status: DISCONTINUED | OUTPATIENT
Start: 2020-09-15 | End: 2020-09-16 | Stop reason: HOSPADM

## 2020-09-15 RX ORDER — TRAZODONE HYDROCHLORIDE 50 MG/1
50 TABLET ORAL NIGHTLY
Status: DISCONTINUED | OUTPATIENT
Start: 2020-09-15 | End: 2020-09-16 | Stop reason: HOSPADM

## 2020-09-15 RX ORDER — THIAMINE MONONITRATE (VIT B1) 100 MG
100 TABLET ORAL DAILY
Status: DISCONTINUED | OUTPATIENT
Start: 2020-09-15 | End: 2020-09-16 | Stop reason: HOSPADM

## 2020-09-15 RX ORDER — LORAZEPAM 2 MG/ML
3 INJECTION INTRAMUSCULAR
Status: DISCONTINUED | OUTPATIENT
Start: 2020-09-15 | End: 2020-09-16 | Stop reason: HOSPADM

## 2020-09-15 RX ORDER — ONDANSETRON 2 MG/ML
4 INJECTION INTRAMUSCULAR; INTRAVENOUS EVERY 6 HOURS PRN
Status: DISCONTINUED | OUTPATIENT
Start: 2020-09-15 | End: 2020-09-16 | Stop reason: HOSPADM

## 2020-09-15 RX ORDER — POTASSIUM CHLORIDE 20 MEQ/1
40 TABLET, EXTENDED RELEASE ORAL PRN
Status: DISCONTINUED | OUTPATIENT
Start: 2020-09-15 | End: 2020-09-16 | Stop reason: HOSPADM

## 2020-09-15 RX ORDER — SODIUM CHLORIDE 0.9 % (FLUSH) 0.9 %
10 SYRINGE (ML) INJECTION EVERY 12 HOURS SCHEDULED
Status: DISCONTINUED | OUTPATIENT
Start: 2020-09-15 | End: 2020-09-16 | Stop reason: HOSPADM

## 2020-09-15 RX ORDER — HYDROXYZINE HYDROCHLORIDE 25 MG/1
25 TABLET, FILM COATED ORAL 3 TIMES DAILY PRN
Status: DISCONTINUED | OUTPATIENT
Start: 2020-09-15 | End: 2020-09-16 | Stop reason: HOSPADM

## 2020-09-15 RX ORDER — LORAZEPAM 2 MG/ML
2 INJECTION INTRAMUSCULAR ONCE
Status: COMPLETED | OUTPATIENT
Start: 2020-09-15 | End: 2020-09-15

## 2020-09-15 RX ORDER — LORAZEPAM 1 MG/1
3 TABLET ORAL
Status: DISCONTINUED | OUTPATIENT
Start: 2020-09-15 | End: 2020-09-16 | Stop reason: HOSPADM

## 2020-09-15 RX ORDER — UREA 10 %
3 LOTION (ML) TOPICAL NIGHTLY
Status: DISCONTINUED | OUTPATIENT
Start: 2020-09-15 | End: 2020-09-16 | Stop reason: HOSPADM

## 2020-09-15 RX ORDER — SODIUM CHLORIDE 0.9 % (FLUSH) 0.9 %
10 SYRINGE (ML) INJECTION PRN
Status: DISCONTINUED | OUTPATIENT
Start: 2020-09-15 | End: 2020-09-16 | Stop reason: HOSPADM

## 2020-09-15 RX ORDER — LORAZEPAM 2 MG/ML
INJECTION INTRAMUSCULAR
Status: COMPLETED
Start: 2020-09-15 | End: 2020-09-15

## 2020-09-15 RX ORDER — LORAZEPAM 2 MG/ML
2 INJECTION INTRAMUSCULAR
Status: DISCONTINUED | OUTPATIENT
Start: 2020-09-15 | End: 2020-09-16 | Stop reason: HOSPADM

## 2020-09-15 RX ORDER — POLYETHYLENE GLYCOL 3350 17 G/17G
17 POWDER, FOR SOLUTION ORAL DAILY PRN
Status: DISCONTINUED | OUTPATIENT
Start: 2020-09-15 | End: 2020-09-16 | Stop reason: HOSPADM

## 2020-09-15 RX ORDER — FOLIC ACID 1 MG/1
1 TABLET ORAL DAILY
Status: DISCONTINUED | OUTPATIENT
Start: 2020-09-15 | End: 2020-09-16 | Stop reason: HOSPADM

## 2020-09-15 RX ORDER — PROMETHAZINE HYDROCHLORIDE 25 MG/1
12.5 TABLET ORAL EVERY 6 HOURS PRN
Status: DISCONTINUED | OUTPATIENT
Start: 2020-09-15 | End: 2020-09-16 | Stop reason: HOSPADM

## 2020-09-15 RX ADMIN — FOLIC ACID 1 MG: 1 TABLET ORAL at 19:45

## 2020-09-15 RX ADMIN — LORAZEPAM 2 MG: 2 INJECTION INTRAMUSCULAR; INTRAVENOUS at 11:16

## 2020-09-15 RX ADMIN — SODIUM CHLORIDE, POTASSIUM CHLORIDE, SODIUM LACTATE AND CALCIUM CHLORIDE: 600; 310; 30; 20 INJECTION, SOLUTION INTRAVENOUS at 19:47

## 2020-09-15 RX ADMIN — LORAZEPAM 2 MG: 2 INJECTION INTRAMUSCULAR at 11:16

## 2020-09-15 RX ADMIN — THIAMINE HCL TAB 100 MG 100 MG: 100 TAB at 19:45

## 2020-09-15 RX ADMIN — FOLIC ACID: 5 INJECTION, SOLUTION INTRAMUSCULAR; INTRAVENOUS; SUBCUTANEOUS at 08:43

## 2020-09-15 RX ADMIN — TRAZODONE HYDROCHLORIDE 50 MG: 50 TABLET ORAL at 19:45

## 2020-09-15 RX ADMIN — ENOXAPARIN SODIUM 40 MG: 40 INJECTION SUBCUTANEOUS at 19:46

## 2020-09-15 RX ADMIN — CHLORDIAZEPOXIDE HYDROCHLORIDE 25 MG: 25 CAPSULE ORAL at 19:45

## 2020-09-15 RX ADMIN — SODIUM CHLORIDE, PRESERVATIVE FREE 10 ML: 5 INJECTION INTRAVENOUS at 19:45

## 2020-09-15 RX ADMIN — LORAZEPAM 1 MG: 2 INJECTION INTRAMUSCULAR; INTRAVENOUS at 17:24

## 2020-09-15 RX ADMIN — THERA TABS 1 TABLET: TAB at 19:45

## 2020-09-15 RX ADMIN — Medication 3 MG: at 19:45

## 2020-09-15 RX ADMIN — LAMOTRIGINE 25 MG: 25 TABLET ORAL at 19:45

## 2020-09-15 RX ADMIN — LOSARTAN POTASSIUM 100 MG: 100 TABLET, FILM COATED ORAL at 19:45

## 2020-09-15 ASSESSMENT — ENCOUNTER SYMPTOMS
SHORTNESS OF BREATH: 0
ABDOMINAL PAIN: 0
VOMITING: 0
CHEST TIGHTNESS: 0

## 2020-09-15 NOTE — ED NOTES
Pt admits to SI to Dr Vital Lab, Suicide precautions initiated     Niko Honeycutt, [facility]  09/15/20 2874

## 2020-09-15 NOTE — ED PROVIDER NOTES
Psych sees/ feels he needs admission, but to medical floor due to chronic alcohol use and withdrawal sxs.       Claudia Blanco MD  09/15/20 3215

## 2020-09-15 NOTE — ED PROVIDER NOTES
Respiratory failure ([facility]     intubation    Seizures ([facility]          SURGICAL HISTORY       Past Surgical History:   Procedure Laterality Date    APPENDECTOMY      CHOLECYSTECTOMY  5/18/2006    Stigall    COLONOSCOPY      ENDOSCOPY, COLON, DIAGNOSTIC      KNEE ARTHROSCOPY Right     NECK SURGERY  7/15/2008    Hadi         CURRENT MEDICATIONS     Previous Medications    ERGOCALCIFEROL (ERGOCALCIFEROL) 51347 UNITS CAPSULE    Take 1 capsule by mouth once a week for 5 doses    FOLIC ACID (FOLVITE) 1 MG TABLET    Take 1 tablet by mouth daily    HYDROXYZINE (ATARAX) 25 MG TABLET    Take 1 tablet by mouth 3 times daily as needed for Anxiety    LAMOTRIGINE (LAMICTAL) 25 MG TABLET    Take 1 tablet by mouth 2 times daily    LOSARTAN (COZAAR) 100 MG TABLET    Take 100 mg by mouth daily    MELATONIN 3 MG TABS TABLET    Take 1 tablet by mouth nightly    MULTIPLE VITAMIN (MULTIVITAMIN) TABS TABLET    Take 1 tablet by mouth daily    NALTREXONE (DEPADE) 50 MG TABLET    Take 1 tablet by mouth daily (with breakfast)    THIAMINE 100 MG TABLET    Take 1 tablet by mouth daily    TRAZODONE (DESYREL) 50 MG TABLET    Take 1 tablet by mouth nightly       ALLERGIES     Patient has no known allergies.     FAMILY HISTORY       Family History   Problem Relation Age of Onset    Osteoarthritis Mother     Thyroid Disease Mother     Heart Failure Father     Heart Failure Maternal Grandmother     Heart Failure Paternal Grandmother     Cancer Maternal Grandfather           SOCIAL HISTORY       Social History     Socioeconomic History    Marital status:      Spouse name: Not on file    Number of children: Not on file    Years of education: Not on file    Highest education level: Not on file   Occupational History     Employer: DISABLED   Social Needs    Financial resource strain: Not on file    Food insecurity     Worry: Not on file     Inability: Not on file    Transportation needs     Medical: Not on file     Non-medical: regular rhythm. Heart sounds: Normal heart sounds. No murmur. Pulmonary:      Effort: Pulmonary effort is normal. No respiratory distress. Breath sounds: Normal breath sounds. No stridor. Abdominal:      General: There is no distension. Palpations: Abdomen is soft. Tenderness: There is no abdominal tenderness. There is no guarding. Skin:     Coloration: Skin is not pale. Findings: No rash. Neurological:      Mental Status: He is alert and oriented to person, place, and time. Psychiatric:         Behavior: Behavior is cooperative. DIAGNOSTIC RESULTS       LABS:  Labs Reviewed   CBC WITH AUTO DIFFERENTIAL - Abnormal; Notable for the following components:       Result Value    RBC 4.45 (*)     Hemoglobin 13.6 (*)     Hematocrit 40.5 (*)     MPV 8.6 (*)     Monocytes % 12.6 (*)     All other components within normal limits   COMPREHENSIVE METABOLIC PANEL - Abnormal; Notable for the following components:    Sodium 133 (*)     Chloride 92 (*)     CO2 30 (*)     Glucose 144 (*)     ALT 74 (*)     AST 45 (*)     All other components within normal limits   ETHANOL   URINE DRUG SCREEN   URINE RT REFLEX TO CULTURE   SALICYLATE LEVEL   ACETAMINOPHEN LEVEL   ETHANOL       All other labs were within normal range or not returned as of this dictation. EMERGENCY DEPARTMENT COURSE and DIFFERENTIAL DIAGNOSIS/MDM:   Vitals:    Vitals:    09/15/20 0735 09/15/20 0800 09/15/20 0845 09/15/20 0900   BP:  (!) 140/86 120/84 133/80   Pulse: 90 86 73 78   Resp:  18     Temp:       TempSrc:       SpO2:  98% 95% 98%   Weight:           MDM    Reassessment    Patient was placed in a safe and secure environment with suicide precautions. His serum alcohol was elevated around 200. We will plan to observe patient here in the ED and reevaluate with the psychiatry team once he is sober. 1500: ED care transferred to Dr. Angelica Fonseca.   He will follow-up on patient's repeat serum alcohol and disposition per psychiatry. PROCEDURES:  Unless otherwise noted below, none     Procedures    FINAL IMPRESSION      1. Acute alcoholic intoxication without complication ([facility]    2.  Depression, unspecified depression type          DISPOSITION/PLAN   DISPOSITION  Pending      (Please note that portions of this note were completed with a voice recognition program.  Efforts were made to edit thedictations but occasionally words are mis-transcribed.)    Arvin Cabrera MD (electronically signed)  Attending Emergency Physician          Arvin Cabrera MD  09/15/20 4767

## 2020-09-15 NOTE — ED NOTES
Patient placed in a gown  Patient placed on cardiac monitor, continuous pulse oximeter, and NIBP monitor. Monitor alarms on. Mask and gloves worn by staff while in pt room. Pt masked during all contact with staff.            Sundeep Reyes RN  09/15/20 3981

## 2020-09-15 NOTE — PROGRESS NOTES
BLAKE ADULT INITIAL INTAKE ASSESSMENT     9/15/20    Velvet Gomez ,a 44 y.o. male, presents to the ED for a psychiatric assessment. ED Arrival time: [facility]  ED physician: [address]  BLAKE Notification time: 46  BLAKE Assessment start time: [address]  Psychiatrist call time: [phone] with Dr. Massimo Turner    Patient is referred by: Self    Reason for visit to ED - Presenting problem:     PT states reason for ED visit, \"I was in my car last night and I had been drinking, I felt weird, like I was going to die, so I figured if I was, I would keep drinking so I drank more. I slept for a couple hours had a couple beers this morning and knew I needed to come to the ER. \"    Patient seen lying in bed in ER exam room 16. Patient appears depressed, has flat affect and voices feelings of hopelessness and helplessness. He reports suicidal thoughts last night but he reports none currently. \"I need to get sober and get back to work, that's what everyone I keep talking to keeps telling me. \"  Patient reports chronic history of alcohol abuse and recent history of alcohol withdrawals. Patient reports no withdrawal seizures that he is aware of but reports \"I've woken up on the floor after drinking a few times and I have fallen pretty hard. \" [facility] last week where he was admitted for suicidal ideations after medical admission for alcohol intoxication. Current CIWA score 11, and patient has been given recent dose of ativan in ER for \"shakes\" per patient. ER Physician Reports: Velvet Gomez is a 44 y.o. male who presents to the emergency department for evaluation regarding depression and alcohol use. Patient states that he has been drinking heavily over the past 2 to 3 days. Reports that last night he began to have feelings that if he continued drinking he would likely die.   On further questioning patient reveals that he feels that he is somewhat suicidal.  He denies any specific plan. Denies any prior history of previous suicide attempt. He tells me that he does have a prior history of alcoholism with previous attempts at rehab however this is been unsuccessful. States that he is not had any vomiting, fever or chills.     Duration of symptoms: one day    Current Stressors: financial and drug and alcohol    C-SSRS Completed: yes    SI:  denies   Plan: no   Past SI attempts: no   If yes, when and how many times:  Describe suicide attempts:   HI: denies  If yes describe:   Delusions: denies  If yes describe:   Hallucinations: denies   If yes describe:   Risk of Harm to self: Self injurious/self mutilation behaviorsno   If yes explain:   Was it within the past 6 months: no   Risk of Harm to others: no   If yes explain:   Was it within the past 6 months: no   Trauma History: denies  Anxiety 1-10:  8  Explain if increased:   Depression 1-10:  9  Explain if increased:   Level of function outside hospital decreased: yes   If yes explain: Drinking daily, homeless, living in his car      Psychiatric Hospitalizations: Yes   Where & When: [facility] last admission 09/2020  Outpatient Psychiatric Treatment: [facility], did not go to follow up after discharge from [facility]    Family History:    Family history of mental illness: no   \"Depression\",\"Anxiety\",\"Bipolar\",\"Schizophrenia\",\"Borderline\",\"ADHD\"}  Family members with suicide attempt: no   If yes explain (attempted or completed):    Substance Abuse History:     SBIRT Completed: yes  Brief Intervention completed if needed:  (Yes/No)    Current ETOH LEVELS: 69    ETOH Usage:     Amount drinking daily: heavy, patient reports drinking \"at least a quart of vodka per day plus at least a 6 pack of beer as well\"   Date of last drink: 9/15/20 about 0700  Longest period of sobriety: unknown    Substance/Chemical Abuse/Recreational Drug History:  Substance used: alcohol  Date of last substance use: 9/15/20  Tobacco Use: yes smokes pack per day   History Evaluation:     Appearance:  disheveled   Behavior:  Restless & fidgety   Speech:  normal pitch and normal volume   Mood:  anxious, depressed and sad   Affect:  flat   Thought Process:  circumstantial   Thought Content:  suicidal   Sensorium:  person, place, time/date and situation   Cognition:  impaired due to situation   Insight:  Poor insight and poor judgment       Collateral Information:     Name: Not needed in ER  Relationship: n/a  Phone Number: n/a  Collateral: n/a    Current living arrangement: homeless living in car  Current Support System: Lives on savings  Employment: unemployed    Disposition:     Choose one of the options below for disposition:     1. Decision to admit to BH:no      Does the patient have a guardian or Medical POA: no  Has the guardian been notified or Medical POA:       2. Decision to Discharge:   Does not meet criteria for acceptance to   unit due to: n/a    3. Transferred:       Patient was transferred due to:  Admitted to medical floor for alcohol withdrawals  CIWA 11 in ER    Other follow up information provided:      Sourav Robertson RN

## 2020-09-15 NOTE — H&P
HISTORY AND PHYSICAL             Date: 9/15/2020        Patient Name: Miguelito Pugh     YOB: 1970      Age:  44 y.o. Chief Complaint     Chief Complaint   Patient presents with    Alcohol Problem     last drink immediately pta. pt reports 'he is losing it'. denies si/hi/avh        History Obtained From   patient    History of Present Illness     49-year-old male with a history of alcohol use disorder, depression, anxiety, tobacco use disorder, hypertension, who presents to the hospital with concerns of alcohol related problem requesting help. Patient was recently discharged from the hospital 7/23 needs inpatient rehab after being treated for alcohol intoxication and withdrawal.  Patient stated that after he left the hospital he run into troubles had alcohol handy and started drinking again, patient stated to be drinking the fourth of vodka with sixpack of beer daily. Patient denied any nausea, emesis, or hallucinations. Positive for headaches, stated he has thoughts of hurting himself but does not have a definitive plan. Also denied homicidal ideation. In the ED blood pressure in the 668O systolic, heart rate in 78E, labs unremarkable noted to have alcohol level of 131 and down trended to 69. Psych evaluated done in the ED by ER physician and recommended admission to medicine for alcohol withdrawal, reevaluate after admission.     Past Medical History     Past Medical History:   Diagnosis Date    CAD (coronary artery disease)     Chest pain 12/12/2011    Cigarette smoker 12/12/2011    Depression     Hypertension 12/12/2011    Respiratory failure ([facility] [address]     intubation    Seizures ([facility] [address]         Past Surgical History     Past Surgical History:   Procedure Laterality Date    APPENDECTOMY      [facility]LE[facility]  [facility]/18/2006    [facility]    COLONOSCOPY      ENDOSCOPY, COLON, DIAGNOSTIC      KNEE ARTHROSCOPY Right     NECK SURGERY  7/15/2008    [facility]        Medications Prior to Admission Father     Heart Failure Maternal Grandmother     Heart Failure Paternal Grandmother     Cancer Maternal Grandfather        Review of Systems   Review of Systems  16 point system reviewed and negative except listed above. Physical Exam   /80   Pulse 78   Temp 98.7 °F (37.1 °C) (Temporal)   Resp 18   Wt 170 lb (77.1 kg)   SpO2 98%   BMI 21.25 kg/m²       Physical Exam  General: Alert, well-developed, no acute distress lying comfortably in bed. HEENT: Atraumatic normocephalic, range of motion normal, no JVD, no tracheal deviation noted. Cardiac: Normal S1-S2 no murmurs rub or gallop. Respiratory: Effort normal, breath sounds normal, clear To auscultation bilaterally, no rhonchi or rales, no wheezing  Abdomen: Soft, positive bowel sounds in all quadrants, no distention, nontender to palpation, no organomegaly noted, no rebound noted, no CVA tenderness. MSK/extremities: no tenderness, no edema, no deformity, moves all extremities  Skin: Warm, no erythema noted, no bruising and no lesions noted. Psych: Affect normal and good eye contact, behavioral normal, thought content normal and judgment normal  Neurological: No focal deficits, alert and conversant, no formal disorientation noted. No sensory deficits, no abnormal coordination.         Labs      Recent Results (from the past 24 hour(s))   CBC Auto Differential    Collection Time: 09/15/20  7:29 AM   Result Value Ref Range    WBC 5.8 4.8 - 10.8 K/uL    RBC 4.45 (L) 4.70 - 6.10 M/uL    Hemoglobin 13.6 (L) 14.0 - 18.0 g/dL    Hematocrit 40.5 (L) 42.0 - 52.0 %    MCV 91.0 80.0 - 94.0 fL    MCH 30.6 27.0 - 31.0 pg    MCHC 33.6 33.0 - 37.0 g/dL    RDW 13.9 11.5 - 14.5 %    Platelets 875 768 - 730 K/uL    MPV 8.6 (L) 9.4 - 12.4 fL    Neutrophils % 51.8 50.0 - 65.0 %    Lymphocytes % 34.1 20.0 - 40.0 %    Monocytes % 12.6 (H) 0.0 - 10.0 %    Eosinophils % 0.5 0.0 - 5.0 %    Basophils % 0.7 0.0 - 1.0 %    Neutrophils Absolute 3.0 1.5 - 7.5 K/uL Immature Granulocytes # 0.0 K/uL    Lymphocytes Absolute 2.0 1.1 - 4.5 K/uL    Monocytes Absolute 0.70 0.00 - 0.90 K/uL    Eosinophils Absolute 0.00 0.00 - 0.60 K/uL    Basophils Absolute 0.00 0.00 - 0.20 K/uL   Comprehensive Metabolic Panel    Collection Time: 09/15/20  7:29 AM   Result Value Ref Range    Sodium 133 (L) 136 - 145 mmol/L    Potassium 3.6 3.5 - 5.0 mmol/L    Chloride 92 (L) 98 - 111 mmol/L    CO2 30 (H) 22 - 29 mmol/L    Anion Gap 11 7 - 19 mmol/L    Glucose 144 (H) 74 - 109 mg/dL    BUN 17 6 - 20 mg/dL    CREATININE 1.0 0.5 - 1.2 mg/dL    GFR Non-African American >60 >60    GFR African American >59 >59    Calcium 8.6 8.6 - 10.0 mg/dL    Total Protein 6.9 6.6 - 8.7 g/dL    Alb 4.3 3.5 - 5.2 g/dL    Total Bilirubin 0.4 0.2 - 1.2 mg/dL    Alkaline Phosphatase 85 40 - 130 U/L    ALT 74 (H) 5 - 41 U/L    AST 45 (H) 5 - 40 U/L   Ethanol    Collection Time: 09/15/20  7:29 AM   Result Value Ref Range    Ethanol Lvl 988 mg/dL   Salicylate    Collection Time: 09/15/20  7:29 AM   Result Value Ref Range    Salicylate, Serum <2.4 3.0 - 10.0 mg/dL   Acetaminophen Level    Collection Time: 09/15/20  7:29 AM   Result Value Ref Range    Acetaminophen Level <15 ug/mL   Urine Drug Screen    Collection Time: 09/15/20  9:36 AM   Result Value Ref Range    Amphetamine Screen, Urine Negative Negative <1000 ng/mL    Barbiturate Screen, Ur Negative Negative < 200 ng/mL    Benzodiazepine Screen, Urine Negative Negative <100 ng/mL    Cannabinoid Scrn, Ur Negative Negative <50 ng/mL    Cocaine Metabolite Screen, Urine Negative Negative <300 ng/mL    Opiate Scrn, Ur Negative Negative < 300 ng/mL    Drug Screen Comment: see below    Urinalysis Reflex to Culture    Collection Time: 09/15/20  9:36 AM    Specimen: Urine, clean catch   Result Value Ref Range    Color, UA YELLOW Straw/Yellow    Clarity, UA Clear Clear    Glucose, Ur Negative Negative mg/dL    Bilirubin Urine Negative Negative    Ketones, Urine Negative Negative mg/dL Specific Gravity, UA 1.009 1.005 - 1.030    Blood, Urine Negative Negative    pH, UA 7.0 5.0 - 8.0    Protein, UA Negative Negative mg/dL    Urobilinogen, Urine 0.2 <2.0 E.U./dL    Nitrite, Urine Negative Negative    Leukocyte Esterase, Urine Negative Negative   Ethanol    Collection Time: 09/15/20 12:49 PM   Result Value Ref Range    Ethanol Lvl 131 mg/dL   Ethanol    Collection Time: 09/15/20  4:42 PM   Result Value Ref Range    Ethanol Lvl 69 mg/dL        Imaging/Diagnostics Last 24 Hours   No results found. Assessment      Hospital Problems           Last Modified POA    Alcohol withdrawal syndrome, uncomplicated ([facility] [address] 8/36/1407 Yes          Plan       Alcohol use disorder/intoxication with concerns of impending withdrawal.  CIWA protocol and also on Librium   electrolytes per sliding scale  Continue to monitor on telemetry  Continue folic acid as well as thiamine supplements.   eval    History of depression and anxiety as well as presented with suicidal ideations  Psychiatry consultation    Hypertension: Hydralazine as needed    Tobacco use disorder: Counseled on cessation      Code: Full  DVT prophylaxis: Enoxaparin  Diet: General  Disposition: Pending improvement in above clinical presentation.       Consultations Ordered:  IP CONSULT TO PSYCHIATRY  IP CONSULT TO SOCIAL WORK  IP CONSULT TO PSYCHIATRY    Electronically signed by Zoila Palomo MD on 9/15/20 at 6:08 PM CDT

## 2020-09-16 VITALS
WEIGHT: 170 LBS | RESPIRATION RATE: 18 BRPM | SYSTOLIC BLOOD PRESSURE: 153 MMHG | DIASTOLIC BLOOD PRESSURE: 77 MMHG | HEART RATE: 72 BPM | OXYGEN SATURATION: 100 % | TEMPERATURE: 97.9 F | BODY MASS INDEX: 21.25 KG/M2

## 2020-09-16 LAB
ALBUMIN SERPL-MCNC: 3.5 G/DL (ref 3.5–5.2)
ALP BLD-CCNC: 81 U/L (ref 40–130)
ALT SERPL-CCNC: 54 U/L (ref 5–41)
ANION GAP SERPL CALCULATED.3IONS-SCNC: 12 MMOL/L (ref 7–19)
AST SERPL-CCNC: 40 U/L (ref 5–40)
BILIRUB SERPL-MCNC: 0.7 MG/DL (ref 0.2–1.2)
BUN BLDV-MCNC: 14 MG/DL (ref 6–20)
CALCIUM SERPL-MCNC: 8.3 MG/DL (ref 8.6–10)
CHLORIDE BLD-SCNC: 99 MMOL/L (ref 98–111)
CO2: 26 MMOL/L (ref 22–29)
CREAT SERPL-MCNC: 0.9 MG/DL (ref 0.5–1.2)
GFR AFRICAN AMERICAN: >59
GFR NON-AFRICAN AMERICAN: >60
GLUCOSE BLD-MCNC: 91 MG/DL (ref 74–109)
HCT VFR BLD CALC: 35.6 % (ref 42–52)
HEMOGLOBIN: 12.5 G/DL (ref 14–18)
MAGNESIUM: 1.8 MG/DL (ref 1.6–2.6)
MCH RBC QN AUTO: 31.3 PG (ref 27–31)
MCHC RBC AUTO-ENTMCNC: 35.1 G/DL (ref 33–37)
MCV RBC AUTO: 89.2 FL (ref 80–94)
PDW BLD-RTO: 13.2 % (ref 11.5–14.5)
PLATELET # BLD: 208 K/UL (ref 130–400)
PMV BLD AUTO: 9.2 FL (ref 9.4–12.4)
POTASSIUM REFLEX MAGNESIUM: 3.7 MMOL/L (ref 3.5–5)
RBC # BLD: 3.99 M/UL (ref 4.7–6.1)
SODIUM BLD-SCNC: 137 MMOL/L (ref 136–145)
TOTAL PROTEIN: 5.4 G/DL (ref 6.6–8.7)
WBC # BLD: 3.8 K/UL (ref 4.8–10.8)

## 2020-09-16 PROCEDURE — 36415 COLL VENOUS BLD VENIPUNCTURE: CPT

## 2020-09-16 PROCEDURE — 85027 COMPLETE CBC AUTOMATED: CPT

## 2020-09-16 PROCEDURE — 6370000000 HC RX 637 (ALT 250 FOR IP): Performed by: HOSPITALIST

## 2020-09-16 PROCEDURE — 80053 COMPREHEN METABOLIC PANEL: CPT

## 2020-09-16 PROCEDURE — G0378 HOSPITAL OBSERVATION PER HR: HCPCS

## 2020-09-16 PROCEDURE — 2580000003 HC RX 258: Performed by: HOSPITALIST

## 2020-09-16 PROCEDURE — 83735 ASSAY OF MAGNESIUM: CPT

## 2020-09-16 RX ADMIN — THERA TABS 1 TABLET: TAB at 08:07

## 2020-09-16 RX ADMIN — CHLORDIAZEPOXIDE HYDROCHLORIDE 25 MG: 25 CAPSULE ORAL at 08:07

## 2020-09-16 RX ADMIN — LOSARTAN POTASSIUM 100 MG: 100 TABLET, FILM COATED ORAL at 08:07

## 2020-09-16 RX ADMIN — SODIUM CHLORIDE, PRESERVATIVE FREE 10 ML: 5 INJECTION INTRAVENOUS at 08:07

## 2020-09-16 RX ADMIN — LAMOTRIGINE 25 MG: 25 TABLET ORAL at 08:07

## 2020-09-16 RX ADMIN — FOLIC ACID 1 MG: 1 TABLET ORAL at 08:07

## 2020-09-16 RX ADMIN — THIAMINE HCL TAB 100 MG 100 MG: 100 TAB at 08:07

## 2020-09-16 RX ADMIN — CHLORDIAZEPOXIDE HYDROCHLORIDE 25 MG: 25 CAPSULE ORAL at 13:44

## 2020-09-16 RX ADMIN — LORAZEPAM 1 MG: 1 TABLET ORAL at 08:07

## 2020-09-16 RX ADMIN — LORAZEPAM 1 MG: 1 TABLET ORAL at 00:40

## 2020-09-16 ASSESSMENT — PAIN SCALES - GENERAL: PAINLEVEL_OUTOF10: 0

## 2020-09-16 NOTE — BH NOTE
Patient was admitted to medical services yesterday with alcohol intoxication,  blood alcohol level of 131. At time of initial evaluation in emergency department patient denied suicidal ideations. Patient is well-known to psychiatry due to multiple previous admissions to psychiatric unit due to alcohol intoxication, suicidal ideations and treatment noncompliance. Last time patient has been admitted to psychiatric unit 7 days ago, following his transfer from medical floor, where initially patient was admitted with blood alcohol level 389 and suicidal ideations. During the hospital stay patient refused to consider rehabilitation treatment for alcohol use disorder. He agreed to restart previously prescribed Lamictal, trazodone, as well as naltrexone for alcohol use disorder. Patient was discharged in stable condition. Review of the patient's chart indicates that patient relapsed in drinking alcohol immediately after discharge from the hospital.  During initial evaluation in the emergency department and by medical team, patient denied suicidal or homicidal ideations, denied auditory and visual hallucinations. Psychiatry consult was placed for suicidal ideations and alcoholism. However, patient does not have indications for inpatient psychiatric admission. Consult will be discontinued. If patient changed his mind and agreed to get referral for rehab treatment due to alcohol use disorder,  needs to provide patient with information about available rehab facilities in our area and it will be patient's responsibility to contact to that facilities for initial evaluation and admission. Please reconsult psychiatry if you have any other reasons or concerns. Thank you.

## 2020-09-16 NOTE — DISCHARGE SUMMARY
Affect normal and good eye contact, behavioral normal, thought content normal and judgment normal, denies current suicidal or homicidal ideations. Neurological: No focal deficits, alert and conversant, no formal disorientation noted. Consultants:   IP CONSULT TO SOCIAL WORK    Time Spent on Discharge:  < 30 minutes were spent in patient examination, evaluation, counseling as well as medication reconciliation, prescriptions for required medications, discharge plan and follow up. Surgeries/Procedures Performed:  NONE    Significant Diagnostic Studies:   Recent Labs:  CBC:   Lab Results   Component Value Date    WBC 3.8 09/16/2020    RBC 3.99 09/16/2020    HGB 12.5 09/16/2020    HCT 35.6 09/16/2020    HCT 41.0 12/19/2011    MCV 89.2 09/16/2020    MCH 31.3 09/16/2020    MCHC 35.1 09/16/2020    RDW 13.2 09/16/2020     09/16/2020     12/19/2011     BMP:    Lab Results   Component Value Date    GLUCOSE 91 09/16/2020     09/16/2020     12/19/2011    K 3.7 09/16/2020    K 3.8 12/19/2011    CL 99 09/16/2020     12/19/2011    CO2 26 09/16/2020    ANIONGAP 12 09/16/2020    BUN 14 09/16/2020    CREATININE 0.9 09/16/2020    CREATININE 1.0 12/19/2011    CALCIUM 8.3 09/16/2020    LABGLOM >60 09/16/2020    GFRAA >59 09/16/2020       Radiology Last 7 Days:  No results found. Discharge Plan   Disposition: Home    Provider Follow-Up:   Veronica Brannon, [address] [phone]    On 9/23/2020  Hospital follow up for 9/23/20 @ 9:45 a.m.          Patient Instructions   Diet: regular diet    Activity: activity as tolerated      Discharge Medications         Medication List      CONTINUE taking these medications    ergocalciferol 1.25 MG (52789 UT) capsule  Commonly known as:  ERGOCALCIFEROL  Take 1 capsule by mouth once a week for 5 doses     folic acid 1 MG tablet  Commonly known as:  FOLVITE  Take 1 tablet by mouth daily     hydrOXYzine 25 MG

## 2020-09-16 NOTE — PLAN OF CARE
Problem: Falls - Risk of:  Goal: Will remain free from falls  Description: Will remain free from falls  Outcome: Ongoing  Goal: Absence of physical injury  Description: Absence of physical injury  Outcome: Ongoing     Problem: Suicide risk  Goal: Provide patient with safe environment  Description: Provide patient with safe environment  Outcome: Ongoing   Electronically signed by Aleah Apple RN on 9/16/2020 at 1:43 AM

## 2020-09-16 NOTE — PROGRESS NOTES
Chelsey states that patient said his ride was here for discharge, and then stated that he had his keys and truck here. Patient was stopped on elevator and instructed that he had lorazepam this morning, and he should not drive himself home. He states understanding, and says that he has a ride home. Refuses transport to front door.  Electronically signed by Devang Forrest RN on 9/16/2020 at 1:52 PM

## 2020-09-16 NOTE — PROGRESS NOTES
Jan Mayorga arrived to room # 307. Presented with: ETOH  Mental Status: Patient is oriented, alert, coherent, logical, thought processes intact and able to concentrate and follow conversation. Vitals:    09/15/20 1945   BP: (!) 151/82   Pulse: 73   Resp: 14   Temp: 98 °F (36.7 °C)   SpO2: 98%     Patient safety contract and falls prevention contract reviewed with patient Yes. Oriented Patient to room. Call light within reach.  No.  Needs, issues or concerns expressed at this time: no.      Electronically signed by Denise Allan RN on 9/15/2020 at 11:11 PM

## 2020-10-23 ENCOUNTER — HOSPITAL ENCOUNTER (EMERGENCY)
Age: 50
Discharge: HOME | End: 2020-10-24
Attending: EMERGENCY MEDICINE
Payer: MEDICAID

## 2020-10-23 LAB
AMPHETAMINE SCREEN, URINE: NEGATIVE
ANION GAP SERPL CALCULATED.3IONS-SCNC: 10 MMOL/L (ref 7–19)
BARBITURATE SCREEN URINE: NEGATIVE
BASOPHILS ABSOLUTE: 0 K/UL (ref 0–0.2)
BASOPHILS RELATIVE PERCENT: 0.4 % (ref 0–1)
BENZODIAZEPINE SCREEN, URINE: NEGATIVE
BILIRUBIN URINE: NEGATIVE
BLOOD, URINE: NEGATIVE
BUN BLDV-MCNC: 13 MG/DL (ref 6–20)
CALCIUM SERPL-MCNC: 8.4 MG/DL (ref 8.6–10)
CANNABINOID SCREEN URINE: NEGATIVE
CHLORIDE BLD-SCNC: 91 MMOL/L (ref 98–111)
CLARITY: CLEAR
CO2: 29 MMOL/L (ref 22–29)
COCAINE METABOLITE SCREEN URINE: NEGATIVE
COLOR: YELLOW
CREAT SERPL-MCNC: 0.8 MG/DL (ref 0.5–1.2)
EOSINOPHILS ABSOLUTE: 0.1 K/UL (ref 0–0.6)
EOSINOPHILS RELATIVE PERCENT: 1.4 % (ref 0–5)
ETHANOL: 256 MG/DL (ref 0–0.08)
ETHANOL: 95 MG/DL (ref 0–0.08)
GFR AFRICAN AMERICAN: >59
GFR NON-AFRICAN AMERICAN: >60
GLUCOSE BLD-MCNC: 90 MG/DL (ref 74–109)
GLUCOSE URINE: NEGATIVE MG/DL
HCT VFR BLD CALC: 37.6 % (ref 42–52)
HEMOGLOBIN: 13.2 G/DL (ref 14–18)
IMMATURE GRANULOCYTES #: 0 K/UL
KETONES, URINE: NEGATIVE MG/DL
LEUKOCYTE ESTERASE, URINE: NEGATIVE
LYMPHOCYTES ABSOLUTE: 0.7 K/UL (ref 1.1–4.5)
LYMPHOCYTES RELATIVE PERCENT: 14.7 % (ref 20–40)
Lab: NORMAL
MCH RBC QN AUTO: 30.6 PG (ref 27–31)
MCHC RBC AUTO-ENTMCNC: 35.1 G/DL (ref 33–37)
MCV RBC AUTO: 87 FL (ref 80–94)
MONOCYTES ABSOLUTE: 0.6 K/UL (ref 0–0.9)
MONOCYTES RELATIVE PERCENT: 12.1 % (ref 0–10)
NEUTROPHILS ABSOLUTE: 3.5 K/UL (ref 1.5–7.5)
NEUTROPHILS RELATIVE PERCENT: 71.2 % (ref 50–65)
NITRITE, URINE: NEGATIVE
OPIATE SCREEN URINE: NEGATIVE
PDW BLD-RTO: 13 % (ref 11.5–14.5)
PH UA: 6 (ref 5–8)
PLATELET # BLD: 195 K/UL (ref 130–400)
PMV BLD AUTO: 8.6 FL (ref 9.4–12.4)
POTASSIUM REFLEX MAGNESIUM: 3.9 MMOL/L (ref 3.5–5)
PROTEIN UA: NEGATIVE MG/DL
RBC # BLD: 4.32 M/UL (ref 4.7–6.1)
SODIUM BLD-SCNC: 130 MMOL/L (ref 136–145)
SPECIFIC GRAVITY UA: 1.01 (ref 1–1.03)
UROBILINOGEN, URINE: 1 E.U./DL
WBC # BLD: 5 K/UL (ref 4.8–10.8)

## 2020-10-23 PROCEDURE — 81003 URINALYSIS AUTO W/O SCOPE: CPT

## 2020-10-23 PROCEDURE — G0480 DRUG TEST DEF 1-7 CLASSES: HCPCS

## 2020-10-23 PROCEDURE — 6370000000 HC RX 637 (ALT 250 FOR IP): Performed by: EMERGENCY MEDICINE

## 2020-10-23 PROCEDURE — 99285 EMERGENCY DEPT VISIT HI MDM: CPT

## 2020-10-23 PROCEDURE — 80048 BASIC METABOLIC PNL TOTAL CA: CPT

## 2020-10-23 PROCEDURE — 85025 COMPLETE CBC W/AUTO DIFF WBC: CPT

## 2020-10-23 PROCEDURE — 99999 PR OFFICE/OUTPT VISIT,PROCEDURE ONLY: CPT | Performed by: EMERGENCY MEDICINE

## 2020-10-23 PROCEDURE — 36415 COLL VENOUS BLD VENIPUNCTURE: CPT

## 2020-10-23 PROCEDURE — 80307 DRUG TEST PRSMV CHEM ANLYZR: CPT

## 2020-10-23 RX ORDER — NICOTINE 21 MG/24HR
1 PATCH, TRANSDERMAL 24 HOURS TRANSDERMAL DAILY
Status: DISCONTINUED | OUTPATIENT
Start: 2020-10-23 | End: 2020-10-24 | Stop reason: HOSPADM

## 2020-10-23 RX ORDER — DIAZEPAM 5 MG/1
5 TABLET ORAL ONCE
Status: COMPLETED | OUTPATIENT
Start: 2020-10-23 | End: 2020-10-23

## 2020-10-23 RX ORDER — LORAZEPAM 1 MG/1
1 TABLET ORAL ONCE
Status: COMPLETED | OUTPATIENT
Start: 2020-10-23 | End: 2020-10-23

## 2020-10-23 RX ADMIN — DIAZEPAM 5 MG: 5 TABLET ORAL at 19:35

## 2020-10-23 RX ADMIN — LORAZEPAM 1 MG: 1 TABLET ORAL at 23:37

## 2020-10-23 ASSESSMENT — ENCOUNTER SYMPTOMS
EYE REDNESS: 0
RHINORRHEA: 0
ABDOMINAL PAIN: 0
VOICE CHANGE: 0
VOMITING: 0
DIARRHEA: 0
COUGH: 0
EYE PAIN: 0
SHORTNESS OF BREATH: 0

## 2020-10-24 VITALS
TEMPERATURE: 98.7 F | RESPIRATION RATE: 18 BRPM | WEIGHT: 190 LBS | BODY MASS INDEX: 23.75 KG/M2 | HEART RATE: 99 BPM | SYSTOLIC BLOOD PRESSURE: 151 MMHG | OXYGEN SATURATION: 95 % | DIASTOLIC BLOOD PRESSURE: 84 MMHG

## 2020-10-24 LAB — ETHANOL: 28 MG/DL (ref 0–0.08)

## 2020-10-24 PROCEDURE — G0480 DRUG TEST DEF 1-7 CLASSES: HCPCS

## 2020-10-24 PROCEDURE — 36415 COLL VENOUS BLD VENIPUNCTURE: CPT

## 2020-10-24 RX ORDER — LORAZEPAM 1 MG/1
1 TABLET ORAL EVERY 6 HOURS PRN
Qty: 12 TABLET | Refills: 0 | Status: SHIPPED | OUTPATIENT
Start: 2020-10-24 | End: 2020-11-23

## 2020-10-24 NOTE — ED NOTES
Dr Santino Manzanares gave verbal order for Ativan 1mg PO x1 dose.       Mary Pinto RN  10/23/20 0116

## 2020-10-24 NOTE — ED PROVIDER NOTES
[address] EMERGENCY DEPT  eMERGENCYdEPARTMENT eNCOUnter      Pt Name: Melba Cardenas  MRN: [id number]  [place] 1970  Date of evaluation: 10/23/2020  Camille Champion MD    Emergency Department care of this patient was assumed at [phone] from Dr. Yael Ford. We have discussed the case and the plan of care. I have seen and evaluated patient and reviewed ED course. CHIEF COMPLAINT       Chief Complaint   Patient presents with    Suicidal         PHYSICAL EXAM    (up to 7 for level 4, 8 or more for level 5)     ED Triage Vitals   BP Temp Temp Source Pulse Resp SpO2 Height Weight   10/23/20 1446 10/23/20 1445 10/23/20 1445 10/23/20 1445 10/23/20 1445 10/23/20 1445 -- 10/23/20 1445   130/71 98.7 °F (37.1 °C) Oral 113 18 93 %  190 lb (86.2 kg)       Physical Exam   Please see Dr. Rima Mirza note for full details.      DIAGNOSTIC RESULTS     EKG: All EKG's are interpreted by the Emergency Department Physician who either signs or Co-signs this chart inthe absence of a cardiologist.        RADIOLOGY:   Non-plain film imagessuch as CT, Ultrasound and MRI are read by the radiologist. Plain radiographic images are visualized and preliminarily interpreted by the emergency physician with the below findings:          No orders to display           LABS:  Labs Reviewed   CBC WITH AUTO DIFFERENTIAL - Abnormal; Notable for the following components:       Result Value    RBC 4.32 (*)     Hemoglobin 13.2 (*)     Hematocrit 37.6 (*)     MPV 8.6 (*)     Neutrophils % 71.2 (*)     Lymphocytes % 14.7 (*)     Monocytes % 12.1 (*)     Lymphocytes Absolute 0.7 (*)     All other components within normal limits   BASIC METABOLIC PANEL W/ REFLEX TO MG FOR LOW K - Abnormal; Notable for the following components:    Sodium 130 (*)     Chloride 91 (*)     Calcium 8.4 (*)     All other components within normal limits   URINALYSIS   ETHANOL   URINE DRUG SCREEN   ETHANOL   ETHANOL       All other labs were within normal range or not returned as of this dictation. EMERGENCY DEPARTMENT COURSE and DIFFERENTIAL DIAGNOSIS/MDM:   Vitals:    Vitals:    10/23/20 1446 10/23/20 2215 10/23/20 2316 10/24/20 0402   BP: 130/71 138/71 (!) 169/91 (!) 151/84   Pulse:  98 96 99   Resp:  16 18 18   Temp:       TempSrc:       SpO2:  98% 94% 95%   Weight:           MDM  Number of Diagnoses or Management Options  Acute alcoholic intoxication without complication ([address]): new and requires workup  Chronic alcohol abuse: new and requires workup  Depression with suicidal ideation: new and requires workup  Diagnosis management comments: Pt evaluated by [facility]. They felt that since pt was no longer having SI that he could be safely d/c. Pt admits that when he drinks he will have certain feelings. Pt has issues with alcohol withdrawal.  I will start him on ativan for the next few days. After careful review of history, physical, and supporting data, there is very low suspicion for a life or limb threatening cause of the patients sxs. The patient's symptoms have improved from presentation and appears clinically well. Patient reexamined prior to discharge and appears improved. Patient has been encouraged to follow up with his/her PCP and to return to the ED with any lack of improvement or worsening symptoms. The patient';s questions regarding the work up and plan were invited and answered. The patient/guardian states understanding and agreement with the plan. Patient Progress  Patient progress: stable      Reassessment      CONSULTS:  None    PROCEDURES:  Unless otherwise noted below, none     Procedures    FINAL IMPRESSION      1. Depression with suicidal ideation    2. Acute alcoholic intoxication without complication ([facility] [address]    3. Chronic alcohol abuse          DISPOSITION/PLAN   DISPOSITION Decision To Discharge    PATIENT REFERRED TO:  [address]   [address] [place] [id number] [phone]  Call in 2 days  For follow up      DISCHARGE MEDICATIONS:  New Prescriptions    LORAZEPAM (ATIVAN) 1 MG TABLET    Take 1 tablet by mouth every 6 hours as needed for Anxiety (and withdrawal) for up to 30 days.           (Please note that portions ofthis note were completed with a voice recognition program.  Efforts were made to edit the dictations but occasionally words are mis-transcribed.)    Mariusz Cruz MD(electronically signed)  Attending Emergency Physician          Adrian Vides MD  10/24/20 3012

## 2020-10-24 NOTE — ED PROVIDER NOTES
[facility] EMERGENCY DEPT  EMERGENCY DEPARTMENT ENCOUNTER      Pt Name: Tim Spear  MRN: [id number]  [place] 1970  Date of evaluation: 10/23/2020  Provider: Ant Gerber MD    CHIEF COMPLAINT       Chief Complaint   Patient presents with    Suicidal         HISTORY OF PRESENT ILLNESS   (Location/Symptom, Timing/Onset,Context/Setting, Quality, Duration, Modifying Factors, Severity)  Note limiting factors. Tim Spear is a 44 y.o. male who presents to the emergency department complaint of depression and suicidal thoughts over the last several days. Patient states he has a history of alcohol abuse and had been drinking earlier today but states that he has a plan to shoot himself with a handgun that he had in his possession. Patient has been admitted to the psychiatry service several times in the past and noted to be often noncompliant with treatment per psychiatry notes. HPI    NursingNotes were reviewed. REVIEW OF SYSTEMS    (2-9 systems for level 4, 10 or more for level 5)     Review of Systems   Constitutional: Negative for fatigue and fever. HENT: Negative for congestion, rhinorrhea and voice change. Eyes: Negative for pain and redness. Respiratory: Negative for cough and shortness of breath. Cardiovascular: Negative for chest pain. Gastrointestinal: Negative for abdominal pain, diarrhea and vomiting. Endocrine: Negative. Genitourinary: Negative. Musculoskeletal: Negative for arthralgias and gait problem. Skin: Negative for rash and wound. Neurological: Negative for weakness and headaches. Hematological: Negative. Psychiatric/Behavioral: Positive for dysphoric mood and suicidal ideas. All other systems reviewed and are negative. A complete review of systems was performed and is negative except as noted above in the HPI.        PAST MEDICAL HISTORY     Past Medical History:   Diagnosis Date    CAD (coronary artery disease)     Chest pain 12/12/2011    Cigarette smoker 12/12/2011    Depression     Hypertension 12/12/2011    Respiratory failure ([facility] [address]     intubation    Seizures ([facility] [address]          SURGICAL HISTORY       Past Surgical History:   Procedure Laterality Date    APPENDECTOMY      [facility]LE[facility]  5/18/2006    [facility]    COLONOSCOPY      ENDOSCOPY, COLON, DIAGNOSTIC      KNEE ARTHROSCOPY Right     NECK SURGERY  7/15/2008    [facility]         CURRENT MEDICATIONS       Previous Medications    ERGOCALCIFEROL (ERGOCALCIFEROL) 88020 UNITS CAPSULE    Take 1 capsule by mouth once a week for 5 doses    FOLIC ACID (FOLVITE) 1 MG TABLET    Take 1 tablet by mouth daily    HYDROXYZINE (ATARAX) 25 MG TABLET    Take 1 tablet by mouth 3 times daily as needed for Anxiety    LAMOTRIGINE (LAMICTAL) 25 MG TABLET    Take 1 tablet by mouth 2 times daily    LOSARTAN (COZAAR) 100 MG TABLET    Take 100 mg by mouth daily    MELATONIN 3 MG TABS TABLET    Take 1 tablet by mouth nightly    MULTIPLE VITAMIN (MULTIVITAMIN) TABS TABLET    Take 1 tablet by mouth daily    NALTREXONE (DEPADE) 50 MG TABLET    Take 1 tablet by mouth daily (with breakfast)       ALLERGIES     Patient has no known allergies.     FAMILY HISTORY       Family History   Problem Relation Age of Onset    Osteoarthritis Mother     Thyroid Disease Mother     Heart Failure Father     Heart Failure Maternal Grandmother     Heart Failure Paternal Grandmother     Cancer Maternal Grandfather           SOCIAL HISTORY       Social History     Socioeconomic History    Marital status:      Spouse name: Not on file    Number of children: Not on file    Years of education: Not on file    Highest education level: Not on file   Occupational History     Employer: DISABLED   Social Needs    Financial resource strain: Not on file    Food insecurity     Worry: Not on file     Inability: Not on file    Transportation needs     Medical: Not on file     Non-medical: Not on file   Tobacco Use    Smoking status: Current Every Day Smoker     Packs/day: 1.00     Years: 35.00     Pack years: 35.00     Types: Cigarettes    Smokeless tobacco: Current User     Types: Snuff   Substance and Sexual Activity    Alcohol use: Yes     Alcohol/week: 0.0 standard drinks     Comment: all day every day, pt states he drinks as much ad he can get    Drug use: Yes     Types: Marijuana    Sexual activity: Yes     Partners: Female   Lifestyle    Physical activity     Days per week: Not on file     Minutes per session: Not on file    Stress: Not on file   Relationships    Social connections     Talks on phone: Not on file     Gets together: Not on file     Attends Nondenominational service: Not on file     Active member of club or organization: Not on file     Attends meetings of clubs or organizations: Not on file     Relationship status: Not on file    Intimate partner violence     Fear of current or ex partner: Not on file     Emotionally abused: Not on file     Physically abused: Not on file     Forced sexual activity: Not on file   Other Topics Concern    Not on file   Social History Narrative    Not on file       SCREENINGS             PHYSICAL EXAM    (up to 7 for level 4, 8 or more for level 5)     ED Triage Vitals   BP Temp Temp Source Pulse Resp SpO2 Height Weight   10/23/20 1446 10/23/20 1445 10/23/20 1445 10/23/20 1445 10/23/20 1445 10/23/20 1445 -- 10/23/20 1445   130/71 98.7 °F (37.1 °C) Oral 113 18 93 %  190 lb (86.2 kg)       Physical Exam  Vitals signs and nursing note reviewed. Constitutional:       General: He is not in acute distress. Appearance: He is well-developed. He is not toxic-appearing or diaphoretic. HENT:      Head: Normocephalic and atraumatic. Eyes:      General: No scleral icterus. Right eye: No discharge. Left eye: No discharge. Pupils: Pupils are equal, round, and reactive to light. Neck:      Musculoskeletal: Normal range of motion.    Cardiovascular:      Rate and Rhythm: Normal rate and regular rhythm. Pulmonary:      Effort: Pulmonary effort is normal. No respiratory distress. Breath sounds: No stridor. Abdominal:      General: There is no distension. Musculoskeletal: Normal range of motion. General: No deformity. Skin:     General: Skin is warm and dry. Neurological:      Mental Status: He is alert and oriented to person, place, and time. GCS: GCS eye subscore is 4. GCS verbal subscore is 5. GCS motor subscore is 6. Cranial Nerves: No cranial nerve deficit. Motor: No abnormal muscle tone. Psychiatric:         Behavior: Behavior normal.         Thought Content:  Thought content normal.         Judgment: Judgment normal.         DIAGNOSTIC RESULTS     EKG: All EKG's are interpreted by the Emergency Department Physician who either signs or Co-signs this chart in the absence of a cardiologist.      RADIOLOGY:   Non-plain film images such as CT, Ultrasound and MRI are read by the radiologist. Mi Mckeon images are visualized and preliminarily interpreted by the emergency physician with the below findings:      Interpretation per the Radiologist below, if available at the time of this note:    No orders to display         ED BEDSIDE ULTRASOUND:   Performed by ED Physician - none    LABS:  Labs Reviewed   CBC WITH AUTO DIFFERENTIAL - Abnormal; Notable for the following components:       Result Value    RBC 4.32 (*)     Hemoglobin 13.2 (*)     Hematocrit 37.6 (*)     MPV 8.6 (*)     Neutrophils % 71.2 (*)     Lymphocytes % 14.7 (*)     Monocytes % 12.1 (*)     Lymphocytes Absolute 0.7 (*)     All other components within normal limits   BASIC METABOLIC PANEL W/ REFLEX TO MG FOR LOW K - Abnormal; Notable for the following components:    Sodium 130 (*)     Chloride 91 (*)     Calcium 8.4 (*)     All other components within normal limits   URINALYSIS   ETHANOL   URINE DRUG SCREEN   ETHANOL       All other labs were within normal range or not returned as of this dictation. Medications   nicotine (NICODERM CQ) 14 MG/24HR 1 patch (1 patch Transdermal Patch Applied 10/23/20 2015)   diazePAM (VALIUM) tablet 5 mg (5 mg Oral Given 10/23/20 1935)       EMERGENCY DEPARTMENT COURSE and DIFFERENTIALDIAGNOSIS/MDM:   Vitals:    Vitals:    10/23/20 1445 10/23/20 1446   BP:  130/71   Pulse: 113    Resp: 18    Temp: 98.7 °F (37.1 °C)    TempSrc: Oral    SpO2: 93%    Weight: 190 lb (86.2 kg)        MDM     Plan for sober reevaluation with psychiatry consultation. Patient checked out to oncoming physician pending repeat ethanol level. CONSULTS:  None    PROCEDURES:  Unless otherwise notedbelow, none     Procedures      FINAL IMPRESSION     1. Depression with suicidal ideation    2. Acute alcoholic intoxication without complication ([facility] [address]    3. Chronic alcohol abuse          DISPOSITION/PLAN   DISPOSITION        PATIENT REFERRED TO:  No follow-up provider specified.     DISCHARGE MEDICATIONS:  New Prescriptions    No medications on file          (Please note that portions of this note were completed with a voice recognition program.  Efforts were made to edit the dictations butoccasionally words are mis-transcribed.)    Jodi Ortega MD (electronically signed)  AttendingEmergency Physician          Jodi Adams MD  10/23/20 1825

## 2020-10-24 NOTE — BH NOTE
BLAKE ADULT INITIAL INTAKE ASSESSMENT     10/24/20    Allie Balbuena ,a 44 y.o. male, presents to the ED for a psychiatric assessment. ED Arrival time: [facility]  ED physician: Sunday Up [facility] OF [facility] Notification time: 36  BLAKE Assessment start time: [address]  Psychiatrist call time: 46  Spoke with Dr. Davis Gilman    Patient is referred by: Freida García to ED by his Father. Reason for visit to ED - Presenting problem:     PT states reason for ED visit, \"I need to quit drinking. I got out of Rehab at the [facility] on the [place] and I've been drinking ever since. I've been drinking 1-2 quarts of Vodka a day\". If I'm not admitted I got to get to [facility] meetings. Admits to SI with plan as reason had Father bring him to ED but, at present time denies SI, HI, AH. Admits to intermittent VH beginning since coming to ED but, patient relates \"seeing bubbles in the air\" to symptoms he has experienced in the past when in withdrawal.  Denies access to guns. ED Physician notes: Allie Balbuena is a 44 y.o. male who presents to the emergency department complaint of depression and suicidal thoughts over the last several days. Patient states he has a history of alcohol abuse and had been drinking earlier today but states that he has a plan to shoot himself with a handgun that he had in his possession. Patient has been admitted to the psychiatry service several times in the past and noted to be often noncompliant with treatment per psychiatry notes. Duration of symptoms: last few days.     Current Stressors:  alcohol    C-SSRS Completed: yes    SI:  reports that he had SI thoughts with plan yesterday afternoon when came in but, denies at this time  Plan: see above   Past SI attempts: no   If yes, when and how many times:  Describe suicide attempts:   HI: denies  If yes describe:   Delusions: denies  If yes describe:   Hallucinations: admits to states since being in ED he has seen \"some bubbles in the air\", patient relates this to withdrawal symptoms he has experienced in past.     Risk of Harm to self: Self injurious/self mutilation behaviorsno   If yes explain:    Risk of Harm to others: no   If yes explain:   Was it within the past 6 months: na   Trauma History: denies  Anxiety 1-10:  8  Explain if increased: ETOH abuse  Depression 1-10:  7  Explain if increased: Disappointed in self for ETOH abuse, ongoing after in [address] for treatment. Level of function outside hospital decreased: no   If yes explain:       Psychiatric Hospitalizations: Yes   Where & When: [facility] 9/7/2020  Outpatient Psychiatric Treatment:    Family History:    Family history of mental illness: no   \"Depression\",\"Anxiety\",\"Bipolar\",\"Schizophrenia\",\"Borderline\",\"ADHD\"}  Family members with suicide attempt: no   I    Substance Abuse History:     SBIRT Completed: yes  Brief Intervention completed if needed:  yes    Current ETOH LEVELS: @ 0124 = 28, @ 1613 on 10/24/2020 = 256    ETOH Usage:     Amount drinking daily: heavy    Date of last drink: 10/23/2020   Longest period of sobriety:    Substance/Chemical Abuse/Recreational Drug History:  Substance used: alcohol  Date of last substance use: 10/23/2020  Tobacco Use: yes   History of rehab treatment: [facility], [facility], [place] 10/19/2020  How many times in rehab:  Last time in rehab: 10/19/2020  Family history of substance abuse:    Opiates: It was discussed with pt they would not be receiving opiates unless they were within 3 days post surgery/acute injury. Patient voiced understanding and agreed. Psychiatric Review Of Systems:     Recent Sleep changes: no   Recent appetite changes: no   Recent weight changes/Pounds gained (+) or lost (-): no      Medical History:     Medical Diagnosis/Issues: HTN  CT today in ED:no  Use of 02 or CPAP: no  Ambulatory: yes  Independent or Need assistance with Self Care:     PCP: No primary care provider on file.      Current I          Does the patient have a guardian or Medical POA: no  Has the guardian been notified or Medical POA:       2. Decision to Discharge:   Does not meet criteria for acceptance to   unit due to: Denies SI, HI, AH. Not psychotic, delusional, or paranoid. ED physician to determine if risk of withdrawal.        Other follow up information provided:  Encouraged patient to attend AA meetings as he has indicated he feels he needs to do also. Completed safety plan with patient.      López Dunn RN

## 2020-10-24 NOTE — SUICIDE SAFETY PLAN
SAFETY PLAN    A suicide Safety Plan is a document that supports someone when they are having thoughts of suicide. Warning Signs that indicate a suicidal crisis may be developing: What (situations, thoughts, feelings, body sensations, behaviors, etc.) do you experience that lets you know you are beginning to think about suicide? 1. alcohol  2. Thinking about my continued use of alcohol  3    Internal Coping Strategies:  What things can I do (relaxation techniques, hobbies, physical activities, etc.) to take my mind off my problems without contacting another person? 1. Archery  2. Look for employment  3. Go to [place] [place]. People and social settings that provide distraction: Who can I call or where can I go to distract me? 1. Name: Cara Chou ([address] sponsor)  Phone: \"I have it at home. 2. Name:  Phone:   3. Place: AA meeting            4. Place:     People whom I can ask for help: Who can I call when I need help - for example, friends, family, clergy, someone else? 1. Name: ENDERliliana Inocencio                Phone: [phone]  2. Name: Monet Tom- [address] sponsor  Phone: \"I have it at home\"  3. Name:  Phone:    Professionals or [address] [place], [place] [address]vd I can contact during a crisis: Who can I call for help - for example, my doctor, my psychiatrist, my psychologist, a mental health provider, a suicide hotline? 1. Clinician Name: [id number]kins   Phone: [phone]      Clinician Pager or Emergency Contact #: [phone]    2. Clinician Name: [address]   Phone: [phone]      Clinician Pager or Emergency Contact #: [phone]    3. Suicide Prevention Lifeline: 0-946-557-TALK (7611)    4.  [address] [place] [address] Emergency Services -  for example, [facility] suicide hotline, [facility] Hotline: [facility] Emergency Department      Emergency Services Address: [address] [place] [address]      Emergency Services Phone: [phone]    Making the environment safe: How can I make my environment (house/apartment/living space) safer? For example, can I remove guns, medications, and other items? 1. Remove extra medications from the home  2.  Keep home free of firearms

## 2020-10-24 NOTE — ED NOTES
Pt states he feels like he is withdrawing. Pt having shakes.  Will notify MD. Ginger Cisneros, RN  10/23/20 3633

## 2020-12-17 RX ORDER — HYDROXYZINE HYDROCHLORIDE 25 MG/1
TABLET, FILM COATED ORAL
Qty: 90 TABLET | Refills: 1 | OUTPATIENT
Start: 2020-12-17

## 2020-12-17 NOTE — TELEPHONE ENCOUNTER
Medication refill request was received, patient is no longer under the provider Dr. Katrin Dover care, on discharge summary, he was referred to Wyatt Boudreaux MD his PCP and for medication management Steve at [facility]

## 2021-01-07 ENCOUNTER — APPOINTMENT (OUTPATIENT)
Dept: GENERAL RADIOLOGY | Age: 51
End: 2021-01-07
Payer: MEDICAID

## 2021-01-07 ENCOUNTER — HOSPITAL ENCOUNTER (EMERGENCY)
Age: 51
Discharge: HOME | End: 2021-01-07
Payer: MEDICAID

## 2021-01-07 VITALS
HEART RATE: 92 BPM | TEMPERATURE: 97.8 F | HEIGHT: 76 IN | OXYGEN SATURATION: 96 % | SYSTOLIC BLOOD PRESSURE: 142 MMHG | DIASTOLIC BLOOD PRESSURE: 84 MMHG | RESPIRATION RATE: 18 BRPM | BODY MASS INDEX: 23.75 KG/M2 | WEIGHT: 195 LBS

## 2021-01-07 DIAGNOSIS — F41.1 ANXIETY STATE: ICD-10-CM

## 2021-01-07 DIAGNOSIS — J06.9 ACUTE UPPER RESPIRATORY INFECTION: Primary | ICD-10-CM

## 2021-01-07 LAB
ADENOVIRUS BY PCR: NOT DETECTED
ALBUMIN SERPL-MCNC: 4.9 G/DL (ref 3.5–5.2)
ALP BLD-CCNC: 143 U/L (ref 40–130)
ALT SERPL-CCNC: 99 U/L (ref 5–41)
ANION GAP SERPL CALCULATED.3IONS-SCNC: 14 MMOL/L (ref 7–19)
AST SERPL-CCNC: 98 U/L (ref 5–40)
BASOPHILS ABSOLUTE: 0 K/UL (ref 0–0.2)
BASOPHILS RELATIVE PERCENT: 0.2 % (ref 0–1)
BILIRUB SERPL-MCNC: 1 MG/DL (ref 0.2–1.2)
BORDETELLA PARAPERTUSSIS BY PCR: NOT DETECTED
BORDETELLA PERTUSSIS BY PCR: NOT DETECTED
BUN BLDV-MCNC: 15 MG/DL (ref 6–20)
CALCIUM SERPL-MCNC: 9.4 MG/DL (ref 8.6–10)
CHLAMYDOPHILIA PNEUMONIAE BY PCR: NOT DETECTED
CHLORIDE BLD-SCNC: 90 MMOL/L (ref 98–111)
CO2: 30 MMOL/L (ref 22–29)
CORONAVIRUS 229E BY PCR: NOT DETECTED
CORONAVIRUS HKU1 BY PCR: NOT DETECTED
CORONAVIRUS NL63 BY PCR: NOT DETECTED
CORONAVIRUS OC43 BY PCR: NOT DETECTED
CREAT SERPL-MCNC: 1 MG/DL (ref 0.5–1.2)
EOSINOPHILS ABSOLUTE: 0 K/UL (ref 0–0.6)
EOSINOPHILS RELATIVE PERCENT: 0.2 % (ref 0–5)
GFR AFRICAN AMERICAN: >59
GFR NON-AFRICAN AMERICAN: >60
GLUCOSE BLD-MCNC: 108 MG/DL (ref 74–109)
HCT VFR BLD CALC: 47.6 % (ref 42–52)
HEMOGLOBIN: 16.3 G/DL (ref 14–18)
HUMAN METAPNEUMOVIRUS BY PCR: NOT DETECTED
HUMAN RHINOVIRUS/ENTEROVIRUS BY PCR: NOT DETECTED
IMMATURE GRANULOCYTES #: 0 K/UL
INFLUENZA A BY PCR: NOT DETECTED
INFLUENZA B BY PCR: NOT DETECTED
LYMPHOCYTES ABSOLUTE: 0.6 K/UL (ref 1.1–4.5)
LYMPHOCYTES RELATIVE PERCENT: 7.2 % (ref 20–40)
MCH RBC QN AUTO: 30.4 PG (ref 27–31)
MCHC RBC AUTO-ENTMCNC: 34.2 G/DL (ref 33–37)
MCV RBC AUTO: 88.6 FL (ref 80–94)
MONOCYTES ABSOLUTE: 0.6 K/UL (ref 0–0.9)
MONOCYTES RELATIVE PERCENT: 6.9 % (ref 0–10)
MYCOPLASMA PNEUMONIAE BY PCR: NOT DETECTED
NEUTROPHILS ABSOLUTE: 7.6 K/UL (ref 1.5–7.5)
NEUTROPHILS RELATIVE PERCENT: 85.3 % (ref 50–65)
PARAINFLUENZA VIRUS 1 BY PCR: NOT DETECTED
PARAINFLUENZA VIRUS 2 BY PCR: NOT DETECTED
PARAINFLUENZA VIRUS 3 BY PCR: NOT DETECTED
PARAINFLUENZA VIRUS 4 BY PCR: NOT DETECTED
PDW BLD-RTO: 13.7 % (ref 11.5–14.5)
PLATELET # BLD: 161 K/UL (ref 130–400)
PMV BLD AUTO: 9.3 FL (ref 9.4–12.4)
POTASSIUM REFLEX MAGNESIUM: 4.4 MMOL/L (ref 3.5–5)
RBC # BLD: 5.37 M/UL (ref 4.7–6.1)
RESPIRATORY SYNCYTIAL VIRUS BY PCR: NOT DETECTED
SARS-COV-2, PCR: NOT DETECTED
SODIUM BLD-SCNC: 134 MMOL/L (ref 136–145)
TOTAL PROTEIN: 8.1 G/DL (ref 6.6–8.7)
WBC # BLD: 8.9 K/UL (ref 4.8–10.8)

## 2021-01-07 PROCEDURE — 0202U NFCT DS 22 TRGT SARS-COV-2: CPT

## 2021-01-07 PROCEDURE — 6370000000 HC RX 637 (ALT 250 FOR IP): Performed by: NURSE PRACTITIONER

## 2021-01-07 PROCEDURE — 80053 COMPREHEN METABOLIC PANEL: CPT

## 2021-01-07 PROCEDURE — 36415 COLL VENOUS BLD VENIPUNCTURE: CPT

## 2021-01-07 PROCEDURE — 85025 COMPLETE CBC W/AUTO DIFF WBC: CPT

## 2021-01-07 PROCEDURE — 2580000003 HC RX 258: Performed by: NURSE PRACTITIONER

## 2021-01-07 PROCEDURE — 99284 EMERGENCY DEPT VISIT MOD MDM: CPT

## 2021-01-07 PROCEDURE — 71045 X-RAY EXAM CHEST 1 VIEW: CPT

## 2021-01-07 RX ORDER — LORAZEPAM 1 MG/1
1 TABLET ORAL ONCE
Status: COMPLETED | OUTPATIENT
Start: 2021-01-07 | End: 2021-01-07

## 2021-01-07 RX ORDER — 0.9 % SODIUM CHLORIDE 0.9 %
500 INTRAVENOUS SOLUTION INTRAVENOUS ONCE
Status: COMPLETED | OUTPATIENT
Start: 2021-01-07 | End: 2021-01-07

## 2021-01-07 RX ORDER — BENZONATATE 100 MG/1
100-200 CAPSULE ORAL 3 TIMES DAILY PRN
Qty: 30 CAPSULE | Refills: 0 | Status: SHIPPED | OUTPATIENT
Start: 2021-01-07 | End: 2021-01-14

## 2021-01-07 RX ADMIN — SODIUM CHLORIDE 500 ML: 9 INJECTION, SOLUTION INTRAVENOUS at 14:28

## 2021-01-07 RX ADMIN — LORAZEPAM 1 MG: 1 TABLET ORAL at 14:28

## 2021-01-07 ASSESSMENT — PAIN SCALES - GENERAL: PAINLEVEL_OUTOF10: 8

## 2021-01-09 ASSESSMENT — ENCOUNTER SYMPTOMS
COUGH: 1
VOMITING: 1
DIARRHEA: 1
SORE THROAT: 1
RHINORRHEA: 1

## 2021-01-09 NOTE — ED PROVIDER NOTES
[facility] EMERGENCY DEPT  eMERGENCY dEPARTMENT eNCOUnter      Pt Name: Isabel Mckoy  MRN: [id number]  [place] 1970  Date of evaluation: 1/7/2021  Provider: Ruthine Boxer, [address]       Chief Complaint   Patient presents with    Concern For COVID-19     N/V/ Cough         HISTORY OF PRESENT ILLNESS   (Location/Symptom, Timing/Onset,Context/Setting, Quality, Duration, Modifying Factors, Severity)  Note limiting factors. Isabel Mckoy is a 44 y.o. male who presents to the emergency department with upper respiratory illness for 3-4 days. + nasal congestion and coughing. + fever + diarrhea and vomiting. Also anxious. Works at [employer] but no known covid contacts     The history is provided by the patient. URI  Presenting symptoms: congestion, cough, fever, rhinorrhea and sore throat    Severity:  Severe  Onset quality:  Sudden  Duration:  3 days  Timing:  Constant  Progression:  Unchanged  Chronicity:  New  Risk factors: no recent travel and no sick contacts        NursingNotes were reviewed. REVIEW OF SYSTEMS    (2-9 systems for level 4, 10 or more for level 5)     Review of Systems   Constitutional: Positive for fever. HENT: Positive for congestion, rhinorrhea and sore throat. Respiratory: Positive for cough. Gastrointestinal: Positive for diarrhea and vomiting. Except as noted above the remainder of the review of systems was reviewed and negative.        PAST MEDICAL HISTORY     Past Medical History:   Diagnosis Date    CAD (coronary artery disease)     Chest pain 12/12/2011    Cigarette smoker 12/12/2011    Depression     Hypertension 12/12/2011    Respiratory failure ([address]     intubation    Seizures (HCC)          SURGICALHISTORY       Past Surgical History:   Procedure Laterality Date    APPENDECTOMY      [facility]LECY[facility]ECT[facility]  [facility]/18/2006    [facility]    COLONOSCOPY      ENDOSCOPY, COLON, DIAGNOSTIC      KNEE ARTHROSCOPY Right     NECK SURGERY  7/15/2008    Hadi CURRENT MEDICATIONS       Discharge Medication List as of 1/7/2021  4:31 PM      CONTINUE these medications which have NOT CHANGED    Details   lamoTRIgine (LAMICTAL) 25 MG tablet Take 1 tablet by mouth 2 times daily, Disp-60 tablet,R-1Normal      naltrexone (DEPADE) 50 MG tablet Take 1 tablet by mouth daily (with breakfast), Disp-30 tablet,R-1Normal      Multiple Vitamin (MULTIVITAMIN) TABS tablet Take 1 tablet by mouth daily, Disp-90 tablet,R-0Normal      melatonin 3 MG TABS tablet Take 1 tablet by mouth nightly, Disp-30 [id number]      folic acid (FOLVITE) 1 MG tablet Take 1 tablet by mouth daily, Disp-30 tablet,R-2Normal      hydrOXYzine (ATARAX) 25 MG tablet Take 1 tablet by mouth 3 times daily as needed for Anxiety, Disp-90 tablet,R-1Normal      losartan (COZAAR) 100 MG tablet Take 100 mg by mouth dailyHistorical Med      ergocalciferol (ERGOCALCIFEROL) 88732 units capsule Take 1 capsule by mouth once a week for 5 doses, Disp-5 capsule, R-0Normal             ALLERGIES     Patient has no known allergies.     FAMILY HISTORY       Family History   Problem Relation Age of Onset    Osteoarthritis Mother     Thyroid Disease Mother     Heart Failure Father     Heart Failure Maternal Grandmother     Heart Failure Paternal Grandmother     Cancer Maternal Grandfather           SOCIAL HISTORY       Social History     Socioeconomic History    Marital status:      Spouse name: None    Number of children: None    Years of education: None    Highest education level: None   Occupational History     Employer: DISABLED   Social Needs    Financial resource strain: None    Food insecurity     Worry: None     Inability: None    Transportation needs     Medical: None     Non-medical: None   Tobacco Use    Smoking status: Current Every Day Smoker     Packs/day: 1.00     Years: 35.00     Pack years: 35.00     Types: Cigarettes    Smokeless tobacco: Current User     Types: Snuff   Substance and Sexual Activity    Alcohol use: Yes     Alcohol/week: 0.0 standard drinks     Comment: all day every day, pt states he drinks as much ad he can get    Drug use: Yes     Types: Marijuana    Sexual activity: Yes     Partners: Female   Lifestyle    Physical activity     Days per week: None     Minutes per session: None    Stress: None   Relationships    Social connections     Talks on phone: None     Gets together: None     Attends [employer] service: None     Active member of club or organization: None     Attends meetings of clubs or organizations: None     Relationship status: None    Intimate partner violence     Fear of current or ex partner: None     Emotionally abused: None     Physically abused: None     Forced sexual activity: None   Other Topics Concern    None   Social History Narrative    None       SCREENINGS      @FLOW([id number])@      PHYSICAL EXAM    (up to 7 for level 4, 8 or more for level 5)     ED Triage Vitals [01/07/21 1253]   BP Temp Temp src Pulse Resp SpO2 Height Weight   (!) 167/105 97.8 °F (36.6 °C) -- 119 20 95 % 6' 4\" (1.93 m) 195 lb (88.5 kg)       Physical Exam  Vitals signs and nursing note reviewed. Constitutional:       Appearance: He is well-developed. HENT:      Head: Normocephalic and atraumatic. Eyes:      General: No scleral icterus. Right eye: No discharge. Left eye: No discharge. Neck:      Musculoskeletal: Normal range of motion and neck supple. Cardiovascular:      Rate and Rhythm: Regular rhythm. Tachycardia present. Heart sounds: S1 normal and S2 normal.   Pulmonary:      Effort: Pulmonary effort is normal. No respiratory distress. Neurological:      Mental Status: He is alert. Psychiatric:         Mood and Affect: Mood is anxious.          Behavior: Behavior normal.         DIAGNOSTIC RESULTS     EKG: All EKG's are interpreted by the Emergency Department Physician who either signs or Co-signsthis chart in the absence of a cardiologist.        RADIOLOGY:   Shala Tubbs such as CT, Ultrasound and MRI are read by the radiologist. Plain radiographic images are visualized and preliminarily interpreted by the emergency physician with the below findings:      Interpretation per the Radiologist below, if available at the time of this note:    XR CHEST PORTABLE   Final Result   Portable chest x-ray demonstrates no active disease. Signed by Dr Lenny Amaya on 1/7/2021 2:46 PM            ED BEDSIDEULTRASOUND:   Performed by ED Physician -none    LABS:  Labs Reviewed   CBC WITH AUTO DIFFERENTIAL - Abnormal; Notable for the following components:       Result Value    MPV 9.3 (*)     Neutrophils % 85.3 (*)     Lymphocytes % 7.2 (*)     Neutrophils Absolute 7.6 (*)     Lymphocytes Absolute 0.6 (*)     All other components within normal limits   COMPREHENSIVE METABOLIC PANEL W/ REFLEX TO MG FOR LOW K - Abnormal; Notable for the following components:    Sodium 134 (*)     Chloride 90 (*)     CO2 30 (*)     Alkaline Phosphatase 143 (*)     ALT 99 (*)     AST 98 (*)     All other components within normal limits   RESPIRATORY PANEL, MOLECULAR, WITH COVID-19       All other labs were within normal range or not returned as of this dictation. EMERGENCY DEPARTMENT COURSE and DIFFERENTIALDIAGNOSIS/MDM:   Vitals:    Vitals:    01/07/21 1253 01/07/21 1400 01/07/21 1500 01/07/21 1600   BP: (!) 167/105 (!) 156/96 (!) 148/88 (!) 142/84   Pulse: 119 102 100 92   Resp: 20 18 18 18   Temp: 97.8 °F (36.6 °C)      SpO2: 95% 96% 95% 96%   Weight: 195 lb (88.5 kg)      Height: 6' 4\" (1.93 m)              MDM  Pt advised to stop smoking and drinking      CONSULTS:  None    PROCEDURES:  Unless otherwise noted below, none     Procedures    FINAL IMPRESSION      1. Acute upper respiratory infection    2.  Anxiety state        DISPOSITION/PLAN   DISPOSITION Decision To Discharge 01/07/2021 04:29:17 PM      PATIENT REFERRED TO:  [facility]  [address] [facility].   [place] [address]  [phone]          DISCHARGE MEDICATIONS:  Discharge Medication List as of 1/7/2021  4:31 PM      START taking these medications    Details   benzonatate (TESSALON) 100 MG capsule Take 1-2 capsules by mouth 3 times daily as needed for Cough, Disp-30 capsule, R-0Print                (Please note that portions of this note were completed with a voice recognitionprogram.  Efforts were made to edit the dictations but occasionally words are mis-transcribed.)    KWASI Coker (electronically signed)          KWASI Coker  01/09/21 1089

## 2021-09-08 ENCOUNTER — HOSPITAL ENCOUNTER (INPATIENT)
Age: 51
LOS: 5 days | Discharge: HOME | DRG: 885 | End: 2021-09-13
Attending: EMERGENCY MEDICINE | Admitting: PSYCHIATRY & NEUROLOGY
Payer: MEDICAID

## 2021-09-08 DIAGNOSIS — R45.851 SUICIDAL IDEATION: Primary | ICD-10-CM

## 2021-09-08 DIAGNOSIS — F41.9 ANXIETY: ICD-10-CM

## 2021-09-08 DIAGNOSIS — F10.10 CHRONIC ALCOHOL ABUSE: ICD-10-CM

## 2021-09-08 DIAGNOSIS — F10.920 ACUTE ALCOHOLIC INTOXICATION WITHOUT COMPLICATION (HCC): ICD-10-CM

## 2021-09-08 DIAGNOSIS — F31.30 BIPOLAR AFFECTIVE DISORDER, CURRENT EPISODE DEPRESSED, CURRENT EPISODE SEVERITY UNSPECIFIED (HCC): ICD-10-CM

## 2021-09-08 LAB
ALBUMIN SERPL-MCNC: 4.3 G/DL (ref 3.5–5.2)
ALP BLD-CCNC: 118 U/L (ref 40–130)
ALT SERPL-CCNC: 53 U/L (ref 5–41)
AMPHETAMINE SCREEN, URINE: NEGATIVE
ANION GAP SERPL CALCULATED.3IONS-SCNC: 20 MMOL/L (ref 7–19)
AST SERPL-CCNC: 70 U/L (ref 5–40)
BARBITURATE SCREEN URINE: NEGATIVE
BASOPHILS ABSOLUTE: 0.1 K/UL (ref 0–0.2)
BASOPHILS RELATIVE PERCENT: 1.1 % (ref 0–1)
BENZODIAZEPINE SCREEN, URINE: NEGATIVE
BILIRUB SERPL-MCNC: <0.2 MG/DL (ref 0.2–1.2)
BILIRUBIN URINE: NEGATIVE
BLOOD, URINE: NEGATIVE
BUN BLDV-MCNC: 15 MG/DL (ref 6–20)
CALCIUM SERPL-MCNC: 8.3 MG/DL (ref 8.6–10)
CANNABINOID SCREEN URINE: NEGATIVE
CHLORIDE BLD-SCNC: 95 MMOL/L (ref 98–111)
CLARITY: CLEAR
CO2: 22 MMOL/L (ref 22–29)
COCAINE METABOLITE SCREEN URINE: NEGATIVE
COLOR: YELLOW
CREAT SERPL-MCNC: 0.9 MG/DL (ref 0.5–1.2)
EOSINOPHILS ABSOLUTE: 0 K/UL (ref 0–0.6)
EOSINOPHILS RELATIVE PERCENT: 0.4 % (ref 0–5)
ETHANOL: 109 MG/DL (ref 0–0.08)
ETHANOL: 245 MG/DL (ref 0–0.08)
ETHANOL: 57 MG/DL (ref 0–0.08)
GFR AFRICAN AMERICAN: >59
GFR NON-AFRICAN AMERICAN: >60
GLUCOSE BLD-MCNC: 94 MG/DL (ref 74–109)
GLUCOSE URINE: NEGATIVE MG/DL
HCT VFR BLD CALC: 43.3 % (ref 42–52)
HEMOGLOBIN: 14.5 G/DL (ref 14–18)
IMMATURE GRANULOCYTES #: 0 K/UL
KETONES, URINE: 15 MG/DL
LEUKOCYTE ESTERASE, URINE: NEGATIVE
LYMPHOCYTES ABSOLUTE: 1.3 K/UL (ref 1.1–4.5)
LYMPHOCYTES RELATIVE PERCENT: 23.2 % (ref 20–40)
Lab: NORMAL
MCH RBC QN AUTO: 31 PG (ref 27–31)
MCHC RBC AUTO-ENTMCNC: 33.5 G/DL (ref 33–37)
MCV RBC AUTO: 92.7 FL (ref 80–94)
MONOCYTES ABSOLUTE: 0.7 K/UL (ref 0–0.9)
MONOCYTES RELATIVE PERCENT: 11.8 % (ref 0–10)
NEUTROPHILS ABSOLUTE: 3.6 K/UL (ref 1.5–7.5)
NEUTROPHILS RELATIVE PERCENT: 63.3 % (ref 50–65)
NITRITE, URINE: NEGATIVE
OPIATE SCREEN URINE: NEGATIVE
PDW BLD-RTO: 13.9 % (ref 11.5–14.5)
PH UA: 6 (ref 5–8)
PLATELET # BLD: 210 K/UL (ref 130–400)
PMV BLD AUTO: 8.3 FL (ref 9.4–12.4)
POTASSIUM REFLEX MAGNESIUM: 4.3 MMOL/L (ref 3.5–5)
PROTEIN UA: NEGATIVE MG/DL
RBC # BLD: 4.67 M/UL (ref 4.7–6.1)
SARS-COV-2, NAAT: NOT DETECTED
SODIUM BLD-SCNC: 137 MMOL/L (ref 136–145)
SPECIFIC GRAVITY UA: 1.01 (ref 1–1.03)
TOTAL PROTEIN: 7.1 G/DL (ref 6.6–8.7)
UROBILINOGEN, URINE: 0.2 E.U./DL
WBC # BLD: 5.6 K/UL (ref 4.8–10.8)

## 2021-09-08 PROCEDURE — 6360000002 HC RX W HCPCS: Performed by: EMERGENCY MEDICINE

## 2021-09-08 PROCEDURE — 6360000002 HC RX W HCPCS

## 2021-09-08 PROCEDURE — 80053 COMPREHEN METABOLIC PANEL: CPT

## 2021-09-08 PROCEDURE — 85025 COMPLETE CBC W/AUTO DIFF WBC: CPT

## 2021-09-08 PROCEDURE — 6370000000 HC RX 637 (ALT 250 FOR IP): Performed by: PSYCHIATRY & NEUROLOGY

## 2021-09-08 PROCEDURE — 36415 COLL VENOUS BLD VENIPUNCTURE: CPT

## 2021-09-08 PROCEDURE — 6370000000 HC RX 637 (ALT 250 FOR IP): Performed by: EMERGENCY MEDICINE

## 2021-09-08 PROCEDURE — 96372 THER/PROPH/DIAG INJ SC/IM: CPT

## 2021-09-08 PROCEDURE — 81003 URINALYSIS AUTO W/O SCOPE: CPT

## 2021-09-08 PROCEDURE — 80307 DRUG TEST PRSMV CHEM ANLYZR: CPT

## 2021-09-08 PROCEDURE — 87635 SARS-COV-2 COVID-19 AMP PRB: CPT

## 2021-09-08 PROCEDURE — 82077 ASSAY SPEC XCP UR&BREATH IA: CPT

## 2021-09-08 PROCEDURE — 99284 EMERGENCY DEPT VISIT MOD MDM: CPT

## 2021-09-08 PROCEDURE — 1240000000 HC EMOTIONAL WELLNESS R&B

## 2021-09-08 RX ORDER — SODIUM CHLORIDE, SODIUM LACTATE, POTASSIUM CHLORIDE, CALCIUM CHLORIDE 600; 310; 30; 20 MG/100ML; MG/100ML; MG/100ML; MG/100ML
1000 INJECTION, SOLUTION INTRAVENOUS ONCE
Status: DISCONTINUED | OUTPATIENT
Start: 2021-09-08 | End: 2021-09-08

## 2021-09-08 RX ORDER — ONDANSETRON 2 MG/ML
INJECTION INTRAMUSCULAR; INTRAVENOUS
Status: COMPLETED
Start: 2021-09-08 | End: 2021-09-08

## 2021-09-08 RX ORDER — THIAMINE HYDROCHLORIDE 100 MG/ML
100 INJECTION, SOLUTION INTRAMUSCULAR; INTRAVENOUS DAILY
Status: DISCONTINUED | OUTPATIENT
Start: 2021-09-08 | End: 2021-09-08

## 2021-09-08 RX ORDER — LORAZEPAM 2 MG/ML
2 INJECTION INTRAMUSCULAR ONCE
Status: DISCONTINUED | OUTPATIENT
Start: 2021-09-08 | End: 2021-09-08

## 2021-09-08 RX ORDER — DIAZEPAM 5 MG/1
5 TABLET ORAL ONCE
Status: COMPLETED | OUTPATIENT
Start: 2021-09-08 | End: 2021-09-08

## 2021-09-08 RX ORDER — THIAMINE HYDROCHLORIDE 100 MG/ML
100 INJECTION, SOLUTION INTRAMUSCULAR; INTRAVENOUS ONCE
Status: COMPLETED | OUTPATIENT
Start: 2021-09-08 | End: 2021-09-08

## 2021-09-08 RX ORDER — QUETIAPINE FUMARATE 200 MG/1
200 TABLET, FILM COATED ORAL NIGHTLY
Status: ON HOLD | COMMUNITY
End: 2022-04-05 | Stop reason: HOSPADM

## 2021-09-08 RX ORDER — QUETIAPINE FUMARATE 200 MG/1
200 TABLET, FILM COATED ORAL NIGHTLY
Status: DISCONTINUED | OUTPATIENT
Start: 2021-09-08 | End: 2021-09-13 | Stop reason: HOSPADM

## 2021-09-08 RX ORDER — LORAZEPAM 1 MG/1
1 TABLET ORAL EVERY 4 HOURS PRN
Status: DISCONTINUED | OUTPATIENT
Start: 2021-09-08 | End: 2021-09-13 | Stop reason: HOSPADM

## 2021-09-08 RX ORDER — LORAZEPAM 1 MG/1
2 TABLET ORAL EVERY 4 HOURS PRN
Status: DISCONTINUED | OUTPATIENT
Start: 2021-09-08 | End: 2021-09-13 | Stop reason: HOSPADM

## 2021-09-08 RX ORDER — M-VIT,TX,IRON,MINS/CALC/FOLIC 27MG-0.4MG
1 TABLET ORAL DAILY
Status: DISCONTINUED | OUTPATIENT
Start: 2021-09-08 | End: 2021-09-13 | Stop reason: HOSPADM

## 2021-09-08 RX ORDER — LOSARTAN POTASSIUM 100 MG/1
100 TABLET ORAL DAILY
Status: DISCONTINUED | OUTPATIENT
Start: 2021-09-09 | End: 2021-09-13 | Stop reason: HOSPADM

## 2021-09-08 RX ORDER — FAMOTIDINE 20 MG/1
40 TABLET, FILM COATED ORAL DAILY
Status: DISCONTINUED | OUTPATIENT
Start: 2021-09-09 | End: 2021-09-13 | Stop reason: HOSPADM

## 2021-09-08 RX ORDER — ACETAMINOPHEN 325 MG/1
650 TABLET ORAL EVERY 4 HOURS PRN
Status: DISCONTINUED | OUTPATIENT
Start: 2021-09-08 | End: 2021-09-13 | Stop reason: HOSPADM

## 2021-09-08 RX ORDER — LORAZEPAM 2 MG/ML
2 INJECTION INTRAMUSCULAR ONCE
Status: COMPLETED | OUTPATIENT
Start: 2021-09-08 | End: 2021-09-08

## 2021-09-08 RX ORDER — FOLIC ACID 1 MG/1
1 TABLET ORAL DAILY
Status: DISCONTINUED | OUTPATIENT
Start: 2021-09-08 | End: 2021-09-08

## 2021-09-08 RX ORDER — M-VIT,TX,IRON,MINS/CALC/FOLIC 27MG-0.4MG
1 TABLET ORAL DAILY
Status: DISCONTINUED | OUTPATIENT
Start: 2021-09-08 | End: 2021-09-08

## 2021-09-08 RX ORDER — GABAPENTIN 300 MG/1
300 CAPSULE ORAL 3 TIMES DAILY
Status: DISCONTINUED | OUTPATIENT
Start: 2021-09-08 | End: 2021-09-09

## 2021-09-08 RX ORDER — LAMOTRIGINE 100 MG/1
100 TABLET ORAL 2 TIMES DAILY
Status: DISCONTINUED | OUTPATIENT
Start: 2021-09-08 | End: 2021-09-09

## 2021-09-08 RX ORDER — HYDROXYZINE HYDROCHLORIDE 25 MG/1
25 TABLET, FILM COATED ORAL 3 TIMES DAILY PRN
Status: DISCONTINUED | OUTPATIENT
Start: 2021-09-08 | End: 2021-09-13 | Stop reason: HOSPADM

## 2021-09-08 RX ORDER — FAMOTIDINE 40 MG/1
40 TABLET, FILM COATED ORAL DAILY
Status: ON HOLD | COMMUNITY
End: 2022-04-05 | Stop reason: HOSPADM

## 2021-09-08 RX ORDER — GABAPENTIN 300 MG/1
CAPSULE ORAL 3 TIMES DAILY
Status: ON HOLD | COMMUNITY
End: 2021-09-13 | Stop reason: HOSPADM

## 2021-09-08 RX ORDER — FOLIC ACID 1 MG/1
1 TABLET ORAL ONCE
Status: COMPLETED | OUTPATIENT
Start: 2021-09-08 | End: 2021-09-08

## 2021-09-08 RX ORDER — NICOTINE 21 MG/24HR
1 PATCH, TRANSDERMAL 24 HOURS TRANSDERMAL DAILY
Status: DISCONTINUED | OUTPATIENT
Start: 2021-09-08 | End: 2021-09-13 | Stop reason: HOSPADM

## 2021-09-08 RX ORDER — ACETAMINOPHEN 325 MG/1
650 TABLET ORAL EVERY 4 HOURS PRN
Status: DISCONTINUED | OUTPATIENT
Start: 2021-09-08 | End: 2021-09-08

## 2021-09-08 RX ORDER — POLYETHYLENE GLYCOL 3350 17 G/17G
17 POWDER, FOR SOLUTION ORAL DAILY PRN
Status: DISCONTINUED | OUTPATIENT
Start: 2021-09-08 | End: 2021-09-13 | Stop reason: HOSPADM

## 2021-09-08 RX ADMIN — QUETIAPINE FUMARATE 200 MG: 200 TABLET ORAL at 21:12

## 2021-09-08 RX ADMIN — ONDANSETRON HYDROCHLORIDE 4 MG: 2 SOLUTION INTRAMUSCULAR; INTRAVENOUS at 12:27

## 2021-09-08 RX ADMIN — LORAZEPAM 2 MG: 2 INJECTION, SOLUTION INTRAMUSCULAR; INTRAVENOUS at 08:43

## 2021-09-08 RX ADMIN — FOLIC ACID 1 MG: 1 TABLET ORAL at 03:48

## 2021-09-08 RX ADMIN — LORAZEPAM 1 MG: 1 TABLET ORAL at 21:48

## 2021-09-08 RX ADMIN — Medication 1 TABLET: at 03:48

## 2021-09-08 RX ADMIN — HYDROXYZINE HYDROCHLORIDE 25 MG: 25 TABLET, FILM COATED ORAL at 21:12

## 2021-09-08 RX ADMIN — LAMOTRIGINE 100 MG: 100 TABLET ORAL at 21:12

## 2021-09-08 RX ADMIN — LORAZEPAM 1 MG: 1 TABLET ORAL at 16:10

## 2021-09-08 RX ADMIN — DIAZEPAM 5 MG: 5 TABLET ORAL at 03:48

## 2021-09-08 RX ADMIN — ACETAMINOPHEN 650 MG: 325 TABLET ORAL at 21:12

## 2021-09-08 RX ADMIN — GABAPENTIN 300 MG: 300 CAPSULE ORAL at 21:12

## 2021-09-08 RX ADMIN — THIAMINE HYDROCHLORIDE 100 MG: 100 INJECTION, SOLUTION INTRAMUSCULAR; INTRAVENOUS at 03:48

## 2021-09-08 RX ADMIN — GABAPENTIN 300 MG: 300 CAPSULE ORAL at 16:10

## 2021-09-08 ASSESSMENT — PAIN DESCRIPTION - ONSET: ONSET: GRADUAL

## 2021-09-08 ASSESSMENT — ENCOUNTER SYMPTOMS
VOMITING: 0
SHORTNESS OF BREATH: 0
ABDOMINAL PAIN: 0
DIARRHEA: 0
VOICE CHANGE: 0
EYE REDNESS: 0
COUGH: 0
RHINORRHEA: 0
EYE PAIN: 0

## 2021-09-08 ASSESSMENT — SLEEP AND FATIGUE QUESTIONNAIRES
DO YOU USE A SLEEP AID: NO
DO YOU HAVE DIFFICULTY SLEEPING: NO
AVERAGE NUMBER OF SLEEP HOURS: 8

## 2021-09-08 ASSESSMENT — PAIN - FUNCTIONAL ASSESSMENT: PAIN_FUNCTIONAL_ASSESSMENT: ACTIVITIES ARE NOT PREVENTED

## 2021-09-08 ASSESSMENT — PATIENT HEALTH QUESTIONNAIRE - PHQ9
SUM OF ALL RESPONSES TO PHQ QUESTIONS 1-9: 20
SUM OF ALL RESPONSES TO PHQ QUESTIONS 1-9: 11

## 2021-09-08 ASSESSMENT — PAIN DESCRIPTION - FREQUENCY: FREQUENCY: INTERMITTENT

## 2021-09-08 ASSESSMENT — PAIN DESCRIPTION - ORIENTATION: ORIENTATION: ANTERIOR

## 2021-09-08 ASSESSMENT — PAIN DESCRIPTION - PAIN TYPE: TYPE: ACUTE PAIN

## 2021-09-08 ASSESSMENT — PAIN SCALES - GENERAL
PAINLEVEL_OUTOF10: 4
PAINLEVEL_OUTOF10: 7

## 2021-09-08 ASSESSMENT — PAIN DESCRIPTION - DESCRIPTORS: DESCRIPTORS: ACHING;HEADACHE

## 2021-09-08 ASSESSMENT — PAIN DESCRIPTION - LOCATION: LOCATION: HEAD

## 2021-09-08 ASSESSMENT — PAIN DESCRIPTION - PROGRESSION: CLINICAL_PROGRESSION: NOT CHANGED

## 2021-09-08 NOTE — SUICIDE SAFETY PLAN
SAFETY PLAN    A suicide Safety Plan is a document that supports someone when they are having thoughts of suicide. Warning Signs that indicate a suicidal crisis may be developing: What (situations, thoughts, feelings, body sensations, behaviors, etc.) do you experience that lets you know you are beginning to think about suicide? 1. Lashing out verbally      Internal Coping Strategies:  What things can I do (relaxation techniques, hobbies, physical activities, etc.) to take my mind off my problems without contacting another person? 1. read  2. television      People and social settings that provide distraction: Who can I call or where can I go to distract me? 1. Name: mom    2. Name: dad    3. Place: \"no\"                People whom I can ask for help: Who can I call when I need help - for example, friends, family, clergy, someone else? 1. Name: mom                  2. Name: dad    3. Name: Alise Srinivasan, sister    Professionals or [employer] agencies I can contact during a crisis: Who can I call for help - for example, my doctor, my psychiatrist, my psychologist, a mental health provider, a suicide hotline? 1. Clinician Name: [id number]   Phone: [phone]      Clinician Pager or Emergency Contact #: [phone]    2. Clinician Name: [address]   Phone: [phone]      Clinician Pager or Emergency Contact #: [phone]    3. Suicide Prevention Lifeline: 9-556-379-TALK (3874)    4. [address] [place] [address] Emergency Services -  for example, [facility] suicide hotline, [facility] Hotline: [facility]      Emergency Services Address: [address] [employer]       Emergency Services Phone: [phone]    Making the environment safe: How can I make my environment (house/apartment/living space) safer? For example, can I remove guns, medications, and other items? 1. Remove weapons from home  2. Remove extra medications from home

## 2021-09-08 NOTE — PROGRESS NOTES
Admission Note      Reason for admission/Target Symptom: Patient admitted to [facility] due to male who presents to the emergency department for evaluation after he started having suicidal thoughts earlier tonight. Patient reports he is an alcoholic but drank way more than usual earlier today. States his last drink was about 12 hours ago. States while he was drinking he had a thought to \"just end it all. \"  States he called his father who could tell something was off and brought him here to the emergency department. Patient has a history of bipolar disorder with chronic alcohol abuse. Patient does state that he has a plan and means to kill himself but does not state what it is.     Diagnoses: Depression NOS  UDS: Neg  BAL:  62    SW met with treatment team to discuss patient's treatment including care planning, discharge planning, and follow-up needs. Pt has been admitted to [facility]. Treatment team has identified patient's discharge needs as medication management and outpatient therapy/counseling. Pt confirmed  the need for ongoing treatment post inpatient stay. Pt was also provided a handout of contact information for drug and alcohol treatment centers and other community support service such as [facility], AA, and [facility].

## 2021-09-08 NOTE — ED PROVIDER NOTES
[facility] EMERGENCY DEPT  eMERGENCYdEPARTMENT eNCOUnter      Pt Name: Negro Thapa  MRN: [id number]  [place] 1970  Date of evaluation: 9/8/2021  Chris Potter MD    Emergency Department care of this patient was assumed at 0630 from Dr. Rich Brooks. We have discussed the case and the plan of care. I have seen and evaluated patient and reviewed ED course. CHIEF COMPLAINT       Chief Complaint   Patient presents with   [address] Problem     SI with plan    Alcohol Intoxication     last drink at noon         PHYSICAL EXAM    (up to 7 for level 4, 8 or more for level 5)     ED Triage Vitals [09/08/21 0242]   BP Temp Temp Source Pulse Resp SpO2 Height Weight   (!) 148/83 97.6 °F (36.4 °C) Oral 91 17 94 % 6' 4\" (1.93 m) 190 lb (86.2 kg)       Physical Exam    DIAGNOSTIC RESULTS       LABS:  Labs Reviewed   CBC WITH AUTO DIFFERENTIAL - Abnormal; Notable for the following components:       Result Value    RBC 4.67 (*)     MPV 8.3 (*)     Monocytes % 11.8 (*)     Basophils % 1.1 (*)     All other components within normal limits   COMPREHENSIVE METABOLIC PANEL W/ REFLEX TO MG FOR LOW K - Abnormal; Notable for the following components:    Chloride 95 (*)     Anion Gap 20 (*)     Calcium 8.3 (*)     ALT 53 (*)     AST 70 (*)     All other components within normal limits   URINE RT REFLEX TO CULTURE - Abnormal; Notable for the following components:    Ketones, Urine 15 (*)     All other components within normal limits   COVID-19, RAPID   ETHANOL   URINE DRUG SCREEN   ETHANOL   ETHANOL       All other labs were within normal range or not returned as of this dictation.     EMERGENCY DEPARTMENT COURSE and DIFFERENTIAL DIAGNOSIS/MDM:   Vitals:    Vitals:    09/08/21 0242 09/08/21 0829 09/08/21 1228   BP: (!) 148/83 (!) 152/80 (!) 156/89   Pulse: 91 76 102   Resp: 17 17    Temp: 97.6 °F (36.4 °C) 97.5 °F (36.4 °C)    TempSrc: Oral     SpO2: 94% 95% 92%   Weight: 190 lb (86.2 kg)     Height: 6' 4\" (1.93 m) MDM      CONSULTS:    Patient was seen and evaluated by mental professional and after discussion with on-call psychiatry, Dr. Morales Helm, patient was accepted for inpatient admission to the [facility]. PROCEDURES:  Unless otherwise noted below, none     Procedures    FINAL IMPRESSION      1. Suicidal ideation    2. Acute alcoholic intoxication without complication ([facility] [address]    3. Chronic alcohol abuse    4.  Bipolar affective disorder, current episode depressed, current episode severity unspecified ([facility] [address]          DISPOSITION/PLAN   DISPOSITION Decision To Admit    (Please note that portions ofthis note were completed with a voice recognition program.  Efforts were made to edit the dictations but occasionally words are mis-transcribed.)    Author Gonzalo MD(electronically signed)  Attending Emergency Physician         Author Gonzalo MD  09/08/21 8882

## 2021-09-08 NOTE — PROGRESS NOTES
explain:   Was it within the past 6 months: no   Risk of Harm to others: no   If yes explain:   Was it within the past 6 months: no   Trauma History: Denies   Anxiety 1-10:  9  Explain if increased:   Depression 1-10:  8  Explain if increased:   Level of function outside hospital decreased: yes   If yes explain: Stoped fishing, Manuel August to apt      Psychiatric Hospitalizations: Yes   Where & When: [facility]  Outpatient Psychiatric Treatment: No    Family History:    Family history of mental illness: no   Family members with suicide attempt: no   If yes explain (attempted or completed):    Substance Abuse History:     SBIRT Completed: yes  Brief Intervention completed if needed:  (Yes/No)    Current ETOH LEVELS:  0242-245             0837- 109             1119-  57    ETOH Usage:     Amount drinking daily: heavy    Date of last drink: Monday  Longest period of sobriety:    Substance/Chemical Abuse/Recreational Drug History:  Substance used: Denies  Date of last substance use: Tobacco Use: yes   History of rehab treatment: Yes  How many times in rehab:10+  Last time in rehab: 2019  Family history of substance abuse: No    Opiates: It was discussed with pt they would not be receiving opiates unless they were within 3 days post surgery/acute injury. Patient voiced understanding and agreed.      Psychiatric Review Of Systems:     Recent Sleep changes: no   Recent appetite changes: yes   Recent weight changes/Pounds gained (+) or lost (-): no      Medical History:     Medical Diagnosis/Issues:    CAD (coronary artery disease)      Chest pain 12/12/2011    Cigarette smoker 12/12/2011    Depression      Hypertension 12/12/2011    Respiratory failure (HCC)       intubation    Seizures (HCC)             CT today in ED:no  Use of 02 or CPAP: no  Ambulatory: yes  Independent or Need assistance with Self Care:     PCP: Adriana Armendariz MD     Current Medications:   Scheduled Meds:   Current Facility-Administered Medications:   therapeutic multivitamin-minerals 1 tablet, 1 tablet, Oral, Daily, Omer Perez MD, 1 tablet at 09/08/21 0348    Current Outpatient Medications:     lamoTRIgine (LAMICTAL) 25 MG tablet, Take 1 tablet by mouth 2 times daily (Patient taking differently: Take 100 mg by mouth 2 times daily ), Disp: 60 tablet, Rfl: 1    naltrexone (DEPADE) 50 MG tablet, Take 1 tablet by mouth daily (with breakfast), Disp: 30 tablet, Rfl: 1    hydrOXYzine (ATARAX) 25 MG tablet, Take 1 tablet by mouth 3 times daily as needed for Anxiety, Disp: 90 tablet, Rfl: 1    losartan (COZAAR) 100 MG tablet, Take 100 mg by mouth daily, Disp: , Rfl:     gabapentin (NEURONTIN) 300 MG capsule, gabapentin 300 mg capsule, Disp: , Rfl:     QUEtiapine (SEROQUEL) 200 MG tablet, quetiapine 200 mg tablet, Disp: , Rfl:      Mental Status Evaluation:     Appearance:  disheveled and older than stated age   Behavior:  Restless & fidgety   Speech:  normal pitch and normal volume   Mood:  anxious and depressed   Affect:  blunted   Thought Process:  circumstantial   Thought Content:  suicidal   Sensorium:  person, place and situation   Cognition:  grossly intact   Insight:  Poor       Collateral Information:     Name: Russel Ayoub  Relationship: Mom  Phone Number: [phone]  Collateral:     Current living arrangement: Lives alone  Current Support System: Family  Employment: Employed    Disposition:     Choose one of the options below for disposition:     1. Decision to admit to :yes    If yes, which unit Adult or Geriatric Unit:  Geriatric  Is patient voluntary: yes  If no has a 72 hold been initiated:   Admission Diagnosis: Suicidal Ideation/Alcoholism    Does the patient have a guardian or Medical POA:   Has the guardian been notified or Medical POA:       2. Decision to Discharge:   Does not meet criteria for acceptance to   unit due to:     3. Transferred:       Patient was transferred due to:      Other follow up information provided: Ezequiel Kirk RN

## 2021-09-08 NOTE — PROGRESS NOTES
[facility] Admission From ED  Nursing Admission Note    Admission Type: Voluntary    Reason for Admission: Nadine Holder is a 46year old male admitted voluntarily from the ED. He reports drinking a gallon of vodka and having thoughts of wanting to \"end it all\". He reports stressors including financial/work related and alcoholisms. He denies interest in rehab at this time. He reports history of mental health treatment and stated, \"seroquel helps me. \"    Patient Active Problem List   Diagnosis    Chest pain    Cigarette smoker    Hypertension    Bradycardia    Abnormal EKG    SOB (shortness of breath)    Benzodiazepine overdose    Severe episode of recurrent major depressive disorder, without psychotic features ([address]    Bipolar disorder, curr episode depressed, severe, w/psychotic features ([address]    Bipolar 1 disorder, depressed ([address]    Intoxication by drug, uncomplicated ([address]    Acute alcoholic intoxication without complication ([address]    Intentional drug overdose ([address]    Suicidal behavior with attempted self-injury ([address]    Acute respiratory failure ([address]    Alcoholism ([address]    Suicidal ideations    Alcohol withdrawal syndrome with complication ([address]    Depression    Alcohol abuse    Bipolar I disorder, most recent episode depressed, severe without psychotic features ([address]    Alcohol use disorder, severe, dependence ([address]    Suicidal ideation    Tobacco abuse disorder    Severe major depression without psychotic features ([address]    Bipolar I disorder, most recent episode mixed, severe without psychotic features ([address]    Alcohol intoxication with blood level over 0.3 ([address]    Unspecified mood (affective) disorder ([address]    Alcohol withdrawal syndrome, uncomplicated ([address]       Pt [facility] 's care in ED [facility] [address] [facility] 0667/620-01. Arrived on unit via [facility] with . Pt appropriately attired in paper scrubs. Body assessment completed by Justina Dsouza RN with no contraband discovered.  All tubes, lines, and drains were appropriately discontinued by ED staff prior to pt transfer to [facility]. Pt belongings and valuables inventoried and cataloged, stored per policy. Pt oriented to surroundings, program expectations, and copy pt rights given. Received admit packet: [address], Visitation Info, Fall Prevention, Restraints Info. Consents reviewed, signed Pt Rights, Handbook Acceptance, Visit/Call Acceptance, PHI Release, Social Info Release, and Treatment Agreement. Pt verbalizes understanding. Pt is a smoker? yes Pt offered Nicotine patch yes  Pt refused Nicotine patch? no Patient provided education on Covid-19 and the importance of reporting any respiratory symptoms, headache, body aches or cough to the nursing staff as well as  frequent hand washing, social distancing and wearing a mask while on the unit? yes patient provided mask? yes patient refused mask? no Patient verbalized understanding? yes    Identifies stressors. Reports alcoholism and work related/financial stress.     Status and Exam  Normal: No  Facial Expression: Avoids Gaze  Affect: Appropriate  Level of Consciousness: Alert  Mood:Normal: Yes  Motor Activity:Normal: Yes  Interview Behavior: Cooperative  Preception: [place] to Person, [address] to Time, [place] to Place, [place] to Situation  Attention:Normal: Yes  Thought Content:Normal: Yes  Hallucinations: None  Delusions: No  Memory:Normal: Yes  Insight and Judgment: No  Insight and Judgment: Poor Judgment, Poor Insight, Unmotivated  Present Suicidal Ideation: No  Present Homicidal Ideation: No    C/o:    Notes:

## 2021-09-08 NOTE — ED NOTES
Bed: 02-A  Expected date:   Expected time:   Means of arrival:   Comments:     Ignacio Roque RN  09/08/21 0236

## 2021-09-08 NOTE — ED PROVIDER NOTES
[facility] DEPT  EMERGENCY DEPARTMENT ENCOUNTER      Pt Name: Jessenia Gottlieb  MRN: [id number]  [place] 1970  Date of evaluation: 9/8/2021  Provider: Jodi Doe MD    CHIEF COMPLAINT       Chief Complaint   Patient presents with    Mental Health Problem     SI with plan    Alcohol Intoxication     last drink at noon         HISTORY OF PRESENT ILLNESS   (Location/Symptom, Timing/Onset,Context/Setting, Quality, Duration, Modifying Factors, Severity)  Note limiting factors. Jessenia Gottlieb is a 46 y.o. male who presents to the emergency department for evaluation after he started having suicidal thoughts earlier tonight. Patient reports he is an alcoholic but drank way more than usual earlier today. States his last drink was about 12 hours ago. States while he was drinking he had a thought to \"just end it all. \"  States he called his father who could tell something was off and brought him here to the emergency department. Patient has a history of bipolar disorder with chronic alcohol abuse. Patient does state that he has a plan and means to kill himself but does not state what it is. HPI    NursingNotes were reviewed. REVIEW OF SYSTEMS    (2-9 systems for level 4, 10 or more for level 5)     Review of Systems   Constitutional: Negative for fatigue and fever. HENT: Negative for congestion, rhinorrhea and voice change. Eyes: Negative for pain and redness. Respiratory: Negative for cough and shortness of breath. Cardiovascular: Negative for chest pain. Gastrointestinal: Negative for abdominal pain, diarrhea and vomiting. Endocrine: Negative. Genitourinary: Negative. Musculoskeletal: Negative for arthralgias and gait problem. Skin: Negative for rash and wound. Neurological: Negative for weakness and headaches. Hematological: Negative. Psychiatric/Behavioral: Positive for suicidal ideas. All other systems reviewed and are negative.       A complete review of systems was performed and is negative except as noted above in the HPI. PAST MEDICAL HISTORY     Past Medical History:   Diagnosis Date    CAD (coronary artery disease)     Chest pain 12/12/2011    Cigarette smoker 12/12/2011    Depression     Hypertension 12/12/2011    Respiratory failure (HCC)     intubation    Seizures (HCC)          SURGICAL HISTORY       Past Surgical History:   Procedure Laterality Date    APPENDECTOMY      CHOLECYSTECTOMY  5/18/2006    Stigall    COLONOSCOPY      ENDOSCOPY, COLON, DIAGNOSTIC      KNEE ARTHROSCOPY Right     NECK SURGERY  7/15/2008    Hadi         CURRENT MEDICATIONS       Previous Medications    GABAPENTIN (NEURONTIN) 300 MG CAPSULE    gabapentin 300 mg capsule    HYDROXYZINE (ATARAX) 25 MG TABLET    Take 1 tablet by mouth 3 times daily as needed for Anxiety    LAMOTRIGINE (LAMICTAL) 25 MG TABLET    Take 1 tablet by mouth 2 times daily    LOSARTAN (COZAAR) 100 MG TABLET    Take 100 mg by mouth daily    NALTREXONE (DEPADE) 50 MG TABLET    Take 1 tablet by mouth daily (with breakfast)    QUETIAPINE (SEROQUEL) 200 MG TABLET    quetiapine 200 mg tablet       ALLERGIES     Patient has no known allergies.     FAMILY HISTORY       Family History   Problem Relation Age of Onset    Osteoarthritis Mother     Thyroid Disease Mother     Heart Failure Father     Heart Failure Maternal Grandmother     Heart Failure Paternal Grandmother     Cancer Maternal Grandfather           SOCIAL HISTORY       Social History     Socioeconomic History    Marital status:      Spouse name: Not on file    Number of children: Not on file    Years of education: Not on file    Highest education level: Not on file   Occupational History     Employer: DISABLED   Tobacco Use    Smoking status: Current Every Day Smoker     Packs/day: 1.00     Years: 35.00     Pack years: 35.00     Types: Cigarettes    Smokeless tobacco: Current User     Types: Snuff   Vaping Use    Vaping Use: Never used   Substance and Sexual Activity    Alcohol use: Yes     Alcohol/week: 0.0 standard drinks     Comment: heavy    Drug use: Not Currently     Types: Marijuana    Sexual activity: Yes     Partners: Female   Other Topics Concern    Not on file   Social History Narrative    Not on file     Social Determinants of Health     Financial Resource Strain:     Difficulty of Paying Living Expenses:    Food Insecurity:     Worried About Running Out of Food in the Last Year:     [address] in the Last Year:    Transportation Needs:     Lack of Transportation (Medical):  Lack of Transportation (Non-Medical):    Physical Activity:     Days of Exercise per Week:     Minutes of Exercise per Session:    Stress:     Feeling of Stress :    Social Connections:     Frequency of Communication with Friends and Family:     Frequency of Social Gatherings with Friends and Family:     Attends Sabianism Services:     Active Member of Clubs or Organizations:     Attends Club or Organization Meetings:     Marital Status:    Intimate Partner Violence:     Fear of Current or Ex-Partner:     Emotionally Abused:     Physically Abused:     Sexually Abused:        SCREENINGS             PHYSICAL EXAM    (up to 7 for level 4, 8 or more for level 5)     ED Triage Vitals [09/08/21 0242]   BP Temp Temp Source Pulse Resp SpO2 Height Weight   (!) 148/83 97.6 °F (36.4 °C) Oral 91 17 94 % 6' 4\" (1.93 m) 190 lb (86.2 kg)       Physical Exam  Vitals and nursing note reviewed. Constitutional:       General: He is not in acute distress. Appearance: He is well-developed. He is not toxic-appearing or diaphoretic. HENT:      Head: Normocephalic and atraumatic. Eyes:      General: No scleral icterus. Right eye: No discharge. Left eye: No discharge. Pupils: Pupils are equal, round, and reactive to light. Cardiovascular:      Rate and Rhythm: Normal rate and regular rhythm.    Pulmonary:      Effort: Pulmonary effort is normal. No respiratory distress. Breath sounds: No stridor. Abdominal:      General: There is no distension. Musculoskeletal:         General: No deformity. Normal range of motion. Cervical back: Normal range of motion. Skin:     General: Skin is warm and dry. Neurological:      Mental Status: He is alert and oriented to person, place, and time. GCS: GCS eye subscore is 4. GCS verbal subscore is 5. GCS motor subscore is 6. Cranial Nerves: No cranial nerve deficit. Motor: No abnormal muscle tone. Psychiatric:         Behavior: Behavior normal.         Thought Content: Thought content normal.         Judgment: Judgment normal.         DIAGNOSTIC RESULTS     EKG: All EKG's are interpreted by the Emergency Department Physician who either signs or Co-signs this chart in the absence of a cardiologist.        RADIOLOGY:   Non-plain film images such as CT, Ultrasound and MRI are read by the radiologist. Allison Quesada images are visualized and preliminarily interpreted by the emergency physician with the below findings:        Interpretation per the Radiologist below, if available at the time of this note:    No orders to display         ED BEDSIDE ULTRASOUND:   Performed by ED Physician - none    LABS:  Labs Reviewed   CBC WITH AUTO DIFFERENTIAL - Abnormal; Notable for the following components:       Result Value    RBC 4.67 (*)     MPV 8.3 (*)     Monocytes % 11.8 (*)     Basophils % 1.1 (*)     All other components within normal limits   COMPREHENSIVE METABOLIC PANEL W/ REFLEX TO MG FOR LOW K - Abnormal; Notable for the following components:    Chloride 95 (*)     Anion Gap 20 (*)     Calcium 8.3 (*)     ALT 53 (*)     AST 70 (*)     All other components within normal limits   ETHANOL   URINE DRUG SCREEN   URINE RT REFLEX TO CULTURE       All other labs were within normal range or not returned as of this dictation.     EMERGENCY DEPARTMENT COURSE and DIFFERENTIALDIAGNOSIS/MDM:   Vitals:    Vitals:    09/08/21 0242   BP: (!) 148/83   Pulse: 91   Resp: 17   Temp: 97.6 °F (36.4 °C)   TempSrc: Oral   SpO2: 94%   Weight: 190 lb (86.2 kg)   Height: 6' 4\" (1.93 m)       [facility]  ED Course as of Sep 08 0618   Wed Sep 08, 2021   0305 Ethanol Lvl: 245 [VAUGHN]   0355 Ethanol Lvl: 245 [VAUGHN]      ED Course User Index  [VAUGHN] Dakota Aguirre MD     Patient checked out to oncoming provider with plan for repeat ethanol and psychiatric evaluation once sober. CONSULTS:  None    PROCEDURES:  Unless otherwise notedbelow, none     Procedures    FINAL IMPRESSION     1. Suicidal ideation    2. Acute alcoholic intoxication without complication ([address]    3. Chronic alcohol abuse    4. Bipolar affective disorder, current episode depressed, current episode severity unspecified ([address]          DISPOSITION/PLAN   DISPOSITION Ed Observation 09/08/2021 03:55:15 AM      PATIENT REFERRED TO:  No follow-up provider specified.     DISCHARGE MEDICATIONS:  New Prescriptions    No medications on file          (Please note that portions of this note were completed with a voice recognition program.  Efforts were made to edit the dictations butoccasionally words are mis-transcribed.)    Dakota Cervantes MD (electronically signed)  Emergency Physician         Dakota Aguirre MD  09/08/21 5405

## 2021-09-09 PROCEDURE — 6370000000 HC RX 637 (ALT 250 FOR IP): Performed by: EMERGENCY MEDICINE

## 2021-09-09 PROCEDURE — 6370000000 HC RX 637 (ALT 250 FOR IP): Performed by: PSYCHIATRY & NEUROLOGY

## 2021-09-09 PROCEDURE — 99222 1ST HOSP IP/OBS MODERATE 55: CPT | Performed by: PSYCHIATRY & NEUROLOGY

## 2021-09-09 PROCEDURE — 1240000000 HC EMOTIONAL WELLNESS R&B

## 2021-09-09 RX ORDER — TRAZODONE HYDROCHLORIDE 50 MG/1
50 TABLET ORAL NIGHTLY PRN
Status: DISCONTINUED | OUTPATIENT
Start: 2021-09-09 | End: 2021-09-13 | Stop reason: HOSPADM

## 2021-09-09 RX ORDER — GABAPENTIN 300 MG/1
600 CAPSULE ORAL 3 TIMES DAILY
Status: DISCONTINUED | OUTPATIENT
Start: 2021-09-09 | End: 2021-09-13 | Stop reason: HOSPADM

## 2021-09-09 RX ORDER — GAUZE BANDAGE 2" X 2"
100 BANDAGE TOPICAL DAILY
Status: DISCONTINUED | OUTPATIENT
Start: 2021-09-09 | End: 2021-09-13 | Stop reason: HOSPADM

## 2021-09-09 RX ORDER — FOLIC ACID 1 MG/1
1 TABLET ORAL DAILY
Status: DISCONTINUED | OUTPATIENT
Start: 2021-09-09 | End: 2021-09-13 | Stop reason: HOSPADM

## 2021-09-09 RX ADMIN — GABAPENTIN 600 MG: 300 CAPSULE ORAL at 13:34

## 2021-09-09 RX ADMIN — GABAPENTIN 600 MG: 300 CAPSULE ORAL at 20:51

## 2021-09-09 RX ADMIN — LORAZEPAM 1 MG: 1 TABLET ORAL at 19:50

## 2021-09-09 RX ADMIN — TRAZODONE HYDROCHLORIDE 50 MG: 50 TABLET ORAL at 22:51

## 2021-09-09 RX ADMIN — LAMOTRIGINE 100 MG: 100 TABLET ORAL at 08:28

## 2021-09-09 RX ADMIN — FAMOTIDINE 40 MG: 20 TABLET, FILM COATED ORAL at 08:28

## 2021-09-09 RX ADMIN — GABAPENTIN 300 MG: 300 CAPSULE ORAL at 08:28

## 2021-09-09 RX ADMIN — Medication 1 TABLET: at 08:27

## 2021-09-09 RX ADMIN — LORAZEPAM 1 MG: 1 TABLET ORAL at 08:27

## 2021-09-09 RX ADMIN — QUETIAPINE FUMARATE 200 MG: 200 TABLET ORAL at 20:51

## 2021-09-09 RX ADMIN — THIAMINE HCL TAB 100 MG 100 MG: 100 TAB at 10:44

## 2021-09-09 RX ADMIN — FOLIC ACID 1 MG: 1 TABLET ORAL at 10:44

## 2021-09-09 RX ADMIN — LOSARTAN POTASSIUM 100 MG: 100 TABLET, FILM COATED ORAL at 08:28

## 2021-09-09 RX ADMIN — LORAZEPAM 1 MG: 1 TABLET ORAL at 13:34

## 2021-09-09 RX ADMIN — HYDROXYZINE HYDROCHLORIDE 25 MG: 25 TABLET, FILM COATED ORAL at 20:51

## 2021-09-09 ASSESSMENT — PAIN DESCRIPTION - DESCRIPTORS: DESCRIPTORS: SORE

## 2021-09-09 ASSESSMENT — PAIN DESCRIPTION - PAIN TYPE: TYPE: CHRONIC PAIN

## 2021-09-09 ASSESSMENT — PAIN DESCRIPTION - FREQUENCY: FREQUENCY: INTERMITTENT

## 2021-09-09 ASSESSMENT — PAIN - FUNCTIONAL ASSESSMENT: PAIN_FUNCTIONAL_ASSESSMENT: PREVENTS OR INTERFERES SOME ACTIVE ACTIVITIES AND ADLS

## 2021-09-09 ASSESSMENT — PAIN DESCRIPTION - ONSET: ONSET: ON-GOING

## 2021-09-09 ASSESSMENT — PAIN DESCRIPTION - PROGRESSION: CLINICAL_PROGRESSION: NOT CHANGED

## 2021-09-09 NOTE — PROGRESS NOTES
Group Therapy Note    Start Time: 322  End Time:  338  Number of Participants: 12    Type of Group: Community Meeting       Patient's Goal:  \"meet with doctor\"      Notes:      Participation Level:  Active Listener       Participation Quality: Appropriate      Thought Process/Content: Logical      Affective Functioning: Congruent      Mood: calm      Level of consciousness:  Alert      Modes of Intervention: Support      Discipline Responsible: Behavioral Health Tech II      Signature:  Rafael Rome Validate Anticipated Plan: No Wound Care: Petrolatum Additional Anesthesia Volume In Cc (Will Not Render If 0): 0 Type Of Destruction Used: Curettage Silver Nitrate Text: The wound bed was treated with silver nitrate after the biopsy was performed. Dressing: bandage Was A Bandage Applied: Yes Biopsy Method: 15 blade Detail Level: Detailed Electrodesiccation Text: The wound bed was treated with electrodesiccation after the biopsy was performed. Consent: Written consent was obtained and risks were reviewed including but not limited to scarring, infection, bleeding, scabbing, incomplete removal, nerve damage and allergy to anesthesia. Information: Selecting Yes will display possible errors in your note based on the variables you have selected. This validation is only offered as a suggestion for you. PLEASE NOTE THAT THE VALIDATION TEXT WILL BE REMOVED WHEN YOU FINALIZE YOUR NOTE. IF YOU WANT TO FAX A PRELIMINARY NOTE YOU WILL NEED TO TOGGLE THIS TO 'NO' IF YOU DO NOT WANT IT IN YOUR FAXED NOTE. Anesthesia Volume In Cc: 0.5 Billing Type: Third-Party Bill Curettage Text: The wound bed was treated with curettage after the biopsy was performed. Hemostasis: Drysol Body Location Override (Optional - Billing Will Still Be Based On Selected Body Map Location If Applicable): Left superior Preauricular sulcus 9x8mm Post-Care Instructions: I reviewed with the patient in detail post-care instructions. Patient is to keep the biopsy site dry overnight, and then apply bacitracin twice daily until healed. Patient may apply hydrogen peroxide soaks to remove any crusting. Anesthesia Type: 1% lidocaine with epinephrine Electrodesiccation And Curettage Text: The wound bed was treated with electrodesiccation and curettage after the biopsy was performed. Body Location Override (Optional - Billing Will Still Be Based On Selected Body Map Location If Applicable): Left proximal dorsal forearm 9x6mm Body Location Override (Optional - Billing Will Still Be Based On Selected Body Map Location If Applicable): Left inferior Preauricular sulcus 13x9mm Notification Instructions: Patient will be notified of biopsy results. However, patient instructed to call the office if not contacted within 2 weeks. Body Location Override (Optional - Billing Will Still Be Based On Selected Body Map Location If Applicable): Left central cheek 8x9mm Cryotherapy Text: The wound bed was treated with cryotherapy after the biopsy was performed. Biopsy Type: H and E Body Location Override (Optional - Billing Will Still Be Based On Selected Body Map Location If Applicable): Right lateral superior knee 6x4mm Depth Of Biopsy: dermis

## 2021-09-09 NOTE — PROGRESS NOTES
Behavioral Services  Medicare Certification Upon Admission    I certify that this patient's inpatient psychiatric hospital admission is medically necessary for:    [x] (1) Treatment which could reasonably be expected to improve this patient's condition,       [] (2) Or for diagnostic study;     AND     [x](2) The inpatient psychiatric services are provided while the individual is under the care of a physician and are included in the individualized plan of care.     Estimated length of stay/service 5-7 days    Plan for post-hospital care TBA    Electronically signed by Andrzej Mccord MD on 9/9/2021 at 9:43 AM

## 2021-09-09 NOTE — PLAN OF CARE
Problem: Suicide risk  Goal: Provide patient with safe environment  Description: Provide patient with safe environment  Outcome: Met This Shift     Problem: Falls - Risk of:  Goal: Will remain free from falls  Description: Will remain free from falls  Outcome: Met This Shift  Goal: Absence of physical injury  Description: Absence of physical injury  Outcome: Met This Shift     Problem: Anxiety:  Goal: Level of anxiety will decrease  Description: Level of anxiety will decrease  Outcome: Ongoing     Problem: Health Behavior:  Goal: Ability to verbalize adaptive coping strategies to use when suicidal feelings occur will improve  Description: Ability to verbalize adaptive coping strategies to use when suicidal feelings occur will improve  Outcome: Ongoing  Goal: Ability to verbalize adaptive coping strategies to use when the urge to self-mutilate occurs will improve  Description: Ability to verbalize adaptive coping strategies to use when the urge to self-mutilate occurs will improve  Outcome: Ongoing     Problem: Safety:  Goal: Ability to contract for his/her safety will improve  Description: Ability to contract for his/her safety will improve  Outcome: Ongoing     Problem: Pain:  Goal: Pain level will decrease  Description: Pain level will decrease  Outcome: Ongoing  Goal: Control of acute pain  Description: Control of acute pain  Outcome: Ongoing  Goal: Control of chronic pain  Description: Control of chronic pain  Outcome: Ongoing     Problem: Tobacco Use:  Goal: Inpatient tobacco use cessation counseling participation  Description: Inpatient tobacco use cessation counseling participation  Outcome: Ongoing

## 2021-09-09 NOTE — PROGRESS NOTES
SW met with treatment team to discuss pt's progress and setbacks. SW 2 was present. Pt was admitted, voluntary, for SI with a plan to \"put a pistol to my head\", chronic alcoholism, has hx of psychiatric treatment/admission, hx of Bipolar DO, reports he drank a gallon of Vodka prior to coming to the ED, denies interest in substance abuse rehab, identifies current stressors as alcoholism and work/financial issues, denies HI/AVH. Since admission, pt reportedly was cooperative with admission process, alert/oriented x 4, slept fair last night, with medications, appetite is decreased, complains of nausea, did not attend scheduled group activities, isolative to self, independent with ADLS, compliant with medications, CIWA score of 13, reports severe depression/anxiety, denies SI/HI/AVH, continue current treatment plan.

## 2021-09-09 NOTE — PROGRESS NOTES
Requirement Note     SW met with pt to complete Psychosocial and CSSR-S on this date. Patients long and short term goals discussed. Patient voiced understanding. Treatment plan sheet signed. Patient verbalized understanding of the treatment plan. Patient participated in goals and objectives of the treatment plan. Patient completed safety plan with , patient received copy of plan, and original was placed into patient's chart. In the last 6 months has the pt been a danger to self: NO  In the last 6 months has the pt been a danger to others: NO  Legal Guardian/POA: NO     Provided patient with [employer] handout entitled \"Quitting Smoking. \"  Reviewed handout with patient: addressing dangers of smoking, developing coping skills, and providing basic information about quitting. Patient received all components practical counseling of tobacco practical counseling during the hospital stay.
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
## 2021-09-09 NOTE — PROGRESS NOTES
[facility] Daily Shift Assessment-Geriatric Unit  Nursing Progress Note          Room: [id number]   Name: Krishna Wilkins   Age: 46 y.o. Gender: male   Dx:   1. Suicidal ideation    2. Acute alcoholic intoxication without complication ([facility]    3. Chronic alcohol abuse    4. Bipolar affective disorder, current episode depressed, current episode severity unspecified ([facility]         Precautions: suicide risk, fall risk and seizure precautions  Inpatient Status: voluntary     SLEEP:    Sleep: Yes,   Sleep Quality Fair   Hours Slept: 7   Sleep Medications: Yes  PRN Sleep Meds: Yes       MEDICAL:      Other PRN Meds: Yes   Med Compliant: Yes   Accu-Chek: No   Oxygen/CPAP/BiPAP: No  CIWA/CINA: Yes   PAIN Assessment: present - adequately treated or location, headache,   Side Effects from medication: No    Is Patient experiencing any respiratory symptoms (headache, fever, body aches, cough. Matthew Daley ): no  Patient educated by nursing to practice social distancing, wear masks, wash hands frequently: yes      Metabolic Screening:    Lab Results   Component Value Date    LABA1C 5.2 09/09/2020       Lab Results   Component Value Date    CHOL 189 09/09/2020    CHOL 148 (L) 01/27/2019    CHOL 177 09/30/2017    CHOL 151 12/09/2011     Lab Results   Component Value Date    TRIG 178 (H) 09/09/2020    TRIG 99 01/27/2019    TRIG 119 (L) 09/30/2017    TRIG 130 12/09/2011     Lab Results   Component Value Date    HDL 50 (L) 09/09/2020    HDL 44 (L) 01/27/2019    HDL 55 09/30/2017    HDL 35 12/09/2011     No components found for: LDLCAL  No results found for: LABVLDL      Body mass index is 23.13 kg/m².     BP Readings from Last 2 Encounters:   09/09/21 (!) 150/92   01/07/21 (!) 142/84         PSYCH:     SI denies suicidal ideation    HI Negative for homicidal ideation        AVH:Absent      Depression: 5 Anxiety: 5       GENERAL:      Appetite: good  Social: Yes, minimally  Speech: normal   Appearance:appropriately dressed, disheveled, looks older and healthy looking  Assistive Devices: noneLevel of Assist: Independent      GROUP:    Group Participation: Yes  Participation LevelMinimal    Participation QualityAttentive    Notes:

## 2021-09-09 NOTE — PLAN OF CARE
Problem: Suicide risk  Description: Suicide risk  Goal: Provide patient with safe environment  Outcome: Met This Shift     Problem: Anxiety:  Goal: Level of anxiety will decrease  Outcome: Ongoing     Problem: Health Behavior:  Goal: Ability to verbalize adaptive coping strategies to use when suicidal feelings occur will improve  Outcome: Ongoing

## 2021-09-10 LAB
TSH REFLEX FT4: 0.97 UIU/ML (ref 0.35–5.5)
VITAMIN B-12: 623 PG/ML (ref 211–946)
VITAMIN D 25-HYDROXY: 23.7 NG/ML

## 2021-09-10 PROCEDURE — 99231 SBSQ HOSP IP/OBS SF/LOW 25: CPT | Performed by: PSYCHIATRY & NEUROLOGY

## 2021-09-10 PROCEDURE — 82306 VITAMIN D 25 HYDROXY: CPT

## 2021-09-10 PROCEDURE — 84443 ASSAY THYROID STIM HORMONE: CPT

## 2021-09-10 PROCEDURE — 82607 VITAMIN B-12: CPT

## 2021-09-10 PROCEDURE — 36415 COLL VENOUS BLD VENIPUNCTURE: CPT

## 2021-09-10 PROCEDURE — 6370000000 HC RX 637 (ALT 250 FOR IP): Performed by: PSYCHIATRY & NEUROLOGY

## 2021-09-10 PROCEDURE — 1240000000 HC EMOTIONAL WELLNESS R&B

## 2021-09-10 PROCEDURE — 6370000000 HC RX 637 (ALT 250 FOR IP): Performed by: EMERGENCY MEDICINE

## 2021-09-10 RX ORDER — CLONIDINE HYDROCHLORIDE 0.1 MG/1
0.1 TABLET ORAL EVERY 4 HOURS PRN
Status: DISCONTINUED | OUTPATIENT
Start: 2021-09-10 | End: 2021-09-13 | Stop reason: HOSPADM

## 2021-09-10 RX ORDER — ERGOCALCIFEROL 1.25 MG/1
50000 CAPSULE ORAL WEEKLY
Status: DISCONTINUED | OUTPATIENT
Start: 2021-09-10 | End: 2021-09-13 | Stop reason: HOSPADM

## 2021-09-10 RX ADMIN — LORAZEPAM 1 MG: 1 TABLET ORAL at 19:55

## 2021-09-10 RX ADMIN — QUETIAPINE FUMARATE 200 MG: 200 TABLET ORAL at 21:09

## 2021-09-10 RX ADMIN — THIAMINE HCL TAB 100 MG 100 MG: 100 TAB at 09:22

## 2021-09-10 RX ADMIN — FAMOTIDINE 40 MG: 20 TABLET, FILM COATED ORAL at 09:23

## 2021-09-10 RX ADMIN — GABAPENTIN 600 MG: 300 CAPSULE ORAL at 09:22

## 2021-09-10 RX ADMIN — Medication 1 TABLET: at 09:22

## 2021-09-10 RX ADMIN — HYDROXYZINE HYDROCHLORIDE 25 MG: 25 TABLET, FILM COATED ORAL at 21:09

## 2021-09-10 RX ADMIN — LAMOTRIGINE 225 MG: 100 TABLET ORAL at 09:22

## 2021-09-10 RX ADMIN — LOSARTAN POTASSIUM 100 MG: 100 TABLET, FILM COATED ORAL at 09:22

## 2021-09-10 RX ADMIN — FOLIC ACID 1 MG: 1 TABLET ORAL at 09:23

## 2021-09-10 RX ADMIN — GABAPENTIN 600 MG: 300 CAPSULE ORAL at 21:09

## 2021-09-10 RX ADMIN — TRAZODONE HYDROCHLORIDE 50 MG: 50 TABLET ORAL at 21:48

## 2021-09-10 RX ADMIN — GABAPENTIN 600 MG: 300 CAPSULE ORAL at 12:54

## 2021-09-10 RX ADMIN — LORAZEPAM 1 MG: 1 TABLET ORAL at 12:54

## 2021-09-10 ASSESSMENT — PAIN DESCRIPTION - FREQUENCY: FREQUENCY: CONTINUOUS

## 2021-09-10 ASSESSMENT — PAIN DESCRIPTION - LOCATION: LOCATION: HEAD

## 2021-09-10 ASSESSMENT — PAIN DESCRIPTION - ORIENTATION: ORIENTATION: ANTERIOR

## 2021-09-10 ASSESSMENT — PAIN DESCRIPTION - ONSET: ONSET: ON-GOING

## 2021-09-10 ASSESSMENT — PAIN SCALES - GENERAL: PAINLEVEL_OUTOF10: 6

## 2021-09-10 ASSESSMENT — PAIN DESCRIPTION - PROGRESSION: CLINICAL_PROGRESSION: NOT CHANGED

## 2021-09-10 ASSESSMENT — PAIN DESCRIPTION - PAIN TYPE: TYPE: CHRONIC PAIN

## 2021-09-10 ASSESSMENT — PAIN - FUNCTIONAL ASSESSMENT: PAIN_FUNCTIONAL_ASSESSMENT: ACTIVITIES ARE NOT PREVENTED

## 2021-09-10 ASSESSMENT — PAIN DESCRIPTION - DESCRIPTORS: DESCRIPTORS: ACHING;CONSTANT;DISCOMFORT;HEADACHE

## 2021-09-10 NOTE — PROGRESS NOTES
WRAP UP GROUP NOTE:     Patient's Goal:  Providing feedback as to their own progress in the care-plan provided. Pt's have an opportunity to explore self-reflective skills and share any additional cares and concerns not yet addressed. Pt effectively participated.      Energy level:low  Appetite:improving  Concentration: improving  Hallucinations:gone  Depression:same  Anxiety:on and off  How I worked today:tried a little  What helps me sleep:medicine  Any questions/complaints/comments:no and Jethro Hairston was a huge help and she helped me organize my thoughts and make sense of things about issues that I have anger about

## 2021-09-10 NOTE — H&P
HISTORY and PHYSICAL      CHIEF COMPLAINT:  Depression, SI, ETOH Abuse    Reason for Admission:  Depression, SI, ETOH Abuse    History Obtained From:  Patient, chart    HISTORY OF PRESENT ILLNESS:      The patient is a 46 y.o. male who is admitted to the [place] [address] unit with worsening mood issues. He has no c/o CP or SOA. No abdominal pain or N/V. No dysuria. He has chronic back pain. He has no new pain issues. No fevers. Past Medical History:        Diagnosis Date    CAD (coronary artery disease)     Chest pain 12/12/2011    Cigarette smoker 12/12/2011    Depression     Hypertension 12/12/2011    Respiratory failure ([facility] [address]     intubation    Seizures ([facility])      Past Surgical History:        Procedure Laterality Date    APPENDECTOMY      [facility]18[facility] [place]    COLONOSCOPY      ENDOSCOPY, COLON, DIAGNOSTIC      KNEE ARTHROSCOPY Right     NECK SURGERY  7/15/2008    [facility]         Medications Prior to Admission:    Medications Prior to Admission: famotidine (PEPCID) 40 MG tablet, Take 40 mg by mouth daily  lamoTRIgine (LAMICTAL) 25 MG tablet, Take 1 tablet by mouth 2 times daily (Patient taking differently: Take 100 mg by mouth 2 times daily )  naltrexone (DEPADE) 50 MG tablet, Take 1 tablet by mouth daily (with breakfast)  hydrOXYzine (ATARAX) 25 MG tablet, Take 1 tablet by mouth 3 times daily as needed for Anxiety  losartan (COZAAR) 100 MG tablet, Take 100 mg by mouth daily  gabapentin (NEURONTIN) 300 MG capsule, 3 times daily. QUEtiapine (SEROQUEL) 200 MG tablet, 200 mg nightly     Allergies:  Patient has no known allergies. Social History:   TOBACCO:   reports that he has been smoking cigarettes. He has a 35.00 pack-year smoking history. His smokeless tobacco use includes snuff. ETOH:   reports current alcohol use. DRUGS:   reports previous drug use. Drug: Marijuana. MARITAL STATUS:    Patient currently lives alone. He is working. Family History:       Problem Relation Age of Onset    Osteoarthritis Mother     Thyroid Disease Mother     Heart Failure Father     Heart Failure Maternal Grandmother     Heart Failure Paternal Grandmother     Cancer Maternal Grandfather      REVIEW OF SYSTEMS:  Constitutional: neg  CV: neg  Pulmonary: neg  GI: neg  : neg  Psych: depression, SI  Neuro: neg  Skin: neg  MusculoSkeletal: neg  HEENT: neg  Joints: neg    Vitals:  BP (!) 152/91   Pulse 90   Temp 96.6 °F (35.9 °C) (Temporal)   Resp 20   Ht 6' 4\" (1.93 m)   Wt 190 lb (86.2 kg)   SpO2 100%   BMI 23.13 kg/m²     PHYSICAL EXAM:  Gen: NAD, alert, in bed  HEENT: WNL  Lymph: no LAD  Neck: no JVD or masses  Chest: CTA bilat  CV: RRR  Abdomen: NT/ND  Extrem: no C/C/E  Neuro: non focal  Skin: no rashes  Joints: no redness    DATA:  I have reviewed the admission labs and imaging tests.     ASSESSMENT AND PLAN:      Patient Active Hospital Problem List:   Severe episode of recurrent major depressive disorder, without psychotic features---follow with Psych   H/O ETOH Abuse   HTN---follow with BP   Elevated LFT's          Shawn Finley MD  8:31 AM 9/10/2021

## 2021-09-10 NOTE — PLAN OF CARE
Problem: Health Behavior:  Goal: Ability to verbalize adaptive coping strategies to use when suicidal feelings occur will improve  Description: Ability to verbalize adaptive coping strategies to use when suicidal feelings occur will improve  Outcome: Ongoing  Note:                                                                     Group Therapy Note    Date: 9/10/2021  Start Time: 1000  End Time:  1030  Number of Participants: 3    Type of Group: Psychoeducation    Wellness Binder Information  Module Name:  Staying well   Session Number:  1    Patient's Goal:  Daily maintenance and coping skills    Notes:  Pt attended group as scheduled. Pt participated in group discussion about positive steps to well being like relaxation, exercising, and taking up a hobby or learning a new skill. Status After Intervention:  Improved    Participation Level:  Active Listener and Interactive    Participation Quality: Appropriate, Attentive, and Sharing      Speech:  normal      Thought Process/Content: Logical      Affective Functioning: Congruent      Mood: depressed      Level of consciousness:  Alert and Attentive      Response to Learning: Able to verbalize current knowledge/experience, Able to verbalize/acknowledge new learning, and Able to retain information      Endings: None Reported    Modes of Intervention: Education, Support, and Socialization      Discipline Responsible: /Counselor      Signature:  Declan Larios [facility]

## 2021-09-10 NOTE — PROGRESS NOTES
SW met with treatment team to discuss pt's progress and setbacks. SW 2 was present. Pt reportedly slept well last night, with medications, appetite is improved, attends scheduled group activities, social with peers/staff, independent with ADLS, compliant with medications, behavior has been cooperative, hyper-verbal at times, CIWA score 9, reports moderate depression/anxiety, denies SI/HI/AVH, tentative discharge Saturday/Sunday, follow-up appointments will be scheduled.

## 2021-09-10 NOTE — PLAN OF CARE
Problem: Suicide risk  Goal: Provide patient with safe environment  Description: Provide patient with safe environment  Outcome: Ongoing     Problem: Anxiety:  Goal: Level of anxiety will decrease  Description: Level of anxiety will decrease  Outcome: Ongoing     Problem: Health Behavior:  Goal: Ability to verbalize adaptive coping strategies to use when suicidal feelings occur will improve  Description: Ability to verbalize adaptive coping strategies to use when suicidal feelings occur will improve  9/10/2021 1115 by Leonardo Becker RN  Outcome: Ongoing  9/10/2021 1052 by Bertha Rayo LPC  Outcome: Ongoing  Note:                                                                     Group Therapy Note    Date: 9/10/2021  Start Time: 1000  End Time:  2243  Number of Participants: 3    Type of Group: Psychoeducation    Wellness Binder Information  Module Name:  Staying well   Session Number:  1    Patient's Goal:  Daily maintenance and coping skills    Notes:  Pt attended group as scheduled. Pt participated in group discussion about positive steps to well being like relaxation, exercising, and taking up a hobby or learning a new skill. Status After Intervention:  Improved    Participation Level:  Active Listener and Interactive    Participation Quality: Appropriate, Attentive, and Sharing      Speech:  normal      Thought Process/Content: Logical      Affective Functioning: Congruent      Mood: depressed      Level of consciousness:  Alert and Attentive      Response to Learning: Able to verbalize current knowledge/experience, Able to verbalize/acknowledge new learning, and Able to retain information      Endings: None Reported    Modes of Intervention: Education, Support, and Socialization      Discipline Responsible: /Counselor      Signature:  Bertha Rayo LPC    Goal: Ability to verbalize adaptive coping strategies to use when the urge to self-mutilate occurs will improve  Description: Ability to verbalize adaptive coping strategies to use when the urge to self-mutilate occurs will improve  Outcome: Ongoing     Problem: Safety:  Goal: Ability to contract for his/her safety will improve  Description: Ability to contract for his/her safety will improve  Outcome: Ongoing     Problem: Pain:  Goal: Pain level will decrease  Description: Pain level will decrease  Outcome: Ongoing  Goal: Control of acute pain  Description: Control of acute pain  Outcome: Ongoing  Goal: Control of chronic pain  Description: Control of chronic pain  Outcome: Ongoing     Problem: Tobacco Use:  Goal: Inpatient tobacco use cessation counseling participation  Description: Inpatient tobacco use cessation counseling participation  Outcome: Ongoing     Problem: Falls - Risk of:  Goal: Will remain free from falls  Description: Will remain free from falls  Outcome: Ongoing  Goal: Absence of physical injury  Description: Absence of physical injury  Outcome: Ongoing

## 2021-09-10 NOTE — PROGRESS NOTES
Department of Psychiatry  Attending Progress Note     Chief complaint: \"Doing better\"    SUBJECTIVE:   Chart reviewed, discussed with the team.  No major issues reported. Patient slept well with Seroquel and trazodone. Patient observed sitting in the dining room. Presents with brighter affect. States \"overall doing better today\". Feels that he needs higher dose of Lamictal.  We discussed his medication regimen. Informed him that Lamictal is titrated slowly to avoid SEs. Patient was receptive. He is considering Vivitrol. Asking about cost.     OBJECTIVE    Physical  Wt Readings from Last 3 Encounters:   09/08/21 190 lb (86.2 kg)   01/07/21 195 lb (88.5 kg)   10/23/20 190 lb (86.2 kg)     Temp Readings from Last 3 Encounters:   09/10/21 97.6 °F (36.4 °C) (Temporal)   01/07/21 97.8 °F (36.6 °C)   10/23/20 98.7 °F (37.1 °C) (Oral)     BP Readings from Last 3 Encounters:   09/10/21 (!) 121/94   01/07/21 (!) 142/84   10/24/20 (!) 151/84     Pulse Readings from Last 3 Encounters:   09/10/21 97   01/07/21 92   10/24/20 99        Review of Systems: 14-point review of systems negative except as described above    Mental Status Examination:   Appearance:  Stated age. Gait stable. No abnormal movements or tremor. Behavior: Calm, cooperative  Speech: Normal in tone, volume, and quality. No slurring, dysarthria or pressured speech noted. Mood: \"Better \"   Affect: Mood congruent. Thought Process: Appears linear. Thought Content: Denies SI/HI . No overt delusions or paranoia appreciated. Perceptions: Denies auditory or visual hallucinations at present time. Not responding to internal stimuli. Concentration: Intact. Orientation: to person, place, date, and situation. Language: Intact. Fund of information: Intact. Memory: Recent and remote appear intact. Neurovegitative: Improved appetite and sleep. Insight: Improving. Judgment: Improving.     Data  Lab Results   Component Value Date    WBC 5.6 09/08/2021    HGB 14.5 09/08/2021    HCT 43.3 09/08/2021    MCV 92.7 09/08/2021     09/08/2021      Lab Results   Component Value Date     09/08/2021    K 4.3 09/08/2021    CL 95 (L) 09/08/2021    CO2 22 09/08/2021    BUN 15 09/08/2021    CREATININE 0.9 09/08/2021    GLUCOSE 94 09/08/2021    CALCIUM 8.3 (L) 09/08/2021    PROT 7.1 09/08/2021    LABALBU 4.3 09/08/2021    BILITOT <0.2 09/08/2021    ALKPHOS 118 09/08/2021    AST 70 (H) 09/08/2021    ALT 53 (H) 09/08/2021    LABGLOM >60 09/08/2021    GFRAA >59 09/08/2021       Medications    Current Facility-Administered Medications:     lamoTRIgine (LAMICTAL) tablet 225 mg, 225 mg, Oral, Daily, Olamide Borges MD, 225 mg at 09/10/21 3615    gabapentin (NEURONTIN) capsule 600 mg, 600 mg, Oral, TID, Olamide Borges MD, 600 mg at 23/85/08 8037    folic acid (FOLVITE) tablet 1 mg, 1 mg, Oral, Daily, Olamide Borges MD, 1 mg at 09/10/21 9730    thiamine mononitrate tablet 100 mg, 100 mg, Oral, Daily, Olamide Borges MD, 100 mg at 09/10/21 2318    traZODone (DESYREL) tablet 50 mg, 50 mg, Oral, Nightly PRN, Olamide Borges MD, 50 mg at 09/09/21 2251    therapeutic multivitamin-minerals 1 tablet, 1 tablet, Oral, Daily, Jd Landry MD, 1 tablet at 09/10/21 8478    polyethylene glycol (GLYCOLAX) packet 17 g, 17 g, Oral, Daily PRN, Olamide Borges MD    nicotine (NICODERM CQ) 21 MG/24HR 1 patch, 1 patch, TransDERmal, Daily, Olamide Borges MD, 1 patch at 09/09/21 0827    Clinical institute withdrawal assessment, , , Q4H **AND** LORazepam (ATIVAN) tablet 1 mg, 1 mg, Oral, Q4H PRN, 1 mg at 09/09/21 1950 **AND** LORazepam (ATIVAN) tablet 2 mg, 2 mg, Oral, Q4H PRN, Olamide Borges MD    QUEtiapine (SEROQUEL) tablet 200 mg, 200 mg, Oral, Nightly, Olamide Borges MD, 200 mg at 09/09/21 2051    hydrOXYzine (ATARAX) tablet 25 mg, 25 mg, Oral, TID PRN, Olamide Borges MD, 25 mg at 09/09/21 2051    famotidine (PEPCID) tablet 40 mg, 40 mg, Oral,

## 2021-09-10 NOTE — PROGRESS NOTES
BHI Daily Shift Assessment-Geriatric Unit  Nursing Progress Note          Room: [facility]620-   Name: Donnie Cherry   Age: 46 y.o. Gender: male   Dx: <principal problem not specified>  Precautions: close watch, suicide risk, fall risk and seizure precautions  Inpatient Status: voluntary     SLEEP:    Sleep: Yes,   Sleep Quality Good   Hours Slept:    Sleep Medications: Yes  PRN Sleep Meds: Yes Trazodone      MEDICAL:      Other PRN Meds: Yes and Ativan 1 mg for withdrawal,per Ciwa score 9  Med Compliant: Yes   Accu-Chek: No   Oxygen/CPAP/BiPAP: No  CIWA/CINA: Yes scorre 9  PAIN Assessment: none  Side Effects from medication: No    Is Patient experiencing any respiratory symptoms (headache, fever, body aches, cough. Chance Blanco ): no  Patient educated by nursing to practice social distancing, wear masks, wash hands frequently: yes      Metabolic Screening:    Lab Results   Component Value Date    LABA1C 5.2 09/09/2020       Lab Results   Component Value Date    CHOL 189 09/09/2020    CHOL 148 (L) 01/27/2019    CHOL 177 09/30/2017    CHOL 151 12/09/2011     Lab Results   Component Value Date    TRIG 178 (H) 09/09/2020    TRIG 99 01/27/2019    TRIG 119 (L) 09/30/2017    TRIG 130 12/09/2011     Lab Results   Component Value Date    HDL 50 (L) 09/09/2020    HDL 44 (L) 01/27/2019    HDL 55 09/30/2017    HDL 35 12/09/2011     No components found for: LDLCAL  No results found for: LABVLDL      Body mass index is 23.13 kg/m².     BP Readings from Last 2 Encounters:   09/09/21 (!) 152/91   01/07/21 (!) 142/84         PSYCH:     SI denies suicidal ideation    HI Negative for homicidal ideation        AVH:Absent      Depression: 5 Anxiety: 7       GENERAL:      Appetite: improved  Social: Yes Speech: normal and hyper verbal at times   Appearance:appropriately dressed  Assistive Devices: noneLevel of Assist: Independent      GROUP:    Group Participation: Yes  Participation LevelActive Listener and Interactive    Participation QualityAppropriate, Attentive and Sharing    Notes:   Pt in day area, talkative, with staff, pt on CIWA for alcohol withdraw, CIWA 9 and administered Ativan 1 mg. Pt stated that depression 5 and improved and anxiety 7 and constant. Pt shared personal feelings of marriages, step children, employment and feeling of his drinking and alcohol use. Pt med compliant, performed ADL's, participated wrap up group, social with staff and peers, pt hyper verbal interm. Pt denies SI,HI and AVH this shift. Will continue to monitor for safety and comfort.        Electronically signed by Moise Vogel RN on 9/10/21 at 4:39 AM CDT

## 2021-09-10 NOTE — PROGRESS NOTES
BHI Daily Shift Assessment  Nursing Progress Note    Room: 0620/620-01 Name: Negro Thapa Age: 46 y.o. Gender: male   Dx: <principal problem not specified>  Precautions: suicide risk and seizure precautions  Target Symptoms:   Accu-Chek: NoSleep: Yes,Sleep Quality Good SI No AVH denies 5 [facility]  ADLs: Yes Speech: normal Depression: 6 Anxiety: 5   Participation LevelActive Listener  Appetite: Good  Respiratory symptoms: No Headache: No Body aches: No Fever: No Cough: No  Patients encouraged to wear masks, wash hands frequently and practice social distancing while on the unit: Yes  Visitation: No   Participation QualityAppropriate    Notes: pt sitting in TV area dressed and groomed in casual clothing during interview. Pt states he slept like a log last night and feels \"pretty good\" today. Pt denies SI,HI and AVH this shift. Pt is compliant with treatment plan and has no complaints at this time. Pt is social with staff and is calm and cooperative this shift. Will continue to monitor pt this shift.

## 2021-09-10 NOTE — PROGRESS NOTES
Pt requested an appointment with [facility] mental [facility] to see Dr. Meet Marroquin. SW placed call and it was reported that pt would have to be seen for therapy first and then pt could switch over to see Dr. Meet Marroquin. Appointment was made on 9/27/2021 at 9:45am. Pt notified.      Electronically signed by Paul Hook [facility] on 9/10/2021 at 10:35 AM

## 2021-09-11 PROCEDURE — 1240000000 HC EMOTIONAL WELLNESS R&B

## 2021-09-11 PROCEDURE — 99233 SBSQ HOSP IP/OBS HIGH 50: CPT | Performed by: PSYCHIATRY & NEUROLOGY

## 2021-09-11 PROCEDURE — 6370000000 HC RX 637 (ALT 250 FOR IP): Performed by: PSYCHIATRY & NEUROLOGY

## 2021-09-11 PROCEDURE — 6370000000 HC RX 637 (ALT 250 FOR IP): Performed by: EMERGENCY MEDICINE

## 2021-09-11 RX ADMIN — THIAMINE HCL TAB 100 MG 100 MG: 100 TAB at 08:33

## 2021-09-11 RX ADMIN — GABAPENTIN 600 MG: 300 CAPSULE ORAL at 20:52

## 2021-09-11 RX ADMIN — HYDROXYZINE HYDROCHLORIDE 25 MG: 25 TABLET, FILM COATED ORAL at 20:52

## 2021-09-11 RX ADMIN — FOLIC ACID 1 MG: 1 TABLET ORAL at 08:32

## 2021-09-11 RX ADMIN — LORAZEPAM 1 MG: 1 TABLET ORAL at 20:53

## 2021-09-11 RX ADMIN — GABAPENTIN 600 MG: 300 CAPSULE ORAL at 08:33

## 2021-09-11 RX ADMIN — LAMOTRIGINE 225 MG: 100 TABLET ORAL at 08:35

## 2021-09-11 RX ADMIN — LORAZEPAM 1 MG: 1 TABLET ORAL at 08:47

## 2021-09-11 RX ADMIN — QUETIAPINE FUMARATE 200 MG: 200 TABLET ORAL at 20:53

## 2021-09-11 RX ADMIN — LOSARTAN POTASSIUM 100 MG: 100 TABLET, FILM COATED ORAL at 08:33

## 2021-09-11 RX ADMIN — HYDROXYZINE HYDROCHLORIDE 25 MG: 25 TABLET, FILM COATED ORAL at 16:46

## 2021-09-11 RX ADMIN — LORAZEPAM 1 MG: 1 TABLET ORAL at 16:46

## 2021-09-11 RX ADMIN — Medication 1 TABLET: at 08:32

## 2021-09-11 RX ADMIN — FAMOTIDINE 40 MG: 20 TABLET, FILM COATED ORAL at 08:32

## 2021-09-11 RX ADMIN — TRAZODONE HYDROCHLORIDE 50 MG: 50 TABLET ORAL at 20:52

## 2021-09-11 RX ADMIN — GABAPENTIN 600 MG: 300 CAPSULE ORAL at 14:51

## 2021-09-11 NOTE — PROGRESS NOTES
BHI Daily Shift Assessment  Nursing Progress Note    Room: 0620/620-01 Name: Roseann Conley Age: 46 y.o. Gender: male   Dx: <principal problem not specified>  Precautions: suicide risk and fall risk  Target Symptoms:   Accu-Chek: NoSleep: Yes,Sleep Quality Good SI No AVH denies [facility]  ADLs: Yes Speech: normal Depression: 2 Anxiety: 2   Participation LevelActive Listener and Interactive  Appetite: Good  Respiratory symptoms: No Headache: No Body aches: No Fever: No Cough: No  Patients encouraged to wear masks, wash hands frequently and practice social distancing while on the unit: Yes  Visitation: No   Participation QualityAppropriate    Notes: Pt siting in TV area dressed and groomed in casual clothing during interview. Pt states he slept well last night and has no complaints at this time. Pt is compliant with treatment plan and social with staff and other peers on the unit. Pt denies SI, HI and AVH this shift. Will continue to monitor pt this shift.

## 2021-09-11 NOTE — PROGRESS NOTES
Department of Psychiatry  Attending Progress Note - Geriatric      SUBJECTIVE:    46years old male with previous psychiatric history of mood disorder and alcohol abuse, who has been admitted to our psychiatric unit secondary to alcohol intoxication, blood alcohol level 245, and suicidal ideations. Patient has been seen in treatment team room. He reported that his condition significantly improved during this hospital stay and his mood is \"much better\" today. Patient endorses good appetite and reported improved quality of sleep, stated that he did not experience significant difficulties to fall asleep or stay asleep during the last night. He is compliant with currently prescribed medications and denies any side effects. Patient continues to report feeling of depression and anxiety, and rated both of them as 4-5 out of 10, with 10 being the worst.  He denies any other affective symptomatology today. Patient attends group activities in the unit and participates in those activities. He is social with medical staff and other patients in the unit. He performs his ADLs daily. Patient denies current active suicidal or homicidal ideations, denies any plans. Also, he denies any auditory and visual hallucinations. OBJECTIVE    Physical  VITALS:  BP (!) 160/105   Pulse 93   Temp 97.2 °F (36.2 °C) (Temporal)   Resp 16   Ht 6' 4\" (1.93 m)   Wt 190 lb (86.2 kg)   SpO2 100%   BMI 23.13 kg/m²   TEMPERATURE:  Current - Temp: 97.2 °F (36.2 °C);  Max - Temp  Av.6 °F (36.4 °C)  Min: 97.2 °F (36.2 °C)  Max: 98.1 °F (36.7 °C)  RESPIRATIONS RANGE: Resp  Av.3  Min: 16  Max: 17  PULSE RANGE: Pulse  Av.7  Min: 82  Max: 101  BLOOD PRESSURE RANGE:  Systolic (89WTK), CJT:758 , Min:121 , YWB:937   ; Diastolic (06IIW), IVA:46, Min:89, Max:109    PULSE OXIMETRY RANGE: SpO2  Av %  Min: 100 %  Max: 100 %    Review of Systems: 14 point review of systems is negative    Psychological ROS: Positive for presence of anxiety and depression    Mental Status Examination:    Appearance: Appropriately groomed, wearing casual civilian clothes. Made good eye contact. Behavior: Calm, cooperative, friendly, socially appropriate. No psychomotor retardation or agitation appreciated. Gait unremarkable. Speech: Normal in tone, hyperverbal, no pressured speech noted. Mood: \"Much better\"   Affect: Mood congruent. Range is slightly intense  Thought Process: Mostly circumstantial  Thought Content: Patient does not have any current active suicidal and homicidal ideations. No overt delusions or paranoia appreciated. Perceptions: Seems patient does not have any auditory or visual hallucinations at present time. Patient did not appear to be responding to internal stimuli. No overt psychosis. Orientation: to person, place, date, and situation. Alert. Impulsivity: Improved. Neurovegitative: Good appetite, improved sleep  Insight: Limited.    Judgment: Limited    Data  No new lab test results to review    Medications  Current Facility-Administered Medications: [START ON 9/12/2021] naltrexone (VIVITROL) injection 380 mg, 380 mg, IntraMUSCular, Once  vitamin D (ERGOCALCIFEROL) capsule 50,000 Units, 50,000 Units, Oral, Weekly  cloNIDine (CATAPRES) tablet 0.1 mg, 0.1 mg, Oral, Q4H PRN  lamoTRIgine (LAMICTAL) tablet 225 mg, 225 mg, Oral, Daily  gabapentin (NEURONTIN) capsule 600 mg, 600 mg, Oral, TID  folic acid (FOLVITE) tablet 1 mg, 1 mg, Oral, Daily  thiamine mononitrate tablet 100 mg, 100 mg, Oral, Daily  traZODone (DESYREL) tablet 50 mg, 50 mg, Oral, Nightly PRN  therapeutic multivitamin-minerals 1 tablet, 1 tablet, Oral, Daily  polyethylene glycol (GLYCOLAX) packet 17 g, 17 g, Oral, Daily PRN  nicotine (NICODERM CQ) 21 MG/24HR 1 patch, 1 patch, TransDERmal, Daily  Clinical institute withdrawal assessment, , , Q4H **AND** LORazepam (ATIVAN) tablet 1 mg, 1 mg, Oral, Q4H PRN **AND** LORazepam (ATIVAN) tablet 2 mg, 2 mg, Oral, Q4H PRN  QUEtiapine (SEROQUEL) tablet 200 mg, 200 mg, Oral, Nightly  hydrOXYzine (ATARAX) tablet 25 mg, 25 mg, Oral, TID PRN  famotidine (PEPCID) tablet 40 mg, 40 mg, Oral, Daily  losartan (COZAAR) tablet 100 mg, 100 mg, Oral, Daily  acetaminophen (TYLENOL) tablet 650 mg, 650 mg, Oral, Q4H PRN    ASSESSMENT AND PLAN    DSM-5 DIAGNOSIS:   Mood disorder  unspecified  Alcohol withdrawal  Alcohol use disorder, severe  Tobacco use disorder     Pertinent medical issues  Transaminitis  HTN    Plan:   1. Psychiatric Medications:   Continue current psychotropic medications as recommended. Patient denies side effect of currently prescribed medications. No changes with dose of prescribed psychotropic medications today. 2. Medical Issues:    Continue medical monitoring by Dr. Jaimee Burgos and associates. 3. Disposition:    Discharge patient home when he will be in stable mental condition and adjustment psychotropic medications will be done.      Amount of time spent with patient:    35 minutes with greater than 50% of the time spent in counseling and collaboration of care

## 2021-09-11 NOTE — PROGRESS NOTES
Progress Note  Ruby Held  9/10/2021 8:48 PM  Subjective:   Admit Date:   9/8/2021      CC/ADMIT DX:       Interval History:   Reviewed overnight events and nursing notes. No new physical complaints. I have reviewed all labs/diagnostics from the last 24hrs. ROS:   I have done a 10 point ROS and all are negative, except what is mentioned in the HPI. ADULT DIET; Regular    Medications:      [START ON 9/12/2021] naltrexone  380 mg IntraMUSCular Once    vitamin D  50,000 Units Oral Weekly    lamoTRIgine  225 mg Oral Daily    gabapentin  600 mg Oral TID    folic acid  1 mg Oral Daily    thiamine mononitrate  100 mg Oral Daily    multivitamin  1 tablet Oral Daily    nicotine  1 patch TransDERmal Daily    QUEtiapine  200 mg Oral Nightly    famotidine  40 mg Oral Daily    losartan  100 mg Oral Daily           Objective:   Vitals: BP (!) 163/93   Pulse 82   Temp 98.1 °F (36.7 °C) (Temporal)   Resp 17   Ht 6' 4\" (1.93 m)   Wt 190 lb (86.2 kg)   SpO2 100%   BMI 23.13 kg/m²  No intake or output data in the 24 hours ending 09/10/21 2048  General appearance: alert and cooperative with exam  Extremities: extremities normal, atraumatic, no cyanosis or edema  Neurologic:  No obvious focal neurologic deficits. Skin: no rashes    Assessment and Plan:   ETOH Abuse  Depression  HTN  Vit D Def    Plan:  1. Continue present medication(s)   2. Follow with Psych  3. Replace Vit D      Discharge planning:   home     Reviewed treatment plans with the patient and/or family.              Electronically signed by Mirtha White MD on 9/10/2021 at 8:48 PM

## 2021-09-11 NOTE — PROGRESS NOTES
[facility] Adult Unit Daily Assessment  Nursing Progress Note    Room: 0620/620-01   Name: Josh Dempsey   Age: 46 y.o. Gender: male   Dx: <principal problem not specified>  Precautions: suicide risk, seizure precautions, fall precautions   Inpatient Status: voluntary       SLEEP:    Sleep Quality Good  Sleep Medications: No   PRN Sleep Meds: No       MEDICAL:    Other PRN Meds: Yes   Med Compliant: Yes  Accu-Chek: No  Oxygen/CPAP/BiPAP: No  CIWA/CINA: Yes   PAIN Assessment: headaches  Side Effects from medication: No    Is Patient experiencing any respiratory symptoms (headache, fever, body aches, cough. Irina Turner ): no  Patient educated by nursing to practice social distancing, wear masks, wash hands frequently: yes      PSYCH:    Depression: 5   Anxiety: 5   SI denies suicidal ideation   HI Negative for homicidal ideation      AVH:Absent      GENERAL:    Appetite: no change from normal    Social: Yes   Speech: normal   Appearance: appropriately dressed and healthy looking    GROUP:    Group Participation: Yes  Participation Quality: Minimal    Notes: Patient is calm, cooperative, A&O x4. Patient is very social sitting out in common area watching TV. Maintained good eye contact throughout interview. Complaint with ADL's, medications, and group. Facial expression appeared to be flat and worried. Patient discussed political issues from watching news; discussed with patient that watching the news may not be the best stress reliever. Reports feeling depressed and anxious. Rated depression and anxiety a 5 on a scale of 0-10 with 10 being the worst. Patient reports, \" I am really nervous about tomorrow because when I don't have anything to drink (alcohol), the third day is always the worst for me\". Reports having a headache with a rating of 6 on 0-10 scale; PRN Tylenol offered but patient refused. Denies SI, HI, AVH. Reports having good quality of sleep and also have increased appetite.  No signs of distress noted; patient left

## 2021-09-12 PROCEDURE — 1240000000 HC EMOTIONAL WELLNESS R&B

## 2021-09-12 PROCEDURE — 6370000000 HC RX 637 (ALT 250 FOR IP): Performed by: EMERGENCY MEDICINE

## 2021-09-12 PROCEDURE — 6370000000 HC RX 637 (ALT 250 FOR IP): Performed by: PSYCHIATRY & NEUROLOGY

## 2021-09-12 RX ADMIN — LORAZEPAM 1 MG: 1 TABLET ORAL at 08:48

## 2021-09-12 RX ADMIN — THIAMINE HCL TAB 100 MG 100 MG: 100 TAB at 08:20

## 2021-09-12 RX ADMIN — Medication 1 TABLET: at 08:19

## 2021-09-12 RX ADMIN — GABAPENTIN 600 MG: 300 CAPSULE ORAL at 08:19

## 2021-09-12 RX ADMIN — LORAZEPAM 1 MG: 1 TABLET ORAL at 22:10

## 2021-09-12 RX ADMIN — QUETIAPINE FUMARATE 200 MG: 200 TABLET ORAL at 21:05

## 2021-09-12 RX ADMIN — FAMOTIDINE 40 MG: 20 TABLET, FILM COATED ORAL at 08:23

## 2021-09-12 RX ADMIN — FOLIC ACID 1 MG: 1 TABLET ORAL at 08:21

## 2021-09-12 RX ADMIN — GABAPENTIN 600 MG: 300 CAPSULE ORAL at 15:20

## 2021-09-12 RX ADMIN — LAMOTRIGINE 225 MG: 100 TABLET ORAL at 08:20

## 2021-09-12 RX ADMIN — LOSARTAN POTASSIUM 100 MG: 100 TABLET, FILM COATED ORAL at 08:20

## 2021-09-12 RX ADMIN — HYDROXYZINE HYDROCHLORIDE 25 MG: 25 TABLET, FILM COATED ORAL at 21:04

## 2021-09-12 RX ADMIN — TRAZODONE HYDROCHLORIDE 50 MG: 50 TABLET ORAL at 21:04

## 2021-09-12 RX ADMIN — GABAPENTIN 600 MG: 300 CAPSULE ORAL at 21:04

## 2021-09-12 RX ADMIN — LORAZEPAM 1 MG: 1 TABLET ORAL at 18:17

## 2021-09-12 NOTE — PROGRESS NOTES
WRAP UP GROUP NOTE:     Patient's Goal:  Providing feedback as to their own progress in the care-plan provided. Pt's have an opportunity to explore self-reflective skills and share any additional cares and concerns not yet addressed. Pt effectively participated. Energy level:  Normal   Appetite:  Blank   Concentration:   Good  Hallucinations:  Never   Depression:  Blank   Anxiety:  Blank   How I worked today:  Tried a lot   What helps me sleep:  Medication   Any questions/complaints/comments:  Detox is much better-still shaky and headache but mentally better. The lamotrigine is easing depression and anxiety quite well. Group/activities that helped me today were:  Gloria Higuera was very helpful in taking to me and encouraging. Justin Stephenson was on the spot for what anyone asked of him.

## 2021-09-12 NOTE — GROUP NOTE
Group Therapy Note    Date: 9/12/2021    Group Start Time: 1100  Group End Time: [address]  Group Topic: Healthy Living/Wellness    [facility] 6 GERIATRIC BEHAVIORAL UNIT    Aminata Rose RN        Group Therapy Note    Attendees:          Patient's Goal:  \"staying sober\"    Notes:  Pt calm and cooperative during group    Status After Intervention:  Improved    Participation Level:  Active Listener    Participation Quality: Appropriate      Speech:  normal      Thought Process/Content: Logical      Affective Functioning: Congruent      Mood: bright      Level of consciousness:  Alert and Oriented x4      Response to Learning: Able to verbalize current knowledge/experience, Able to verbalize/acknowledge new learning and Able to retain information      Endings: None Reported    Modes of Intervention: Education and Support      Discipline Responsible: Registered Nurse      Signature:  Dafne Alfaro RN

## 2021-09-12 NOTE — GROUP NOTE
Group Therapy Note    Date: 9/12/2021    Group Start Time: 0830  Group End Time: 0930  Group Topic: Community Meeting    [facility] 6 GERIATRIC BEHAVIORAL UNIT    Jose Purcell RN        Group Therapy Note    Attendees:          Patient's Goal:  \"concentration\"    Notes:  Pt calm and cooperative during group    Status After Intervention:  Improved    Participation Level:  Active Listener    Participation Quality: Appropriate, Attentive and Sharing      Speech:  normal      Thought Process/Content: Logical      Affective Functioning: Congruent      Mood: anxious      Level of consciousness:  Alert and Oriented x4      Response to Learning: Able to verbalize current knowledge/experience, Able to verbalize/acknowledge new learning, Able to retain information and Capable of insight      Endings: None Reported    Modes of Intervention: Education and Support      Discipline Responsible: Registered Nurse      Signature:  Noemi Duff RN

## 2021-09-12 NOTE — PLAN OF CARE
Problem: Suicide risk  Goal: Provide patient with safe environment  Description: Provide patient with safe environment  9/12/2021 0940 by Brenda Lee RN  Outcome: Ongoing  9/12/2021 0053 by Mack Baer RN  Outcome: Met This Shift     Problem: Anxiety:  Goal: Level of anxiety will decrease  Description: Level of anxiety will decrease  9/12/2021 0940 by Brenda Lee RN  Outcome: Ongoing  9/12/2021 0053 by Mack Baer RN  Outcome: Ongoing     Problem: Health Behavior:  Goal: Ability to verbalize adaptive coping strategies to use when suicidal feelings occur will improve  Description: Ability to verbalize adaptive coping strategies to use when suicidal feelings occur will improve  9/12/2021 0940 by Brenda Lee RN  Outcome: Ongoing  9/12/2021 0053 by Mack Baer RN  Outcome: Ongoing  Goal: Ability to verbalize adaptive coping strategies to use when the urge to self-mutilate occurs will improve  Description: Ability to verbalize adaptive coping strategies to use when the urge to self-mutilate occurs will improve  9/12/2021 0940 by Brenda Lee RN  Outcome: Ongoing  9/12/2021 0053 by Mack Baer RN  Outcome: Ongoing     Problem: Safety:  Goal: Ability to contract for his/her safety will improve  Description: Ability to contract for his/her safety will improve  9/12/2021 0940 by Brenda Lee RN  Outcome: Ongoing  9/12/2021 0053 by Mack Baer RN  Outcome: Ongoing     Problem: Pain:  Goal: Pain level will decrease  Description: Pain level will decrease  9/12/2021 0940 by Brenda Lee RN  Outcome: Ongoing  9/12/2021 0053 by Mack Baer RN  Outcome: Ongoing  Goal: Control of acute pain  Description: Control of acute pain  9/12/2021 0940 by Brenda Lee RN  Outcome: Ongoing  9/12/2021 0053 by Mack Baer RN  Outcome: Ongoing  Goal: Control of chronic pain  Description: Control of chronic pain  9/12/2021 0940 by Brenda Lee RN  Outcome: Ongoing  9/12/2021 0053 by Mack Baer RN  Outcome: Ongoing     Problem: Tobacco Use:  Goal: Inpatient tobacco use cessation counseling participation  Description: Inpatient tobacco use cessation counseling participation  9/12/2021 0940 by Rupesh Thrasher RN  Outcome: Ongoing  9/12/2021 0053 by Ronaldo Ziegler RN  Outcome: Ongoing     Problem: Falls - Risk of:  Goal: Will remain free from falls  Description: Will remain free from falls  9/12/2021 0940 by Rupesh Thrasher RN  Outcome: Ongoing  9/12/2021 0053 by Ronaldo Ziegler RN  Outcome: Met This Shift  Goal: Absence of physical injury  Description: Absence of physical injury  9/12/2021 0940 by Rupesh Thrasher RN  Outcome: Ongoing  9/12/2021 0053 by Ronaldo Ziegler RN  Outcome: Met This Shift

## 2021-09-12 NOTE — PROGRESS NOTES
BHI Daily Shift Assessment-Geriatric Unit  Nursing Progress Note          Room: [facility]620-   Name: Melba Cardenas   Age: 46 y.o. Gender: male   Dx: <principal problem not specified>  Precautions: suicide risk, fall risk and seizure precautions  Inpatient Status: voluntary     SLEEP:    Sleep: Yes,   Sleep Quality Good   Hours Slept: 7   Sleep Medications: Yes  PRN Sleep Meds: Yes       MEDICAL:      Other PRN Meds: No   Med Compliant: Yes   Accu-Chek: No   Oxygen/CPAP/BiPAP: No  CIWA/CINA: No   PAIN Assessment: none  Side Effects from medication: No    Is Patient experiencing any respiratory symptoms (headache, fever, body aches, cough. Alcon Sams ): no  Patient educated by nursing to practice social distancing, wear masks, wash hands frequently: yes      Metabolic Screening:    Lab Results   Component Value Date    LABA1C 5.2 09/09/2020       Lab Results   Component Value Date    CHOL 189 09/09/2020    CHOL 148 (L) 01/27/2019    CHOL 177 09/30/2017    CHOL 151 12/09/2011     Lab Results   Component Value Date    TRIG 178 (H) 09/09/2020    TRIG 99 01/27/2019    TRIG 119 (L) 09/30/2017    TRIG 130 12/09/2011     Lab Results   Component Value Date    HDL 50 (L) 09/09/2020    HDL 44 (L) 01/27/2019    HDL 55 09/30/2017    HDL 35 12/09/2011     No components found for: LDLCAL  No results found for: LABVLDL      Body mass index is 23.13 kg/m². BP Readings from Last 2 Encounters:   09/11/21 (!) 145/91   01/07/21 (!) 142/84         PSYCH:     SI denies suicidal ideation    HI Negative for homicidal ideation        AVH:Absent      Depression: 3 Anxiety: 5       GENERAL:      Appetite: good  Social: Yes Speech: normal   Appearance:appropriately dressed and appropriately groomed  Assistive Devices: noneLevel of Assist: Independent      GROUP:    Group Participation: Yes  Participation LevelMinimal    Participation QualityAppropriate    Notes:  Patient sitting in the day area at the beginning of the shift talking with staff. Patient pleasant and cooperative with staff and other patients. Patient sat in the day area and listened to music and ate snack. Patient took medications and then went to the TV area to watch TV for a while before going to bed. Patient has not complained of any signs or symptoms of respiratory issues. Will continue to monitor for any changes. Patient resting in bed with eyes closed at this time. Will continue to monitor for safety.           Electronically signed by Neyda Forman RN on 9/12/21 at 2:50 AM CDT

## 2021-09-13 VITALS
SYSTOLIC BLOOD PRESSURE: 140 MMHG | TEMPERATURE: 98.2 F | BODY MASS INDEX: 23.14 KG/M2 | HEIGHT: 76 IN | WEIGHT: 190 LBS | HEART RATE: 92 BPM | RESPIRATION RATE: 18 BRPM | OXYGEN SATURATION: 100 % | DIASTOLIC BLOOD PRESSURE: 80 MMHG

## 2021-09-13 PROBLEM — F10.939 ALCOHOL WITHDRAWAL SYNDROME WITH COMPLICATION (HCC): Status: RESOLVED | Noted: 2019-02-16 | Resolved: 2021-09-13

## 2021-09-13 PROBLEM — F31.5 BIPOLAR DISORDER, CURR EPISODE DEPRESSED, SEVERE, W/PSYCHOTIC FEATURES (HCC): Status: RESOLVED | Noted: 2018-06-27 | Resolved: 2021-09-13

## 2021-09-13 PROBLEM — F10.920 ACUTE ALCOHOLIC INTOXICATION WITHOUT COMPLICATION (HCC): Status: RESOLVED | Noted: 2018-10-01 | Resolved: 2021-09-13

## 2021-09-13 PROBLEM — F31.4 BIPOLAR I DISORDER, MOST RECENT EPISODE DEPRESSED, SEVERE WITHOUT PSYCHOTIC FEATURES (HCC): Status: RESOLVED | Noted: 2019-02-20 | Resolved: 2021-09-13

## 2021-09-13 PROBLEM — F10.129 ALCOHOL INTOXICATION WITH BLOOD LEVEL OVER 0.3 (HCC): Status: RESOLVED | Noted: 2020-09-04 | Resolved: 2021-09-13

## 2021-09-13 PROBLEM — F31.9 BIPOLAR 1 DISORDER, DEPRESSED (HCC): Status: RESOLVED | Noted: 2018-06-29 | Resolved: 2021-09-13

## 2021-09-13 PROBLEM — R45.851 SUICIDAL IDEATION: Status: RESOLVED | Noted: 2019-06-12 | Resolved: 2021-09-13

## 2021-09-13 PROBLEM — F19.920 INTOXICATION BY DRUG, UNCOMPLICATED (HCC): Status: RESOLVED | Noted: 2018-09-30 | Resolved: 2021-09-13

## 2021-09-13 PROBLEM — F10.930 ALCOHOL WITHDRAWAL SYNDROME, UNCOMPLICATED (HCC): Status: RESOLVED | Noted: 2020-09-15 | Resolved: 2021-09-13

## 2021-09-13 PROBLEM — F10.20 ALCOHOLISM (HCC): Status: RESOLVED | Noted: 2019-01-28 | Resolved: 2021-09-13

## 2021-09-13 PROBLEM — F31.63 BIPOLAR I DISORDER, MOST RECENT EPISODE MIXED, SEVERE WITHOUT PSYCHOTIC FEATURES (HCC): Status: RESOLVED | Noted: 2019-09-10 | Resolved: 2021-09-13

## 2021-09-13 PROBLEM — T14.91XA SUICIDAL BEHAVIOR WITH ATTEMPTED SELF-INJURY (HCC): Status: RESOLVED | Noted: 2019-01-26 | Resolved: 2021-09-13

## 2021-09-13 PROBLEM — F34.9 PERSISTENT MOOD (AFFECTIVE) DISORDER, UNSPECIFIED (HCC): Status: ACTIVE | Noted: 2021-09-13

## 2021-09-13 PROBLEM — F41.9 ANXIETY: Chronic | Status: ACTIVE | Noted: 2021-09-13

## 2021-09-13 PROBLEM — F39 UNSPECIFIED MOOD (AFFECTIVE) DISORDER (HCC): Status: RESOLVED | Noted: 2020-09-09 | Resolved: 2021-09-13

## 2021-09-13 PROBLEM — F33.2 SEVERE EPISODE OF RECURRENT MAJOR DEPRESSIVE DISORDER, WITHOUT PSYCHOTIC FEATURES (HCC): Status: RESOLVED | Noted: 2017-09-28 | Resolved: 2021-09-13

## 2021-09-13 PROBLEM — F32.2 SEVERE MAJOR DEPRESSION WITHOUT PSYCHOTIC FEATURES (HCC): Status: RESOLVED | Noted: 2019-09-09 | Resolved: 2021-09-13

## 2021-09-13 PROCEDURE — 99239 HOSP IP/OBS DSCHRG MGMT >30: CPT | Performed by: PSYCHIATRY & NEUROLOGY

## 2021-09-13 PROCEDURE — 6370000000 HC RX 637 (ALT 250 FOR IP): Performed by: EMERGENCY MEDICINE

## 2021-09-13 PROCEDURE — 5130000000 HC BRIDGE APPOINTMENT

## 2021-09-13 PROCEDURE — 6370000000 HC RX 637 (ALT 250 FOR IP): Performed by: PSYCHIATRY & NEUROLOGY

## 2021-09-13 RX ORDER — NALTREXONE 380 MG
380 KIT INTRAMUSCULAR
Qty: 1.2 EACH | Refills: 1 | Status: ON HOLD | OUTPATIENT
Start: 2021-10-10 | End: 2022-04-05 | Stop reason: SDUPTHER

## 2021-09-13 RX ORDER — LAMOTRIGINE 100 MG/1
TABLET ORAL
Qty: 177 TABLET | Refills: 0 | Status: ON HOLD | OUTPATIENT
Start: 2021-09-14 | End: 2022-04-05 | Stop reason: HOSPADM

## 2021-09-13 RX ORDER — GABAPENTIN 600 MG/1
600 TABLET ORAL DAILY
Qty: 30 TABLET | Refills: 1 | Status: SHIPPED | OUTPATIENT
Start: 2021-09-13 | End: 2021-09-13 | Stop reason: HOSPADM

## 2021-09-13 RX ORDER — GABAPENTIN 600 MG/1
600 TABLET ORAL 3 TIMES DAILY
Qty: 90 TABLET | Refills: 1 | Status: SHIPPED | OUTPATIENT
Start: 2021-09-13 | End: 2022-04-29 | Stop reason: DRUGHIGH

## 2021-09-13 RX ORDER — TRAZODONE HYDROCHLORIDE 50 MG/1
50 TABLET ORAL NIGHTLY PRN
Qty: 30 TABLET | Refills: 1 | Status: ON HOLD | OUTPATIENT
Start: 2021-09-13 | End: 2022-04-05

## 2021-09-13 RX ADMIN — THIAMINE HCL TAB 100 MG 100 MG: 100 TAB at 08:13

## 2021-09-13 RX ADMIN — GABAPENTIN 600 MG: 300 CAPSULE ORAL at 08:12

## 2021-09-13 RX ADMIN — FOLIC ACID 1 MG: 1 TABLET ORAL at 08:13

## 2021-09-13 RX ADMIN — LOSARTAN POTASSIUM 100 MG: 100 TABLET, FILM COATED ORAL at 08:12

## 2021-09-13 RX ADMIN — Medication 1 TABLET: at 08:12

## 2021-09-13 RX ADMIN — FAMOTIDINE 40 MG: 20 TABLET, FILM COATED ORAL at 08:13

## 2021-09-13 RX ADMIN — LAMOTRIGINE 225 MG: 100 TABLET ORAL at 08:12

## 2021-09-13 NOTE — PROGRESS NOTES
SW met with treatment team to discuss pt's progress and setbacks. SW 2 was present. Pt reportedly slept well last night, with medications, appetite is good, attends scheduled group activities, social with peers/staff, performs ADLS, compliant with medications, behavior has been calm/cooperative, denies depression/anxiety, denies SI/HI/AVH, will be discharged today, follow-up appointments will be scheduled.

## 2021-09-13 NOTE — PROGRESS NOTES
CLINICAL PHARMACY NOTE: MEDS TO BEDS    Total # of Prescriptions Filled: 3   The following medications were delivered to the patient:  · Trazodone 50 mg  · Lamotrigine 100 mg  · Gabapentin 600 mg    Additional Documentation:    Handed scripts to patient at pharmacy window.

## 2021-09-13 NOTE — PROGRESS NOTES
[facility]  Discharge Note  Bridge Appointment completed: Reviewed Discharge Instructions with patient. Patient verbalizes understanding and agreement with the discharge plan using the teachback method. Referral for Outpatient Tobacco Cessation Counseling, upon discharge (nirmala X if applicable and completed):    ( )  Hospital staff assisted patient to call Quit Line or faxed referral                                   during hospitalization                  ( )  Recognizing danger situations (included triggers and roadblocks), if not completed on admission                    ( )  Coping skills (new ways to manage stress, exercise, relaxation techniques, changing routine, distraction), if not completed on admission                                                           ( )  Basic information about quitting (benefits of quitting, techniques in how to quit, available resources, if not completed on admission  ( ) Referral for counseling faxed to [facility]   (x ) Patient refused referral  (x ) Patient refused counseling  ( x) Patient refused smoking cessation medication upon discharge    Vaccinations (nirmala X if applicable and completed):  ( ) Patient states already received influenza vaccine elsewhere  ( ) Patient received influenza vaccine during this hospitalization  (x) Patient refused influenza vaccine at this time      Pt discharged with followings belongings:   Dentures: None  Vision - Corrective Lenses: None  Hearing Aid: None  Jewelry: None  Body Piercings Removed: No  Clothing: Footwear, Shirt, Pants  Were All Patient Medications Collected?: Not Applicable  Other Valuables: Money (Comment) (46 cents)   Valuables sent home with patient. Valuables retrieved from safe and returned to patient. Patient left department with  parents via  car , discharged to  home. Patient education on aftercare instructions: yes  Patient verbalize understanding of AVS:  yes.   Suicidal Ideations? No AVH? denies HI?  Negative for homicidal ideation       Status EXAM upon discharge:  Status and Exam  Normal: Yes  Facial Expression: Brightened  Affect: Appropriate  Level of Consciousness: Alert  Mood:Normal: Yes  Mood: Depressed, Anxious  Motor Activity:Normal: Yes  Interview Behavior: Cooperative  Preception: [place] to Person, [place] to Time, [place] to Place, [place] to Situation  Attention:Normal: Yes  Attention: Unable to Concentrate  Thought Processes: Circumstantial  Thought Content:Normal: Yes  Thought Content: Preoccupations  Hallucinations: None  Delusions: No  Memory:Normal: Yes  Memory: Poor Recent  Insight and Judgment: Yes  Insight and Judgment: Poor Judgment, Poor Insight  Present Suicidal Ideation: No  Present Homicidal Ideation: No

## 2021-09-13 NOTE — PLAN OF CARE
Problem: Suicide risk  Goal: Provide patient with safe environment  Description: Provide patient with safe environment  9/12/2021 2113 by Sonia Meza RN  Outcome: Ongoing     Problem: Anxiety:  Goal: Level of anxiety will decrease  Description: Level of anxiety will decrease  9/12/2021 2113 by Sonia Meza RN  Outcome: Ongoing     Problem: Health Behavior:  Goal: Ability to verbalize adaptive coping strategies to use when suicidal feelings occur will improve  Description: Ability to verbalize adaptive coping strategies to use when suicidal feelings occur will improve  9/12/2021 2113 by Sonia Meza RN  Outcome: Ongoing     Problem: Health Behavior:  Goal: Ability to verbalize adaptive coping strategies to use when the urge to self-mutilate occurs will improve  Description: Ability to verbalize adaptive coping strategies to use when the urge to self-mutilate occurs will improve  9/12/2021 2113 by Sonia Meza RN  Outcome: Ongoing     Problem: Safety:  Goal: Ability to contract for his/her safety will improve  Description: Ability to contract for his/her safety will improve  9/12/2021 2113 by Sonia Meza RN  Outcome: Ongoing     Problem: Pain:  Goal: Pain level will decrease  Description: Pain level will decrease  9/12/2021 2113 by Sonia Meza RN  Outcome: Ongoing     Problem: Pain:  Goal: Control of acute pain  Description: Control of acute pain  9/12/2021 2113 by Sonia Meza RN  Outcome: Ongoing     Problem: Pain:  Goal: Control of chronic pain  Description: Control of chronic pain  9/12/2021 2113 by Sonia Meza RN  Outcome: Ongoing     Problem: Tobacco Use:  Goal: Inpatient tobacco use cessation counseling participation  Description: Inpatient tobacco use cessation counseling participation  9/12/2021 2113 by Sonia Meza RN  Outcome: Ongoing     Problem: Falls - Risk of:  Goal: Will remain free from falls  Description: Will remain free from falls  9/12/2021 2113 by Radha Posey RN  Outcome: Ongoing     Problem: Falls - Risk of:  Goal: Absence of physical injury  Description: Absence of physical injury  9/12/2021 2113 by Radha Posey RN  Outcome: Ongoing

## 2021-09-13 NOTE — PROGRESS NOTES
Group Note    Number of Participants in Group: 3  Number of Patients on Unit:3      Patient attended group:Yes  Reason for Absence:  Intervention for patient absence:        Type of Group:   Wrap-Up/Relaxation    Patient's Goal: See wrap up group sheet    Participation Level:    Interactive           Patient Response to Learning: Yes    Patient's Behavior: Cooperative    Is Patient Social/Interacting: Yes    Relaxation:   Television:No   Reading:No   Game/Puzzle:No   Phone: No    Writing: Yes       Notes/Comments:  Participated in a reminiscence group with staff.       Please see patient's wrap up group sheet for patient's comments       Electronically signed by Victoriano Lima RN on 9/12/21 at 8:59 PM CDT
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
## 2021-09-13 NOTE — PROGRESS NOTES
[facility] Daily Shift Assessment  Nursing Progress Note    Room: 0620/620-01 Name: Denice Byers Age: 46 y.o. Gender: male   Dx: <principal problem not specified>  Precautions: close watch, suicide risk and fall risk  Target Symptoms:   Accu-Chek: NoSleep: Yes,Sleep Quality Good SI No AVH denies [address] [place] [address]  ADLs: Yes Speech: normal Depression: 0 Anxiety: 0   Participation LevelActive Listener and Interactive  Appetite: Good  Respiratory symptoms: No Headache: No Body aches: No Fever: No Cough: No  Patients encouraged to wear masks, wash hands frequently and practice social distancing while on the unit: Yes  Visitation: No   Participation QualityAppropriate, Attentive, Sharing and Supportive    Complaints:none    Notes: Patient is calm, cooperative and coherent.     Signature: Ayde Cruz RN

## 2021-09-13 NOTE — PROGRESS NOTES
Group Therapy Note    Date: 09/13/21  Start Time: 0800  End [id number]    Number of Participants: 3    Type of Group: self inventory    Patient's Goal:  Going home, work. Notes:  Patient participated    Status After Intervention:  Improved    Participation Level:  Active Listener and Interactive    Participation Quality: Appropriate, Attentive, Sharing and Supportive    Speech:  normal    Thought Process/Content: Logical    Affective Functioning: Exaggerated    Mood: elevated    Level of consciousness:  Alert and Oriented x4    Response to Learning: Progressing to goal    Modes of Intervention: Education and Support    Discipline Responsible: Registered Nurse    Signature: Izzy Joel RN

## 2021-09-13 NOTE — PROGRESS NOTES
[facility] Daily Shift Assessment-Geriatric Unit  Nursing Progress Note          Room: [address] [place] [address]   Name: Shawn Jones   Age: 46 y.o. Gender: male   Dx: <principal problem not specified>  Precautions: suicide risk, fall risk and seizure precautions  Inpatient Status: voluntary     SLEEP:    Sleep: Yes,   Sleep Quality Good   Hours Slept:    Sleep Medications: Yes  PRN Sleep Meds: Yes       MEDICAL:      Other PRN Meds: Yes   Med Compliant: Yes   Accu-Chek: No   Oxygen/CPAP/BiPAP: No  CIWA/CINA: Yes 11  PAIN Assessment: headaches  Side Effects from medication: No    Is Patient experiencing any respiratory symptoms (headache, fever, body aches, cough. Cleophus [place] ): no  Patient educated by nursing to practice social distancing, wear masks, wash hands frequently: yes      Metabolic Screening:    Lab Results   Component Value Date    LABA1C 5.2 09/09/2020       Lab Results   Component Value Date    CHOL 189 09/09/2020    CHOL 148 (L) 01/27/2019    CHOL 177 09/30/2017    CHOL 151 12/09/2011     Lab Results   Component Value Date    TRIG 178 (H) 09/09/2020    TRIG 99 01/27/2019    TRIG 119 (L) 09/30/2017    TRIG 130 12/09/2011     Lab Results   Component Value Date    HDL 50 (L) 09/09/2020    HDL 44 (L) 01/27/2019    HDL 55 09/30/2017    HDL 35 12/09/2011     No components found for: LDLCAL  No results found for: LABVLDL      Body mass index is 23.13 kg/m². BP Readings from Last 2 Encounters:   09/12/21 (!) 176/107   01/07/21 (!) 142/84         PSYCH:     SI denies suicidal ideation    HI Negative for homicidal ideation        AVH:Absent      Depression: 3 Anxiety: 3       GENERAL:      Appetite: good  Social: Yes Speech: normal   Appearance:appropriately dressed and healthy looking  Assistive Devices: noneLevel of Assist: Independent      GROUP:    Group Participation: Yes  Participation LevelInteractive    Participation QualityAppropriate    Notes:   Patient was cooperative and interactive during assessment.  Patient had a brightened affect and maintained eye contact throughout interview. He stated that he feels ready for his upcoming discharge tomorrow. Patient stated that his main concern at this point is returning to work and the anxiety that will accompany it. He said that he does plan on joining an [place] [id number] program and wants to remain sober. Patient stated that he has felt like a failure in the past when he has had relapses, so he continued to drink. He said this time he will try to view one day at a time and not be so hard on himself. He believes that all of the stressors in his life are what cause the need he feels to drink. He feels, that with the help of therapy and AA, he will be able to overcome the urges he has to drink. Will continue to monitor via 15 minute rounds.           Electronically signed by Jeanmarie Campbell RN on 9/12/21 at 9:50 PM CDT

## 2021-12-29 ENCOUNTER — HOSPITAL ENCOUNTER (EMERGENCY)
Age: 51
Discharge: HOME HEALTH | End: 2021-12-29
Payer: MEDICAID

## 2021-12-29 ENCOUNTER — APPOINTMENT (OUTPATIENT)
Dept: GENERAL RADIOLOGY | Age: 51
End: 2021-12-29
Payer: MEDICAID

## 2021-12-29 VITALS — DIASTOLIC BLOOD PRESSURE: 89 MMHG | HEART RATE: 79 BPM | TEMPERATURE: 98.6 F | SYSTOLIC BLOOD PRESSURE: 142 MMHG

## 2021-12-29 DIAGNOSIS — K08.89 DENTALGIA: Primary | ICD-10-CM

## 2021-12-29 PROCEDURE — 99281 EMR DPT VST MAYX REQ PHY/QHP: CPT

## 2021-12-29 PROCEDURE — 96372 THER/PROPH/DIAG INJ SC/IM: CPT

## 2021-12-29 PROCEDURE — 6360000002 HC RX W HCPCS: Performed by: PHYSICIAN ASSISTANT

## 2021-12-29 PROCEDURE — 70110 X-RAY EXAM OF JAW 4/> VIEWS: CPT

## 2021-12-29 RX ORDER — KETOROLAC TROMETHAMINE 30 MG/ML
30 INJECTION, SOLUTION INTRAMUSCULAR; INTRAVENOUS ONCE
Status: COMPLETED | OUTPATIENT
Start: 2021-12-29 | End: 2021-12-29

## 2021-12-29 RX ADMIN — KETOROLAC TROMETHAMINE 30 MG: 30 INJECTION, SOLUTION INTRAMUSCULAR; INTRAVENOUS at 10:55

## 2021-12-29 ASSESSMENT — ENCOUNTER SYMPTOMS
APNEA: 0
SHORTNESS OF BREATH: 0
EYE ITCHING: 0
BACK PAIN: 0
COUGH: 0
PHOTOPHOBIA: 0
EYE DISCHARGE: 0
COLOR CHANGE: 0

## 2021-12-29 ASSESSMENT — PAIN SCALES - GENERAL
PAINLEVEL_OUTOF10: 7
PAINLEVEL_OUTOF10: 3

## 2021-12-29 NOTE — ED PROVIDER NOTES
[facility]  eMERGENCYdEPARTMENT eNCOUnter      Pt Name: Leona Tinsley  MRN: [id number]  [place] 1970  Date of evaluation: 12/29/2021  Provider:CHRIS Carrion    CHIEF COMPLAINT       Chief Complaint   Patient presents with    Dental Pain         HISTORY OF PRESENT ILLNESS  (Location/Symptom, Timing/Onset, Context/Setting, Quality, Duration, Modifying Factors, Severity.)   Leona Tinsley is a 46 y.o. male who presents to the emergency department with complaints of left sided mandibular dental pain has appt with dentistry today. This is his mothers dentist at [facility]. He had 3rd molar pulled last Monday at [facility]. He feels like he is having intermittent swelling and pain of his left jaw made better with compression and ice pack. No fever or chill. No dry socket. No active bleeding or discharge from the extraction site. Sent here by family dentist for xr to confirm no fracture of his jaw as \"they had a real hard time pulling this tooth. \"    HPI    Nursing Notes were reviewed and I agree. REVIEW OF SYSTEMS    (2-9 systems for level 4, 10 or more for level 5)     Review of Systems   Constitutional: Negative for activity change, appetite change, chills and fever. HENT: Positive for dental problem. Negative for congestion. Eyes: Negative for photophobia, discharge and itching. Respiratory: Negative for apnea, cough and shortness of breath. Cardiovascular: Negative for chest pain. Musculoskeletal: Negative for arthralgias, back pain, gait problem, myalgias and neck pain. Skin: Negative for color change, pallor and rash. Neurological: Negative for dizziness, seizures and syncope. Psychiatric/Behavioral: Negative for agitation. The patient is not nervous/anxious. Except as noted above the remainder of the review of systems was reviewed and negative.        PAST MEDICAL HISTORY     Past Medical History:   Diagnosis Date    CAD (coronary artery disease)     Chest pain 12/12/2011    Cigarette smoker 12/12/2011    Depression     Hypertension 12/12/2011    Respiratory failure ([facility]     intubation    Seizures ([facility]          SURGICAL HISTORY       Past Surgical History:   Procedure Laterality Date    APPENDECTOMY      CHOLECYSTECTOMY  5/18/2006    Ann Marie    COLONOSCOPY      ENDOSCOPY, COLON, DIAGNOSTIC      KNEE ARTHROSCOPY Right     NECK SURGERY  7/15/2008    Hadi         CURRENT MEDICATIONS       Discharge Medication List as of 12/29/2021 10:57 AM      CONTINUE these medications which have NOT CHANGED    Details   lamoTRIgine (LAMICTAL) 100 MG tablet Take 2.5 tablets by mouth daily for 7 days, THEN 3 tablets daily. , Disp-177 tablet, R-0Normal      traZODone (DESYREL) 50 MG tablet Take 1 tablet by mouth nightly as needed for Sleep, Disp-30 tablet, R-1Normal      naltrexone (VIVITROL) 380 MG injection Inject 380 mg into the muscle every 28 days, Disp-1.2 each, R-1Normal      gabapentin (NEURONTIN) 600 MG tablet Take 1 tablet by mouth 3 times daily for 30 days. , Disp-90 tablet, R-1Normal      QUEtiapine (SEROQUEL) 200 MG tablet 200 mg nightly Historical Med      famotidine (PEPCID) 40 MG tablet Take 40 mg by mouth dailyHistorical Med      hydrOXYzine (ATARAX) 25 MG tablet Take 1 tablet by mouth 3 times daily as needed for Anxiety, Disp-90 tablet,R-1Normal      losartan (COZAAR) 100 MG tablet Take 100 mg by mouth dailyHistorical Med             ALLERGIES     Patient has no known allergies.     FAMILY HISTORY       Family History   Problem Relation Age of Onset    Osteoarthritis Mother     Thyroid Disease Mother     Heart Failure Father     Heart Failure Maternal Grandmother     Heart Failure Paternal Grandmother     Cancer Maternal Grandfather           SOCIAL HISTORY       Social History     Socioeconomic History    Marital status:      Spouse name: Not on file    Number of children: Not on file    Years of education: Not on file    Highest education level: Not on file   Occupational History     Employer: DISABLED   Tobacco Use    Smoking status: Current Every Day Smoker     Packs/day: 1.00     Years: 35.00     Pack years: 35.00     Types: Cigarettes    Smokeless tobacco: Current User     Types: Snuff   Vaping Use    Vaping Use: Never used   Substance and Sexual Activity    Alcohol use: Yes     Alcohol/week: 0.0 standard drinks     Comment: heavy    Drug use: Not Currently     Types: Marijuana Morenita Lizet)    Sexual activity: Yes     Partners: Female   Other Topics Concern    Not on file   Social History Narrative    Not on file     Social Determinants of Health     Financial Resource Strain:     Difficulty of Paying Living Expenses: Not on file   Food Insecurity:     Worried About Running Out of Food in the Last Year: Not on file    Toan of Food in the Last Year: Not on file   Transportation Needs:     Lack of Transportation (Medical): Not on file    Lack of Transportation (Non-Medical):  Not on file   Physical Activity:     Days of Exercise per Week: Not on file    Minutes of Exercise per Session: Not on file   Stress:     Feeling of Stress : Not on file   Social Connections:     Frequency of Communication with Friends and Family: Not on file    Frequency of Social Gatherings with Friends and Family: Not on file    Attends Caodaism Services: Not on file    Active Member of [address] [place] [address] or Organizations: Not on file    Attends Club or Organization Meetings: Not on file    Marital Status: Not on file   Intimate Partner Violence:     Fear of Current or Ex-Partner: Not on file    Emotionally Abused: Not on file    Physically Abused: Not on file    Sexually Abused: Not on file   Housing Stability:     Unable to Pay for Housing in the Last Year: Not on file    Number of [place] in the Last Year: Not on file    Unstable Housing in the Last Year: Not on file       SCREENINGS           PHYSICAL EXAM    (up to 7 forlevel 4, 8 or more for level 5)     ED Triage Vitals [12/29/21 0940]   BP Temp Temp Source Pulse Resp SpO2 Height Weight   (!) 142/89 98.6 °F (37 °C) Oral 79 -- -- -- --       Physical Exam  Vitals and nursing note reviewed. Constitutional:       General: He is not in acute distress. Appearance: Normal appearance. He is well-developed. He is not diaphoretic. HENT:      Head: Normocephalic and atraumatic. Right Ear: Tympanic membrane, ear canal and external ear normal.      Left Ear: Tympanic membrane, ear canal and external ear normal.      Nose: Nose normal.      Mouth/Throat:      Mouth: Mucous membranes are moist.        Comments: I appreciate multiple dental carries throughout mouth but site doesn't look suspect where tooth was extracted. Eyes:      Pupils: Pupils are equal, round, and reactive to light. Neck:      Trachea: No tracheal deviation. Cardiovascular:      Rate and Rhythm: Normal rate and regular rhythm. Heart sounds: Normal heart sounds. No murmur heard. Pulmonary:      Effort: Pulmonary effort is normal.      Breath sounds: Normal breath sounds. No stridor. No wheezing. Chest:      Chest wall: No tenderness. Abdominal:      General: Bowel sounds are normal. There is no distension. Palpations: Abdomen is soft. Tenderness: There is no abdominal tenderness. Musculoskeletal:         General: Normal range of motion. Cervical back: Normal range of motion and neck supple. Skin:     General: Skin is warm and dry. Capillary Refill: Capillary refill takes less than 2 seconds. Neurological:      Mental Status: He is alert and oriented to person, place, and time.    Psychiatric:         Behavior: Behavior normal.           DIAGNOSTIC RESULTS     RADIOLOGY:   Non-plain film images such as CT, Ultrasound and MRI are read by the radiologist. Plain radiographic images are visualized and preliminarilyinterpreted by No att. providers found with the below findings:      Interpretation per the Radiologist below, if available at the time of this note:    XR MANDIBLE (MIN 4 VIEWS)   Final Result   1. No fracture is seen though a nondisplaced fracture may not be   detectable. Consider noncontrast CT imaging for more detailed. Signed by Dr Lilia Tate:  Labs Reviewed - No data to display    All other labs were within normal range or notreturned as of this dictation. RE-ASSESSMENT        EMERGENCY DEPARTMENT COURSE and DIFFERENTIAL DIAGNOSIS/MDM:   Vitals:    Vitals:    12/29/21 0940   BP: (!) 142/89   Pulse: 79   Temp: 98.6 °F (37 °C)   TempSrc: Oral       MDM  Patient has no findings for abscess or dry socket. The x-ray does not show any obvious fracture radiology did comment that CT could be utilized for hairline I discussed this with the patient declined he has what he needs one set on a disc for his dentist this afternoon. No indication that I can tell for any antibiotics at this time plan will be for him to keep his appointments afternoon with his dentist.  PROCEDURES:    Procedures      FINAL IMPRESSION      1.  Dentalgia          DISPOSITION/PLAN   DISPOSITION Decision To Discharge 12/29/2021 10:38:06 AM      PATIENT REFERRED TO:  [address] [place] [address] EMERGENCY DEPT  [facility]  [phone]    If symptoms worsen      DISCHARGE MEDICATIONS:  Discharge Medication List as of 12/29/2021 10:57 AM          (Please note that portions of this note were completed with a voice recognition program.  Efforts were made to edit the dictations but occasionallywords are mis-transcribed.)    Michell Echevarria [phone], [phone] Doris Tran  12/30/21 7923

## 2022-04-01 ENCOUNTER — HOSPITAL ENCOUNTER (INPATIENT)
Age: 52
LOS: 4 days | Discharge: HOME | DRG: 885 | End: 2022-04-05
Attending: EMERGENCY MEDICINE | Admitting: PSYCHIATRY & NEUROLOGY
Payer: MEDICAID

## 2022-04-01 DIAGNOSIS — F12.10 MARIJUANA ABUSE: ICD-10-CM

## 2022-04-01 DIAGNOSIS — F32.A DEPRESSION WITH SUICIDAL IDEATION: Primary | ICD-10-CM

## 2022-04-01 DIAGNOSIS — R45.851 DEPRESSION WITH SUICIDAL IDEATION: Primary | ICD-10-CM

## 2022-04-01 DIAGNOSIS — F10.10 ALCOHOL ABUSE: ICD-10-CM

## 2022-04-01 LAB
ACETAMINOPHEN LEVEL: <15 UG/ML
ALBUMIN SERPL-MCNC: 4.8 G/DL (ref 3.5–5.2)
ALP BLD-CCNC: 112 U/L (ref 40–130)
ALT SERPL-CCNC: 31 U/L (ref 5–41)
AMPHETAMINE SCREEN, URINE: NEGATIVE
ANION GAP SERPL CALCULATED.3IONS-SCNC: 11 MMOL/L (ref 7–19)
AST SERPL-CCNC: 23 U/L (ref 5–40)
BACTERIA: NEGATIVE /HPF
BARBITURATE SCREEN URINE: NEGATIVE
BASOPHILS ABSOLUTE: 0 K/UL (ref 0–0.2)
BASOPHILS RELATIVE PERCENT: 0.5 % (ref 0–1)
BENZODIAZEPINE SCREEN, URINE: POSITIVE
BILIRUB SERPL-MCNC: 0.5 MG/DL (ref 0.2–1.2)
BILIRUBIN URINE: NEGATIVE
BLOOD, URINE: NEGATIVE
BUN BLDV-MCNC: 13 MG/DL (ref 6–20)
CALCIUM SERPL-MCNC: 9.4 MG/DL (ref 8.6–10)
CANNABINOID SCREEN URINE: POSITIVE
CHLORIDE BLD-SCNC: 105 MMOL/L (ref 98–111)
CLARITY: CLEAR
CO2: 27 MMOL/L (ref 22–29)
COCAINE METABOLITE SCREEN URINE: NEGATIVE
COLOR: YELLOW
CREAT SERPL-MCNC: 1 MG/DL (ref 0.5–1.2)
CRYSTALS, UA: ABNORMAL /HPF
EOSINOPHILS ABSOLUTE: 0.1 K/UL (ref 0–0.6)
EOSINOPHILS RELATIVE PERCENT: 0.8 % (ref 0–5)
EPITHELIAL CELLS, UA: 1 /HPF (ref 0–5)
ETHANOL: 29 MG/DL (ref 0–0.08)
GFR AFRICAN AMERICAN: >59
GFR NON-AFRICAN AMERICAN: >60
GLUCOSE BLD-MCNC: 91 MG/DL (ref 74–109)
GLUCOSE URINE: NEGATIVE MG/DL
HCT VFR BLD CALC: 41.9 % (ref 42–52)
HEMOGLOBIN: 13.8 G/DL (ref 14–18)
HYALINE CASTS: 1 /HPF (ref 0–8)
IMMATURE GRANULOCYTES #: 0 K/UL
KETONES, URINE: NEGATIVE MG/DL
LEUKOCYTE ESTERASE, URINE: NEGATIVE
LIPASE: 23 U/L (ref 13–60)
LYMPHOCYTES ABSOLUTE: 1.8 K/UL (ref 1.1–4.5)
LYMPHOCYTES RELATIVE PERCENT: 23.6 % (ref 20–40)
Lab: ABNORMAL
MCH RBC QN AUTO: 31.2 PG (ref 27–31)
MCHC RBC AUTO-ENTMCNC: 32.9 G/DL (ref 33–37)
MCV RBC AUTO: 94.6 FL (ref 80–94)
MONOCYTES ABSOLUTE: 0.4 K/UL (ref 0–0.9)
MONOCYTES RELATIVE PERCENT: 5 % (ref 0–10)
NEUTROPHILS ABSOLUTE: 5.2 K/UL (ref 1.5–7.5)
NEUTROPHILS RELATIVE PERCENT: 69.8 % (ref 50–65)
NITRITE, URINE: NEGATIVE
OPIATE SCREEN URINE: NEGATIVE
PDW BLD-RTO: 13.6 % (ref 11.5–14.5)
PH UA: 7 (ref 5–8)
PLATELET # BLD: 260 K/UL (ref 130–400)
PMV BLD AUTO: 9.4 FL (ref 9.4–12.4)
POTASSIUM SERPL-SCNC: 4.5 MMOL/L (ref 3.5–5)
PROTEIN UA: 100 MG/DL
RBC # BLD: 4.43 M/UL (ref 4.7–6.1)
RBC UA: 1 /HPF (ref 0–4)
SALICYLATE, SERUM: <3 MG/DL (ref 3–10)
SARS-COV-2, NAAT: NOT DETECTED
SODIUM BLD-SCNC: 143 MMOL/L (ref 136–145)
SPECIFIC GRAVITY UA: 1.02 (ref 1–1.03)
TOTAL PROTEIN: 7.1 G/DL (ref 6.6–8.7)
UROBILINOGEN, URINE: 1 E.U./DL
WBC # BLD: 7.4 K/UL (ref 4.8–10.8)
WBC UA: 2 /HPF (ref 0–5)

## 2022-04-01 PROCEDURE — 83036 HEMOGLOBIN GLYCOSYLATED A1C: CPT

## 2022-04-01 PROCEDURE — 84443 ASSAY THYROID STIM HORMONE: CPT

## 2022-04-01 PROCEDURE — 81001 URINALYSIS AUTO W/SCOPE: CPT

## 2022-04-01 PROCEDURE — 82077 ASSAY SPEC XCP UR&BREATH IA: CPT

## 2022-04-01 PROCEDURE — 6370000000 HC RX 637 (ALT 250 FOR IP): Performed by: PSYCHIATRY & NEUROLOGY

## 2022-04-01 PROCEDURE — 87635 SARS-COV-2 COVID-19 AMP PRB: CPT

## 2022-04-01 PROCEDURE — 96374 THER/PROPH/DIAG INJ IV PUSH: CPT

## 2022-04-01 PROCEDURE — 82306 VITAMIN D 25 HYDROXY: CPT

## 2022-04-01 PROCEDURE — 36415 COLL VENOUS BLD VENIPUNCTURE: CPT

## 2022-04-01 PROCEDURE — 80053 COMPREHEN METABOLIC PANEL: CPT

## 2022-04-01 PROCEDURE — 1240000000 HC EMOTIONAL WELLNESS R&B

## 2022-04-01 PROCEDURE — 83690 ASSAY OF LIPASE: CPT

## 2022-04-01 PROCEDURE — 80061 LIPID PANEL: CPT

## 2022-04-01 PROCEDURE — 99285 EMERGENCY DEPT VISIT HI MDM: CPT

## 2022-04-01 PROCEDURE — 80307 DRUG TEST PRSMV CHEM ANLYZR: CPT

## 2022-04-01 PROCEDURE — 96375 TX/PRO/DX INJ NEW DRUG ADDON: CPT

## 2022-04-01 PROCEDURE — 80143 DRUG ASSAY ACETAMINOPHEN: CPT

## 2022-04-01 PROCEDURE — 80179 DRUG ASSAY SALICYLATE: CPT

## 2022-04-01 PROCEDURE — 85025 COMPLETE CBC W/AUTO DIFF WBC: CPT

## 2022-04-01 PROCEDURE — 82607 VITAMIN B-12: CPT

## 2022-04-01 PROCEDURE — 6360000002 HC RX W HCPCS: Performed by: EMERGENCY MEDICINE

## 2022-04-01 RX ORDER — POLYETHYLENE GLYCOL 3350 17 G/17G
17 POWDER, FOR SOLUTION ORAL DAILY PRN
Status: DISCONTINUED | OUTPATIENT
Start: 2022-04-01 | End: 2022-04-01 | Stop reason: SDUPTHER

## 2022-04-01 RX ORDER — LORAZEPAM 1 MG/1
2 TABLET ORAL EVERY 4 HOURS PRN
Status: DISCONTINUED | OUTPATIENT
Start: 2022-04-01 | End: 2022-04-04

## 2022-04-01 RX ORDER — PROMETHAZINE HYDROCHLORIDE 12.5 MG/1
12.5 TABLET ORAL EVERY 8 HOURS PRN
Status: DISCONTINUED | OUTPATIENT
Start: 2022-04-01 | End: 2022-04-05 | Stop reason: HOSPADM

## 2022-04-01 RX ORDER — ACETAMINOPHEN 325 MG/1
650 TABLET ORAL EVERY 4 HOURS PRN
Status: DISCONTINUED | OUTPATIENT
Start: 2022-04-01 | End: 2022-04-05 | Stop reason: HOSPADM

## 2022-04-01 RX ORDER — QUETIAPINE FUMARATE 200 MG/1
200 TABLET, FILM COATED ORAL NIGHTLY
Status: DISCONTINUED | OUTPATIENT
Start: 2022-04-01 | End: 2022-04-05 | Stop reason: HOSPADM

## 2022-04-01 RX ORDER — POLYETHYLENE GLYCOL 3350 17 G/17G
17 POWDER, FOR SOLUTION ORAL DAILY PRN
Status: DISCONTINUED | OUTPATIENT
Start: 2022-04-01 | End: 2022-04-05 | Stop reason: HOSPADM

## 2022-04-01 RX ORDER — LAMOTRIGINE 100 MG/1
100 TABLET ORAL 2 TIMES DAILY
Status: DISCONTINUED | OUTPATIENT
Start: 2022-04-01 | End: 2022-04-02

## 2022-04-01 RX ORDER — TRAZODONE HYDROCHLORIDE 50 MG/1
50 TABLET ORAL NIGHTLY PRN
Status: DISCONTINUED | OUTPATIENT
Start: 2022-04-01 | End: 2022-04-05 | Stop reason: HOSPADM

## 2022-04-01 RX ORDER — LORAZEPAM 1 MG/1
1 TABLET ORAL EVERY 4 HOURS PRN
Status: DISCONTINUED | OUTPATIENT
Start: 2022-04-01 | End: 2022-04-04

## 2022-04-01 RX ORDER — LORAZEPAM 2 MG/ML
1 INJECTION INTRAMUSCULAR ONCE
Status: COMPLETED | OUTPATIENT
Start: 2022-04-01 | End: 2022-04-01

## 2022-04-01 RX ORDER — THIAMINE HYDROCHLORIDE 100 MG/ML
100 INJECTION, SOLUTION INTRAMUSCULAR; INTRAVENOUS ONCE
Status: COMPLETED | OUTPATIENT
Start: 2022-04-01 | End: 2022-04-01

## 2022-04-01 RX ORDER — LOSARTAN POTASSIUM 100 MG/1
100 TABLET ORAL DAILY
Status: DISCONTINUED | OUTPATIENT
Start: 2022-04-01 | End: 2022-04-05 | Stop reason: HOSPADM

## 2022-04-01 RX ORDER — LORAZEPAM 1 MG/1
2 TABLET ORAL ONCE
Status: COMPLETED | OUTPATIENT
Start: 2022-04-01 | End: 2022-04-01

## 2022-04-01 RX ORDER — GABAPENTIN 300 MG/1
300 CAPSULE ORAL 3 TIMES DAILY
Status: DISCONTINUED | OUTPATIENT
Start: 2022-04-01 | End: 2022-04-02

## 2022-04-01 RX ORDER — NICOTINE 21 MG/24HR
1 PATCH, TRANSDERMAL 24 HOURS TRANSDERMAL DAILY
Status: DISCONTINUED | OUTPATIENT
Start: 2022-04-01 | End: 2022-04-05 | Stop reason: HOSPADM

## 2022-04-01 RX ORDER — HYDROXYZINE HYDROCHLORIDE 25 MG/1
25 TABLET, FILM COATED ORAL 3 TIMES DAILY PRN
Status: DISCONTINUED | OUTPATIENT
Start: 2022-04-01 | End: 2022-04-05 | Stop reason: HOSPADM

## 2022-04-01 RX ADMIN — LAMOTRIGINE 100 MG: 100 TABLET ORAL at 20:46

## 2022-04-01 RX ADMIN — LORAZEPAM 2 MG: 1 TABLET ORAL at 22:10

## 2022-04-01 RX ADMIN — THIAMINE HYDROCHLORIDE 100 MG: 100 INJECTION, SOLUTION INTRAMUSCULAR; INTRAVENOUS at 16:26

## 2022-04-01 RX ADMIN — HYDROXYZINE HYDROCHLORIDE 25 MG: 25 TABLET, FILM COATED ORAL at 20:46

## 2022-04-01 RX ADMIN — LORAZEPAM 2 MG: 1 TABLET ORAL at 18:37

## 2022-04-01 RX ADMIN — LORAZEPAM 1 MG: 2 INJECTION, SOLUTION INTRAMUSCULAR; INTRAVENOUS at 13:05

## 2022-04-01 RX ADMIN — GABAPENTIN 300 MG: 300 CAPSULE ORAL at 20:46

## 2022-04-01 RX ADMIN — ACETAMINOPHEN 650 MG: 325 TABLET ORAL at 20:45

## 2022-04-01 RX ADMIN — LOSARTAN POTASSIUM 100 MG: 100 TABLET, FILM COATED ORAL at 20:46

## 2022-04-01 RX ADMIN — TRAZODONE HYDROCHLORIDE 50 MG: 50 TABLET ORAL at 20:46

## 2022-04-01 RX ADMIN — QUETIAPINE FUMARATE 200 MG: 200 TABLET ORAL at 20:46

## 2022-04-01 ASSESSMENT — PAIN SCALES - GENERAL
PAINLEVEL_OUTOF10: 4
PAINLEVEL_OUTOF10: 4

## 2022-04-01 ASSESSMENT — PAIN DESCRIPTION - FREQUENCY
FREQUENCY: INTERMITTENT
FREQUENCY: INTERMITTENT

## 2022-04-01 ASSESSMENT — ENCOUNTER SYMPTOMS
SHORTNESS OF BREATH: 0
NAUSEA: 0
VOMITING: 0
DIARRHEA: 0
COUGH: 0
ABDOMINAL PAIN: 0

## 2022-04-01 ASSESSMENT — PATIENT HEALTH QUESTIONNAIRE - PHQ9
SUM OF ALL RESPONSES TO PHQ QUESTIONS 1-9: 23
SUM OF ALL RESPONSES TO PHQ QUESTIONS 1-9: 23

## 2022-04-01 ASSESSMENT — SLEEP AND FATIGUE QUESTIONNAIRES
DO YOU HAVE DIFFICULTY SLEEPING: NO
AVERAGE NUMBER OF SLEEP HOURS: 8
DO YOU USE A SLEEP AID: NO

## 2022-04-01 ASSESSMENT — PAIN DESCRIPTION - ONSET
ONSET: ON-GOING
ONSET: ON-GOING

## 2022-04-01 ASSESSMENT — LIFESTYLE VARIABLES
HISTORY_ALCOHOL_USE: YES
HISTORY_ALCOHOL_USE: YES

## 2022-04-01 ASSESSMENT — PAIN DESCRIPTION - PROGRESSION
CLINICAL_PROGRESSION: GRADUALLY WORSENING
CLINICAL_PROGRESSION: GRADUALLY WORSENING

## 2022-04-01 ASSESSMENT — PAIN DESCRIPTION - LOCATION
LOCATION: OTHER (COMMENT)
LOCATION: OTHER (COMMENT)

## 2022-04-01 ASSESSMENT — PAIN DESCRIPTION - PAIN TYPE
TYPE: ACUTE PAIN
TYPE: ACUTE PAIN

## 2022-04-01 ASSESSMENT — PAIN - FUNCTIONAL ASSESSMENT
PAIN_FUNCTIONAL_ASSESSMENT: ACTIVITIES ARE NOT PREVENTED
PAIN_FUNCTIONAL_ASSESSMENT: ACTIVITIES ARE NOT PREVENTED

## 2022-04-01 ASSESSMENT — PAIN DESCRIPTION - DESCRIPTORS
DESCRIPTORS: ACHING;DISCOMFORT
DESCRIPTORS: ACHING;DISCOMFORT

## 2022-04-01 NOTE — PROGRESS NOTES
BLAKE ADULT INITIAL INTAKE ASSESSMENT     4/1/22    Karlene Masterson ,a 46 y.o. male, presents to the ED for a psychiatric assessment. ED Arrival time: 1108  ED physician: INGRID OAKLEY Notification time: 12  BLAKE Assessment start time: 1320  Psychiatrist call time: [phone] with Dr. Sanjuanita Irby    Patient is referred by: \"The dispatcher girl from the [facility] office brought me. \"    Reason for visit to ED - Presenting problem:     PT states reason for ED visit, \"I have been very suicidal. I have just been thinking about it a lot. I just keeping asking myself Why. I don't know something just snapped. It just kept getting worse. \"    Seen in ED room 16, dressed in paper scrubs and agreeable to interview. States,\"I was at my mom and dad's house. I told them there are people I want to get rid of or get rid of myself so I just as soon get rid of myself. The police came and took me to [facility] to be evaluated by [employer], but they wouldn't evaluate me because it said my blood alcohol level was .3 (reported having 60 oz of beer). They said this was too high to be evaluated but I don't drink that much so there is no way. I have IBS, ulcers and inflamed stomach. I have been throwing up a lot and I can't keep anything down. I don't have an appetite. I have been to [facility] for this but they didn't do anything. I went back again and told them they have to do something and they did a colonoscopy and endoscopy that showed irritable bowel and polyps. \" Reports this was on 3/30 and was treated with omeprazole which he says has kept him from vomiting since. Also, reports not taking medications (Lamictal, Atarax, and Gabapentin) because he can't keep anything down. \" Reports this has been ongoing for 28 days. However, does report taking a valium daily that he gets from a friend. Reports drinking 6 beer/day and  smoking about 7 grams of marijuana per month.  Did report having thoughts of harming his ex-fiance and her spouse but would never actually act on those thoughts and has not thought about what he would do to them. Was ultimately discharged from [facility] at brought here. ED provider writes: Lilian Moe is a 46 y.o. male who presents to the emergency department with depression and suicidal ideation. The patient tells me \"I am going to kill myself you do not want to leave me alone I am going to shoot myself I will find a gun. \"     Patient tells me he has been depressed severely he cannot function right he states a couple weeks ago he had a lot of diarrhea nausea vomiting issues he states that resolved. He is taking his medications as prescribed he tells me the police brought him to [facility] last night as he was going to kill himself at his parents house. The patient states that he sobered up they could not have him evaluated until he was sober he was then discharged from the ER. It is unclear to me what exactly happened he basically has continued to be suicidal and he presented to our ER with an active plan to shoot himself. The patient would like to be admitted and get help he has not been admitted here that I can see in the last 6 months.     He tells me he drank 3 tall boys of 14% alcohol yesterday. \"    Duration of symptoms: 4-5 weeks ago    Current Stressors: family, financial and occupational    C-SSRS Completed: yes    SI:  admits to   Plan: yes, shoot self    Past SI attempts: no   If yes, when and how many times:  Describe suicide attempts:   HI: admits to  If yes describe: ex-fiance and spouse  Delusions: admits to  If yes describe:   Hallucinations: denies  If yes describe  Risk of Harm to self: Self injurious/self mutilation behaviorsno   If yes explain:   Was it within the past 6 months: N/A   Risk of Harm to others: no   If yes explain:   Was it within the past 6 months: no   Trauma History:   Anxiety 1-10:  2  Explain if increased:   Depression 1-10: 10  Explain if increased: \"It has been for a while and it just gets worse and worse. \"  Level of function outside hospital decreased: no   If yes explain: Independent      Psychiatric Hospitalizations: Yes   Where & When: [id number] (9/7/21, 9/9/19, 6/19, 2/19, 2/26/19, 9/27/17)  Outpatient Psychiatric Treatment: No    Family History:    Family history of mental illness: yes mother (depression)  Family members with suicide attempt: no   If yes explain (attempted or completed):    Substance Abuse History:     SBIRT Completed: yes  Brief Intervention completed if needed:  (Yes/No)    Current ETOH LEVELS: 29    ETOH Usage:     Amount drinking daily: moderate (6 beer/day)  Date of last drink: 3/31/22  Longest period of sobriety: 2 year    Substance/Chemical Abuse/Recreational Drug History:  Substance used: alcohol, marijuana and benzodiazepines  Date of last substance use: 3/31/22  Tobacco Use: no and yes   History of rehab treatment: Yes  How many times in rehab: \"lots\"  Last time in rehab: 2 years ago  Family history of substance abuse: No    Opiates: It was discussed with pt they would not be receiving opiates unless they were within 3 days post surgery/acute injury. Patient voiced understanding and agreed. Psychiatric Review Of Systems:     Recent Sleep changes: yes, sleeping more   Recent appetite changes: yes, no appetite   Recent weight changes/Pounds gained (+) or lost (-): yes, 25 lbs      Medical History:     Medical Diagnosis/Issues: IBS, HTN, bipolar  CT today in ED:no  Use of 02 or CPAP: supposed to wear Cpap but it was stolen  Ambulatory: yes  Independent or Need assistance with Self Care: Independent    PCP: Trice Irene MD     Current Medications:   Scheduled Meds: No current facility-administered medications for this encounter. Current Outpatient Medications:     lamoTRIgine (LAMICTAL) 100 MG tablet, Take 2.5 tablets by mouth daily for 7 days, THEN 3 tablets daily. , Disp: 177 tablet, Rfl: 0    traZODone (DESYREL) 50 MG tablet, Take 1 tablet by mouth nightly as needed for Sleep, Disp: 30 tablet, Rfl: 1    naltrexone (VIVITROL) 380 MG injection, Inject 380 mg into the muscle every 28 days, Disp: 1.2 each, Rfl: 1    gabapentin (NEURONTIN) 600 MG tablet, Take 1 tablet by mouth 3 times daily for 30 days. , Disp: 90 tablet, Rfl: 1    QUEtiapine (SEROQUEL) 200 MG tablet, 200 mg nightly , Disp: , Rfl:     famotidine (PEPCID) 40 MG tablet, Take 40 mg by mouth daily, Disp: , Rfl:     hydrOXYzine (ATARAX) 25 MG tablet, Take 1 tablet by mouth 3 times daily as needed for Anxiety, Disp: 90 tablet, Rfl: 1    losartan (COZAAR) 100 MG tablet, Take 100 mg by mouth daily, Disp: , Rfl:      Mental Status Evaluation:     Appearance:  age appropriate   Behavior:  Restless & fidgety   Speech:  pressured   Mood:  anxious and sad   Affect:  normal and mood-congruent   Thought Process:  flight of ideas   Thought Content:  delusions   Sensorium:  person, place, time/date and situation   Cognition:  grossly intact   Insight:  fair         Current living arrangement: living with parents  Current Support System: mom and dad, friend at work  Employment: [employer]-full time    Disposition:     Choose one of the options below for disposition:     1. Decision to admit to :no and yes    If yes, which unit Adult or Geriatric Unit:  Geriatric  Is patient voluntary: yes  If no has a 72 hold been initiated:   Admission Diagnosis: Suicidal Ideation      Other follow up information provided:  Advised Dr. Kenny Purcell and ER nurse of decision to admit and ER nurse to call report.      Ashlyn Gramajo RN

## 2022-04-01 NOTE — PROGRESS NOTES
[address] Admission Note  Nursing Admission Note        Reason for Admission: PT states reason for ED visit, \"I have been very suicidal. I have just been thinking about it a lot. I just keeping asking myself Why. I don't know something just snapped. It just kept getting worse. \"    Patient Active Problem List   Diagnosis    Hypertension    Alcohol use disorder, severe, dependence ([facility]    Tobacco abuse disorder    [facility]affect[facility]    Anxiety    Depression with suicidal ideation         Addictive Behavior:   Addictive Behavior  In the past 3 months, have you felt or has someone told you that you have a problem with:  : None  Do you have a history of Chemical Use?: Yes  Do you have a history of Alcohol Use?: Yes  Do you have a history of Street Drug Abuse?: Yes  Histroy of Prescripton Drug Abuse?: Yes    Medical Problems:   Past Medical History:   Diagnosis Date    CAD (coronary artery disease)     Chest pain 12/12/2011    Cigarette smoker 12/12/2011    Depression     Hypertension 12/12/2011    Respiratory failure ([facility]     intubation    Seizures ([facility]        Status EXAM:  Status and Exam  Normal: Yes  Facial Expression: Flat,Sad  Affect: Congruent  Level of Consciousness: Alert  Mood:Normal: No  Mood: Depressed,Anxious,Despair,Sad,Worthless, low self-esteem  Motor Activity:Normal: Yes  Interview Behavior: Cooperative  Preception: [place] to Person,[place] to Time,[place] to Place,[place] to Situation  Attention:Normal: No  Attention: Distractible  Thought Processes: Circumstantial  Thought Content:Normal: No  Thought Content: Delusions,Preoccupations,Other(See Comment) (unorganized)  Hallucinations: None  Delusions: Yes  Delusions: Reference  Memory:Normal: Yes  Insight and Judgment: No  Insight and Judgment: Poor Judgment,Poor Insight  Present Suicidal Ideation: Yes  Present Homicidal Ideation: No (but reports having thoughts at times of wanting to harm his ex-fiance andher spouse but would never actually do anything.)      Metabolic Screening:    Lab Results   Component Value Date    LABA1C 5.2 09/09/2020     Lab Results   Component Value Date    CHOL 189 09/09/2020    CHOL 148 (L) 01/27/2019    CHOL 177 09/30/2017    CHOL 151 12/09/2011     Lab Results   Component Value Date    TRIG 178 (H) 09/09/2020    TRIG 99 01/27/2019    TRIG 119 (L) 09/30/2017    TRIG 130 12/09/2011     Lab Results   Component Value Date    HDL 50 (L) 09/09/2020    HDL 44 (L) 01/27/2019    HDL 55 09/30/2017    HDL 35 12/09/2011     No components found for: LDLCAL  No results found for: LABVLDL    Body mass index is 20.72 kg/m². BP Readings from Last 2 Encounters:   04/01/22 (!) 164/74   12/29/21 (!) 142/89       PATIENT STRENGTHS:  Strengths: Employment,Positive Support,Social Skills    Patient Strengths and Limitations:  Limitations: General negative or hopeless attitude about future/recovery,Inappropriate/potentially harmful leisure interests,Hopeless about future,Tendency to isolate self      Tobacco Screening:  Practical Counseling, on admission, nirmala X, if applicable and completed (first 3 are required if patient doesn't refuse):            Recognizing danger situations (included triggers and roadblocks)                 Coping skills (new ways to manage stress, exercise, relaxation techniques, changing routine, distraction                                                      Basic information about quitting (benefits of quitting, techniques in how to quit, available resources   Referral for counseling faxed to [facility] yes                                           Patient refused counseling yes  Patient has not smoked in the last 30 days   Patient offered nicotine patch.   Received yes  Refused   Patient is a non-smoker          Admission to Unit:    Pt admitted to [facility] under the care of Dr. Jose Angel Lui,  arrived on unit via [facility] with security and staff from ED    Patient arrived dressed in paper scrubs:  yes. Body assessment and safety check completed by Татьяна Harris RN and Lexi Carr RN and  no contraband discovered. Patient belongings and valuables was cataloged and accounted for by Lexi Carr RN. Admission completed by Татьяна Harris RN and Lexi Carr RN  Oriented to unit, unit policy and expectations:  yes    Reviewed and explained all legal documents:  yes    Education for Fall Prevention and Restraints given: yes    Patient signed all legal documents yes   Pt verbalizes understanding:yes     Geraldo Clutter Obtained? yes    Patient Teaching:    Identifies stressors. yes   Family, financial, occupational.      Protective Factors:    Patient identifies protective factors with nursing staff as follows: Identifies reasons for living: Yes   Supportive Social Network or family: Yes    Belief that suicide is immoral/high spirituality: Yes   Responsibility to family or others/living with family: No   Fear of death or dying due to pain and suffering: Yes   Engaged in work or school: No     If Patient is unable to identify, reason why? N/A      COVID TEACHING:   Nursing provided education regarding COVID for social distancing, wearing masks, washing hands, and reporting any symptoms: no  Mask Provided: yes If patient refused, reason: pts have negative COVID test prior to admission to [address] [place] [address]      Admission Note:  Pt is alert and oriented x 4, cooperative. Flat affect, appears sad, mood congruent. Concentration is impaired. Mostly circumstantial thought processes. Memory intact. Poor insight and judgment. Will continue to monitor.     Electronically signed by Kae Donovan RN on 4/1/22 at 5:38 PM CDT

## 2022-04-01 NOTE — ED NOTES
Dr Camila Reina at bedside       Ranjana Seta, [facility]  04/01/22 [id number]

## 2022-04-02 LAB
INR BLD: 1.07 (ref 0.88–1.18)
PROTHROMBIN TIME: 13.9 SEC (ref 12–14.6)

## 2022-04-02 PROCEDURE — 6370000000 HC RX 637 (ALT 250 FOR IP): Performed by: PSYCHIATRY & NEUROLOGY

## 2022-04-02 PROCEDURE — 85610 PROTHROMBIN TIME: CPT

## 2022-04-02 PROCEDURE — 1240000000 HC EMOTIONAL WELLNESS R&B

## 2022-04-02 PROCEDURE — 99222 1ST HOSP IP/OBS MODERATE 55: CPT | Performed by: PSYCHIATRY & NEUROLOGY

## 2022-04-02 PROCEDURE — 36415 COLL VENOUS BLD VENIPUNCTURE: CPT

## 2022-04-02 RX ORDER — GABAPENTIN 600 MG/1
600 TABLET ORAL 3 TIMES DAILY
Status: DISCONTINUED | OUTPATIENT
Start: 2022-04-02 | End: 2022-04-05 | Stop reason: HOSPADM

## 2022-04-02 RX ORDER — FOLIC ACID 1 MG/1
1 TABLET ORAL DAILY
Status: DISCONTINUED | OUTPATIENT
Start: 2022-04-02 | End: 2022-04-05 | Stop reason: HOSPADM

## 2022-04-02 RX ORDER — MULTIVITAMIN WITH IRON
1 TABLET ORAL DAILY
Status: DISCONTINUED | OUTPATIENT
Start: 2022-04-02 | End: 2022-04-05 | Stop reason: HOSPADM

## 2022-04-02 RX ORDER — LORAZEPAM 1 MG/1
2 TABLET ORAL ONCE
Status: COMPLETED | OUTPATIENT
Start: 2022-04-02 | End: 2022-04-02

## 2022-04-02 RX ORDER — CLONIDINE HYDROCHLORIDE 0.1 MG/1
0.1 TABLET ORAL EVERY 4 HOURS PRN
Status: DISCONTINUED | OUTPATIENT
Start: 2022-04-02 | End: 2022-04-05 | Stop reason: HOSPADM

## 2022-04-02 RX ORDER — LAMOTRIGINE 25 MG/1
50 TABLET ORAL 2 TIMES DAILY
Status: DISCONTINUED | OUTPATIENT
Start: 2022-04-02 | End: 2022-04-04

## 2022-04-02 RX ORDER — GAUZE BANDAGE 2" X 2"
100 BANDAGE TOPICAL DAILY
Status: DISCONTINUED | OUTPATIENT
Start: 2022-04-02 | End: 2022-04-05 | Stop reason: HOSPADM

## 2022-04-02 RX ORDER — PANTOPRAZOLE SODIUM 40 MG/1
40 TABLET, DELAYED RELEASE ORAL
Status: DISCONTINUED | OUTPATIENT
Start: 2022-04-03 | End: 2022-04-05 | Stop reason: HOSPADM

## 2022-04-02 RX ADMIN — GABAPENTIN 600 MG: 600 TABLET, FILM COATED ORAL at 20:00

## 2022-04-02 RX ADMIN — FOLIC ACID 1 MG: 1 TABLET ORAL at 15:07

## 2022-04-02 RX ADMIN — THERA TABS 1 TABLET: TAB at 15:07

## 2022-04-02 RX ADMIN — LORAZEPAM 2 MG: 1 TABLET ORAL at 12:12

## 2022-04-02 RX ADMIN — LOSARTAN POTASSIUM 100 MG: 100 TABLET, FILM COATED ORAL at 09:31

## 2022-04-02 RX ADMIN — GABAPENTIN 600 MG: 600 TABLET, FILM COATED ORAL at 12:12

## 2022-04-02 RX ADMIN — LAMOTRIGINE 50 MG: 25 TABLET ORAL at 20:00

## 2022-04-02 RX ADMIN — LAMOTRIGINE 100 MG: 100 TABLET ORAL at 09:31

## 2022-04-02 RX ADMIN — LORAZEPAM 1 MG: 1 TABLET ORAL at 19:36

## 2022-04-02 RX ADMIN — THIAMINE HCL TAB 100 MG 100 MG: 100 TAB at 15:07

## 2022-04-02 RX ADMIN — HYDROXYZINE HYDROCHLORIDE 25 MG: 25 TABLET, FILM COATED ORAL at 20:01

## 2022-04-02 RX ADMIN — LORAZEPAM 2 MG: 1 TABLET ORAL at 09:37

## 2022-04-02 RX ADMIN — PROMETHAZINE HYDROCHLORIDE 12.5 MG: 12.5 TABLET ORAL at 09:38

## 2022-04-02 RX ADMIN — GABAPENTIN 300 MG: 300 CAPSULE ORAL at 09:31

## 2022-04-02 RX ADMIN — QUETIAPINE FUMARATE 200 MG: 200 TABLET ORAL at 20:00

## 2022-04-02 ASSESSMENT — PAIN - FUNCTIONAL ASSESSMENT: PAIN_FUNCTIONAL_ASSESSMENT: ACTIVITIES ARE NOT PREVENTED

## 2022-04-02 ASSESSMENT — PAIN DESCRIPTION - PROGRESSION: CLINICAL_PROGRESSION: GRADUALLY WORSENING

## 2022-04-02 ASSESSMENT — PAIN SCALES - GENERAL: PAINLEVEL_OUTOF10: 7

## 2022-04-02 ASSESSMENT — PAIN DESCRIPTION - ORIENTATION: ORIENTATION: LOWER

## 2022-04-02 ASSESSMENT — PAIN DESCRIPTION - LOCATION: LOCATION: BACK

## 2022-04-02 ASSESSMENT — PAIN DESCRIPTION - ONSET: ONSET: GRADUAL

## 2022-04-02 ASSESSMENT — PAIN DESCRIPTION - FREQUENCY: FREQUENCY: INTERMITTENT

## 2022-04-02 ASSESSMENT — PAIN DESCRIPTION - DESCRIPTORS: DESCRIPTORS: ACHING;DISCOMFORT

## 2022-04-02 ASSESSMENT — PAIN DESCRIPTION - PAIN TYPE: TYPE: CHRONIC PAIN

## 2022-04-02 NOTE — PLAN OF CARE
Problem: Anger Management/Homicidal Ideation:  Goal: Able to display appropriate communication and problem solving  Description: Able to display appropriate communication and problem solving  Outcome: Ongoing  Goal: Ability to verbalize frustrations and anger appropriately will improve  Description: Ability to verbalize frustrations and anger appropriately will improve  Outcome: Ongoing     Problem: Altered Mood, Depressive Behavior:  Goal: Able to verbalize acceptance of life and situations over which he or she has no control  Description: Able to verbalize acceptance of life and situations over which he or she has no control  Outcome: Ongoing  Goal: Able to verbalize and/or display a decrease in depressive symptoms  Description: Able to verbalize and/or display a decrease in depressive symptoms  Outcome: Ongoing     Problem: Depressive Behavior With or Without Suicide Precautions:  Goal: Ability to disclose and discuss suicidal ideas will improve  Description: Ability to disclose and discuss suicidal ideas will improve  Outcome: Ongoing  Goal: Able to verbalize support systems  Description: Able to verbalize support systems  Outcome: Ongoing  Goal: Absence of self-harm  Description: Absence of self-harm  Outcome: Ongoing     Problem: Risk of Harm:  Goal: Ability to remain free from injury will improve  Description: Ability to remain free from injury will improve  Outcome: Ongoing     Problem: Substance Abuse:  Goal: Absence of drug withdrawal signs and symptoms  Description: Absence of drug withdrawal signs and symptoms  Outcome: Ongoing  Goal: Participates in care planning  Description: Participates in care planning  Outcome: Ongoing     Problem: Pain:  Description: Pain management should include both nonpharmacologic and pharmacologic interventions.   Goal: Pain level will decrease  Description: Pain level will decrease  Outcome: Ongoing  Goal: Control of acute pain  Description: Control of acute pain  Outcome: Ongoing  Goal: Control of chronic pain  Description: Control of chronic pain  Outcome: Ongoing     Problem: Falls - Risk of:  Goal: Will remain free from falls  Description: Will remain free from falls  Outcome: Ongoing  Goal: Absence of physical injury  Description: Absence of physical injury  Outcome: Ongoing

## 2022-04-02 NOTE — PLAN OF CARE
Problem: Depressive Behavior With or Without Suicide Precautions:  Goal: Absence of self-harm  Description: Absence of self-harm  4/2/2022 0838 by Marco Antonio Dang RN  Outcome: Ongoing  4/2/2022 0105 by Vidya Swartz RN  Outcome: Ongoing     Problem: Substance Abuse:  Goal: Absence of drug withdrawal signs and symptoms  Description: Absence of drug withdrawal signs and symptoms  4/2/2022 0838 by Marco Antonio Dang RN  Outcome: Ongoing  4/2/2022 0105 by Vidya Swartz RN  Outcome: Ongoing     Problem: Substance Abuse:  Goal: Participates in care planning  Description: Participates in care planning  4/2/2022 0838 by Marco Antonio Dang RN  Outcome: Ongoing  4/2/2022 0105 by Vidya Swartz RN  Outcome: Ongoing

## 2022-04-02 NOTE — PROGRESS NOTES
Group Therapy Note    Date: 04/02/22  Start Time: 0800  End Time: 0830    Number of Participants: 3 of 4    Type of Group: Wellness    Patient's Goal:  \"Try to eat something\"    Notes:  Patient needed encouragement but was cooperative    Status After Intervention:  Improved    Participation Level:  Active Listener    Participation Quality: Appropriate and Attentive    Speech:  normal    Thought Process/Content: Logical    Affective Functioning: Congruent    Mood: anxious and depressed    Level of consciousness:  Alert and Oriented x4    Response to Learning: Able to verbalize current knowledge/experience    Modes of Intervention: Education and Support    Discipline Responsible: Registered Nurse    Signature:  Courtney Guevara RN

## 2022-04-02 NOTE — PROGRESS NOTES
Admission Note      Reason for admission/Target Symptom: Patient admitted to [facility] due to depression and suicidal ideation. The patient says, \"I am going to kill myself you do not want to leave me alone. I am going to shoot myself I will find a gun. \" Patient reports that he has been depressed severely and that he cannot function right. He states that a couple of weeks ago he had a lot of diarrhea, nausea, and vomiting issues, but he states since then that has been resolved. He is taking his medications as prescribed and reports that the police brought him to [facility] as he was going to kill himself at his parents house. The patient states that he sobered up, because they told pt that they could not have him evaluated until he was sober. He was later discharged from the ER. Pt is reluctant to explain exactly what exactly happened, but he basically has continued to be suicidal and he presented to our ER with an active plan to shoot himself. The patient would like to be admitted and get help. Pt has been here in the past, but has not been admitted in the last 6 months. He says that he drank 3 tall boys of 14% alcohol before arriving at [facility]. Diagnoses: Depression with Suicidal Ideation; Homicidal Ideation; Delusional; Alcohol Abuse    UDS: Positive for Benzodiazepines and Cannabis  BAL: 29 @1148 on 4/1/22     SW met with treatment team to discuss patient's treatment including care planning, discharge planning, and follow-up needs. Pt has been admitted to [facility]. Treatment team has identified patient's discharge needs as medication management and outpatient therapy/counseling. Pt confirmed  the need for ongoing treatment post inpatient stay. Pt was also provided a handout of contact information for drug and alcohol treatment centers and other community support service such as [facility], AA, and [facility].

## 2022-04-02 NOTE — PROGRESS NOTES
I Daily Shift Assessment-Geriatric Unit  Nursing Progress Note          Room: [facility]/619-01   Name: Rosita Hartman   Age: 46 y.o. Gender: male   Dx: Depression with suicidal ideation  Precautions: suicide risk, fall risk and seizure precautions  Inpatient Status: voluntary     SLEEP:    Sleep: Yes,   Sleep Quality Fair   Hours Slept:    Sleep Medications: Yes  PRN Sleep Meds: Yes       MEDICAL:      Other PRN Meds: Yes   Med Compliant: Yes   Accu-Chek: No   Oxygen/CPAP/BiPAP: No  CIWA/CINA: Yes   PAIN Assessment: location, headache  Side Effects from medication: No    COVID TEACHING:    Is Patient experiencing any respiratory symptoms (headache, fever, body aches, cough. Blanchie Bevels ): no  Patient educated by nursing to practice social distancing, wear masks, wash hands frequently: yes    Protective Factors:    Patient identifies protective factors with nursing staff as follows: Identifies reasons for living: Yes   Supportive Social Network or family: Yes    Belief that suicide is immoral/high spirituality: Yes   Responsibility to family or others/living with family: Yes   Fear of death or dying due to pain and suffering: Yes   Engaged in work or school: Yes     If Patient is unable to identify, reason why? N/A      Metabolic Screening:    Lab Results   Component Value Date    LABA1C 5.2 09/09/2020       Lab Results   Component Value Date    CHOL 189 09/09/2020    CHOL 148 (L) 01/27/2019    CHOL 177 09/30/2017    CHOL 151 12/09/2011     Lab Results   Component Value Date    TRIG 178 (H) 09/09/2020    TRIG 99 01/27/2019    TRIG 119 (L) 09/30/2017    TRIG 130 12/09/2011     Lab Results   Component Value Date    HDL 50 (L) 09/09/2020    HDL 44 (L) 01/27/2019    HDL 55 09/30/2017    HDL 35 12/09/2011     No components found for: LDLCAL  No results found for: LABVLDL      Body mass index is 20.72 kg/m².     BP Readings from Last 2 Encounters:   04/01/22 130/71   12/29/21 (!) 142/89         PSYCH:     SI denies suicidal ideation HI Negative for homicidal ideation        AVH:Absent      Depression: 10 Anxiety: 10       GENERAL:      Appetite: decreased  Percent Meals:    Social: Yes Speech: normal   Appearance:appropriately dressed  Assistive Devices: noneLevel of Assist: Independent      GROUP:    Group Participation: Yes  Participation LevelMinimal    Participation QualityAppropriate    Notes:      Patient has been some social this evening. He has watched television some this evening. He reports problems with his stomach and keeping things down some times. He was able to eat a sandwich at bedtime. He reports some stomach issues this evening. He reports some withdrawal problems this evening. Patient received Ativan 2 mg for his CIWA score of 17. He reports that his depression and anxiety have been high for the last 21 days. He denies SI and AVH at the present time of the assessment.        Electronically signed by Marita Yang RN on 4/2/22 at 3:31 AM CDT

## 2022-04-02 NOTE — PROGRESS NOTES
Behavioral Services  Medicare Certification Upon Admission    I certify that this patient's inpatient psychiatric hospital admission is medically necessary for:    [x] (1) Treatment which could reasonably be expected to improve this patient's condition,       [x] (2) Or for diagnostic study;     AND     [x](2) The inpatient psychiatric services are provided while the individual is under the care of a physician and are included in the individualized plan of care.     Estimated length of stay/service 3-5 days    Plan for post-hospital care TBA    Electronically signed by Holden Camp MD on 4/2/2022 at 1:44 PM

## 2022-04-02 NOTE — PROGRESS NOTES
Requirement Note     SW met with pt to complete Psychosocial and CSSR-S on this date. Patients long and short term goals discussed. Patient voiced understanding. Treatment plan sheet signed. Patient verbalized understanding of the treatment plan. Patient participated in goals and objectives of the treatment plan. Patient discussed safety plan with . In the last 6 months has the pt been a danger to self: NO  In the last 6 months has the pt been a danger to others: NO  Legal Guardian/POA: NO     Provided patient with [employer] Online handout entitled \"Quitting Smoking. \"  Reviewed handout with patient: addressing dangers of smoking, developing coping skills, and providing basic information about quitting. Patient received all components practical counseling of tobacco practical counseling during the hospital stay.
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
## 2022-04-02 NOTE — PROGRESS NOTES
[facility] Daily Shift Assessment-Geriatric Unit  Nursing Progress Note          Room: [facility]/619-01   Name: Yanni Espinosa   Age: 46 y.o. Gender: male   Dx: Depression with suicidal ideation  Precautions: close watch, suicide risk and fall risk  Inpatient Status: voluntary     SLEEP:    Sleep: Yes,   Sleep Quality Good   Hours Slept: 8   Sleep Medications: Yes  PRN Sleep Meds: Yes       MEDICAL:      Other PRN Meds: Yes   Med Compliant: Yes   Accu-Chek: No   Oxygen/CPAP/BiPAP: No  CIWA/CINA: Yes   PAIN Assessment: headaches  Side Effects from medication: No    COVID TEACHING:    Is Patient experiencing any respiratory symptoms (headache, fever, body aches, cough. Hampton Hilt ): no  Patient educated by nursing to practice social distancing, wear masks, wash hands frequently: yes    Protective Factors:    Patient identifies protective factors with nursing staff as follows: Identifies reasons for living: Yes   Supportive Social Network or family: Yes    Belief that suicide is immoral/high spirituality: Yes   Responsibility to family or others/living with family: Yes   Fear of death or dying due to pain and suffering: Yes   Engaged in work or school: Yes     If Patient is unable to identify, reason why? Metabolic Screening:    Lab Results   Component Value Date    LABA1C 5.2 09/09/2020       Lab Results   Component Value Date    CHOL 189 09/09/2020    CHOL 148 (L) 01/27/2019    CHOL 177 09/30/2017    CHOL 151 12/09/2011     Lab Results   Component Value Date    TRIG 178 (H) 09/09/2020    TRIG 99 01/27/2019    TRIG 119 (L) 09/30/2017    TRIG 130 12/09/2011     Lab Results   Component Value Date    HDL 50 (L) 09/09/2020    HDL 44 (L) 01/27/2019    HDL 55 09/30/2017    HDL 35 12/09/2011     No components found for: LDLCAL  No results found for: LABVLDL      Body mass index is 20.72 kg/m².     BP Readings from Last 2 Encounters:   04/02/22 (!) 145/93   12/29/21 (!) 142/89         PSYCH:     SI denies suicidal ideation    HI Negative for homicidal ideation        AVH:Absent      Depression: 10 Anxiety: 10       GENERAL:      Appetite: decreased  Percent Meals: 25%   Social: No Speech: pressured   Appearance:appropriately dressed, inappropriately dressed, disheveled and healthy looking  Assistive Devices: noneLevel of Assist: Independent      GROUP:    Group Participation: No  Participation LevelNone    Participation Nani     Notes: Patient is still experiencing moderate alcohol withdrawal symptoms (headache, tremors, sweating, muscle aches, nausea), and is on CIWA q 4hrs. Vitals are stable and patient is coherent and cooperative. Patient is med compliant as well. Patient has a decreased appetite, but has managed to keep some food down overnight.            Electronically signed by Ebenezer Kwong RN on 4/2/22 at 8:47 AM CDT

## 2022-04-02 NOTE — H&P
HISTORY and PHYSICAL      CHIEF COMPLAINT:  Depression, SI, ETOH Abuse    Reason for Admission:  Depression, SI, ETOH Abuse    History Obtained From:  Patient, chart    HISTORY OF PRESENT ILLNESS:      The patient is a 46 y.o. male who is admitted to the [place] [address] unit with worsening mood issues. He has no c/o CP or SOA. He has c/o nausea. No vomiting. He has abdominal pain and just recently had EGD and c-scope. No dysuria. No new pain issues. No fevers. No HA or dizziness. Past Medical History:        Diagnosis Date    CAD (coronary artery disease)     Chest pain 12/12/2011    Cigarette smoker 12/12/2011    Depression     Hypertension 12/12/2011    Respiratory failure ([facility] [address]     intubation    Seizures ([facility] [address]      Past Surgical History:        Procedure Laterality Date    APPENDECTOMY      [facility]    COLONOSCOPY      ENDOSCOPY, COLON, DIAGNOSTIC      KNEE ARTHROSCOPY Right     NECK SURGERY  7/15/2008    [facility]         Medications Prior to Admission:    Medications Prior to Admission: lamoTRIgine (LAMICTAL) 100 MG tablet, Take 2.5 tablets by mouth daily for 7 days, THEN 3 tablets daily. (Patient taking differently: 100 mg tablet BID)  traZODone (DESYREL) 50 MG tablet, Take 1 tablet by mouth nightly as needed for Sleep  naltrexone (VIVITROL) 380 MG injection, Inject 380 mg into the muscle every 28 days  gabapentin (NEURONTIN) 600 MG tablet, Take 1 tablet by mouth 3 times daily for 30 days.  (Patient taking differently: Take 300 mg by mouth 3 times daily. )  QUEtiapine (SEROQUEL) 200 MG tablet, 200 mg nightly   famotidine (PEPCID) 40 MG tablet, Take 40 mg by mouth daily  hydrOXYzine (ATARAX) 25 MG tablet, Take 1 tablet by mouth 3 times daily as needed for Anxiety  losartan (COZAAR) 100 MG tablet, Take 100 mg by mouth daily    Allergies:  Zofran [ondansetron]    Social History:   TOBACCO:   reports that he has been smoking cigarettes. He has a 35.00 pack-year smoking history. He has quit using smokeless tobacco.  ETOH:   reports current alcohol use of about 6.0 standard drinks of alcohol per week. DRUGS:   reports current drug use. Frequency: 1.00 time per week. Drug: Marijuana Sumeet Mike). MARITAL STATUS:    Patient currently lives alone. Family History:       Problem Relation Age of Onset    Osteoarthritis Mother     Thyroid Disease Mother     Heart Failure Father     Heart Failure Maternal Grandmother     Heart Failure Paternal Grandmother     Cancer Maternal Grandfather      REVIEW OF SYSTEMS:  Constitutional: neg  CV: neg  Pulmonary: neg  GI: neg  : neg  Psych: depression, SI  Neuro: neg  Skin: neg  MusculoSkeletal: neg  HEENT: neg  Joints: neg    Vitals:  BP (!) 167/107   Pulse 80   Temp 97.6 °F (36.4 °C) (Temporal)   Resp 18   Ht 6' 3\" (1.905 m)   Wt 165 lb 12.8 oz (75.2 kg)   SpO2 97%   BMI 20.72 kg/m²     PHYSICAL EXAM:  Gen: NAD, alert  HEENT: WNL  Lymph: no LAD  Neck: no JVD or masses  Chest: CTA bilat  CV: RRR  Abdomen: NT/ND  Extrem: no C/C/E  Neuro: non focal  Skin: no rashes  Joints: no redness    DATA:  I have reviewed the admission labs and imaging tests.     ASSESSMENT AND PLAN:      Patient Active Hospital Problem List:   Severe episode of recurrent major depressive disorder, without psychotic features---follow with Psych   H/O ETOH Abuse   HTN---monitor BP             Dank Pryor MD  2:16 PM 4/2/2022

## 2022-04-02 NOTE — PROGRESS NOTES
WRAP UP GROUP NOTE:     Patient's Goal:  Providing feedback as to their own progress in the care-plan provided. Pt's have an opportunity to explore self-reflective skills and share any additional cares and concerns not yet addressed. Pt effectively participated.      Energy level:LOW  Appetite:POOR  Concentration: POOR  Hallucinations:BLANK  Depression:WORSE  Anxiety:CONSTANT   How I worked today:BLANK  What helps me sleep:BLANK  Any questions/complaints/comments:BLANK

## 2022-04-03 LAB
CHOLESTEROL, TOTAL: 166 MG/DL (ref 160–199)
HBA1C MFR BLD: 5.1 % (ref 4–6)
HDLC SERPL-MCNC: 61 MG/DL (ref 55–121)
LDL CHOLESTEROL CALCULATED: 75 MG/DL
TRIGL SERPL-MCNC: 148 MG/DL (ref 0–149)
TSH REFLEX FT4: 0.98 UIU/ML (ref 0.35–5.5)
VITAMIN B-12: 532 PG/ML (ref 211–946)
VITAMIN D 25-HYDROXY: 26.7 NG/ML

## 2022-04-03 PROCEDURE — 1240000000 HC EMOTIONAL WELLNESS R&B

## 2022-04-03 PROCEDURE — 6370000000 HC RX 637 (ALT 250 FOR IP): Performed by: FAMILY MEDICINE

## 2022-04-03 PROCEDURE — 6370000000 HC RX 637 (ALT 250 FOR IP): Performed by: PSYCHIATRY & NEUROLOGY

## 2022-04-03 RX ORDER — ERGOCALCIFEROL 1.25 MG/1
50000 CAPSULE ORAL WEEKLY
Status: DISCONTINUED | OUTPATIENT
Start: 2022-04-03 | End: 2022-04-05 | Stop reason: HOSPADM

## 2022-04-03 RX ORDER — AMLODIPINE BESYLATE 5 MG/1
2.5 TABLET ORAL DAILY
Status: DISCONTINUED | OUTPATIENT
Start: 2022-04-03 | End: 2022-04-05

## 2022-04-03 RX ADMIN — THIAMINE HCL TAB 100 MG 100 MG: 100 TAB at 08:30

## 2022-04-03 RX ADMIN — HYDROXYZINE HYDROCHLORIDE 25 MG: 25 TABLET, FILM COATED ORAL at 19:58

## 2022-04-03 RX ADMIN — FOLIC ACID 1 MG: 1 TABLET ORAL at 08:30

## 2022-04-03 RX ADMIN — QUETIAPINE FUMARATE 200 MG: 200 TABLET ORAL at 19:58

## 2022-04-03 RX ADMIN — LAMOTRIGINE 50 MG: 25 TABLET ORAL at 19:58

## 2022-04-03 RX ADMIN — LAMOTRIGINE 50 MG: 25 TABLET ORAL at 08:30

## 2022-04-03 RX ADMIN — LOSARTAN POTASSIUM 100 MG: 100 TABLET, FILM COATED ORAL at 08:30

## 2022-04-03 RX ADMIN — THERA TABS 1 TABLET: TAB at 08:30

## 2022-04-03 RX ADMIN — LORAZEPAM 1 MG: 1 TABLET ORAL at 15:52

## 2022-04-03 RX ADMIN — GABAPENTIN 600 MG: 600 TABLET, FILM COATED ORAL at 15:46

## 2022-04-03 RX ADMIN — ERGOCALCIFEROL 50000 UNITS: 1.25 CAPSULE ORAL at 08:40

## 2022-04-03 RX ADMIN — GABAPENTIN 600 MG: 600 TABLET, FILM COATED ORAL at 08:30

## 2022-04-03 RX ADMIN — GABAPENTIN 600 MG: 600 TABLET, FILM COATED ORAL at 19:57

## 2022-04-03 RX ADMIN — LORAZEPAM 1 MG: 1 TABLET ORAL at 08:30

## 2022-04-03 RX ADMIN — AMLODIPINE BESYLATE 2.5 MG: 5 TABLET ORAL at 10:04

## 2022-04-03 RX ADMIN — LORAZEPAM 1 MG: 1 TABLET ORAL at 19:58

## 2022-04-03 RX ADMIN — PANTOPRAZOLE SODIUM 40 MG: 40 TABLET, DELAYED RELEASE ORAL at 06:28

## 2022-04-03 ASSESSMENT — PAIN DESCRIPTION - ORIENTATION: ORIENTATION: LOWER

## 2022-04-03 ASSESSMENT — PAIN DESCRIPTION - PROGRESSION: CLINICAL_PROGRESSION: GRADUALLY WORSENING

## 2022-04-03 ASSESSMENT — PAIN SCALES - GENERAL: PAINLEVEL_OUTOF10: 5

## 2022-04-03 ASSESSMENT — PAIN DESCRIPTION - LOCATION: LOCATION: BACK

## 2022-04-03 ASSESSMENT — PAIN DESCRIPTION - DESCRIPTORS: DESCRIPTORS: ACHING;DISCOMFORT

## 2022-04-03 ASSESSMENT — PAIN DESCRIPTION - PAIN TYPE: TYPE: ACUTE PAIN;CHRONIC PAIN

## 2022-04-03 ASSESSMENT — PAIN DESCRIPTION - ONSET: ONSET: GRADUAL

## 2022-04-03 ASSESSMENT — PAIN DESCRIPTION - FREQUENCY: FREQUENCY: INTERMITTENT

## 2022-04-03 ASSESSMENT — PAIN - FUNCTIONAL ASSESSMENT: PAIN_FUNCTIONAL_ASSESSMENT: ACTIVITIES ARE NOT PREVENTED

## 2022-04-03 NOTE — GROUP NOTE
Group Therapy Note    Date: 4/3/2022    Group Start Time: 0800  Group End Time: 0830  Group Topic: [employer]    [facility] 6 GERIATRIC BEHAVIORAL UNIT    Nette Jacobs RN; Que Reyez RN    Group Therapy Note                                                                        Group Therapy Note    Date: 04/03/22  Start Time: 0800  End Time: 0830    Number of Participants:     Type of Group: Community Meeting    Patient's Goal:  \"talk to doctor\"    Notes:      Participation Level:  Active Listener and Interactive    Participation Quality: Appropriate    Speech:  normal and talkative    Thought Process/Content: circumstantial    Affective Functioning: Flat    Mood: anxious, depressed, and cooperative    Level of consciousness:  Alert and Oriented x4    Response to Learning: Able to verbalize current knowledge/experience, Able to verbalize/acknowledge new learning, Able to retain information, and Capable of insight    Modes of Intervention: Education and Support    Discipline Responsible: Registered Nurse    Signature:  Nette Jacobs RN  Electronically signed by Nette Jacobs RN on 4/3/2022 at 10:28 AM

## 2022-04-03 NOTE — PROGRESS NOTES
WRAP UP GROUP NOTE:     Patient's Goal:  Providing feedback as to their own progress in the care-plan provided. Pt's have an opportunity to explore self-reflective skills and share any additional cares and concerns not yet addressed. Pt effectively participated.      Energy level:low  Appetite:poor  Concentration: imporving  Hallucinations:gone  Depression:same  Anxiety:on/off  How I worked today:tried a lot  What helps me sleep:medicine  Any questions/complaints/comments:staff was wonderful, informative, caring, concerned, great

## 2022-04-03 NOTE — BH NOTE
BHI Daily Shift Assessment-Geriatric Unit  Nursing Progress Note          Room: [facility]19-01   Name: Yanni Espinosa   Age: 46 y.o. Gender: male   Dx: Depression with suicidal ideation  Precautions: suicide risk, fall risk and seizure precautions  Inpatient Status: voluntary     SLEEP:    Sleep: Yes,   Sleep Quality Good   Hours Slept:    Sleep Medications: Yes; seroquel 200mg  PRN Sleep Meds: No       MEDICAL:      Other PRN Meds: Yes; ativan 1mg,  Atarax 25mg  Med Compliant: Yes   Accu-Chek: No   Oxygen/CPAP/BiPAP: No  CIWA/CINA: Yes ; 10  PAIN Assessment: reports pain, refused tylenol, received scheduled gabapentin  Side Effects from medication: none voiced    COVID TEACHING:    Is Patient experiencing any respiratory symptoms (headache, fever, body aches, cough. [place] ): no  Patient educated by nursing to practice social distancing, wear masks, wash hands frequently: yes    Protective Factors:    Patient identifies protective factors with nursing staff as follows: Identifies reasons for living: Yes   Supportive Social Network or family: Yes    Belief that suicide is immoral/high spirituality: Yes   Responsibility to family or others/living with family: Yes   Fear of death or dying due to pain and suffering: Yes   Engaged in work or school: Yes     If Patient is unable to identify, reason why? N/A      Metabolic Screening:    Lab Results   Component Value Date    LABA1C 5.2 09/09/2020       Lab Results   Component Value Date    CHOL 189 09/09/2020    CHOL 148 (L) 01/27/2019    CHOL 177 09/30/2017    CHOL 151 12/09/2011     Lab Results   Component Value Date    TRIG 178 (H) 09/09/2020    TRIG 99 01/27/2019    TRIG 119 (L) 09/30/2017    TRIG 130 12/09/2011     Lab Results   Component Value Date    HDL 50 (L) 09/09/2020    HDL 44 (L) 01/27/2019    HDL 55 09/30/2017    HDL 35 12/09/2011     No components found for: LDLCAL  No results found for: LABVLDL      Body mass index is 20.72 kg/m².     BP Readings from Last 2 Encounters:   04/02/22 (!) 153/100   12/29/21 (!) 142/89         PSYCH:     SI denies suicidal ideation    HI Negative for homicidal ideation        AVH:denies      Depression: 8 Anxiety: 4       GENERAL:      Appetite: improved  Percent Meals: 50%-75%  Social: Yes Speech: normal   Appearance:appropriately dressed and disheveled  Assistive Devices: noneLevel of Assist: Independent      GROUP:    Group Participation: Yes  Participation LevelInteractive    Participation QualityAppropriate    Notes: Pt has been cooperative with his interview. He is impulsive at times. His affect is flat and worried. Pt's appetite has improved tonight as he has had a sandwich, chips, cheez-its etc. since shift change. Pt CIWA score tonight was 10 and he received 1mg PO of ativan. Pt continues with withdrawal s/s of nausea, tremors, anxiety, sweating and mild agitation. He is social and med compliant. He rates his depression a 8/10 and his anxiety a 4/10. Pt denies SI, HI, and AVH. Pt reported pain but denies any Tylenol. He did receive his scheduled gabapentin. Monitoring for safety continues as ordered.      Electronically signed by Solomon Renee RN on 4/2/2022 at 9:19 PM

## 2022-04-03 NOTE — GROUP NOTE
Group Therapy Note    Date: 4/3/2022    Group Start Time: 9846  Group End Time: 0331  Group Topic: Art Therapy     [facility] 6 GERIATRIC BEHAVIORAL UNIT    Santos Macedo RN; Willa Aggarwal RN    Group Therapy Note                                                                        Group Therapy Note    Date: 04/03/22  Start Time: 0189  End Time: 7756    Number of Participants: 2    Type of Group: Art Therapy    Patient's Goal:  Positive communication and interaction with staff and peers    Notes:      Participation Level:  Active Listener and Interactive    Participation Quality: Appropriate    Speech:  normal    Thought Process/Content: circumstantial    Affective Functioning: Congruent    Mood:  calm and cooperative    Level of consciousness:  Alert and Oriented x4    Response to Learning: Able to verbalize current knowledge/experience, Able to verbalize/acknowledge new learning, Able to retain information, and Capable of insight    Modes of Intervention: Education and Support    Discipline Responsible: Registered Nurse    Signature:  Santos Macedo RN   Electronically signed by Santos Macedo RN on 4/3/2022 at 6:10 PM

## 2022-04-03 NOTE — PROGRESS NOTES
Progress Note  Teddy Alfonso  4/3/2022 9:09 AM  Subjective:   Admit Date:   4/1/2022      CC/ADMIT DX:       Interval History:   Reviewed overnight events and nursing notes. He has no new medical issues. I have reviewed all labs/diagnostics from the last 24hrs. ROS:   I have done a 10 point ROS and all are negative, except what is mentioned in the HPI. ADULT DIET; Regular    Medications:      vitamin D  50,000 Units Oral Weekly    amLODIPine  2.5 mg Oral Daily    gabapentin  600 mg Oral TID    folic acid  1 mg Oral Daily    thiamine mononitrate  100 mg Oral Daily    multivitamin  1 tablet Oral Daily    lamoTRIgine  50 mg Oral BID    pantoprazole  40 mg Oral QAM AC    nicotine  1 patch TransDERmal Daily    losartan  100 mg Oral Daily    QUEtiapine  200 mg Oral Nightly           Objective:   Vitals: BP (!) 166/96   Pulse 77   Temp 97.2 °F (36.2 °C) (Temporal)   Resp 18   Ht 6' 3\" (1.905 m)   Wt 165 lb 12.8 oz (75.2 kg)   SpO2 100%   BMI 20.72 kg/m²  No intake or output data in the 24 hours ending 04/03/22 0909  General appearance: alert and cooperative with exam  Extremities: extremities normal, atraumatic, no cyanosis or edema  Neurologic:  No obvious focal neurologic deficits. Skin: no rsahes    Assessment and Plan:   Principal Problem:    Depression with suicidal ideation  Resolved Problems:    * No resolved hospital problems. *    Vit D Def    Plan:  1. Continue present medication(s)   2. Replace Vit D  3. Add Norvasc for BP control  4. Follow with Psych      Discharge planning:    home     Reviewed treatment plans with the patient and/or family.              Electronically signed by Alf Espinosa MD on 4/3/2022 at 9:09 AM

## 2022-04-03 NOTE — PROGRESS NOTES
BHI Daily Shift Assessment-Geriatric Unit  Nursing Progress Note          Room: [facility]619-   Name: Roby Hobbs   Age: 46 y.o. Gender: male   Dx: Depression with suicidal ideation  Precautions: suicide risk, fall risk and seizure precautions  Inpatient Status: voluntary     SLEEP:    Sleep: Yes,   Sleep Quality Good   Hours Slept: 6   Sleep Medications: Yes  PRN Sleep Meds: No       MEDICAL:      Other PRN Meds: Yes   Med Compliant: Yes   Accu-Chek: No   Oxygen/CPAP/BiPAP: No  CIWA/CINA: Yes   PAIN Assessment: level of pain (1-10, 10 severe), back 6, neck 4  Side Effects from medication: No    COVID TEACHING:    Is Patient experiencing any respiratory symptoms (headache, fever, body aches, cough. [place] ): no  Patient educated by nursing to practice social distancing, wear masks, wash hands frequently: yes    Protective Factors:    Patient identifies protective factors with nursing staff as follows: Identifies reasons for living: Yes   Supportive Social Network or family: Yes    Belief that suicide is immoral/high spirituality: Yes   Responsibility to family or others/living with family: No   Fear of death or dying due to pain and suffering: Yes   Engaged in work or school: Yes     If Patient is unable to identify, reason why? Metabolic Screening:    Lab Results   Component Value Date    LABA1C 5.1 04/01/2022       Lab Results   Component Value Date    CHOL 166 04/01/2022    CHOL 189 09/09/2020    CHOL 148 (L) 01/27/2019    CHOL 177 09/30/2017    CHOL 151 12/09/2011     Lab Results   Component Value Date    TRIG 148 04/01/2022    TRIG 178 (H) 09/09/2020    TRIG 99 01/27/2019    TRIG 119 (L) 09/30/2017    TRIG 130 12/09/2011     Lab Results   Component Value Date    HDL 61 04/01/2022    HDL 50 (L) 09/09/2020    HDL 44 (L) 01/27/2019    HDL 55 09/30/2017    HDL 35 12/09/2011     No components found for: LDLCAL  No results found for: LABVLDL      Body mass index is 20.72 kg/m².     BP Readings from Last 2 Encounters:   04/03/22 (!) 166/96   12/29/21 (!) 142/89         PSYCH:     SI denies suicidal ideation    HI Negative for homicidal ideation, reports thoughts of wanting to harm his ex-wife and spouse daily        AVH:Absent      Depression: 7-8 Anxiety: 6-7       GENERAL:      Appetite: improved  Percent Meals: %   Social: Yes Speech: normal and talkative   Appearance:appropriately dressed and appropriately groomed  Assistive Devices: none  Level of Assist: Independent      GROUP:    Group Participation: Yes  Participation LevelActive Listener and Interactive    Participation QualityAppropriate    Notes: Pt is alert and oriented x 4, cooperative. Pt has flat affect, appears worried. Mood congruent. Concentration and memory intact. Limited insight and judgment. States he has thoughts of wanting to harm his ex-wife and spouse daily, but \"I didn't say I wanted to kill them\". Pt is social with staff and peers. Attends groups. CIWA q4hr. Rates depression 7-8 and anxiety 6-7. Denies SI, HI, and AVH.      Electronically signed by Mirtha Scott RN on 4/3/22 at 10:21 AM CDT

## 2022-04-03 NOTE — PLAN OF CARE
Problem: Anger Management/Homicidal Ideation:  Goal: Able to display appropriate communication and problem solving  Description: Able to display appropriate communication and problem solving  4/3/2022 0856 by Santos Macedo RN  Outcome: Ongoing  4/2/2022 2051 by Fortino Mitchell RN  Outcome: Ongoing  Goal: Ability to verbalize frustrations and anger appropriately will improve  Description: Ability to verbalize frustrations and anger appropriately will improve  4/3/2022 0856 by Santos Macedo RN  Outcome: Ongoing  4/2/2022 2051 by Fortino Mitchell RN  Outcome: Ongoing     Problem: Altered Mood, Depressive Behavior:  Goal: Able to verbalize acceptance of life and situations over which he or she has no control  Description: Able to verbalize acceptance of life and situations over which he or she has no control  4/3/2022 0856 by Santos Macedo RN  Outcome: Ongoing  4/2/2022 2051 by Fortino Mitchell RN  Outcome: Ongoing  Goal: Able to verbalize and/or display a decrease in depressive symptoms  Description: Able to verbalize and/or display a decrease in depressive symptoms  4/3/2022 0856 by Santos Macedo RN  Outcome: Ongoing  4/2/2022 2051 by Fortino Mitchell RN  Outcome: Ongoing     Problem: Depressive Behavior With or Without Suicide Precautions:  Goal: Ability to disclose and discuss suicidal ideas will improve  Description: Ability to disclose and discuss suicidal ideas will improve  4/3/2022 0856 by Santos Macedo RN  Outcome: Ongoing  4/2/2022 2051 by Fortino Mitchell RN  Outcome: Ongoing  Goal: Able to verbalize support systems  Description: Able to verbalize support systems  4/3/2022 0856 by Santos Macedo RN  Outcome: Ongoing  4/2/2022 2051 by Fortino Mitchell RN  Outcome: Ongoing  Goal: Absence of self-harm  Description: Absence of self-harm  4/3/2022 0856 by Santos Macedo RN  Outcome: Ongoing  4/2/2022 2051 by Fortino Mitchell RN  Outcome: Ongoing     Problem: Risk of Harm:  Goal: Ability to remain free from injury will improve  Description: Ability to remain free from injury will improve  4/3/2022 0856 by Gabriela Givens RN  Outcome: Ongoing  4/2/2022 2051 by Sherren Gibney, RN  Outcome: Ongoing     Problem: Substance Abuse:  Goal: Absence of drug withdrawal signs and symptoms  Description: Absence of drug withdrawal signs and symptoms  4/3/2022 0856 by Gabriela Givens RN  Outcome: Ongoing  4/2/2022 2051 by Sherren Gibney, RN  Outcome: Ongoing  Goal: Participates in care planning  Description: Participates in care planning  4/3/2022 0856 by Gabriela Givens RN  Outcome: Ongoing  4/2/2022 2051 by Sherren Gibney, RN  Outcome: Ongoing     Problem: Pain:  Goal: Pain level will decrease  Description: Pain level will decrease  4/3/2022 0856 by Gabriela Givens RN  Outcome: Ongoing  4/2/2022 2051 by Sherren Gibney, RN  Outcome: Ongoing  Goal: Control of acute pain  Description: Control of acute pain  4/3/2022 0856 by Gabriela Givens RN  Outcome: Ongoing  4/2/2022 2051 by Sherren Gibney, RN  Outcome: Ongoing  Goal: Control of chronic pain  Description: Control of chronic pain  4/3/2022 0856 by Gabriela Givens RN  Outcome: Ongoing  4/2/2022 2051 by Sherren Gibney, RN  Outcome: Ongoing     Problem: Falls - Risk of:  Goal: Will remain free from falls  Description: Will remain free from falls  4/3/2022 0856 by Gabriela Givens RN  Outcome: Ongoing  4/2/2022 2051 by Sherren Gibney, RN  Outcome: Ongoing  Goal: Absence of physical injury  Description: Absence of physical injury  4/3/2022 0856 by Gabriela Givens RN  Outcome: Ongoing  4/2/2022 2051 by Sherren Gibney, RN  Outcome: Ongoing     Problem: Cardiac:  Goal: Ability to maintain vital signs within normal range will improve  Description: Ability to maintain vital signs within normal range will improve  4/3/2022 0856 by Gabriela Givens RN  Outcome: Ongoing  4/2/2022 2051 by Sherren Gibney, RN  Outcome: Ongoing  Goal: Cardiovascular alteration will improve  Description: Cardiovascular alteration will improve  4/3/2022 0856 by Satish Stovall RN  Outcome: Ongoing  4/2/2022 2051 by Wilma Foster RN  Outcome: Ongoing     Problem: Health Behavior:  Goal: Will modify at least one risk factor affecting health status  Description: Will modify at least one risk factor affecting health status  4/3/2022 0856 by Satish Stovall RN  Outcome: Ongoing  4/2/2022 2051 by Wilma Foster RN  Outcome: Ongoing  Goal: Identification of resources available to assist in meeting health care needs will improve  Description: Identification of resources available to assist in meeting health care needs will improve  4/3/2022 0856 by Satish Stovall RN  Outcome: Ongoing  4/2/2022 2051 by Wilma Foster RN  Outcome: Ongoing     Problem: Physical Regulation:  Goal: Complications related to the disease process, condition or treatment will be avoided or minimized  Description: Complications related to the disease process, condition or treatment will be avoided or minimized  4/3/2022 0856 by Satish Stovall RN  Outcome: Ongoing  4/2/2022 2051 by Wilma Foster RN  Outcome: Ongoing

## 2022-04-03 NOTE — GROUP NOTE
Group Therapy Note    Date: 4/3/2022    Group Start Time: 2345  Group End Time: 1815  Group Topic: Recreational    [facility] 6 GERIATRIC BEHAVIORAL UNIT    Mirtha Scott RN; Geena Block RN    Group Therapy Note                                                                        Group Therapy Note    Date: 04/03/22  Start Time: 9246  End Time: 1815    Number of Participants: 3    Type of Group: Recreational    Patient's Goal:  Positive communication and interaction with staff and peers    Notes:      Participation Level:  Active Listener and Interactive    Participation Quality: Appropriate    Speech:  normal    Thought Process/Content: circumstantial    Affective Functioning: Congruent    Mood:  calm and cooperative    Level of consciousness:  Alert and Oriented x4    Response to Learning: Able to verbalize current knowledge/experience, Able to verbalize/acknowledge new learning, Able to retain information, and Capable of insight    Modes of Intervention: Education and Support    Discipline Responsible: Registered Nurse    Signature:  Mirtha Scott RN  Electronically signed by Mirtha Scott RN on 4/3/2022 at 6:17 PM

## 2022-04-03 NOTE — PLAN OF CARE
Problem: Anger Management/Homicidal Ideation:  Goal: Able to display appropriate communication and problem solving  Description: Able to display appropriate communication and problem solving  4/2/2022 2051 by Madeleine Ellington RN  Outcome: Ongoing  4/2/2022 0838 by Heather Shay RN  Outcome: Ongoing  Goal: Ability to verbalize frustrations and anger appropriately will improve  Description: Ability to verbalize frustrations and anger appropriately will improve  4/2/2022 2051 by Madeleine Ellington RN  Outcome: Ongoing  4/2/2022 0838 by Heather Shay RN  Outcome: Ongoing     Problem: Altered Mood, Depressive Behavior:  Goal: Able to verbalize acceptance of life and situations over which he or she has no control  Description: Able to verbalize acceptance of life and situations over which he or she has no control  4/2/2022 2051 by Madeleine Ellington RN  Outcome: Ongoing  4/2/2022 0838 by Heather Shay RN  Outcome: Ongoing  Goal: Able to verbalize and/or display a decrease in depressive symptoms  Description: Able to verbalize and/or display a decrease in depressive symptoms  4/2/2022 2051 by Madeleine Ellington RN  Outcome: Ongoing  4/2/2022 0838 by Heather Shay RN  Outcome: Ongoing     Problem: Depressive Behavior With or Without Suicide Precautions:  Goal: Ability to disclose and discuss suicidal ideas will improve  Description: Ability to disclose and discuss suicidal ideas will improve  4/2/2022 2051 by Madeleine Ellington RN  Outcome: Ongoing  4/2/2022 0838 by Heather Shay RN  Outcome: Ongoing  Goal: Able to verbalize support systems  Description: Able to verbalize support systems  4/2/2022 2051 by Madeleine Ellington RN  Outcome: Ongoing  4/2/2022 0838 by Heather Shay RN  Outcome: Ongoing  Goal: Absence of self-harm  Description: Absence of self-harm  4/2/2022 2051 by Madeleine Ellington RN  Outcome: Ongoing  4/2/2022 0838 by Heather Shay RN  Outcome: Ongoing     Problem: Risk of Harm:  Goal: Ability to remain free from injury will improve  Description: Ability to remain free from injury will improve  4/2/2022 2051 by Swapna Carrillo RN  Outcome: Ongoing  4/2/2022 0838 by Rickey Lowery RN  Outcome: Ongoing     Problem: Substance Abuse:  Goal: Absence of drug withdrawal signs and symptoms  Description: Absence of drug withdrawal signs and symptoms  4/2/2022 2051 by Swapna Carrillo RN  Outcome: Ongoing  4/2/2022 0838 by Rickey Lowery RN  Outcome: Ongoing  Goal: Participates in care planning  Description: Participates in care planning  4/2/2022 2051 by Swapna Carrillo RN  Outcome: Ongoing  4/2/2022 0838 by Rickey Lowery RN  Outcome: Ongoing     Problem: Pain:  Goal: Pain level will decrease  Description: Pain level will decrease  4/2/2022 2051 by Swapna Carrillo RN  Outcome: Ongoing  4/2/2022 0838 by Rickey Lowery RN  Outcome: Ongoing  Goal: Control of acute pain  Description: Control of acute pain  4/2/2022 2051 by Swapna Carrillo RN  Outcome: Ongoing  4/2/2022 0838 by Rickey Lowery RN  Outcome: Ongoing  Goal: Control of chronic pain  Description: Control of chronic pain  4/2/2022 2051 by Swapna Carrillo RN  Outcome: Ongoing  4/2/2022 0838 by Rickey Lowery RN  Outcome: Ongoing     Problem: Falls - Risk of:  Goal: Will remain free from falls  Description: Will remain free from falls  4/2/2022 2051 by Swapna Carrillo RN  Outcome: Ongoing  4/2/2022 0838 by Rickey Lowery RN  Outcome: Ongoing  Goal: Absence of physical injury  Description: Absence of physical injury  4/2/2022 2051 by Swapna Carrillo RN  Outcome: Ongoing  4/2/2022 0838 by Rickey Lowery RN  Outcome: Ongoing     Problem: Cardiac:  Goal: Ability to maintain vital signs within normal range will improve  Description: Ability to maintain vital signs within normal range will improve  Outcome: Ongoing  Goal: Cardiovascular alteration will improve  Description: Cardiovascular alteration will improve  Outcome: Ongoing     Problem: Health Behavior:  Goal: Will modify at least one risk factor affecting health status  Description: Will modify at least one risk factor affecting health status  Outcome: Ongoing  Goal: Identification of resources available to assist in meeting health care needs will improve  Description: Identification of resources available to assist in meeting health care needs will improve  Outcome: Ongoing     Problem: Physical Regulation:  Goal: Complications related to the disease process, condition or treatment will be avoided or minimized  Description: Complications related to the disease process, condition or treatment will be avoided or minimized  Outcome: Ongoing

## 2022-04-04 PROCEDURE — 6370000000 HC RX 637 (ALT 250 FOR IP): Performed by: FAMILY MEDICINE

## 2022-04-04 PROCEDURE — 6370000000 HC RX 637 (ALT 250 FOR IP): Performed by: PSYCHIATRY & NEUROLOGY

## 2022-04-04 PROCEDURE — 1240000000 HC EMOTIONAL WELLNESS R&B

## 2022-04-04 PROCEDURE — 99233 SBSQ HOSP IP/OBS HIGH 50: CPT | Performed by: PSYCHIATRY & NEUROLOGY

## 2022-04-04 RX ORDER — LAMOTRIGINE 25 MG/1
75 TABLET ORAL 2 TIMES DAILY
Status: DISCONTINUED | OUTPATIENT
Start: 2022-04-04 | End: 2022-04-05 | Stop reason: HOSPADM

## 2022-04-04 RX ADMIN — THERA TABS 1 TABLET: TAB at 07:39

## 2022-04-04 RX ADMIN — QUETIAPINE FUMARATE 200 MG: 200 TABLET ORAL at 21:09

## 2022-04-04 RX ADMIN — GABAPENTIN 600 MG: 600 TABLET, FILM COATED ORAL at 21:09

## 2022-04-04 RX ADMIN — GABAPENTIN 600 MG: 600 TABLET, FILM COATED ORAL at 07:39

## 2022-04-04 RX ADMIN — LAMOTRIGINE 50 MG: 25 TABLET ORAL at 07:40

## 2022-04-04 RX ADMIN — AMLODIPINE BESYLATE 2.5 MG: 5 TABLET ORAL at 07:38

## 2022-04-04 RX ADMIN — TRAZODONE HYDROCHLORIDE 50 MG: 50 TABLET ORAL at 21:09

## 2022-04-04 RX ADMIN — LOSARTAN POTASSIUM 100 MG: 100 TABLET, FILM COATED ORAL at 07:37

## 2022-04-04 RX ADMIN — THIAMINE HCL TAB 100 MG 100 MG: 100 TAB at 07:39

## 2022-04-04 RX ADMIN — FOLIC ACID 1 MG: 1 TABLET ORAL at 07:39

## 2022-04-04 RX ADMIN — PANTOPRAZOLE SODIUM 40 MG: 40 TABLET, DELAYED RELEASE ORAL at 06:10

## 2022-04-04 RX ADMIN — GABAPENTIN 600 MG: 600 TABLET, FILM COATED ORAL at 13:33

## 2022-04-04 RX ADMIN — HYDROXYZINE HYDROCHLORIDE 25 MG: 25 TABLET, FILM COATED ORAL at 21:09

## 2022-04-04 RX ADMIN — LAMOTRIGINE 75 MG: 25 TABLET ORAL at 21:09

## 2022-04-04 RX ADMIN — ACETAMINOPHEN 650 MG: 325 TABLET ORAL at 21:09

## 2022-04-04 ASSESSMENT — PAIN SCALES - GENERAL
PAINLEVEL_OUTOF10: 4
PAINLEVEL_OUTOF10: 0

## 2022-04-04 NOTE — PLAN OF CARE
Harm:  Goal: Ability to remain free from injury will improve  Description: Ability to remain free from injury will improve  4/3/2022 2058 by Janie Cesar RN  Outcome: Ongoing  4/3/2022 0856 by Gifty Parks RN  Outcome: Ongoing     Problem: Substance Abuse:  Goal: Absence of drug withdrawal signs and symptoms  Description: Absence of drug withdrawal signs and symptoms  4/3/2022 2058 by Janie Cesar RN  Outcome: Ongoing  4/3/2022 0856 by Gifty Parks RN  Outcome: Ongoing  Goal: Participates in care planning  Description: Participates in care planning  4/3/2022 2058 by Janie Cesar RN  Outcome: Ongoing  4/3/2022 0856 by Gifty Parks RN  Outcome: Ongoing     Problem: Pain:  Goal: Pain level will decrease  Description: Pain level will decrease  4/3/2022 2058 by Janie Cesar RN  Outcome: Ongoing  4/3/2022 0856 by Gifty Parks RN  Outcome: Ongoing  Goal: Control of acute pain  Description: Control of acute pain  4/3/2022 2058 by Janie Cesar RN  Outcome: Ongoing  4/3/2022 0856 by Gifty Parks RN  Outcome: Ongoing  Goal: Control of chronic pain  Description: Control of chronic pain  4/3/2022 2058 by Janie Cesar RN  Outcome: Ongoing  4/3/2022 0856 by Gifty Parks RN  Outcome: Ongoing     Problem: Falls - Risk of:  Goal: Will remain free from falls  Description: Will remain free from falls  4/3/2022 2058 by Janie Cesar RN  Outcome: Ongoing  4/3/2022 0856 by Gifty Parks RN  Outcome: Ongoing  Goal: Absence of physical injury  Description: Absence of physical injury  4/3/2022 2058 by Janie Cesar RN  Outcome: Ongoing  4/3/2022 0856 by Gifty Parks RN  Outcome: Ongoing     Problem: Cardiac:  Goal: Ability to maintain vital signs within normal range will improve  Description: Ability to maintain vital signs within normal range will improve  4/3/2022 2058 by Janie Cesar RN  Outcome: Ongoing  4/3/2022 0856 by Gifty Parks RN  Outcome: Ongoing  Goal: Cardiovascular alteration will improve  Description: Cardiovascular alteration will improve  4/3/2022 2058 by Solomon Renee RN  Outcome: Ongoing  4/3/2022 0856 by Matilde Pak RN  Outcome: Ongoing     Problem: Health Behavior:  Goal: Will modify at least one risk factor affecting health status  Description: Will modify at least one risk factor affecting health status  4/3/2022 2058 by Solomon Renee RN  Outcome: Ongoing  4/3/2022 0856 by Matilde Pak RN  Outcome: Ongoing  Goal: Identification of resources available to assist in meeting health care needs will improve  Description: Identification of resources available to assist in meeting health care needs will improve  4/3/2022 2058 by Solomon Renee RN  Outcome: Ongoing  4/3/2022 0856 by Matilde Pak RN  Outcome: Ongoing     Problem: Physical Regulation:  Goal: Complications related to the disease process, condition or treatment will be avoided or minimized  Description: Complications related to the disease process, condition or treatment will be avoided or minimized  4/3/2022 2058 by Solomon Renee RN  Outcome: Ongoing  4/3/2022 0856 by Matilde Pak RN  Outcome: Ongoing

## 2022-04-04 NOTE — PLAN OF CARE
Problem: Altered Mood, Depressive Behavior:  Goal: Able to verbalize acceptance of life and situations over which he or she has no control  Description: Able to verbalize acceptance of life and situations over which he or she has no control  4/4/2022 1055 by Radha Plane  Outcome: Ongoing  Note:                                                                     Group Therapy Note    Date: 4/4/2022  Start Time: 1000  End Time:  1030  Number of Participants: 3    Type of Group: Psychoeducation    Wellness Binder Information  Module Name:  Men's Issues  Session Number:  1    Group Goal for Pt: To improve knowledge of effective stress management techniques    Notes:  Pt demonstrated improved knowledge of effective stress management techniques by actively participating in group discussion. Status After Intervention:  Unchanged    Participation Level:  Active Listener and Interactive    Participation Quality: Appropriate and Attentive      Speech:  normal      Thought Process/Content: Logical      Affective Functioning: Congruent      Mood: anxious and depressed      Level of consciousness:  Alert and Oriented x4      Response to Learning: Able to verbalize current knowledge/experience, Able to verbalize/acknowledge new learning, and Progressing to goal      Endings: None Reported    Modes of Intervention: Education      Discipline Responsible: Psychoeducational Specialist      Signature:  Radha Saucedo

## 2022-04-04 NOTE — PLAN OF CARE
Problem: Anger Management/Homicidal Ideation:  Goal: Able to display appropriate communication and problem solving  Description: Able to display appropriate communication and problem solving  Outcome: Ongoing  Goal: Ability to verbalize frustrations and anger appropriately will improve  Description: Ability to verbalize frustrations and anger appropriately will improve  Outcome: Ongoing     Problem: Altered Mood, Depressive Behavior:  Goal: Able to verbalize acceptance of life and situations over which he or she has no control  Description: Able to verbalize acceptance of life and situations over which he or she has no control  4/4/2022 1207 by Carissa Silvestre RN  Outcome: Ongoing  4/4/2022 1055 by Ramona England  Outcome: Ongoing  Note:                                                                     Group Therapy Note    Date: 4/4/2022  Start Time: 1000  End Time:  1030  Number of Participants: 3    Type of Group: Psychoeducation    Wellness Binder Information  Module Name:  Men's Dayo  Session Number:  1    Group Goal for Pt: To improve knowledge of effective stress management techniques    Notes:  Pt demonstrated improved knowledge of effective stress management techniques by actively participating in group discussion. Status After Intervention:  Unchanged    Participation Level:  Active Listener and Interactive    Participation Quality: Appropriate and Attentive      Speech:  normal      Thought Process/Content: Logical      Affective Functioning: Congruent      Mood: anxious and depressed      Level of consciousness:  Alert and Oriented x4      Response to Learning: Able to verbalize current knowledge/experience, Able to verbalize/acknowledge new learning, and Progressing to goal      Endings: None Reported    Modes of Intervention: Education      Discipline Responsible: Psychoeducational Specialist      Signature:  Ramona England    Goal: Able to verbalize and/or display a decrease in depressive symptoms  Description: Able to verbalize and/or display a decrease in depressive symptoms  Outcome: Ongoing     Problem: Depressive Behavior With or Without Suicide Precautions:  Goal: Ability to disclose and discuss suicidal ideas will improve  Description: Ability to disclose and discuss suicidal ideas will improve  Outcome: Ongoing  Goal: Able to verbalize support systems  Description: Able to verbalize support systems  Outcome: Ongoing  Goal: Absence of self-harm  Description: Absence of self-harm  Outcome: Ongoing     Problem: Risk of Harm:  Goal: Ability to remain free from injury will improve  Description: Ability to remain free from injury will improve  Outcome: Ongoing     Problem: Substance Abuse:  Goal: Absence of drug withdrawal signs and symptoms  Description: Absence of drug withdrawal signs and symptoms  Outcome: Ongoing  Goal: Participates in care planning  Description: Participates in care planning  Outcome: Ongoing     Problem: Pain:  Goal: Pain level will decrease  Description: Pain level will decrease  Outcome: Ongoing  Goal: Control of acute pain  Description: Control of acute pain  Outcome: Ongoing  Goal: Control of chronic pain  Description: Control of chronic pain  Outcome: Ongoing     Problem: Falls - Risk of:  Goal: Will remain free from falls  Description: Will remain free from falls  Outcome: Ongoing  Goal: Absence of physical injury  Description: Absence of physical injury  Outcome: Ongoing     Problem: Cardiac:  Goal: Ability to maintain vital signs within normal range will improve  Description: Ability to maintain vital signs within normal range will improve  Outcome: Ongoing  Goal: Cardiovascular alteration will improve  Description: Cardiovascular alteration will improve  Outcome: Ongoing     Problem: Health Behavior:  Goal: Will modify at least one risk factor affecting health status  Description: Will modify at least one risk factor affecting health status  Outcome: Ongoing  Goal: Identification of resources available to assist in meeting health care needs will improve  Description: Identification of resources available to assist in meeting health care needs will improve  Outcome: Ongoing     Problem: Physical Regulation:  Goal: Complications related to the disease process, condition or treatment will be avoided or minimized  Description: Complications related to the disease process, condition or treatment will be avoided or minimized  Outcome: Ongoing

## 2022-04-04 NOTE — PROGRESS NOTES
[facility] Daily Shift Assessment-Geriatric Unit  Nursing Progress Note          Room: [facility]619-   Name: Rosita Hartman   Age: 46 y.o. Gender: male   Dx: Depression with suicidal ideation  Precautions: suicide risk  Inpatient Status: voluntary     SLEEP:    Sleep: Yes,   Sleep Quality Good   Hours Slept: 7   Sleep Medications: No  PRN Sleep Meds: No       MEDICAL:      Other PRN Meds: No   Med Compliant: Yes   Accu-Chek: No   Oxygen/CPAP/BiPAP: No  CIWA/CINA: Yes, 3  PAIN Assessment: yes  Side Effects from medication: No    COVID TEACHING:    Is Patient experiencing any respiratory symptoms (headache, fever, body aches, cough. Blanchie Bevels ): no  Patient educated by nursing to practice social distancing, wear masks, wash hands frequently: no    Protective Factors:    Patient identifies protective factors with nursing staff as follows: Identifies reasons for living: Yes   Supportive Social Network or family: Yes    Belief that suicide is immoral/high spirituality: Yes   Responsibility to family or others/living with family: Yes   Fear of death or dying due to pain and suffering: Yes   Engaged in work or school: Yes     If Patient is unable to identify, reason why? Metabolic Screening:    Lab Results   Component Value Date    LABA1C 5.1 04/01/2022       Lab Results   Component Value Date    CHOL 166 04/01/2022    CHOL 189 09/09/2020    CHOL 148 (L) 01/27/2019    CHOL 177 09/30/2017    CHOL 151 12/09/2011     Lab Results   Component Value Date    TRIG 148 04/01/2022    TRIG 178 (H) 09/09/2020    TRIG 99 01/27/2019    TRIG 119 (L) 09/30/2017    TRIG 130 12/09/2011     Lab Results   Component Value Date    HDL 61 04/01/2022    HDL 50 (L) 09/09/2020    HDL 44 (L) 01/27/2019    HDL 55 09/30/2017    HDL 35 12/09/2011     No components found for: LDLCAL  No results found for: LABVLDL      Body mass index is 20.72 kg/m².     BP Readings from Last 2 Encounters:   04/04/22 (!) 145/90   12/29/21 (!) 142/89         PSYCH:     SI denies suicidal ideation    HI Negative for homicidal ideation        AVH:Absent      Depression: 8 Anxiety: 8       GENERAL:      Appetite: good  Percent Meals: 75%   Social: Yes Speech: normal   Appearance:appropriately dressed and healthy looking  Assistive Devices: noneLevel of Assist: Independent      GROUP:    Group Participation: Yes  Participation LevelActive Listener and Interactive    Participation QualityAppropriate    Notes: Patient cooperative, taking medication, saw doctor, affect bright, thought organized, participating in activities, self care done, reports no problems.           Tomasa Solis RN

## 2022-04-04 NOTE — PROGRESS NOTES
Addended by: PATI SOLIS on: 9/4/2019 10:08 AM     Modules accepted: aSleem     RT leisure assessment is complete. Pt will be encouraged to attend scheduled group activities to address leisure and social deficits.

## 2022-04-04 NOTE — PLAN OF CARE
Problem: Altered Mood, Depressive Behavior:  Goal: Able to verbalize acceptance of life and situations over which he or she has no control  Description: Able to verbalize acceptance of life and situations over which he or she has no control  4/4/2022 1442 by Forest Szymanski  Outcome: Ongoing  Note:                                                                     Group Therapy Note    Date: 4/4/2022  Start Time: 1400  End Time:  1430  Number of Participants: 2    Type of Group: Cognitive Skills    Wellness Binder Information  Module Name:  Women's Issues  Session Number:  4    Group Goal for Pt: To raise awareness of how thoughts influence feelings    Notes:  Pt demonstrated improved awareness of how thoughts influence feelings by actively participating in group discussion. Status After Intervention:  Unchanged    Participation Level:  Active Listener and Interactive    Participation Quality: Appropriate and Attentive      Speech:  normal      Thought Process/Content: Logical      Affective Functioning: Congruent      Mood: anxious and depressed      Level of consciousness:  Alert and Oriented x4      Response to Learning: Able to verbalize current knowledge/experience, Able to verbalize/acknowledge new learning, and Progressing to goal      Endings: None Reported    Modes of Intervention: Education      Discipline Responsible: Psychoeducational Specialist      Signature:  Forest Szymanski

## 2022-04-04 NOTE — PROGRESS NOTES
SW met with treatment team to discuss pt's progress and setbacks. SW 2 was present. Pt was admitted, voluntary, for SI with a plan to shoot himself, HI toward his ex-fiance and spouse, has hx of psychiatric treatment/multiple admissions, hx of alcohol abuse, hx of non-compliance with medications, UDS was positive for Benzodiazepine and cannabinoid, identifies current stressors as family, financial and occupational issues, denies AVH. Since admission, pt reportedly was cooperative with admission process, alert/oriented x 4, slept well last night, with medications, appetite is improved, attends scheduled group activities, social with peers/staff, independent with ADLS, compliant with medications, CIWA score was 9, reports severe depression/anxiety, denies SI/HI/AVH, tentative discharge Tuesday, continue current treatment plan.

## 2022-04-04 NOTE — BH NOTE
[facility] Daily Shift Assessment-Geriatric Unit  Nursing Progress Note          Room: [address] [place], [place] [address]   Name: Yanni Espinosa   Age: 46 y.o. Gender: male   Dx: Depression with suicidal ideation  Precautions: suicide risk, fall risk and seizure precautions  Inpatient Status: voluntary     SLEEP:    Sleep: Yes,   Sleep Quality Good   Hours Slept:    Sleep Medications: Yes; seroquel 200mg  PRN Sleep Meds: No       MEDICAL:      Other PRN Meds: Yes; atarax 25mg, ativan 1mg   Med Compliant: Yes   Accu-Chek: No   Oxygen/CPAP/BiPAP: No  CIWA/CINA: Yes; 9   PAIN Assessment: refused tylenol, but c/o back pain  Side Effects from medication: none voiced    COVID TEACHING:    Is Patient experiencing any respiratory symptoms (headache, fever, body aches, cough. Dandre Hilkathleen ): no  Patient educated by nursing to practice social distancing, wear masks, wash hands frequently: yes    Protective Factors:    Patient identifies protective factors with nursing staff as follows: Identifies reasons for living: Yes   Supportive Social Network or family: Yes    Belief that suicide is immoral/high spirituality: Yes   Responsibility to family or others/living with family: Yes   Fear of death or dying due to pain and suffering: Yes   Engaged in work or school: Yes     If Patient is unable to identify, reason why?  N/A      Metabolic Screening:    Lab Results   Component Value Date    LABA1C 5.1 04/01/2022       Lab Results   Component Value Date    CHOL 166 04/01/2022    CHOL 189 09/09/2020    CHOL 148 (L) 01/27/2019    CHOL 177 09/30/2017    CHOL 151 12/09/2011     Lab Results   Component Value Date    TRIG 148 04/01/2022    TRIG 178 (H) 09/09/2020    TRIG 99 01/27/2019    TRIG 119 (L) 09/30/2017    TRIG 130 12/09/2011     Lab Results   Component Value Date    HDL 61 04/01/2022    HDL 50 (L) 09/09/2020    HDL 44 (L) 01/27/2019    HDL 55 09/30/2017    HDL 35 12/09/2011     No components found for: LDLCAL  No results found for: LABVLDL      Body mass index is 20.72 kg/m². BP Readings from Last 2 Encounters:   04/03/22 (!) 147/99   12/29/21 (!) 142/89         PSYCH:     SI denies suicidal ideation    HI Negative for homicidal ideation        AVH:denies      Depression: 8 Anxiety: 7-8       GENERAL:      Appetite: improved  Percent Meals: 75%   Social: Yes Speech: normal   Appearance:appropriately dressed and appropriately groomed  Assistive Devices: noneLevel of Assist: Independent      GROUP:    Group Participation: Yes  Participation LevelInteractive    Participation QualityAppropriate    Notes: Pt has been cooperative tonight with his interview. Pt has a worried affect and that is congruent with his mood. Pt rates depression 8 and anxiety 7-8. He reports being \"anxious to leave. \" He received atarax 25mg for anxiety. Pt denies SI, HI, and AVH tonight. Pt has been painting tonight and is social with staff and peers. He is hyperverbal at times. His CIWA score tonight was a 9 and he received 1mg ativan PO. His w/s consist of continued nausea, tremors, anxiety and mild agitation. He is med compliant. Will continue to monitor for safety as ordered.      Electronically signed by Sony Henderson RN on 4/3/2022 at 9:18 PM

## 2022-04-04 NOTE — PROGRESS NOTES
Department of Psychiatry  Attending Progress Note     Chief complaint: \"I'm better\"    SUBJECTIVE:   Chart reviewed, discussed with the team. No major issues overnight. Patient is med-compliant. No SEs. Performs ADLs. Social and goes to groups. Hypertensive. Patient seen watching TV. He is calm and cooperative. Smiled. Denies SI/HI/AVH. Rates depression 4/10, anxiety 3/10. Slept 7h. Denies physical complaints. Planning to attend groups today. States he is working and enjoying his job. He is planning to return to work after discharge. Also going to attend AA meetings. \"I think I had a meltdown because I was not taking my medication. I am doing so much better now. \"    OBJECTIVE    Physical  Wt Readings from Last 3 Encounters:   04/01/22 165 lb 12.8 oz (75.2 kg)   09/08/21 190 lb (86.2 kg)   01/07/21 195 lb (88.5 kg)     Temp Readings from Last 3 Encounters:   04/04/22 96.8 °F (36 °C) (Temporal)   12/29/21 98.6 °F (37 °C) (Oral)   09/13/21 98.2 °F (36.8 °C) (Temporal)     BP Readings from Last 3 Encounters:   04/04/22 (!) 145/90   12/29/21 (!) 142/89   09/13/21 (!) 140/80     Pulse Readings from Last 3 Encounters:   04/04/22 78   12/29/21 79   09/13/21 92        Review of Systems: 14-point review of systems negative except as described above    Mental Status Examination:   Appearance: Stated age. Thin. Hygiene improved. Gait stable. No abnormal movements or tremor. Behavior: Calm, cooperative, smiled  Speech: Normal in tone, volume, and quality. No slurring, dysarthria or pressured speech noted. Mood: \"Much better\"   Affect: Mood congruent. Thought Process: Appears linear. Thought Content: Denies suicidal and homicidal ideation. No overt delusions or paranoia appreciated. Perceptions: Denies auditory or visual hallucinations at present time. Not responding to internal stimuli. Concentration: Intact. Orientation: to person, place, date, and situation. Language: Intact.    Fund of information: Intact. Memory: Recent and remote appear intact. Neurovegitative: Improved appetite and sleep. Insight: Improving. Judgment: Improving.     Data  Lab Results   Component Value Date    WBC 7.4 04/01/2022    HGB 13.8 (L) 04/01/2022    HCT 41.9 (L) 04/01/2022    MCV 94.6 (H) 04/01/2022     04/01/2022      Lab Results   Component Value Date     04/01/2022    K 4.5 04/01/2022     04/01/2022    CO2 27 04/01/2022    BUN 13 04/01/2022    CREATININE 1.0 04/01/2022    GLUCOSE 91 04/01/2022    CALCIUM 9.4 04/01/2022    PROT 7.1 04/01/2022    LABALBU 4.8 04/01/2022    BILITOT 0.5 04/01/2022    ALKPHOS 112 04/01/2022    AST 23 04/01/2022    ALT 31 04/01/2022    LABGLOM >60 04/01/2022    GFRAA >59 04/01/2022       Medications    Current Facility-Administered Medications:     vitamin D (ERGOCALCIFEROL) capsule 50,000 Units, 50,000 Units, Oral, Weekly, Mirtha White MD, 50,000 Units at 04/03/22 0840    amLODIPine (NORVASC) tablet 2.5 mg, 2.5 mg, Oral, Daily, Mirtha White MD, 2.5 mg at 04/04/22 0738    gabapentin (NEURONTIN) tablet 600 mg, 600 mg, Oral, TID, Ce Kate MD, 600 mg at 09/88/10 6149    folic acid (FOLVITE) tablet 1 mg, 1 mg, Oral, Daily, Ce Kate MD, 1 mg at 04/04/22 0739    thiamine mononitrate tablet 100 mg, 100 mg, Oral, Daily, Ce Kate MD, 100 mg at 04/04/22 0739    multivitamin 1 tablet, 1 tablet, Oral, Daily, Ce Kate MD, 1 tablet at 04/04/22 0739    lamoTRIgine (LAMICTAL) tablet 50 mg, 50 mg, Oral, BID, Ce Kate MD, 50 mg at 04/04/22 0740    cloNIDine (CATAPRES) tablet 0.1 mg, 0.1 mg, Oral, Q4H PRN, Mirtha White MD    pantoprazole (PROTONIX) tablet 40 mg, 40 mg, Oral, QAM AC, Mirtha White MD, 40 mg at 04/04/22 0610    acetaminophen (TYLENOL) tablet 650 mg, 650 mg, Oral, Q4H PRN, Ce Kate MD, 650 mg at 04/01/22 2045    nicotine (NICODERM CQ) 21 MG/24HR 1 patch, 1 patch, TransDERmal, Daily, Kristal Ayush Bates MD, 1 patch at 04/04/22 0743    polyethylene glycol (GLYCOLAX) packet 17 g, 17 g, Oral, Daily PRN, Fabian Blanco MD    traZODone (DESYREL) tablet 50 mg, 50 mg, Oral, Nightly PRN, Fabian Blanco MD, 50 mg at 04/01/22 2046    LORazepam (ATIVAN) tablet 1 mg, 1 mg, Oral, Q4H PRN, Fabian Blanco MD, 1 mg at 04/03/22 1958    LORazepam (ATIVAN) tablet 2 mg, 2 mg, Oral, Q4H PRN, Fabian Blanco MD, 2 mg at 04/02/22 1478    promethazine (PHENERGAN) tablet 12.5 mg, 12.5 mg, Oral, Q8H PRN, Fabian Blanco MD, 12.5 mg at 04/02/22 1596    hydrOXYzine (ATARAX) tablet 25 mg, 25 mg, Oral, TID PRN, Fabian Blanco MD, 25 mg at 04/03/22 1958    losartan (COZAAR) tablet 100 mg, 100 mg, Oral, Daily, Fabian Blanco MD, 100 mg at 04/04/22 0737    QUEtiapine (SEROQUEL) tablet 200 mg, 200 mg, Oral, Nightly, Fabian Blanco MD, 200 mg at 04/03/22 1958    ASSESSMENT AND PLAN  DSM 5 DIAGNOSIS  Impression  Mood disorder  unspecified  Alcohol use disorder, severe  Cannabis use disorder  Tobacco use disorder  HTN    Continue to observe. Plan:   1. Psychiatric Medications:   Increase Lamictal for mood stabilization. Monitor for side effects. The risks, benefits, side effects, indications, contraindications, alternatives and adverse effects of the medications have been discussed with patient. 2. Continue to provide supportive psychotherapy. Encourage socialization and participation in recreational activities. Work on coping skills. 3. Medical Issues:    Continue medical monitoring by Dr. Kadeem Medina and associates. MA TEEWA. Monitor BP. 4. Disposition:     to provide outpatient resources and facilitate disposition.      Amount of time spent with patient:      35 minutes with greater than 50 % of the time spent in counseling and collaboration of care

## 2022-04-04 NOTE — PROGRESS NOTES
WRAP UP GROUP NOTE:     Patient's Goal:  Providing feedback as to their own progress in the care-plan provided. Pt's have an opportunity to explore self-reflective skills and share any additional cares and concerns not yet addressed. Pt effectively participated.      Energy level:low/hyper  Appetite:poor  Concentration: improving  Hallucinations:gone  Depression:same  Anxiety:constant/ off/on  How I worked today:tried alittle  What helps me sleep:meds  Any questions/complaints/comments:NA staff was great Clint Murdock and [employer]

## 2022-04-05 VITALS
DIASTOLIC BLOOD PRESSURE: 99 MMHG | BODY MASS INDEX: 20.62 KG/M2 | SYSTOLIC BLOOD PRESSURE: 149 MMHG | HEART RATE: 91 BPM | HEIGHT: 75 IN | RESPIRATION RATE: 20 BRPM | OXYGEN SATURATION: 97 % | WEIGHT: 165.8 LBS | TEMPERATURE: 97.3 F

## 2022-04-05 PROBLEM — F12.10 MARIJUANA ABUSE: Status: ACTIVE | Noted: 2022-04-05

## 2022-04-05 PROBLEM — R45.851 DEPRESSION WITH SUICIDAL IDEATION: Status: RESOLVED | Noted: 2022-04-01 | Resolved: 2022-04-05

## 2022-04-05 PROBLEM — F32.A DEPRESSION WITH SUICIDAL IDEATION: Status: RESOLVED | Noted: 2022-04-01 | Resolved: 2022-04-05

## 2022-04-05 PROCEDURE — 5130000000 HC BRIDGE APPOINTMENT

## 2022-04-05 PROCEDURE — 99239 HOSP IP/OBS DSCHRG MGMT >30: CPT | Performed by: PSYCHIATRY & NEUROLOGY

## 2022-04-05 PROCEDURE — 6370000000 HC RX 637 (ALT 250 FOR IP): Performed by: FAMILY MEDICINE

## 2022-04-05 PROCEDURE — 6370000000 HC RX 637 (ALT 250 FOR IP): Performed by: PSYCHIATRY & NEUROLOGY

## 2022-04-05 RX ORDER — TRAZODONE HYDROCHLORIDE 50 MG/1
50 TABLET ORAL NIGHTLY PRN
Qty: 30 TABLET | Refills: 1 | Status: SHIPPED | OUTPATIENT
Start: 2022-04-05 | End: 2022-08-04 | Stop reason: SDUPTHER

## 2022-04-05 RX ORDER — AMLODIPINE BESYLATE 5 MG/1
5 TABLET ORAL DAILY
Qty: 30 TABLET | Refills: 1 | Status: ON HOLD | OUTPATIENT
Start: 2022-04-06 | End: 2022-08-05 | Stop reason: HOSPADM

## 2022-04-05 RX ORDER — ERGOCALCIFEROL 1.25 MG/1
50000 CAPSULE ORAL WEEKLY
Qty: 11 CAPSULE | Refills: 1 | Status: ON HOLD | OUTPATIENT
Start: 2022-04-10 | End: 2022-08-05 | Stop reason: HOSPADM

## 2022-04-05 RX ORDER — PANTOPRAZOLE SODIUM 40 MG/1
40 TABLET, DELAYED RELEASE ORAL
Qty: 30 TABLET | Refills: 1 | Status: ON HOLD | OUTPATIENT
Start: 2022-04-06 | End: 2022-08-05 | Stop reason: HOSPADM

## 2022-04-05 RX ORDER — AMLODIPINE BESYLATE 5 MG/1
5 TABLET ORAL DAILY
Status: DISCONTINUED | OUTPATIENT
Start: 2022-04-05 | End: 2022-04-05 | Stop reason: HOSPADM

## 2022-04-05 RX ORDER — QUETIAPINE FUMARATE 200 MG/1
200 TABLET, FILM COATED ORAL NIGHTLY
Qty: 30 TABLET | Refills: 1 | Status: SHIPPED | OUTPATIENT
Start: 2022-04-05 | End: 2022-08-04 | Stop reason: SDUPTHER

## 2022-04-05 RX ORDER — LAMOTRIGINE 25 MG/1
75 TABLET ORAL 2 TIMES DAILY
Qty: 180 TABLET | Refills: 1 | Status: SHIPPED | OUTPATIENT
Start: 2022-04-05 | End: 2022-04-29 | Stop reason: DRUGHIGH

## 2022-04-05 RX ORDER — LOSARTAN POTASSIUM 100 MG/1
100 TABLET ORAL DAILY
Qty: 30 TABLET | Refills: 1 | Status: SHIPPED | OUTPATIENT
Start: 2022-04-05 | End: 2022-08-04 | Stop reason: SDUPTHER

## 2022-04-05 RX ORDER — NALTREXONE 380 MG
380 KIT INTRAMUSCULAR
Qty: 1.2 EACH | Refills: 1 | Status: ON HOLD | OUTPATIENT
Start: 2022-05-02 | End: 2022-08-05 | Stop reason: HOSPADM

## 2022-04-05 RX ADMIN — AMLODIPINE BESYLATE 5 MG: 5 TABLET ORAL at 08:05

## 2022-04-05 RX ADMIN — FOLIC ACID 1 MG: 1 TABLET ORAL at 08:05

## 2022-04-05 RX ADMIN — THIAMINE HCL TAB 100 MG 100 MG: 100 TAB at 08:05

## 2022-04-05 RX ADMIN — THERA TABS 1 TABLET: TAB at 08:05

## 2022-04-05 RX ADMIN — LOSARTAN POTASSIUM 100 MG: 100 TABLET, FILM COATED ORAL at 08:05

## 2022-04-05 RX ADMIN — PANTOPRAZOLE SODIUM 40 MG: 40 TABLET, DELAYED RELEASE ORAL at 06:06

## 2022-04-05 RX ADMIN — LAMOTRIGINE 75 MG: 25 TABLET ORAL at 08:05

## 2022-04-05 RX ADMIN — GABAPENTIN 600 MG: 600 TABLET, FILM COATED ORAL at 08:05

## 2022-04-05 ASSESSMENT — PAIN SCALES - GENERAL: PAINLEVEL_OUTOF10: 0

## 2022-04-05 NOTE — PROGRESS NOTES
SW met with treatment team to discuss pt's progress and setbacks. SW 2 was present. Pt reportedly slept well last night, without medications, appetite is good, attends scheduled group activities, social with peers/staff, independent with ADLS, compliant with medications, behavior has been cooperative, affect bright, reports mild depression/anxiety, denies SI/HI/AVH, will be discharged today, follow-up appointments will be scheduled.

## 2022-04-05 NOTE — PROGRESS NOTES
WRAP UP GROUP NOTE:     Patient's Goal:  Providing feedback as to their own progress in the care-plan provided. Pt's have an opportunity to explore self-reflective skills and share any additional cares and concerns not yet addressed. Pt effectively participated. Energy level:  Normal   Appetite:  Good   Concentration:   Good   Hallucinations:  Gone   Depression:  Improved   Anxiety:  Improved   How I worked today:  David Wong a lot   What helps me sleep:  Meds  Any questions/complaints/comments:  N/A   Group/activities that helped me today were:  Nursing education--good;  Process--good; Therapeutic recreation--painting;  Seeing doctor--good; One-to-one with staff--Brittan is awesome.

## 2022-04-05 NOTE — PROGRESS NOTES
Progress Note  Jarvis Montoya  4/5/2022 12:03 AM  Subjective:   Admit Date:   4/1/2022      CC/ADMIT DX:       Interval History:   Reviewed overnight events and nursing notes. He has no new medical issues. I have reviewed all labs/diagnostics from the last 24hrs. ROS:   I have done a 10 point ROS and all are negative, except what is mentioned in the HPI. ADULT DIET; Regular    Medications:      amLODIPine  5 mg Oral Daily    lamoTRIgine  75 mg Oral BID    vitamin D  50,000 Units Oral Weekly    gabapentin  600 mg Oral TID    folic acid  1 mg Oral Daily    thiamine mononitrate  100 mg Oral Daily    multivitamin  1 tablet Oral Daily    pantoprazole  40 mg Oral QAM AC    nicotine  1 patch TransDERmal Daily    losartan  100 mg Oral Daily    QUEtiapine  200 mg Oral Nightly           Objective:   Vitals: BP (!) 150/90   Pulse 72   Temp 97.9 °F (36.6 °C) (Oral)   Resp 20   Ht 6' 3\" (1.905 m)   Wt 165 lb 12.8 oz (75.2 kg)   SpO2 99%   BMI 20.72 kg/m²  No intake or output data in the 24 hours ending 04/05/22 0003  General appearance: alert and cooperative with exam  Extremities: extremities normal, atraumatic, no cyanosis or edema  Neurologic:  No obvious focal neurologic deficits. Skin: no rsahes    Assessment and Plan:   Principal Problem:    Depression with suicidal ideation  Resolved Problems:    * No resolved hospital problems. *    Vit D Def    Plan:  1. Continue present medication(s)   2. Adjust Norvasc dose for BP control  3. Follow with Psych      Discharge planning:    home     Reviewed treatment plans with the patient and/or family.              Electronically signed by Evelyn Aiken MD on 4/5/2022 at 12:03 AM

## 2022-04-05 NOTE — PROGRESS NOTES
Behavioral Health   Discharge Note  Bridge Appointment completed: Reviewed Discharge Instructions with patient. Patient verbalizes understanding and agreement with the discharge plan using the teachback method. Referral for Outpatient Tobacco Cessation Counseling, upon discharge (nirmala X if applicable and completed):    ( )  Hospital staff assisted patient to call Quit Line or faxed referral                                   during hospitalization                  ( )  Recognizing danger situations (included triggers and roadblocks), if not completed on admission                    ( )  Coping skills (new ways to manage stress, exercise, relaxation techniques, changing routine, distraction), if not completed on admission                                                           ( )  Basic information about quitting (benefits of quitting, techniques in how to quit, available resources, if not completed on admission  ( ) Referral for counseling faxed to [facility]   (X ) Patient refused referral  (X ) Patient refused counseling  ( X) Patient refused smoking cessation medication upon discharge    Vaccinations (nirmala X if applicable and completed):  ( ) Patient states already received influenza vaccine elsewhere  ( ) Patient received influenza vaccine during this hospitalization  (X ) Patient refused influenza vaccine at this time      Pt discharged with followings belongings:   Dental Appliances: None  Vision - Corrective Lenses: Contacts  Hearing Aid: None  Jewelry: None  Body Piercings Removed: N/A  Clothing: Jarvis Bryan / coat,Pants,Shirt,Socks  Were All Patient Medications Collected?: Yes (all items cataloged upon arrival)  Other Valuables: Tita Hopkins (Comment) (cigarettes)   Valuables sent home with Yes. Valuables retrieved from safe and returned to patient. Patient left department with   via  , discharged to  .  Patient education on aftercare instructions: Yes  Patient verbalize understanding of AVS:  Yes. Suicidal Ideations? No AVH? Denies HI?  Negative for homicidal ideation       Status EXAM upon discharge:  Status and Exam  Normal: No  Facial Expression: Elevated  Affect: Congruent  Level of Consciousness: Alert  Mood:Normal: No  Mood: Depressed,Anxious  Motor Activity:Normal: Yes  Interview Behavior: Cooperative  Preception: [place] to Person,[place] to Time,[place] to Place,[place] to Situation  Attention:Normal: No  Attention: Distractible  Thought Processes: Circumstantial  Thought Content:Normal: No  Thought Content: Preoccupations  Hallucinations: None  Delusions: No  Delusions: Reference  Memory:Normal: Yes  Memory: Poor Recent  Insight and Judgment: No  Insight and Judgment: Poor Judgment  Present Suicidal Ideation: No  Present Homicidal Ideation: No

## 2022-04-05 NOTE — PROGRESS NOTES
I Daily Shift Assessment-Geriatric Unit  Nursing Progress Note          Room: [facility]619-01   Name: Lilian Moe   Age: 46 y.o. Gender: male   Dx: Persistent mood (affective) disorder, unspecified (HCC)  Precautions: suicide risk and fall risk  Inpatient Status: voluntary     SLEEP:    Sleep: Yes,   Sleep Quality Good   Hours Slept: 7   Sleep Medications: Yes  PRN Sleep Meds: Yes       MEDICAL:      Other PRN Meds: Yes   Med Compliant: Yes   Accu-Chek: No   Oxygen/CPAP/BiPAP: No  CIWA/CINA: No   PAIN Assessment: none  Side Effects from medication: No    COVID TEACHING:    Is Patient experiencing any respiratory symptoms (headache, fever, body aches, cough. Markos Founds ): no  Patient educated by nursing to practice social distancing, wear masks, wash hands frequently: yes    Protective Factors:    Patient identifies protective factors with nursing staff as follows: Identifies reasons for living: Yes   Supportive Social Network or family: Yes    Belief that suicide is immoral/high spirituality: Yes   Responsibility to family or others/living with family: No   Fear of death or dying due to pain and suffering: Yes   Engaged in work or school: Yes     If Patient is unable to identify, reason why? Metabolic Screening:    Lab Results   Component Value Date    LABA1C 5.1 04/01/2022       Lab Results   Component Value Date    CHOL 166 04/01/2022    CHOL 189 09/09/2020    CHOL 148 (L) 01/27/2019    CHOL 177 09/30/2017    CHOL 151 12/09/2011     Lab Results   Component Value Date    TRIG 148 04/01/2022    TRIG 178 (H) 09/09/2020    TRIG 99 01/27/2019    TRIG 119 (L) 09/30/2017    TRIG 130 12/09/2011     Lab Results   Component Value Date    HDL 61 04/01/2022    HDL 50 (L) 09/09/2020    HDL 44 (L) 01/27/2019    HDL 55 09/30/2017    HDL 35 12/09/2011     No components found for: LDLCAL  No results found for: LABVLDL      Body mass index is 20.72 kg/m².     BP Readings from Last 2 Encounters:   04/05/22 (!) 149/99   12/29/21 (!) 142/89         PSYCH:     SI denies suicidal ideation    HI Negative for homicidal ideation        AVH:Absent      Depression: 4 Anxiety: 4       GENERAL:      Appetite: good  Percent Meals: 100%   Social: Yes Speech: normal, talkative  Appearance:appropriately dressed and healthy looking  Assistive Devices: noneLevel of Assist: Independent      GROUP:    Group Participation: Yes  Participation LevelInteractive    Participation QualityAppropriate    Notes:      Alert, oriented, pleasant, cooperative, and very talkative. Participated in am group and reports going home today. Mood is stable, reports anxiety and depression is 4/10. Denies SI, HI, and AVH. Cooperative with staff and interacts appropriately with others.        Electronically signed by Aishwarya Vasquez RN on 4/5/22 at 9:12 AM CDT
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
## 2022-04-05 NOTE — PROGRESS NOTES
Discharge Note     Pt discharging on this date. Pt denies SI, HI, and AVH at this time. Pt reports improvement in behavior and is leaving unit in overall good condition. SW and pt discussed pt's follow up appointments and importance of attending appointments as scheduled, pt voiced understanding and agreement. Pt and SW also discussed pt safety plan and pt able to verbally identify: warning signs, coping strategies, places and people that help make the pt feel better/distract negative thoughts, friends/family/agencies/professionals the pt can reach out to in a crisis, and something that is important to the pt/worth living for. Pt provided the national suicide prevention hotline number (3-055-513-696-900-6308) as well as local community behavioral health [facility]) crisis number for emergencies (2-540-781-542-777-1396). Pt to follow up with:  [address]) on __/__/22 @ 00:00 AM/PM. Patient [facility], patient will be seen on __/__/22 @ 00:00 AM/PM for the med management appt.      Referral to out patient tobacco cessation counseling treatment:  Made at discharge with (company) on (date) at (time)  Patient refused referral to outpatient tobacco cessation counseling    SW offered to assist pt with transportation, pt reports

## 2022-04-05 NOTE — DISCHARGE INSTR - COC
Continuity of Care Form    Patient Name: Josh Dempsey   :  1970  MRN:  [id number]    Admit date:  2022  Discharge date:  22    Code Status Order: Full Code   Advance Directives:      Admitting Physician:  Juan Lilly MD  PCP: Jahaira Aguilar MD    Discharging Nurse: [facility] Unit/Room#: 6123/674-05  Discharging Unit Phone Number: [phone]    Emergency Contact:   Extended Emergency Contact Information  Primary Emergency Contact: Blank Addison  Address: [address] [address] [facility] [address]           [place], [address] [place] [address] [address]  Phone: [phone]  Mobile Phone: [phone]  Relation: Parent  Secondary Emergency Contact: Russel Ayoub  Address: [address] [address]           [place], [address] [facility] [address] Phone: [phone]  Relation: Parent    Past Surgical History:  Past Surgical History:   Procedure Laterality Date    APPENDECTOMY      CHOLECYSTECTOMY  2006    [facility]    COLONOSCOPY      ENDOSCOPY, COLON, DIAGNOSTIC      KNEE ARTHROSCOPY Right     NECK SURGERY  7/15/2008    [facility]       Immunization History: There is no immunization history on file for this patient.     Active Problems:  Patient Active Problem List   Diagnosis Code    Hypertension I10    Alcohol use disorder, severe, dependence ([address] F10.20    Tobacco abuse disorder Z72.0    Persistent mood (affective) disorder, unspecified ([address] F34.9    Anxiety F41.9    Marijuana abuse F12.10       Isolation/Infection:   Isolation            No Isolation          Patient Infection Status       Infection Onset Added Last Indicated Last Indicated By Review Planned Expiration Resolved Resolved By    MRSA 19 MRSA Screening Culture Only        2019 MRSA Screening culture: MRSA    Resolved    COVID-19 (Rule Out) 21 Respiratory Panel, Molecular, with COVID-19 (Restricted: peds pts or suitable admitted adults) (Ordered)   21 Rule-Out 13           Discharging to Facility/ Agency   Name:   Address:  Phone:  Fax:    Dialysis Facility (if applicable)   Name:  Address:  Dialysis Schedule:  Phone:  Fax:    / signature: {Esignature:923871480}    PHYSICIAN SECTION    Prognosis: {Prognosis:7973981872}    Condition at Discharge: Izzy Escalante Patient Condition:143526603}    Rehab Potential (if transferring to Rehab): {Prognosis:5680115108}    Recommended Labs or Other Treatments After Discharge: ***    Physician Certification: I certify the above information and transfer of Brea San  is necessary for the continuing treatment of the diagnosis listed and that he requires {Admit to Appropriate Level of Care:41079} for {GREATER/LESS:015290620} 30 days.      Update Admission H&P: {CHP DME Changes in [facility]:951947132}    PHYSICIAN SIGNATURE:  {Esignature:906176460} How Severe Is Your Rash?: mild Is This A New Presentation, Or A Follow-Up?: Rash

## 2022-04-05 NOTE — PROGRESS NOTES
Group Therapy Note    Date: 3/10/2020  Start Time: 0800  End Time:  0830  Number of Participants: 5    Type of Group: Healthy Living/Wellness    Wellness Binder Information  Module Name:  Self-Inventory  Session Number:      Patient's Goal:  Going home    Notes:      Status After Intervention:  Unchanged    Participation Level: Interactive and Monopolizing    Participation Quality: Sharing      Speech:  loud      Thought Process/Content: Linear      Affective Functioning: Congruent      Mood: anxious and elevated      Level of consciousness:  Alert and Oriented x4      Response to Learning: Progressing to goal      Endings: None Reported    Modes of Intervention: Problem-solving      Discipline Responsible: Registered Nurse      Signature:  Gus Oden RN

## 2022-04-05 NOTE — PROGRESS NOTES
I Daily Shift Assessment-Geriatric Unit  Nursing Progress Note          Room: [facility]-   Name: Rosita Hartman   Age: 46 y.o. Gender: male   Dx: Depression with suicidal ideation  Precautions: suicide risk, fall risk and seizure precautions  Inpatient Status: voluntary     SLEEP:    Sleep: Yes,   Sleep Quality Fair   Hours Slept:    Sleep Medications: Yes  PRN Sleep Meds: Yes       MEDICAL:      Other PRN Meds: Yes   Med Compliant: Yes   Accu-Chek: No   Oxygen/CPAP/BiPAP: No  CIWA/CINA: No   PAIN Assessment: level of pain (4/10, 10 severe),   Side Effects from medication: No    COVID TEACHING:    Is Patient experiencing any respiratory symptoms (headache, fever, body aches, cough. Blanchie Bevels ): no  Patient educated by nursing to practice social distancing, wear masks, wash hands frequently: yes    Protective Factors:    Patient identifies protective factors with nursing staff as follows: Identifies reasons for living: Yes   Supportive Social Network or family: Yes    Belief that suicide is immoral/high spirituality: Yes   Responsibility to family or others/living with family: Yes   Fear of death or dying due to pain and suffering: Yes   Engaged in work or school: Yes     If Patient is unable to identify, reason why? N/A      Metabolic Screening:    Lab Results   Component Value Date    LABA1C 5.1 04/01/2022       Lab Results   Component Value Date    CHOL 166 04/01/2022    CHOL 189 09/09/2020    CHOL 148 (L) 01/27/2019    CHOL 177 09/30/2017    CHOL 151 12/09/2011     Lab Results   Component Value Date    TRIG 148 04/01/2022    TRIG 178 (H) 09/09/2020    TRIG 99 01/27/2019    TRIG 119 (L) 09/30/2017    TRIG 130 12/09/2011     Lab Results   Component Value Date    HDL 61 04/01/2022    HDL 50 (L) 09/09/2020    HDL 44 (L) 01/27/2019    HDL 55 09/30/2017    HDL 35 12/09/2011     No components found for: LDLCAL  No results found for: LABVLDL      Body mass index is 20.72 kg/m².     BP Readings from Last 2 Encounters: 04/04/22 (!) 150/90   12/29/21 (!) 142/89         PSYCH:     SI denies suicidal ideation    HI Negative for homicidal ideation        AVH:Absent      Depression: 4 Anxiety: 4       GENERAL:      Appetite: good  Percent Meals:    Social: Yes with staff Speech: normal   Appearance:appropriately dressed  Assistive Devices: noneLevel of Assist: Independent      GROUP:    Group Participation: Yes  Participation LevelInteractive    Participation QualityAppropriate    Notes:   Patient has been up to the nursing station frequently this evening talking to the staff. He has enjoyed watching television some this evening. He reports improvement in his depression and anxiety.          Electronically signed by Renan Curry RN on 4/5/22 at 12:07 AM CDT

## 2022-04-05 NOTE — PLAN OF CARE
Problem: Anger Management/Homicidal Ideation:  Goal: Able to display appropriate communication and problem solving  Description: Able to display appropriate communication and problem solving  Outcome: Ongoing  Goal: Ability to verbalize frustrations and anger appropriately will improve  Description: Ability to verbalize frustrations and anger appropriately will improve  Outcome: Ongoing     Problem: Altered Mood, Depressive Behavior:  Goal: Able to verbalize acceptance of life and situations over which he or she has no control  Description: Able to verbalize acceptance of life and situations over which he or she has no control  Outcome: Ongoing  Goal: Able to verbalize and/or display a decrease in depressive symptoms  Description: Able to verbalize and/or display a decrease in depressive symptoms  Outcome: Ongoing     Problem: Depressive Behavior With or Without Suicide Precautions:  Goal: Ability to disclose and discuss suicidal ideas will improve  Description: Ability to disclose and discuss suicidal ideas will improve  Outcome: Ongoing  Goal: Able to verbalize support systems  Description: Able to verbalize support systems  Outcome: Ongoing  Goal: Absence of self-harm  Description: Absence of self-harm  Outcome: Ongoing     Problem: Risk of Harm:  Goal: Ability to remain free from injury will improve  Description: Ability to remain free from injury will improve  Outcome: Ongoing     Problem: Substance Abuse:  Goal: Absence of drug withdrawal signs and symptoms  Description: Absence of drug withdrawal signs and symptoms  Outcome: Ongoing  Goal: Participates in care planning  Description: Participates in care planning  Outcome: Ongoing     Problem: Pain:  Description: Pain management should include both nonpharmacologic and pharmacologic interventions.   Goal: Pain level will decrease  Description: Pain level will decrease  Outcome: Ongoing  Goal: Control of acute pain  Description: Control of acute pain  Outcome: Ongoing  Goal: Control of chronic pain  Description: Control of chronic pain  Outcome: Ongoing     Problem: Falls - Risk of:  Goal: Will remain free from falls  Description: Will remain free from falls  Outcome: Ongoing  Goal: Absence of physical injury  Description: Absence of physical injury  Outcome: Ongoing     Problem: Cardiac:  Goal: Ability to maintain vital signs within normal range will improve  Description: Ability to maintain vital signs within normal range will improve  Outcome: Ongoing  Goal: Cardiovascular alteration will improve  Description: Cardiovascular alteration will improve  Outcome: Ongoing     Problem: Health Behavior:  Goal: Will modify at least one risk factor affecting health status  Description: Will modify at least one risk factor affecting health status  Outcome: Ongoing  Goal: Identification of resources available to assist in meeting health care needs will improve  Description: Identification of resources available to assist in meeting health care needs will improve  Outcome: Ongoing     Problem: Physical Regulation:  Goal: Complications related to the disease process, condition or treatment will be avoided or minimized  Description: Complications related to the disease process, condition or treatment will be avoided or minimized  Outcome: Ongoing

## 2022-04-05 NOTE — PROGRESS NOTES
CLINICAL PHARMACY NOTE: MEDS TO BEDS    Total # of Prescriptions Filled: 7   The following medications were delivered to the patient:  · Pantoprazole 40mg  · Trazodone 50mg  · Vitamin D  · Quetiapine 200mg  · Losartan 100mg  · Lamotrigine 25mg  · Amlodipine 5mg    Additional Documentation:  The patient had a zero co pay, [facility]

## 2022-04-22 ENCOUNTER — HOSPITAL ENCOUNTER (INPATIENT)
Age: 52
LOS: 1 days | Discharge: HOME | DRG: 897 | End: 2022-04-23
Attending: EMERGENCY MEDICINE | Admitting: STUDENT IN AN ORGANIZED HEALTH CARE EDUCATION/TRAINING PROGRAM
Payer: MEDICAID

## 2022-04-22 DIAGNOSIS — R45.851 SUICIDAL IDEATION: Primary | ICD-10-CM

## 2022-04-22 DIAGNOSIS — F10.920 ACUTE ALCOHOLIC INTOXICATION WITHOUT COMPLICATION (HCC): ICD-10-CM

## 2022-04-22 LAB
ALBUMIN SERPL-MCNC: 4.5 G/DL (ref 3.5–5.2)
ALP BLD-CCNC: 99 U/L (ref 40–130)
ALT SERPL-CCNC: 46 U/L (ref 5–41)
AMPHETAMINE SCREEN, URINE: NEGATIVE
ANION GAP SERPL CALCULATED.3IONS-SCNC: 16 MMOL/L (ref 7–19)
AST SERPL-CCNC: 45 U/L (ref 5–40)
BARBITURATE SCREEN URINE: NEGATIVE
BASOPHILS ABSOLUTE: 0 K/UL (ref 0–0.2)
BASOPHILS RELATIVE PERCENT: 0.4 % (ref 0–1)
BENZODIAZEPINE SCREEN, URINE: NEGATIVE
BILIRUB SERPL-MCNC: <0.2 MG/DL (ref 0.2–1.2)
BILIRUBIN URINE: NEGATIVE
BLOOD, URINE: NEGATIVE
BUN BLDV-MCNC: 12 MG/DL (ref 6–20)
CALCIUM SERPL-MCNC: 8.9 MG/DL (ref 8.6–10)
CANNABINOID SCREEN URINE: NEGATIVE
CHLORIDE BLD-SCNC: 102 MMOL/L (ref 98–111)
CLARITY: CLEAR
CO2: 25 MMOL/L (ref 22–29)
COCAINE METABOLITE SCREEN URINE: NEGATIVE
COLOR: YELLOW
CREAT SERPL-MCNC: 1 MG/DL (ref 0.5–1.2)
EOSINOPHILS ABSOLUTE: 0.4 K/UL (ref 0–0.6)
EOSINOPHILS RELATIVE PERCENT: 6.8 % (ref 0–5)
ETHANOL: 406 MG/DL (ref 0–0.08)
GFR AFRICAN AMERICAN: >59
GFR NON-AFRICAN AMERICAN: >60
GLUCOSE BLD-MCNC: 122 MG/DL (ref 74–109)
GLUCOSE URINE: NEGATIVE MG/DL
HCT VFR BLD CALC: 39 % (ref 42–52)
HEMOGLOBIN: 13.7 G/DL (ref 14–18)
IMMATURE GRANULOCYTES #: 0 K/UL
KETONES, URINE: NEGATIVE MG/DL
LEUKOCYTE ESTERASE, URINE: NEGATIVE
LYMPHOCYTES ABSOLUTE: 2.1 K/UL (ref 1.1–4.5)
LYMPHOCYTES RELATIVE PERCENT: 40.3 % (ref 20–40)
Lab: NORMAL
MCH RBC QN AUTO: 32 PG (ref 27–31)
MCHC RBC AUTO-ENTMCNC: 35.1 G/DL (ref 33–37)
MCV RBC AUTO: 91.1 FL (ref 80–94)
MONOCYTES ABSOLUTE: 0.3 K/UL (ref 0–0.9)
MONOCYTES RELATIVE PERCENT: 6.1 % (ref 0–10)
NEUTROPHILS ABSOLUTE: 2.4 K/UL (ref 1.5–7.5)
NEUTROPHILS RELATIVE PERCENT: 46.2 % (ref 50–65)
NITRITE, URINE: NEGATIVE
OPIATE SCREEN URINE: NEGATIVE
PDW BLD-RTO: 13.3 % (ref 11.5–14.5)
PH UA: 6 (ref 5–8)
PLATELET # BLD: 231 K/UL (ref 130–400)
PMV BLD AUTO: 8.7 FL (ref 9.4–12.4)
POTASSIUM SERPL-SCNC: 3.7 MMOL/L (ref 3.5–5)
PROTEIN UA: NEGATIVE MG/DL
RBC # BLD: 4.28 M/UL (ref 4.7–6.1)
SARS-COV-2, NAAT: NOT DETECTED
SODIUM BLD-SCNC: 143 MMOL/L (ref 136–145)
SPECIFIC GRAVITY UA: 1.01 (ref 1–1.03)
TOTAL PROTEIN: 6.7 G/DL (ref 6.6–8.7)
UROBILINOGEN, URINE: 0.2 E.U./DL
WBC # BLD: 5.3 K/UL (ref 4.8–10.8)

## 2022-04-22 PROCEDURE — 6360000002 HC RX W HCPCS

## 2022-04-22 PROCEDURE — 6370000000 HC RX 637 (ALT 250 FOR IP)

## 2022-04-22 PROCEDURE — 85025 COMPLETE CBC W/AUTO DIFF WBC: CPT

## 2022-04-22 PROCEDURE — 1210000000 HC MED SURG R&B

## 2022-04-22 PROCEDURE — 82077 ASSAY SPEC XCP UR&BREATH IA: CPT

## 2022-04-22 PROCEDURE — 87635 SARS-COV-2 COVID-19 AMP PRB: CPT

## 2022-04-22 PROCEDURE — 99285 EMERGENCY DEPT VISIT HI MDM: CPT

## 2022-04-22 PROCEDURE — 80053 COMPREHEN METABOLIC PANEL: CPT

## 2022-04-22 PROCEDURE — 6370000000 HC RX 637 (ALT 250 FOR IP): Performed by: EMERGENCY MEDICINE

## 2022-04-22 PROCEDURE — 81003 URINALYSIS AUTO W/O SCOPE: CPT

## 2022-04-22 PROCEDURE — 36415 COLL VENOUS BLD VENIPUNCTURE: CPT

## 2022-04-22 PROCEDURE — 2580000003 HC RX 258

## 2022-04-22 PROCEDURE — 80307 DRUG TEST PRSMV CHEM ANLYZR: CPT

## 2022-04-22 RX ORDER — SODIUM CHLORIDE 0.9 % (FLUSH) 0.9 %
5-40 SYRINGE (ML) INJECTION PRN
Status: DISCONTINUED | OUTPATIENT
Start: 2022-04-22 | End: 2022-04-23 | Stop reason: HOSPADM

## 2022-04-22 RX ORDER — LORAZEPAM 2 MG/ML
1 INJECTION INTRAMUSCULAR
Status: DISCONTINUED | OUTPATIENT
Start: 2022-04-22 | End: 2022-04-23 | Stop reason: HOSPADM

## 2022-04-22 RX ORDER — NICOTINE 21 MG/24HR
1 PATCH, TRANSDERMAL 24 HOURS TRANSDERMAL DAILY
Status: DISCONTINUED | OUTPATIENT
Start: 2022-04-22 | End: 2022-04-23 | Stop reason: HOSPADM

## 2022-04-22 RX ORDER — ACETAMINOPHEN 650 MG/1
650 SUPPOSITORY RECTAL EVERY 6 HOURS PRN
Status: DISCONTINUED | OUTPATIENT
Start: 2022-04-22 | End: 2022-04-23 | Stop reason: HOSPADM

## 2022-04-22 RX ORDER — LORAZEPAM 1 MG/1
3 TABLET ORAL
Status: DISCONTINUED | OUTPATIENT
Start: 2022-04-22 | End: 2022-04-23 | Stop reason: HOSPADM

## 2022-04-22 RX ORDER — LORAZEPAM 2 MG/ML
2 INJECTION INTRAMUSCULAR
Status: DISCONTINUED | OUTPATIENT
Start: 2022-04-22 | End: 2022-04-23 | Stop reason: HOSPADM

## 2022-04-22 RX ORDER — GAUZE BANDAGE 2" X 2"
100 BANDAGE TOPICAL DAILY
Status: DISCONTINUED | OUTPATIENT
Start: 2022-04-23 | End: 2022-04-23 | Stop reason: HOSPADM

## 2022-04-22 RX ORDER — SODIUM CHLORIDE 0.9 % (FLUSH) 0.9 %
5-40 SYRINGE (ML) INJECTION EVERY 12 HOURS SCHEDULED
Status: DISCONTINUED | OUTPATIENT
Start: 2022-04-22 | End: 2022-04-23 | Stop reason: HOSPADM

## 2022-04-22 RX ORDER — ENOXAPARIN SODIUM 100 MG/ML
40 INJECTION SUBCUTANEOUS EVERY 24 HOURS
Status: DISCONTINUED | OUTPATIENT
Start: 2022-04-22 | End: 2022-04-23 | Stop reason: HOSPADM

## 2022-04-22 RX ORDER — FOLIC ACID 1 MG/1
1 TABLET ORAL DAILY
Status: DISCONTINUED | OUTPATIENT
Start: 2022-04-23 | End: 2022-04-23 | Stop reason: HOSPADM

## 2022-04-22 RX ORDER — MULTIVITAMIN WITH IRON
1 TABLET ORAL DAILY
Status: DISCONTINUED | OUTPATIENT
Start: 2022-04-23 | End: 2022-04-23 | Stop reason: HOSPADM

## 2022-04-22 RX ORDER — OMEPRAZOLE 20 MG/1
CAPSULE, DELAYED RELEASE ORAL
Status: ON HOLD | COMMUNITY
End: 2022-04-23 | Stop reason: HOSPADM

## 2022-04-22 RX ORDER — CIPROFLOXACIN 250 MG/1
TABLET, FILM COATED ORAL
Status: ON HOLD | COMMUNITY
End: 2022-04-23 | Stop reason: HOSPADM

## 2022-04-22 RX ORDER — GAUZE BANDAGE 2" X 2"
100 BANDAGE TOPICAL ONCE
Status: COMPLETED | OUTPATIENT
Start: 2022-04-22 | End: 2022-04-22

## 2022-04-22 RX ORDER — PROMETHAZINE HYDROCHLORIDE 25 MG/1
TABLET ORAL
Status: ON HOLD | COMMUNITY
End: 2022-08-05 | Stop reason: HOSPADM

## 2022-04-22 RX ORDER — SODIUM CHLORIDE 9 MG/ML
INJECTION, SOLUTION INTRAVENOUS CONTINUOUS
Status: DISCONTINUED | OUTPATIENT
Start: 2022-04-22 | End: 2022-04-23 | Stop reason: HOSPADM

## 2022-04-22 RX ORDER — LORAZEPAM 1 MG/1
1 TABLET ORAL
Status: DISCONTINUED | OUTPATIENT
Start: 2022-04-22 | End: 2022-04-23 | Stop reason: HOSPADM

## 2022-04-22 RX ORDER — SODIUM CHLORIDE 9 MG/ML
INJECTION, SOLUTION INTRAVENOUS PRN
Status: DISCONTINUED | OUTPATIENT
Start: 2022-04-22 | End: 2022-04-23 | Stop reason: HOSPADM

## 2022-04-22 RX ORDER — FOLIC ACID 1 MG/1
1 TABLET ORAL ONCE
Status: COMPLETED | OUTPATIENT
Start: 2022-04-22 | End: 2022-04-22

## 2022-04-22 RX ORDER — LORAZEPAM 1 MG/1
2 TABLET ORAL
Status: DISCONTINUED | OUTPATIENT
Start: 2022-04-22 | End: 2022-04-23 | Stop reason: HOSPADM

## 2022-04-22 RX ORDER — MULTIVITAMIN WITH IRON
1 TABLET ORAL ONCE
Status: COMPLETED | OUTPATIENT
Start: 2022-04-22 | End: 2022-04-22

## 2022-04-22 RX ORDER — ACETAMINOPHEN 325 MG/1
650 TABLET ORAL EVERY 6 HOURS PRN
Status: DISCONTINUED | OUTPATIENT
Start: 2022-04-22 | End: 2022-04-23 | Stop reason: HOSPADM

## 2022-04-22 RX ORDER — LORAZEPAM 2 MG/ML
4 INJECTION INTRAMUSCULAR
Status: DISCONTINUED | OUTPATIENT
Start: 2022-04-22 | End: 2022-04-23 | Stop reason: HOSPADM

## 2022-04-22 RX ORDER — LORAZEPAM 2 MG/ML
3 INJECTION INTRAMUSCULAR
Status: DISCONTINUED | OUTPATIENT
Start: 2022-04-22 | End: 2022-04-23 | Stop reason: HOSPADM

## 2022-04-22 RX ORDER — LORAZEPAM 1 MG/1
4 TABLET ORAL
Status: DISCONTINUED | OUTPATIENT
Start: 2022-04-22 | End: 2022-04-23 | Stop reason: HOSPADM

## 2022-04-22 RX ORDER — POLYETHYLENE GLYCOL 3350 17 G/17G
17 POWDER, FOR SOLUTION ORAL DAILY PRN
Status: DISCONTINUED | OUTPATIENT
Start: 2022-04-22 | End: 2022-04-23 | Stop reason: HOSPADM

## 2022-04-22 RX ORDER — METOPROLOL TARTRATE 50 MG/1
TABLET, FILM COATED ORAL
Status: ON HOLD | COMMUNITY
End: 2022-08-05 | Stop reason: HOSPADM

## 2022-04-22 RX ADMIN — LORAZEPAM 2 MG: 2 INJECTION INTRAMUSCULAR; INTRAVENOUS at 20:14

## 2022-04-22 RX ADMIN — THIAMINE HCL TAB 100 MG 100 MG: 100 TAB at 16:39

## 2022-04-22 RX ADMIN — LORAZEPAM 4 MG: 2 INJECTION INTRAMUSCULAR; INTRAVENOUS at 22:22

## 2022-04-22 RX ADMIN — LORAZEPAM 2 MG: 2 INJECTION INTRAMUSCULAR; INTRAVENOUS at 21:15

## 2022-04-22 RX ADMIN — THERA TABS 1 TABLET: TAB at 16:39

## 2022-04-22 RX ADMIN — SODIUM CHLORIDE: 9 INJECTION, SOLUTION INTRAVENOUS at 20:12

## 2022-04-22 RX ADMIN — FOLIC ACID 1 MG: 1 TABLET ORAL at 16:39

## 2022-04-22 ASSESSMENT — ENCOUNTER SYMPTOMS
WHEEZING: 0
BACK PAIN: 0
RHINORRHEA: 0
SHORTNESS OF BREATH: 0
ABDOMINAL PAIN: 0
DIARRHEA: 0
NAUSEA: 1
VOMITING: 0
COLOR CHANGE: 0
SORE THROAT: 0
CHEST TIGHTNESS: 0
COUGH: 0
NAUSEA: 0
ABDOMINAL DISTENTION: 0
CONSTIPATION: 0

## 2022-04-22 ASSESSMENT — PAIN - FUNCTIONAL ASSESSMENT: PAIN_FUNCTIONAL_ASSESSMENT: NONE - DENIES PAIN

## 2022-04-22 NOTE — ED NOTES
Report called to floor nurse, told to wait for them to speak to clinical house - floor nurse called back to say go ahead sitter will be there at [address], RN  04/22/22 [address] [place] [address]

## 2022-04-22 NOTE — H&P
[facility] [facility]      [facility]ist - History & Physical      PCP: Preethi Moser MD    Date of Admission: 4/22/2022    Date of Service: 4/22/2022    Chief Complaint:  Alcohol intoxication     History Of Present Illness: The patient is a 45 y.o. male who presented to [facility] ER with PMH CAD, depression, HTN, seizure complaining of alcohol intoxication. Patient states that he has been drinking alcohol, vodka, heavily for the last 3 days. His last drink was approximately 3 to 4 hours ago. He states he does have a history of alcohol withdrawal in the past.  He has been off his daily home medications for approximately 5 days. He reports suicidal ideation with plan to use a gun and shoot himself in the head. Patient states that he does have a loaded gun at his home. Patient is to be admitted to the hospitalist service due to alcohol intoxication with suicidal ideation with consultation to psychiatry once ethanol level is declined. Past Medical History:        Diagnosis Date    CAD (coronary artery disease)     Chest pain 12/12/2011    Cigarette smoker 12/12/2011    Depression     Hypertension 12/12/2011    Respiratory failure ([facility] [address]     intubation    Seizures ([facility] [address]        Past Surgical History:        Procedure Laterality Date    APPENDECTOMY      [facility]18/[facility]    COLONOSCOPY      ENDOSCOPY, COLON, DIAGNOSTIC      KNEE ARTHROSCOPY Right     NECK SURGERY  7/15/2008    [facility]       Home Medications:  Prior to Admission medications    Medication Sig Start Date End Date Taking?  Authorizing Provider   amLODIPine (NORVASC) 5 MG tablet Take 1 tablet by mouth daily 4/6/22 5/6/22  Pia Rios MD   lamoTRIgine (LAMICTAL) 25 MG tablet Take 3 tablets by mouth 2 times daily 4/5/22 5/5/22  Pia Rios MD   losartan (COZAAR) 100 MG tablet Take 1 tablet by mouth daily 4/5/22 5/5/22  Pia Rios MD   naltrexone (VIVITROL) 380 MG injection Inject 380 mg into the muscle every 28 days 5/2/22   Daphne Montana MD   QUEtiapine (SEROQUEL) 200 MG tablet Take 1 tablet by mouth nightly 4/5/22 5/5/22  Daphne Montana MD   vitamin D (ERGOCALCIFEROL) 1.25 MG (14375 UT) CAPS capsule Take 1 capsule by mouth once a week for 11 doses 4/10/22 6/20/22  Daphne Montana MD   traZODone (DESYREL) 50 MG tablet Take 1 tablet by mouth nightly as needed for Sleep 4/5/22 5/5/22  Daphne Montana MD   pantoprazole (PROTONIX) 40 MG tablet Take 1 tablet by mouth every morning (before breakfast) 4/6/22 5/6/22  Daphne Montana MD   gabapentin (NEURONTIN) 600 MG tablet Take 1 tablet by mouth 3 times daily for 30 days. Patient taking differently: Take 300 mg by mouth 3 times daily. 9/13/21 10/13/21  Daphne Montana MD       Allergies:    Zofran [ondansetron]    Social History:    The patient currently lives home  Tobacco:   reports that he has been smoking cigarettes. He has a 35.00 pack-year smoking history. He has quit using smokeless tobacco.  Alcohol:   reports current alcohol use of about 6.0 standard drinks of alcohol per week. Illicit Drugs: denies    Family History:      Problem Relation Age of Onset    Osteoarthritis Mother     Thyroid Disease Mother     Heart Failure Father     Heart Failure Maternal Grandmother     Heart Failure Paternal Grandmother     Cancer Maternal Grandfather        Review of Systems:   Review of Systems   Constitutional: Negative for chills, diaphoresis, fatigue and fever. Respiratory: Negative for cough, chest tightness, shortness of breath and wheezing. Cardiovascular: Negative for chest pain and palpitations. Gastrointestinal: Positive for nausea. Negative for abdominal distention, abdominal pain, constipation and vomiting. Skin: Negative for color change, pallor and rash. Neurological: Negative for tremors, syncope, weakness, light-headedness, numbness and headaches. Psychiatric/Behavioral: Positive for suicidal ideas.  Negative for agitation, behavioral problems and confusion. The patient is nervous/anxious. 14 point review of systems is negative except as specifically addressed above. Physical Examination:  BP (!) 119/90   Pulse 104   Temp 98.7 °F (37.1 °C) (Oral)   Resp 18   Ht 6' 4\" (1.93 m)   Wt 190 lb (86.2 kg)   SpO2 96%   BMI 23.13 kg/m²   Physical Exam  Vitals and nursing note reviewed. Constitutional:       Appearance: Normal appearance. HENT:      Mouth/Throat:      Mouth: Mucous membranes are moist.      Pharynx: Oropharynx is clear. Eyes:      Extraocular Movements: Extraocular movements intact. Conjunctiva/sclera: Conjunctivae normal.      Pupils: Pupils are equal, round, and reactive to light. Cardiovascular:      Rate and Rhythm: Normal rate and regular rhythm. Pulses: Normal pulses. Heart sounds: Normal heart sounds. No murmur heard. Pulmonary:      Effort: Pulmonary effort is normal. No respiratory distress. Breath sounds: Normal breath sounds. Abdominal:      General: Bowel sounds are normal. There is no distension. Palpations: Abdomen is soft. Tenderness: There is no abdominal tenderness. There is no guarding or rebound. Musculoskeletal:         General: No swelling. Normal range of motion. Cervical back: Normal range of motion and neck supple. No rigidity or tenderness. Right lower leg: No edema. Left lower leg: No edema. Skin:     General: Skin is warm and dry. Capillary Refill: Capillary refill takes less than 2 seconds. Neurological:      General: No focal deficit present. Mental Status: He is alert and oriented to person, place, and time. Cranial Nerves: No cranial nerve deficit. Psychiatric:         Mood and Affect: Mood is anxious. Behavior: Behavior normal. Behavior is cooperative. Thought Content: Thought content includes suicidal ideation. Thought content includes suicidal plan.           Diagnostic Data:  CBC:  Recent Labs     04/22/22  1645   WBC 5.3   HGB 13.7*   HCT 39.0*        BMP:  Recent Labs     04/22/22  1645      K 3.7      CO2 25   BUN 12   CREATININE 1.0   CALCIUM 8.9     Recent Labs     04/22/22  1645   AST 45*   ALT 46*   BILITOT <0.2   ALKPHOS 99     ABGs:  Lab Results   Component Value Date    PHART 7.530 01/27/2019    PO2ART 72.0 01/27/2019    CXM0QXI 39.0 01/27/2019     Urinalysis:  Lab Results   Component Value Date    NITRU Negative 04/22/2022    WBCUA 2 04/01/2022    BACTERIA NEGATIVE 04/01/2022    RBCUA 1 04/01/2022    BLOODU Negative 04/22/2022    SPECGRAV 1.008 04/22/2022    GLUCOSEU Negative 04/22/2022       Assessment/Plan:    Acute alcoholic intoxication without complication               - Recheck am ETOH               - Monitor for withdrawal               - [facility] protocol in place   -IVF    - Lorazepam PRN per [facility] protocol   - Seizure Precautions               - Daily Thiamine/folic acid/ multivitamin               - Counseled on alcohol abuse               - Social work consultation for  Rehab/placement   -Neuro checks               - Monitor on Telemetry    -Daily labs     Suicidal ideation   -One-on-one sitter   -Suicide precautions   -Consultation to psychiatry once ethanol  level is declined     DVT prophylaxis Lovenox    Signed:  KWASI Benoit - CNP, 4/22/2022 5:21 PM

## 2022-04-22 NOTE — ED PROVIDER NOTES
[facility] EMERGENCY DEPT  eMERGENCY dEPARTMENT eNCOUnter      Pt Name: Donnie Cherry  MRN: [id number]  [place] 1970  Date of evaluation: 4/22/2022  Provider: Sangeeta Balderas MD    [address] [place] [address]       Chief Complaint   Patient presents with    Alcohol Intoxication     drank approx. 50 oz vodka last drink 4 hrs ago    Suicidal         HISTORY OF PRESENT ILLNESS   (Location/Symptom, Timing/Onset,Context/Setting, Quality, Duration, Modifying Factors, Severity)  Note limiting factors. Donnie Cherry is a 45 y.o. male who presents to the emergency department with alcohol intoxication. Patient reports suicidal ideation. He had a plan to use a gun and shoot himself in the head. He does have a gun at his home. He says his been drinking alcohol heavily for the last 3 days. His last drink was 3 to 4 hours ago. He has history of alcohol withdrawal in the past.  He has been treated here for depression in the past.  Is been off his medicine for about 5 days. HPI    NursingNotes were reviewed. REVIEW OF SYSTEMS    (2-9 systems for level 4, 10 or more for level 5)     Review of Systems   Constitutional: Negative for chills and fever. HENT: Negative for rhinorrhea and sore throat. Respiratory: Negative for shortness of breath. Cardiovascular: Negative for chest pain and leg swelling. Gastrointestinal: Negative for abdominal pain, diarrhea, nausea and vomiting. Genitourinary: Negative for difficulty urinating. Musculoskeletal: Negative for back pain and neck pain. Skin: Negative for rash. Neurological: Negative for weakness and headaches. Psychiatric/Behavioral: Positive for dysphoric mood and suicidal ideas. Negative for confusion. A complete review of systems was performed and is negative except as noted above in the HPI.        PAST MEDICAL HISTORY     Past Medical History:   Diagnosis Date    CAD (coronary artery disease)     Chest pain 12/12/2011    Cigarette smoker 12/12/2011    Depression     Hypertension 12/12/2011    Respiratory failure ([facility] [address]     intubation    Seizures (HCC)          SURGICAL HISTORY       Past Surgical History:   Procedure Laterality Date    APPENDECTOMY      CHOLECYSTECTOMY  5/18/2006    Stigall    COLONOSCOPY      ENDOSCOPY, COLON, DIAGNOSTIC      KNEE ARTHROSCOPY Right     NECK SURGERY  7/15/2008    Hadi         CURRENT MEDICATIONS       Previous Medications    AMLODIPINE (NORVASC) 5 MG TABLET    Take 1 tablet by mouth daily    GABAPENTIN (NEURONTIN) 600 MG TABLET    Take 1 tablet by mouth 3 times daily for 30 days.     LAMOTRIGINE (LAMICTAL) 25 MG TABLET    Take 3 tablets by mouth 2 times daily    LOSARTAN (COZAAR) 100 MG TABLET    Take 1 tablet by mouth daily    NALTREXONE (VIVITROL) 380 MG INJECTION    Inject 380 mg into the muscle every 28 days    PANTOPRAZOLE (PROTONIX) 40 MG TABLET    Take 1 tablet by mouth every morning (before breakfast)    QUETIAPINE (SEROQUEL) 200 MG TABLET    Take 1 tablet by mouth nightly    TRAZODONE (DESYREL) 50 MG TABLET    Take 1 tablet by mouth nightly as needed for Sleep    VITAMIN D (ERGOCALCIFEROL) 1.25 MG (50947 UT) CAPS CAPSULE    Take 1 capsule by mouth once a week for 11 doses       ALLERGIES     Zofran [ondansetron]    FAMILY HISTORY       Family History   Problem Relation Age of Onset    Osteoarthritis Mother     Thyroid Disease Mother     Heart Failure Father     Heart Failure Maternal Grandmother     Heart Failure Paternal Grandmother     Cancer Maternal Grandfather           SOCIAL HISTORY       Social History     Socioeconomic History    Marital status:      Spouse name: None    Number of children: None    Years of education: None    Highest education level: None   Occupational History    Occupation: cuts wire     Employer: DISABLED     Comment: [employer]   Tobacco Use    Smoking status: Current Every Day Smoker     Packs/day: 1.00     Years: 35.00     Pack years: 35.00     Types: Cigarettes    Smokeless tobacco: Former User    Tobacco comment: refused counseling   Vaping Use    Vaping Use: Never used   Substance and Sexual Activity    Alcohol use: Yes     Alcohol/week: 6.0 standard drinks     Types: 6 Cans of beer per week     Comment: heavy    Drug use: Yes     Frequency: 1.0 times per week     Types: Marijuana (Weed)     Comment: reports using 7 grams per month    Sexual activity: Yes     Partners: Female   Other Topics Concern    None   Social History Narrative    None     Social Determinants of Health     Financial Resource Strain:     Difficulty of Paying Living Expenses: Not on file   Food Insecurity:     Worried About Running Out of Food in the Last Year: Not on file    Toan of Food in the Last Year: Not on file   Transportation Needs:     Lack of Transportation (Medical): Not on file    Lack of Transportation (Non-Medical):  Not on file   Physical Activity:     Days of Exercise per Week: Not on file    Minutes of Exercise per Session: Not on file   Stress:     Feeling of Stress : Not on file   Social Connections:     Frequency of Communication with Friends and Family: Not on file    Frequency of Social Gatherings with Friends and Family: Not on file    Attends [employer] Services: Not on file    Active Member of [address] [place] [address] [employer] or Organizations: Not on file    Attends Club or Organization Meetings: Not on file    Marital Status: Not on file   Intimate Partner Violence:     Fear of Current or Ex-Partner: Not on file    Emotionally Abused: Not on file    Physically Abused: Not on file    Sexually Abused: Not on file   Housing Stability:     Unable to Pay for Housing in the Last Year: Not on file    Number of [place] in the Last Year: Not on file    Unstable Housing in the Last Year: Not on file       SCREENINGS    Jaime Coma Scale  Eye Opening: Spontaneous  Best Verbal Response: Oriented  Best Motor Response: Obeys commands  Gallup Coma Scale Score: 15 PHYSICAL EXAM    (up to 7 for level 4, 8 or more for level 5)     ED Triage Vitals [04/22/22 1535]   BP Temp Temp Source Pulse Resp SpO2 Height Weight   (!) 119/90 98.7 °F (37.1 °C) Oral 104 18 96 % 6' 4\" (1.93 m) 190 lb (86.2 kg)       Physical Exam  Vitals and nursing note reviewed. Constitutional:       General: He is not in acute distress. Appearance: He is well-developed. He is not diaphoretic. Comments: Appears intoxicated   HENT:      Head: Normocephalic and atraumatic. Eyes:      Pupils: Pupils are equal, round, and reactive to light. Cardiovascular:      Rate and Rhythm: Normal rate and regular rhythm. Heart sounds: Normal heart sounds. Pulmonary:      Effort: Pulmonary effort is normal. No respiratory distress. Breath sounds: Normal breath sounds. Abdominal:      General: Bowel sounds are normal. There is no distension. Palpations: Abdomen is soft. Tenderness: There is no abdominal tenderness. Musculoskeletal:         General: Normal range of motion. Cervical back: Normal range of motion and neck supple. Skin:     General: Skin is warm and dry. Findings: No rash. Neurological:      Mental Status: He is alert and oriented to person, place, and time. Cranial Nerves: No cranial nerve deficit. Motor: No abnormal muscle tone. Coordination: Coordination normal.   Psychiatric:         Mood and Affect: Mood is anxious and depressed. Behavior: Behavior normal.         Thought Content: Thought content includes suicidal ideation. Thought content includes suicidal plan.          DIAGNOSTIC RESULTS     EKG: All EKG's are interpreted by the Emergency Department Physician who either signs or Co-signs this chart in the absence of a cardiologist.        RADIOLOGY:   Non-plain film images such as CT, Ultrasound and MRI are read by the radiologist. Lynette Ashly images are visualized and preliminarily interpreted by the emergency physician with the below findings:        Interpretation per the Radiologist below, if available at the time of this note:    No orders to display         ED BEDSIDE ULTRASOUND:   Performed by ED Physician - none    LABS:  Labs Reviewed   CBC WITH AUTO DIFFERENTIAL - Abnormal; Notable for the following components:       Result Value    RBC 4.28 (*)     Hemoglobin 13.7 (*)     Hematocrit 39.0 (*)     MCH 32.0 (*)     MPV 8.7 (*)     Neutrophils % 46.2 (*)     Lymphocytes % 40.3 (*)     Eosinophils % 6.8 (*)     All other components within normal limits   COMPREHENSIVE METABOLIC PANEL - Abnormal; Notable for the following components:    Glucose 122 (*)     ALT 46 (*)     AST 45 (*)     All other components within normal limits   COVID-19, RAPID   ETHANOL   URINALYSIS WITH REFLEX TO CULTURE   URINE DRUG SCREEN   COMPREHENSIVE METABOLIC PANEL W/ REFLEX TO MG FOR LOW K   ETHANOL   CBC       All other labs were within normal range or not returned as of this dictation. EMERGENCY DEPARTMENT COURSE and DIFFERENTIALDIAGNOSIS/MDM:   Vitals:    Vitals:    04/22/22 1535   BP: (!) 119/90   Pulse: 104   Resp: 18   Temp: 98.7 °F (37.1 °C)   TempSrc: Oral   SpO2: 96%   Weight: 190 lb (86.2 kg)   Height: 6' 4\" (1.93 m)       MDM  Pt suicidal with plan with ETOH >400. D/w Dr Bryan Barajas for admission    CONSULTS:  IP CONSULT TO SOCIAL WORK  IP CONSULT TO PSYCHIATRY    PROCEDURES:  Unless otherwise notedbelow, none     Procedures    FINAL IMPRESSION     1. Suicidal ideation    2.  Acute alcoholic intoxication without complication ([facility] [address]          DISPOSITION/PLAN   DISPOSITION Admitted 04/22/2022 05:28:23 PM      PATIENT REFERRED TO:  @FUP@    DISCHARGE MEDICATIONS:  New Prescriptions    No medications on file          (Please note that portions of this note were completed with a voice recognition program.  Efforts were made to edit the dictations butoccasionally words are mis-transcribed.)    Jamison Tee MD (electronically signed)  AttendingEmergency Physician         Ida Cameron MD  04/22/22 5118

## 2022-04-23 VITALS
HEART RATE: 85 BPM | HEIGHT: 76 IN | SYSTOLIC BLOOD PRESSURE: 155 MMHG | BODY MASS INDEX: 23.14 KG/M2 | TEMPERATURE: 98.8 F | WEIGHT: 190 LBS | DIASTOLIC BLOOD PRESSURE: 85 MMHG | RESPIRATION RATE: 18 BRPM | OXYGEN SATURATION: 99 %

## 2022-04-23 LAB
ALBUMIN SERPL-MCNC: 3.8 G/DL (ref 3.5–5.2)
ALP BLD-CCNC: 89 U/L (ref 40–130)
ALT SERPL-CCNC: 39 U/L (ref 5–41)
ANION GAP SERPL CALCULATED.3IONS-SCNC: 10 MMOL/L (ref 7–19)
AST SERPL-CCNC: 35 U/L (ref 5–40)
BILIRUB SERPL-MCNC: <0.2 MG/DL (ref 0.2–1.2)
BUN BLDV-MCNC: 10 MG/DL (ref 6–20)
CALCIUM SERPL-MCNC: 8.7 MG/DL (ref 8.6–10)
CHLORIDE BLD-SCNC: 104 MMOL/L (ref 98–111)
CO2: 27 MMOL/L (ref 22–29)
CREAT SERPL-MCNC: 0.8 MG/DL (ref 0.5–1.2)
ETHANOL: 154 MG/DL (ref 0–0.08)
GFR AFRICAN AMERICAN: >59
GFR NON-AFRICAN AMERICAN: >60
GLUCOSE BLD-MCNC: 90 MG/DL (ref 74–109)
HCT VFR BLD CALC: 37.3 % (ref 42–52)
HEMOGLOBIN: 12.6 G/DL (ref 14–18)
MCH RBC QN AUTO: 31.7 PG (ref 27–31)
MCHC RBC AUTO-ENTMCNC: 33.8 G/DL (ref 33–37)
MCV RBC AUTO: 94 FL (ref 80–94)
PDW BLD-RTO: 13.2 % (ref 11.5–14.5)
PLATELET # BLD: 203 K/UL (ref 130–400)
PMV BLD AUTO: 8.8 FL (ref 9.4–12.4)
POTASSIUM REFLEX MAGNESIUM: 3.8 MMOL/L (ref 3.5–5)
RBC # BLD: 3.97 M/UL (ref 4.7–6.1)
SODIUM BLD-SCNC: 141 MMOL/L (ref 136–145)
TOTAL PROTEIN: 6.1 G/DL (ref 6.6–8.7)
WBC # BLD: 4.6 K/UL (ref 4.8–10.8)

## 2022-04-23 PROCEDURE — 85027 COMPLETE CBC AUTOMATED: CPT

## 2022-04-23 PROCEDURE — 6360000002 HC RX W HCPCS

## 2022-04-23 PROCEDURE — 80053 COMPREHEN METABOLIC PANEL: CPT

## 2022-04-23 PROCEDURE — 2580000003 HC RX 258

## 2022-04-23 PROCEDURE — 82077 ASSAY SPEC XCP UR&BREATH IA: CPT

## 2022-04-23 PROCEDURE — 99222 1ST HOSP IP/OBS MODERATE 55: CPT | Performed by: PSYCHIATRY & NEUROLOGY

## 2022-04-23 PROCEDURE — 6370000000 HC RX 637 (ALT 250 FOR IP)

## 2022-04-23 PROCEDURE — 36415 COLL VENOUS BLD VENIPUNCTURE: CPT

## 2022-04-23 RX ADMIN — SODIUM CHLORIDE: 9 INJECTION, SOLUTION INTRAVENOUS at 05:51

## 2022-04-23 RX ADMIN — SODIUM CHLORIDE, PRESERVATIVE FREE 10 ML: 5 INJECTION INTRAVENOUS at 09:08

## 2022-04-23 RX ADMIN — LORAZEPAM 2 MG: 1 TABLET ORAL at 05:51

## 2022-04-23 RX ADMIN — LORAZEPAM 4 MG: 2 INJECTION INTRAMUSCULAR; INTRAVENOUS at 09:05

## 2022-04-23 RX ADMIN — LORAZEPAM 4 MG: 2 INJECTION INTRAMUSCULAR; INTRAVENOUS at 01:30

## 2022-04-23 RX ADMIN — LORAZEPAM 3 MG: 2 INJECTION INTRAMUSCULAR; INTRAVENOUS at 10:46

## 2022-04-23 RX ADMIN — LORAZEPAM 4 MG: 2 INJECTION INTRAMUSCULAR; INTRAVENOUS at 04:15

## 2022-04-23 NOTE — DISCHARGE SUMMARY
Discharge Summary    NAME: Roby Hobbs  :  1970  MRN:  [id number]    Admit date:  2022  Discharge date:  2022    Advance Directive: Full Code    Consults: psychiatry    Primary Care Physician:  Andres Avilez MD    Discharge Diagnoses:  Principal Problem:    Acute alcoholic intoxication without complication [facility])  Active Problems:    Suicidal ideation          Significant Diagnostic Studies:   No results found. Pertinent Labs:   CBC: Recent Labs     22  1645 22  0243   WBC 5.3 4.6*   HGB 13.7* 12.6*    203     BMP:    Recent Labs     22  1645 22  0243    141   K 3.7 3.8    104   CO2 25 27   BUN 12 10   CREATININE 1.0 0.8   GLUCOSE 122* 90           Hospital Course: 61-year-old male with alcohol abuse, substance-induced mood disorder admitted after presenting with severe alcohol intoxication alcohol level over 400 and suicidal ideation due to concern for risk of alcohol withdrawal.  The following day he was evaluated by psychiatry who noted that patient was no longer suicidal and otherwise did not meet any criteria for psychiatric hospitalization. Patient had capacity to make his own medical decisions. Psychiatry did not recommend any adjustment to previously prescribed psychotropics. Did not have any severe alcohol withdrawal and patient requested to be discharged home. Otherwise medically stable for discharge. Physical Exam:  Vital Signs: BP (!) 155/85   Pulse 85   Temp 98.8 °F (37.1 °C) (Temporal)   Resp 18   Ht 6' 4\" (1.93 m)   Wt 190 lb (86.2 kg)   SpO2 99%   BMI 23.13 kg/m²   General appearance:. Alert and Cooperative   HEENT: Normocephalic. Chest: clear to auscultation bilaterally without wheezes or rhonchi. Cardiac: Normal heart tones with regular rate and rhythm, S1, S2 normal. No murmurs, gallops, or rubs auscultated. Abdomen:soft, non-tender; normal bowel sounds  Extremities: No clubbing or cyanosis.  No peripheral edema. Peripheral pulses palpable. Neurologic: Grossly intact. Discharge Medications:         Medication List      CHANGE how you take these medications    gabapentin 600 MG tablet  Commonly known as: Neurontin  Take 1 tablet by mouth 3 times daily for 30 days. What changed: how much to take        CONTINUE taking these medications    amLODIPine 5 MG tablet  Commonly known as: NORVASC  Take 1 tablet by mouth daily     lamoTRIgine 25 MG tablet  Commonly known as: LAMICTAL  Take 3 tablets by mouth 2 times daily     losartan 100 MG tablet  Commonly known as: COZAAR  Take 1 tablet by mouth daily     metoprolol tartrate 50 MG tablet  Commonly known as: LOPRESSOR     pantoprazole 40 MG tablet  Commonly known as: PROTONIX  Take 1 tablet by mouth every morning (before breakfast)     promethazine 25 MG tablet  Commonly known as: PHENERGAN     QUEtiapine 200 MG tablet  Commonly known as: SEROQUEL  Take 1 tablet by mouth nightly     traZODone 50 MG tablet  Commonly known as: DESYREL  Take 1 tablet by mouth nightly as needed for Sleep     vitamin D 1.25 MG (75300 UT) Caps capsule  Commonly known as: ERGOCALCIFEROL  Take 1 capsule by mouth once a week for 11 doses     Vivitrol 380 MG injection  Generic drug: naltrexone  Inject 380 mg into the muscle every 28 days  Start taking on: May 2, 2022        STOP taking these medications    ciprofloxacin 250 MG tablet  Commonly known as: CIPRO     omeprazole 20 MG delayed release capsule  Commonly known as: PRILOSEC            Discharge Instructions: Follow up with Sang Hernadez MD this week. Take medications as directed. Resume activity as tolerated. Disposition: Patient is medically stable and will be discharged home. Time spent on discharge 33 minutes.      Signed:  Max Dudley MD  4/23/2022 2:02 PM

## 2022-04-23 NOTE — CONSULTS
[facility]  Psychiatry Consult    Reason for Consult: Concern   Suicidal ideations    The primary source(s) of information include(s):  patient    The patient is a 45 y.o. male with previous psychiatric history of alcohol use disorder, cannabis use disorder, substance induced mood disorder, who has been admitted to medical services secondary to alcohol intoxication, blood alcohol level 406, and suicidal ideations. Patient is well-known to psychiatry due to multiple previous admissions to our psychiatric unit with same clinical presentation, as at present time. Last time patient was admitted to our psychiatric unit 3 weeks ago. Patient has been seen in his room with presence of one-to-one sitter. Patient reported that a few days ago he relapsed in drinking alcohol and has been drinking \"three-quarter of a gallon of vodka every day\". Today during the interview, patient endorses withdrawal symptoms from alcohol-hand tremor, body shakes, increased level of anxiety, irritability, decreased quality of sleep, muscle weakness, low energy level, mild headache. In regards to affective symptomatology, patient denies feeling of depression, denies any psychotic symptoms, denies feeling of hopelessness, helplessness and worthlessness. He denies current active suicidal or homicidal ideations, denies any plans. Also, he denies any auditory and visual hallucinations. Patient reported that he was not compliant with prescribed psychotropic medications for a few days, since he relapsed in drinking alcohol.       PSYCHIATRIC HISTORY:  Diagnoses: Alcohol use disorder, cannabis use disorder, substance-induced mood disorder  Suicide attempts/gestures: Denies   Prior hospitalizations: Multiple to [facility] psychiatric unit with last admission 3 weeks ago  Medication trials: Lamictal, Seroquel, Prozac, Zoloft, trazodone, Risperdal, naltrexone, Vivitrol, Klonopin  Mental health contact: Lost to follow-up     Allergies:  Zofran [ondansetron]    Mental Status  Appearance: Properly groomed, wearing hospital attire. Made good eye contact. Behavior: Mildly withdrawn, cooperative with interview, socially appropriate. Mild psychomotor retardation appreciated. Gait was not evaluated, as patient was lying down on the bed  Speech: Normal in tone, volume, and quality. Mood: \"better\"   Affect: Mood congruent. Range is slightly restricted  Thought Process: Appears linear and goal oriented. Thought Content: Patient does not have any current active suicidal and homicidal ideations. No overt delusions or paranoia appreciated. Perceptions: Seems patient does not have any auditory or visual hallucinations at present time. Patient did not appear to be responding to internal stimuli. No overt psychosis. Executive Functions: Appear intact. Concentration: Decreased  Reasoning: Appears mildly impaired based on interaction from interview   Orientation: to person, place, date, and situation. Alert. Language: Intact. Fund of information: Intact. Memory: recent and remote appear intact. Impulsivity: Limited. Neurovegitative: Fair appetite decreased sleep  Insight: Limited  Judgment: Limited    Assessment  DSM 5 DIAGNOSIS:  Mood disorder, unspecified  Alcohol use disorder, severe, currently in withdrawal  Cannabis use disorder  Tobacco use disorder    Recommendations  1. Currently patient is not suicidal, homicidal or psychotic. Patient does not meet criteria for psychiatric hospitalization. 2. Patient does have a capacity of making his own medical decisions. 3.  Recommended to discontinue one-to-one sitter. 4.  Patient endorses withdrawal symptoms from alcohol, which need to be addressed. 5.  No recommendations with adjustment of psychotropic medications at this time. 6.  Patient can be discharged from the hospital when he is medically stable. 7.  Psychiatry will sign off today.       Ivette Linares MD

## 2022-04-23 NOTE — CARE COORDINATION
SW met with pt at bedside re: consideration of rehab. Pt states he drank too much for his birthday and is not interested in rehab at this time. He was provided with a list of local rehab facilities. He states he will keep this information. Pt denied any needs at this time.

## 2022-04-24 NOTE — FLOWSHEET NOTE
04/23/22 1300   Encounter Summary   Encounter Overview/Reason  Initial Encounter   Service Provided For: Patient   Referral/Consult From: Nurse   Begin Time 1300   End Time  1315   Total Time Calculated 15 min   Advance Care Planning   Type ACP conversation     Received referral for LW. Patient discharged at this time.   Electronically signed by Kiesha Alcazar on 4/23/2022 at 7:45 PM

## 2022-04-29 ENCOUNTER — OFFICE VISIT (OUTPATIENT)
Dept: INTERNAL MEDICINE | Age: 52
End: 2022-04-29
Payer: MEDICAID

## 2022-04-29 VITALS
WEIGHT: 176.6 LBS | HEART RATE: 84 BPM | DIASTOLIC BLOOD PRESSURE: 84 MMHG | HEIGHT: 76 IN | OXYGEN SATURATION: 100 % | SYSTOLIC BLOOD PRESSURE: 132 MMHG | BODY MASS INDEX: 21.5 KG/M2

## 2022-04-29 DIAGNOSIS — M54.2 CERVICALGIA OF OCCIPITO-ATLANTO-AXIAL REGION: ICD-10-CM

## 2022-04-29 DIAGNOSIS — F33.9 EPISODE OF RECURRENT MAJOR DEPRESSIVE DISORDER, UNSPECIFIED DEPRESSION EPISODE SEVERITY (HCC): ICD-10-CM

## 2022-04-29 DIAGNOSIS — F10.20 ALCOHOL USE DISORDER, SEVERE, DEPENDENCE (HCC): ICD-10-CM

## 2022-04-29 DIAGNOSIS — F10.10 ALCOHOL ABUSE: ICD-10-CM

## 2022-04-29 DIAGNOSIS — F41.9 ANXIETY DISORDER, UNSPECIFIED TYPE: ICD-10-CM

## 2022-04-29 DIAGNOSIS — F34.9 PERSISTENT MOOD (AFFECTIVE) DISORDER, UNSPECIFIED (HCC): ICD-10-CM

## 2022-04-29 DIAGNOSIS — G62.9 NEUROPATHY: ICD-10-CM

## 2022-04-29 DIAGNOSIS — G89.29 CHRONIC LOW BACK PAIN, UNSPECIFIED BACK PAIN LATERALITY, UNSPECIFIED WHETHER SCIATICA PRESENT: ICD-10-CM

## 2022-04-29 DIAGNOSIS — I10 PRIMARY HYPERTENSION: ICD-10-CM

## 2022-04-29 DIAGNOSIS — Z00.00 ROUTINE ADULT HEALTH MAINTENANCE: Primary | ICD-10-CM

## 2022-04-29 DIAGNOSIS — M54.50 CHRONIC LOW BACK PAIN, UNSPECIFIED BACK PAIN LATERALITY, UNSPECIFIED WHETHER SCIATICA PRESENT: ICD-10-CM

## 2022-04-29 DIAGNOSIS — F39 MOOD DISORDER (HCC): ICD-10-CM

## 2022-04-29 DIAGNOSIS — R10.9 ABDOMINAL CRAMPING: ICD-10-CM

## 2022-04-29 DIAGNOSIS — G47.00 INSOMNIA, UNSPECIFIED TYPE: ICD-10-CM

## 2022-04-29 DIAGNOSIS — K21.9 GASTROESOPHAGEAL REFLUX DISEASE WITHOUT ESOPHAGITIS: ICD-10-CM

## 2022-04-29 PROBLEM — F41.1 GENERALIZED ANXIETY DISORDER: Status: ACTIVE | Noted: 2022-04-29

## 2022-04-29 LAB
ALCOHOL URINE: ABNORMAL
AMPHETAMINE SCREEN, URINE: ABNORMAL
BARBITURATE SCREEN, URINE: ABNORMAL
BENZODIAZEPINE SCREEN, URINE: ABNORMAL
BUPRENORPHINE URINE: ABNORMAL
COCAINE METABOLITE SCREEN URINE: ABNORMAL
FENTANYL SCREEN, URINE: ABNORMAL
GABAPENTIN SCREEN, URINE: ABNORMAL
MDMA URINE: ABNORMAL
METHADONE SCREEN, URINE: ABNORMAL
METHAMPHETAMINE, URINE: ABNORMAL
OPIATE SCREEN URINE: ABNORMAL
OXYCODONE SCREEN URINE: ABNORMAL
PHENCYCLIDINE SCREEN URINE: ABNORMAL
PROPOXYPHENE SCREEN, URINE: ABNORMAL
SYNTHETIC CANNABINOIDS(K2) SCREEN, URINE: ABNORMAL
THC SCREEN, URINE: ABNORMAL
TRAMADOL SCREEN URINE: ABNORMAL
TRICYCLIC ANTIDEPRESSANTS, UR: ABNORMAL

## 2022-04-29 PROCEDURE — 90471 IMMUNIZATION ADMIN: CPT | Performed by: INTERNAL MEDICINE

## 2022-04-29 PROCEDURE — 80305 DRUG TEST PRSMV DIR OPT OBS: CPT | Performed by: INTERNAL MEDICINE

## 2022-04-29 PROCEDURE — 99396 PREV VISIT EST AGE 40-64: CPT | Performed by: INTERNAL MEDICINE

## 2022-04-29 PROCEDURE — 90732 PPSV23 VACC 2 YRS+ SUBQ/IM: CPT | Performed by: INTERNAL MEDICINE

## 2022-04-29 RX ORDER — GABAPENTIN 300 MG/1
CAPSULE ORAL
COMMUNITY
Start: 2022-04-08 | End: 2022-04-29 | Stop reason: SDUPTHER

## 2022-04-29 RX ORDER — HYDROXYZINE HYDROCHLORIDE 25 MG/1
TABLET, FILM COATED ORAL
COMMUNITY
Start: 2022-04-08

## 2022-04-29 RX ORDER — GABAPENTIN 300 MG/1
600 CAPSULE ORAL 3 TIMES DAILY
Qty: 180 CAPSULE | Refills: 1 | Status: SHIPPED | OUTPATIENT
Start: 2022-04-29 | End: 2022-06-24 | Stop reason: SDUPTHER

## 2022-04-29 RX ORDER — DICYCLOMINE HCL 20 MG
TABLET ORAL
Status: ON HOLD | COMMUNITY
Start: 2022-02-23 | End: 2022-08-05 | Stop reason: HOSPADM

## 2022-04-29 RX ORDER — LAMOTRIGINE 100 MG/1
300 TABLET, ORALLY DISINTEGRATING ORAL DAILY
Qty: 90 TABLET | Refills: 3 | Status: SHIPPED | OUTPATIENT
Start: 2022-04-29 | End: 2022-08-04 | Stop reason: SDUPTHER

## 2022-04-29 SDOH — ECONOMIC STABILITY: FOOD INSECURITY: WITHIN THE PAST 12 MONTHS, THE FOOD YOU BOUGHT JUST DIDN'T LAST AND YOU DIDN'T HAVE MONEY TO GET MORE.: NEVER TRUE

## 2022-04-29 SDOH — ECONOMIC STABILITY: FOOD INSECURITY: WITHIN THE PAST 12 MONTHS, YOU WORRIED THAT YOUR FOOD WOULD RUN OUT BEFORE YOU GOT MONEY TO BUY MORE.: NEVER TRUE

## 2022-04-29 ASSESSMENT — PATIENT HEALTH QUESTIONNAIRE - PHQ9
7. TROUBLE CONCENTRATING ON THINGS, SUCH AS READING THE NEWSPAPER OR WATCHING TELEVISION: 0
SUM OF ALL RESPONSES TO PHQ9 QUESTIONS 1 & 2: 0
1. LITTLE INTEREST OR PLEASURE IN DOING THINGS: 0
SUM OF ALL RESPONSES TO PHQ QUESTIONS 1-9: 0
4. FEELING TIRED OR HAVING LITTLE ENERGY: 0
3. TROUBLE FALLING OR STAYING ASLEEP: 0
5. POOR APPETITE OR OVEREATING: 0
SUM OF ALL RESPONSES TO PHQ QUESTIONS 1-9: 0
8. MOVING OR SPEAKING SO SLOWLY THAT OTHER PEOPLE COULD HAVE NOTICED. OR THE OPPOSITE, BEING SO FIGETY OR RESTLESS THAT YOU HAVE BEEN MOVING AROUND A LOT MORE THAN USUAL: 0
9. THOUGHTS THAT YOU WOULD BE BETTER OFF DEAD, OR OF HURTING YOURSELF: 0
SUM OF ALL RESPONSES TO PHQ QUESTIONS 1-9: 0
10. IF YOU CHECKED OFF ANY PROBLEMS, HOW DIFFICULT HAVE THESE PROBLEMS MADE IT FOR YOU TO DO YOUR WORK, TAKE CARE OF THINGS AT HOME, OR GET ALONG WITH OTHER PEOPLE: 0
2. FEELING DOWN, DEPRESSED OR HOPELESS: 0
6. FEELING BAD ABOUT YOURSELF - OR THAT YOU ARE A FAILURE OR HAVE LET YOURSELF OR YOUR FAMILY DOWN: 0
SUM OF ALL RESPONSES TO PHQ QUESTIONS 1-9: 0

## 2022-04-29 ASSESSMENT — SOCIAL DETERMINANTS OF HEALTH (SDOH): HOW HARD IS IT FOR YOU TO PAY FOR THE VERY BASICS LIKE FOOD, HOUSING, MEDICAL CARE, AND HEATING?: NOT HARD AT ALL

## 2022-04-29 NOTE — PROGRESS NOTES
After obtaining consent, and per orders of Dr. Bessie Dudley, injection of Pneumovax-23 given in Left deltoid by Jean Price MA. Patient instructed to remain in clinic for 20 minutes afterwards, and to report any adverse reaction to me immediately.
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
## 2022-05-01 ENCOUNTER — TELEPHONE (OUTPATIENT)
Dept: INTERNAL MEDICINE | Age: 52
End: 2022-05-01

## 2022-05-01 PROBLEM — F10.920 ACUTE ALCOHOLIC INTOXICATION WITHOUT COMPLICATION (HCC): Status: RESOLVED | Noted: 2022-04-22 | Resolved: 2022-05-01

## 2022-05-01 ASSESSMENT — ENCOUNTER SYMPTOMS
COLOR CHANGE: 0
BACK PAIN: 1
PHOTOPHOBIA: 0
RHINORRHEA: 0
CONSTIPATION: 0
EYE ITCHING: 0
CHEST TIGHTNESS: 0
EYE DISCHARGE: 0
TROUBLE SWALLOWING: 0
SORE THROAT: 0
ANAL BLEEDING: 0
SINUS PRESSURE: 0
FACIAL SWELLING: 0
EYE PAIN: 0
VOMITING: 0
ABDOMINAL PAIN: 0
VOICE CHANGE: 0
EYE REDNESS: 0
BLOOD IN STOOL: 0
SHORTNESS OF BREATH: 0
ABDOMINAL DISTENTION: 0
DIARRHEA: 0
RECTAL PAIN: 0
COUGH: 0
CHOKING: 0
WHEEZING: 0
NAUSEA: 0

## 2022-05-11 ENCOUNTER — TELEPHONE (OUTPATIENT)
Dept: INTERNAL MEDICINE | Age: 52
End: 2022-05-11

## 2022-05-11 NOTE — TELEPHONE ENCOUNTER
Patient calling in just to let doctor know that the new medication she prescribed for him is working wonderfully!

## 2022-05-17 ENCOUNTER — TELEPHONE (OUTPATIENT)
Dept: INTERNAL MEDICINE | Age: 52
End: 2022-05-17

## 2022-05-17 DIAGNOSIS — M54.2 NECK PAIN: Primary | ICD-10-CM
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
## 2022-05-20 RX ORDER — METHYLPREDNISOLONE 4 MG/1
TABLET ORAL
Qty: 1 KIT | Refills: 0 | Status: SHIPPED | OUTPATIENT
Start: 2022-05-20 | End: 2022-05-26

## 2022-05-20 NOTE — TELEPHONE ENCOUNTER
Pt has not come in for his xray yet. He was told and made it sound like he was coming in that day, because he said lets just wait and see what the xray says. Tried to reach him, had to leave a message.

## 2022-06-10 ENCOUNTER — TELEPHONE (OUTPATIENT)
Dept: INTERNAL MEDICINE | Age: 52
End: 2022-06-10

## 2022-06-10 DIAGNOSIS — M54.2 NECK PAIN: Primary | ICD-10-CM

## 2022-06-10 RX ORDER — METHYLPREDNISOLONE 4 MG/1
TABLET ORAL
Qty: 1 KIT | Refills: 0 | Status: SHIPPED | OUTPATIENT
Start: 2022-06-10 | End: 2022-06-16

## 2022-06-10 NOTE — TELEPHONE ENCOUNTER
He should still have Gabapentin thru 6/29. Attempted to call pt but had to leave a message. Rx pending.

## 2022-06-10 NOTE — TELEPHONE ENCOUNTER
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 He says the pain has been worse the last 2-3 days. He hasn't been able to sleep. He had surgery in 2008. He last saw someone about this in 2015. He takes Gabapentin 300mg TID. [facility]. Please advise.

## 2022-06-10 NOTE — TELEPHONE ENCOUNTER
Is he asking for a refill on gabapentin? Him in a Medrol Dosepak and see if that will help with the pain.

## 2022-06-24 DIAGNOSIS — F34.9 PERSISTENT MOOD (AFFECTIVE) DISORDER, UNSPECIFIED (HCC): ICD-10-CM

## 2022-06-24 DIAGNOSIS — F10.10 ALCOHOL ABUSE: ICD-10-CM

## 2022-06-24 RX ORDER — GABAPENTIN 300 MG/1
600 CAPSULE ORAL 3 TIMES DAILY
Qty: 180 CAPSULE | Refills: 0 | Status: SHIPPED | OUTPATIENT
Start: 2022-06-24 | End: 2022-08-04 | Stop reason: SDUPTHER

## 2022-06-24 NOTE — TELEPHONE ENCOUNTER
Damari Donovan called to request a refill on his medication. Last office visit : 4/29/2022   Next office visit : 7/29/2022     Last UDS:   Amphetamine Screen, Urine   Date Value Ref Range Status   04/29/2022 NEG  Final     Barbiturate Screen, Urine   Date Value Ref Range Status   04/29/2022 NEG  Final     Benzodiazepine Screen, Urine   Date Value Ref Range Status   04/29/2022 POS  Final     Buprenorphine Urine   Date Value Ref Range Status   04/29/2022 NEG  Final     Cocaine Metabolite Screen, Urine   Date Value Ref Range Status   04/29/2022 NEG  Final     Gabapentin Screen, Urine   Date Value Ref Range Status   04/29/2022 NEG  Final     MDMA, Urine   Date Value Ref Range Status   04/29/2022 NEG  Final     Methamphetamine, Urine   Date Value Ref Range Status   04/29/2022 NEG  Final     Opiate Scrn, Ur   Date Value Ref Range Status   04/29/2022 NEG  Final     Oxycodone Screen, Ur   Date Value Ref Range Status   04/29/2022 POS  Final     PCP Screen, Urine   Date Value Ref Range Status   04/29/2022 NEG  Final     Propoxyphene Screen, Urine   Date Value Ref Range Status   04/29/2022 NEG  Final     THC Screen, Urine   Date Value Ref Range Status   04/29/2022 POS  Final     Tricyclic Antidepressants, Urine   Date Value Ref Range Status   04/29/2022 NEG  Final       Last Maryann Merry: 06/24/2022  Medication Contract: 04/29/2022  Last Fill: 04/29/2022    Requested Prescriptions      No prescriptions requested or ordered in this encounter         Please approve or refuse this medication.    Cottie Cockayne, MA

## 2022-06-27 ENCOUNTER — TELEPHONE (OUTPATIENT)
Dept: INTERNAL MEDICINE | Age: 52
End: 2022-06-27

## 2022-06-27 DIAGNOSIS — R19.7 DIARRHEA, UNSPECIFIED TYPE: Primary | ICD-10-CM

## 2022-06-27 RX ORDER — DIPHENOXYLATE HYDROCHLORIDE AND ATROPINE SULFATE 2.5; .025 MG/1; MG/1
1 TABLET ORAL EVERY 6 HOURS
Qty: 10 TABLET | Refills: 0 | Status: SHIPPED | OUTPATIENT
Start: 2022-06-27 | End: 2022-06-30

## 2022-06-27 RX ORDER — CIPROFLOXACIN 500 MG/1
500 TABLET, FILM COATED ORAL 2 TIMES DAILY
Qty: 14 TABLET | Refills: 0 | Status: SHIPPED | OUTPATIENT
Start: 2022-06-27 | End: 2022-07-04

## 2022-06-27 NOTE — TELEPHONE ENCOUNTER
Pts mother called and stated that he has been having a lot of diarrhea, chills, and just does not feel good. She stated that there is no way for him to come into the office and would like something called in.

## 2022-08-03 ENCOUNTER — TELEPHONE (OUTPATIENT)
Dept: INTERNAL MEDICINE | Age: 52
End: 2022-08-03

## 2022-08-04 ENCOUNTER — HOSPITAL ENCOUNTER (INPATIENT)
Age: 52
LOS: 1 days | Discharge: NURSING, REHAB OR LONG-TERM CARE | DRG: 897 | End: 2022-08-05
Attending: HOSPITALIST | Admitting: HOSPITALIST
Payer: MEDICAID

## 2022-08-04 ENCOUNTER — OFFICE VISIT (OUTPATIENT)
Dept: INTERNAL MEDICINE | Age: 52
End: 2022-08-04
Payer: MEDICAID

## 2022-08-04 VITALS
HEIGHT: 76 IN | BODY MASS INDEX: 21.26 KG/M2 | OXYGEN SATURATION: 94 % | HEART RATE: 95 BPM | TEMPERATURE: 98.4 F | SYSTOLIC BLOOD PRESSURE: 136 MMHG | DIASTOLIC BLOOD PRESSURE: 88 MMHG | WEIGHT: 174.6 LBS

## 2022-08-04 DIAGNOSIS — R50.9 FUO (FEVER OF UNKNOWN ORIGIN): Primary | ICD-10-CM

## 2022-08-04 DIAGNOSIS — F10.929 ACUTE ALCOHOLIC INTOXICATION WITH COMPLICATION (HCC): ICD-10-CM

## 2022-08-04 DIAGNOSIS — R45.851 SUICIDAL IDEATION: Primary | ICD-10-CM

## 2022-08-04 DIAGNOSIS — F39 MOOD DISORDER (HCC): ICD-10-CM

## 2022-08-04 DIAGNOSIS — R50.9 FUO (FEVER OF UNKNOWN ORIGIN): ICD-10-CM

## 2022-08-04 DIAGNOSIS — M72.0 DUPUYTREN'S CONTRACTURE: ICD-10-CM

## 2022-08-04 DIAGNOSIS — G62.9 NEUROPATHY: ICD-10-CM

## 2022-08-04 DIAGNOSIS — G47.00 INSOMNIA, UNSPECIFIED TYPE: ICD-10-CM

## 2022-08-04 DIAGNOSIS — F10.10 ALCOHOL ABUSE: ICD-10-CM

## 2022-08-04 DIAGNOSIS — F34.9 PERSISTENT MOOD (AFFECTIVE) DISORDER, UNSPECIFIED (HCC): ICD-10-CM

## 2022-08-04 LAB
ACETAMINOPHEN LEVEL: <15 UG/ML
ALBUMIN SERPL-MCNC: 4 G/DL (ref 3.5–5.2)
ALBUMIN SERPL-MCNC: 4.3 G/DL (ref 3.5–5.2)
ALP BLD-CCNC: 109 U/L (ref 40–130)
ALP BLD-CCNC: 123 U/L (ref 40–130)
ALT SERPL-CCNC: 24 U/L (ref 5–41)
ALT SERPL-CCNC: 25 U/L (ref 5–41)
ANION GAP SERPL CALCULATED.3IONS-SCNC: 12 MMOL/L (ref 7–19)
ANION GAP SERPL CALCULATED.3IONS-SCNC: 13 MMOL/L (ref 7–19)
AST SERPL-CCNC: 28 U/L (ref 5–40)
AST SERPL-CCNC: 29 U/L (ref 5–40)
BASOPHILS ABSOLUTE: 0 K/UL (ref 0–0.2)
BASOPHILS ABSOLUTE: 0.1 K/UL (ref 0–0.2)
BASOPHILS RELATIVE PERCENT: 0.5 % (ref 0–1)
BASOPHILS RELATIVE PERCENT: 1.1 % (ref 0–1)
BILIRUB SERPL-MCNC: <0.2 MG/DL (ref 0.2–1.2)
BILIRUB SERPL-MCNC: <0.2 MG/DL (ref 0.2–1.2)
BILIRUBIN URINE: NEGATIVE
BLOOD, URINE: NEGATIVE
BUN BLDV-MCNC: 8 MG/DL (ref 6–20)
BUN BLDV-MCNC: 9 MG/DL (ref 6–20)
CALCIUM SERPL-MCNC: 8.4 MG/DL (ref 8.6–10)
CALCIUM SERPL-MCNC: 9 MG/DL (ref 8.6–10)
CHLORIDE BLD-SCNC: 101 MMOL/L (ref 98–111)
CHLORIDE BLD-SCNC: 103 MMOL/L (ref 98–111)
CLARITY: CLEAR
CO2: 28 MMOL/L (ref 22–29)
CO2: 29 MMOL/L (ref 22–29)
COLOR: YELLOW
CREAT SERPL-MCNC: 1.1 MG/DL (ref 0.5–1.2)
CREAT SERPL-MCNC: 1.1 MG/DL (ref 0.5–1.2)
EOSINOPHILS ABSOLUTE: 0.1 K/UL (ref 0–0.6)
EOSINOPHILS ABSOLUTE: 0.2 K/UL (ref 0–0.6)
EOSINOPHILS RELATIVE PERCENT: 2.1 % (ref 0–5)
EOSINOPHILS RELATIVE PERCENT: 3 % (ref 0–5)
ETHANOL: 389 MG/DL (ref 0–0.08)
GFR AFRICAN AMERICAN: >59
GFR AFRICAN AMERICAN: >59
GFR NON-AFRICAN AMERICAN: >60
GFR NON-AFRICAN AMERICAN: >60
GLUCOSE BLD-MCNC: 118 MG/DL (ref 74–109)
GLUCOSE BLD-MCNC: 79 MG/DL (ref 74–109)
GLUCOSE URINE: NEGATIVE MG/DL
HCT VFR BLD CALC: 38.1 % (ref 42–52)
HCT VFR BLD CALC: 42.1 % (ref 42–52)
HEMOGLOBIN: 13.1 G/DL (ref 14–18)
HEMOGLOBIN: 14.1 G/DL (ref 14–18)
IMMATURE GRANULOCYTES #: 0 K/UL
IMMATURE GRANULOCYTES #: 0 K/UL
INFLUENZA A ANTIBODY: NEGATIVE
INFLUENZA B ANTIBODY: NEGATIVE
KETONES, URINE: NEGATIVE MG/DL
LEUKOCYTE ESTERASE, URINE: NEGATIVE
LYMPHOCYTES ABSOLUTE: 2.6 K/UL (ref 1.1–4.5)
LYMPHOCYTES ABSOLUTE: 2.9 K/UL (ref 1.1–4.5)
LYMPHOCYTES RELATIVE PERCENT: 46 % (ref 20–40)
LYMPHOCYTES RELATIVE PERCENT: 49.7 % (ref 20–40)
MCH RBC QN AUTO: 31.8 PG (ref 27–31)
MCH RBC QN AUTO: 32 PG (ref 27–31)
MCHC RBC AUTO-ENTMCNC: 33.5 G/DL (ref 33–37)
MCHC RBC AUTO-ENTMCNC: 34.4 G/DL (ref 33–37)
MCV RBC AUTO: 93.2 FL (ref 80–94)
MCV RBC AUTO: 94.8 FL (ref 80–94)
MONOCYTES ABSOLUTE: 0.3 K/UL (ref 0–0.9)
MONOCYTES ABSOLUTE: 0.4 K/UL (ref 0–0.9)
MONOCYTES RELATIVE PERCENT: 5.8 % (ref 0–10)
MONOCYTES RELATIVE PERCENT: 7.7 % (ref 0–10)
NEUTROPHILS ABSOLUTE: 2.4 K/UL (ref 1.5–7.5)
NEUTROPHILS ABSOLUTE: 2.4 K/UL (ref 1.5–7.5)
NEUTROPHILS RELATIVE PERCENT: 41.7 % (ref 50–65)
NEUTROPHILS RELATIVE PERCENT: 42 % (ref 50–65)
NITRITE, URINE: NEGATIVE
PDW BLD-RTO: 13.2 % (ref 11.5–14.5)
PDW BLD-RTO: 13.2 % (ref 11.5–14.5)
PH UA: 5.5 (ref 5–8)
PHOSPHORUS: 3.6 MG/DL (ref 2.5–4.5)
PLATELET # BLD: 248 K/UL (ref 130–400)
PLATELET # BLD: 284 K/UL (ref 130–400)
PMV BLD AUTO: 8.7 FL (ref 9.4–12.4)
PMV BLD AUTO: 9.1 FL (ref 9.4–12.4)
POTASSIUM REFLEX MAGNESIUM: 4 MMOL/L (ref 3.5–5)
POTASSIUM SERPL-SCNC: 4.2 MMOL/L (ref 3.5–5)
PROTEIN UA: NEGATIVE MG/DL
RBC # BLD: 4.09 M/UL (ref 4.7–6.1)
RBC # BLD: 4.44 M/UL (ref 4.7–6.1)
SALICYLATE, SERUM: <3 MG/DL (ref 3–10)
SARS-COV-2, NAAT: NOT DETECTED
SARS-COV-2, PCR: NOT DETECTED
SODIUM BLD-SCNC: 143 MMOL/L (ref 136–145)
SODIUM BLD-SCNC: 143 MMOL/L (ref 136–145)
SPECIFIC GRAVITY UA: 1.01 (ref 1–1.03)
TOTAL PROTEIN: 6.9 G/DL (ref 6.6–8.7)
TOTAL PROTEIN: 7.2 G/DL (ref 6.6–8.7)
TSH SERPL DL<=0.05 MIU/L-ACNC: 0.54 UIU/ML (ref 0.27–4.2)
UROBILINOGEN, URINE: 0.2 E.U./DL
WBC # BLD: 5.7 K/UL (ref 4.8–10.8)
WBC # BLD: 5.8 K/UL (ref 4.8–10.8)

## 2022-08-04 PROCEDURE — 87804 INFLUENZA ASSAY W/OPTIC: CPT | Performed by: INTERNAL MEDICINE

## 2022-08-04 PROCEDURE — 80053 COMPREHEN METABOLIC PANEL: CPT

## 2022-08-04 PROCEDURE — HZ2ZZZZ DETOXIFICATION SERVICES FOR SUBSTANCE ABUSE TREATMENT: ICD-10-PCS | Performed by: STUDENT IN AN ORGANIZED HEALTH CARE EDUCATION/TRAINING PROGRAM

## 2022-08-04 PROCEDURE — 1210000000 HC MED SURG R&B

## 2022-08-04 PROCEDURE — 80143 DRUG ASSAY ACETAMINOPHEN: CPT

## 2022-08-04 PROCEDURE — 82077 ASSAY SPEC XCP UR&BREATH IA: CPT

## 2022-08-04 PROCEDURE — 85025 COMPLETE CBC W/AUTO DIFF WBC: CPT

## 2022-08-04 PROCEDURE — 99285 EMERGENCY DEPT VISIT HI MDM: CPT

## 2022-08-04 PROCEDURE — 36415 COLL VENOUS BLD VENIPUNCTURE: CPT

## 2022-08-04 PROCEDURE — 87635 SARS-COV-2 COVID-19 AMP PRB: CPT

## 2022-08-04 PROCEDURE — 84100 ASSAY OF PHOSPHORUS: CPT

## 2022-08-04 PROCEDURE — 99214 OFFICE O/P EST MOD 30 MIN: CPT | Performed by: INTERNAL MEDICINE

## 2022-08-04 PROCEDURE — 80179 DRUG ASSAY SALICYLATE: CPT

## 2022-08-04 RX ORDER — LOSARTAN POTASSIUM 100 MG/1
100 TABLET ORAL DAILY
Qty: 30 TABLET | Refills: 1 | Status: SHIPPED | OUTPATIENT
Start: 2022-08-04 | End: 2022-09-03

## 2022-08-04 RX ORDER — LAMOTRIGINE 100 MG/1
300 TABLET ORAL DAILY
Status: DISCONTINUED | OUTPATIENT
Start: 2022-08-05 | End: 2022-08-05 | Stop reason: HOSPADM

## 2022-08-04 RX ORDER — LOSARTAN POTASSIUM 50 MG/1
100 TABLET ORAL DAILY
Status: DISCONTINUED | OUTPATIENT
Start: 2022-08-05 | End: 2022-08-05 | Stop reason: HOSPADM

## 2022-08-04 RX ORDER — QUETIAPINE FUMARATE 200 MG/1
200 TABLET, FILM COATED ORAL NIGHTLY
Qty: 30 TABLET | Refills: 1 | Status: SHIPPED | OUTPATIENT
Start: 2022-08-04 | End: 2022-09-03

## 2022-08-04 RX ORDER — POTASSIUM CHLORIDE 20 MEQ/1
40 TABLET, EXTENDED RELEASE ORAL PRN
Status: DISCONTINUED | OUTPATIENT
Start: 2022-08-04 | End: 2022-08-05 | Stop reason: HOSPADM

## 2022-08-04 RX ORDER — LORAZEPAM 2 MG/ML
1 INJECTION INTRAMUSCULAR
Status: DISCONTINUED | OUTPATIENT
Start: 2022-08-04 | End: 2022-08-05 | Stop reason: HOSPADM

## 2022-08-04 RX ORDER — SODIUM CHLORIDE 0.9 % (FLUSH) 0.9 %
5-40 SYRINGE (ML) INJECTION EVERY 12 HOURS SCHEDULED
Status: DISCONTINUED | OUTPATIENT
Start: 2022-08-05 | End: 2022-08-05 | Stop reason: HOSPADM

## 2022-08-04 RX ORDER — TRAZODONE HYDROCHLORIDE 50 MG/1
50 TABLET ORAL NIGHTLY PRN
Status: DISCONTINUED | OUTPATIENT
Start: 2022-08-04 | End: 2022-08-05 | Stop reason: HOSPADM

## 2022-08-04 RX ORDER — LORAZEPAM 1 MG/1
4 TABLET ORAL
Status: DISCONTINUED | OUTPATIENT
Start: 2022-08-04 | End: 2022-08-05 | Stop reason: HOSPADM

## 2022-08-04 RX ORDER — LEVOFLOXACIN 500 MG/1
500 TABLET, FILM COATED ORAL DAILY
Qty: 10 TABLET | Refills: 0 | Status: ON HOLD
Start: 2022-08-04 | End: 2022-08-05 | Stop reason: HOSPADM

## 2022-08-04 RX ORDER — LORAZEPAM 1 MG/1
2 TABLET ORAL 3 TIMES DAILY
Status: DISCONTINUED | OUTPATIENT
Start: 2022-08-05 | End: 2022-08-05

## 2022-08-04 RX ORDER — MULTIVITAMIN WITH IRON
1 TABLET ORAL DAILY
Status: DISCONTINUED | OUTPATIENT
Start: 2022-08-05 | End: 2022-08-05 | Stop reason: HOSPADM

## 2022-08-04 RX ORDER — CALCIUM CARBONATE 200(500)MG
500 TABLET,CHEWABLE ORAL 3 TIMES DAILY PRN
Status: DISCONTINUED | OUTPATIENT
Start: 2022-08-04 | End: 2022-08-05 | Stop reason: HOSPADM

## 2022-08-04 RX ORDER — GAUZE BANDAGE 2" X 2"
100 BANDAGE TOPICAL DAILY
Status: DISCONTINUED | OUTPATIENT
Start: 2022-08-05 | End: 2022-08-05 | Stop reason: HOSPADM

## 2022-08-04 RX ORDER — TRAZODONE HYDROCHLORIDE 50 MG/1
50 TABLET ORAL NIGHTLY PRN
Qty: 30 TABLET | Refills: 1 | Status: SHIPPED | OUTPATIENT
Start: 2022-08-04 | End: 2022-09-03

## 2022-08-04 RX ORDER — LORAZEPAM 2 MG/ML
3 INJECTION INTRAMUSCULAR
Status: DISCONTINUED | OUTPATIENT
Start: 2022-08-04 | End: 2022-08-05 | Stop reason: HOSPADM

## 2022-08-04 RX ORDER — LORAZEPAM 1 MG/1
3 TABLET ORAL
Status: DISCONTINUED | OUTPATIENT
Start: 2022-08-04 | End: 2022-08-05 | Stop reason: HOSPADM

## 2022-08-04 RX ORDER — HYDROXYZINE HYDROCHLORIDE 25 MG/1
25 TABLET, FILM COATED ORAL 3 TIMES DAILY
Status: DISCONTINUED | OUTPATIENT
Start: 2022-08-04 | End: 2022-08-05 | Stop reason: HOSPADM

## 2022-08-04 RX ORDER — GABAPENTIN 300 MG/1
600 CAPSULE ORAL 3 TIMES DAILY
Qty: 180 CAPSULE | Refills: 0 | Status: SHIPPED | OUTPATIENT
Start: 2022-08-04 | End: 2022-09-01

## 2022-08-04 RX ORDER — LORAZEPAM 1 MG/1
2 TABLET ORAL
Status: DISCONTINUED | OUTPATIENT
Start: 2022-08-04 | End: 2022-08-05 | Stop reason: HOSPADM

## 2022-08-04 RX ORDER — LAMOTRIGINE 100 MG/1
300 TABLET, ORALLY DISINTEGRATING ORAL DAILY
Qty: 90 TABLET | Refills: 3 | Status: SHIPPED | OUTPATIENT
Start: 2022-08-04

## 2022-08-04 RX ORDER — SODIUM CHLORIDE 0.9 % (FLUSH) 0.9 %
5-40 SYRINGE (ML) INJECTION PRN
Status: DISCONTINUED | OUTPATIENT
Start: 2022-08-04 | End: 2022-08-05 | Stop reason: HOSPADM

## 2022-08-04 RX ORDER — QUETIAPINE FUMARATE 50 MG/1
200 TABLET, FILM COATED ORAL NIGHTLY
Status: DISCONTINUED | OUTPATIENT
Start: 2022-08-04 | End: 2022-08-05 | Stop reason: HOSPADM

## 2022-08-04 RX ORDER — LORAZEPAM 2 MG/ML
4 INJECTION INTRAMUSCULAR
Status: DISCONTINUED | OUTPATIENT
Start: 2022-08-04 | End: 2022-08-05 | Stop reason: HOSPADM

## 2022-08-04 RX ORDER — POLYETHYLENE GLYCOL 3350 17 G/17G
17 POWDER, FOR SOLUTION ORAL DAILY PRN
Status: DISCONTINUED | OUTPATIENT
Start: 2022-08-04 | End: 2022-08-05 | Stop reason: HOSPADM

## 2022-08-04 RX ORDER — LORAZEPAM 1 MG/1
2 TABLET ORAL 2 TIMES DAILY
Status: DISCONTINUED | OUTPATIENT
Start: 2022-08-06 | End: 2022-08-05

## 2022-08-04 RX ORDER — NALTREXONE 380 MG
380 KIT INTRAMUSCULAR
Qty: 1.2 EACH | Refills: 1 | Status: CANCELLED | OUTPATIENT
Start: 2022-08-04

## 2022-08-04 RX ORDER — FOLIC ACID 1 MG/1
1 TABLET ORAL DAILY
Status: DISCONTINUED | OUTPATIENT
Start: 2022-08-05 | End: 2022-08-05 | Stop reason: HOSPADM

## 2022-08-04 RX ORDER — POTASSIUM CHLORIDE 7.45 MG/ML
10 INJECTION INTRAVENOUS PRN
Status: DISCONTINUED | OUTPATIENT
Start: 2022-08-04 | End: 2022-08-05 | Stop reason: HOSPADM

## 2022-08-04 RX ORDER — LORAZEPAM 1 MG/1
1 TABLET ORAL
Status: DISCONTINUED | OUTPATIENT
Start: 2022-08-04 | End: 2022-08-05 | Stop reason: HOSPADM

## 2022-08-04 RX ORDER — LORAZEPAM 2 MG/ML
2 INJECTION INTRAMUSCULAR
Status: DISCONTINUED | OUTPATIENT
Start: 2022-08-04 | End: 2022-08-05 | Stop reason: HOSPADM

## 2022-08-04 RX ORDER — GABAPENTIN 300 MG/1
600 CAPSULE ORAL 3 TIMES DAILY
Status: DISCONTINUED | OUTPATIENT
Start: 2022-08-04 | End: 2022-08-05 | Stop reason: HOSPADM

## 2022-08-04 RX ORDER — SODIUM CHLORIDE 9 MG/ML
INJECTION, SOLUTION INTRAVENOUS PRN
Status: DISCONTINUED | OUTPATIENT
Start: 2022-08-04 | End: 2022-08-05 | Stop reason: HOSPADM

## 2022-08-04 RX ORDER — MECOBALAMIN 5000 MCG
5 TABLET,DISINTEGRATING ORAL NIGHTLY PRN
Status: DISCONTINUED | OUTPATIENT
Start: 2022-08-04 | End: 2022-08-05 | Stop reason: HOSPADM

## 2022-08-04 RX ORDER — ENOXAPARIN SODIUM 100 MG/ML
40 INJECTION SUBCUTANEOUS DAILY
Status: DISCONTINUED | OUTPATIENT
Start: 2022-08-05 | End: 2022-08-05 | Stop reason: HOSPADM

## 2022-08-04 RX ORDER — MAGNESIUM SULFATE IN WATER 40 MG/ML
2000 INJECTION, SOLUTION INTRAVENOUS PRN
Status: DISCONTINUED | OUTPATIENT
Start: 2022-08-04 | End: 2022-08-05 | Stop reason: HOSPADM

## 2022-08-04 ASSESSMENT — ENCOUNTER SYMPTOMS
PHOTOPHOBIA: 0
COLOR CHANGE: 0
COUGH: 0
BACK PAIN: 0
EYE DISCHARGE: 0
EYE ITCHING: 0
APNEA: 0
SHORTNESS OF BREATH: 0

## 2022-08-04 ASSESSMENT — PAIN - FUNCTIONAL ASSESSMENT
PAIN_FUNCTIONAL_ASSESSMENT: NONE - DENIES PAIN
PAIN_FUNCTIONAL_ASSESSMENT: NONE - DENIES PAIN

## 2022-08-04 NOTE — LETTER
Kevon Villegas was seen and treated in our facility on 8/4/2022-8/5/2022. If you have any questions or concerns, please don't hesitate to call.

## 2022-08-04 NOTE — LETTER
[facility] 4 ONCOLOGY UNIT  [address]  Phone: [phone]    No name on file. August 5, 2022     Patient: Lea Eid   YOB: 1970   Date of Visit: 8/4/2022       To Whom it May Concern:    Pham Lofton was treated in our facility 8/4/2022-8/5/2022. If you have any questions or concerns, please don't hesitate to call.     Sincerely,

## 2022-08-05 VITALS
RESPIRATION RATE: 20 BRPM | OXYGEN SATURATION: 96 % | HEIGHT: 72 IN | TEMPERATURE: 97 F | HEART RATE: 103 BPM | SYSTOLIC BLOOD PRESSURE: 129 MMHG | DIASTOLIC BLOOD PRESSURE: 87 MMHG | BODY MASS INDEX: 22.01 KG/M2 | WEIGHT: 162.5 LBS

## 2022-08-05 LAB
AMPHETAMINE SCREEN, URINE: NEGATIVE
ANION GAP SERPL CALCULATED.3IONS-SCNC: 12 MMOL/L (ref 7–19)
BARBITURATE SCREEN URINE: NEGATIVE
BASOPHILS ABSOLUTE: 0 K/UL (ref 0–0.2)
BASOPHILS RELATIVE PERCENT: 0.6 % (ref 0–1)
BENZODIAZEPINE SCREEN, URINE: POSITIVE
BILIRUBIN URINE: NEGATIVE
BLOOD, URINE: NEGATIVE
BUN BLDV-MCNC: 10 MG/DL (ref 6–20)
BUPRENORPHINE URINE: NEGATIVE
CALCIUM SERPL-MCNC: 7.9 MG/DL (ref 8.6–10)
CANNABINOID SCREEN URINE: NEGATIVE
CHLORIDE BLD-SCNC: 105 MMOL/L (ref 98–111)
CLARITY: CLEAR
CO2: 28 MMOL/L (ref 22–29)
COCAINE METABOLITE SCREEN URINE: NEGATIVE
COLOR: YELLOW
CREAT SERPL-MCNC: 1 MG/DL (ref 0.5–1.2)
EOSINOPHILS ABSOLUTE: 0.1 K/UL (ref 0–0.6)
EOSINOPHILS RELATIVE PERCENT: 2.7 % (ref 0–5)
ETHANOL: 133 MG/DL (ref 0–0.08)
ETHANOL: 234 MG/DL (ref 0–0.08)
GFR AFRICAN AMERICAN: >59
GFR NON-AFRICAN AMERICAN: >60
GLUCOSE BLD-MCNC: 121 MG/DL (ref 74–109)
GLUCOSE URINE: NEGATIVE MG/DL
HCT VFR BLD CALC: 34.9 % (ref 42–52)
HEMOGLOBIN: 12 G/DL (ref 14–18)
IMMATURE GRANULOCYTES #: 0 K/UL
KETONES, URINE: NEGATIVE MG/DL
LEUKOCYTE ESTERASE, URINE: NEGATIVE
LYMPHOCYTES ABSOLUTE: 2.5 K/UL (ref 1.1–4.5)
LYMPHOCYTES RELATIVE PERCENT: 52.4 % (ref 20–40)
Lab: ABNORMAL
MCH RBC QN AUTO: 32.9 PG (ref 27–31)
MCHC RBC AUTO-ENTMCNC: 34.4 G/DL (ref 33–37)
MCV RBC AUTO: 95.6 FL (ref 80–94)
METHADONE SCREEN, URINE: NEGATIVE
METHAMPHETAMINE, URINE: NEGATIVE
MONOCYTES ABSOLUTE: 0.3 K/UL (ref 0–0.9)
MONOCYTES RELATIVE PERCENT: 6.5 % (ref 0–10)
NEUTROPHILS ABSOLUTE: 1.8 K/UL (ref 1.5–7.5)
NEUTROPHILS RELATIVE PERCENT: 37.6 % (ref 50–65)
NITRITE, URINE: NEGATIVE
OPIATE SCREEN URINE: NEGATIVE
OXYCODONE URINE: NEGATIVE
PDW BLD-RTO: 13.3 % (ref 11.5–14.5)
PH UA: 5.5 (ref 5–8)
PHENCYCLIDINE SCREEN URINE: NEGATIVE
PLATELET # BLD: 209 K/UL (ref 130–400)
PMV BLD AUTO: 9.1 FL (ref 9.4–12.4)
POTASSIUM REFLEX MAGNESIUM: 3.8 MMOL/L (ref 3.5–5)
PROPOXYPHENE SCREEN: NEGATIVE
PROTEIN UA: NEGATIVE MG/DL
RBC # BLD: 3.65 M/UL (ref 4.7–6.1)
SODIUM BLD-SCNC: 145 MMOL/L (ref 136–145)
SPECIFIC GRAVITY UA: 1.01 (ref 1–1.03)
TRICYCLIC, URINE: NEGATIVE
UROBILINOGEN, URINE: 1 E.U./DL
WBC # BLD: 4.8 K/UL (ref 4.8–10.8)

## 2022-08-05 PROCEDURE — 81003 URINALYSIS AUTO W/O SCOPE: CPT

## 2022-08-05 PROCEDURE — 80048 BASIC METABOLIC PNL TOTAL CA: CPT

## 2022-08-05 PROCEDURE — 36415 COLL VENOUS BLD VENIPUNCTURE: CPT

## 2022-08-05 PROCEDURE — 82077 ASSAY SPEC XCP UR&BREATH IA: CPT

## 2022-08-05 PROCEDURE — 80306 DRUG TEST PRSMV INSTRMNT: CPT

## 2022-08-05 PROCEDURE — 99251 PR INITL INPATIENT CONSULT NEW/ESTAB PT 20 MIN: CPT | Performed by: PSYCHIATRY & NEUROLOGY

## 2022-08-05 PROCEDURE — 85025 COMPLETE CBC W/AUTO DIFF WBC: CPT

## 2022-08-05 PROCEDURE — 6360000002 HC RX W HCPCS: Performed by: HOSPITALIST

## 2022-08-05 PROCEDURE — 2580000003 HC RX 258: Performed by: HOSPITALIST

## 2022-08-05 PROCEDURE — 6370000000 HC RX 637 (ALT 250 FOR IP): Performed by: HOSPITALIST

## 2022-08-05 RX ORDER — NICOTINE 21 MG/24HR
1 PATCH, TRANSDERMAL 24 HOURS TRANSDERMAL DAILY
Status: DISCONTINUED | OUTPATIENT
Start: 2022-08-05 | End: 2022-08-05 | Stop reason: HOSPADM

## 2022-08-05 RX ADMIN — Medication 10 ML: at 09:43

## 2022-08-05 RX ADMIN — QUETIAPINE FUMARATE 200 MG: 50 TABLET ORAL at 01:08

## 2022-08-05 RX ADMIN — HYDROXYZINE HYDROCHLORIDE 25 MG: 25 TABLET, FILM COATED ORAL at 09:39

## 2022-08-05 RX ADMIN — Medication 100 MG: at 09:40

## 2022-08-05 RX ADMIN — LOSARTAN POTASSIUM 100 MG: 50 TABLET, FILM COATED ORAL at 09:40

## 2022-08-05 RX ADMIN — LAMOTRIGINE 300 MG: 100 TABLET ORAL at 09:39

## 2022-08-05 RX ADMIN — THERA TABS 1 TABLET: TAB at 09:40

## 2022-08-05 RX ADMIN — FOLIC ACID 1 MG: 1 TABLET ORAL at 09:40

## 2022-08-05 RX ADMIN — GABAPENTIN 600 MG: 300 CAPSULE ORAL at 09:39

## 2022-08-05 RX ADMIN — ENOXAPARIN SODIUM 40 MG: 100 INJECTION SUBCUTANEOUS at 09:40

## 2022-08-05 RX ADMIN — LORAZEPAM 3 MG: 1 TABLET ORAL at 01:09

## 2022-08-05 ASSESSMENT — LIFESTYLE VARIABLES
HOW MANY STANDARD DRINKS CONTAINING ALCOHOL DO YOU HAVE ON A TYPICAL DAY: 5 OR 6
HOW OFTEN DO YOU HAVE A DRINK CONTAINING ALCOHOL: 4 OR MORE TIMES A WEEK

## 2022-08-05 ASSESSMENT — ENCOUNTER SYMPTOMS: SHORTNESS OF BREATH: 0

## 2022-08-05 NOTE — ED PROVIDER NOTES
[facility] ONCOLOGY UNIT  eMERGENCYdEPARTMENT eNCOUnter      Pt Name: Alesha Buenrostro  MRN: [id number]  [place] 1970  Date of evaluation: 8/4/2022  Provider:CHRIS Carrion    CHIEF COMPLAINT       Chief Complaint   Patient presents with    Alcohol Intoxication     When asked what brings him to the hospital patient states \"to be honest, alcoholism\". Pt also states \"I pray to the [address] every time that I just do not wake up. \" +ETOH at this time. Suicidal         HISTORY OF PRESENT ILLNESS  (Location/Symptom, Timing/Onset, Context/Setting, Quality, Duration, Modifying Factors, Severity.)   Alesha Buenrostro is a 45 y.o. male who presents to the emergency department with complaints of alcoholic intoxication suicidal ideation worsening depression x1 week has been admitted here before for this issue he is here voluntarily denies any HI or hallucinations. Endorses that he has episodes in times when he gets very depressed and does not want to wake up. Drank earlier today drinks 1/5 of vodka every couple of days. He has a history of withdrawal syndrome including tremors shaking and diaphoresis but denies any seizures. HPI    Nursing Notes were reviewed and I agree. REVIEW OF SYSTEMS    (2-9 systems for level 4, 10 or more for level 5)     Review of Systems   Constitutional:  Negative for activity change, appetite change, chills and fever. HENT:  Negative for congestion and dental problem. Eyes:  Negative for photophobia, discharge and itching. Respiratory:  Negative for apnea, cough and shortness of breath. Cardiovascular:  Negative for chest pain. Musculoskeletal:  Negative for arthralgias, back pain, gait problem, myalgias and neck pain. Skin:  Negative for color change, pallor and rash. Neurological:  Negative for dizziness, seizures and syncope. Psychiatric/Behavioral:  Negative for agitation. The patient is not nervous/anxious.          Depression suicidal ideation intoxication      Except as in the morning for 10 days. Qty: 10 tablet, Refills: 0      dicyclomine (BENTYL) 20 MG tablet TAKE 1 TABLET BY MOUTH FOUR TIMES A DAY AS NEEDED FOR 5 DAYS      hydrOXYzine (ATARAX) 25 MG tablet TAKE 1 TABLET BY MOUTH THREE TIMES DAILY. metoprolol tartrate (LOPRESSOR) 50 MG tablet 1 tablet with food      promethazine (PHENERGAN) 25 MG tablet promethazine 25 mg tablet   TAKE 1 TABLET BY MOUTH EVERY 6 HOURS FOR 5 DAYS      amLODIPine (NORVASC) 5 MG tablet Take 1 tablet by mouth daily  Qty: 30 tablet, Refills: 1      naltrexone (VIVITROL) 380 MG injection Inject 380 mg into the muscle every 28 days  Qty: 1.2 each, Refills: 1      vitamin D (ERGOCALCIFEROL) 1.25 MG (41442 UT) CAPS capsule Take 1 capsule by mouth once a week for 11 doses  Qty: 11 capsule, Refills: 1      pantoprazole (PROTONIX) 40 MG tablet Take 1 tablet by mouth every morning (before breakfast)  Qty: 30 tablet, Refills: 1             ALLERGIES     Zofran [ondansetron]    FAMILY HISTORY       Family History   Problem Relation Age of Onset    Osteoarthritis Mother     Thyroid Disease Mother     Heart Failure Father     Heart Failure Maternal Grandmother     Heart Failure Paternal Grandmother     Cancer Maternal Grandfather           SOCIAL HISTORY       Social History     Socioeconomic History    Marital status:      Spouse name: None    Number of children: None    Years of education: None    Highest education level: None   Occupational History    Occupation: cuts wire     Employer: DISABLED     Comment: [employer]   Tobacco Use    Smoking status: Every Day     Packs/day: 2.00     Years: 35.00     Pack years: 70.00     Types: Cigarettes    Smokeless tobacco: Current     Types: Chew    Tobacco comments:     refused counseling   Vaping Use    Vaping Use: Never used   Substance and Sexual Activity    Alcohol use:  Yes     Alcohol/week: 6.0 standard drinks     Types: 6 Cans of beer per week     Comment: heavy    Drug use: Not Currently Frequency: 1.0 times per week    Sexual activity: Yes     Partners: Female     Social Determinants of Health     Financial Resource Strain: Low Risk     Difficulty of Paying Living Expenses: Not hard at all   Food Insecurity: No Food Insecurity    Worried About [address] in the Last Year: Never true    [facility] in the Last Year: Never true       SCREENINGS    Jaime Coma Scale  Eye Opening: Spontaneous  Best Verbal Response: Oriented  Best Motor Response: Obeys commands  Jaime Coma Scale Score: 15      PHYSICAL EXAM    (up to 7 forlevel 4, 8 or more for level 5)     ED Triage Vitals [08/04/22 1946]   BP Temp Temp Source Heart Rate Resp SpO2 Height Weight   (!) 156/92 98.2 °F (36.8 °C) Oral (!) 112 20 94 % 6' (1.829 m) 180 lb (81.6 kg)       Physical Exam  Vitals reviewed. Constitutional:       General: He is not in acute distress. Appearance: He is well-developed. He is not diaphoretic. HENT:      Head: Normocephalic and atraumatic. Right Ear: External ear normal.      Left Ear: External ear normal.   Eyes:      Pupils: Pupils are equal, round, and reactive to light. Neck:      Trachea: No tracheal deviation. Cardiovascular:      Rate and Rhythm: Normal rate and regular rhythm. Pulses: Normal pulses. Heart sounds: Normal heart sounds. No murmur heard. Pulmonary:      Effort: Pulmonary effort is normal.      Breath sounds: Normal breath sounds. No stridor. No wheezing. Chest:      Chest wall: No tenderness. Abdominal:      General: Bowel sounds are normal. There is no distension. Palpations: Abdomen is soft. Tenderness: There is no abdominal tenderness. Musculoskeletal:         General: Normal range of motion. Cervical back: Normal range of motion and neck supple. Skin:     General: Skin is warm and dry. Capillary Refill: Capillary refill takes less than 2 seconds.    Neurological:      Mental Status: He is alert and oriented to person, place, SI.    PROCEDURES:    Procedures      FINAL IMPRESSION      1. Suicidal ideation    2. Acute alcoholic intoxication with complication [facility])          DISPOSITION/PLAN   DISPOSITION Admitted 08/04/2022 08:35:59 PM      PATIENT REFERRED TO:  No follow-up provider specified.     DISCHARGE MEDICATIONS:  Current Discharge Medication List          (Please note that portions of this note were completed with a voice recognition program.  Efforts were made to edit the dictations but occasionallywords are mis-transcribed.)    Michell Senior 986, [address]  08/04/22 5645
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
## 2022-08-05 NOTE — DISCHARGE SUMMARY
Discharge Summary    NAME: Adriane Pizarro  :  1970  MRN:  [id number]    Admit date:  2022  Discharge date:    2022    Admitting Physician:  Cass Gerber MD    Advance Directive: Full Code    Consults: psychiatry    Primary Care Physician:  Emi Jacob MD    Discharge Diagnoses:  Principal Problem:  Alcohol intoxication    Significant Diagnostic Studies:   No results found. Pertinent Labs:   CBC:   Recent Labs     22  1258 22  0351   WBC 5.7 5.8 4.8   HGB 14.1 13.1* 12.0*    248 209     BMP:    Recent Labs     22  1258 22  0351    143 145   K 4.2 4.0 3.8    103 105   CO2 29 28 28   BUN 8 9 10   CREATININE 1.1 1.1 1.0   GLUCOSE 79 118* 121*           Hospital Course: 49-year-old male with history of mood disorder and alcohol abuse, admitted following significant alcohol intoxication with blood alcohol level 389 on admission with initial report of him having some suicidal ideation. UDS is positive for benzodiazepine. He has had ongoing depression with continued alcohol use. Psychiatry was consulted however they saw him early in the a.m. while still sedated. Later that a.m. patient no longer intoxicated and adamantly denied any thoughts of suicidal ideation or self-harm. He also reports recently setting up appointment with [facility] psychiatric service in [facility] with upcoming appointment. He was future oriented. I discussed his case further with psychiatry and deemed stable for discharge from psychiatric standpoint. Patient without any withdrawal symptoms. Medically stable for discharge. Physical Exam:  Vital Signs: /87   Pulse (!) 103   Temp 97 °F (36.1 °C) (Temporal)   Resp 20   Ht 6' (1.829 m)   Wt 162 lb 8 oz (73.7 kg)   SpO2 96%   BMI 22.04 kg/m²   General appearance:. Alert and Cooperative  Psychiatric: Appropriate insight. No suicidal ideation  HEENT: Normocephalic.   Chest: clear to

## 2022-08-05 NOTE — CARE COORDINATION
Received phone call from Ginger Troncoso, admissions at [facility]. She reports that pt's mother reached out to [facility] rehab ([facility]) and [facility] suggested that pt go to [facility]. Informed Enrique Downing that we are still monitoring ethanol level and psych will see pt while he is admitted. Enrique Downing states that they do not do admissions over the weekend. Spoke with pt, he is interested in dc to an etoh rehab program when he is medically stable, and gave permission for  to send clinical information to [facility]. [facility]  Ph: [phone] (ext. 1 admissions - ext.  3 nursing)   F: [phone]  Electronically signed by Ryder Hinojosa on 8/5/2022 at 10:49 AM

## 2022-08-05 NOTE — PROGRESS NOTES
Was with Dr Ladi Murray in the hallway when he spoke with Dr Asad Redmond. Both agreed pt stable to DC and Dr Ty Buchanan placed DC orders in the chart.

## 2022-08-05 NOTE — ED NOTES
Pt asleep at this time and snoring loudly. VSS, NAD, will continue to monitor.       Yari Scott RN  08/04/22 7884
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
## 2022-08-05 NOTE — ED NOTES
Patient awake from sleeping after repeated verbal stimulation and able to answer all orientation questions appropriately.  Pt with continued slurred speech at this time      Ezequiel Escobar RN  08/04/22 2591

## 2022-08-05 NOTE — ED NOTES
Pt cooperative with exam at this time and states his father brought him into the ER and dropped him off. Pt states \"I'm not going to lie, I had another beer on the way in.\" When asked what brings him to the hospital patient states \"to be honest, alcoholism\". When asked if he is suicidal pt states \"Yes I am, and I pray to the [address] every time that I just do not wake up\". Pt denies any new triggers at this time, but states \"I have a lot of money right now and I know if I'm not here I'm going to spend it all on alcohol and it will kill me. \" pt denies previous suicide attempts but states he has had suicidal ideation in the past.      Renata Moore RN  08/04/22 5788

## 2022-08-05 NOTE — ED NOTES
Report called to [facility]. Pt to go to room 430.      Charis Velasquez RN  08/04/22 4855

## 2022-08-07 ASSESSMENT — ENCOUNTER SYMPTOMS
EYE DISCHARGE: 0
SORE THROAT: 0
COUGH: 0
RECTAL PAIN: 0
SHORTNESS OF BREATH: 0
NAUSEA: 0
DIARRHEA: 1
CHOKING: 0
ANAL BLEEDING: 0
VOMITING: 0
WHEEZING: 0
ABDOMINAL DISTENTION: 0
RHINORRHEA: 0
FACIAL SWELLING: 0
SINUS PRESSURE: 0
TROUBLE SWALLOWING: 0
ABDOMINAL PAIN: 1
EYE PAIN: 0
BLOOD IN STOOL: 0
VOICE CHANGE: 0
PHOTOPHOBIA: 0
BACK PAIN: 1
CONSTIPATION: 0
EYE ITCHING: 0
EYE REDNESS: 0
COLOR CHANGE: 0
CHEST TIGHTNESS: 0
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
## 2022-08-08 ENCOUNTER — TELEPHONE (OUTPATIENT)
Dept: INTERNAL MEDICINE | Age: 52
End: 2022-08-08

## 2022-08-08 DIAGNOSIS — M72.0 DUPUYTREN'S CONTRACTURE: Primary | ICD-10-CM

## 2022-08-08 NOTE — TELEPHONE ENCOUNTER
On previous referral the wrong Diagnosis code was entered for referral. Placed new referral order for patient, to send information to Dr. Feliciano Mcclelland.

## 2022-08-08 NOTE — TELEPHONE ENCOUNTER
[address] Trans[facility] Initial Follow Up Call    Outreach made within 2 business days of discharge: Yes    Patient: Nanci Noble   Patient : 1970 MRN: [id number]    Reason for Admission: alcohol intoxication with blood alcohol level 389 on admission with initial report of him having some suicidal ideation    Discharge Date: 22      Discharge Diagnoses:  Principal Problem:  Alcohol intoxication     Spoke with: Patient    Discharge department/facility: [address] Interactive Patient Contact:  Was patient able to fill all prescriptions: Yes  Was patient instructed to bring all medications to the follow-up visit: Yes  Is patient taking all medications as directed in the discharge summary? Yes  Does patient understand their discharge instructions: Yes  Does patient have questions or concerns that need addressed prior to 7-14 day follow up office visit: no    Spoke to the pt, he is doing much better he is back at work. He is eating a normal diet and was able to get all his medications. Pt knows to bring any new medications to the apt and he is scheduled for . Pt will let us know if he needs anything before that apt.     Scheduled appointment with PCP within 7-14 days    Follow Up  Future Appointments   Date Time Provider Abdi Black   2022  8:30 AM MD MEGAN Burdick   2022  1:20 PM [facility] RM 1 [facility] Rad   10/10/2022  4:15 PM MD MEGAN BurdickP[place]       [place], [place]

## 2022-08-16 ENCOUNTER — HOSPITAL ENCOUNTER (OUTPATIENT)
Age: 52
Setting detail: OBSERVATION
Discharge: HOME | End: 2022-08-18
Attending: EMERGENCY MEDICINE | Admitting: HOSPITALIST
Payer: MEDICAID

## 2022-08-16 DIAGNOSIS — F10.920 ACUTE ALCOHOLIC INTOXICATION WITHOUT COMPLICATION (HCC): ICD-10-CM

## 2022-08-16 DIAGNOSIS — F32.A DEPRESSION WITH SUICIDAL IDEATION: Primary | ICD-10-CM

## 2022-08-16 DIAGNOSIS — R45.851 DEPRESSION WITH SUICIDAL IDEATION: Primary | ICD-10-CM

## 2022-08-16 DIAGNOSIS — R45.850 HOMICIDAL IDEATION: ICD-10-CM

## 2022-08-16 LAB
ACETAMINOPHEN LEVEL: <15 UG/ML
ALBUMIN SERPL-MCNC: 4.7 G/DL (ref 3.5–5.2)
ALP BLD-CCNC: 116 U/L (ref 40–130)
ALT SERPL-CCNC: 26 U/L (ref 5–41)
ANION GAP SERPL CALCULATED.3IONS-SCNC: 11 MMOL/L (ref 7–19)
AST SERPL-CCNC: 37 U/L (ref 5–40)
BASOPHILS ABSOLUTE: 0.1 K/UL (ref 0–0.2)
BASOPHILS RELATIVE PERCENT: 1.2 % (ref 0–1)
BILIRUB SERPL-MCNC: 0.4 MG/DL (ref 0.2–1.2)
BUN BLDV-MCNC: 11 MG/DL (ref 6–20)
CALCIUM SERPL-MCNC: 8.8 MG/DL (ref 8.6–10)
CHLORIDE BLD-SCNC: 102 MMOL/L (ref 98–111)
CO2: 28 MMOL/L (ref 22–29)
CREAT SERPL-MCNC: 1.1 MG/DL (ref 0.5–1.2)
EOSINOPHILS ABSOLUTE: 0.1 K/UL (ref 0–0.6)
EOSINOPHILS RELATIVE PERCENT: 2.2 % (ref 0–5)
ETHANOL: 323 MG/DL (ref 0–0.08)
GFR AFRICAN AMERICAN: >59
GFR NON-AFRICAN AMERICAN: >60
GLUCOSE BLD-MCNC: 105 MG/DL (ref 74–109)
HCT VFR BLD CALC: 41.3 % (ref 42–52)
HEMOGLOBIN: 13.8 G/DL (ref 14–18)
IMMATURE GRANULOCYTES #: 0 K/UL
LYMPHOCYTES ABSOLUTE: 2 K/UL (ref 1.1–4.5)
LYMPHOCYTES RELATIVE PERCENT: 48.2 % (ref 20–40)
MCH RBC QN AUTO: 31.9 PG (ref 27–31)
MCHC RBC AUTO-ENTMCNC: 33.4 G/DL (ref 33–37)
MCV RBC AUTO: 95.4 FL (ref 80–94)
MONOCYTES ABSOLUTE: 0.4 K/UL (ref 0–0.9)
MONOCYTES RELATIVE PERCENT: 9.2 % (ref 0–10)
NEUTROPHILS ABSOLUTE: 1.6 K/UL (ref 1.5–7.5)
NEUTROPHILS RELATIVE PERCENT: 39 % (ref 50–65)
PDW BLD-RTO: 13.8 % (ref 11.5–14.5)
PLATELET # BLD: 250 K/UL (ref 130–400)
PMV BLD AUTO: 8.3 FL (ref 9.4–12.4)
POTASSIUM SERPL-SCNC: 4.1 MMOL/L (ref 3.5–5)
RBC # BLD: 4.33 M/UL (ref 4.7–6.1)
SALICYLATE, SERUM: <3 MG/DL (ref 3–10)
SARS-COV-2, NAAT: NOT DETECTED
SODIUM BLD-SCNC: 141 MMOL/L (ref 136–145)
TOTAL PROTEIN: 7.5 G/DL (ref 6.6–8.7)
WBC # BLD: 4.1 K/UL (ref 4.8–10.8)

## 2022-08-16 PROCEDURE — 82077 ASSAY SPEC XCP UR&BREATH IA: CPT

## 2022-08-16 PROCEDURE — 80179 DRUG ASSAY SALICYLATE: CPT

## 2022-08-16 PROCEDURE — G0378 HOSPITAL OBSERVATION PER HR: HCPCS

## 2022-08-16 PROCEDURE — 6370000000 HC RX 637 (ALT 250 FOR IP)

## 2022-08-16 PROCEDURE — 36415 COLL VENOUS BLD VENIPUNCTURE: CPT

## 2022-08-16 PROCEDURE — 80143 DRUG ASSAY ACETAMINOPHEN: CPT

## 2022-08-16 PROCEDURE — 87635 SARS-COV-2 COVID-19 AMP PRB: CPT

## 2022-08-16 PROCEDURE — 85025 COMPLETE CBC W/AUTO DIFF WBC: CPT

## 2022-08-16 PROCEDURE — 99285 EMERGENCY DEPT VISIT HI MDM: CPT

## 2022-08-16 PROCEDURE — 80053 COMPREHEN METABOLIC PANEL: CPT

## 2022-08-16 RX ORDER — LORAZEPAM 1 MG/1
3 TABLET ORAL
Status: DISCONTINUED | OUTPATIENT
Start: 2022-08-16 | End: 2022-08-18 | Stop reason: HOSPADM

## 2022-08-16 RX ORDER — POLYETHYLENE GLYCOL 3350 17 G/17G
17 POWDER, FOR SOLUTION ORAL DAILY PRN
Status: DISCONTINUED | OUTPATIENT
Start: 2022-08-16 | End: 2022-08-18 | Stop reason: HOSPADM

## 2022-08-16 RX ORDER — ACETAMINOPHEN 650 MG/1
650 SUPPOSITORY RECTAL EVERY 6 HOURS PRN
Status: DISCONTINUED | OUTPATIENT
Start: 2022-08-16 | End: 2022-08-18 | Stop reason: HOSPADM

## 2022-08-16 RX ORDER — LORAZEPAM 2 MG/ML
2 INJECTION INTRAMUSCULAR
Status: DISCONTINUED | OUTPATIENT
Start: 2022-08-16 | End: 2022-08-16

## 2022-08-16 RX ORDER — LORAZEPAM 2 MG/ML
4 INJECTION INTRAMUSCULAR
Status: DISCONTINUED | OUTPATIENT
Start: 2022-08-16 | End: 2022-08-16

## 2022-08-16 RX ORDER — MULTIVITAMIN WITH IRON
1 TABLET ORAL DAILY
Status: DISCONTINUED | OUTPATIENT
Start: 2022-08-16 | End: 2022-08-18 | Stop reason: HOSPADM

## 2022-08-16 RX ORDER — LOSARTAN POTASSIUM 100 MG/1
100 TABLET ORAL DAILY
Status: DISCONTINUED | OUTPATIENT
Start: 2022-08-17 | End: 2022-08-18 | Stop reason: HOSPADM

## 2022-08-16 RX ORDER — LORAZEPAM 1 MG/1
4 TABLET ORAL
Status: DISCONTINUED | OUTPATIENT
Start: 2022-08-16 | End: 2022-08-18 | Stop reason: HOSPADM

## 2022-08-16 RX ORDER — LAMOTRIGINE 100 MG/1
300 TABLET ORAL DAILY
Status: DISCONTINUED | OUTPATIENT
Start: 2022-08-17 | End: 2022-08-18 | Stop reason: HOSPADM

## 2022-08-16 RX ORDER — SODIUM CHLORIDE 0.9 % (FLUSH) 0.9 %
5-40 SYRINGE (ML) INJECTION EVERY 12 HOURS SCHEDULED
Status: DISCONTINUED | OUTPATIENT
Start: 2022-08-16 | End: 2022-08-18 | Stop reason: HOSPADM

## 2022-08-16 RX ORDER — FOLIC ACID 1 MG/1
1 TABLET ORAL DAILY
Status: DISCONTINUED | OUTPATIENT
Start: 2022-08-16 | End: 2022-08-18 | Stop reason: HOSPADM

## 2022-08-16 RX ORDER — HYDROXYZINE HYDROCHLORIDE 10 MG/1
25 TABLET, FILM COATED ORAL 3 TIMES DAILY PRN
Status: DISCONTINUED | OUTPATIENT
Start: 2022-08-16 | End: 2022-08-18 | Stop reason: HOSPADM

## 2022-08-16 RX ORDER — GAUZE BANDAGE 2" X 2"
100 BANDAGE TOPICAL DAILY
Status: DISCONTINUED | OUTPATIENT
Start: 2022-08-16 | End: 2022-08-18 | Stop reason: HOSPADM

## 2022-08-16 RX ORDER — LORAZEPAM 1 MG/1
1 TABLET ORAL
Status: DISCONTINUED | OUTPATIENT
Start: 2022-08-16 | End: 2022-08-18 | Stop reason: HOSPADM

## 2022-08-16 RX ORDER — ENOXAPARIN SODIUM 100 MG/ML
40 INJECTION SUBCUTANEOUS DAILY
Status: DISCONTINUED | OUTPATIENT
Start: 2022-08-16 | End: 2022-08-18 | Stop reason: HOSPADM

## 2022-08-16 RX ORDER — SODIUM CHLORIDE 0.9 % (FLUSH) 0.9 %
5-40 SYRINGE (ML) INJECTION PRN
Status: DISCONTINUED | OUTPATIENT
Start: 2022-08-16 | End: 2022-08-18 | Stop reason: HOSPADM

## 2022-08-16 RX ORDER — NICOTINE 21 MG/24HR
1 PATCH, TRANSDERMAL 24 HOURS TRANSDERMAL DAILY
Status: DISCONTINUED | OUTPATIENT
Start: 2022-08-16 | End: 2022-08-18 | Stop reason: HOSPADM

## 2022-08-16 RX ORDER — LORAZEPAM 2 MG/ML
1 INJECTION INTRAMUSCULAR
Status: DISCONTINUED | OUTPATIENT
Start: 2022-08-16 | End: 2022-08-16

## 2022-08-16 RX ORDER — GABAPENTIN 300 MG/1
600 CAPSULE ORAL 3 TIMES DAILY
Status: DISCONTINUED | OUTPATIENT
Start: 2022-08-16 | End: 2022-08-18 | Stop reason: HOSPADM

## 2022-08-16 RX ORDER — LORAZEPAM 1 MG/1
2 TABLET ORAL
Status: DISCONTINUED | OUTPATIENT
Start: 2022-08-16 | End: 2022-08-18 | Stop reason: HOSPADM

## 2022-08-16 RX ORDER — TRAZODONE HYDROCHLORIDE 50 MG/1
50 TABLET ORAL NIGHTLY PRN
Status: DISCONTINUED | OUTPATIENT
Start: 2022-08-16 | End: 2022-08-18 | Stop reason: HOSPADM

## 2022-08-16 RX ORDER — ACETAMINOPHEN 325 MG/1
650 TABLET ORAL EVERY 6 HOURS PRN
Status: DISCONTINUED | OUTPATIENT
Start: 2022-08-16 | End: 2022-08-18 | Stop reason: HOSPADM

## 2022-08-16 RX ORDER — LORAZEPAM 2 MG/ML
3 INJECTION INTRAMUSCULAR
Status: DISCONTINUED | OUTPATIENT
Start: 2022-08-16 | End: 2022-08-16

## 2022-08-16 RX ORDER — SODIUM CHLORIDE 9 MG/ML
INJECTION, SOLUTION INTRAVENOUS PRN
Status: DISCONTINUED | OUTPATIENT
Start: 2022-08-16 | End: 2022-08-18 | Stop reason: HOSPADM

## 2022-08-16 RX ADMIN — QUETIAPINE FUMARATE 200 MG: 300 TABLET ORAL at 22:17

## 2022-08-16 RX ADMIN — LORAZEPAM 1 MG: 1 TABLET ORAL at 20:11

## 2022-08-16 RX ADMIN — FOLIC ACID 1 MG: 1 TABLET ORAL at 21:41

## 2022-08-16 RX ADMIN — LORAZEPAM 2 MG: 1 TABLET ORAL at 17:30

## 2022-08-16 RX ADMIN — GABAPENTIN 600 MG: 300 CAPSULE ORAL at 22:15

## 2022-08-16 RX ADMIN — THERA TABS 1 TABLET: TAB at 20:11

## 2022-08-16 RX ADMIN — LORAZEPAM 1 MG: 1 TABLET ORAL at 21:41

## 2022-08-16 RX ADMIN — THIAMINE HCL TAB 100 MG 100 MG: 100 TAB at 20:11

## 2022-08-16 RX ADMIN — TRAZODONE HYDROCHLORIDE 50 MG: 50 TABLET ORAL at 22:15

## 2022-08-16 ASSESSMENT — LIFESTYLE VARIABLES
HOW OFTEN DO YOU HAVE A DRINK CONTAINING ALCOHOL: 4 OR MORE TIMES A WEEK
HOW MANY STANDARD DRINKS CONTAINING ALCOHOL DO YOU HAVE ON A TYPICAL DAY: 5 OR 6

## 2022-08-16 ASSESSMENT — ENCOUNTER SYMPTOMS
SHORTNESS OF BREATH: 0
BACK PAIN: 0
COUGH: 0
CHEST TIGHTNESS: 0
SORE THROAT: 0
ABDOMINAL DISTENTION: 0
WHEEZING: 0
NAUSEA: 0
DIARRHEA: 0
CONSTIPATION: 0
COLOR CHANGE: 0
VOMITING: 0
RHINORRHEA: 0
ABDOMINAL PAIN: 0

## 2022-08-16 ASSESSMENT — PAIN - FUNCTIONAL ASSESSMENT: PAIN_FUNCTIONAL_ASSESSMENT: NONE - DENIES PAIN

## 2022-08-16 NOTE — ED NOTES
PT becoming increasingly restless, requesting medication to help with detox. CIWA scale completed. PRN Ativan to be given.       Doron James RN  08/16/22 [id number]

## 2022-08-16 NOTE — ED NOTES
4th floor called to attempt report, no answer returned, will try to call back shortly.       Maggi Mcintosh RN  08/16/22 [id number]

## 2022-08-16 NOTE — ED NOTES
Dr. Fredo Mills speaking with patient in ED hallway at this time.       Josephine Solitario RN  08/16/22 3318
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
## 2022-08-17 LAB
ANION GAP SERPL CALCULATED.3IONS-SCNC: 16 MMOL/L (ref 7–19)
BUN BLDV-MCNC: 10 MG/DL (ref 6–20)
CALCIUM SERPL-MCNC: 8.4 MG/DL (ref 8.6–10)
CHLORIDE BLD-SCNC: 99 MMOL/L (ref 98–111)
CO2: 23 MMOL/L (ref 22–29)
CREAT SERPL-MCNC: 1.1 MG/DL (ref 0.5–1.2)
ETHANOL: 72 MG/DL (ref 0–0.08)
GFR AFRICAN AMERICAN: >59
GFR NON-AFRICAN AMERICAN: >60
GLUCOSE BLD-MCNC: 85 MG/DL (ref 74–109)
HCT VFR BLD CALC: 34.7 % (ref 42–52)
HEMOGLOBIN: 11.6 G/DL (ref 14–18)
IRON SATURATION: 53 % (ref 14–50)
IRON: 146 UG/DL (ref 59–158)
MAGNESIUM: 1.5 MG/DL (ref 1.6–2.6)
MCH RBC QN AUTO: 32.1 PG (ref 27–31)
MCHC RBC AUTO-ENTMCNC: 33.4 G/DL (ref 33–37)
MCV RBC AUTO: 96.1 FL (ref 80–94)
PDW BLD-RTO: 13.2 % (ref 11.5–14.5)
PHOSPHORUS: 3.7 MG/DL (ref 2.5–4.5)
PLATELET # BLD: 198 K/UL (ref 130–400)
PMV BLD AUTO: 8.6 FL (ref 9.4–12.4)
POTASSIUM REFLEX MAGNESIUM: 3.3 MMOL/L (ref 3.5–5)
RBC # BLD: 3.61 M/UL (ref 4.7–6.1)
SODIUM BLD-SCNC: 138 MMOL/L (ref 136–145)
TOTAL IRON BINDING CAPACITY: 275 UG/DL (ref 250–400)
WBC # BLD: 4.2 K/UL (ref 4.8–10.8)

## 2022-08-17 PROCEDURE — 82077 ASSAY SPEC XCP UR&BREATH IA: CPT

## 2022-08-17 PROCEDURE — 85027 COMPLETE CBC AUTOMATED: CPT

## 2022-08-17 PROCEDURE — 84100 ASSAY OF PHOSPHORUS: CPT

## 2022-08-17 PROCEDURE — 80048 BASIC METABOLIC PNL TOTAL CA: CPT

## 2022-08-17 PROCEDURE — G0378 HOSPITAL OBSERVATION PER HR: HCPCS

## 2022-08-17 PROCEDURE — 96372 THER/PROPH/DIAG INJ SC/IM: CPT

## 2022-08-17 PROCEDURE — 2580000003 HC RX 258

## 2022-08-17 PROCEDURE — 96365 THER/PROPH/DIAG IV INF INIT: CPT

## 2022-08-17 PROCEDURE — 36415 COLL VENOUS BLD VENIPUNCTURE: CPT

## 2022-08-17 PROCEDURE — 96366 THER/PROPH/DIAG IV INF ADDON: CPT

## 2022-08-17 PROCEDURE — 6360000002 HC RX W HCPCS

## 2022-08-17 PROCEDURE — 83735 ASSAY OF MAGNESIUM: CPT

## 2022-08-17 PROCEDURE — 83540 ASSAY OF IRON: CPT

## 2022-08-17 PROCEDURE — 6370000000 HC RX 637 (ALT 250 FOR IP)

## 2022-08-17 PROCEDURE — 83550 IRON BINDING TEST: CPT

## 2022-08-17 PROCEDURE — 6360000002 HC RX W HCPCS: Performed by: HOSPITALIST

## 2022-08-17 RX ORDER — MAGNESIUM SULFATE IN WATER 40 MG/ML
2000 INJECTION, SOLUTION INTRAVENOUS ONCE
Status: COMPLETED | OUTPATIENT
Start: 2022-08-17 | End: 2022-08-17

## 2022-08-17 RX ORDER — MAGNESIUM SULFATE IN WATER 40 MG/ML
2000 INJECTION, SOLUTION INTRAVENOUS PRN
Status: DISCONTINUED | OUTPATIENT
Start: 2022-08-17 | End: 2022-08-18 | Stop reason: HOSPADM

## 2022-08-17 RX ORDER — POTASSIUM CHLORIDE 7.45 MG/ML
10 INJECTION INTRAVENOUS
Status: COMPLETED | OUTPATIENT
Start: 2022-08-17 | End: 2022-08-17

## 2022-08-17 RX ADMIN — POTASSIUM CHLORIDE 10 MEQ: 10 INJECTION, SOLUTION INTRAVENOUS at 10:27

## 2022-08-17 RX ADMIN — ENOXAPARIN SODIUM 40 MG: 100 INJECTION SUBCUTANEOUS at 09:03

## 2022-08-17 RX ADMIN — FOLIC ACID 1 MG: 1 TABLET ORAL at 09:04

## 2022-08-17 RX ADMIN — SODIUM CHLORIDE, PRESERVATIVE FREE 10 ML: 5 INJECTION INTRAVENOUS at 09:06

## 2022-08-17 RX ADMIN — THIAMINE HCL TAB 100 MG 100 MG: 100 TAB at 09:04

## 2022-08-17 RX ADMIN — SODIUM CHLORIDE, PRESERVATIVE FREE 10 ML: 5 INJECTION INTRAVENOUS at 21:00

## 2022-08-17 RX ADMIN — LAMOTRIGINE 300 MG: 100 TABLET ORAL at 09:03

## 2022-08-17 RX ADMIN — LOSARTAN POTASSIUM 100 MG: 100 TABLET, FILM COATED ORAL at 09:04

## 2022-08-17 RX ADMIN — GABAPENTIN 600 MG: 300 CAPSULE ORAL at 09:04

## 2022-08-17 RX ADMIN — TRAZODONE HYDROCHLORIDE 50 MG: 50 TABLET ORAL at 22:14

## 2022-08-17 RX ADMIN — QUETIAPINE FUMARATE 200 MG: 300 TABLET ORAL at 22:14

## 2022-08-17 RX ADMIN — GABAPENTIN 600 MG: 300 CAPSULE ORAL at 15:08

## 2022-08-17 RX ADMIN — GABAPENTIN 600 MG: 300 CAPSULE ORAL at 22:14

## 2022-08-17 RX ADMIN — POTASSIUM CHLORIDE 10 MEQ: 10 INJECTION, SOLUTION INTRAVENOUS at 09:08

## 2022-08-17 RX ADMIN — THERA TABS 1 TABLET: TAB at 09:04

## 2022-08-17 RX ADMIN — MAGNESIUM SULFATE HEPTAHYDRATE 2000 MG: 40 INJECTION, SOLUTION INTRAVENOUS at 09:14

## 2022-08-17 RX ADMIN — LORAZEPAM 2 MG: 1 TABLET ORAL at 00:35

## 2022-08-17 ASSESSMENT — ENCOUNTER SYMPTOMS
RESPIRATORY NEGATIVE: 1
GASTROINTESTINAL NEGATIVE: 1
EYES NEGATIVE: 1
ALLERGIC/IMMUNOLOGIC NEGATIVE: 1

## 2022-08-17 NOTE — PROGRESS NOTES
[facility] Hospital[facility]      Progress Note    Patient:  Kelly Hammond  YOB: 1970  Date of Service: 8/17/2022  MRN: [id number]   Acct: [de-identified]   Primary Care Physician: Tomás Osborne MD  Advance Directive: Full Code  Admit Date: 8/16/2022       Hospital Day: 0    Portions of this note have been copied forward, however, updated to reflect the most current clinical status of this patient. CHIEF COMPLAINT Suicidal ideation    SUBJECTIVE: States he is no longer suicidal.  He went a week without his psych meds and then drank a lot of alcohol. CUMULATIVE HOSPITAL COURSE:     The patient is a 45 y.o. male who presented to [facility] ER with PMH alcohol abuse, anxiety, smoker, depression complaining of mental health problem. Patient was recently discharged on 8/5 due to similar concerns. Patient alcohol level of 389 and suicidal ideation. While hospitalized psychiatry was consulted and patient adamantly denied suicidal ideation. Patient was stable for discharge from psychiatry standpoint and was discharged stable condition. Patient returns  for mental health evaluation. Patient admits to suicidal and homicidal thoughts denies specific plan. Daily drinker 1/5 alcohol. He does admit to prior history of withdrawal and possible seizure history. However does not take seizure medication. Denies recent illness, fever, chills, shortness of breath, chest pain. Work-up in ER CMP WNL and ethanol 323. States hes been working and did not take his mood stabilizers for a week then drank a large amount of alcohol. Review of Systems   Constitutional: Negative. HENT: Negative. Eyes: Negative. Respiratory: Negative. Cardiovascular: Negative. Gastrointestinal: Negative. Endocrine: Negative. Genitourinary: Negative. Musculoskeletal: Negative. Allergic/Immunologic: Negative. Neurological: Negative. Hematological: Negative.     Psychiatric/Behavioral:  Positive for dysphoric mood and suicidal ideas. Objective:   VITALS:  /75   Pulse 79   Temp 97.3 °F (36.3 °C) (Temporal)   Resp 14   Ht 6' 4\" (1.93 m)   Wt 180 lb (81.6 kg)   SpO2 95%   BMI 21.91 kg/m²   24HR INTAKE/OUTPUT:    Intake/Output Summary (Last 24 hours) at 8/17/2022 1450  Last data filed at 8/17/2022 1234  Gross per 24 hour   Intake 240 ml   Output --   Net 240 ml           Physical Exam  Vitals and nursing note reviewed. Constitutional:       Appearance: He is ill-appearing. HENT:      Head: Normocephalic and atraumatic. Nose: Nose normal.      Mouth/Throat:      Mouth: Mucous membranes are moist.   Eyes:      Extraocular Movements: Extraocular movements intact. Cardiovascular:      Rate and Rhythm: Normal rate and regular rhythm. Pulses: Normal pulses. Heart sounds: Normal heart sounds. Pulmonary:      Effort: Pulmonary effort is normal.      Breath sounds: Normal breath sounds. Abdominal:      General: Bowel sounds are normal.      Palpations: Abdomen is soft. Musculoskeletal:         General: Normal range of motion. Cervical back: Neck supple. Skin:     General: Skin is warm and dry. Neurological:      General: No focal deficit present. Mental Status: He is alert.    Psychiatric:         Mood and Affect: Mood normal.          Medications:      sodium chloride        sodium chloride flush  5-40 mL IntraVENous 2 times per day    thiamine  100 mg Oral Daily    enoxaparin  40 mg SubCUTAneous Daily    multivitamin  1 tablet Oral Daily    folic acid  1 mg Oral Daily    nicotine  1 patch TransDERmal Daily    gabapentin  600 mg Oral TID    lamoTRIgine  300 mg Oral Daily    losartan  100 mg Oral Daily    QUEtiapine  200 mg Oral Nightly     magnesium sulfate, sodium chloride flush, sodium chloride, polyethylene glycol, acetaminophen **OR** acetaminophen, LORazepam **OR** [DISCONTINUED] LORazepam **OR** LORazepam **OR** [DISCONTINUED] LORazepam **OR** LORazepam **OR** [DISCONTINUED] LORazepam **OR** LORazepam **OR** [DISCONTINUED] LORazepam, hydrOXYzine HCl, traZODone  ADULT DIET; Regular     Lab and other Data:     Recent Labs     08/16/22  1604 08/17/22  0408   WBC 4.1* 4.2*   HGB 13.8* 11.6*    198     Recent Labs     08/16/22  1604 08/17/22  0408    138   K 4.1 3.3*    99   CO2 28 23   BUN 11 10   CREATININE 1.1 1.1   GLUCOSE 105 85     Recent Labs     08/16/22  1604   AST 37   ALT 26   BILITOT 0.4   ALKPHOS 116     Troponin T: No results for input(s): TROPONINI in the last 72 hours. Pro-BNP: No results for input(s): BNP in the last 72 hours. INR: No results for input(s): INR in the last 72 hours. UA:No results for input(s): NITRITE, COLORU, PHUR, LABCAST, WBCUA, RBCUA, MUCUS, TRICHOMONAS, YEAST, BACTERIA, CLARITYU, SPECGRAV, LEUKOCYTESUR, UROBILINOGEN, BILIRUBINUR, BLOODU, GLUCOSEU, AMORPHOUS in the last 72 hours. Invalid input(s): Elease Schiller  A1C: No results for input(s): LABA1C in the last 72 hours. ABG:No results for input(s): PHART, DVC2LGX, PO2ART, VMP0SCJ, BEART, HGBAE, R4ERJLTQ, CARBOXHGBART in the last 72 hours. RAD:   No results found. Principal Problem:    Acute alcoholic intoxication without complication ([facility]   [facility] protocol  Active Problems:    Suicidal ideation   1 on 1 sitter   Suicide precautions   Psych consult-will not see until tomorrow per nursing    Tobacco abuse disorder   noted  Resolved Problems:    * No resolved hospital problems. *    Discharge planning: TBD       Further Orders per Clinical course/attending. Electronically signed by KWASI Raymond CNP on 8/17/2022 at 2:50 PM       EMR Dragon/Transcription disclaimer:   Much of this encounter note is an electronic transcription/translation of spoken language to printed text.  The electronic translation of spoken language may permit erroneous, or at times, nonsensical words or phrases to be inadvertently transcribed; although attempts have made to review the note for such errors, some may still exist.

## 2022-08-17 NOTE — CARE COORDINATION
Patient Contact Information:  [address]  [facility] [phone] (home)   Above information verified? [x]   Yes  []   No    Had HOME OXYGEN prior to admission:  No    Have you been vaccinated for COVID-19 (SARS-CoV-2)? [x]   Yes  []   No                   Pharmacy:    [facility], [address], [address] [place] [address] [address]  Phone: [phone] Fax: [phone]    [id number] [address] [place] [address] [address]  [address]  Phone: [phone] Fax: [phone]      Active with HD/PD prior to admission:           []   Yes  [x]   No  HD Center:       Financial:  Payor: Marlen Cassidy / Plan: Marlen Cassidy / Product Type: *No Product type* /     Pre-Cert required for SNF:   []   Yes  [x]   No    Patient Deficits:  []   Yes   [x]   No    If yes:  []   Confusion/Memory  []   Visual  []   Motor/Sensory         []   Right arm         []   Right leg         []   Left arm         []   Left leg  []   Language/Speech         []   Aphasia         []   Dysarthria         []   Swallow         Jaime Coma Scale  Eye Opening: Spontaneous  Best Verbal Response: Oriented  Best Motor Response: Obeys commands  [place] Coma Scale Score: 15    Patient Deficit Notes: Additional CM/SW Notes:   Spoke with pt re: last dc. Inquired about him going to rehab last admission, he states \"I forgot about that\". He says that he got a new job at [employer], he's supposed to go there tomorrow. He states he's not interested in rehab at this time and plans to go home at dc.      [address] Management       08/17/22 0909   Service Assessment   Patient Orientation Alert and Oriented   Cognition Alert   History Provided By Patient   Primary Caregiver [place] 18 Parent;Friends/Neighbors   Patient's Healthcare Decision Maker is: Legal Next of Kin PCP Verified by CM Yes   Prior Functional Level Independent in ADLs/IADLs   Current Functional Level Independent in ADLs/IADLs   Can patient return to prior living arrangement Yes   Ability to make needs known: Good   Family able to assist with home care needs: Yes   Would you like for me to discuss the discharge plan with any other family members/significant others, and if so, who? No   Financial Resources Medicaid   Social/Functional History   Lives With Alone   Type of [address] Help From Friend(s)   ADL Assistance Independent   Homemaking Assistance Independent   Ambulation Assistance Independent   Transfer Assistance Independent   Occupation Full time employment   Type of Occupation works at [employer] in [address] [place] [address] [address] Prior To Admission None   Potential Assistance Needed N/A   DME Ordered? No   Potential Assistance Purchasing Medications No   Type of Home Care Services None   Patient expects to be discharged to: House   History of falls? 0   Services At/After Discharge   Transition of Care Consult (CM Consult) Other  ([facility] rehab)   Services At/After Discharge None   Condition of Participation: Discharge Planning   The Patient and/or Patient Representative was provided with a Choice of Provider? Patient   The Patient and/Or Patient Representative agree with the Discharge Plan?  Yes

## 2022-08-18 VITALS
BODY MASS INDEX: 21.92 KG/M2 | DIASTOLIC BLOOD PRESSURE: 105 MMHG | WEIGHT: 180 LBS | HEART RATE: 105 BPM | TEMPERATURE: 97.2 F | SYSTOLIC BLOOD PRESSURE: 148 MMHG | OXYGEN SATURATION: 99 % | HEIGHT: 76 IN | RESPIRATION RATE: 16 BRPM

## 2022-08-18 LAB
ANION GAP SERPL CALCULATED.3IONS-SCNC: 11 MMOL/L (ref 7–19)
BUN BLDV-MCNC: 12 MG/DL (ref 6–20)
CALCIUM SERPL-MCNC: 9.3 MG/DL (ref 8.6–10)
CHLORIDE BLD-SCNC: 102 MMOL/L (ref 98–111)
CO2: 27 MMOL/L (ref 22–29)
CREAT SERPL-MCNC: 1.1 MG/DL (ref 0.5–1.2)
ETHANOL: <10 MG/DL (ref 0–0.08)
GFR AFRICAN AMERICAN: >59
GFR NON-AFRICAN AMERICAN: >60
GLUCOSE BLD-MCNC: 97 MG/DL (ref 74–109)
HCT VFR BLD CALC: 43.9 % (ref 42–52)
HEMOGLOBIN: 14.9 G/DL (ref 14–18)
MAGNESIUM: 2 MG/DL (ref 1.6–2.6)
MCH RBC QN AUTO: 32.1 PG (ref 27–31)
MCHC RBC AUTO-ENTMCNC: 33.9 G/DL (ref 33–37)
MCV RBC AUTO: 94.6 FL (ref 80–94)
PDW BLD-RTO: 13.4 % (ref 11.5–14.5)
PLATELET # BLD: 211 K/UL (ref 130–400)
PMV BLD AUTO: 9 FL (ref 9.4–12.4)
POTASSIUM REFLEX MAGNESIUM: 3.9 MMOL/L (ref 3.5–5)
RBC # BLD: 4.64 M/UL (ref 4.7–6.1)
SODIUM BLD-SCNC: 140 MMOL/L (ref 136–145)
WBC # BLD: 4 K/UL (ref 4.8–10.8)

## 2022-08-18 PROCEDURE — 83735 ASSAY OF MAGNESIUM: CPT

## 2022-08-18 PROCEDURE — 36415 COLL VENOUS BLD VENIPUNCTURE: CPT

## 2022-08-18 PROCEDURE — 99252 IP/OBS CONSLTJ NEW/EST SF 35: CPT | Performed by: PSYCHIATRY & NEUROLOGY

## 2022-08-18 PROCEDURE — 82077 ASSAY SPEC XCP UR&BREATH IA: CPT

## 2022-08-18 PROCEDURE — 2580000003 HC RX 258

## 2022-08-18 PROCEDURE — 6360000002 HC RX W HCPCS

## 2022-08-18 PROCEDURE — G0378 HOSPITAL OBSERVATION PER HR: HCPCS

## 2022-08-18 PROCEDURE — 6370000000 HC RX 637 (ALT 250 FOR IP)

## 2022-08-18 PROCEDURE — 80048 BASIC METABOLIC PNL TOTAL CA: CPT

## 2022-08-18 PROCEDURE — 85027 COMPLETE CBC AUTOMATED: CPT

## 2022-08-18 RX ORDER — POTASSIUM CHLORIDE 7.45 MG/ML
10 INJECTION INTRAVENOUS PRN
Status: DISCONTINUED | OUTPATIENT
Start: 2022-08-18 | End: 2022-08-18 | Stop reason: HOSPADM

## 2022-08-18 RX ORDER — POTASSIUM CHLORIDE 20 MEQ/1
40 TABLET, EXTENDED RELEASE ORAL PRN
Status: DISCONTINUED | OUTPATIENT
Start: 2022-08-18 | End: 2022-08-18 | Stop reason: HOSPADM

## 2022-08-18 RX ADMIN — LOSARTAN POTASSIUM 100 MG: 100 TABLET, FILM COATED ORAL at 09:04

## 2022-08-18 RX ADMIN — GABAPENTIN 600 MG: 300 CAPSULE ORAL at 09:04

## 2022-08-18 RX ADMIN — LAMOTRIGINE 300 MG: 100 TABLET ORAL at 09:04

## 2022-08-18 RX ADMIN — SODIUM CHLORIDE, PRESERVATIVE FREE 10 ML: 5 INJECTION INTRAVENOUS at 09:04

## 2022-08-18 RX ADMIN — FOLIC ACID 1 MG: 1 TABLET ORAL at 09:04

## 2022-08-18 RX ADMIN — THIAMINE HCL TAB 100 MG 100 MG: 100 TAB at 09:04

## 2022-08-18 RX ADMIN — THERA TABS 1 TABLET: TAB at 09:04
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
## 2022-08-18 NOTE — DISCHARGE SUMMARY
[place] [address]    DEPARTMENT OF HOSPITALIST MEDICINE      DISCHARGE SUMMARY:      PATIENT NAME:  Bg Anguiano  :  1970  MRN:  [id number]    Admission Date:   2022  3:39 PM Attending: No att. providers found   Discharge Date:   2022              PCP: Aurora Heredia MD  Length of Stay: 0 days     Chief Complaint on Admission:   Chief Complaint   Patient presents with    Mental Health Problem     Pt C/O SI and homicidal thoughts x 2 days. Pt reports \"nobody\" when asked who he wants to harm. Pt states he drinks a pint a day. Appears intoxicated at time of triage. Denies plan for self harm. Consultants:     IP CONSULT TO SOCIAL WORK  IP CONSULT TO PSYCHIATRY       Discharge Problem List:   Principal Problem:    Acute alcoholic intoxication without complication ([facility] [address]  Active Problems:    Suicidal ideation    Tobacco abuse disorder  Resolved Problems:    * No resolved hospital problems. *         Last dated Assessment and Plan . .. 2022      CUMULATIVE  HOSPITAL  COURSE  AND  TREATMENT:    The patient is a 45 y.o. male who presented to [facility] ER with PMH alcohol abuse, anxiety, smoker, depression complaining of mental health problem. Patient was recently discharged on  due to similar concerns. Patient alcohol level of 389 and suicidal ideation. While hospitalized psychiatry was consulted and patient adamantly denied suicidal ideation. Patient was stable for discharge from psychiatry standpoint and was discharged stable condition. Patient returns  for mental health evaluation. Patient admits to suicidal and homicidal thoughts denies specific plan. Daily drinker 1/5 alcohol. He does admit to prior history of withdrawal and possible seizure history. However does not take seizure medication. Denies recent illness, fever, chills, shortness of breath, chest pain. Work-up in ER CMP WNL and ethanol 323.   States hes been working and did not take his mood stabilizers for a week then drank a large amount of alcohol. He denies being suicidal now. He is cleared by psych and ready for discharge. He will continue his medications and follow up with PCP  OBJECTIVE:  BP (!) 148/105   Pulse (!) 105   Temp 97.2 °F (36.2 °C) (Temporal)   Resp 16   Ht 6' 4\" (1.93 m)   Wt 180 lb (81.6 kg)   SpO2 99%   BMI 21.91 kg/m²       Heart: RRR   Lungs: Bilateral fair air entry   Abdomen: Soft, non-tender   Extremities: No edema   Neurologic: Alert and oriented   Skin: Warm and dry          Laboratory Data:  Recent Labs     08/16/22  1604 08/17/22  0408 08/18/22  0719   WBC 4.1* 4.2* 4.0*   HGB 13.8* 11.6* 14.9    198 211     Recent Labs     08/16/22  1604 08/17/22  0408 08/18/22  0719    138 140   K 4.1 3.3* 3.9    99 102   CO2 28 23 27   BUN 11 10 12   CREATININE 1.1 1.1 1.1   GLUCOSE 105 85 97     Recent Labs     08/16/22  1604   AST 37   ALT 26   BILITOT 0.4   ALKPHOS 116     Troponin T: No results for input(s): TROPONINI in the last 72 hours. Pro-BNP: No results for input(s): BNP in the last 72 hours. INR: No results for input(s): INR in the last 72 hours. UA:No results for input(s): NITRITE, COLORU, PHUR, LABCAST, WBCUA, RBCUA, MUCUS, TRICHOMONAS, YEAST, BACTERIA, CLARITYU, SPECGRAV, LEUKOCYTESUR, UROBILINOGEN, BILIRUBINUR, BLOODU, GLUCOSEU, AMORPHOUS in the last 72 hours. Invalid input(s): Yenny Gurney  A1C: No results for input(s): LABA1C in the last 72 hours. ABG:No results for input(s): PHART, ZNK2LRJ, PO2ART, EJN7DDZ, BEART, HGBAE, Z9GSSGFH, CARBOXHGBART in the last 72 hours. Impressions of imaging performed in 48 hours before discharge:    No results found. Medication List        CONTINUE taking these medications      gabapentin 300 MG capsule  Commonly known as: NEURONTIN  Take 2 capsules by mouth in the morning and 2 capsules at noon and 2 capsules before bedtime. Do all this for 30 days.      hydrOXYzine HCl 25 MG tablet  Commonly known as: plan. Patient has been advised to continue all medications as prescribed and advised, and f/u with PCP within 1 week. Patient is stable from medical standpoint to be discharged. Total time spent during patient evaluation and assessment, discussion with the nurse/family, addressing discharge medications/scripts and coordination of care for safe discharge was in excess of 35 minutes.       Signed Electronically:    KWASI Qureshi CNP  2:03 PM 8/18/2022

## 2022-08-18 NOTE — PLAN OF CARE
Problem: Safety - Adult  Goal: Free from fall injury  8/18/2022 1138 by Nilda Molina LPN  Outcome: Adequate for Discharge  8/18/2022 0513 by Lacy Tran RN  Outcome: Progressing     Problem: ABCDS Injury Assessment  Goal: Absence of physical injury  8/18/2022 1138 by Nilda Molina LPN  Outcome: Adequate for Discharge  8/18/2022 0238 by Lacy Tran RN  Outcome: Progressing

## 2022-08-19 ENCOUNTER — TELEPHONE (OUTPATIENT)
Dept: INTERNAL MEDICINE | Age: 52
End: 2022-08-19

## 2022-08-19 NOTE — TELEPHONE ENCOUNTER
[facility] Transitions Initial Follow Up Call    Outreach made within 2 business days of discharge: Yes    Patient: Stephanie Palafox   Patient : 1970 MRN: [id number]  Reason for Admission: Intoxication, suiciadal    Discharge Date: 22      Discharge Problem List:   Principal Problem:    Acute alcoholic intoxication without complication ([address]  Active Problems:    Suicidal ideation    Tobacco abuse disorder  Resolved Problems:    * No resolved hospital problems     Spoke with: Attempted to make contact with patient/caregiver for an initial transitions of care follow up call post discharge without success. I will reach out again at a later time. Any previously scheduled hospital follow up appointments noted below.           [facility]    Scheduled appointment with PCP within 7-14 days    Follow Up  Future Appointments   Date Time Provider Abdi Black   2022  1:30 PM KWASI Bhatti CHERYL   10/10/2022  4:15 PM MD MEGAN Alvarado [facility]       Romayne Guppy, [facility] Sonya Fry

## 2022-08-22 ENCOUNTER — TELEPHONE (OUTPATIENT)
Dept: INTERNAL MEDICINE | Age: 52
End: 2022-08-22

## 2022-08-22 NOTE — TELEPHONE ENCOUNTER
[address] [facility] Transitions Initial Follow Up Call    Outreach made within 2 business days of discharge: Yes    Patient: Bartolome Palacios   Patient : 1970 MRN: [id number]    Reason for Admission: Intoxication, suiciadal    Discharge Date: 22      Discharge Problem List:   Principal Problem:    Acute alcoholic intoxication without complication ([facility] [address]  Active Problems:    Suicidal ideation    Tobacco abuse disorder  Resolved Problems:    * No resolved hospital problems       Spoke with: Left 2nd message for pt to call us back to do the TCM note. PT is scheduled for today with Angie.     Discharge department/facility: [place] [id number]    Scheduled appointment with PCP within 7-14 days    Follow Up  Future Appointments   Date Time Provider Abdi Black   2022  1:30 PM KWASI Martinez CHERYL   10/10/2022  4:15 PM [facility]       [place], [place]

## 2022-08-23 ENCOUNTER — OFFICE VISIT (OUTPATIENT)
Dept: INTERNAL MEDICINE | Age: 52
End: 2022-08-23
Payer: MEDICAID

## 2022-08-23 VITALS
HEIGHT: 75 IN | WEIGHT: 174 LBS | DIASTOLIC BLOOD PRESSURE: 80 MMHG | HEART RATE: 100 BPM | SYSTOLIC BLOOD PRESSURE: 164 MMHG | BODY MASS INDEX: 21.64 KG/M2 | OXYGEN SATURATION: 99 %

## 2022-08-23 DIAGNOSIS — R45.851 SUICIDAL IDEATION: ICD-10-CM

## 2022-08-23 DIAGNOSIS — F41.9 ANXIETY: Primary | Chronic | ICD-10-CM

## 2022-08-23 DIAGNOSIS — F33.9 EPISODE OF RECURRENT MAJOR DEPRESSIVE DISORDER, UNSPECIFIED DEPRESSION EPISODE SEVERITY (HCC): ICD-10-CM

## 2022-08-23 PROCEDURE — 99215 OFFICE O/P EST HI 40 MIN: CPT | Performed by: NURSE PRACTITIONER

## 2022-08-23 NOTE — PROGRESS NOTES
Post-Discharge Transitional Care  Follow Up      Imtiaz Jose   YOB: 1970    Date of Office Visit:  8/23/2022  Date of Hospital Admission: 8/16/22  Date of Hospital Discharge: 8/18/22  Risk of hospital readmission (high >=14%. Medium >=10%) :Readmission Risk Score: 15.3      Care management risk score Rising risk (score 2-5) and Complex Care (Scores >=6): No Risk Score On File     Non face to face  following discharge, date last encounter closed (first attempt may have been earlier): 08/22/2022    Call initiated 2 business days of discharge: Yes    ASSESSMENT/PLAN:   Anxiety  Episode of recurrent major depressive disorder, unspecified depression episode severity ([address]  Suicidal ideation    Medical Decision Making: high complexity  No follow-ups on file. Subjective:   HPI:  Follow up of Hospital problems/diagnosis(es):    [address] admission with suicidal ideation he had forgotten to take his meds for a couple a weeks and he said something was just telling him to drink  ; he says he drank to the point of hospitalization   2. Depression; had stopped the lamictal too   He feels back to normal;    Is trying to find a new jobs   Inpatient course: Discharge summary reviewed- see chart.     Interval history/Current status:    Suicidal ideations;  resolved with restart of his meds;    Depression;  back on meds;  stable for now     Patient Active Problem List   Diagnosis    Hypertension    Alcohol use disorder, severe, dependence ([address]    Suicidal ideation    Tobacco abuse disorder    Persistent mood (affective) disorder, unspecified ([address]    Anxiety    Marijuana abuse    Alcohol abuse, daily use    Generalized anxiety disorder    Insomnia    Recurrent major depression ([address]    Acute alcoholic intoxication without complication ([address]       Medications listed as ordered at the time of discharge from hospital     Medication List            Accurate as of August 23, 2022  3:25 PM. If you have any questions, ask your nurse or doctor. CONTINUE taking these medications      gabapentin 300 MG capsule  Commonly known as: NEURONTIN  Take 2 capsules by mouth in the morning and 2 capsules at noon and 2 capsules before bedtime. Do all this for 30 days. hydrOXYzine HCl 25 MG tablet  Commonly known as: ATARAX     lamoTRIgine 100 MG Tbdp  Take 300 mg by mouth daily     losartan 100 MG tablet  Commonly known as: COZAAR  Take 1 tablet by mouth in the morning. QUEtiapine 200 MG tablet  Commonly known as: SEROQUEL  Take 1 tablet by mouth nightly     traZODone 50 MG tablet  Commonly known as: DESYREL  Take 1 tablet by mouth nightly as needed for Sleep                Medications marked \"taking\" at this time  No outpatient medications have been marked as taking for the 8/23/22 encounter (Office Visit) with KWASI Thompson. Medications patient taking as of now reconciled against medications ordered at time of hospital discharge: Yes    A comprehensive review of systems was negative except for what was noted in the HPI.     Objective:    BP (!) 164/80   Pulse 100   Ht 6' 3\" (1.905 m)   Wt 174 lb (78.9 kg)   SpO2 99%   BMI 21.75 kg/m²   General Appearance: alert and oriented to person, place and time, well developed and well- nourished, in no acute distress  Skin: warm and dry, no rash or erythema  Head: normocephalic and atraumatic  Eyes: pupils equal, round, and reactive to light, extraocular eye movements intact, conjunctivae normal  ENT: tympanic membrane, external ear and ear canal normal bilaterally, nose without deformity, nasal mucosa and turbinates normal without polyps  Neck: supple and non-tender without mass, no thyromegaly or thyroid nodules, no cervical lymphadenopathy  Pulmonary/Chest: clear to auscultation bilaterally- no wheezes, rales or rhonchi, normal air movement, no respiratory distress  Cardiovascular: normal rate, regular rhythm, normal S1 and S2, no murmurs, rubs, clicks, or gallops, distal pulses intact, no carotid bruits  Abdomen: soft, non-tender, non-distended, normal bowel sounds, no masses or organomegaly  Extremities: no cyanosis, clubbing or edema  Musculoskeletal: normal range of motion, no joint swelling, deformity or tenderness  Neurologic: reflexes normal and symmetric, no cranial nerve deficit, gait, coordination and speech normal      An electronic signature was used to authenticate this note.   --Kashmir Diaz, APRN

## 2022-09-01 DIAGNOSIS — F34.9 PERSISTENT MOOD (AFFECTIVE) DISORDER, UNSPECIFIED (HCC): ICD-10-CM

## 2022-09-01 DIAGNOSIS — F10.10 ALCOHOL ABUSE: ICD-10-CM

## 2022-09-01 RX ORDER — GABAPENTIN 300 MG/1
CAPSULE ORAL
Qty: 180 CAPSULE | Refills: 0 | Status: SHIPPED | OUTPATIENT
Start: 2022-09-01 | End: 2022-10-11 | Stop reason: SDUPTHER

## 2022-09-01 NOTE — TELEPHONE ENCOUNTER
Robel Sim called to request a refill on his medication. Last office visit : 8/23/2022   Next office visit : 10/10/2022     Last UDS:   Amphetamine Screen, Urine   Date Value Ref Range Status   08/04/2022 Negative Negative <500 ng/mL Final     Barbiturate Screen, Urine   Date Value Ref Range Status   04/29/2022 NEG  Final     Benzodiazepine Screen, Urine   Date Value Ref Range Status   08/04/2022 POSITIVE (A) Negative <150 ng/mL Final     Buprenorphine Urine   Date Value Ref Range Status   08/04/2022 Negative Negative <5 ng/mL Final     Cocaine Metabolite Screen, Urine   Date Value Ref Range Status   08/04/2022 Negative Negative <150 ng/mL Final     Gabapentin Screen, Urine   Date Value Ref Range Status   04/29/2022 NEG  Final     MDMA, Urine   Date Value Ref Range Status   04/29/2022 NEG  Final     Methamphetamine, Urine   Date Value Ref Range Status   08/04/2022 Negative Negative <500 ng/mL Final     Opiate Scrn, Ur   Date Value Ref Range Status   08/04/2022 Negative Negative < 300 ng/mL Final     Oxycodone Screen, Ur   Date Value Ref Range Status   04/29/2022 POS  Final     PCP Screen, Urine   Date Value Ref Range Status   08/04/2022 Negative Negative <25 ng/mL Final     Propoxyphene Screen, Urine   Date Value Ref Range Status   04/29/2022 NEG  Final     THC Screen, Urine   Date Value Ref Range Status   04/29/2022 POS  Final     Tricyclic Antidepressants, Urine   Date Value Ref Range Status   04/29/2022 NEG  Final       Last Chalice Susana: 04/27/2022  Medication Contract: 04/29/2022  Last Fill: 08/04/2022    Requested Prescriptions     Pending Prescriptions Disp Refills    gabapentin (NEURONTIN) 300 MG capsule [Pharmacy Med Name: GABAPENTIN 300MG CAPSULE] 180 capsule 0     Sig: TAKE 2 CAPSULES BY MOUTH THREE TIMES DAILY (MORNING, NOON AND BEFORE BED)         Please approve or refuse this medication.    Ida Clark MA

## 2022-10-11 DIAGNOSIS — F34.9 PERSISTENT MOOD (AFFECTIVE) DISORDER, UNSPECIFIED (HCC): ICD-10-CM

## 2022-10-11 DIAGNOSIS — F10.10 ALCOHOL ABUSE: ICD-10-CM

## 2022-10-11 RX ORDER — GABAPENTIN 300 MG/1
CAPSULE ORAL
Qty: 180 CAPSULE | Refills: 0 | Status: SHIPPED | OUTPATIENT
Start: 2022-10-11 | End: 2022-11-10

## 2022-10-11 NOTE — TELEPHONE ENCOUNTER
Laura Lewis called to request a refill on his medication. Last office visit : 8/23/2022   Next office visit : 12/23/2022     Last UDS:   Amphetamine Screen, Urine   Date Value Ref Range Status   08/04/2022 Negative Negative <500 ng/mL Final     Barbiturate Screen, Urine   Date Value Ref Range Status   04/29/2022 NEG  Final     Benzodiazepine Screen, Urine   Date Value Ref Range Status   08/04/2022 POSITIVE (A) Negative <150 ng/mL Final     Buprenorphine Urine   Date Value Ref Range Status   08/04/2022 Negative Negative <5 ng/mL Final     Cocaine Metabolite Screen, Urine   Date Value Ref Range Status   08/04/2022 Negative Negative <150 ng/mL Final     Gabapentin Screen, Urine   Date Value Ref Range Status   04/29/2022 NEG  Final     MDMA, Urine   Date Value Ref Range Status   04/29/2022 NEG  Final     Methamphetamine, Urine   Date Value Ref Range Status   08/04/2022 Negative Negative <500 ng/mL Final     Opiate Scrn, Ur   Date Value Ref Range Status   08/04/2022 Negative Negative < 300 ng/mL Final     Oxycodone Screen, Ur   Date Value Ref Range Status   04/29/2022 POS  Final     PCP Screen, Urine   Date Value Ref Range Status   08/04/2022 Negative Negative <25 ng/mL Final     Propoxyphene Screen, Urine   Date Value Ref Range Status   04/29/2022 NEG  Final     THC Screen, Urine   Date Value Ref Range Status   04/29/2022 POS  Final     Tricyclic Antidepressants, Urine   Date Value Ref Range Status   04/29/2022 NEG  Final       Last Tarry Martyr: 10/09/2022  Medication Contract: 04/29/2022   Last Fill: 09/01/2022    Requested Prescriptions     Pending Prescriptions Disp Refills    gabapentin (NEURONTIN) 300 MG capsule 180 capsule 0     Sig: TAKE 2 CAPSULES BY MOUTH THREE TIMES DAILY (MORNING, NOON AND BEFORE BED)         Please approve or refuse this medication.    Wilbert Urena MA

## 2022-11-12 DIAGNOSIS — F10.10 ALCOHOL ABUSE: ICD-10-CM

## 2022-11-12 DIAGNOSIS — F34.9 PERSISTENT MOOD (AFFECTIVE) DISORDER, UNSPECIFIED (HCC): ICD-10-CM

## 2022-11-14 RX ORDER — QUETIAPINE FUMARATE 200 MG/1
200 TABLET, FILM COATED ORAL NIGHTLY
Qty: 30 TABLET | Refills: 1 | Status: SHIPPED | OUTPATIENT
Start: 2022-11-14 | End: 2022-12-01 | Stop reason: ALTCHOICE

## 2022-11-14 RX ORDER — GABAPENTIN 300 MG/1
CAPSULE ORAL
Qty: 180 CAPSULE | Refills: 0 | Status: SHIPPED | OUTPATIENT
Start: 2022-11-14 | End: 2022-12-16 | Stop reason: SDUPTHER

## 2022-11-14 RX ORDER — LOSARTAN POTASSIUM 100 MG/1
TABLET ORAL
Qty: 30 TABLET | Refills: 1 | Status: ON HOLD
Start: 2022-11-14 | End: 2022-11-25 | Stop reason: HOSPADM

## 2022-11-14 RX ORDER — TRAZODONE HYDROCHLORIDE 50 MG/1
50 TABLET ORAL NIGHTLY PRN
Qty: 30 TABLET | Refills: 1 | Status: SHIPPED | OUTPATIENT
Start: 2022-11-14 | End: 2022-12-01

## 2022-11-15 ENCOUNTER — OFFICE VISIT (OUTPATIENT)
Dept: INTERNAL MEDICINE | Age: 52
End: 2022-11-15
Payer: MEDICAID

## 2022-11-15 VITALS
OXYGEN SATURATION: 97 % | DIASTOLIC BLOOD PRESSURE: 102 MMHG | BODY MASS INDEX: 21.68 KG/M2 | HEART RATE: 84 BPM | WEIGHT: 178 LBS | HEIGHT: 76 IN | SYSTOLIC BLOOD PRESSURE: 156 MMHG

## 2022-11-15 DIAGNOSIS — G47.00 INSOMNIA, UNSPECIFIED TYPE: ICD-10-CM

## 2022-11-15 DIAGNOSIS — F10.20 ALCOHOL USE DISORDER, SEVERE, DEPENDENCE (HCC): ICD-10-CM

## 2022-11-15 DIAGNOSIS — Z12.5 SCREENING FOR PROSTATE CANCER: ICD-10-CM

## 2022-11-15 DIAGNOSIS — R30.0 DYSURIA: ICD-10-CM

## 2022-11-15 DIAGNOSIS — R53.83 OTHER FATIGUE: ICD-10-CM

## 2022-11-15 DIAGNOSIS — F34.9 PERSISTENT MOOD (AFFECTIVE) DISORDER, UNSPECIFIED (HCC): ICD-10-CM

## 2022-11-15 DIAGNOSIS — J06.9 ACUTE URI: Primary | ICD-10-CM

## 2022-11-15 DIAGNOSIS — I10 PRIMARY HYPERTENSION: ICD-10-CM

## 2022-11-15 DIAGNOSIS — G62.9 PERIPHERAL POLYNEUROPATHY: ICD-10-CM

## 2022-11-15 LAB
ALBUMIN SERPL-MCNC: 4.8 G/DL (ref 3.5–5.2)
ALP BLD-CCNC: 126 U/L (ref 40–130)
ALT SERPL-CCNC: 23 U/L (ref 5–41)
ANION GAP SERPL CALCULATED.3IONS-SCNC: 10 MMOL/L (ref 7–19)
AST SERPL-CCNC: 27 U/L (ref 5–40)
BACTERIA: ABNORMAL /HPF
BASOPHILS ABSOLUTE: 0.1 K/UL (ref 0–0.2)
BASOPHILS RELATIVE PERCENT: 0.7 % (ref 0–1)
BILIRUB SERPL-MCNC: 0.7 MG/DL (ref 0.2–1.2)
BILIRUBIN URINE: NEGATIVE
BLOOD, URINE: NEGATIVE
BUN BLDV-MCNC: 22 MG/DL (ref 6–20)
CALCIUM SERPL-MCNC: 9.5 MG/DL (ref 8.6–10)
CHLORIDE BLD-SCNC: 98 MMOL/L (ref 98–111)
CLARITY: CLEAR
CO2: 28 MMOL/L (ref 22–29)
COLOR: YELLOW
CREAT SERPL-MCNC: 1.7 MG/DL (ref 0.5–1.2)
EOSINOPHILS ABSOLUTE: 0.2 K/UL (ref 0–0.6)
EOSINOPHILS RELATIVE PERCENT: 3 % (ref 0–5)
EPITHELIAL CELLS, UA: ABNORMAL /HPF
GFR SERPL CREATININE-BSD FRML MDRD: 47 ML/MIN/{1.73_M2}
GLUCOSE BLD-MCNC: 102 MG/DL (ref 74–109)
GLUCOSE URINE: NEGATIVE MG/DL
HCT VFR BLD CALC: 40.1 % (ref 42–52)
HEMOGLOBIN: 13.8 G/DL (ref 14–18)
HYALINE CASTS: ABNORMAL /LPF (ref 0–5)
IMMATURE GRANULOCYTES #: 0 K/UL
KETONES, URINE: NEGATIVE MG/DL
LEUKOCYTE ESTERASE, URINE: ABNORMAL
LYMPHOCYTES ABSOLUTE: 2.3 K/UL (ref 1.1–4.5)
LYMPHOCYTES RELATIVE PERCENT: 33 % (ref 20–40)
MCH RBC QN AUTO: 32.9 PG (ref 27–31)
MCHC RBC AUTO-ENTMCNC: 34.4 G/DL (ref 33–37)
MCV RBC AUTO: 95.5 FL (ref 80–94)
MONOCYTES ABSOLUTE: 0.7 K/UL (ref 0–0.9)
MONOCYTES RELATIVE PERCENT: 10.5 % (ref 0–10)
NEUTROPHILS ABSOLUTE: 3.7 K/UL (ref 1.5–7.5)
NEUTROPHILS RELATIVE PERCENT: 52.5 % (ref 50–65)
NITRITE, URINE: NEGATIVE
PDW BLD-RTO: 13.1 % (ref 11.5–14.5)
PH UA: 7 (ref 5–8)
PLATELET # BLD: 242 K/UL (ref 130–400)
PMV BLD AUTO: 9.3 FL (ref 9.4–12.4)
POTASSIUM SERPL-SCNC: 5.1 MMOL/L (ref 3.5–5)
PROSTATE SPECIFIC ANTIGEN: 2.1 NG/ML (ref 0–4)
PROTEIN UA: NEGATIVE MG/DL
RBC # BLD: 4.2 M/UL (ref 4.7–6.1)
RBC UA: ABNORMAL /HPF (ref 0–2)
SODIUM BLD-SCNC: 136 MMOL/L (ref 136–145)
SPECIFIC GRAVITY UA: 1 (ref 1–1.03)
TOTAL PROTEIN: 7.5 G/DL (ref 6.6–8.7)
TSH SERPL DL<=0.05 MIU/L-ACNC: 1 UIU/ML (ref 0.27–4.2)
UROBILINOGEN, URINE: 0.2 E.U./DL
VITAMIN B-12: 461 PG/ML (ref 211–946)
WBC # BLD: 7.1 K/UL (ref 4.8–10.8)
WBC UA: ABNORMAL /HPF (ref 0–5)

## 2022-11-15 PROCEDURE — 4004F PT TOBACCO SCREEN RCVD TLK: CPT | Performed by: INTERNAL MEDICINE

## 2022-11-15 PROCEDURE — 99214 OFFICE O/P EST MOD 30 MIN: CPT | Performed by: INTERNAL MEDICINE

## 2022-11-15 PROCEDURE — G8420 CALC BMI NORM PARAMETERS: HCPCS | Performed by: INTERNAL MEDICINE

## 2022-11-15 PROCEDURE — G8484 FLU IMMUNIZE NO ADMIN: HCPCS | Performed by: INTERNAL MEDICINE

## 2022-11-15 PROCEDURE — 3078F DIAST BP <80 MM HG: CPT | Performed by: INTERNAL MEDICINE

## 2022-11-15 PROCEDURE — G0296 VISIT TO DETERM LDCT ELIG: HCPCS | Performed by: INTERNAL MEDICINE

## 2022-11-15 PROCEDURE — 3017F COLORECTAL CA SCREEN DOC REV: CPT | Performed by: INTERNAL MEDICINE

## 2022-11-15 PROCEDURE — G8427 DOCREV CUR MEDS BY ELIG CLIN: HCPCS | Performed by: INTERNAL MEDICINE

## 2022-11-15 PROCEDURE — 3074F SYST BP LT 130 MM HG: CPT | Performed by: INTERNAL MEDICINE

## 2022-11-15 RX ORDER — AMLODIPINE BESYLATE 5 MG/1
5 TABLET ORAL DAILY
Qty: 30 TABLET | Refills: 3 | Status: ON HOLD
Start: 2022-11-15 | End: 2022-11-25 | Stop reason: HOSPADM

## 2022-11-15 RX ORDER — DICLOXACILLIN SODIUM 250 MG/1
250 CAPSULE ORAL 4 TIMES DAILY
Status: ON HOLD | COMMUNITY
End: 2022-11-25 | Stop reason: HOSPADM

## 2022-11-15 NOTE — PROGRESS NOTES
Took 2 100mg losartan today,   186/104 @ 2pm,   174/120 @ 3pm     Sunday night woke up soaking wet, couch saturated with sweat   All of this started on last Thursday .      Pain upon urination in the bladder he states,
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
## 2022-11-16 ENCOUNTER — TELEPHONE (OUTPATIENT)
Dept: INTERNAL MEDICINE | Age: 52
End: 2022-11-16

## 2022-11-16 DIAGNOSIS — R10.84 GENERALIZED ABDOMINAL PAIN: ICD-10-CM

## 2022-11-16 DIAGNOSIS — N28.9 RENAL INSUFFICIENCY: Primary | ICD-10-CM

## 2022-11-16 NOTE — TELEPHONE ENCOUNTER
----- Message from Katiuska Suarez MD sent at 11/16/2022  5:08 AM CST -----  B12 normal.  PSA normal. TSH normal. He has some renal insufficiency. Let's get an ultrasound of his kidney. This could be why he is feeling so poorly. His urine was okay and he no blood in the urine. That pretty much rules out an infection or a kidney stone.

## 2022-11-16 NOTE — TELEPHONE ENCOUNTER
Patient agrees to renal ultrasound. He advises understanding, no further questions, concerns at this time.

## 2022-11-19 ENCOUNTER — APPOINTMENT (OUTPATIENT)
Dept: CT IMAGING | Age: 52
DRG: 897 | End: 2022-11-19
Payer: MEDICAID

## 2022-11-19 ENCOUNTER — HOSPITAL ENCOUNTER (EMERGENCY)
Age: 52
Discharge: HOME | DRG: 897 | End: 2022-11-19
Attending: EMERGENCY MEDICINE
Payer: MEDICAID

## 2022-11-19 VITALS
TEMPERATURE: 98 F | SYSTOLIC BLOOD PRESSURE: 115 MMHG | HEART RATE: 79 BPM | WEIGHT: 178 LBS | DIASTOLIC BLOOD PRESSURE: 70 MMHG | BODY MASS INDEX: 22.13 KG/M2 | RESPIRATION RATE: 17 BRPM | HEIGHT: 75 IN | OXYGEN SATURATION: 94 %

## 2022-11-19 DIAGNOSIS — S01.81XA FACIAL LACERATION, INITIAL ENCOUNTER: ICD-10-CM

## 2022-11-19 DIAGNOSIS — S09.90XA INJURY OF HEAD, INITIAL ENCOUNTER: ICD-10-CM

## 2022-11-19 DIAGNOSIS — F10.920 ACUTE ALCOHOLIC INTOXICATION WITHOUT COMPLICATION (HCC): Primary | ICD-10-CM

## 2022-11-19 LAB
ALBUMIN SERPL-MCNC: 4.2 G/DL (ref 3.5–5.2)
ALP BLD-CCNC: 104 U/L (ref 40–130)
ALT SERPL-CCNC: 41 U/L (ref 5–41)
ANION GAP SERPL CALCULATED.3IONS-SCNC: 10 MMOL/L (ref 7–19)
APTT: 33.2 SEC (ref 26–36.2)
AST SERPL-CCNC: 52 U/L (ref 5–40)
BASOPHILS ABSOLUTE: 0.1 K/UL (ref 0–0.2)
BASOPHILS RELATIVE PERCENT: 0.6 % (ref 0–1)
BILIRUB SERPL-MCNC: <0.2 MG/DL (ref 0.2–1.2)
BUN BLDV-MCNC: 19 MG/DL (ref 6–20)
CALCIUM SERPL-MCNC: 8.4 MG/DL (ref 8.6–10)
CHLORIDE BLD-SCNC: 103 MMOL/L (ref 98–111)
CO2: 30 MMOL/L (ref 22–29)
CREAT SERPL-MCNC: 1.1 MG/DL (ref 0.5–1.2)
EOSINOPHILS ABSOLUTE: 0.2 K/UL (ref 0–0.6)
EOSINOPHILS RELATIVE PERCENT: 2.6 % (ref 0–5)
ETHANOL: 370 MG/DL (ref 0–0.08)
GFR SERPL CREATININE-BSD FRML MDRD: >60 ML/MIN/{1.73_M2}
GLUCOSE BLD-MCNC: 102 MG/DL (ref 74–109)
HCT VFR BLD CALC: 38.3 % (ref 42–52)
HEMOGLOBIN: 12.9 G/DL (ref 14–18)
IMMATURE GRANULOCYTES #: 0 K/UL
INR BLD: 1.04 (ref 0.88–1.18)
LYMPHOCYTES ABSOLUTE: 3.4 K/UL (ref 1.1–4.5)
LYMPHOCYTES RELATIVE PERCENT: 41.4 % (ref 20–40)
MCH RBC QN AUTO: 32.9 PG (ref 27–31)
MCHC RBC AUTO-ENTMCNC: 33.7 G/DL (ref 33–37)
MCV RBC AUTO: 97.7 FL (ref 80–94)
MONOCYTES ABSOLUTE: 0.6 K/UL (ref 0–0.9)
MONOCYTES RELATIVE PERCENT: 6.7 % (ref 0–10)
NEUTROPHILS ABSOLUTE: 4 K/UL (ref 1.5–7.5)
NEUTROPHILS RELATIVE PERCENT: 48.3 % (ref 50–65)
PDW BLD-RTO: 13.6 % (ref 11.5–14.5)
PLATELET # BLD: 239 K/UL (ref 130–400)
PMV BLD AUTO: 8.5 FL (ref 9.4–12.4)
POTASSIUM SERPL-SCNC: 5 MMOL/L (ref 3.5–5)
PROTHROMBIN TIME: 13.5 SEC (ref 12–14.6)
RBC # BLD: 3.92 M/UL (ref 4.7–6.1)
SODIUM BLD-SCNC: 143 MMOL/L (ref 136–145)
TOTAL CK: 284 U/L (ref 39–308)
TOTAL PROTEIN: 7 G/DL (ref 6.6–8.7)
WBC # BLD: 8.3 K/UL (ref 4.8–10.8)

## 2022-11-19 PROCEDURE — 82077 ASSAY SPEC XCP UR&BREATH IA: CPT

## 2022-11-19 PROCEDURE — 85025 COMPLETE CBC W/AUTO DIFF WBC: CPT

## 2022-11-19 PROCEDURE — 82550 ASSAY OF CK (CPK): CPT

## 2022-11-19 PROCEDURE — 90471 IMMUNIZATION ADMIN: CPT | Performed by: EMERGENCY MEDICINE

## 2022-11-19 PROCEDURE — 70450 CT HEAD/BRAIN W/O DYE: CPT

## 2022-11-19 PROCEDURE — 6370000000 HC RX 637 (ALT 250 FOR IP): Performed by: EMERGENCY MEDICINE

## 2022-11-19 PROCEDURE — 2580000003 HC RX 258: Performed by: EMERGENCY MEDICINE

## 2022-11-19 PROCEDURE — 85730 THROMBOPLASTIN TIME PARTIAL: CPT

## 2022-11-19 PROCEDURE — 36415 COLL VENOUS BLD VENIPUNCTURE: CPT

## 2022-11-19 PROCEDURE — 72125 CT NECK SPINE W/O DYE: CPT

## 2022-11-19 PROCEDURE — 90715 TDAP VACCINE 7 YRS/> IM: CPT | Performed by: EMERGENCY MEDICINE

## 2022-11-19 PROCEDURE — 80053 COMPREHEN METABOLIC PANEL: CPT

## 2022-11-19 PROCEDURE — 2500000003 HC RX 250 WO HCPCS: Performed by: EMERGENCY MEDICINE

## 2022-11-19 PROCEDURE — 12011 RPR F/E/E/N/L/M 2.5 CM/<: CPT

## 2022-11-19 PROCEDURE — 6360000002 HC RX W HCPCS: Performed by: EMERGENCY MEDICINE

## 2022-11-19 PROCEDURE — 85610 PROTHROMBIN TIME: CPT

## 2022-11-19 PROCEDURE — 99284 EMERGENCY DEPT VISIT MOD MDM: CPT

## 2022-11-19 RX ORDER — LIDOCAINE HYDROCHLORIDE AND EPINEPHRINE 10; 10 MG/ML; UG/ML
20 INJECTION, SOLUTION INFILTRATION; PERINEURAL ONCE
Status: COMPLETED | OUTPATIENT
Start: 2022-11-19 | End: 2022-11-19

## 2022-11-19 RX ORDER — LIDOCAINE HYDROCHLORIDE AND EPINEPHRINE 10; 10 MG/ML; UG/ML
20 INJECTION, SOLUTION INFILTRATION; PERINEURAL ONCE
Status: DISCONTINUED | OUTPATIENT
Start: 2022-11-19 | End: 2022-11-19

## 2022-11-19 RX ORDER — ZIPRASIDONE MESYLATE 20 MG/ML
20 INJECTION, POWDER, LYOPHILIZED, FOR SOLUTION INTRAMUSCULAR ONCE
Status: DISCONTINUED | OUTPATIENT
Start: 2022-11-19 | End: 2022-11-19 | Stop reason: HOSPADM

## 2022-11-19 RX ORDER — CHLORDIAZEPOXIDE HYDROCHLORIDE 25 MG/1
25 CAPSULE, GELATIN COATED ORAL ONCE
Status: COMPLETED | OUTPATIENT
Start: 2022-11-19 | End: 2022-11-19

## 2022-11-19 RX ORDER — WATER 1000 ML/1000ML
INJECTION, SOLUTION INTRAVENOUS
Status: DISCONTINUED
Start: 2022-11-19 | End: 2022-11-19 | Stop reason: HOSPADM

## 2022-11-19 RX ORDER — 0.9 % SODIUM CHLORIDE 0.9 %
1000 INTRAVENOUS SOLUTION INTRAVENOUS ONCE
Status: COMPLETED | OUTPATIENT
Start: 2022-11-19 | End: 2022-11-19

## 2022-11-19 RX ADMIN — TETANUS TOXOID, REDUCED DIPHTHERIA TOXOID AND ACELLULAR PERTUSSIS VACCINE, ADSORBED 0.5 ML: 5; 2.5; 8; 8; 2.5 SUSPENSION INTRAMUSCULAR at 06:02

## 2022-11-19 RX ADMIN — LIDOCAINE HYDROCHLORIDE,EPINEPHRINE BITARTRATE 20 ML: 10; .01 INJECTION, SOLUTION INFILTRATION; PERINEURAL at 04:19

## 2022-11-19 RX ADMIN — SODIUM CHLORIDE 1000 ML: 9 INJECTION, SOLUTION INTRAVENOUS at 06:01

## 2022-11-19 RX ADMIN — CHLORDIAZEPOXIDE HYDROCHLORIDE 25 MG: 25 CAPSULE ORAL at 11:39

## 2022-11-19 ASSESSMENT — PAIN SCALES - GENERAL: PAINLEVEL_OUTOF10: 0

## 2022-11-19 ASSESSMENT — PAIN - FUNCTIONAL ASSESSMENT: PAIN_FUNCTIONAL_ASSESSMENT: NONE - DENIES PAIN
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
## 2022-11-19 NOTE — ED NOTES
Attempted to call patient's father, Shaylee, no answer at this time.       Isis Chambers, RN  11/19/22 9676

## 2022-11-19 NOTE — ED NOTES
Spoke with Haresh, patients father, explained to him that patient was medically cleared for discharge and is ready to go as soon as someone comes to get him, father stated that he would come to get him.       [facility] Chambers  11/19/22 4589

## 2022-11-19 NOTE — ED NOTES
Pt calm at this time. Explained to pt multiple times that when his CT reads come back and are negative he can have something to eat.       Alfornia Goodell, RN  11/19/22 4615
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
## 2022-11-19 NOTE — ED NOTES
Pt came out to nurses station yelling \" what do I need to do to clock out, sign some papers so I can get out of here, fuck I'm hungry\"      Mariel Watson RN  11/19/22 [address] [facility]  11/19/22 9493

## 2022-11-19 NOTE — ED NOTES
Pt states he feel like he's coming down and that when that happens he will leave not because he \"wants to but has to\" to get another drink. Pt still waiting for his dad to arrive for a ride home. Dr Nicole Johnson aware and order received for [employer].  Pt asked about rehab and he is open to it, [facility] from social work aware and will go speak to pt     Donna Palm RN  11/19/22 8483

## 2022-11-19 NOTE — CARE COORDINATION
Met with pt at bedside to discuss alcohol rehab. Pt states he has been to rehab in the past and would be agreeable to discharging to one. Placed call to [facility] who stated they can admit over the weekend. Admissions stated they need to speak to the pt. Provided the phone to pt who spoke to admissions.  Admissions state they will call back re: acceptance or denial.

## 2022-11-19 NOTE — CARE COORDINATION
Spoke with admissions at [facility] who state pt was accepted and can dc to their facility at any time today. Cab voucher provided for transport.  Pt agreeable with this plan

## 2022-11-19 NOTE — ED NOTES
Called pt's father, Shaylee to see if he was still picking up patient. He states that he thought he was going to be admitted and it would still be a while. Asked what a while meant and he said about an hour. Pt is resting in bed with his eyes shut.       Isidro Villegas RN  11/19/22 8610

## 2022-11-22 ENCOUNTER — HOSPITAL ENCOUNTER (INPATIENT)
Age: 52
LOS: 3 days | Discharge: HOME | DRG: 897 | End: 2022-11-25
Attending: EMERGENCY MEDICINE | Admitting: STUDENT IN AN ORGANIZED HEALTH CARE EDUCATION/TRAINING PROGRAM
Payer: MEDICAID

## 2022-11-22 DIAGNOSIS — F10.931 ALCOHOL WITHDRAWAL SYNDROME, WITH DELIRIUM (HCC): Primary | ICD-10-CM

## 2022-11-22 DIAGNOSIS — F10.931: ICD-10-CM

## 2022-11-22 DIAGNOSIS — R45.851 DEPRESSION WITH SUICIDAL IDEATION: ICD-10-CM

## 2022-11-22 DIAGNOSIS — F32.A DEPRESSION WITH SUICIDAL IDEATION: ICD-10-CM

## 2022-11-22 LAB
ALBUMIN SERPL-MCNC: 4 G/DL (ref 3.5–5.2)
ALP BLD-CCNC: 114 U/L (ref 40–130)
ALT SERPL-CCNC: 30 U/L (ref 5–41)
AMPHETAMINE SCREEN, URINE: NEGATIVE
ANION GAP SERPL CALCULATED.3IONS-SCNC: 13 MMOL/L (ref 7–19)
AST SERPL-CCNC: 37 U/L (ref 5–40)
BARBITURATE SCREEN URINE: NEGATIVE
BASOPHILS ABSOLUTE: 0 K/UL (ref 0–0.2)
BASOPHILS RELATIVE PERCENT: 0.7 % (ref 0–1)
BENZODIAZEPINE SCREEN, URINE: POSITIVE
BILIRUB SERPL-MCNC: 0.5 MG/DL (ref 0.2–1.2)
BUN BLDV-MCNC: 11 MG/DL (ref 6–20)
BUPRENORPHINE URINE: NEGATIVE
CALCIUM SERPL-MCNC: 8.4 MG/DL (ref 8.6–10)
CANNABINOID SCREEN URINE: NEGATIVE
CHLORIDE BLD-SCNC: 98 MMOL/L (ref 98–111)
CO2: 24 MMOL/L (ref 22–29)
COCAINE METABOLITE SCREEN URINE: NEGATIVE
CREAT SERPL-MCNC: 1 MG/DL (ref 0.5–1.2)
EOSINOPHILS ABSOLUTE: 0.1 K/UL (ref 0–0.6)
EOSINOPHILS RELATIVE PERCENT: 1.3 % (ref 0–5)
ETHANOL: 78 MG/DL (ref 0–0.08)
GFR SERPL CREATININE-BSD FRML MDRD: >60 ML/MIN/{1.73_M2}
GLUCOSE BLD-MCNC: 89 MG/DL (ref 74–109)
HCT VFR BLD CALC: 36.1 % (ref 42–52)
HEMOGLOBIN: 12.8 G/DL (ref 14–18)
IMMATURE GRANULOCYTES #: 0 K/UL
LYMPHOCYTES ABSOLUTE: 0.9 K/UL (ref 1.1–4.5)
LYMPHOCYTES RELATIVE PERCENT: 17.4 % (ref 20–40)
Lab: ABNORMAL
MCH RBC QN AUTO: 32.8 PG (ref 27–31)
MCHC RBC AUTO-ENTMCNC: 35.5 G/DL (ref 33–37)
MCV RBC AUTO: 92.6 FL (ref 80–94)
METHADONE SCREEN, URINE: NEGATIVE
METHAMPHETAMINE, URINE: NEGATIVE
MONOCYTES ABSOLUTE: 0.3 K/UL (ref 0–0.9)
MONOCYTES RELATIVE PERCENT: 5.7 % (ref 0–10)
NEUTROPHILS ABSOLUTE: 4 K/UL (ref 1.5–7.5)
NEUTROPHILS RELATIVE PERCENT: 74.7 % (ref 50–65)
OPIATE SCREEN URINE: NEGATIVE
OXYCODONE URINE: NEGATIVE
PDW BLD-RTO: 13.3 % (ref 11.5–14.5)
PHENCYCLIDINE SCREEN URINE: NEGATIVE
PLATELET # BLD: 199 K/UL (ref 130–400)
PMV BLD AUTO: 8.7 FL (ref 9.4–12.4)
POTASSIUM SERPL-SCNC: 3.7 MMOL/L (ref 3.5–5)
PROPOXYPHENE SCREEN: NEGATIVE
RBC # BLD: 3.9 M/UL (ref 4.7–6.1)
SARS-COV-2, NAAT: NOT DETECTED
SODIUM BLD-SCNC: 135 MMOL/L (ref 136–145)
TOTAL PROTEIN: 6.6 G/DL (ref 6.6–8.7)
TRICYCLIC, URINE: NEGATIVE
WBC # BLD: 5.4 K/UL (ref 4.8–10.8)

## 2022-11-22 PROCEDURE — 96374 THER/PROPH/DIAG INJ IV PUSH: CPT

## 2022-11-22 PROCEDURE — 6370000000 HC RX 637 (ALT 250 FOR IP): Performed by: STUDENT IN AN ORGANIZED HEALTH CARE EDUCATION/TRAINING PROGRAM

## 2022-11-22 PROCEDURE — 6360000002 HC RX W HCPCS

## 2022-11-22 PROCEDURE — 99285 EMERGENCY DEPT VISIT HI MDM: CPT

## 2022-11-22 PROCEDURE — 2580000003 HC RX 258: Performed by: EMERGENCY MEDICINE

## 2022-11-22 PROCEDURE — 80053 COMPREHEN METABOLIC PANEL: CPT

## 2022-11-22 PROCEDURE — 96376 TX/PRO/DX INJ SAME DRUG ADON: CPT

## 2022-11-22 PROCEDURE — 87635 SARS-COV-2 COVID-19 AMP PRB: CPT

## 2022-11-22 PROCEDURE — 6360000002 HC RX W HCPCS: Performed by: PHYSICIAN ASSISTANT

## 2022-11-22 PROCEDURE — 0HQ1XZZ REPAIR FACE SKIN, EXTERNAL APPROACH: ICD-10-PCS | Performed by: INTERNAL MEDICINE

## 2022-11-22 PROCEDURE — 80306 DRUG TEST PRSMV INSTRMNT: CPT

## 2022-11-22 PROCEDURE — 6360000002 HC RX W HCPCS: Performed by: EMERGENCY MEDICINE

## 2022-11-22 PROCEDURE — 93005 ELECTROCARDIOGRAM TRACING: CPT

## 2022-11-22 PROCEDURE — HZ2ZZZZ DETOXIFICATION SERVICES FOR SUBSTANCE ABUSE TREATMENT: ICD-10-PCS | Performed by: INTERNAL MEDICINE

## 2022-11-22 PROCEDURE — 85025 COMPLETE CBC W/AUTO DIFF WBC: CPT

## 2022-11-22 PROCEDURE — 82077 ASSAY SPEC XCP UR&BREATH IA: CPT

## 2022-11-22 PROCEDURE — 2500000003 HC RX 250 WO HCPCS: Performed by: EMERGENCY MEDICINE

## 2022-11-22 PROCEDURE — 1210000000 HC MED SURG R&B

## 2022-11-22 PROCEDURE — 6370000000 HC RX 637 (ALT 250 FOR IP)

## 2022-11-22 PROCEDURE — 36415 COLL VENOUS BLD VENIPUNCTURE: CPT

## 2022-11-22 RX ORDER — LORAZEPAM 1 MG/1
3 TABLET ORAL
Status: DISCONTINUED | OUTPATIENT
Start: 2022-11-22 | End: 2022-11-23

## 2022-11-22 RX ORDER — GABAPENTIN 300 MG/1
300 CAPSULE ORAL 3 TIMES DAILY
Status: DISCONTINUED | OUTPATIENT
Start: 2022-11-22 | End: 2022-11-25 | Stop reason: HOSPADM

## 2022-11-22 RX ORDER — LORAZEPAM 1 MG/1
4 TABLET ORAL
Status: DISCONTINUED | OUTPATIENT
Start: 2022-11-22 | End: 2022-11-23

## 2022-11-22 RX ORDER — ACETAMINOPHEN 650 MG/1
650 SUPPOSITORY RECTAL EVERY 6 HOURS PRN
Status: DISCONTINUED | OUTPATIENT
Start: 2022-11-22 | End: 2022-11-25 | Stop reason: HOSPADM

## 2022-11-22 RX ORDER — GAUZE BANDAGE 2" X 2"
100 BANDAGE TOPICAL DAILY
Status: DISCONTINUED | OUTPATIENT
Start: 2022-11-22 | End: 2022-11-22

## 2022-11-22 RX ORDER — LOSARTAN POTASSIUM 100 MG/1
100 TABLET ORAL DAILY
Status: DISCONTINUED | OUTPATIENT
Start: 2022-11-22 | End: 2022-11-24

## 2022-11-22 RX ORDER — QUETIAPINE FUMARATE 200 MG/1
200 TABLET, FILM COATED ORAL NIGHTLY
Status: DISCONTINUED | OUTPATIENT
Start: 2022-11-22 | End: 2022-11-25 | Stop reason: HOSPADM

## 2022-11-22 RX ORDER — LORAZEPAM 2 MG/ML
2 INJECTION INTRAMUSCULAR
Status: DISCONTINUED | OUTPATIENT
Start: 2022-11-22 | End: 2022-11-25 | Stop reason: HOSPADM

## 2022-11-22 RX ORDER — SODIUM CHLORIDE 0.9 % (FLUSH) 0.9 %
5-40 SYRINGE (ML) INJECTION PRN
Status: DISCONTINUED | OUTPATIENT
Start: 2022-11-22 | End: 2022-11-25 | Stop reason: HOSPADM

## 2022-11-22 RX ORDER — LAMOTRIGINE 150 MG/1
300 TABLET ORAL DAILY
Status: DISCONTINUED | OUTPATIENT
Start: 2022-11-22 | End: 2022-11-25 | Stop reason: HOSPADM

## 2022-11-22 RX ORDER — MAGNESIUM SULFATE IN WATER 40 MG/ML
2000 INJECTION, SOLUTION INTRAVENOUS PRN
Status: DISCONTINUED | OUTPATIENT
Start: 2022-11-22 | End: 2022-11-25 | Stop reason: HOSPADM

## 2022-11-22 RX ORDER — ENOXAPARIN SODIUM 100 MG/ML
40 INJECTION SUBCUTANEOUS DAILY
Status: DISCONTINUED | OUTPATIENT
Start: 2022-11-22 | End: 2022-11-22

## 2022-11-22 RX ORDER — LORAZEPAM 1 MG/1
2 TABLET ORAL
Status: DISCONTINUED | OUTPATIENT
Start: 2022-11-22 | End: 2022-11-25 | Stop reason: HOSPADM

## 2022-11-22 RX ORDER — POTASSIUM CHLORIDE 7.45 MG/ML
10 INJECTION INTRAVENOUS PRN
Status: DISCONTINUED | OUTPATIENT
Start: 2022-11-22 | End: 2022-11-25 | Stop reason: HOSPADM

## 2022-11-22 RX ORDER — LORAZEPAM 2 MG/ML
2 INJECTION INTRAMUSCULAR ONCE
Status: COMPLETED | OUTPATIENT
Start: 2022-11-22 | End: 2022-11-22

## 2022-11-22 RX ORDER — ACETAMINOPHEN 650 MG/1
650 SUPPOSITORY RECTAL EVERY 6 HOURS PRN
Status: DISCONTINUED | OUTPATIENT
Start: 2022-11-22 | End: 2022-11-22

## 2022-11-22 RX ORDER — THIAMINE HYDROCHLORIDE 100 MG/ML
100 INJECTION, SOLUTION INTRAMUSCULAR; INTRAVENOUS DAILY
Status: DISCONTINUED | OUTPATIENT
Start: 2022-11-22 | End: 2022-11-25 | Stop reason: HOSPADM

## 2022-11-22 RX ORDER — AMLODIPINE BESYLATE 5 MG/1
5 TABLET ORAL DAILY
Status: DISCONTINUED | OUTPATIENT
Start: 2022-11-22 | End: 2022-11-24

## 2022-11-22 RX ORDER — LOSARTAN POTASSIUM 100 MG/1
100 TABLET ORAL DAILY
Status: DISCONTINUED | OUTPATIENT
Start: 2022-11-22 | End: 2022-11-22

## 2022-11-22 RX ORDER — SODIUM CHLORIDE 0.9 % (FLUSH) 0.9 %
5-40 SYRINGE (ML) INJECTION PRN
Status: DISCONTINUED | OUTPATIENT
Start: 2022-11-22 | End: 2022-11-22 | Stop reason: SDUPTHER

## 2022-11-22 RX ORDER — SODIUM CHLORIDE 9 MG/ML
25 INJECTION, SOLUTION INTRAVENOUS PRN
Status: DISCONTINUED | OUTPATIENT
Start: 2022-11-22 | End: 2022-11-22 | Stop reason: SDUPTHER

## 2022-11-22 RX ORDER — LORAZEPAM 1 MG/1
1 TABLET ORAL
Status: DISCONTINUED | OUTPATIENT
Start: 2022-11-22 | End: 2022-11-25 | Stop reason: HOSPADM

## 2022-11-22 RX ORDER — SODIUM CHLORIDE 9 MG/ML
INJECTION, SOLUTION INTRAVENOUS PRN
Status: DISCONTINUED | OUTPATIENT
Start: 2022-11-22 | End: 2022-11-25 | Stop reason: HOSPADM

## 2022-11-22 RX ORDER — POLYETHYLENE GLYCOL 3350 17 G/17G
17 POWDER, FOR SOLUTION ORAL DAILY PRN
Status: DISCONTINUED | OUTPATIENT
Start: 2022-11-22 | End: 2022-11-25 | Stop reason: HOSPADM

## 2022-11-22 RX ORDER — LORAZEPAM 2 MG/ML
1 INJECTION INTRAMUSCULAR
Status: DISCONTINUED | OUTPATIENT
Start: 2022-11-22 | End: 2022-11-25 | Stop reason: HOSPADM

## 2022-11-22 RX ORDER — PHENOBARBITAL SODIUM 65 MG/ML
260 INJECTION INTRAMUSCULAR ONCE
Status: COMPLETED | OUTPATIENT
Start: 2022-11-22 | End: 2022-11-22

## 2022-11-22 RX ORDER — POLYETHYLENE GLYCOL 3350 17 G/17G
17 POWDER, FOR SOLUTION ORAL DAILY PRN
Status: DISCONTINUED | OUTPATIENT
Start: 2022-11-22 | End: 2022-11-22

## 2022-11-22 RX ORDER — ACETAMINOPHEN 325 MG/1
650 TABLET ORAL EVERY 6 HOURS PRN
Status: DISCONTINUED | OUTPATIENT
Start: 2022-11-22 | End: 2022-11-22

## 2022-11-22 RX ORDER — ACETAMINOPHEN 325 MG/1
650 TABLET ORAL EVERY 6 HOURS PRN
Status: DISCONTINUED | OUTPATIENT
Start: 2022-11-22 | End: 2022-11-25 | Stop reason: HOSPADM

## 2022-11-22 RX ORDER — LORAZEPAM 2 MG/ML
4 INJECTION INTRAMUSCULAR
Status: DISCONTINUED | OUTPATIENT
Start: 2022-11-22 | End: 2022-11-23

## 2022-11-22 RX ORDER — TRAZODONE HYDROCHLORIDE 50 MG/1
50 TABLET ORAL NIGHTLY PRN
Status: DISCONTINUED | OUTPATIENT
Start: 2022-11-22 | End: 2022-11-25 | Stop reason: HOSPADM

## 2022-11-22 RX ORDER — ENOXAPARIN SODIUM 100 MG/ML
40 INJECTION SUBCUTANEOUS DAILY
Status: DISCONTINUED | OUTPATIENT
Start: 2022-11-22 | End: 2022-11-25 | Stop reason: HOSPADM

## 2022-11-22 RX ORDER — LORAZEPAM 2 MG/ML
3 INJECTION INTRAMUSCULAR
Status: DISCONTINUED | OUTPATIENT
Start: 2022-11-22 | End: 2022-11-23

## 2022-11-22 RX ORDER — SODIUM CHLORIDE 0.9 % (FLUSH) 0.9 %
5-40 SYRINGE (ML) INJECTION EVERY 12 HOURS SCHEDULED
Status: DISCONTINUED | OUTPATIENT
Start: 2022-11-22 | End: 2022-11-25 | Stop reason: HOSPADM

## 2022-11-22 RX ORDER — POTASSIUM CHLORIDE 20 MEQ/1
40 TABLET, EXTENDED RELEASE ORAL PRN
Status: DISCONTINUED | OUTPATIENT
Start: 2022-11-22 | End: 2022-11-25 | Stop reason: HOSPADM

## 2022-11-22 RX ORDER — SODIUM CHLORIDE 0.9 % (FLUSH) 0.9 %
5-40 SYRINGE (ML) INJECTION EVERY 12 HOURS SCHEDULED
Status: DISCONTINUED | OUTPATIENT
Start: 2022-11-22 | End: 2022-11-22 | Stop reason: SDUPTHER

## 2022-11-22 RX ORDER — HYDROXYZINE HYDROCHLORIDE 10 MG/1
25 TABLET, FILM COATED ORAL 3 TIMES DAILY PRN
Status: DISCONTINUED | OUTPATIENT
Start: 2022-11-22 | End: 2022-11-25 | Stop reason: HOSPADM

## 2022-11-22 RX ORDER — LORAZEPAM 2 MG/ML
1 INJECTION INTRAMUSCULAR ONCE
Status: COMPLETED | OUTPATIENT
Start: 2022-11-22 | End: 2022-11-22

## 2022-11-22 RX ADMIN — LORAZEPAM 2 MG: 2 INJECTION INTRAMUSCULAR; INTRAVENOUS at 15:15

## 2022-11-22 RX ADMIN — LORAZEPAM 1 MG: 1 TABLET ORAL at 21:16

## 2022-11-22 RX ADMIN — LAMOTRIGINE 300 MG: 150 TABLET ORAL at 18:47

## 2022-11-22 RX ADMIN — LORAZEPAM 1 MG: 2 INJECTION INTRAMUSCULAR; INTRAVENOUS at 14:21

## 2022-11-22 RX ADMIN — GABAPENTIN 300 MG: 300 CAPSULE ORAL at 21:16

## 2022-11-22 RX ADMIN — QUETIAPINE FUMARATE 200 MG: 200 TABLET ORAL at 21:16

## 2022-11-22 RX ADMIN — LOSARTAN POTASSIUM 100 MG: 100 TABLET, FILM COATED ORAL at 21:16

## 2022-11-22 RX ADMIN — ENOXAPARIN SODIUM 40 MG: 100 INJECTION SUBCUTANEOUS at 18:32

## 2022-11-22 RX ADMIN — FOLIC ACID: 5 INJECTION, SOLUTION INTRAMUSCULAR; INTRAVENOUS; SUBCUTANEOUS at 15:18

## 2022-11-22 RX ADMIN — AMLODIPINE BESYLATE 5 MG: 5 TABLET ORAL at 18:35

## 2022-11-22 RX ADMIN — THIAMINE HYDROCHLORIDE 100 MG: 100 INJECTION, SOLUTION INTRAMUSCULAR; INTRAVENOUS at 18:47

## 2022-11-22 RX ADMIN — PHENOBARBITAL SODIUM 260 MG: 65 INJECTION INTRAMUSCULAR at 17:20

## 2022-11-22 ASSESSMENT — ENCOUNTER SYMPTOMS
VOICE CHANGE: 0
RHINORRHEA: 0
COUGH: 0
CONSTIPATION: 0
EYE ITCHING: 0
EYE PAIN: 0
CHEST TIGHTNESS: 0
NAUSEA: 1
ALLERGIC/IMMUNOLOGIC NEGATIVE: 1
VOMITING: 0
VOMITING: 1
PHOTOPHOBIA: 0
WHEEZING: 0
DIARRHEA: 0
SINUS PRESSURE: 0
BACK PAIN: 1
SORE THROAT: 0
BLOOD IN STOOL: 0
ABDOMINAL DISTENTION: 0
SHORTNESS OF BREATH: 0
DIARRHEA: 0
ANAL BLEEDING: 0
CHOKING: 0
EYE REDNESS: 0
EYE DISCHARGE: 0
TROUBLE SWALLOWING: 0
CONSTIPATION: 0
CHEST TIGHTNESS: 0
EYES NEGATIVE: 1
ABDOMINAL PAIN: 0
FACIAL SWELLING: 0
SHORTNESS OF BREATH: 0
ABDOMINAL PAIN: 0
NAUSEA: 0
COLOR CHANGE: 0
RECTAL PAIN: 0

## 2022-11-22 ASSESSMENT — PAIN - FUNCTIONAL ASSESSMENT: PAIN_FUNCTIONAL_ASSESSMENT: NONE - DENIES PAIN

## 2022-11-22 NOTE — LETTER
[facility] 4 ONCOLOGY UNIT  [address] [id number]  Phone: [phone]          November 25, 2022     Patient: Aman Rocha   YOB: 1970   Date of Visit: 11/22/2022       To Whom It May Concern:    Tom Cons was hospitalized at this institution from 11/19/2022 until 11/25/2022. It is my medical opinion that Tom Cons may return to full duty immediately with no restrictions on 11/29/2022. If you have any questions or concerns, please don't hesitate to call.     Sincerely,
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
## 2022-11-22 NOTE — ED NOTES
Pt placed on cardiac monitor. Alarms are on and audible. Sitter at bedside initiated.       Marita Beal RN  11/22/22 6062
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
## 2022-11-22 NOTE — LETTER
[facility] ONCOLOGY UNIT  [address] [id number]  Phone: [phone]          November 25, 2022     Patient: Key Cutler   YOB: 1970   Date of Visit: 11/22/2022       To Whom It May Concern:    Sebastien Montoya was hospitalized at this institution from 11/19/2022 until 11/25/2022. Mr. Holden Geronimo received Ativan(Lorazepam) during this hospital stay of 11/19/2022 to 11/25/2022. If you have any questions or concerns, please don't hesitate to call.     Sincerely,

## 2022-11-23 ENCOUNTER — APPOINTMENT (OUTPATIENT)
Dept: ULTRASOUND IMAGING | Age: 52
DRG: 897 | End: 2022-11-23
Payer: MEDICAID

## 2022-11-23 LAB
ANION GAP SERPL CALCULATED.3IONS-SCNC: 11 MMOL/L (ref 7–19)
BASOPHILS ABSOLUTE: 0 K/UL (ref 0–0.2)
BASOPHILS RELATIVE PERCENT: 0.9 % (ref 0–1)
BUN BLDV-MCNC: 10 MG/DL (ref 6–20)
CALCIUM SERPL-MCNC: 8.4 MG/DL (ref 8.6–10)
CHLORIDE BLD-SCNC: 105 MMOL/L (ref 98–111)
CO2: 24 MMOL/L (ref 22–29)
CREAT SERPL-MCNC: 1 MG/DL (ref 0.5–1.2)
EKG P AXIS: 70 DEGREES
EKG P-R INTERVAL: 178 MS
EKG Q-T INTERVAL: 348 MS
EKG QRS DURATION: 94 MS
EKG QTC CALCULATION (BAZETT): 414 MS
EKG T AXIS: 55 DEGREES
EOSINOPHILS ABSOLUTE: 0.2 K/UL (ref 0–0.6)
EOSINOPHILS RELATIVE PERCENT: 3.2 % (ref 0–5)
ETHANOL: <10 MG/DL (ref 0–0.08)
GFR SERPL CREATININE-BSD FRML MDRD: >60 ML/MIN/{1.73_M2}
GLUCOSE BLD-MCNC: 96 MG/DL (ref 74–109)
HCT VFR BLD CALC: 35.1 % (ref 42–52)
HEMOGLOBIN: 11.9 G/DL (ref 14–18)
IMMATURE GRANULOCYTES #: 0 K/UL
LYMPHOCYTES ABSOLUTE: 1.7 K/UL (ref 1.1–4.5)
LYMPHOCYTES RELATIVE PERCENT: 37.3 % (ref 20–40)
MAGNESIUM: 1.5 MG/DL (ref 1.6–2.6)
MAGNESIUM: 1.9 MG/DL (ref 1.6–2.6)
MCH RBC QN AUTO: 32 PG (ref 27–31)
MCHC RBC AUTO-ENTMCNC: 33.9 G/DL (ref 33–37)
MCV RBC AUTO: 94.4 FL (ref 80–94)
MONOCYTES ABSOLUTE: 0.4 K/UL (ref 0–0.9)
MONOCYTES RELATIVE PERCENT: 8 % (ref 0–10)
NEUTROPHILS ABSOLUTE: 2.3 K/UL (ref 1.5–7.5)
NEUTROPHILS RELATIVE PERCENT: 50.4 % (ref 50–65)
PDW BLD-RTO: 13.2 % (ref 11.5–14.5)
PLATELET # BLD: 188 K/UL (ref 130–400)
PMV BLD AUTO: 8.9 FL (ref 9.4–12.4)
POTASSIUM REFLEX MAGNESIUM: 3.4 MMOL/L (ref 3.5–5)
POTASSIUM SERPL-SCNC: 3.7 MMOL/L (ref 3.5–5)
POTASSIUM SERPL-SCNC: 3.9 MMOL/L (ref 3.5–5)
RBC # BLD: 3.72 M/UL (ref 4.7–6.1)
SALICYLATE, SERUM: <0.3 MG/DL (ref 3–10)
SODIUM BLD-SCNC: 140 MMOL/L (ref 136–145)
WBC # BLD: 4.6 K/UL (ref 4.8–10.8)

## 2022-11-23 PROCEDURE — 1210000000 HC MED SURG R&B

## 2022-11-23 PROCEDURE — 93010 ELECTROCARDIOGRAM REPORT: CPT | Performed by: INTERNAL MEDICINE

## 2022-11-23 PROCEDURE — 83735 ASSAY OF MAGNESIUM: CPT

## 2022-11-23 PROCEDURE — 80048 BASIC METABOLIC PNL TOTAL CA: CPT

## 2022-11-23 PROCEDURE — 6370000000 HC RX 637 (ALT 250 FOR IP): Performed by: STUDENT IN AN ORGANIZED HEALTH CARE EDUCATION/TRAINING PROGRAM

## 2022-11-23 PROCEDURE — 99252 IP/OBS CONSLTJ NEW/EST SF 35: CPT | Performed by: PSYCHIATRY & NEUROLOGY

## 2022-11-23 PROCEDURE — 84132 ASSAY OF SERUM POTASSIUM: CPT

## 2022-11-23 PROCEDURE — 85025 COMPLETE CBC W/AUTO DIFF WBC: CPT

## 2022-11-23 PROCEDURE — 2580000003 HC RX 258

## 2022-11-23 PROCEDURE — 82077 ASSAY SPEC XCP UR&BREATH IA: CPT

## 2022-11-23 PROCEDURE — 80179 DRUG ASSAY SALICYLATE: CPT

## 2022-11-23 PROCEDURE — 6360000002 HC RX W HCPCS

## 2022-11-23 PROCEDURE — 6370000000 HC RX 637 (ALT 250 FOR IP)

## 2022-11-23 PROCEDURE — 36415 COLL VENOUS BLD VENIPUNCTURE: CPT

## 2022-11-23 PROCEDURE — 93350 STRESS TTE ONLY: CPT

## 2022-11-23 PROCEDURE — 2580000003 HC RX 258: Performed by: INTERNAL MEDICINE

## 2022-11-23 PROCEDURE — 76770 US EXAM ABDO BACK WALL COMP: CPT

## 2022-11-23 RX ORDER — METOPROLOL TARTRATE 5 MG/5ML
5 INJECTION INTRAVENOUS EVERY 5 MIN PRN
Status: ACTIVE | OUTPATIENT
Start: 2022-11-23 | End: 2022-11-23

## 2022-11-23 RX ORDER — MAGNESIUM SULFATE 1 G/100ML
1000 INJECTION INTRAVENOUS PRN
Status: DISCONTINUED | OUTPATIENT
Start: 2022-11-23 | End: 2022-11-25 | Stop reason: HOSPADM

## 2022-11-23 RX ORDER — SODIUM CHLORIDE 9 MG/ML
500 INJECTION, SOLUTION INTRAVENOUS CONTINUOUS PRN
Status: ACTIVE | OUTPATIENT
Start: 2022-11-23 | End: 2022-11-23

## 2022-11-23 RX ORDER — SODIUM CHLORIDE, SODIUM LACTATE, POTASSIUM CHLORIDE, CALCIUM CHLORIDE 600; 310; 30; 20 MG/100ML; MG/100ML; MG/100ML; MG/100ML
INJECTION, SOLUTION INTRAVENOUS CONTINUOUS
Status: DISCONTINUED | OUTPATIENT
Start: 2022-11-23 | End: 2022-11-25 | Stop reason: HOSPADM

## 2022-11-23 RX ORDER — CHLORDIAZEPOXIDE HYDROCHLORIDE 25 MG/1
25 CAPSULE, GELATIN COATED ORAL 3 TIMES DAILY
Status: DISPENSED | OUTPATIENT
Start: 2022-11-23 | End: 2022-11-25

## 2022-11-23 RX ORDER — NITROGLYCERIN 0.4 MG/1
0.4 TABLET SUBLINGUAL EVERY 5 MIN PRN
Status: ACTIVE | OUTPATIENT
Start: 2022-11-23 | End: 2022-11-23

## 2022-11-23 RX ORDER — ATROPINE SULFATE 0.1 MG/ML
0.5 INJECTION INTRAVENOUS EVERY 5 MIN PRN
Status: ACTIVE | OUTPATIENT
Start: 2022-11-23 | End: 2022-11-23

## 2022-11-23 RX ORDER — CHLORDIAZEPOXIDE HYDROCHLORIDE 5 MG/1
10 CAPSULE, GELATIN COATED ORAL 3 TIMES DAILY
Status: DISCONTINUED | OUTPATIENT
Start: 2022-11-23 | End: 2022-11-23

## 2022-11-23 RX ORDER — ALBUTEROL SULFATE 90 UG/1
2 AEROSOL, METERED RESPIRATORY (INHALATION) PRN
Status: ACTIVE | OUTPATIENT
Start: 2022-11-23 | End: 2022-11-23

## 2022-11-23 RX ORDER — DOBUTAMINE HYDROCHLORIDE 100 MG/100ML
10 INJECTION INTRAVENOUS CONTINUOUS
Status: DISCONTINUED | OUTPATIENT
Start: 2022-11-23 | End: 2022-11-24

## 2022-11-23 RX ORDER — MAGNESIUM SULFATE IN WATER 40 MG/ML
2000 INJECTION, SOLUTION INTRAVENOUS ONCE
Status: COMPLETED | OUTPATIENT
Start: 2022-11-23 | End: 2022-11-23

## 2022-11-23 RX ORDER — SODIUM CHLORIDE 0.9 % (FLUSH) 0.9 %
10 SYRINGE (ML) INJECTION PRN
Status: ACTIVE | OUTPATIENT
Start: 2022-11-23 | End: 2022-11-23

## 2022-11-23 RX ORDER — POTASSIUM CHLORIDE 7.45 MG/ML
10 INJECTION INTRAVENOUS PRN
Status: DISCONTINUED | OUTPATIENT
Start: 2022-11-23 | End: 2022-11-25 | Stop reason: HOSPADM

## 2022-11-23 RX ORDER — NICOTINE 21 MG/24HR
1 PATCH, TRANSDERMAL 24 HOURS TRANSDERMAL DAILY
Status: DISCONTINUED | OUTPATIENT
Start: 2022-11-23 | End: 2022-11-25 | Stop reason: HOSPADM

## 2022-11-23 RX ADMIN — MAGNESIUM SULFATE HEPTAHYDRATE 2000 MG: 40 INJECTION, SOLUTION INTRAVENOUS at 12:22

## 2022-11-23 RX ADMIN — THIAMINE HYDROCHLORIDE 100 MG: 100 INJECTION, SOLUTION INTRAMUSCULAR; INTRAVENOUS at 08:50

## 2022-11-23 RX ADMIN — SODIUM CHLORIDE, PRESERVATIVE FREE 10 ML: 5 INJECTION INTRAVENOUS at 08:50

## 2022-11-23 RX ADMIN — GABAPENTIN 300 MG: 300 CAPSULE ORAL at 14:46

## 2022-11-23 RX ADMIN — GABAPENTIN 300 MG: 300 CAPSULE ORAL at 20:56

## 2022-11-23 RX ADMIN — ENOXAPARIN SODIUM 40 MG: 100 INJECTION SUBCUTANEOUS at 17:16

## 2022-11-23 RX ADMIN — POTASSIUM CHLORIDE 40 MEQ: 1500 TABLET, EXTENDED RELEASE ORAL at 04:51

## 2022-11-23 RX ADMIN — SODIUM CHLORIDE, PRESERVATIVE FREE 10 ML: 5 INJECTION INTRAVENOUS at 20:56

## 2022-11-23 RX ADMIN — CHLORDIAZEPOXIDE HYDROCHLORIDE 10 MG: 5 CAPSULE ORAL at 20:56

## 2022-11-23 RX ADMIN — LAMOTRIGINE 300 MG: 150 TABLET ORAL at 08:49

## 2022-11-23 RX ADMIN — SODIUM CHLORIDE, POTASSIUM CHLORIDE, SODIUM LACTATE AND CALCIUM CHLORIDE: 600; 310; 30; 20 INJECTION, SOLUTION INTRAVENOUS at 17:15

## 2022-11-23 RX ADMIN — QUETIAPINE FUMARATE 200 MG: 200 TABLET ORAL at 20:56

## 2022-11-23 RX ADMIN — AMLODIPINE BESYLATE 5 MG: 5 TABLET ORAL at 08:50

## 2022-11-23 RX ADMIN — LOSARTAN POTASSIUM 100 MG: 100 TABLET, FILM COATED ORAL at 20:56

## 2022-11-23 RX ADMIN — GABAPENTIN 300 MG: 300 CAPSULE ORAL at 08:50

## 2022-11-23 RX ADMIN — MAGNESIUM SULFATE HEPTAHYDRATE 2000 MG: 40 INJECTION, SOLUTION INTRAVENOUS at 04:51

## 2022-11-23 ASSESSMENT — ENCOUNTER SYMPTOMS
ALLERGIC/IMMUNOLOGIC NEGATIVE: 1
SHORTNESS OF BREATH: 0
CONSTIPATION: 0
BLOOD IN STOOL: 0
EYES NEGATIVE: 1
CHEST TIGHTNESS: 0
ABDOMINAL PAIN: 0

## 2022-11-23 NOTE — PROGRESS NOTES
[facility]      Patient:  Angelic Hou  YOB: 1970  Date of Service: 11/23/2022  MRN: [id number]   Acct: [de-identified]   Primary Care Physician: Darlene Phipps MD  Advance Directive: Full Code  Admit Date: 11/22/2022       Hospital Day: 1  Portions of this note have been copied forward, however, changed to reflect the most current clinical status of this patient. CHIEF COMPLAINT   Delirium Tremens (DTS)/ Hallucinations     SUBJECTIVE:  Patient sitting on the side of the bed. No complaints at this time. No signs of withdrawals. Concerned where he will go on discharge. Instructed the  will assist with discharge planning. CUMULATIVE HOSPITAL COURSE:  Angelic Hou is a 45 y.o. male who presents to the emergency department for evaluation regarding alcohol intoxication and ongoing episodes of hallucinations. Patient states that he has a longstanding history of alcoholism and had previously been sober for several weeks. States that over the last couple of days has been drinking very heavily. States that he drinks approximately 3 beers a day. He had tried to stop drinking today with his last drink occurring at around 3 AM.  States that he has began to have some hallucinations. Has been seeing cats walking around in his house which she does not have any cats. States that he has had some nausea with a couple episodes of vomiting. No prior history of withdrawal related seizures. Patient states that he has been feeling very depressed about his resumption of alcohol use and has been having some thoughts about wanting to harm himself. Denies of specific plan. Patient will be admitted to Hospitalist services for medical management. Patient will be admitted to floor with a one-on-one sitter. Monitored for withdrawal symptoms. Daily labs. Psych consult for evaluation.     Psych evaluation- Angelic Hou is a 45 y.o. male who presents to the emergency department for evaluation regarding alcohol intoxication and ongoing episodes of hallucinations. Patient states that he has a longstanding history of alcoholism and had previously been sober for several weeks. States that over the last couple of days has been drinking very heavily. States that he drinks approximately 3 beers a day. He had tried to stop drinking today with his last drink occurring at around 3 AM.  States that he has began to have some hallucinations. Has been seeing cats walking around in his house which she does not have any cats. States that he has had some nausea with a couple episodes of vomiting. No prior history of withdrawal related seizures. Patient states that he has been feeling very depressed about his resumption of alcohol use and has been having some thoughts about wanting to harm himself. Denies of specific plan. Patient will be admitted to Hospitalist services for medical management. Patient will be admitted to floor with a one-on-one sitter. Monitored for withdrawal symptoms. Daily labs. Psych consult for evaluation- No evidence of acute suicidality, homicidality or psychosis observed today. Patient is future oriented and not meeting the criteria for inpatient psychiatric treatment. He may discharge home once medically cleared. Discontinue sitter. He will follow-up with his outpatient psychiatrist and AA. Stress ECHO completed pending Cardiology to review. Ethanol <69 this AM.  Salicylate level <3.3. Complaints of left flank pain, Renal ultrasound completed, pending results. Patient having some diaphoresis, will continue to monitor withdrawal.  Librium 10mg TID for 2 day, reassess.  consulted to assist with discharge planning. Review of Systems:   Review of Systems   Constitutional:  Negative for appetite change, chills, fatigue and fever. HENT: Negative. Eyes: Negative. Respiratory:  Negative for chest tightness and shortness of breath. Cardiovascular:  Positive for chest pain. Negative for palpitations. Gastrointestinal:  Negative for abdominal pain, blood in stool and constipation. Endocrine: Negative. Genitourinary: Negative. Musculoskeletal:  Negative for arthralgias. Skin: Negative. Allergic/Immunologic: Negative. Neurological: Negative. Hematological: Negative. Psychiatric/Behavioral:  Negative for agitation and suicidal ideas. The patient is nervous/anxious. 14 point review of systems is negative except as specifically addressed above. Objective:   VITALS:  BP (!) 146/86   Pulse 84   Temp 98.4 °F (36.9 °C) (Oral)   Resp 18   Ht 6' 3\" (1.905 m)   Wt 176 lb (79.8 kg)   SpO2 97%   BMI 22.00 kg/m²   24HR INTAKE/OUTPUT:  No intake or output data in the 24 hours ending 11/23/22 1055    Physical Exam  Vitals and nursing note reviewed. Constitutional:       Appearance: Normal appearance. He is obese. HENT:      Head: Normocephalic. Nose: Nose normal.      Mouth/Throat:      Mouth: Mucous membranes are moist.   Eyes:      Pupils: Pupils are equal, round, and reactive to light. Cardiovascular:      Rate and Rhythm: Normal rate and regular rhythm. Pulses: Normal pulses. Heart sounds: Normal heart sounds. Pulmonary:      Effort: Pulmonary effort is normal.      Breath sounds: Normal breath sounds. Abdominal:      General: Bowel sounds are normal.      Palpations: Abdomen is soft. Musculoskeletal:         General: Normal range of motion. Cervical back: Normal range of motion. Skin:     General: Skin is warm and dry. Capillary Refill: Capillary refill takes less than 2 seconds. Neurological:      Mental Status: He is alert and oriented to person, place, and time.    Psychiatric:         Mood and Affect: Mood normal.         Behavior: Behavior normal.           Medications:      DOBUTamine      sodium chloride      sodium chloride        magnesium sulfate  2,000 mg IntraVENous Once    enoxaparin  40 mg SubCUTAneous Daily    thiamine  100 mg IntraVENous Daily    sodium chloride flush  5-40 mL IntraVENous 2 times per day    amLODIPine  5 mg Oral Daily    gabapentin  300 mg Oral TID    lamoTRIgine  300 mg Oral Daily    QUEtiapine  200 mg Oral Nightly    losartan  100 mg Oral Daily     perflutren lipid microspheres, sodium chloride flush, sodium chloride, albuterol sulfate HFA, atropine, nitroGLYCERIN, metoprolol, potassium chloride, magnesium sulfate, polyethylene glycol, acetaminophen **OR** acetaminophen, potassium chloride **OR** potassium alternative oral replacement **OR** potassium chloride, sodium chloride flush, sodium chloride, LORazepam **OR** LORazepam **OR** LORazepam **OR** LORazepam **OR** LORazepam **OR** LORazepam **OR** LORazepam **OR** LORazepam, hydrOXYzine HCl, traZODone  ADULT DIET; Regular  Diet NPO  Diet NPO Exceptions are: Sips of Water with Meds     Lab and other Data:     Recent Labs     11/22/22  1403 11/23/22  0220   WBC 5.4 4.6*   HGB 12.8* 11.9*    188     Recent Labs     11/22/22  1403 11/23/22  0220 11/23/22  0919   * 140  --    K 3.7 3.4* 3.9   CL 98 105  --    CO2 24 24  --    BUN 11 10  --    CREATININE 1.0 1.0  --    GLUCOSE 89 96  --      Recent Labs     11/22/22  1403   AST 37   ALT 30   BILITOT 0.5   ALKPHOS 114     Troponin T: No results for input(s): TROPONINI in the last 72 hours. Pro-BNP: No results for input(s): BNP in the last 72 hours. INR: No results for input(s): INR in the last 72 hours. UA:No results for input(s): NITRITE, COLORU, PHUR, LABCAST, WBCUA, RBCUA, MUCUS, TRICHOMONAS, YEAST, BACTERIA, CLARITYU, SPECGRAV, LEUKOCYTESUR, UROBILINOGEN, BILIRUBINUR, BLOODU, GLUCOSEU, AMORPHOUS in the last 72 hours. Invalid input(s): Daisy Love  A1C: No results for input(s): LABA1C in the last 72 hours.   ABG:No results for input(s): PHART, PBY8LPT, PO2ART, INB0KRE, BEART, HGBAE, Q2DBHTMX, CARBOXHGBART in the last 72 hours.    RAD:   CT HEAD WO CONTRAST    Result Date: 11/19/2022  Normal head/brain CT. Electronically signed by Reji Wiley MD on 11-19-22 at 0924    CT CERVICAL SPINE WO CONTRAST    Result Date: 11/19/2022  No acute traumatic findings on CT of the cervical spine. Electronically signed by Reji Wilye MD on 11-19-22 at 0501       Assessment/Plan   Principal Problem:    Delirium, alcoholic ([address]  Resolved Problems:    * No resolved hospital problems.  *    Delirium, alcoholic              -Psych consult- 11/23 OK to d/c when medical stable              -Monitor for withdrawal symptoms              -One-on-one sitter- D/C per Psych 11/23              -Vitals per unit protocol              -Diet as tolerated              -Daily labs              -Drug Screen, Buprenorphine   -Repeat Ethanol- <79 02/54   -Salicylate level 06/45 <0.3   -Renal US- Left flank pain   -Librium 10mg TID     Abnormal EKG   -Stress ECHO for 11/23      DVT Prophylaxis:  KWASI Zamora - CNP, 11/23/2022 10:55 AM

## 2022-11-23 NOTE — PROGRESS NOTES
Stress echo completed utilizing the standard [facility] protocol. Patient was able to exercise for 9 minutes. There was no clinical, EKG or echo changes to suggest myocardial ischemia. Reviewed by Dr. Chikis Dumont and he concurred.

## 2022-11-23 NOTE — CARE COORDINATION
11/23/22 0837   Service Assessment   Patient Orientation Alert and Oriented;Person;Place;Situation   Cognition Alert   History Provided By Patient   Primary Caregiver Self   Accompanied By/Relationship In Pt room at bedside room rahul Bhatia 35 is: Legal Next of Kin   PCP Verified by CM Yes   Last Visit to PCP Within last 6 months   Prior Functional Level Independent in ADLs/IADLs   Current Functional Level Independent in ADLs/IADLs   Can patient return to prior living arrangement Yes   Ability to make needs known: Good   Family able to assist with home care needs: Yes   Would you like for me to discuss the discharge plan with any other family members/significant others, and if so, who? Yes   [employer] Other (Comment)  (went to [employer] reports he did not like the program.)   Social/Functional History   Lives With Alone   Type of [address]   Home Access Stairs to enter with rails   Entrance Stairs - Number of Steps 3   Entrance Stairs - Rails Both   Bathroom Shower/Tub Tub/Shower unit   Bathroom Toilet Standard   Receives Help From Family   ADL Assistance Independent   Homemaking Assistance Independent   Homemaking Responsibilities Yes   Ambulation Assistance Independent   Transfer Assistance Independent   Active  Yes   Mode of Transportation Car   Occupation Full time employment   Type of Occupation Pt works at [employer]   [place]   Discharge [address]   Patient expects to be discharged to: House  (Pt reports he will go to his house in [place] and return to Work. Pt Dad will pick him up when medically stable.)   One/Two Story Residence One story   History of falls?  0   Services At/After Discharge   Mode of Transport at Discharge Other (see comment)  (Pt father will pick him up when Pt is medically stable.)   Confirm Follow Up Transport Family   Condition of Participation: Discharge Planning   The Patient and/or Patient Representative was provided with a Choice of Provider? Patient   The Patient and/Or Patient Representative agree with the Discharge Plan? Yes   Freedom of Choice list was provided with basic dialogue that supports the patient's individualized plan of care/goals, treatment preferences, and shares the quality data associated with the providers? Yes   Spoke to Pt about discharge plans. Pt reports that he will go back to his one level home in [place], [place] and return back to work. Pt reports he works at [employer] in [place], [place]. Pt reports his father will pick him up when medically stable. Pt reports he did try [facility] program. Pt reports he did not like the program. Pt denies any needs or concerns at this time.    Electronically signed by SAMIRA Rivera on 11/23/2022 at 8:45 AM
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
## 2022-11-23 NOTE — PLAN OF CARE
Problem: ABCDS Injury Assessment  Goal: Absence of physical injury  11/23/2022 0149 by Libby Sanders RN  Outcome: Progressing  11/22/2022 1824 by Juma Easton RN  Outcome: Progressing

## 2022-11-24 LAB
ANION GAP SERPL CALCULATED.3IONS-SCNC: 11 MMOL/L (ref 7–19)
BASOPHILS ABSOLUTE: 0 K/UL (ref 0–0.2)
BASOPHILS RELATIVE PERCENT: 0.5 % (ref 0–1)
BUN BLDV-MCNC: 11 MG/DL (ref 6–20)
CALCIUM SERPL-MCNC: 8.6 MG/DL (ref 8.6–10)
CHLORIDE BLD-SCNC: 101 MMOL/L (ref 98–111)
CO2: 24 MMOL/L (ref 22–29)
CREAT SERPL-MCNC: 1.1 MG/DL (ref 0.5–1.2)
EOSINOPHILS ABSOLUTE: 0.2 K/UL (ref 0–0.6)
EOSINOPHILS RELATIVE PERCENT: 5 % (ref 0–5)
GFR SERPL CREATININE-BSD FRML MDRD: >60 ML/MIN/{1.73_M2}
GLUCOSE BLD-MCNC: 105 MG/DL (ref 74–109)
HCT VFR BLD CALC: 33.3 % (ref 42–52)
HEMOGLOBIN: 11.6 G/DL (ref 14–18)
IMMATURE GRANULOCYTES #: 0 K/UL
LYMPHOCYTES ABSOLUTE: 1.5 K/UL (ref 1.1–4.5)
LYMPHOCYTES RELATIVE PERCENT: 34.3 % (ref 20–40)
MCH RBC QN AUTO: 32.6 PG (ref 27–31)
MCHC RBC AUTO-ENTMCNC: 34.8 G/DL (ref 33–37)
MCV RBC AUTO: 93.5 FL (ref 80–94)
MONOCYTES ABSOLUTE: 0.4 K/UL (ref 0–0.9)
MONOCYTES RELATIVE PERCENT: 9.3 % (ref 0–10)
NEUTROPHILS ABSOLUTE: 2.2 K/UL (ref 1.5–7.5)
NEUTROPHILS RELATIVE PERCENT: 50.7 % (ref 50–65)
PDW BLD-RTO: 13.1 % (ref 11.5–14.5)
PLATELET # BLD: 161 K/UL (ref 130–400)
PMV BLD AUTO: 8.8 FL (ref 9.4–12.4)
POTASSIUM REFLEX MAGNESIUM: 3.7 MMOL/L (ref 3.5–5)
RBC # BLD: 3.56 M/UL (ref 4.7–6.1)
SODIUM BLD-SCNC: 136 MMOL/L (ref 136–145)
TROPONIN: <0.01 NG/ML (ref 0–0.03)
WBC # BLD: 4.4 K/UL (ref 4.8–10.8)

## 2022-11-24 PROCEDURE — 6370000000 HC RX 637 (ALT 250 FOR IP)

## 2022-11-24 PROCEDURE — 85025 COMPLETE CBC W/AUTO DIFF WBC: CPT

## 2022-11-24 PROCEDURE — 6360000002 HC RX W HCPCS

## 2022-11-24 PROCEDURE — 1210000000 HC MED SURG R&B

## 2022-11-24 PROCEDURE — 80048 BASIC METABOLIC PNL TOTAL CA: CPT

## 2022-11-24 PROCEDURE — 36415 COLL VENOUS BLD VENIPUNCTURE: CPT

## 2022-11-24 PROCEDURE — 6370000000 HC RX 637 (ALT 250 FOR IP): Performed by: INTERNAL MEDICINE

## 2022-11-24 PROCEDURE — 6360000002 HC RX W HCPCS: Performed by: HOSPITALIST

## 2022-11-24 PROCEDURE — 6370000000 HC RX 637 (ALT 250 FOR IP): Performed by: HOSPITALIST

## 2022-11-24 PROCEDURE — 84484 ASSAY OF TROPONIN QUANT: CPT

## 2022-11-24 RX ORDER — HYDRALAZINE HYDROCHLORIDE 20 MG/ML
10 INJECTION INTRAMUSCULAR; INTRAVENOUS EVERY 4 HOURS PRN
Status: DISCONTINUED | OUTPATIENT
Start: 2022-11-24 | End: 2022-11-25 | Stop reason: HOSPADM

## 2022-11-24 RX ORDER — CLONIDINE HYDROCHLORIDE 0.1 MG/1
0.1 TABLET ORAL 3 TIMES DAILY
Status: DISCONTINUED | OUTPATIENT
Start: 2022-11-24 | End: 2022-11-25 | Stop reason: HOSPADM

## 2022-11-24 RX ORDER — LOSARTAN POTASSIUM 50 MG/1
50 TABLET ORAL 2 TIMES DAILY
Status: DISCONTINUED | OUTPATIENT
Start: 2022-11-24 | End: 2022-11-25 | Stop reason: HOSPADM

## 2022-11-24 RX ORDER — AMLODIPINE BESYLATE 10 MG/1
10 TABLET ORAL NIGHTLY
Status: DISCONTINUED | OUTPATIENT
Start: 2022-11-25 | End: 2022-11-25 | Stop reason: HOSPADM

## 2022-11-24 RX ORDER — HYDRALAZINE HYDROCHLORIDE 20 MG/ML
5 INJECTION INTRAMUSCULAR; INTRAVENOUS EVERY 4 HOURS PRN
Status: DISCONTINUED | OUTPATIENT
Start: 2022-11-24 | End: 2022-11-24

## 2022-11-24 RX ORDER — AMLODIPINE BESYLATE 10 MG/1
10 TABLET ORAL DAILY
Status: DISCONTINUED | OUTPATIENT
Start: 2022-11-24 | End: 2022-11-24

## 2022-11-24 RX ADMIN — CHLORDIAZEPOXIDE HYDROCHLORIDE 25 MG: 25 CAPSULE ORAL at 08:17

## 2022-11-24 RX ADMIN — ACETAMINOPHEN 650 MG: 325 TABLET ORAL at 03:26

## 2022-11-24 RX ADMIN — LAMOTRIGINE 300 MG: 150 TABLET ORAL at 08:16

## 2022-11-24 RX ADMIN — LORAZEPAM 1 MG: 1 TABLET ORAL at 05:59

## 2022-11-24 RX ADMIN — LOSARTAN POTASSIUM 50 MG: 50 TABLET, FILM COATED ORAL at 22:09

## 2022-11-24 RX ADMIN — AMLODIPINE BESYLATE 10 MG: 10 TABLET ORAL at 04:25

## 2022-11-24 RX ADMIN — HYDRALAZINE HYDROCHLORIDE 5 MG: 20 INJECTION, SOLUTION INTRAMUSCULAR; INTRAVENOUS at 03:48

## 2022-11-24 RX ADMIN — GABAPENTIN 300 MG: 300 CAPSULE ORAL at 08:16

## 2022-11-24 RX ADMIN — GABAPENTIN 300 MG: 300 CAPSULE ORAL at 22:09

## 2022-11-24 RX ADMIN — CLONIDINE HYDROCHLORIDE 0.1 MG: 0.1 TABLET ORAL at 22:09

## 2022-11-24 RX ADMIN — CHLORDIAZEPOXIDE HYDROCHLORIDE 25 MG: 25 CAPSULE ORAL at 22:09

## 2022-11-24 RX ADMIN — CLONIDINE HYDROCHLORIDE 0.1 MG: 0.1 TABLET ORAL at 08:16

## 2022-11-24 RX ADMIN — QUETIAPINE FUMARATE 200 MG: 200 TABLET ORAL at 22:09

## 2022-11-24 RX ADMIN — THIAMINE HYDROCHLORIDE 100 MG: 100 INJECTION, SOLUTION INTRAMUSCULAR; INTRAVENOUS at 08:17

## 2022-11-24 RX ADMIN — CHLORDIAZEPOXIDE HYDROCHLORIDE 25 MG: 25 CAPSULE ORAL at 14:15

## 2022-11-24 RX ADMIN — CLONIDINE HYDROCHLORIDE 0.1 MG: 0.1 TABLET ORAL at 14:15

## 2022-11-24 RX ADMIN — GABAPENTIN 300 MG: 300 CAPSULE ORAL at 14:16

## 2022-11-24 RX ADMIN — LOSARTAN POTASSIUM 50 MG: 50 TABLET, FILM COATED ORAL at 14:16

## 2022-11-24 ASSESSMENT — ENCOUNTER SYMPTOMS
EYES NEGATIVE: 1
ALLERGIC/IMMUNOLOGIC NEGATIVE: 1
CONSTIPATION: 0
ABDOMINAL PAIN: 0
CHEST TIGHTNESS: 0
BLOOD IN STOOL: 0
SHORTNESS OF BREATH: 0

## 2022-11-24 NOTE — PLAN OF CARE
Problem: ABCDS Injury Assessment  Goal: Absence of physical injury  11/24/2022 0537 by Edilia Burnett, RN  Outcome: Progressing  Flowsheets (Taken 11/23/2022 2056)  Absence of Physical Injury: Implement safety measures based on patient assessment  11/23/2022 1729 by Kevon Marcial, RN  Outcome: Progressing
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
## 2022-11-24 NOTE — PROGRESS NOTES
Pt had walked to nurse's station with c/o of pinching in his chest. Pt was asked to describe the pinching. Pt denied any pressure in his chest. Pt denied any pain running down his arm. Pt also denied any light headedness, nausea, or vomiting. Pt's vitals were taken and were stable, /96, . Pt's telemetry strip was also looked and was he was seen to be in Sinus Tachycardia. After 2 mins pt had stated that the pinching had stopped. Pt was visibly seen to be anxious and given 1mg Ativan per CIWA Protocol. Pt was educated on what signs to look for and to notify nursing staff immediately if he notices that signs. Will continue to monitor pt. Attending Physician was notified.

## 2022-11-24 NOTE — PROGRESS NOTES
[facility]      Patient:  Jignesh Meraz  YOB: 1970  Date of Service: 11/24/2022  MRN: [id number]   Acct: [de-identified]   Primary Care Physician: Brenden Manrique MD  Advance Directive: Full Code  Admit Date: 11/22/2022       Hospital Day: 2  Portions of this note have been copied forward, however, changed to reflect the most current clinical status of this patient. CHIEF COMPLAINT   Delirium Tremens (DTS)/ Hallucinations     SUBJECTIVE:  Patient sitting on the side of the bed. No complaints at this time, chest pain free at this time. No signs of withdrawals, continue Librium and reevaluate in [address] [place] [address] COURSE:  Jignesh Meraz is a 45 y.o. male who presents to the emergency department for evaluation regarding alcohol intoxication and ongoing episodes of hallucinations. Patient states that he has a longstanding history of alcoholism and had previously been sober for several weeks. States that over the last couple of days has been drinking very heavily. States that he drinks approximately 3 beers a day. He had tried to stop drinking today with his last drink occurring at around 3 AM.  States that he has began to have some hallucinations. Has been seeing cats walking around in his house which she does not have any cats. States that he has had some nausea with a couple episodes of vomiting. No prior history of withdrawal related seizures. Patient states that he has been feeling very depressed about his resumption of alcohol use and has been having some thoughts about wanting to harm himself. Denies of specific plan. Patient will be admitted to Hospitalist services for medical management. Patient will be admitted to floor with a one-on-one sitter. Monitored for withdrawal symptoms. Daily labs. Psych consult for evaluation.     Psych evaluation- Jignesh Meraz is a 45 y.o. male who presents to the emergency department for evaluation regarding alcohol intoxication and ongoing episodes of hallucinations. Patient states that he has a longstanding history of alcoholism and had previously been sober for several weeks. States that over the last couple of days has been drinking very heavily. States that he drinks approximately 3 beers a day. He had tried to stop drinking today with his last drink occurring at around 3 AM.  States that he has began to have some hallucinations. Has been seeing cats walking around in his house which she does not have any cats. States that he has had some nausea with a couple episodes of vomiting. No prior history of withdrawal related seizures. Patient states that he has been feeling very depressed about his resumption of alcohol use and has been having some thoughts about wanting to harm himself. Denies of specific plan. Patient will be admitted to Hospitalist services for medical management. Patient will be admitted to floor with a one-on-one sitter. Monitored for withdrawal symptoms. Daily labs. Psych consult for evaluation- No evidence of acute suicidality, homicidality or psychosis observed today. Patient is future oriented and not meeting the criteria for inpatient psychiatric treatment. He may discharge home once medically cleared. Discontinue sitter. He will follow-up with his outpatient psychiatrist and AA. Stress ECHO completed pending Cardiology to review. Ethanol <46 this AM.  Salicylate level <9.9. Complaints of left flank pain, Renal ultrasound completed, pending results. Patient having some diaphoresis, will continue to monitor withdrawal.  Librium 10mg TID for 2 day, reassess.  consulted to assist with discharge planning. Patients renal US resulted multiple small cysts in both kidneys, no hydronephrosis noted. Urology consult-  To obtain CT abdomen with IV contrast as an out patient to confirm no small solid renal mass. Follow up urology office with Dr. Bryant Lanier in 4-6 weeks. Continue Librium, was increased during the night to 25mg PO BID. Continue to monitor and trend daily labs.  to assist with discharge. Review of Systems:   Review of Systems   Constitutional:  Negative for appetite change, chills, fatigue and fever. HENT: Negative. Eyes: Negative. Respiratory:  Negative for chest tightness and shortness of breath. Cardiovascular:  Negative for chest pain and palpitations. Gastrointestinal:  Negative for abdominal pain, blood in stool and constipation. Endocrine: Negative. Genitourinary:  Positive for flank pain. Musculoskeletal:  Negative for arthralgias. Skin: Negative. Allergic/Immunologic: Negative. Neurological: Negative. Hematological: Negative. Psychiatric/Behavioral:  Negative for agitation and suicidal ideas. The patient is not nervous/anxious. 14 point review of systems is negative except as specifically addressed above. Objective:   VITALS:  BP (!) 133/91   Pulse 79   Temp 97.3 °F (36.3 °C) (Temporal)   Resp 20   Ht 6' 3\" (1.905 m)   Wt 176 lb (79.8 kg)   SpO2 97%   BMI 22.00 kg/m²   24HR INTAKE/OUTPUT:  No intake or output data in the 24 hours ending 11/24/22 1010    Physical Exam  Vitals and nursing note reviewed. Constitutional:       Appearance: Normal appearance. HENT:      Head: Normocephalic. Nose: Nose normal.      Mouth/Throat:      Mouth: Mucous membranes are moist.   Eyes:      Pupils: Pupils are equal, round, and reactive to light. Cardiovascular:      Rate and Rhythm: Normal rate and regular rhythm. Pulses: Normal pulses. Heart sounds: Normal heart sounds. Pulmonary:      Effort: Pulmonary effort is normal.      Breath sounds: Normal breath sounds. Abdominal:      General: Bowel sounds are normal.      Palpations: Abdomen is soft. Tenderness: There is left CVA tenderness. Musculoskeletal:         General: Normal range of motion.       Cervical back: Normal range of motion. Skin:     General: Skin is warm and dry. Capillary Refill: Capillary refill takes less than 2 seconds. Neurological:      Mental Status: He is alert and oriented to person, place, and time.    Psychiatric:         Mood and Affect: Mood normal.         Behavior: Behavior normal.           Medications:      lactated ringers 75 mL/hr at 11/24/22 0425    sodium chloride        cloNIDine  0.1 mg Oral TID    [START ON 11/25/2022] amLODIPine  10 mg Oral Nightly    losartan  50 mg Oral BID    nicotine  1 patch TransDERmal Daily    chlordiazePOXIDE  25 mg Oral TID    enoxaparin  40 mg SubCUTAneous Daily    thiamine  100 mg IntraVENous Daily    sodium chloride flush  5-40 mL IntraVENous 2 times per day    gabapentin  300 mg Oral TID    lamoTRIgine  300 mg Oral Daily    QUEtiapine  200 mg Oral Nightly     hydrALAZINE, perflutren lipid microspheres, perflutren lipid microspheres, potassium chloride, magnesium sulfate, potassium chloride, magnesium sulfate, polyethylene glycol, acetaminophen **OR** acetaminophen, potassium chloride **OR** potassium alternative oral replacement **OR** potassium chloride, sodium chloride flush, sodium chloride, LORazepam **OR** LORazepam **OR** LORazepam **OR** LORazepam **OR** [DISCONTINUED] LORazepam **OR** [DISCONTINUED] LORazepam **OR** [DISCONTINUED] LORazepam **OR** [DISCONTINUED] LORazepam, hydrOXYzine HCl, traZODone  Diet NPO Exceptions are: Sips of Water with Meds     Lab and other Data:     Recent Labs     11/22/22  1403 11/23/22  0220 11/24/22  0336   WBC 5.4 4.6* 4.4*   HGB 12.8* 11.9* 11.6*    188 161       Recent Labs     11/22/22  1403 11/23/22  0220 11/23/22  0919 11/23/22  2233 11/24/22  0336   * 140  --   --  136   K 3.7 3.4* 3.9 3.7 3.7   CL 98 105  --   --  101   CO2 24 24  --   --  24   BUN 11 10  --   --  11   CREATININE 1.0 1.0  --   --  1.1   GLUCOSE 89 96  --   --  105       Recent Labs     11/22/22  1403   AST 37   ALT 30   BILITOT 0. 5   ALKPHOS 114       Troponin T:   Recent Labs     11/24/22  0747   TROPONINI <0.01     Pro-BNP: No results for input(s): BNP in the last 72 hours. INR: No results for input(s): INR in the last 72 hours. UA:No results for input(s): NITRITE, COLORU, PHUR, LABCAST, WBCUA, RBCUA, MUCUS, TRICHOMONAS, YEAST, BACTERIA, CLARITYU, SPECGRAV, LEUKOCYTESUR, UROBILINOGEN, BILIRUBINUR, BLOODU, GLUCOSEU, AMORPHOUS in the last 72 hours. Invalid input(s): Duana Spurling  A1C: No results for input(s): LABA1C in the last 72 hours. ABG:No results for input(s): PHART, JUB5JCY, PO2ART, ASL8QHH, BEART, HGBAE, Z4LKCSJY, CARBOXHGBART in the last 72 hours. RAD:   CT HEAD WO CONTRAST    Result Date: 11/19/2022  Normal head/brain CT. Electronically signed by Paty Potter MD on 11-19-22 at 1911    CT CERVICAL SPINE WO CONTRAST    Result Date: 11/19/2022  No acute traumatic findings on CT of the cervical spine. Electronically signed by Paty Potter MD on 11-19-22 at 0501       Assessment/Plan   Principal Problem:    Delirium, alcoholic ([address]  Resolved Problems:    * No resolved hospital problems. *    Delirium, alcoholic              -Psych consult- 11/23 OK to d/c when medical stable              -Monitor for withdrawal symptoms              -One-on-one sitter- D/C per Psych 11/23              -Vitals per unit protocol              -Diet as tolerated              -Daily labs- monitor/trend              -Drug Screen, Buprenorphine   -Ethanol- <66 32/42   -Salicylate level 23/79 <0.3   -Renal US- Left flank pain- completed see report  -Consult Urology- Multiple Cyst bilateral   -Librium 10mg TID- Increased to 25mg  PO TID    Abnormal EKG   -Stress ECHO for 11/23- pending final report- There was no clinical, EKG or echo changes to suggest myocardial ischemia.        DVT Prophylaxis:  Lovenox        KWASI Pinzon - CNP, 11/24/2022 10:10 AM

## 2022-11-25 VITALS
SYSTOLIC BLOOD PRESSURE: 128 MMHG | BODY MASS INDEX: 21.88 KG/M2 | HEART RATE: 78 BPM | DIASTOLIC BLOOD PRESSURE: 93 MMHG | TEMPERATURE: 97.1 F | WEIGHT: 176 LBS | OXYGEN SATURATION: 100 % | HEIGHT: 75 IN | RESPIRATION RATE: 18 BRPM

## 2022-11-25 LAB
ANION GAP SERPL CALCULATED.3IONS-SCNC: 11 MMOL/L (ref 7–19)
BASOPHILS ABSOLUTE: 0 K/UL (ref 0–0.2)
BASOPHILS RELATIVE PERCENT: 0.4 % (ref 0–1)
BUN BLDV-MCNC: 9 MG/DL (ref 6–20)
CALCIUM SERPL-MCNC: 8.7 MG/DL (ref 8.6–10)
CHLORIDE BLD-SCNC: 104 MMOL/L (ref 98–111)
CO2: 24 MMOL/L (ref 22–29)
CREAT SERPL-MCNC: 1.3 MG/DL (ref 0.5–1.2)
EOSINOPHILS ABSOLUTE: 0.2 K/UL (ref 0–0.6)
EOSINOPHILS RELATIVE PERCENT: 4.7 % (ref 0–5)
GFR SERPL CREATININE-BSD FRML MDRD: >60 ML/MIN/{1.73_M2}
GLUCOSE BLD-MCNC: 94 MG/DL (ref 74–109)
HCT VFR BLD CALC: 33.8 % (ref 42–52)
HEMOGLOBIN: 11.1 G/DL (ref 14–18)
IMMATURE GRANULOCYTES #: 0 K/UL
LYMPHOCYTES ABSOLUTE: 1.8 K/UL (ref 1.1–4.5)
LYMPHOCYTES RELATIVE PERCENT: 36.1 % (ref 20–40)
MCH RBC QN AUTO: 31.4 PG (ref 27–31)
MCHC RBC AUTO-ENTMCNC: 32.8 G/DL (ref 33–37)
MCV RBC AUTO: 95.8 FL (ref 80–94)
MONOCYTES ABSOLUTE: 0.5 K/UL (ref 0–0.9)
MONOCYTES RELATIVE PERCENT: 10.6 % (ref 0–10)
NEUTROPHILS ABSOLUTE: 2.4 K/UL (ref 1.5–7.5)
NEUTROPHILS RELATIVE PERCENT: 48 % (ref 50–65)
PDW BLD-RTO: 13.3 % (ref 11.5–14.5)
PLATELET # BLD: 186 K/UL (ref 130–400)
PMV BLD AUTO: 9.2 FL (ref 9.4–12.4)
POTASSIUM REFLEX MAGNESIUM: 4.1 MMOL/L (ref 3.5–5)
RBC # BLD: 3.53 M/UL (ref 4.7–6.1)
SODIUM BLD-SCNC: 139 MMOL/L (ref 136–145)
WBC # BLD: 5.1 K/UL (ref 4.8–10.8)

## 2022-11-25 PROCEDURE — 85025 COMPLETE CBC W/AUTO DIFF WBC: CPT

## 2022-11-25 PROCEDURE — 94760 N-INVAS EAR/PLS OXIMETRY 1: CPT

## 2022-11-25 PROCEDURE — 6360000002 HC RX W HCPCS

## 2022-11-25 PROCEDURE — 36415 COLL VENOUS BLD VENIPUNCTURE: CPT

## 2022-11-25 PROCEDURE — 6370000000 HC RX 637 (ALT 250 FOR IP): Performed by: HOSPITALIST

## 2022-11-25 PROCEDURE — 6370000000 HC RX 637 (ALT 250 FOR IP): Performed by: INTERNAL MEDICINE

## 2022-11-25 PROCEDURE — 6370000000 HC RX 637 (ALT 250 FOR IP)

## 2022-11-25 PROCEDURE — 80048 BASIC METABOLIC PNL TOTAL CA: CPT

## 2022-11-25 RX ORDER — LAMOTRIGINE 150 MG/1
300 TABLET ORAL DAILY
Qty: 30 TABLET | Refills: 3 | Status: SHIPPED | OUTPATIENT
Start: 2022-11-26

## 2022-11-25 RX ORDER — CHLORDIAZEPOXIDE HYDROCHLORIDE 25 MG/1
25 CAPSULE, GELATIN COATED ORAL 2 TIMES DAILY
Qty: 6 CAPSULE | Refills: 0 | Status: SHIPPED | OUTPATIENT
Start: 2022-11-25 | End: 2022-11-28

## 2022-11-25 RX ORDER — LOSARTAN POTASSIUM 50 MG/1
50 TABLET ORAL 2 TIMES DAILY
Qty: 30 TABLET | Refills: 3 | Status: SHIPPED | OUTPATIENT
Start: 2022-11-25

## 2022-11-25 RX ORDER — CLONIDINE HYDROCHLORIDE 0.1 MG/1
0.1 TABLET ORAL 3 TIMES DAILY
Qty: 60 TABLET | Refills: 3 | Status: SHIPPED | OUTPATIENT
Start: 2022-11-25

## 2022-11-25 RX ORDER — AMLODIPINE BESYLATE 10 MG/1
10 TABLET ORAL NIGHTLY
Qty: 30 TABLET | Refills: 3 | Status: SHIPPED | OUTPATIENT
Start: 2022-11-25

## 2022-11-25 RX ADMIN — CHLORDIAZEPOXIDE HYDROCHLORIDE 25 MG: 25 CAPSULE ORAL at 09:55

## 2022-11-25 RX ADMIN — CLONIDINE HYDROCHLORIDE 0.1 MG: 0.1 TABLET ORAL at 14:19

## 2022-11-25 RX ADMIN — THIAMINE HYDROCHLORIDE 100 MG: 100 INJECTION, SOLUTION INTRAMUSCULAR; INTRAVENOUS at 09:56

## 2022-11-25 RX ADMIN — CLONIDINE HYDROCHLORIDE 0.1 MG: 0.1 TABLET ORAL at 09:55

## 2022-11-25 RX ADMIN — GABAPENTIN 300 MG: 300 CAPSULE ORAL at 14:19

## 2022-11-25 RX ADMIN — LOSARTAN POTASSIUM 50 MG: 50 TABLET, FILM COATED ORAL at 09:55

## 2022-11-25 RX ADMIN — LAMOTRIGINE 300 MG: 150 TABLET ORAL at 09:55

## 2022-11-25 RX ADMIN — GABAPENTIN 300 MG: 300 CAPSULE ORAL at 09:55

## 2022-11-25 NOTE — PROGRESS NOTES
CLINICAL PHARMACY NOTE: MEDS TO BEDS    Total # of Prescriptions Filled: 4   The following medications were delivered to the patient:    Amlodipine 10 mg  Losartan 50 mg  Lamotrigine 150 mg  Clonidine 0.1 mg      Additional Documentation:   Delivered Rx's to patients room. Gave Rx's to patient. Patient paid $0.00 copay.

## 2022-11-25 NOTE — DISCHARGE SUMMARY
Laura Lewis  :  1970  MRN:  [id number]    Admit date:  2022  Discharge date:      Discharging Physician:  Dr Sandip Kenney    Advance Directive: Full Code    Consults: Maria M Krueger     Primary Care Physician:  Katiuska Suarez MD    Discharge Diagnoses:  Principal Problem:    Delirium, alcoholic ([address]  Resolved Problems:    * No resolved hospital problems. *      Portions of this note have been copied forward, however, changed to reflect the most current clinical status of this patient. Hospital Course: Laura Lewis is a 45 y.o. male who presents to the emergency department for evaluation regarding alcohol intoxication and ongoing episodes of hallucinations. Patient states that he has a longstanding history of alcoholism and had previously been sober for several weeks. States that over the last couple of days has been drinking very heavily. States that he drinks approximately 3 beers a day. He had tried to stop drinking today with his last drink occurring at around 3 AM.  States that he has began to have some hallucinations. Has been seeing cats walking around in his house which she does not have any cats. States that he has had some nausea with a couple episodes of vomiting. No prior history of withdrawal related seizures. Patient states that he has been feeling very depressed about his resumption of alcohol use and has been having some thoughts about wanting to harm himself. Denies of specific plan. Patient will be admitted to Hospitalist services for medical management. Patient will be admitted to floor with a one-on-one sitter. Monitored for withdrawal symptoms. Daily labs. Psych consult for evaluation. Stress ECHO completed normal, see report. Daily labs completed monitored and trended.   Renal US resulted multiple small cysts in both kidneys, no hydronephrosis noted completed for complaints of left flank pain. Urology consult-  To obtain CT abdomen with IV contrast as an out patient to confirm no small solid renal mass. Follow up urology office with Dr. Henry Mendez in 4-6 weeks. Continue Librium at 25 mg BID for 3 days on discharge. Patient will continue current blood pressure medications. Instructed to follow up with PCP for blood pressure control and discuss a treatment plan for chronic alcohol use. Encourage AA or some form of treatment. Significant Diagnostic Studies:   US RENAL COMPLETE    Result Date: 11/23/2022  RENAL SONOGRAM: CLINICAL HISTORY: Tenderness of left kidney FINDINGS : Real-time sonogram study of the right and left kidneys shows normal size and shaped kidneys. The right kidney measures 13.0x4.9x5.9 cm and the left kidney measures 11.2x5.6x5.7 cm . Right kidney has a mid pole cyst measuring 0.61x0.41x0.5 cm. There is a 2nd cyst also seen in the posterior aspect of the right kidney measuring 0.68x0.61x0.58 cm. The left kidney has assist in the upper pole measuring 1.2x1. 2x1.4 cm. There is a cyst in the left kidney in the lower pole measuring 1.1x0.74x1.3 cm. The right kidney cortical thickness upper pole is 1.29 cm, and lower pole is 1.06 cm. The left kidney cortical thickness upper pole is 1.68 cm, and lower pole is 1.38 cm. There is no apparent hydronephrosis or renal mass. The retroperitoneal area appears unremarkable. No evidence of aneurysmal dilatation of the aorta is seen. The urinary bladder appears to be normal .There is no mass or debris seen.     NORMAL RENAL SONOGRAM. Multiple small cysts seen in both kidneys as described above      Pertinent Labs:   CBC:   Recent Labs     11/23/22 0220 11/24/22  0336 11/25/22  0519   WBC 4.6* 4.4* 5.1   HGB 11.9* 11.6* 11.1*    161 186     BMP:    Recent Labs     11/23/22 0220 11/23/22  0919 11/23/22 2233 11/24/22  0336 11/25/22  0519     --   --  136 139   K 3.4*   < > 3.7 3.7 4.1     --   --  101 104   CO2 24  --   --  24 24 BUN 10  --   --  11 9   CREATININE 1.0  --   --  1.1 1.3*   GLUCOSE 96  --   --  105 94    < > = values in this interval not displayed. INR: No results for input(s): INR in the last 72 hours. Physical Exam:  Vital Signs: BP (!) 128/93   Pulse 78   Temp 97.1 °F (36.2 °C) (Temporal)   Resp 18   Ht 6' 3\" (1.905 m)   Wt 176 lb (79.8 kg)   SpO2 100%   BMI 22.00 kg/m²       Physical Exam  Vitals and nursing note reviewed. Constitutional:       General: He is not in acute distress. Appearance: Normal appearance. He is not ill-appearing. HENT:      Head: Normocephalic. Nose: Nose normal.      Mouth/Throat:      Mouth: Mucous membranes are moist.   Eyes:      Pupils: Pupils are equal, round, and reactive to light. Cardiovascular:      Rate and Rhythm: Normal rate and regular rhythm. Pulses: Normal pulses. Heart sounds: Normal heart sounds. Pulmonary:      Effort: Pulmonary effort is normal.      Breath sounds: Normal breath sounds. Abdominal:      General: Bowel sounds are normal. There is no distension. Palpations: Abdomen is soft. Musculoskeletal:         General: Normal range of motion. Skin:     General: Skin is warm. Capillary Refill: Capillary refill takes less than 2 seconds. Neurological:      Mental Status: He is alert and oriented to person, place, and time. Psychiatric:         Mood and Affect: Mood normal.         Behavior: Behavior normal.       Discharge Medications:         Medication List        START taking these medications      chlordiazePOXIDE 25 MG capsule  Commonly known as: LIBRIUM  Take 1 capsule by mouth in the morning and at bedtime for 3 days.      cloNIDine 0.1 MG tablet  Commonly known as: CATAPRES  Take 1 tablet by mouth 3 times daily     lamoTRIgine 150 MG tablet  Commonly known as: LAMICTAL  Take 2 tablets by mouth daily  Start taking on: November 26, 2022  Replaces: lamoTRIgine 100 MG Tbdp            CHANGE how you take these medications      amLODIPine 10 MG tablet  Commonly known as: NORVASC  Take 1 tablet by mouth nightly  What changed:   medication strength  how much to take  when to take this     losartan 50 MG tablet  Commonly known as: COZAAR  Take 1 tablet by mouth in the morning and at bedtime  What changed:   medication strength  See the new instructions. CONTINUE taking these medications      gabapentin 300 MG capsule  Commonly known as: NEURONTIN  TAKE 2 CAPSULES BY MOUTH THREE TIMES DAILY (MORNING, NOON AND BEFORE BED)     hydrOXYzine HCl 25 MG tablet  Commonly known as: ATARAX     mupirocin 2 % ointment  Commonly known as: BACTROBAN     QUEtiapine 200 MG tablet  Commonly known as: SEROQUEL  TAKE 1 TABLET BY MOUTH NIGHTLY     traZODone 50 MG tablet  Commonly known as: DESYREL  TAKE 1 TABLET BY MOUTH NIGHTLY AS NEEDED FOR SLEEP            STOP taking these medications      dicloxacillin 250 MG capsule  Commonly known as: DYNAPEN     dicyclomine 20 MG tablet  Commonly known as: BENTYL     lamoTRIgine 100 MG Tbdp  Replaced by: lamoTRIgine 150 MG tablet     pantoprazole 40 MG tablet  Commonly known as: PROTONIX     vitamin D 1.25 MG (86725 UT) Caps capsule  Commonly known as: ERGOCALCIFEROL               Where to Get Your Medications        These medications were sent to [facility], [address] [place] [address] Road  [address] , [address] [place] [id number]      Phone: [phone]   amLODIPine 10 MG tablet  cloNIDine 0.1 MG tablet  lamoTRIgine 150 MG tablet  losartan 50 MG tablet       You can get these medications from any pharmacy    Bring a paper prescription for each of these medications  chlordiazePOXIDE 25 MG capsule         Discharge Instructions: Follow up with Sabine Camarillo MD in 7 days. Take medications as directed. Resume activity as tolerated. Diet: ADULT DIET; Regular     Disposition: Patient is Stable  and will be discharged to Home.     Time spent on discharge 35  minutes spent in assessing patient, reviewing medications, discussion with nursing, confirming safe discharge plan and preparation of discharge summary.     Signed:  Monica Wiggins, APRN - CNP, 11/25/2022 3:56 PM

## 2022-11-28 ENCOUNTER — TELEPHONE (OUTPATIENT)
Dept: INTERNAL MEDICINE | Age: 52
End: 2022-11-28

## 2022-11-28 NOTE — TELEPHONE ENCOUNTER
[facility] Transitions Initial Follow Up Call    Outreach made within 2 business days of discharge: Yes    Patient: Aman Rocha   Patient : 1970 MRN: [id number]    Reason for Admission: Alcohol intoxication and hallucinations    Discharge Date: 22      Discharge Diagnoses:  Principal Problem:    Delirium, alcoholic ([address]  Resolved Problems:    * No resolved hospital problems. *      Spoke with: Attempted to make contact with patient/caregiver for an initial transitions of care follow up call post discharge without success. I will reach out again at a later time. Any previously scheduled hospital follow up appointments noted below.         [facility]    Scheduled appointment with PCP within 7-14 days    Follow Up  Future Appointments   Date Time Provider Abdi Black   2022  4:15 PM Sabine Camarillo MD [place], [place]

## 2022-11-29 ENCOUNTER — TELEPHONE (OUTPATIENT)
Dept: INTERNAL MEDICINE | Age: 52
End: 2022-11-29

## 2022-11-29 NOTE — TELEPHONE ENCOUNTER
[facility] Transitions Initial Follow Up Call    Outreach made within 2 business days of discharge: Yes    Patient: Tray Davenport   Patient : 1970 MRN: [id number]    Reason for Admission: Alcohol intoxication and hallucinations    Discharge Date: 22      Discharge Diagnoses:  Principal Problem:    Delirium, alcoholic ([address]  Resolved Problems:    * No resolved hospital problems     Spoke with: Patient    [facility]    TCM Interactive Patient Contact:  Was patient able to fill all prescriptions: Yes  Was patient instructed to bring all medications to the follow-up visit: Yes  Is patient taking all medications as directed in the discharge summary? Yes  Does patient understand their discharge instructions: Yes  Does patient have questions or concerns that need addressed prior to 7-14 day follow up office visit: no    Spoke to the patient he said that he BP is running high. He has already spoken to [place] about this. He has had several high readings. He said he was asking them for a longer time off work due to his BP running so high. He said he has taken all his BP medications and a clonidine just a little bit ago. He is laying down now and trying to relax. He will check his BP in about an hour and see if it is still really high. He will call us back then for further instructions if it is still high. Pt did not want to come in today he was sent home from work bc his BP was to high. I have his scheduled for tomorrow. Pt states he has had no coffee or caffeine. He has no t had any alcohol. He has had some PP&J and soup with water. Pt will let us know how he is in an hour.      Scheduled appointment with PCP within 7-14 days    Follow Up  Future Appointments   Date Time Provider Abdi Black   2022  4:15 PM [facility]   2023  8:30 AM [facility] [place], [place]

## 2022-11-30 ENCOUNTER — HOSPITAL ENCOUNTER (EMERGENCY)
Age: 52
Discharge: HOME | End: 2022-11-30
Attending: EMERGENCY MEDICINE
Payer: MEDICAID

## 2022-11-30 ENCOUNTER — APPOINTMENT (OUTPATIENT)
Dept: GENERAL RADIOLOGY | Age: 52
End: 2022-11-30
Payer: MEDICAID

## 2022-11-30 VITALS
TEMPERATURE: 97.6 F | BODY MASS INDEX: 21.14 KG/M2 | SYSTOLIC BLOOD PRESSURE: 126 MMHG | OXYGEN SATURATION: 97 % | HEIGHT: 75 IN | WEIGHT: 170 LBS | DIASTOLIC BLOOD PRESSURE: 92 MMHG | RESPIRATION RATE: 19 BRPM | HEART RATE: 59 BPM

## 2022-11-30 DIAGNOSIS — R07.9 CHEST PAIN, UNSPECIFIED TYPE: ICD-10-CM

## 2022-11-30 DIAGNOSIS — F41.1 ANXIETY STATE: ICD-10-CM

## 2022-11-30 DIAGNOSIS — I10 ESSENTIAL HYPERTENSION: Primary | ICD-10-CM

## 2022-11-30 LAB
ALBUMIN SERPL-MCNC: 4.4 G/DL (ref 3.5–5.2)
ALP BLD-CCNC: 115 U/L (ref 40–130)
ALT SERPL-CCNC: 26 U/L (ref 5–41)
ANION GAP SERPL CALCULATED.3IONS-SCNC: 8 MMOL/L (ref 7–19)
AST SERPL-CCNC: 24 U/L (ref 5–40)
BASOPHILS ABSOLUTE: 0 K/UL (ref 0–0.2)
BASOPHILS RELATIVE PERCENT: 0.5 % (ref 0–1)
BILIRUB SERPL-MCNC: 0.5 MG/DL (ref 0.2–1.2)
BUN BLDV-MCNC: 8 MG/DL (ref 6–20)
CALCIUM SERPL-MCNC: 9.4 MG/DL (ref 8.6–10)
CHLORIDE BLD-SCNC: 99 MMOL/L (ref 98–111)
CO2: 27 MMOL/L (ref 22–29)
CREAT SERPL-MCNC: 1.1 MG/DL (ref 0.5–1.2)
EOSINOPHILS ABSOLUTE: 0.1 K/UL (ref 0–0.6)
EOSINOPHILS RELATIVE PERCENT: 2.1 % (ref 0–5)
GFR SERPL CREATININE-BSD FRML MDRD: >60 ML/MIN/{1.73_M2}
GLUCOSE BLD-MCNC: 103 MG/DL (ref 74–109)
HCT VFR BLD CALC: 38.7 % (ref 42–52)
HEMOGLOBIN: 13.3 G/DL (ref 14–18)
IMMATURE GRANULOCYTES #: 0 K/UL
LYMPHOCYTES ABSOLUTE: 1.4 K/UL (ref 1.1–4.5)
LYMPHOCYTES RELATIVE PERCENT: 25.2 % (ref 20–40)
MCH RBC QN AUTO: 32.5 PG (ref 27–31)
MCHC RBC AUTO-ENTMCNC: 34.4 G/DL (ref 33–37)
MCV RBC AUTO: 94.6 FL (ref 80–94)
MONOCYTES ABSOLUTE: 0.6 K/UL (ref 0–0.9)
MONOCYTES RELATIVE PERCENT: 9.9 % (ref 0–10)
NEUTROPHILS ABSOLUTE: 3.5 K/UL (ref 1.5–7.5)
NEUTROPHILS RELATIVE PERCENT: 61.9 % (ref 50–65)
PDW BLD-RTO: 13.6 % (ref 11.5–14.5)
PLATELET # BLD: 283 K/UL (ref 130–400)
PMV BLD AUTO: 8.4 FL (ref 9.4–12.4)
POTASSIUM REFLEX MAGNESIUM: 4 MMOL/L (ref 3.5–5)
PRO-BNP: 179 PG/ML (ref 0–900)
RBC # BLD: 4.09 M/UL (ref 4.7–6.1)
SODIUM BLD-SCNC: 134 MMOL/L (ref 136–145)
TOTAL PROTEIN: 7.5 G/DL (ref 6.6–8.7)
TROPONIN: <0.01 NG/ML (ref 0–0.03)
WBC # BLD: 5.7 K/UL (ref 4.8–10.8)

## 2022-11-30 PROCEDURE — 99285 EMERGENCY DEPT VISIT HI MDM: CPT

## 2022-11-30 PROCEDURE — 71045 X-RAY EXAM CHEST 1 VIEW: CPT

## 2022-11-30 PROCEDURE — 93005 ELECTROCARDIOGRAM TRACING: CPT | Performed by: NURSE PRACTITIONER

## 2022-11-30 PROCEDURE — 85025 COMPLETE CBC W/AUTO DIFF WBC: CPT

## 2022-11-30 PROCEDURE — 80053 COMPREHEN METABOLIC PANEL: CPT

## 2022-11-30 PROCEDURE — 83880 ASSAY OF NATRIURETIC PEPTIDE: CPT

## 2022-11-30 PROCEDURE — 84484 ASSAY OF TROPONIN QUANT: CPT

## 2022-11-30 PROCEDURE — 36415 COLL VENOUS BLD VENIPUNCTURE: CPT

## 2022-11-30 PROCEDURE — 6370000000 HC RX 637 (ALT 250 FOR IP): Performed by: NURSE PRACTITIONER

## 2022-11-30 RX ORDER — AMLODIPINE BESYLATE 5 MG/1
10 TABLET ORAL DAILY
Status: DISCONTINUED | OUTPATIENT
Start: 2022-11-30 | End: 2022-11-30

## 2022-11-30 RX ORDER — AMLODIPINE BESYLATE 5 MG/1
10 TABLET ORAL ONCE
Status: COMPLETED | OUTPATIENT
Start: 2022-11-30 | End: 2022-11-30

## 2022-11-30 RX ADMIN — AMLODIPINE BESYLATE 10 MG: 5 TABLET ORAL at 10:59

## 2022-11-30 ASSESSMENT — PAIN DESCRIPTION - LOCATION: LOCATION: CHEST;ARM

## 2022-11-30 ASSESSMENT — PAIN SCALES - GENERAL: PAINLEVEL_OUTOF10: 3

## 2022-11-30 ASSESSMENT — PAIN - FUNCTIONAL ASSESSMENT: PAIN_FUNCTIONAL_ASSESSMENT: 0-10

## 2022-11-30 ASSESSMENT — HEART SCORE: ECG: 1

## 2022-11-30 ASSESSMENT — PAIN DESCRIPTION - DESCRIPTORS: DESCRIPTORS: DULL

## 2022-11-30 NOTE — ED PROVIDER NOTES
[facility] EMERGENCY DEPT  eMERGENCY dEPARTMENT eNCOUnter      Pt Name: Aman Rocha  MRN: [id number]  [place] 1970  Date of evaluation: 11/30/2022  Provider: Brandyn [address]       Chief Complaint   Patient presents with    Chest Pain     Pt arrived to the ed with c/o chest pain and left shoulder pain. Onset 0400. Pt also c/o having trouble with HTN. Hypertension         HISTORY OF PRESENT ILLNESS   (Location/Symptom, Timing/Onset,Context/Setting, Quality, Duration, Modifying Factors, Severity)  Note limiting factors. Aman Rocha is a 45 y.o. male who presents to the emergency department with chest pain that started at 4 am.  He tells me about how high his blood pressure has been running. Pt was recently discharged after a medical admission for alcohol withdrawal. He denies any alcohol or drug use. Pain to his chest radiates to his left shoulder. Comes and goes. Not present now     The history is provided by the patient. Chest Pain  Pain location:  L chest  Pain quality: sharp and stabbing    Pain radiates to:  L shoulder  Pain severity:  Moderate  Onset quality:  Sudden  Duration:  5 hours  Timing:  Intermittent  Progression:  Waxing and waning  Chronicity:  New  Risk factors: hypertension, male sex and smoking      NursingNotes were reviewed. REVIEW OF SYSTEMS    (2-9 systems for level 4, 10 or more for level 5)     Review of Systems   Cardiovascular:  Positive for chest pain. Except as noted above the remainder of the review of systems was reviewed and negative.        PAST MEDICAL HISTORY     Past Medical History:   Diagnosis Date    Acute alcoholic intoxication without complication ([address] 9/94/1929    Alcoholism ([facility]     history of alcohol overdose, denies history of seizure    Anxiety     CAD (coronary artery disease)     old MI on a prior EKG, but no other problmes/    Chest pain 12/12/2011    Chronic midline low back pain without sciatica     Cigarette smoker Years: 35.00     Pack years: 70.00     Types: Cigarettes    Smokeless tobacco: Current     Types: Chew    Tobacco comments:     refused counseling   Vaping Use    Vaping Use: Never used   Substance and Sexual Activity    Alcohol use: Yes     Alcohol/week: 6.0 standard drinks     Types: 6 Cans of beer per week     Comment: heavy; no drinks in a a week and a half. Drug use: Not Currently     Frequency: 1.0 times per week    Sexual activity: Yes     Partners: Female     Social Determinants of Health     Financial Resource Strain: Low Risk     Difficulty of Paying Living Expenses: Not hard at all   Food Insecurity: No Food Insecurity    Worried About [address] in the Last Year: Never true    [address] in the Last Year: Never true       SCREENINGS    [place] Coma Scale  Eye Opening: Spontaneous  Best Verbal Response: Oriented  Best Motor Response: Obeys commands  [place] Coma Scale Score: 15 @FLOW(46036299)@      PHYSICAL EXAM    (up to 7 for level 4, 8 or more for level 5)     ED Triage Vitals   BP Temp Temp Source Heart Rate Resp SpO2 Height Weight   11/30/22 0922 11/30/22 0922 11/30/22 0922 11/30/22 0922 11/30/22 0922 11/30/22 0922 11/30/22 0918 11/30/22 0918   (!) 143/93 97.6 °F (36.4 °C) Oral 63 17 97 % 6' 3\" (1.905 m) 170 lb (77.1 kg)       Physical Exam  Vitals and nursing note reviewed. Constitutional:       Appearance: Normal appearance. He is well-developed. HENT:      Head: Normocephalic and atraumatic. Eyes:      General: No scleral icterus. Right eye: No discharge. Left eye: No discharge. Cardiovascular:      Rate and Rhythm: Normal rate. Heart sounds: Normal heart sounds, S1 normal and S2 normal.   Pulmonary:      Effort: No respiratory distress. Musculoskeletal:      Cervical back: Normal range of motion and neck supple. Right lower leg: No edema. Left lower leg: No edema.    Neurological:      Mental Status: He is alert and oriented to person, place, and time. Psychiatric:         Mood and Affect: Mood is anxious. Behavior: Behavior normal.       DIAGNOSTIC RESULTS     EKG: All EKG's are interpreted by the Emergency Department Physician who either signs or Co-signsthis chart in the absence of a cardiologist.        RADIOLOGY:   Adin Zach such as CT, Ultrasound and MRI are read by the radiologist. Plain radiographic images are visualized and preliminarily interpreted by the emergency physician with the below findings:      Interpretation per the Radiologist below, if available at the time of this note:    XR CHEST PORTABLE   Final Result   No acute cardiopulmonary process identified. ED BEDSIDEULTRASOUND:   Performed by ED Physician -none    LABS:  Labs Reviewed   CBC WITH AUTO DIFFERENTIAL - Abnormal; Notable for the following components:       Result Value    RBC 4.09 (*)     Hemoglobin 13.3 (*)     Hematocrit 38.7 (*)     MCV 94.6 (*)     MCH 32.5 (*)     MPV 8.4 (*)     All other components within normal limits   COMPREHENSIVE METABOLIC PANEL W/ REFLEX TO MG FOR LOW K - Abnormal; Notable for the following components:    Sodium 134 (*)     All other components within normal limits   TROPONIN   BRAIN NATRIURETIC PEPTIDE       All other labs were within normal range or not returned as of this dictation. EMERGENCY DEPARTMENT COURSE and DIFFERENTIALDIAGNOSIS/MDM:   Vitals:    Vitals:    11/30/22 0918 11/30/22 0922 11/30/22 1030   BP:  (!) 143/93 138/89   Pulse:  63 57   Resp:  17 14   Temp:  97.6 °F (36.4 °C)    TempSrc:  Oral    SpO2:  97% 97%   Weight: 170 lb (77.1 kg)     Height: 6' 3\" (1.905 m)             [facility]  Pt had appointment with his pcp which he missed to come here. He had an echo stress 11/22 that showed no ischemia and a duke treadmill score of 9. Pt is anxious. Has not been taking his norvasc. Doesn't know why. Took 4 clonidine in the last 5 hours. Admits to anxiety. Not having chest pain now.   Heart score 3. Told pt he needs to stop smoking also. Gave pt his norvasc dose here. Can Return for any chest pain but just keep a record of his blood pressure to take in when he see Dr Anshul Alonso      CONSULTS:  None    PROCEDURES:  Unless otherwise noted below, none     Procedures    FINAL IMPRESSION      1. Essential hypertension    2. Chest pain, unspecified type    3.  Anxiety state        DISPOSITION/PLAN   DISPOSITION Decision To Discharge 11/30/2022 11:13:55 AM      PATIENT REFERRED TO:  Carey Gerber MD  [address] [place] [address] Dr Steve Cannon [address] [place] [address] [phone]    Schedule an appointment as soon as possible for a visit   for blood presure control    DISCHARGE MEDICATIONS:  New Prescriptions    No medications on file          (Please note that portions of this note were completed with a voice recognitionprogram.  Efforts were made to edit the dictations but occasionally words are mis-transcribed.)    KWASI Osborn (electronically signed)           KWASI Osborn  11/30/22 [id number] KWASI Rodriguez  11/30/22 [id number]

## 2022-12-01 ENCOUNTER — OFFICE VISIT (OUTPATIENT)
Dept: INTERNAL MEDICINE | Age: 52
End: 2022-12-01
Payer: MEDICAID

## 2022-12-01 VITALS
SYSTOLIC BLOOD PRESSURE: 132 MMHG | BODY MASS INDEX: 22.01 KG/M2 | HEART RATE: 106 BPM | HEIGHT: 75 IN | WEIGHT: 177 LBS | DIASTOLIC BLOOD PRESSURE: 80 MMHG | OXYGEN SATURATION: 100 %

## 2022-12-01 DIAGNOSIS — G62.9 PERIPHERAL POLYNEUROPATHY: ICD-10-CM

## 2022-12-01 DIAGNOSIS — I10 PRIMARY HYPERTENSION: ICD-10-CM

## 2022-12-01 DIAGNOSIS — F10.921 ACUTE ALCOHOLIC INTOXICATION WITH DELIRIUM (HCC): Primary | ICD-10-CM

## 2022-12-01 DIAGNOSIS — G47.00 INSOMNIA, UNSPECIFIED TYPE: ICD-10-CM

## 2022-12-01 DIAGNOSIS — F10.10 ETOH ABUSE: ICD-10-CM

## 2022-12-01 LAB
EKG P AXIS: 56 DEGREES
EKG P-R INTERVAL: 194 MS
EKG Q-T INTERVAL: 432 MS
EKG QRS DURATION: 96 MS
EKG QTC CALCULATION (BAZETT): 435 MS
EKG T AXIS: 66 DEGREES

## 2022-12-01 PROCEDURE — 99215 OFFICE O/P EST HI 40 MIN: CPT | Performed by: INTERNAL MEDICINE

## 2022-12-01 RX ORDER — CHLORDIAZEPOXIDE HYDROCHLORIDE 10 MG/1
10 CAPSULE, GELATIN COATED ORAL NIGHTLY
Qty: 30 CAPSULE | Refills: 0 | Status: SHIPPED | OUTPATIENT
Start: 2022-12-01 | End: 2022-12-31

## 2022-12-01 ASSESSMENT — ENCOUNTER SYMPTOMS
EYE PAIN: 0
RHINORRHEA: 0
NAUSEA: 0
VOICE CHANGE: 0
BLOOD IN STOOL: 0
ANAL BLEEDING: 0
SORE THROAT: 0
DIARRHEA: 0
EYE DISCHARGE: 0
ABDOMINAL PAIN: 0
SINUS PRESSURE: 0
EYE REDNESS: 0
FACIAL SWELLING: 0
EYE ITCHING: 0
ABDOMINAL DISTENTION: 0
TROUBLE SWALLOWING: 0
CONSTIPATION: 0
SHORTNESS OF BREATH: 0
CHEST TIGHTNESS: 0
RECTAL PAIN: 0
COLOR CHANGE: 0
VOMITING: 0
COUGH: 0
BACK PAIN: 1
CHOKING: 0
PHOTOPHOBIA: 0
WHEEZING: 0

## 2022-12-01 NOTE — PROGRESS NOTES
Post-Discharge Transitional Care Follow Up      Dyan Crenshaw   YOB: 1970    Date of Office Visit:  12/1/2022  Date of Hospital Admission: 11/22/22  Date of Hospital Discharge:  11/25/22  Readmission Risk Score (high >=14%. Medium >=10%):Readmission Risk Score: 15.1      Care management risk score Rising risk (score 2-5) and Complex Care (Scores >=6): No Risk Score On File     Non face to face  following discharge, date last encounter closed (first attempt may have been earlier): 11/29/2022     Call initiated 2 business days of discharge: Yes     Acute alcoholic intoxication with delirium ([address]  ETOH abuse  -     chlordiazePOXIDE (LIBRIUM) 10 MG capsule; Take 1 capsule by mouth nightly for 30 days. , Disp-30 capsule, R-0Normal  Insomnia, unspecified type  Peripheral polyneuropathy  Primary hypertension    Medical Decision Making: high complexity  Return in about 1 week (around 12/8/2022), or with either Dr. Mary Kenyon for Marylene Runner for BP check. On this date 12/1/2022 I have spent 40 minutes reviewing previous notes, test results and face to face with the patient discussing the diagnosis and importance of compliance with the treatment plan as well as documenting on the day of the visit. Subjective:   HPI    Inpatient course: Discharge summary reviewed- see chart. Interval history/Current status: Mr. Monalisa Gooden is a 78-year-old male who presents to the office today for hospital follow-up. He presented to the emergency department on the day of admission in regards to alcohol intoxication and hallucinations. He has a longstanding history of alcoholism and had been sober for about 4 to 5 weeks prior to his presentation. However, over the couple days prior to admission, he started drinking heavily. That time, he was drinking about 3 beers per day. He started having hallucinations. He stated that he was seeing cats walking around his house. He does not have any cats.   He also has some nausea and vomiting. He had been feeling very depressed about his resumption of alcohol. He had had some thoughts about wanting to harm himself. He was admitted to the hospitalist service for medical management. Psychiatry also was consulted. He was admitted to the floor with a one-on-one sitter. An  echocardiogram was done during his hospitalization, which was normal.  He also had a renal ultrasound that did show small cyst on both kidneys. There was no evidence of hydronephrosis. Urology was consulted for flank pain. CT of abdomen with IV contrast was ordered as an outpatient to confirm that there was no solid renal mass. He does have an upcoming appointment with Dr. Anastasiia Silvestre in about 4 to 6 weeks. Psych the consultation for urology was secondary to flank pain and a kidney cyst.    In the interim since his discharge, he has stopped drinking. He has joint alcoholics anonymous. However, he has been having issues with blood pressure control. He was released to go back to work. However, when he got to work, his blood pressure was 190/100. He states around 430 on Tuesday morning, he started having chest pain that ran down his left arm. He did go to the emergency room for further evaluation. He was given Norvasc and clonidine in the emergency room. It was felt that his blood pressure elevation was secondary to anxiety. He is feeling much better at this time. His blood pressure is much better controlled. He is taking clonidine at least 3 times a day. He has been advised to take it only as needed. However, he continues to take it 3 times a day. It looks like he is only taken his losartan once a day instead of twice a day as ordered. He has been advised to take his losartan twice a day. His mood is stable his current dose of Lamictal.  However, Seroquel makes his heart race. He wants something to help him sleep. He also would like to come off of his gabapentin.   I did tell him that he needs to wean off of the gabapentin rather than just discontinuing it all at once. He is agreeable to taking the gabapentin twice daily for 2 weeks, then once a day for 2 weeks. He states that he still having some cravings for alcohol. He wants to know if he can try Librium at night for sleep. He thinks they will also help with the cravings. Librium does make him sleepy when he takes it. His blood pressure is well controlled today. Currently, it is running at 132/80. However, he states that after he takes the clonidine, it comes up within 2 hours. Be having some rebound hypertension. We need to get him on the losartan twice daily, continue his Norvasc and take the clonidine only if his blood pressure gets above 170/110. Neuropathy is stable. However, he would like to get off the gabapentin altogether. Again, we can wean his gabapentin slowly. Patient Active Problem List   Diagnosis    Hypertension    Alcohol use disorder, severe, dependence ([address]    Suicidal ideation    Tobacco abuse disorder    Persistent mood (affective) disorder, unspecified (HCC)    Anxiety    Marijuana abuse    Alcohol abuse, daily use    Generalized anxiety disorder    Insomnia    Recurrent major depression ([address]    Acute alcoholic intoxication without complication ([address]    Delirium, alcoholic ([address]       Medications listed as ordered at the time of discharge from hospital     Medication List            Accurate as of December 1, 2022  9:43 AM. If you have any questions, ask your nurse or doctor. START taking these medications      chlordiazePOXIDE 10 MG capsule  Commonly known as: LIBRIUM  Take 1 capsule by mouth nightly for 30 days.   Started by: Malia Gross MD            CONTINUE taking these medications      amLODIPine 10 MG tablet  Commonly known as: NORVASC  Take 1 tablet by mouth nightly     cloNIDine 0.1 MG tablet  Commonly known as: CATAPRES  Take 1 tablet by mouth 3 times daily     gabapentin 300 MG capsule  Commonly known as: NEURONTIN  TAKE 2 CAPSULES BY MOUTH THREE TIMES DAILY (MORNING, NOON AND BEFORE BED)     hydrOXYzine HCl 25 MG tablet  Commonly known as: ATARAX     lamoTRIgine 150 MG tablet  Commonly known as: LAMICTAL  Take 2 tablets by mouth daily     losartan 50 MG tablet  Commonly known as: COZAAR  Take 1 tablet by mouth in the morning and at bedtime     mupirocin 2 % ointment  Commonly known as: BACTROBAN            STOP taking these medications      QUEtiapine 200 MG tablet  Commonly known as: SEROQUEL  Stopped by: Lien Whitfield MD               Where to Get Your Medications        These medications were sent to [facility], [address] [facility], [address]  [address] [address], [email or web address] [address]      Phone: [phone]   chlordiazePOXIDE 10 MG capsule          Medications marked \"taking\" at this time  Outpatient Medications Marked as Taking for the 12/1/22 encounter (Office Visit) with Lien Whitfield MD   Medication Sig Dispense Refill    chlordiazePOXIDE (LIBRIUM) 10 MG capsule Take 1 capsule by mouth nightly for 30 days. 30 capsule 0    lamoTRIgine (LAMICTAL) 150 MG tablet Take 2 tablets by mouth daily 30 tablet 3    cloNIDine (CATAPRES) 0.1 MG tablet Take 1 tablet by mouth 3 times daily 60 tablet 3    losartan (COZAAR) 50 MG tablet Take 1 tablet by mouth in the morning and at bedtime 30 tablet 3    amLODIPine (NORVASC) 10 MG tablet Take 1 tablet by mouth nightly 30 tablet 3    mupirocin (BACTROBAN) 2 % ointment Apply topically 3 times daily Apply topically 3 times daily. gabapentin (NEURONTIN) 300 MG capsule TAKE 2 CAPSULES BY MOUTH THREE TIMES DAILY (MORNING, NOON AND BEFORE BED) 180 capsule 0    hydrOXYzine (ATARAX) 25 MG tablet TAKE 1 TABLET BY MOUTH THREE TIMES DAILY.           Medications patient taking as of now reconciled against medications ordered at time of hospital discharge: Yes    Review of Systems   Constitutional:  Negative for activity change, appetite change, chills, diaphoresis, fatigue, fever and unexpected weight change. HENT:  Negative for congestion, ear pain, facial swelling, hearing loss, mouth sores, nosebleeds, postnasal drip, rhinorrhea, sinus pressure, sneezing, sore throat, tinnitus, trouble swallowing and voice change. Eyes:  Negative for photophobia, pain, discharge, redness, itching and visual disturbance. Respiratory:  Negative for cough, choking, chest tightness, shortness of breath and wheezing. Cardiovascular:  Negative for chest pain, palpitations and leg swelling. Gastrointestinal:  Negative for abdominal distention, abdominal pain, anal bleeding, blood in stool, constipation, diarrhea, nausea, rectal pain and vomiting. Endocrine: Negative for cold intolerance, heat intolerance, polydipsia, polyphagia and polyuria. Genitourinary:  Negative for decreased urine volume, difficulty urinating, dysuria, flank pain, frequency, hematuria and urgency. Does complain of some dribbling and dysuria at times. Musculoskeletal:  Positive for back pain and neck pain. Negative for arthralgias, gait problem, joint swelling, myalgias and neck stiffness. He has had a history of a neck fracture. Dupuytren's contracture to the left palm   Skin:  Negative for color change, pallor and rash. Allergic/Immunologic: Negative for environmental allergies and food allergies. Neurological:  Negative for dizziness, tremors, syncope, weakness, light-headedness, numbness and headaches. Hematological:  Negative for adenopathy. Does not bruise/bleed easily. Psychiatric/Behavioral:  Positive for dysphoric mood. Negative for agitation, behavioral problems, confusion, decreased concentration, hallucinations, self-injury, sleep disturbance and suicidal ideas. The patient is nervous/anxious. The patient is not hyperactive.       Objective:    /80 (Site: Left Upper Arm, Position: Sitting, Cuff Size: Medium Adult)   Pulse (!) 106   Ht 6' 3\" (1.905 m)   Wt 177 lb (80.3 kg)   SpO2 100%   BMI 22.12 kg/m²   Physical Exam  Vitals and nursing note reviewed. Constitutional:       General: He is not in acute distress. Appearance: Normal appearance. He is well-developed. He is not ill-appearing, toxic-appearing or diaphoretic. HENT:      Head: Normocephalic and atraumatic. Right Ear: Tympanic membrane, ear canal and external ear normal. There is no impacted cerumen. Left Ear: Tympanic membrane, ear canal and external ear normal. There is no impacted cerumen. Nose: Nose normal. No congestion or rhinorrhea. Mouth/Throat:      Mouth: Mucous membranes are moist.      Pharynx: Oropharynx is clear. No oropharyngeal exudate or posterior oropharyngeal erythema. Eyes:      General: No scleral icterus. Right eye: No discharge. Left eye: No discharge. Extraocular Movements: Extraocular movements intact. Conjunctiva/sclera: Conjunctivae normal.      Pupils: Pupils are equal, round, and reactive to light. Neck:      Thyroid: No thyromegaly. Vascular: No carotid bruit or JVD. Trachea: No tracheal deviation. Cardiovascular:      Rate and Rhythm: Normal rate and regular rhythm. Pulses: Normal pulses. Heart sounds: Normal heart sounds. No murmur heard. No friction rub. No gallop. Pulmonary:      Effort: Pulmonary effort is normal. No respiratory distress. Breath sounds: Normal breath sounds. No stridor. No wheezing, rhonchi or rales. Chest:      Chest wall: No tenderness. Abdominal:      General: Bowel sounds are normal. There is no distension. Palpations: Abdomen is soft. There is no mass. Tenderness: There is no abdominal tenderness. There is no right CVA tenderness, left CVA tenderness, guarding or rebound. Hernia: No hernia is present.    Genitourinary:     Penis: Normal.       Testes: Normal.      Prostate: Normal.      Rectum: Normal. Musculoskeletal:         General: No swelling, tenderness, deformity or signs of injury. Normal range of motion. Cervical back: Normal range of motion and neck supple. No rigidity. No muscular tenderness. Right lower leg: No edema. Left lower leg: No edema. Lymphadenopathy:      Cervical: No cervical adenopathy. Skin:     General: Skin is warm and dry. Coloration: Skin is not jaundiced or pale. Findings: No bruising, erythema, lesion or rash. Neurological:      General: No focal deficit present. Mental Status: He is alert and oriented to person, place, and time. Mental status is at baseline. Cranial Nerves: No cranial nerve deficit. Motor: No weakness or abnormal muscle tone. Coordination: Coordination normal.      Gait: Gait normal.      Deep Tendon Reflexes: Reflexes are normal and symmetric. Reflexes normal.   Psychiatric:         Mood and Affect: Mood normal.         Behavior: Behavior normal.         Thought Content: Thought content normal.         Judgment: Judgment normal.     58-year-old male here for follow-up    1. Acute alcohol intoxication with hallucinations: Patient is no longer having symptoms. He states that he is no longer drinking alcohol and is involved in AA. He is having some cravings for alcohol. He is having some difficulty sleeping. He wants to try short-term course of Librium at night. We can certainly try this and see if that helps with both insomnia and alcohol cravings. 2.  Insomnia: Discontinue Seroquel. Thinks it makes his heart race. 3.  Peripheral neuropathy: Start weaning gabapentin. Take gabapentin twice daily for 2 weeks, then weekly for 2 weeks and then discontinue the medication. 4.  Hypertension: Patient needs to take the clonidine only for blood pressure above 170/110. Continue taking the Norvasc. Take the losartan twice daily as prescribed. He is only taking it once a day at this time.         An electronic signature was used to authenticate this note.   --Zak Owen MD

## 2022-12-01 NOTE — PROGRESS NOTES
Clondine did bring it down for about two hours, then it would shoot back up   Took two clonidine on Tuesday night, took a norvasc last night and one this morning, took clonidine this morning along with amlodipine and norvasc.

## 2022-12-08 ENCOUNTER — OFFICE VISIT (OUTPATIENT)
Dept: INTERNAL MEDICINE | Age: 52
End: 2022-12-08
Payer: MEDICAID

## 2022-12-08 VITALS
HEIGHT: 74 IN | WEIGHT: 179 LBS | SYSTOLIC BLOOD PRESSURE: 124 MMHG | HEART RATE: 68 BPM | DIASTOLIC BLOOD PRESSURE: 78 MMHG | OXYGEN SATURATION: 100 % | BODY MASS INDEX: 22.97 KG/M2

## 2022-12-08 DIAGNOSIS — I10 HYPERTENSION, UNSPECIFIED TYPE: Primary | ICD-10-CM

## 2022-12-08 PROCEDURE — 99213 OFFICE O/P EST LOW 20 MIN: CPT | Performed by: INTERNAL MEDICINE

## 2022-12-08 PROCEDURE — G8484 FLU IMMUNIZE NO ADMIN: HCPCS | Performed by: INTERNAL MEDICINE

## 2022-12-08 PROCEDURE — 3078F DIAST BP <80 MM HG: CPT | Performed by: INTERNAL MEDICINE

## 2022-12-08 PROCEDURE — G8427 DOCREV CUR MEDS BY ELIG CLIN: HCPCS | Performed by: INTERNAL MEDICINE

## 2022-12-08 PROCEDURE — 3074F SYST BP LT 130 MM HG: CPT | Performed by: INTERNAL MEDICINE

## 2022-12-08 PROCEDURE — G8420 CALC BMI NORM PARAMETERS: HCPCS | Performed by: INTERNAL MEDICINE

## 2022-12-08 PROCEDURE — 3017F COLORECTAL CA SCREEN DOC REV: CPT | Performed by: INTERNAL MEDICINE

## 2022-12-08 PROCEDURE — 4004F PT TOBACCO SCREEN RCVD TLK: CPT | Performed by: INTERNAL MEDICINE

## 2022-12-08 PROCEDURE — 1111F DSCHRG MED/CURRENT MED MERGE: CPT | Performed by: INTERNAL MEDICINE

## 2022-12-08 ASSESSMENT — ENCOUNTER SYMPTOMS
BLOOD IN STOOL: 0
TROUBLE SWALLOWING: 0
ABDOMINAL PAIN: 0
SINUS PRESSURE: 0
EYE DISCHARGE: 0
SHORTNESS OF BREATH: 0
VOMITING: 0
ABDOMINAL DISTENTION: 0
NAUSEA: 0
WHEEZING: 0
BACK PAIN: 0
EYE ITCHING: 0
SORE THROAT: 0
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
## 2022-12-13 ENCOUNTER — TELEPHONE (OUTPATIENT)
Dept: INTERNAL MEDICINE | Age: 52
End: 2022-12-13

## 2022-12-13 NOTE — TELEPHONE ENCOUNTER
Pt called and stated that he had to leave work today because he was so sick to his stomach. Pt feels he has been put on to much medication.  Pt wants to know if he can stop the amlodipine at night and just monitor his BP

## 2022-12-14 ENCOUNTER — OFFICE VISIT (OUTPATIENT)
Dept: INTERNAL MEDICINE | Age: 52
End: 2022-12-14
Payer: MEDICAID

## 2022-12-14 VITALS
OXYGEN SATURATION: 99 % | DIASTOLIC BLOOD PRESSURE: 76 MMHG | TEMPERATURE: 97.5 F | SYSTOLIC BLOOD PRESSURE: 128 MMHG | HEART RATE: 82 BPM | WEIGHT: 173 LBS | BODY MASS INDEX: 22.21 KG/M2

## 2022-12-14 DIAGNOSIS — I10 HYPERTENSION, UNSPECIFIED TYPE: Primary | ICD-10-CM

## 2022-12-14 DIAGNOSIS — F33.9 EPISODE OF RECURRENT MAJOR DEPRESSIVE DISORDER, UNSPECIFIED DEPRESSION EPISODE SEVERITY (HCC): ICD-10-CM

## 2022-12-14 DIAGNOSIS — F10.20 ALCOHOL USE DISORDER, SEVERE, DEPENDENCE (HCC): ICD-10-CM

## 2022-12-14 PROCEDURE — G8420 CALC BMI NORM PARAMETERS: HCPCS | Performed by: NURSE PRACTITIONER

## 2022-12-14 PROCEDURE — 99213 OFFICE O/P EST LOW 20 MIN: CPT | Performed by: NURSE PRACTITIONER

## 2022-12-14 PROCEDURE — 4004F PT TOBACCO SCREEN RCVD TLK: CPT | Performed by: NURSE PRACTITIONER

## 2022-12-14 PROCEDURE — G8484 FLU IMMUNIZE NO ADMIN: HCPCS | Performed by: NURSE PRACTITIONER

## 2022-12-14 PROCEDURE — 1111F DSCHRG MED/CURRENT MED MERGE: CPT | Performed by: NURSE PRACTITIONER

## 2022-12-14 PROCEDURE — 3074F SYST BP LT 130 MM HG: CPT | Performed by: NURSE PRACTITIONER

## 2022-12-14 PROCEDURE — 3017F COLORECTAL CA SCREEN DOC REV: CPT | Performed by: NURSE PRACTITIONER

## 2022-12-14 PROCEDURE — G8427 DOCREV CUR MEDS BY ELIG CLIN: HCPCS | Performed by: NURSE PRACTITIONER

## 2022-12-14 PROCEDURE — 3078F DIAST BP <80 MM HG: CPT | Performed by: NURSE PRACTITIONER

## 2022-12-14 ASSESSMENT — ENCOUNTER SYMPTOMS
VOMITING: 0
CHOKING: 0
SHORTNESS OF BREATH: 0
ABDOMINAL PAIN: 0
BLOOD IN STOOL: 0
ABDOMINAL DISTENTION: 0
CONSTIPATION: 0
EYE DISCHARGE: 0
SORE THROAT: 0
DIARRHEA: 0
NAUSEA: 0
STRIDOR: 0
EYE ITCHING: 0
TROUBLE SWALLOWING: 0
WHEEZING: 0
COUGH: 0
COLOR CHANGE: 0

## 2022-12-14 NOTE — ASSESSMENT & PLAN NOTE
He is weaned off most of his medication he said that he only has to be on antidepressant and his blood pressure medicine

## 2022-12-14 NOTE — TELEPHONE ENCOUNTER
Called the pt and he stated that when he exerted himself and work he became dizzy and nauseated. Pt stated he left work and began throwing up multiple times. Pt wants to be seen today, but can not come in until late afternoon due to work.  This [facility] pt him on Angie Mitchell's schedule for 4:15 pm.

## 2022-12-14 NOTE — PATIENT INSTRUCTIONS
Hypertension we will continue with just the losartan 50 twice daily. Recheck next week to see how he is doing. If he is having to take clonidine or if the blood pressures not down we will have to add another medicine besides the Norvasc.

## 2022-12-16 ENCOUNTER — OFFICE VISIT (OUTPATIENT)
Dept: INTERNAL MEDICINE | Age: 52
End: 2022-12-16
Payer: MEDICAID

## 2022-12-16 VITALS
DIASTOLIC BLOOD PRESSURE: 80 MMHG | HEART RATE: 104 BPM | OXYGEN SATURATION: 98 % | SYSTOLIC BLOOD PRESSURE: 120 MMHG | WEIGHT: 160 LBS | BODY MASS INDEX: 20.54 KG/M2

## 2022-12-16 DIAGNOSIS — I10 PRIMARY HYPERTENSION: Primary | ICD-10-CM

## 2022-12-16 DIAGNOSIS — F10.10 ALCOHOL ABUSE: ICD-10-CM

## 2022-12-16 DIAGNOSIS — R42 VERTIGO: ICD-10-CM

## 2022-12-16 DIAGNOSIS — F39 MOOD DISORDER (HCC): ICD-10-CM

## 2022-12-16 DIAGNOSIS — F34.9 PERSISTENT MOOD (AFFECTIVE) DISORDER, UNSPECIFIED (HCC): ICD-10-CM

## 2022-12-16 DIAGNOSIS — Z87.891 SMOKING HISTORY: ICD-10-CM

## 2022-12-16 DIAGNOSIS — J01.90 ACUTE SINUSITIS, RECURRENCE NOT SPECIFIED, UNSPECIFIED LOCATION: ICD-10-CM

## 2022-12-16 PROCEDURE — 99214 OFFICE O/P EST MOD 30 MIN: CPT | Performed by: INTERNAL MEDICINE

## 2022-12-16 PROCEDURE — 4004F PT TOBACCO SCREEN RCVD TLK: CPT | Performed by: INTERNAL MEDICINE

## 2022-12-16 PROCEDURE — 3017F COLORECTAL CA SCREEN DOC REV: CPT | Performed by: INTERNAL MEDICINE

## 2022-12-16 PROCEDURE — G0296 VISIT TO DETERM LDCT ELIG: HCPCS | Performed by: INTERNAL MEDICINE

## 2022-12-16 PROCEDURE — G8420 CALC BMI NORM PARAMETERS: HCPCS | Performed by: INTERNAL MEDICINE

## 2022-12-16 PROCEDURE — G8427 DOCREV CUR MEDS BY ELIG CLIN: HCPCS | Performed by: INTERNAL MEDICINE

## 2022-12-16 PROCEDURE — 3074F SYST BP LT 130 MM HG: CPT | Performed by: INTERNAL MEDICINE

## 2022-12-16 PROCEDURE — G8484 FLU IMMUNIZE NO ADMIN: HCPCS | Performed by: INTERNAL MEDICINE

## 2022-12-16 PROCEDURE — 1111F DSCHRG MED/CURRENT MED MERGE: CPT | Performed by: INTERNAL MEDICINE

## 2022-12-16 PROCEDURE — 3078F DIAST BP <80 MM HG: CPT | Performed by: INTERNAL MEDICINE

## 2022-12-16 RX ORDER — MECLIZINE HYDROCHLORIDE 25 MG/1
25 TABLET ORAL 3 TIMES DAILY PRN
Qty: 30 TABLET | Refills: 0 | Status: SHIPPED | OUTPATIENT
Start: 2022-12-16 | End: 2022-12-26

## 2022-12-16 RX ORDER — GABAPENTIN 300 MG/1
CAPSULE ORAL
Qty: 180 CAPSULE | Refills: 0 | OUTPATIENT
Start: 2022-12-16

## 2022-12-16 RX ORDER — AMOXICILLIN AND CLAVULANATE POTASSIUM 875; 125 MG/1; MG/1
1 TABLET, FILM COATED ORAL 2 TIMES DAILY
Qty: 20 TABLET | Refills: 0 | Status: SHIPPED | OUTPATIENT
Start: 2022-12-16 | End: 2022-12-26

## 2022-12-16 RX ORDER — GABAPENTIN 300 MG/1
CAPSULE ORAL
Qty: 180 CAPSULE | Refills: 0 | Status: SHIPPED | OUTPATIENT
Start: 2022-12-16 | End: 2023-02-08 | Stop reason: SDUPTHER
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
## 2022-12-16 NOTE — TELEPHONE ENCOUNTER
He said he forgot to ask the refill while you were discussing weaning him off the gabapentin. With the taper, you will need to fix the sig & amount. Stephanie Palafox called to request a refill on his medication. Last office visit : 12/16/2022   Next office visit : 2/16/2023     Last UDS:   Amphetamine Screen, Urine   Date Value Ref Range Status   11/22/2022 Negative Negative <500 ng/mL Final     Barbiturate Screen, Urine   Date Value Ref Range Status   04/29/2022 NEG  Final     Benzodiazepine Screen, Urine   Date Value Ref Range Status   11/22/2022 POSITIVE (A) Negative <150 ng/mL Final     Buprenorphine Urine   Date Value Ref Range Status   11/22/2022 Negative Negative <10 ng/mL Final     Cocaine Metabolite Screen, Urine   Date Value Ref Range Status   11/22/2022 Negative Negative <150 ng/mL Final     Gabapentin Screen, Urine   Date Value Ref Range Status   04/29/2022 NEG  Final     MDMA, Urine   Date Value Ref Range Status   04/29/2022 NEG  Final     Methamphetamine, Urine   Date Value Ref Range Status   11/22/2022 Negative Negative <500 ng/mL Final     Opiate Scrn, Ur   Date Value Ref Range Status   11/22/2022 Negative Negative < 100 ng/mL Final     Oxycodone Screen, Ur   Date Value Ref Range Status   04/29/2022 POS  Final     PCP Screen, Urine   Date Value Ref Range Status   11/22/2022 Negative Negative <25 ng/mL Final     Propoxyphene Screen, Urine   Date Value Ref Range Status   04/29/2022 NEG  Final     THC Screen, Urine   Date Value Ref Range Status   04/29/2022 POS  Final     Tricyclic Antidepressants, Urine   Date Value Ref Range Status   04/29/2022 NEG  Final       Last Climmie Amel: 12/16/2022  Medication Contract: 04/29/2022     Requested Prescriptions     Pending Prescriptions Disp Refills    gabapentin (NEURONTIN) 300 MG capsule 180 capsule 0         Please approve or refuse this medication.    Monica Virk MA

## 2022-12-17 ASSESSMENT — ENCOUNTER SYMPTOMS
EYE ITCHING: 0
CHOKING: 0
SHORTNESS OF BREATH: 0
SINUS PRESSURE: 1
SINUS PAIN: 1
COUGH: 0
SORE THROAT: 0
DIARRHEA: 0
VOMITING: 0
NAUSEA: 0
EYE DISCHARGE: 0
WHEEZING: 0
ABDOMINAL DISTENTION: 0
CONSTIPATION: 0
TROUBLE SWALLOWING: 0
STRIDOR: 0
BLOOD IN STOOL: 0
COLOR CHANGE: 0
ABDOMINAL PAIN: 0

## 2022-12-27 ENCOUNTER — TELEPHONE (OUTPATIENT)
Dept: INTERNAL MEDICINE | Age: 52
End: 2022-12-27

## 2022-12-27 DIAGNOSIS — F41.0 PANIC ATTACK: Primary | ICD-10-CM

## 2022-12-27 RX ORDER — LORAZEPAM 0.5 MG/1
0.25 TABLET ORAL 2 TIMES DAILY
Qty: 10 TABLET | Refills: 0 | Status: SHIPPED | OUTPATIENT
Start: 2022-12-27 | End: 2023-01-06

## 2022-12-27 NOTE — TELEPHONE ENCOUNTER
Pts mom called and stated that he has been staying with them for the past few days due to the severe panic attacks that he is having. Pts mom is extremely worried and wants to know if there is something he can be given to help with this? Please advise.

## 2023-01-05 ENCOUNTER — TELEPHONE (OUTPATIENT)
Dept: INTERNAL MEDICINE | Age: 53
End: 2023-01-05

## 2023-01-05 ENCOUNTER — HOSPITAL ENCOUNTER (OUTPATIENT)
Dept: ULTRASOUND IMAGING | Age: 53
Discharge: HOME | End: 2023-01-05
Payer: MEDICAID

## 2023-01-05 DIAGNOSIS — N28.9 RENAL INSUFFICIENCY: ICD-10-CM

## 2023-01-05 DIAGNOSIS — R10.84 GENERALIZED ABDOMINAL PAIN: ICD-10-CM

## 2023-01-05 DIAGNOSIS — Q61.3 POLYCYSTIC KIDNEY DISEASE: Primary | ICD-10-CM

## 2023-01-05 PROCEDURE — 76775 US EXAM ABDO BACK WALL LIM: CPT | Performed by: RADIOLOGY

## 2023-01-05 PROCEDURE — 76770 US EXAM ABDO BACK WALL COMP: CPT

## 2023-01-05 NOTE — TELEPHONE ENCOUNTER
----- Message from Ana Thorpe MD sent at 1/5/2023  2:26 PM CST -----  Has polycystic kidney disease. Needs to see nephrology.

## 2023-01-10 ENCOUNTER — OFFICE VISIT (OUTPATIENT)
Dept: INTERNAL MEDICINE | Age: 53
End: 2023-01-10

## 2023-01-10 VITALS
BODY MASS INDEX: 22.67 KG/M2 | HEART RATE: 60 BPM | HEIGHT: 72 IN | DIASTOLIC BLOOD PRESSURE: 78 MMHG | WEIGHT: 167.4 LBS | OXYGEN SATURATION: 98 % | SYSTOLIC BLOOD PRESSURE: 118 MMHG

## 2023-01-10 DIAGNOSIS — G62.9 PERIPHERAL POLYNEUROPATHY: ICD-10-CM

## 2023-01-10 DIAGNOSIS — G89.29 CHRONIC LOW BACK PAIN, UNSPECIFIED BACK PAIN LATERALITY, UNSPECIFIED WHETHER SCIATICA PRESENT: ICD-10-CM

## 2023-01-10 DIAGNOSIS — R10.9 FLANK PAIN: Primary | ICD-10-CM

## 2023-01-10 DIAGNOSIS — Z87.891 SMOKING HISTORY: ICD-10-CM

## 2023-01-10 DIAGNOSIS — M54.50 CHRONIC LOW BACK PAIN, UNSPECIFIED BACK PAIN LATERALITY, UNSPECIFIED WHETHER SCIATICA PRESENT: ICD-10-CM

## 2023-01-10 DIAGNOSIS — F34.9 PERSISTENT MOOD (AFFECTIVE) DISORDER, UNSPECIFIED (HCC): ICD-10-CM

## 2023-01-10 DIAGNOSIS — F10.920 ACUTE ALCOHOLIC INTOXICATION WITHOUT COMPLICATION (HCC): ICD-10-CM

## 2023-01-10 DIAGNOSIS — R30.0 DYSURIA: ICD-10-CM

## 2023-01-10 DIAGNOSIS — F10.931: ICD-10-CM

## 2023-01-10 DIAGNOSIS — F41.9 ANXIETY DISORDER, UNSPECIFIED TYPE: ICD-10-CM

## 2023-01-10 DIAGNOSIS — I10 PRIMARY HYPERTENSION: ICD-10-CM

## 2023-01-10 DIAGNOSIS — F33.9 EPISODE OF RECURRENT MAJOR DEPRESSIVE DISORDER, UNSPECIFIED DEPRESSION EPISODE SEVERITY (HCC): ICD-10-CM

## 2023-01-10 DIAGNOSIS — F10.20 ALCOHOL USE DISORDER, SEVERE, DEPENDENCE (HCC): ICD-10-CM

## 2023-01-10 LAB
ALCOHOL URINE: NORMAL
AMPHETAMINE SCREEN, URINE: NORMAL
BARBITURATE SCREEN, URINE: NORMAL
BENZODIAZEPINE SCREEN, URINE: NORMAL
BUPRENORPHINE URINE: NORMAL
COCAINE METABOLITE SCREEN URINE: NORMAL
FENTANYL SCREEN, URINE: NORMAL
GABAPENTIN SCREEN, URINE: NORMAL
MDMA URINE: NORMAL
METHADONE SCREEN, URINE: NORMAL
METHAMPHETAMINE, URINE: NORMAL
OPIATE SCREEN URINE: NORMAL
OXYCODONE SCREEN URINE: NORMAL
PHENCYCLIDINE SCREEN URINE: NORMAL
PROPOXYPHENE SCREEN, URINE: NORMAL
SYNTHETIC CANNABINOIDS(K2) SCREEN, URINE: NORMAL
THC SCREEN, URINE: NORMAL
TRAMADOL SCREEN URINE: NORMAL
TRICYCLIC ANTIDEPRESSANTS, UR: POSITIVE

## 2023-01-10 RX ORDER — CLONAZEPAM 0.5 MG/1
0.5 TABLET ORAL DAILY PRN
Qty: 30 TABLET | Refills: 0 | Status: SHIPPED | OUTPATIENT
Start: 2023-01-10 | End: 2023-02-09

## 2023-01-10 ASSESSMENT — PATIENT HEALTH QUESTIONNAIRE - PHQ9
7. TROUBLE CONCENTRATING ON THINGS, SUCH AS READING THE NEWSPAPER OR WATCHING TELEVISION: 0
SUM OF ALL RESPONSES TO PHQ QUESTIONS 1-9: 2
4. FEELING TIRED OR HAVING LITTLE ENERGY: 0
8. MOVING OR SPEAKING SO SLOWLY THAT OTHER PEOPLE COULD HAVE NOTICED. OR THE OPPOSITE, BEING SO FIGETY OR RESTLESS THAT YOU HAVE BEEN MOVING AROUND A LOT MORE THAN USUAL: 0
3. TROUBLE FALLING OR STAYING ASLEEP: 1
1. LITTLE INTEREST OR PLEASURE IN DOING THINGS: 0
10. IF YOU CHECKED OFF ANY PROBLEMS, HOW DIFFICULT HAVE THESE PROBLEMS MADE IT FOR YOU TO DO YOUR WORK, TAKE CARE OF THINGS AT HOME, OR GET ALONG WITH OTHER PEOPLE: 1
SUM OF ALL RESPONSES TO PHQ QUESTIONS 1-9: 2
2. FEELING DOWN, DEPRESSED OR HOPELESS: 1
5. POOR APPETITE OR OVEREATING: 0
9. THOUGHTS THAT YOU WOULD BE BETTER OFF DEAD, OR OF HURTING YOURSELF: 0
SUM OF ALL RESPONSES TO PHQ9 QUESTIONS 1 & 2: 1
6. FEELING BAD ABOUT YOURSELF - OR THAT YOU ARE A FAILURE OR HAVE LET YOURSELF OR YOUR FAMILY DOWN: 0

## 2023-01-10 ASSESSMENT — ANXIETY QUESTIONNAIRES
GAD7 TOTAL SCORE: 11
3. WORRYING TOO MUCH ABOUT DIFFERENT THINGS: 1
IF YOU CHECKED OFF ANY PROBLEMS ON THIS QUESTIONNAIRE, HOW DIFFICULT HAVE THESE PROBLEMS MADE IT FOR YOU TO DO YOUR WORK, TAKE CARE OF THINGS AT HOME, OR GET ALONG WITH OTHER PEOPLE: VERY DIFFICULT
4. TROUBLE RELAXING: 2
1. FEELING NERVOUS, ANXIOUS, OR ON EDGE: 2
7. FEELING AFRAID AS IF SOMETHING AWFUL MIGHT HAPPEN: 0
6. BECOMING EASILY ANNOYED OR IRRITABLE: 2
2. NOT BEING ABLE TO STOP OR CONTROL WORRYING: 2
5. BEING SO RESTLESS THAT IT IS HARD TO SIT STILL: 2
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
## 2023-01-15 ASSESSMENT — ENCOUNTER SYMPTOMS
TROUBLE SWALLOWING: 0
VOMITING: 0
CONSTIPATION: 0
CHOKING: 0
NAUSEA: 0
WHEEZING: 0
COUGH: 0
EYE DISCHARGE: 0
ABDOMINAL DISTENTION: 0
SHORTNESS OF BREATH: 0
SINUS PRESSURE: 0
SINUS PAIN: 0
EYE ITCHING: 0
DIARRHEA: 0
SORE THROAT: 0
ABDOMINAL PAIN: 0
STRIDOR: 0
COLOR CHANGE: 0
BLOOD IN STOOL: 0

## 2023-01-20 ENCOUNTER — HOSPITAL ENCOUNTER (OUTPATIENT)
Dept: MRI IMAGING | Age: 53
Discharge: HOME | End: 2023-01-20
Payer: MEDICAID

## 2023-01-20 DIAGNOSIS — Q61.3 POLYCYSTIC KIDNEY: ICD-10-CM

## 2023-01-20 PROCEDURE — 74181 MRI ABDOMEN W/O CONTRAST: CPT

## 2023-02-08 DIAGNOSIS — F41.9 ANXIETY DISORDER, UNSPECIFIED TYPE: ICD-10-CM

## 2023-02-08 DIAGNOSIS — F10.10 ALCOHOL ABUSE: ICD-10-CM

## 2023-02-08 DIAGNOSIS — F34.9 PERSISTENT MOOD (AFFECTIVE) DISORDER, UNSPECIFIED (HCC): ICD-10-CM

## 2023-02-08 RX ORDER — GABAPENTIN 300 MG/1
CAPSULE ORAL
Qty: 180 CAPSULE | Refills: 0 | Status: SHIPPED | OUTPATIENT
Start: 2023-02-08 | End: 2023-03-12

## 2023-02-08 RX ORDER — CLONAZEPAM 0.5 MG/1
0.5 TABLET ORAL DAILY PRN
Qty: 30 TABLET | Refills: 0 | Status: SHIPPED | OUTPATIENT
Start: 2023-02-09 | End: 2023-03-11

## 2023-02-08 NOTE — TELEPHONE ENCOUNTER
Velia England called to request a refill on his medication. Last office visit : 1/10/2023   Next office visit : 2/16/2023     Last UDS:   Amphetamine Screen, Urine   Date Value Ref Range Status   01/10/2023 neg  Final     Barbiturate Screen, Urine   Date Value Ref Range Status   01/10/2023 neg  Final     Benzodiazepine Screen, Urine   Date Value Ref Range Status   01/10/2023 neg  Final     Buprenorphine Urine   Date Value Ref Range Status   01/10/2023 neg  Final     Cocaine Metabolite Screen, Urine   Date Value Ref Range Status   01/10/2023 neg  Final     Gabapentin Screen, Urine   Date Value Ref Range Status   01/10/2023 neg  Final     MDMA, Urine   Date Value Ref Range Status   01/10/2023 neg  Final     Methamphetamine, Urine   Date Value Ref Range Status   01/10/2023 neg  Final     Opiate Scrn, Ur   Date Value Ref Range Status   01/10/2023 neg  Final     Oxycodone Screen, Ur   Date Value Ref Range Status   01/10/2023 neg  Final     PCP Screen, Urine   Date Value Ref Range Status   01/10/2023 neg  Final     Propoxyphene Screen, Urine   Date Value Ref Range Status   01/10/2023 neg  Final     THC Screen, Urine   Date Value Ref Range Status   01/10/2023 neg  Final     Tricyclic Antidepressants, Urine   Date Value Ref Range Status   01/10/2023 positive  Final       Last Alvaro Oka: 12/16/2022  Medication Contract: 04/29/2022   Last Fill: 01/10/2023    Requested Prescriptions     Pending Prescriptions Disp Refills    gabapentin (NEURONTIN) 300 MG capsule 180 capsule 0     Sig: Two tablets TID    clonazePAM (KLONOPIN) 0.5 MG tablet 30 tablet 0     Sig: Take 1 tablet by mouth daily as needed for Anxiety for up to 30 days. Max Daily Amount: 0.5 mg         Please approve or refuse this medication.    Erick Sawyer MA

## 2023-02-18 ENCOUNTER — HOSPITAL ENCOUNTER (EMERGENCY)
Age: 53
Discharge: HOME | End: 2023-02-19
Attending: EMERGENCY MEDICINE
Payer: MEDICAID

## 2023-02-18 ENCOUNTER — APPOINTMENT (OUTPATIENT)
Dept: CT IMAGING | Age: 53
End: 2023-02-18
Payer: MEDICAID

## 2023-02-18 DIAGNOSIS — S70.01XA CONTUSION OF RIGHT HIP, INITIAL ENCOUNTER: ICD-10-CM

## 2023-02-18 DIAGNOSIS — S09.90XA INJURY OF HEAD, INITIAL ENCOUNTER: ICD-10-CM

## 2023-02-18 DIAGNOSIS — S60.819A ABRASION, WRIST W/O INFECTION: ICD-10-CM

## 2023-02-18 DIAGNOSIS — S02.2XXA CLOSED FRACTURE OF NASAL BONE, INITIAL ENCOUNTER: ICD-10-CM

## 2023-02-18 DIAGNOSIS — R45.851 SUICIDAL IDEATION: ICD-10-CM

## 2023-02-18 DIAGNOSIS — F10.920 ACUTE ALCOHOLIC INTOXICATION WITHOUT COMPLICATION (HCC): Primary | ICD-10-CM

## 2023-02-18 PROCEDURE — 6370000000 HC RX 637 (ALT 250 FOR IP): Performed by: EMERGENCY MEDICINE

## 2023-02-18 PROCEDURE — 70450 CT HEAD/BRAIN W/O DYE: CPT

## 2023-02-18 PROCEDURE — 99285 EMERGENCY DEPT VISIT HI MDM: CPT

## 2023-02-18 RX ORDER — NICOTINE 21 MG/24HR
1 PATCH, TRANSDERMAL 24 HOURS TRANSDERMAL
Status: DISCONTINUED | OUTPATIENT
Start: 2023-02-18 | End: 2023-02-19 | Stop reason: HOSPADM

## 2023-02-19 ENCOUNTER — APPOINTMENT (OUTPATIENT)
Dept: CT IMAGING | Age: 53
End: 2023-02-19
Payer: MEDICAID

## 2023-02-19 ENCOUNTER — APPOINTMENT (OUTPATIENT)
Dept: GENERAL RADIOLOGY | Age: 53
End: 2023-02-19
Payer: MEDICAID

## 2023-02-19 VITALS
RESPIRATION RATE: 16 BRPM | DIASTOLIC BLOOD PRESSURE: 72 MMHG | BODY MASS INDEX: 24.41 KG/M2 | SYSTOLIC BLOOD PRESSURE: 125 MMHG | TEMPERATURE: 97.7 F | WEIGHT: 180 LBS | HEART RATE: 78 BPM | OXYGEN SATURATION: 96 %

## 2023-02-19 LAB
ALBUMIN SERPL-MCNC: 4.1 G/DL (ref 3.5–5.2)
ALP BLD-CCNC: 96 U/L (ref 40–130)
ALT SERPL-CCNC: 30 U/L (ref 5–41)
AMPHETAMINE SCREEN, URINE: NEGATIVE
ANION GAP SERPL CALCULATED.3IONS-SCNC: 13 MMOL/L (ref 7–19)
AST SERPL-CCNC: 31 U/L (ref 5–40)
BARBITURATE SCREEN URINE: NEGATIVE
BASOPHILS ABSOLUTE: 0 K/UL (ref 0–0.2)
BASOPHILS RELATIVE PERCENT: 0.7 % (ref 0–1)
BENZODIAZEPINE SCREEN, URINE: POSITIVE
BILIRUB SERPL-MCNC: <0.2 MG/DL (ref 0.2–1.2)
BUN BLDV-MCNC: 10 MG/DL (ref 6–20)
BUPRENORPHINE URINE: NEGATIVE
CALCIUM SERPL-MCNC: 9.1 MG/DL (ref 8.6–10)
CANNABINOID SCREEN URINE: NEGATIVE
CHLORIDE BLD-SCNC: 103 MMOL/L (ref 98–111)
CO2: 28 MMOL/L (ref 22–29)
COCAINE METABOLITE SCREEN URINE: NEGATIVE
CREAT SERPL-MCNC: 1 MG/DL (ref 0.5–1.2)
EOSINOPHILS ABSOLUTE: 0.2 K/UL (ref 0–0.6)
EOSINOPHILS RELATIVE PERCENT: 3.2 % (ref 0–5)
ETHANOL: 259 MG/DL (ref 0–0.08)
ETHANOL: 56 MG/DL (ref 0–0.08)
GFR SERPL CREATININE-BSD FRML MDRD: >60 ML/MIN/{1.73_M2}
GLUCOSE BLD-MCNC: 85 MG/DL (ref 74–109)
HCT VFR BLD CALC: 38.6 % (ref 42–52)
HEMOGLOBIN: 13.2 G/DL (ref 14–18)
IMMATURE GRANULOCYTES #: 0 K/UL
LYMPHOCYTES ABSOLUTE: 2.3 K/UL (ref 1.1–4.5)
LYMPHOCYTES RELATIVE PERCENT: 40.8 % (ref 20–40)
Lab: ABNORMAL
MCH RBC QN AUTO: 32.4 PG (ref 27–31)
MCHC RBC AUTO-ENTMCNC: 34.2 G/DL (ref 33–37)
MCV RBC AUTO: 94.8 FL (ref 80–94)
METHADONE SCREEN, URINE: NEGATIVE
METHAMPHETAMINE, URINE: NEGATIVE
MONOCYTES ABSOLUTE: 0.4 K/UL (ref 0–0.9)
MONOCYTES RELATIVE PERCENT: 7.3 % (ref 0–10)
NEUTROPHILS ABSOLUTE: 2.7 K/UL (ref 1.5–7.5)
NEUTROPHILS RELATIVE PERCENT: 47.8 % (ref 50–65)
OPIATE SCREEN URINE: NEGATIVE
OXYCODONE URINE: NEGATIVE
PDW BLD-RTO: 13.8 % (ref 11.5–14.5)
PHENCYCLIDINE SCREEN URINE: NEGATIVE
PLATELET # BLD: 275 K/UL (ref 130–400)
PMV BLD AUTO: 8.4 FL (ref 9.4–12.4)
POTASSIUM SERPL-SCNC: 3.9 MMOL/L (ref 3.5–5)
PROPOXYPHENE SCREEN: NEGATIVE
RBC # BLD: 4.07 M/UL (ref 4.7–6.1)
SARS-COV-2, NAAT: NOT DETECTED
SODIUM BLD-SCNC: 144 MMOL/L (ref 136–145)
TOTAL PROTEIN: 7 G/DL (ref 6.6–8.7)
TRICYCLIC, URINE: NEGATIVE
WBC # BLD: 5.6 K/UL (ref 4.8–10.8)

## 2023-02-19 PROCEDURE — 36415 COLL VENOUS BLD VENIPUNCTURE: CPT

## 2023-02-19 PROCEDURE — 80306 DRUG TEST PRSMV INSTRMNT: CPT

## 2023-02-19 PROCEDURE — 85025 COMPLETE CBC W/AUTO DIFF WBC: CPT

## 2023-02-19 PROCEDURE — 82077 ASSAY SPEC XCP UR&BREATH IA: CPT

## 2023-02-19 PROCEDURE — 72125 CT NECK SPINE W/O DYE: CPT

## 2023-02-19 PROCEDURE — 73502 X-RAY EXAM HIP UNI 2-3 VIEWS: CPT

## 2023-02-19 PROCEDURE — 70486 CT MAXILLOFACIAL W/O DYE: CPT

## 2023-02-19 PROCEDURE — 87635 SARS-COV-2 COVID-19 AMP PRB: CPT

## 2023-02-19 PROCEDURE — 80053 COMPREHEN METABOLIC PANEL: CPT

## 2023-02-19 ASSESSMENT — ENCOUNTER SYMPTOMS
ABDOMINAL PAIN: 0
FACIAL SWELLING: 1
RHINORRHEA: 0
BACK PAIN: 0
VOMITING: 0
SHORTNESS OF BREATH: 0
NAUSEA: 0
DIARRHEA: 0
SORE THROAT: 0

## 2023-02-19 ASSESSMENT — PAIN - FUNCTIONAL ASSESSMENT: PAIN_FUNCTIONAL_ASSESSMENT: NONE - DENIES PAIN

## 2023-02-19 NOTE — PROGRESS NOTES
BLAKE ADULT INITIAL INTAKE ASSESSMENT     2/19/23    Paco Jacome ,a 45 y.o. male, presents to the ED for a psychiatric assessment. ED Arrival time: 2/18/23 at 2333  ED physician: [facility] Notification time: 46  BLAKE Assessment start time: 0830  Psychiatrist call time: 5  Spoke with Dr. Brandy Romano    Patient is referred by: EMS    Reason for visit to ED - Presenting problem:     PT states reason for ED visit, \"I was drunk last night, said I wanted to hurt myself but I don't. I've got a job interview on Monday, really looking forward to it because I haven't got to work in [employer]. \"    Patient seen in ED exam room 4 lying on bed. He is dressed in paper scrubs, has one-to-one sitter at bedside, is calm, cooperative and agreeable to interview. He reports history of depression, anxiety, alcohol use disorder. ETOH upon arrival to ED last night was 259, level currently 56. He reports \"I drank too much and said I wanted to hurt myself, but I don't. \" Patient denies suicidal ideations, homicidal ideations, and hallucinations. He is not paranoid and no delusions noted. He lives alone, no access to guns. He is future oriented, excited about job interview on Monday morning. He denies previous suicide attempts, has been hospitalized at [facility] in the past, last in 2022. His PCP prescribes his medication and reports compliance. He reports he has been sober \"for awhile\" before drinking yesterday. Reports AA meetings do help and is planning to attend again. Denies other substance use, positive for benzodiazepines, reports prescription of Klonopin from PCP. ED Physician reports: Paco Jacome is a 45 y.o. male who presents to the emergency department with suicidal ideation. Patient states that he was disappointed because his knife was not sharp enough to cut his wrist.  His last tetanus was about 9 years ago.   He says that he drank 4-0/2 alcoholic beverages with 70% alcohol as well as his prescribed clonazepam.  He says that he fell and hit his face.  He bled copiously from his nose.  He has some pain in his right hip.  No neck pain or back pain.    Duration of symptoms: one day    Current Stressors: alcohol    C-SSRS Completed: yes  Risk Assessment Completed:yes  Risk of Suicide: Moderate Risk  Provider notified of the C-SSRS Screening and Risk Assessment Findings:yes    SI:  denies   Plan: no   Past SI attempts: no   If yes, when and how many times:  Describe suicide attempts:   HI: denies  If yes describe:   Delusions: denies  If yes describe:   Hallucinations: denies   If yes describe:   Risk of Harm to self: Self injurious/self mutilation behaviorsno   If yes explain:   Was it within the past 6 months: no   Risk of Harm to others: no   If yes explain:   Was it within the past 6 months: no   Trauma History:  Anxiety 1-10:  7  Explain if increased:   Depression 1-10:  6  Explain if increased:   Level of function outside hospital decreased: no   If yes explain:   Has patient been completing ADL's: yes    Mental Status Evaluation:     Appearance:  age appropriate   Behavior:  Within Normal Limits   Speech:  normal pitch and normal volume   Mood:  anxious   Affect:  mood-congruent   Thought Process:  within normal limits   Thought Content:  Denies SI, HI, AVH, not delusional or psychotic   Sensorium:  person, place, time/date, and situation   Cognition:  grossly intact   Insight:  age appropriate         Psychiatric Hospitalizations: Yes   Where & When: [facility], last in 2022  Outpatient Psychiatric Treatment: lost to follow up    Family History:    Family history of mental illness: no   Family members with suicide attempt: no   If yes explain (attempted or completed):    Substance Abuse History:     SBIRT Completed: yes  Brief Intervention completed if needed:  (Yes/No)    Current ETOH LEVELS: 56    ETOH Usage:     Amount drinking daily: heavy    Date of last drink: 2/18/23  Longest period of  sobriety:    Substance/Chemical Abuse/Recreational Drug History:  Substance used: alcohol  Date of last substance use: 2/18/23  Tobacco Use: yes   History of rehab treatment:   How many times in rehab:  Last time in rehab:  Family history of substance abuse:    Opiates: It was discussed with pt they would not be receiving opiates unless they were within 3 days post surgery/acute injury. Patient voiced understanding and agreed. Psychiatric Review Of Systems:     Recent Sleep changes: no   Recent appetite changes: no   Recent weight changes/Pounds gained (+) or lost (-): no      Medical History:     Medical Diagnosis/Issues: depression, anxiety, alcohol use disorder  CT today in ED:no  Use of 02 or CPAP: no  Ambulatory: yes  Independent or Need assistance with Self Care:     PCP: Angela Astudillo MD     Current Medications:   Scheduled Meds:   Current Facility-Administered Medications:     nicotine (NICODERM CQ) 21 MG/24HR 1 patch, 1 patch, TransDERmal, NOW, Rosalio Liao MD, 1 patch at 02/18/23 3297    Tetanus-Diphth-Acell Pertussis (BOOSTRIX) injection 0.5 mL, 0.5 mL, IntraMUSCular, Once, Rosalio Liao MD    Current Outpatient Medications:     gabapentin (NEURONTIN) 300 MG capsule, Two tablets TID, Disp: 180 capsule, Rfl: 0    clonazePAM (KLONOPIN) 0.5 MG tablet, Take 1 tablet by mouth daily as needed for Anxiety for up to 30 days. Max Daily Amount: 0.5 mg, Disp: 30 tablet, Rfl: 0    lamoTRIgine (LAMICTAL) 150 MG tablet, Take 2 tablets by mouth daily, Disp: 30 tablet, Rfl: 3    cloNIDine (CATAPRES) 0.1 MG tablet, Take 1 tablet by mouth 3 times daily, Disp: 60 tablet, Rfl: 3    losartan (COZAAR) 50 MG tablet, Take 1 tablet by mouth in the morning and at bedtime, Disp: 30 tablet, Rfl: 3    amLODIPine (NORVASC) 10 MG tablet, Take 1 tablet by mouth nightly, Disp: 30 tablet, Rfl: 3    mupirocin (BACTROBAN) 2 % ointment, Apply topically 3 times daily Apply topically 3 times daily. , Disp: , Rfl:     hydrOXYzine (ATARAX) 25 MG tablet, TAKE 1 TABLET BY MOUTH THREE TIMES DAILY. , Disp: , Rfl:        Collateral Information:     Name: not needed in ED  Relationship: n/a  Phone Number: n/a  Collateral: n/a    Current living arrangement:Lives alone  Current Support System: family, friends  Employment: unemployed    Disposition:     Choose one of the options below for disposition:     1. Decision to admit to :no        Does the patient have a guardian or Medical POA: no  Has the guardian been notified or Medical POA:       2. Decision to Discharge:   Does not meet criteria for acceptance to   unit due to: denies SI, HI, AVH, not delusional or psychotic. Safety plan completed. Contact information given for outpatient [address] [place] [address].     3. Transferred:       Patient was transferred due to: n/a    Other follow up information provided:      Amy Leggett RN

## 2023-02-19 NOTE — ED PROVIDER NOTES
[address] [facility] EMERGENCY DEPT  EMERGENCY DEPARTMENT ENCOUNTER      Pt Name: Benedict Ocampo  MRN: [id number]  [place] 1970  Date of evaluation: 2/18/2023  Provider: Cj Yañez MD    CHIEF COMPLAINT       Chief Complaint   Patient presents with    Mental Health Problem     Pt reports SI with plan to cut wrists; small lac to wrist noted- states if he had a gun he would just do that         PHYSICAL EXAM    (up to 7 for level 4, 8 or more for level 5)     ED Triage Vitals   BP Temp Temp Source Heart Rate Resp SpO2 Height Weight   02/18/23 2338 02/18/23 2335 02/18/23 2335 02/18/23 2338 02/18/23 2335 02/18/23 2338 -- 02/18/23 2335   (!) 141/90 97.7 °F (36.5 °C) Oral 88 15 94 %  180 lb (81.6 kg)       Physical Exam    DIAGNOSTIC RESULTS     EKG: All EKG's are interpreted by the Emergency Department Physician who either signs or Co-signs this chart in the absence of a cardiologist.        RADIOLOGY:   Non-plain film images such as CT, Ultrasound and MRI are read by the radiologist. Quentin Longo radiographicimages are visualized and preliminarily interpreted by the emergency physician with the below findings:        XR HIP 2-3 VW W PELVIS RIGHT   Final Result   No acute findings. Electronically signed by Sheldon Chávez MD on 02-19-23 at [address] [place] [address]   Final Result   No acute fracture or subluxation. Electronically signed by Sheldon Chávez MD on 02-19-23 at [place] [address]   Final Result   Age indeterminate nasal bone fracture. Remaining facial bones are intact. Electronically signed by Sheldon Chávez MD on 02-19-23 at [address] [place] [address]   Final Result   No acute hemorrhage, hydrocephalus, or mass effect. Age indeterminate nasal bone fracture. Electronically signed by Sheldon Chávez MD on 02-19-23 at [address]:  Labs Reviewed   CBC WITH AUTO DIFFERENTIAL - Abnormal; Notable for the following components:       Result Value    RBC 4.07 (*)     Hemoglobin 13.2 (*) Hematocrit 38.6 (*)     MCV 94.8 (*)     MCH 32.4 (*)     MPV 8.4 (*)     Neutrophils % 47.8 (*)     Lymphocytes % 40.8 (*)     All other components within normal limits   DRUG SCRN, BUPRENORPHINE - Abnormal; Notable for the following components:    Benzodiazepine Screen, Urine POSITIVE (*)     All other components within normal limits   COVID-19, RAPID   COMPREHENSIVE METABOLIC PANEL   ETHANOL   ETHANOL       All other labs were within normal range or not returned as of this dictation. EMERGENCY DEPARTMENT COURSE and DIFFERENTIALDIAGNOSIS/MDM:   Vitals:    Vitals:    02/18/23 2335 02/18/23 2338 02/19/23 0047   BP:  (!) 141/90 129/72   Pulse:  88    Resp: 15     Temp: 97.7 °F (36.5 °C)     TempSrc: Oral     SpO2:  94%    Weight: 180 lb (81.6 kg)         MDM      Reassessment    ED Course as of 02/19/23 0943   Sun Feb 19, 2023   0736 Ethanol Lvl: 56 [VAUGHN]   0737 Received patient in checkout from overnight physician pending sober psychiatric evaluation. Repeat ethanol below the threshold now. Behavioral health evaluator contacted for evaluation. [VAUGHN]   1238 Patient has been evaluated by psychiatry. No longer suicidal and is felt safe for discharge with safety plan and outpatient follow-up as needed. [VAUGHN]      ED Course User Index  [VAUGHN] Kody Andino MD     Evaluation and work-up here revealed no signs of emergent or life-threatening pathology that would necessitate admission for further work-up or management at this time. Patient is felt to be stable for discharge home with return precautions for worsening of the condition or development of new concerning symptoms. Patient was encouraged to follow-up with their primary care doctor in the appropriate timeframe. Necessary prescriptions and information have been provided for treatment at home. Patient voices understanding and agreement with the plan.     CONSULTS:  None    PROCEDURES:  Unless otherwise noted below, none     Procedures        FINAL IMPRESSION     1. Acute alcoholic intoxication without complication ([facility]    2. Suicidal ideation    3. Abrasion, wrist w/o infection    4. Injury of head, initial encounter    5. Closed fracture of nasal bone, initial encounter    6.  Contusion of right hip, initial encounter          DISPOSITION/PLAN   DISPOSITION Decision To Discharge    PATIENT REFERRED TO:  [address]  [address] [facility] [address] [phone]  Schedule an appointment as soon as possible for a visit      [address] [place] [address]   Schedule an appointment as soon as possible for a visit        DISCHARGE MEDICATIONS:  New Prescriptions    No medications on file          (Please note that portions of this note were completed with a voice recognition program.  Efforts were made to edit the dictations but occasionally words aremis-transcribed.)    Gerber Moss MD (electronically signed)  Emergency Physician          Gerber Storey MD  02/19/23 2621

## 2023-02-19 NOTE — ED NOTES
Pt placed in paper scrubs. Personal belongs removed and given to security. Suicidal Flowsheet Initiated. Sitter at bedside.       Meghan Fragoso RN  02/19/23 0003

## 2023-02-19 NOTE — ED NOTES
Patient jumped out of bed, removed all vital monitoring and stated that he is leaving. Pt yelled that is doesn't take this effing long to get results back and he is hungry and wants food now. Writer explained to patient that all reads are sent out and it takes a while to get results back. Patient agreed to climb back in bed while writer spoke with RN and Physician. RN and Physician aware of patients behavior.          Antoni Blacnhard  02/19/23 0120
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
## 2023-02-19 NOTE — ED NOTES
This RN informed by PCA that pt was getting upset and wanted to leave. This Rn went to check on pt, pt in bed on his left side pulling covers up. Pt apologized to this RN stating that he was just hungry.       Jd Castañeda RN  02/19/23 7474

## 2023-03-13 ENCOUNTER — HOSPITAL ENCOUNTER (INPATIENT)
Age: 53
LOS: 2 days | Discharge: HOME | DRG: 897 | End: 2023-03-15
Attending: EMERGENCY MEDICINE | Admitting: HOSPITALIST
Payer: MEDICAID

## 2023-03-13 DIAGNOSIS — F32.A DEPRESSION WITH SUICIDAL IDEATION: ICD-10-CM

## 2023-03-13 DIAGNOSIS — F41.1 GENERALIZED ANXIETY DISORDER: Primary | ICD-10-CM

## 2023-03-13 DIAGNOSIS — F10.920 ACUTE ALCOHOLIC INTOXICATION WITHOUT COMPLICATION (HCC): ICD-10-CM

## 2023-03-13 DIAGNOSIS — F10.10 CHRONIC ALCOHOL ABUSE: ICD-10-CM

## 2023-03-13 DIAGNOSIS — Z91.89 AT RISK FOR WITHDRAWAL: ICD-10-CM

## 2023-03-13 DIAGNOSIS — R45.851 DEPRESSION WITH SUICIDAL IDEATION: ICD-10-CM

## 2023-03-13 LAB
ALBUMIN SERPL-MCNC: 4.2 G/DL (ref 3.5–5.2)
ALP BLD-CCNC: 101 U/L (ref 40–130)
ALT SERPL-CCNC: 38 U/L (ref 5–41)
AMPHETAMINE SCREEN, URINE: NEGATIVE
ANION GAP SERPL CALCULATED.3IONS-SCNC: 10 MMOL/L (ref 7–19)
AST SERPL-CCNC: 40 U/L (ref 5–40)
BARBITURATE SCREEN URINE: NEGATIVE
BASOPHILS ABSOLUTE: 0 K/UL (ref 0–0.2)
BASOPHILS RELATIVE PERCENT: 0.6 % (ref 0–1)
BENZODIAZEPINE SCREEN, URINE: POSITIVE
BILIRUB SERPL-MCNC: <0.2 MG/DL (ref 0.2–1.2)
BILIRUBIN URINE: NEGATIVE
BLOOD, URINE: NEGATIVE
BUN BLDV-MCNC: 6 MG/DL (ref 6–20)
BUPRENORPHINE URINE: NEGATIVE
CALCIUM SERPL-MCNC: 8.1 MG/DL (ref 8.6–10)
CANNABINOID SCREEN URINE: NEGATIVE
CHLORIDE BLD-SCNC: 101 MMOL/L (ref 98–111)
CLARITY: CLEAR
CO2: 28 MMOL/L (ref 22–29)
COCAINE METABOLITE SCREEN URINE: NEGATIVE
COLOR: YELLOW
CREAT SERPL-MCNC: 0.9 MG/DL (ref 0.5–1.2)
EOSINOPHILS ABSOLUTE: 0.1 K/UL (ref 0–0.6)
EOSINOPHILS RELATIVE PERCENT: 1.7 % (ref 0–5)
ETHANOL: 355 MG/DL (ref 0–0.08)
GFR SERPL CREATININE-BSD FRML MDRD: >60 ML/MIN/{1.73_M2}
GLUCOSE BLD-MCNC: 119 MG/DL (ref 74–109)
GLUCOSE URINE: NEGATIVE MG/DL
HCT VFR BLD CALC: 39.2 % (ref 42–52)
HEMOGLOBIN: 13.2 G/DL (ref 14–18)
IMMATURE GRANULOCYTES #: 0 K/UL
KETONES, URINE: NEGATIVE MG/DL
LEUKOCYTE ESTERASE, URINE: NEGATIVE
LYMPHOCYTES ABSOLUTE: 3 K/UL (ref 1.1–4.5)
LYMPHOCYTES RELATIVE PERCENT: 42.3 % (ref 20–40)
Lab: ABNORMAL
MCH RBC QN AUTO: 32 PG (ref 27–31)
MCHC RBC AUTO-ENTMCNC: 33.7 G/DL (ref 33–37)
MCV RBC AUTO: 94.9 FL (ref 80–94)
METHADONE SCREEN, URINE: NEGATIVE
METHAMPHETAMINE, URINE: NEGATIVE
MONOCYTES ABSOLUTE: 0.3 K/UL (ref 0–0.9)
MONOCYTES RELATIVE PERCENT: 4.6 % (ref 0–10)
NEUTROPHILS ABSOLUTE: 3.5 K/UL (ref 1.5–7.5)
NEUTROPHILS RELATIVE PERCENT: 50.7 % (ref 50–65)
NITRITE, URINE: NEGATIVE
OPIATE SCREEN URINE: NEGATIVE
OXYCODONE URINE: NEGATIVE
PDW BLD-RTO: 13.1 % (ref 11.5–14.5)
PH UA: 5.5 (ref 5–8)
PHENCYCLIDINE SCREEN URINE: NEGATIVE
PLATELET # BLD: 290 K/UL (ref 130–400)
PMV BLD AUTO: 8.7 FL (ref 9.4–12.4)
POTASSIUM SERPL-SCNC: 3.9 MMOL/L (ref 3.5–5)
PROPOXYPHENE SCREEN: NEGATIVE
PROTEIN UA: NEGATIVE MG/DL
RBC # BLD: 4.13 M/UL (ref 4.7–6.1)
SARS-COV-2, NAAT: NOT DETECTED
SODIUM BLD-SCNC: 139 MMOL/L (ref 136–145)
SPECIFIC GRAVITY UA: 1 (ref 1–1.03)
TOTAL PROTEIN: 7 G/DL (ref 6.6–8.7)
TRICYCLIC, URINE: NEGATIVE
UROBILINOGEN, URINE: 0.2 E.U./DL
WBC # BLD: 7 K/UL (ref 4.8–10.8)

## 2023-03-13 PROCEDURE — 1210000000 HC MED SURG R&B

## 2023-03-13 PROCEDURE — 80053 COMPREHEN METABOLIC PANEL: CPT

## 2023-03-13 PROCEDURE — 36415 COLL VENOUS BLD VENIPUNCTURE: CPT

## 2023-03-13 PROCEDURE — 85025 COMPLETE CBC W/AUTO DIFF WBC: CPT

## 2023-03-13 PROCEDURE — 99285 EMERGENCY DEPT VISIT HI MDM: CPT

## 2023-03-13 PROCEDURE — 81003 URINALYSIS AUTO W/O SCOPE: CPT

## 2023-03-13 PROCEDURE — 6370000000 HC RX 637 (ALT 250 FOR IP): Performed by: EMERGENCY MEDICINE

## 2023-03-13 PROCEDURE — 87635 SARS-COV-2 COVID-19 AMP PRB: CPT

## 2023-03-13 PROCEDURE — 82077 ASSAY SPEC XCP UR&BREATH IA: CPT

## 2023-03-13 PROCEDURE — 80306 DRUG TEST PRSMV INSTRMNT: CPT

## 2023-03-13 RX ORDER — HALOPERIDOL 5 MG/ML
5 INJECTION INTRAMUSCULAR ONCE
Status: COMPLETED | OUTPATIENT
Start: 2023-03-13 | End: 2023-03-14

## 2023-03-13 RX ORDER — DIAZEPAM 5 MG/1
10 TABLET ORAL ONCE
Status: COMPLETED | OUTPATIENT
Start: 2023-03-13 | End: 2023-03-13

## 2023-03-13 RX ORDER — NICOTINE 21 MG/24HR
1 PATCH, TRANSDERMAL 24 HOURS TRANSDERMAL DAILY
Status: DISCONTINUED | OUTPATIENT
Start: 2023-03-13 | End: 2023-03-14

## 2023-03-13 RX ORDER — CHLORDIAZEPOXIDE HYDROCHLORIDE 25 MG/1
50 CAPSULE, GELATIN COATED ORAL ONCE
Status: DISCONTINUED | OUTPATIENT
Start: 2023-03-13 | End: 2023-03-13

## 2023-03-13 RX ORDER — DIPHENHYDRAMINE HYDROCHLORIDE 50 MG/ML
50 INJECTION INTRAMUSCULAR; INTRAVENOUS ONCE
Status: COMPLETED | OUTPATIENT
Start: 2023-03-13 | End: 2023-03-14

## 2023-03-13 RX ORDER — LORAZEPAM 2 MG/ML
2 INJECTION INTRAMUSCULAR ONCE
Status: COMPLETED | OUTPATIENT
Start: 2023-03-13 | End: 2023-03-14

## 2023-03-13 RX ADMIN — DIAZEPAM 10 MG: 5 TABLET ORAL at 22:59

## 2023-03-13 ASSESSMENT — ENCOUNTER SYMPTOMS
RESPIRATORY NEGATIVE: 1
GASTROINTESTINAL NEGATIVE: 1
EYES NEGATIVE: 1

## 2023-03-13 ASSESSMENT — PAIN - FUNCTIONAL ASSESSMENT: PAIN_FUNCTIONAL_ASSESSMENT: NONE - DENIES PAIN

## 2023-03-14 PROBLEM — F10.94 ALCOHOL-INDUCED MOOD DISORDER WITH DEPRESSIVE SYMPTOMS (HCC): Status: ACTIVE | Noted: 2023-03-13

## 2023-03-14 LAB — ETHANOLAMINE SERPL-MCNC: 174 MG/DL (ref 0–0.08)

## 2023-03-14 PROCEDURE — 6370000000 HC RX 637 (ALT 250 FOR IP): Performed by: HOSPITALIST

## 2023-03-14 PROCEDURE — 6360000002 HC RX W HCPCS: Performed by: HOSPITALIST

## 2023-03-14 PROCEDURE — 2580000003 HC RX 258: Performed by: HOSPITALIST

## 2023-03-14 PROCEDURE — 6360000002 HC RX W HCPCS: Performed by: EMERGENCY MEDICINE

## 2023-03-14 PROCEDURE — 36415 COLL VENOUS BLD VENIPUNCTURE: CPT

## 2023-03-14 PROCEDURE — 1210000000 HC MED SURG R&B

## 2023-03-14 PROCEDURE — 82077 ASSAY SPEC XCP UR&BREATH IA: CPT

## 2023-03-14 PROCEDURE — 6370000000 HC RX 637 (ALT 250 FOR IP): Performed by: INTERNAL MEDICINE

## 2023-03-14 PROCEDURE — 2580000003 HC RX 258: Performed by: INTERNAL MEDICINE

## 2023-03-14 RX ORDER — LORAZEPAM 2 MG/ML
4 INJECTION INTRAMUSCULAR
Status: DISCONTINUED | OUTPATIENT
Start: 2023-03-14 | End: 2023-03-14

## 2023-03-14 RX ORDER — LORAZEPAM 2 MG/1
2 TABLET ORAL 2 TIMES DAILY
Status: DISCONTINUED | OUTPATIENT
Start: 2023-03-15 | End: 2023-03-14

## 2023-03-14 RX ORDER — ACETAMINOPHEN 650 MG/1
650 SUPPOSITORY RECTAL EVERY 4 HOURS PRN
Status: DISCONTINUED | OUTPATIENT
Start: 2023-03-14 | End: 2023-03-15 | Stop reason: HOSPADM

## 2023-03-14 RX ORDER — HYDROXYZINE HYDROCHLORIDE 25 MG/1
25 TABLET, FILM COATED ORAL 3 TIMES DAILY
Status: DISCONTINUED | OUTPATIENT
Start: 2023-03-14 | End: 2023-03-14

## 2023-03-14 RX ORDER — ACETAMINOPHEN 325 MG/1
650 TABLET ORAL EVERY 4 HOURS PRN
Status: DISCONTINUED | OUTPATIENT
Start: 2023-03-14 | End: 2023-03-15 | Stop reason: HOSPADM

## 2023-03-14 RX ORDER — LORAZEPAM 2 MG/ML
3 INJECTION INTRAMUSCULAR
Status: DISCONTINUED | OUTPATIENT
Start: 2023-03-14 | End: 2023-03-14

## 2023-03-14 RX ORDER — LOSARTAN POTASSIUM 25 MG/1
50 TABLET ORAL 2 TIMES DAILY
Status: DISCONTINUED | OUTPATIENT
Start: 2023-03-14 | End: 2023-03-15 | Stop reason: HOSPADM

## 2023-03-14 RX ORDER — GABAPENTIN 300 MG/1
600 CAPSULE ORAL 3 TIMES DAILY
Status: DISCONTINUED | OUTPATIENT
Start: 2023-03-14 | End: 2023-03-15 | Stop reason: HOSPADM

## 2023-03-14 RX ORDER — ENOXAPARIN SODIUM 100 MG/ML
40 INJECTION SUBCUTANEOUS DAILY
Status: DISCONTINUED | OUTPATIENT
Start: 2023-03-14 | End: 2023-03-15 | Stop reason: HOSPADM

## 2023-03-14 RX ORDER — LORAZEPAM 2 MG/1
4 TABLET ORAL
Status: DISCONTINUED | OUTPATIENT
Start: 2023-03-14 | End: 2023-03-15 | Stop reason: HOSPADM

## 2023-03-14 RX ORDER — LORAZEPAM 2 MG/1
2 TABLET ORAL 3 TIMES DAILY
Status: DISCONTINUED | OUTPATIENT
Start: 2023-03-14 | End: 2023-03-14

## 2023-03-14 RX ORDER — LORAZEPAM 2 MG/ML
2 INJECTION INTRAMUSCULAR
Status: DISCONTINUED | OUTPATIENT
Start: 2023-03-14 | End: 2023-03-14

## 2023-03-14 RX ORDER — MULTIVITAMIN WITH IRON
1 TABLET ORAL DAILY
Status: DISCONTINUED | OUTPATIENT
Start: 2023-03-14 | End: 2023-03-15 | Stop reason: HOSPADM

## 2023-03-14 RX ORDER — LORAZEPAM 2 MG/1
2 TABLET ORAL
Status: DISCONTINUED | OUTPATIENT
Start: 2023-03-14 | End: 2023-03-15 | Stop reason: HOSPADM

## 2023-03-14 RX ORDER — NIFEDIPINE 60 MG/1
60 TABLET, EXTENDED RELEASE ORAL DAILY
Status: DISCONTINUED | OUTPATIENT
Start: 2023-03-14 | End: 2023-03-15 | Stop reason: HOSPADM

## 2023-03-14 RX ORDER — POTASSIUM CHLORIDE 20 MEQ/1
40 TABLET, EXTENDED RELEASE ORAL PRN
Status: DISCONTINUED | OUTPATIENT
Start: 2023-03-14 | End: 2023-03-15 | Stop reason: HOSPADM

## 2023-03-14 RX ORDER — SODIUM CHLORIDE 0.9 % (FLUSH) 0.9 %
5-40 SYRINGE (ML) INJECTION PRN
Status: DISCONTINUED | OUTPATIENT
Start: 2023-03-14 | End: 2023-03-15 | Stop reason: HOSPADM

## 2023-03-14 RX ORDER — SODIUM CHLORIDE 0.9 % (FLUSH) 0.9 %
5-40 SYRINGE (ML) INJECTION EVERY 12 HOURS SCHEDULED
Status: DISCONTINUED | OUTPATIENT
Start: 2023-03-14 | End: 2023-03-15 | Stop reason: HOSPADM

## 2023-03-14 RX ORDER — AMLODIPINE BESYLATE 10 MG/1
10 TABLET ORAL NIGHTLY
Status: DISCONTINUED | OUTPATIENT
Start: 2023-03-14 | End: 2023-03-14

## 2023-03-14 RX ORDER — NICOTINE 21 MG/24HR
1 PATCH, TRANSDERMAL 24 HOURS TRANSDERMAL DAILY
Status: DISCONTINUED | OUTPATIENT
Start: 2023-03-14 | End: 2023-03-15 | Stop reason: HOSPADM

## 2023-03-14 RX ORDER — SODIUM CHLORIDE, SODIUM LACTATE, POTASSIUM CHLORIDE, CALCIUM CHLORIDE 600; 310; 30; 20 MG/100ML; MG/100ML; MG/100ML; MG/100ML
INJECTION, SOLUTION INTRAVENOUS CONTINUOUS
Status: DISCONTINUED | OUTPATIENT
Start: 2023-03-14 | End: 2023-03-15 | Stop reason: HOSPADM

## 2023-03-14 RX ORDER — GAUZE BANDAGE 2" X 2"
100 BANDAGE TOPICAL DAILY
Status: DISCONTINUED | OUTPATIENT
Start: 2023-03-14 | End: 2023-03-15 | Stop reason: HOSPADM

## 2023-03-14 RX ORDER — POTASSIUM CHLORIDE 7.45 MG/ML
10 INJECTION INTRAVENOUS PRN
Status: DISCONTINUED | OUTPATIENT
Start: 2023-03-14 | End: 2023-03-15 | Stop reason: HOSPADM

## 2023-03-14 RX ORDER — FOLIC ACID 1 MG/1
1 TABLET ORAL DAILY
Status: DISCONTINUED | OUTPATIENT
Start: 2023-03-14 | End: 2023-03-15 | Stop reason: HOSPADM

## 2023-03-14 RX ORDER — MAGNESIUM SULFATE IN WATER 40 MG/ML
2000 INJECTION, SOLUTION INTRAVENOUS PRN
Status: DISCONTINUED | OUTPATIENT
Start: 2023-03-14 | End: 2023-03-15 | Stop reason: HOSPADM

## 2023-03-14 RX ORDER — SODIUM CHLORIDE 9 MG/ML
INJECTION, SOLUTION INTRAVENOUS PRN
Status: DISCONTINUED | OUTPATIENT
Start: 2023-03-14 | End: 2023-03-15 | Stop reason: HOSPADM

## 2023-03-14 RX ORDER — LAMOTRIGINE 150 MG/1
300 TABLET ORAL DAILY
Status: DISCONTINUED | OUTPATIENT
Start: 2023-03-14 | End: 2023-03-15 | Stop reason: HOSPADM

## 2023-03-14 RX ORDER — HYDROXYZINE HYDROCHLORIDE 25 MG/1
25 TABLET, FILM COATED ORAL 3 TIMES DAILY PRN
Status: DISCONTINUED | OUTPATIENT
Start: 2023-03-14 | End: 2023-03-15 | Stop reason: HOSPADM

## 2023-03-14 RX ORDER — LORAZEPAM 1 MG/1
1 TABLET ORAL
Status: DISCONTINUED | OUTPATIENT
Start: 2023-03-14 | End: 2023-03-15 | Stop reason: HOSPADM

## 2023-03-14 RX ORDER — CLONIDINE HYDROCHLORIDE 0.1 MG/1
0.1 TABLET ORAL 3 TIMES DAILY
Status: DISCONTINUED | OUTPATIENT
Start: 2023-03-14 | End: 2023-03-14

## 2023-03-14 RX ORDER — LORAZEPAM 2 MG/ML
1 INJECTION INTRAMUSCULAR
Status: DISCONTINUED | OUTPATIENT
Start: 2023-03-14 | End: 2023-03-14

## 2023-03-14 RX ORDER — CLONIDINE HYDROCHLORIDE 0.1 MG/1
0.1 TABLET ORAL 2 TIMES DAILY
Status: DISCONTINUED | OUTPATIENT
Start: 2023-03-14 | End: 2023-03-15 | Stop reason: HOSPADM

## 2023-03-14 RX ORDER — CHLORDIAZEPOXIDE HYDROCHLORIDE 25 MG/1
50 CAPSULE, GELATIN COATED ORAL 2 TIMES DAILY
Status: DISCONTINUED | OUTPATIENT
Start: 2023-03-14 | End: 2023-03-15 | Stop reason: HOSPADM

## 2023-03-14 RX ADMIN — LORAZEPAM 2 MG: 2 INJECTION INTRAMUSCULAR; INTRAVENOUS at 03:55

## 2023-03-14 RX ADMIN — GABAPENTIN 600 MG: 300 CAPSULE ORAL at 13:22

## 2023-03-14 RX ADMIN — SODIUM CHLORIDE, POTASSIUM CHLORIDE, SODIUM LACTATE AND CALCIUM CHLORIDE: 600; 310; 30; 20 INJECTION, SOLUTION INTRAVENOUS at 12:40

## 2023-03-14 RX ADMIN — LORAZEPAM 2 MG: 2 INJECTION INTRAMUSCULAR; INTRAVENOUS at 08:28

## 2023-03-14 RX ADMIN — THIAMINE HCL TAB 100 MG 100 MG: 100 TAB at 12:31

## 2023-03-14 RX ADMIN — LORAZEPAM 2 MG: 2 TABLET ORAL at 14:38

## 2023-03-14 RX ADMIN — CHLORDIAZEPOXIDE HYDROCHLORIDE 50 MG: 25 CAPSULE ORAL at 19:30

## 2023-03-14 RX ADMIN — DIPHENHYDRAMINE HYDROCHLORIDE 50 MG: 50 INJECTION, SOLUTION INTRAMUSCULAR; INTRAVENOUS at 03:55

## 2023-03-14 RX ADMIN — SODIUM CHLORIDE, PRESERVATIVE FREE 10 ML: 5 INJECTION INTRAVENOUS at 12:40

## 2023-03-14 RX ADMIN — FOLIC ACID 1 MG: 1 TABLET ORAL at 12:31

## 2023-03-14 RX ADMIN — LOSARTAN POTASSIUM 50 MG: 25 TABLET, FILM COATED ORAL at 12:30

## 2023-03-14 RX ADMIN — CHLORDIAZEPOXIDE HYDROCHLORIDE 50 MG: 25 CAPSULE ORAL at 12:33

## 2023-03-14 RX ADMIN — HYDROXYZINE HYDROCHLORIDE 25 MG: 25 TABLET, FILM COATED ORAL at 19:30

## 2023-03-14 RX ADMIN — CLONIDINE HYDROCHLORIDE 0.1 MG: 0.1 TABLET ORAL at 19:30

## 2023-03-14 RX ADMIN — CLONIDINE HYDROCHLORIDE 0.1 MG: 0.1 TABLET ORAL at 13:22

## 2023-03-14 RX ADMIN — LAMOTRIGINE 300 MG: 150 TABLET ORAL at 12:30

## 2023-03-14 RX ADMIN — GABAPENTIN 600 MG: 300 CAPSULE ORAL at 19:30

## 2023-03-14 RX ADMIN — SODIUM CHLORIDE, POTASSIUM CHLORIDE, SODIUM LACTATE AND CALCIUM CHLORIDE: 600; 310; 30; 20 INJECTION, SOLUTION INTRAVENOUS at 21:25

## 2023-03-14 RX ADMIN — LOSARTAN POTASSIUM 50 MG: 25 TABLET, FILM COATED ORAL at 19:30

## 2023-03-14 RX ADMIN — HALOPERIDOL LACTATE 5 MG: 5 INJECTION, SOLUTION INTRAMUSCULAR at 03:55

## 2023-03-14 RX ADMIN — THERA TABS 1 TABLET: TAB at 12:31

## 2023-03-14 RX ADMIN — LORAZEPAM 1 MG: 1 TABLET ORAL at 22:50

## 2023-03-14 RX ADMIN — NIFEDIPINE 60 MG: 60 TABLET, EXTENDED RELEASE ORAL at 19:33

## 2023-03-14 RX ADMIN — LORAZEPAM 1 MG: 1 TABLET ORAL at 21:24

## 2023-03-14 RX ADMIN — ENOXAPARIN SODIUM 40 MG: 100 INJECTION SUBCUTANEOUS at 12:33

## 2023-03-14 ASSESSMENT — ENCOUNTER SYMPTOMS
SHORTNESS OF BREATH: 0
CONSTIPATION: 0
DIARRHEA: 0
COUGH: 0
NAUSEA: 0
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
## 2023-03-14 NOTE — BH NOTE
Patient has been admitted to medical services secondary to alcohol intoxication and suicidal ideation. Psychiatric consult was placed to evaluate the patient's suicidal ideations. However, when this physician came to the patient's room to perform requested consult, patient was sleeping. Medical staff reported that patient just got an injection of lorazepam for alcohol withdrawal symptoms. Psychiatry will see and perform requested consult tomorrow.
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
## 2023-03-14 NOTE — PROGRESS NOTES
Date:3/14/2023  Patient: Velia Arriola  : 1970  TGJ:[id number]  CODE:                                                                        Wally Powell MD    Admit Date: 3/13/2023 10:05 PM   LOS: 1 day     Hospital course : Mr. Velia Arriola is a pleasant 45year old [de-identified] [place] man. He presented to the ED after having made comments about wanting to put a 38 to his head. He states that he has been with AA and had good success with that. He had grown up here, and had went to [facility]. He states that he had been feeling helpless and because he was hopeless he made the comment, he thought about shooting himself. Subjective:  3/14 seen and evaluated at bedside along with RN the patient is not having withdrawal admitted that he drinks 750 mL of vodka almost every day on CIWA scale and Ativan IV protocol. Psych came to see him but he was very drowsy due to IV Ativan so discussed with RN at bedside to change IV Ativan to p.o. Ativan as needed and start on Librium protocol to have psych counseled, sitter at the bedside. Ask for nicotine patch    Review of Systems    Reviewed 12 system and found most of them negative except as stated above in subjective note    Objective:      Vital signs in last 24 hours:  Patient Vitals for the past 24 hrs:   BP Temp Temp src Pulse Resp SpO2 Height Weight   23 1141 (!) 146/86 99 °F (37.2 °C) Axillary 85 18 96 % -- --   23 0749 (!) 137/95 97.1 °F (36.2 °C) Temporal 74 16 97 % 6' 4\" (1.93 m) 174 lb 1.6 oz (79 kg)   23 0359 137/85 -- -- 80 20 -- -- --   23 2200 (!) 142/89 98.2 °F (36.8 °C) -- 94 19 98 % 6' 4\" (1.93 m) 180 lb (81.6 kg)       Patient examined with appropriate PPE  Physical Exam:  Vital Signs: BP (!) 146/86   Pulse 85   Temp 99 °F (37.2 °C) (Axillary)   Resp 18   Ht 6' 4\" (1.93 m)   Wt 174 lb 1.6 oz (79 kg)   SpO2 96%   BMI 21.19 kg/m²   General appearance:. Lying comfortably in bed ,   HEENT: Normocephalic , Atraumatic, PERRL, JVP not raised  Chest: On inspection no use of accessory muscles of respiration, on auscultation vesicular breath sounds equal bilaterally no rales rhonchi or wheezing   cardiac: Regular rate and rhythm, S1, S2 normal. No murmurs, gallops, or rubs auscultated. Abdomen:soft, non-tender; normal bowel sounds, no masses, no organomegaly. Urogenital : no hare, no suprapubic tenderness, no CVA tenderness  Extremities: No clubbing or cyanosis. No peripheral edema. Peripheral pulses palpable.   Neurologic: Alert and Cooperative , cranial nerves grossly intact, DTR equal, Power  5/5 , no shaking in the extended arm  Psychology: No hallucination, no delusion, appropriate mood, not suicidal anymore          Lab Review   Recent Results (from the past 24 hour(s))   Drug SCRN, Buprenorphine    Collection Time: 03/13/23 10:01 PM   Result Value Ref Range    Amphetamine Screen, Urine Negative Negative <500 ng/mL    Barbiturate Screen, Ur Negative Negative < 200 ng/mL    Benzodiazepine Screen, Urine POSITIVE (A) Negative <150 ng/mL    Cannabinoid Scrn, Ur Negative Negative <50 ng/mL    Cocaine Metabolite Screen, Urine Negative Negative <150 ng/mL    Opiate Scrn, Ur Negative Negative < 100 ng/mL    PCP Screen, Urine Negative Negative <25 ng/mL    Methadone Screen, Urine Negative Negative <200 ng/mL    Propoxyphene Scrn, Ur Negative Negative <834 ng/mL    Tricyclic Negative Negative <300 ng/mL    Oxycodone Urine Negative Negative <100 ng/mL    Buprenorphine Urine Negative Negative <10 ng/mL    Methamphetamine, Urine Negative Negative <500 ng/mL    Drug Screen Comment: see below    Urinalysis with Reflex to Culture    Collection Time: 03/13/23 10:01 PM    Specimen: Urine   Result Value Ref Range    Color, UA YELLOW Straw/Yellow    Clarity, UA Clear Clear    Glucose, Ur Negative Negative mg/dL    Bilirubin Urine Negative Negative    Ketones, Urine Negative Negative mg/dL    Specific Gravity, UA 1.005 1.005 - 1.030 Blood, Urine Negative Negative    pH, UA 5.5 5.0 - 8.0    Protein, UA Negative Negative mg/dL    Urobilinogen, Urine 0.2 <2.0 E.U./dL    Nitrite, Urine Negative Negative    Leukocyte Esterase, Urine Negative Negative   CBC with Auto Differential    Collection Time: 03/13/23 10:12 PM   Result Value Ref Range    WBC 7.0 4.8 - 10.8 K/uL    RBC 4.13 (L) 4.70 - 6.10 M/uL    Hemoglobin 13.2 (L) 14.0 - 18.0 g/dL    Hematocrit 39.2 (L) 42.0 - 52.0 %    MCV 94.9 (H) 80.0 - 94.0 fL    MCH 32.0 (H) 27.0 - 31.0 pg    MCHC 33.7 33.0 - 37.0 g/dL    RDW 13.1 11.5 - 14.5 %    Platelets 284 423 - 205 K/uL    MPV 8.7 (L) 9.4 - 12.4 fL    Neutrophils % 50.7 50.0 - 65.0 %    Lymphocytes % 42.3 (H) 20.0 - 40.0 %    Monocytes % 4.6 0.0 - 10.0 %    Eosinophils % 1.7 0.0 - 5.0 %    Basophils % 0.6 0.0 - 1.0 %    Neutrophils Absolute 3.5 1.5 - 7.5 K/uL    Immature Granulocytes # 0.0 K/uL    Lymphocytes Absolute 3.0 1.1 - 4.5 K/uL    Monocytes Absolute 0.30 0.00 - 0.90 K/uL    Eosinophils Absolute 0.10 0.00 - 0.60 K/uL    Basophils Absolute 0.00 0.00 - 0.20 K/uL   Comprehensive Metabolic Panel    Collection Time: 03/13/23 10:12 PM   Result Value Ref Range    Sodium 139 136 - 145 mmol/L    Potassium 3.9 3.5 - 5.0 mmol/L    Chloride 101 98 - 111 mmol/L    CO2 28 22 - 29 mmol/L    Anion Gap 10 7 - 19 mmol/L    Glucose 119 (H) 74 - 109 mg/dL    BUN 6 6 - 20 mg/dL    Creatinine 0.9 0.5 - 1.2 mg/dL    Est, Glom Filt Rate >60 >60    Calcium 8.1 (L) 8.6 - 10.0 mg/dL    Total Protein 7.0 6.6 - 8.7 g/dL    Albumin 4.2 3.5 - 5.2 g/dL    Total Bilirubin <0.2 0.2 - 1.2 mg/dL    Alkaline Phosphatase 101 40 - 130 U/L    ALT 38 5 - 41 U/L    AST 40 5 - 40 U/L   Ethanol    Collection Time: 03/13/23 10:12 PM   Result Value Ref Range    Ethanol Lvl 355 mg/dL   COVID-19, Rapid    Collection Time: 03/13/23 10:12 PM    Specimen: Nasopharyngeal Swab   Result Value Ref Range    SARS-CoV-2, NAAT Not Detected Not Detected   Ethanol    Collection Time: 03/14/23 8:36 AM   Result Value Ref Range    Ethanol Lvl 174 mg/dL        No intake/output data recorded. gabapentin  600 mg Oral TID    lamoTRIgine  300 mg Oral Daily    losartan  50 mg Oral BID    cloNIDine  0.1 mg Oral TID    amLODIPine  10 mg Oral Nightly    sodium chloride flush  5-40 mL IntraVENous 2 times per day    thiamine  100 mg Oral Daily    enoxaparin  40 mg SubCUTAneous Daily    multivitamin  1 tablet Oral Daily    folic acid  1 mg Oral Daily    chlordiazePOXIDE  50 mg Oral BID    nicotine  1 patch TransDERmal Daily           I have reviewed the patient's daily labs, including BMP and CBC with pertinent results discussed below.      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 Domenico Macedo if [place] is low risk in AM unless we have specific reason for high concern they may be discharged home , repeat [place] is to be done AFTER pt chemically sober first and documented for psych to see as per Dr. Maribell Brady belongings from room  Ativan Protocol PRN  Multivitamin and Thiamine and Folic Acid PO  Will plan to do scheduled ativan 3-day taper at this time, 2mg tablets TID on day 1 and BID on day 2 and once in the AM of day 3  3/14 as the patient is getting very drowsy from Ativan IV we discontinue Ativan and started on Librium protocol> expected to have alcohol withdrawal associated with sweating started on LR at 100 cc/h to prevent dehydration     Chronic Medical Problems:  Continue Home Regimen   gabapentin  600 mg Oral TID    lamoTRIgine  300 mg Oral Daily    losartan  50 mg Oral BID    cloNIDine  0.1 mg Oral TID      Hypertension   Losartan 50 mg p.o. twice daily, amlodipine 10 mg daily, on clonidine 0.1 mg p.o. 3 times daily  Sometimes clonidine cause CNS depression   3/14 reduce clonidine 0.1 mg twice daily and discontinue amlodipine and started on nifedipine 60 mg daily May discontinue clonidine on discharge to avoid central depression    DVT prophylaxis: Enoxaparin    POA  Full Code   Case d/w the RN taking care of the patient  RN  notes reviewed  Consultant notes reviewed     DISPOSITION:    D/C: Home  Estimated D/C Date: 3/15      Sivakumar Carter MD 3/14/2023 6:09 PM    EMR Dragon/Transcription disclaimer: This dictation was performed using [address]. Mistake and misspelling may have been created without  realizing them, although attempts have made to review the note for such errors. Please notify me for clarification.   Thank you

## 2023-03-14 NOTE — CARE COORDINATION
SW visited with Pt this afternoon re: etoh rehab info and AA meetings; Pt is also interested in [facility].   Electronically signed by GEREMIAS Cardoza on 3/14/2023 at 4:53 PM

## 2023-03-15 VITALS
HEIGHT: 76 IN | TEMPERATURE: 98 F | RESPIRATION RATE: 18 BRPM | BODY MASS INDEX: 20.87 KG/M2 | WEIGHT: 171.4 LBS | OXYGEN SATURATION: 99 % | DIASTOLIC BLOOD PRESSURE: 91 MMHG | HEART RATE: 96 BPM | SYSTOLIC BLOOD PRESSURE: 131 MMHG

## 2023-03-15 LAB
ALBUMIN SERPL-MCNC: 3.8 G/DL (ref 3.5–5.2)
ALP SERPL-CCNC: 94 U/L (ref 40–130)
ALT SERPL-CCNC: 42 U/L (ref 5–41)
ANION GAP SERPL CALCULATED.3IONS-SCNC: 10 MMOL/L (ref 7–19)
AST SERPL-CCNC: 39 U/L (ref 5–40)
BASOPHILS # BLD: 0 K/UL (ref 0–0.2)
BASOPHILS NFR BLD: 0.6 % (ref 0–1)
BILIRUB SERPL-MCNC: 0.3 MG/DL (ref 0.2–1.2)
BUN SERPL-MCNC: 10 MG/DL (ref 6–20)
CALCIUM SERPL-MCNC: 8.5 MG/DL (ref 8.6–10)
CHLORIDE SERPL-SCNC: 105 MMOL/L (ref 98–111)
CO2 SERPL-SCNC: 24 MMOL/L (ref 22–29)
CREAT SERPL-MCNC: 1 MG/DL (ref 0.5–1.2)
EOSINOPHIL # BLD: 0.1 K/UL (ref 0–0.6)
EOSINOPHIL NFR BLD: 2 % (ref 0–5)
ERYTHROCYTE [DISTWIDTH] IN BLOOD BY AUTOMATED COUNT: 12.6 % (ref 11.5–14.5)
GLUCOSE SERPL-MCNC: 99 MG/DL (ref 74–109)
HCT VFR BLD AUTO: 35.3 % (ref 42–52)
HGB BLD-MCNC: 12.5 G/DL (ref 14–18)
IMM GRANULOCYTES # BLD: 0 K/UL
LYMPHOCYTES # BLD: 1.4 K/UL (ref 1.1–4.5)
LYMPHOCYTES NFR BLD: 28.8 % (ref 20–40)
MAGNESIUM SERPL-MCNC: 2 MG/DL (ref 1.6–2.6)
MCH RBC QN AUTO: 32.3 PG (ref 27–31)
MCHC RBC AUTO-ENTMCNC: 35.4 G/DL (ref 33–37)
MCV RBC AUTO: 91.2 FL (ref 80–94)
MONOCYTES # BLD: 0.4 K/UL (ref 0–0.9)
MONOCYTES NFR BLD: 9 % (ref 0–10)
NEUTROPHILS # BLD: 2.9 K/UL (ref 1.5–7.5)
NEUTS SEG NFR BLD: 59.2 % (ref 50–65)
PHOSPHATE SERPL-MCNC: 3 MG/DL (ref 2.5–4.5)
PLATELET # BLD AUTO: 224 K/UL (ref 130–400)
PMV BLD AUTO: 9.3 FL (ref 9.4–12.4)
POTASSIUM SERPL-SCNC: 4 MMOL/L (ref 3.5–5)
PROT SERPL-MCNC: 6.3 G/DL (ref 6.6–8.7)
RBC # BLD AUTO: 3.87 M/UL (ref 4.7–6.1)
SODIUM SERPL-SCNC: 139 MMOL/L (ref 136–145)
WBC # BLD AUTO: 4.9 K/UL (ref 4.8–10.8)

## 2023-03-15 PROCEDURE — 84100 ASSAY OF PHOSPHORUS: CPT

## 2023-03-15 PROCEDURE — 6360000002 HC RX W HCPCS: Performed by: HOSPITALIST

## 2023-03-15 PROCEDURE — 85025 COMPLETE CBC W/AUTO DIFF WBC: CPT

## 2023-03-15 PROCEDURE — 83735 ASSAY OF MAGNESIUM: CPT

## 2023-03-15 PROCEDURE — 80053 COMPREHEN METABOLIC PANEL: CPT

## 2023-03-15 PROCEDURE — 36415 COLL VENOUS BLD VENIPUNCTURE: CPT

## 2023-03-15 PROCEDURE — 6370000000 HC RX 637 (ALT 250 FOR IP): Performed by: HOSPITALIST

## 2023-03-15 PROCEDURE — 2580000003 HC RX 258: Performed by: HOSPITALIST

## 2023-03-15 PROCEDURE — 6370000000 HC RX 637 (ALT 250 FOR IP): Performed by: INTERNAL MEDICINE

## 2023-03-15 RX ORDER — CLONIDINE HYDROCHLORIDE 0.1 MG/1
0.1 TABLET ORAL 2 TIMES DAILY PRN
Qty: 60 TABLET | Refills: 3
Start: 2023-03-15

## 2023-03-15 RX ORDER — AMLODIPINE BESYLATE 10 MG/1
10 TABLET ORAL NIGHTLY
Qty: 30 TABLET | Refills: 3 | Status: SHIPPED | OUTPATIENT
Start: 2023-03-15

## 2023-03-15 RX ORDER — CLONAZEPAM 0.5 MG/1
0.5 TABLET ORAL 2 TIMES DAILY PRN
Qty: 6 TABLET | Refills: 0 | Status: SHIPPED | OUTPATIENT
Start: 2023-03-15 | End: 2023-03-16 | Stop reason: SDUPTHER

## 2023-03-15 RX ADMIN — CHLORDIAZEPOXIDE HYDROCHLORIDE 50 MG: 25 CAPSULE ORAL at 10:14

## 2023-03-15 RX ADMIN — GABAPENTIN 600 MG: 300 CAPSULE ORAL at 10:16

## 2023-03-15 RX ADMIN — THIAMINE HCL TAB 100 MG 100 MG: 100 TAB at 10:14

## 2023-03-15 RX ADMIN — SODIUM CHLORIDE, PRESERVATIVE FREE 10 ML: 5 INJECTION INTRAVENOUS at 10:18

## 2023-03-15 RX ADMIN — LAMOTRIGINE 300 MG: 150 TABLET ORAL at 10:17

## 2023-03-15 RX ADMIN — ENOXAPARIN SODIUM 40 MG: 100 INJECTION SUBCUTANEOUS at 10:17

## 2023-03-15 RX ADMIN — LOSARTAN POTASSIUM 50 MG: 25 TABLET, FILM COATED ORAL at 10:15

## 2023-03-15 RX ADMIN — THERA TABS 1 TABLET: TAB at 10:14

## 2023-03-15 RX ADMIN — FOLIC ACID 1 MG: 1 TABLET ORAL at 10:15

## 2023-03-15 NOTE — PROGRESS NOTES
Pt discharged home. Pt has copy of discharge instruction and bag of MEDs to BEDs. Pt's father is picking him up. Pt ambulated all afternoon in hallways waiting for his father to arrive.  Electronically signed by Rossi Soni RN on 3/15/2023 at 2:59 PM

## 2023-03-15 NOTE — DISCHARGE SUMMARY
Discharge Summary    NAME: Velia Arrioal  :  1970  MRN:  [id number]    Admit date:  3/13/2023  Discharge date:      Admitting Physician:  No admitting provider for patient encounter. Advance Directive: Full Code    Consults: psychiatry    Primary Care Physician:  Dolores Degroot MD    DISCHARGE DIAGNOSES:  Principal Problem:    Suicidal ideation  Active Problems:    Alcohol abuse, daily use    Acute alcoholic intoxication without complication (HCC)    Alcohol-induced mood disorder with depressive symptoms ([address]    Alcohol use disorder, severe, dependence ([address]  Resolved Problems:    * No resolved hospital problems. 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 Jazmyn Dueñas if [place] is low risk in AM unless we have specific reason for high concern they may be discharged home , repeat [place] is to be done AFTER pt chemically sober first and documented for psych to see as per Dr. Josemanuel Cabrera belongings from room  Ativan Protocol PRN  Multivitamin and Thiamine and Folic Acid PO  Will plan to do scheduled ativan 3-day taper at this time, 2mg tablets TID on day 1 and BID on day 2 and once in the AM of day 3  3/14 as the patient is getting very drowsy from Ativan IV we discontinue Ativan and started on Librium protocol> expected to have alcohol withdrawal associated with sweating started on LR at 100 cc/h to prevent dehydration  3/15 nothing happened overnight, the patient claim that he is not a regular drinker and drinks once in a while got Librium in a tapering dose did not need Ativan.      Anxiety disorder with history of panic attack  3/15 given clonazepam 0.5 mg p.o. twice daily as needed anxiety for next 2 to 3 days as he ran out of prescription   Chronic Medical Problems:  Continue Home Regimen   gabapentin  600 mg Oral TID    lamoTRIgine  300 mg Oral Daily    losartan  50 mg Oral BID    cloNIDine  0.1 mg Oral TID      Hypertension   Losartan 50 mg p.o. twice daily, amlodipine 10 mg daily, on clonidine 0.1 mg p.o. 3 times daily  Sometimes clonidine cause CNS depression   3/14 reduce clonidine 0.1 mg twice daily and discontinue amlodipine and started on nifedipine 60 mg daily May discontinue clonidine on discharge to avoid central depression  3/15 since the blood pressure is on the lower side given amlodipine at nighttime and continue losartan twice daily discontinue nifedipine and continue clonidine as needed with systolic blood pressure more than 180      HOSPITAL COURSE:  Mr. Fidencio Ayoub is a pleasant 52 year old  american man. He presented to the ED after having made comments about wanting to put a 38 to his head. He states that he has been with AA and had good success with that. He had grown up here, and had went to [facility]. He states that he had been feeling helpless and because he was hopeless he made the comment, he thought about shooting himself.      3/14 seen and evaluated at bedside along with RN the patient is not having withdrawal admitted that he drinks 750 mL of vodka almost every day on CIWA scale and Ativan IV protocol.  Psych came to see him but he was very drowsy due to IV Ativan so discussed with RN at bedside to change IV Ativan to p.o. Ativan as needed and start on Librium protocol to have psych counseled, sitter at the bedside.  Ask for nicotine patch    3/15 seen by  psychiatry cleared for discharge.  The patient informed that he was given clonidine for blood pressure but he was taking it for anxiety and sleep and ended up taking more than 3 times, he has blood pressure machine at home and his blood pressure remained on the lower side and stop taking the amlodipine.  Discussed about the misunderstanding and  reeducated the patient about clonidine to be as needed for systolic blood pressure more than 180 and reinstated the previous medication. The patient has history of anxiety attack and panic attack and do well with clonazepam 0.5 mg once or twice a day and really requested for a medicine for tomorrow as he is going to some job interview. Physical Exam:  Vital Signs: BP (!) 131/91   Pulse 96   Temp 98 °F (36.7 °C) (Temporal)   Resp 18   Ht 6' 4\" (1.93 m)   Wt 171 lb 6.4 oz (77.7 kg)   SpO2 99%   BMI 20.86 kg/m²   General appearance:. Alert and Cooperative   HEENT: Normocephalic. Chest: clear to auscultation bilaterally without wheezes or rhonchi. Cardiac: Normal heart tones with regular rate and rhythm, S1, S2 normal. No murmurs, gallops, or rubs auscultated. Abdomen:soft, non-tender; normal bowel sounds, no masses, no organomegaly. Extremities: No clubbing or cyanosis. No peripheral edema. Peripheral pulses palpable. Neurologic: Grossly intact. Significant Diagnostic Studies:   No results found. Pertinent Labs:   CBC:   Recent Labs     03/13/23 2212 03/15/23  0241   WBC 7.0 4.9   HGB 13.2* 12.5*    224     BMP:    Recent Labs     03/13/23 2212 03/15/23  0241    139   K 3.9 4.0    105   CO2 28 24   BUN 6 10   CREATININE 0.9 1.0   GLUCOSE 119* 99         Discharge Medications:         Medication List        CHANGE how you take these medications      clonazePAM 0.5 MG tablet  Commonly known as: KLONOPIN  Take 1 tablet by mouth 2 times daily as needed for Anxiety for up to 3 days. Max Daily Amount: 1 mg  What changed: when to take this     cloNIDine 0.1 MG tablet  Commonly known as: CATAPRES  Take 1 tablet by mouth 2 times daily as needed for High Blood Pressure (If systolic blood pressure is more than 180) Not for any anxiety or sleep.   Patient has been using it for sleep and anxiety  What changed:   when to take this  reasons to take this  additional instructions CONTINUE taking these medications      amLODIPine 10 MG tablet  Commonly known as: NORVASC  Take 1 tablet by mouth nightly     gabapentin 300 MG capsule  Commonly known as: NEURONTIN  Two tablets TID     lamoTRIgine 150 MG tablet  Commonly known as: LAMICTAL  Take 2 tablets by mouth daily     losartan 50 MG tablet  Commonly known as: COZAAR  Take 1 tablet by mouth in the morning and at bedtime            STOP taking these medications      dicyclomine 20 MG tablet  Commonly known as: BENTYL     hydrOXYzine HCl 25 MG tablet  Commonly known as: ATARAX     mupirocin 2 % ointment  Commonly known as: BACTROBAN     pantoprazole 40 MG tablet  Commonly known as: PROTONIX     vitamin D 1.25 MG (45228 UT) Caps capsule  Commonly known as: ERGOCALCIFEROL               Where to Get Your Medications        These medications were sent to [facility], [address]  [address] , [address] [place] [address] [place] [address]      Phone: [phone]   amLODIPine 10 MG tablet  clonazePAM 0.5 MG tablet       Information about where to get these medications is not yet available    Ask your nurse or doctor about these medications  cloNIDine 0.1 MG tablet         Discharge Instructions: Follow up with Darlene Phipps MD in 7 days. Take medications as directed. Resume activity as tolerated. Diet: ADULT DIET; Regular     Disposition: Patient is medically stable and will be discharged home. Time spent on discharge 36 minutes.     Signed:  Sivakumar Carter MD  3/15/2023 12:45 PM

## 2023-03-15 NOTE — PROGRESS NOTES
CLINICAL PHARMACY NOTE: MEDS TO BEDS    Total # of Prescriptions Filled: 2   The following medications were delivered to the patient:  Current Discharge Medication List      Amlodipine 10 mg  Clonazepam 0.5 mg      Additional Documentation:   Delivered Rx's to patients room. Gave Rx's to patient. Paid $0.00.

## 2023-03-15 NOTE — PROGRESS NOTES
Dr Coco Forbes has answered all questions pt had regarding current medications pt was ordered here , and ones he was taking at home. Meds clarified and will be on his discharge orders.  Electronically signed by Gilles Dickerson RN on 3/15/2023 at 11:35 AM

## 2023-03-15 NOTE — PROGRESS NOTES
Pt ambulating the hallways today. No suicide ideations voiced. Pt has spoken to Dr Sherle Councilman and is OK for DC home from his standpoint. HR regular, lungs CTA , no edema, voiding.  Electronically signed by Rolfe Gowers, RN on 3/15/2023 at 11:33 AM

## 2023-03-15 NOTE — CONSULTS
[facility]  Psychiatry Consult    Reason for Consult: Concern   suicidal ideations    The primary source(s) of information include(s):  patient    The patient is a 45 y.o. male with previous psychiatric history of uncomplicated alcohol use disorder, cannabis use disorder, substance-induced use disorder, who has been admitted to medical services secondary to treatment noncompliance, alcohol intoxication, blood alcohol level 355, and suicidal ideations. Patient is well-known to psychiatry due to multiple previous admissions to our psychiatric unit with same clinical presentation, as at present time. Patient has been seen in his room with presence of one-to-one sitter. He reported that after last discharge from the hospital in November 2022, he was doing well, however, 5 weeks ago he relapsed in drinking alcohol and has been drinking \"tall boy beers with 14% alcohol\" every day. Patient stated that on weekends he was drinking more alcohol than during the weekdays, when he has to go to work. He reported that he stopped taking all of the prescribed psychotropic medications 5 weeks ago, when he started drinking alcohol, stated \"Doc you know me, I do not drink alcohol and take medications. I need to return back on my medications. \"    During the interview, patient denies any withdrawal symptoms from alcohol. He endorses mild feeling of depression, anxiety, denies any psychotic symptoms. He endorses fair appetite and fair quality of sleep, denies feeling of hopelessness, helplessness and worthlessness. Patient denies current active suicidal or homicidal ideations, denies any plans. Also, he denies auditory and visual hallucinations. Patient did not endorse any delusions or paranoid thoughts.     Patient reported that  provided him with a list of available [place] [address] meetings, as well as list of rehab facilities in his area, stated that he will review the list after discharge from the hospital.    Also, discussed with patient and recommended to have a psychiatrist for psychotropic medications management. Patient agreed to follow with our psychiatrist in hospital's outpatient psychiatric clinic. PSYCHIATRIC HISTORY:  Diagnoses: Alcohol use disorder, cannabis use disorder, substance-induced mood disorder  Suicide attempts/gestures: Denies   Prior hospitalizations: Multiple to [facility] psychiatric unit with last admission 1 year ago  Medication trials: Lamictal, Seroquel, Prozac, Zoloft, trazodone, Risperdal, naltrexone, Vivitrol, Klonopin  Mental health contact: Lost to follow-up   Head trauma: Denies    Allergies:  Zofran [ondansetron]    Mental Status  Appearance: Poorly groomed, wearing hospital attire. Made good eye contact. Behavior: Slightly anxious, cooperative with interview, socially appropriate. Mild psychomotor agitation appreciated. Gait unremarkable. Speech: Normal in tone and quality, slightly hyperverbal.    Mood: \"Much better\"   Affect: Mood congruent. Range is mildly intense  Thought Process: Mostly linear and goal oriented  Thought Content: Patient does not have any current active suicidal and homicidal ideations. No overt delusions or paranoia appreciated. Perceptions: Seems patient does not have any auditory or visual hallucinations at present time. Patient did not appear to be responding to internal stimuli. No overt psychosis. Executive Functions: Appear intact  Concentration: Slightly decreased  Reasoning: Appears impaired based on interaction from interview   Orientation: to person, place, date, and situation. Alert. Language: Intact. Fund of information: Intact. Memory: recent and remote appear intact.    Impulsivity: Limited  Neurovegitative: Fair appetite, fair sleep  Insight: Limited  Judgment: Limited    Assessment    DSM 5 DIAGNOSIS:  Mood disorder, unspecified  Alcohol use disorder, severe, uncomplicated  Cannabis use disorder  Tobacco use disorder  Treatment noncompliance    Recommendations  1. Currently patient is not suicidal, homicidal or psychotic. Patient denies any withdrawal symptoms from alcohol. Patient does not meet criteria for psychiatric hospitalization. 2. Patient does have a capacity of making his own medical decisions. 3.  Recommended to discontinue one-to-one sitter. 4.  , place, schedule follow-up appointment in [facility] outpatient psychiatric clinic with Dr. Idania Sena for management patient's psychotropic medications. 5.  Patient can be discharged from the hospital when he is medically stable. 6.  Psychiatry will sign off today.       Craig Wilkinson MD

## 2023-03-16 ENCOUNTER — TELEPHONE (OUTPATIENT)
Dept: INTERNAL MEDICINE | Age: 53
End: 2023-03-16

## 2023-03-16 DIAGNOSIS — F41.1 GENERALIZED ANXIETY DISORDER: ICD-10-CM

## 2023-03-16 DIAGNOSIS — F34.9 PERSISTENT MOOD (AFFECTIVE) DISORDER, UNSPECIFIED (HCC): ICD-10-CM

## 2023-03-16 DIAGNOSIS — F10.10 ALCOHOL ABUSE: ICD-10-CM

## 2023-03-16 RX ORDER — CLONAZEPAM 0.5 MG/1
0.5 TABLET ORAL 2 TIMES DAILY PRN
Qty: 60 TABLET | Refills: 0 | Status: SHIPPED | OUTPATIENT
Start: 2023-03-16 | End: 2023-04-15

## 2023-03-16 RX ORDER — GABAPENTIN 300 MG/1
CAPSULE ORAL
Qty: 180 CAPSULE | Refills: 0 | Status: SHIPPED | OUTPATIENT
Start: 2023-03-16 | End: 2023-04-17

## 2023-03-16 NOTE — TELEPHONE ENCOUNTER
[facility] Initial Follow Up Call    Outreach made within 2 business days of discharge: Yes    Patient: Shamar Resendiz   Patient : 1970 MRN: [id number]    Reason for Admission: Depression    Discharge Date: 3/15/23      DISCHARGE DIAGNOSES:  Principal Problem:    Suicidal ideation  Active Problems:    Alcohol abuse, daily use    Acute alcoholic intoxication without complication (HCC)    Alcohol-induced mood disorder with depressive symptoms (HCC)    Alcohol use disorder, severe, dependence ([facility]  Resolved Problems:    * No resolved hospital problems     Spoke with: Patient    Discharge department/facility: [address] Interactive Patient Contact:  Was patient able to fill all prescriptions: Yes  Was patient instructed to bring all medications to the follow-up visit: Yes  Is patient taking all medications as directed in the discharge summary? Yes  Does patient understand their discharge instructions: Yes  Does patient have questions or concerns that need addressed prior to 7-14 day follow up office visit: no    He is doing much better today they changed some of his medications up and have got him set up to see Dr Ede Prakash. He will run out of medications before he sees her and did ask for a refill on those I will get those sent to Dr Caro León for approval. Pt said he is much improved and does not think he needs anything at this time.     Scheduled appointment with PCP within 7-14 days    Follow Up  Future Appointments   Date Time Provider Abdi Black   3/21/2023  8:30 AM Malia Gross MD [facility]   3/27/2023 12:00 PM Merrill Habermann, MD [address] [place] [address]

## 2023-03-16 NOTE — TELEPHONE ENCOUNTER
Pt was given these from the hospital and he will be out before his appointment with you. He is also been set up with Dr Divya Lynn who is supposed to take over the mental health medications be he will not see her until the 27th as a new pt.

## 2023-03-21 ENCOUNTER — OFFICE VISIT (OUTPATIENT)
Dept: INTERNAL MEDICINE | Age: 53
End: 2023-03-21
Payer: MEDICAID

## 2023-03-21 ENCOUNTER — TELEPHONE (OUTPATIENT)
Dept: INTERNAL MEDICINE | Age: 53
End: 2023-03-21

## 2023-03-21 VITALS
OXYGEN SATURATION: 99 % | WEIGHT: 184 LBS | HEART RATE: 90 BPM | BODY MASS INDEX: 22.4 KG/M2 | DIASTOLIC BLOOD PRESSURE: 78 MMHG | SYSTOLIC BLOOD PRESSURE: 134 MMHG

## 2023-03-21 DIAGNOSIS — F10.20 ALCOHOL USE DISORDER, SEVERE, DEPENDENCE (HCC): ICD-10-CM

## 2023-03-21 DIAGNOSIS — G62.9 PERIPHERAL POLYNEUROPATHY: ICD-10-CM

## 2023-03-21 DIAGNOSIS — F33.9 EPISODE OF RECURRENT MAJOR DEPRESSIVE DISORDER, UNSPECIFIED DEPRESSION EPISODE SEVERITY (HCC): ICD-10-CM

## 2023-03-21 DIAGNOSIS — Z87.891 SMOKING HISTORY: ICD-10-CM

## 2023-03-21 DIAGNOSIS — F10.10 ALCOHOL ABUSE: ICD-10-CM

## 2023-03-21 DIAGNOSIS — R45.851 SUICIDAL IDEATION: Primary | ICD-10-CM

## 2023-03-21 DIAGNOSIS — F34.9 PERSISTENT MOOD (AFFECTIVE) DISORDER, UNSPECIFIED (HCC): ICD-10-CM

## 2023-03-21 DIAGNOSIS — F10.931: ICD-10-CM

## 2023-03-21 DIAGNOSIS — I10 PRIMARY HYPERTENSION: ICD-10-CM

## 2023-03-21 DIAGNOSIS — F10.920 ACUTE ALCOHOLIC INTOXICATION WITHOUT COMPLICATION (HCC): ICD-10-CM

## 2023-03-21 DIAGNOSIS — F32.89 OTHER DEPRESSION: ICD-10-CM

## 2023-03-21 DIAGNOSIS — F10.94 ALCOHOL-INDUCED MOOD DISORDER WITH DEPRESSIVE SYMPTOMS (HCC): ICD-10-CM

## 2023-03-21 DIAGNOSIS — R73.9 HYPERGLYCEMIA: ICD-10-CM

## 2023-03-21 DIAGNOSIS — F41.1 GENERALIZED ANXIETY DISORDER: ICD-10-CM

## 2023-03-21 LAB
ALBUMIN SERPL-MCNC: 4.5 G/DL (ref 3.5–5.2)
ALP SERPL-CCNC: 94 U/L (ref 40–130)
ALT SERPL-CCNC: 23 U/L (ref 5–41)
ANION GAP SERPL CALCULATED.3IONS-SCNC: 11 MMOL/L (ref 7–19)
AST SERPL-CCNC: 19 U/L (ref 5–40)
BASOPHILS # BLD: 0 K/UL (ref 0–0.2)
BASOPHILS NFR BLD: 0.4 % (ref 0–1)
BILIRUB SERPL-MCNC: 0.3 MG/DL (ref 0.2–1.2)
BUN SERPL-MCNC: 30 MG/DL (ref 6–20)
CALCIUM SERPL-MCNC: 9.6 MG/DL (ref 8.6–10)
CHLORIDE SERPL-SCNC: 98 MMOL/L (ref 98–111)
CO2 SERPL-SCNC: 25 MMOL/L (ref 22–29)
CREAT SERPL-MCNC: 1.2 MG/DL (ref 0.5–1.2)
EOSINOPHIL # BLD: 0.2 K/UL (ref 0–0.6)
EOSINOPHIL NFR BLD: 2.5 % (ref 0–5)
ERYTHROCYTE [DISTWIDTH] IN BLOOD BY AUTOMATED COUNT: 14.1 % (ref 11.5–14.5)
GLUCOSE SERPL-MCNC: 93 MG/DL (ref 74–109)
HCT VFR BLD AUTO: 38.3 % (ref 42–52)
HGB BLD-MCNC: 12.6 G/DL (ref 14–18)
IMM GRANULOCYTES # BLD: 0 K/UL
LYMPHOCYTES # BLD: 1.7 K/UL (ref 1.1–4.5)
LYMPHOCYTES NFR BLD: 22.3 % (ref 20–40)
MAGNESIUM SERPL-MCNC: 2.2 MG/DL (ref 1.6–2.6)
MCH RBC QN AUTO: 32.1 PG (ref 27–31)
MCHC RBC AUTO-ENTMCNC: 32.9 G/DL (ref 33–37)
MCV RBC AUTO: 97.5 FL (ref 80–94)
MONOCYTES # BLD: 0.6 K/UL (ref 0–0.9)
MONOCYTES NFR BLD: 8.6 % (ref 0–10)
NEUTROPHILS # BLD: 4.9 K/UL (ref 1.5–7.5)
NEUTS SEG NFR BLD: 65.7 % (ref 50–65)
PHOSPHATE SERPL-MCNC: 4.1 MG/DL (ref 2.5–4.5)
PLATELET # BLD AUTO: 227 K/UL (ref 130–400)
PMV BLD AUTO: 9.9 FL (ref 9.4–12.4)
POTASSIUM SERPL-SCNC: 5.1 MMOL/L (ref 3.5–5)
PROT SERPL-MCNC: 7.6 G/DL (ref 6.6–8.7)
RBC # BLD AUTO: 3.93 M/UL (ref 4.7–6.1)
SODIUM SERPL-SCNC: 134 MMOL/L (ref 136–145)
VIT B12 SERPL-MCNC: 1402 PG/ML (ref 211–946)
WBC # BLD AUTO: 7.5 K/UL (ref 4.8–10.8)

## 2023-03-21 PROCEDURE — 99215 OFFICE O/P EST HI 40 MIN: CPT | Performed by: INTERNAL MEDICINE

## 2023-03-21 RX ORDER — CLONAZEPAM 0.5 MG/1
0.5 TABLET ORAL 2 TIMES DAILY PRN
Qty: 12 TABLET | Refills: 0 | Status: SHIPPED | OUTPATIENT
Start: 2023-03-21 | End: 2023-03-27

## 2023-03-21 SDOH — ECONOMIC STABILITY: FOOD INSECURITY: WITHIN THE PAST 12 MONTHS, YOU WORRIED THAT YOUR FOOD WOULD RUN OUT BEFORE YOU GOT MONEY TO BUY MORE.: NEVER TRUE

## 2023-03-21 SDOH — ECONOMIC STABILITY: HOUSING INSECURITY
IN THE LAST 12 MONTHS, WAS THERE A TIME WHEN YOU DID NOT HAVE A STEADY PLACE TO SLEEP OR SLEPT IN A SHELTER (INCLUDING NOW)?: NO

## 2023-03-21 SDOH — ECONOMIC STABILITY: INCOME INSECURITY: HOW HARD IS IT FOR YOU TO PAY FOR THE VERY BASICS LIKE FOOD, HOUSING, MEDICAL CARE, AND HEATING?: NOT HARD AT ALL

## 2023-03-21 SDOH — ECONOMIC STABILITY: FOOD INSECURITY: WITHIN THE PAST 12 MONTHS, THE FOOD YOU BOUGHT JUST DIDN'T LAST AND YOU DIDN'T HAVE MONEY TO GET MORE.: NEVER TRUE

## 2023-03-21 ASSESSMENT — ENCOUNTER SYMPTOMS
SHORTNESS OF BREATH: 0
CHOKING: 0
SINUS PAIN: 0
NAUSEA: 0
WHEEZING: 0
STRIDOR: 0
CONSTIPATION: 0
ABDOMINAL DISTENTION: 0
VOMITING: 0
SINUS PRESSURE: 0
COUGH: 0
SORE THROAT: 0
TROUBLE SWALLOWING: 0
ABDOMINAL PAIN: 0
BLOOD IN STOOL: 0
EYE DISCHARGE: 0
EYE ITCHING: 0
DIARRHEA: 0
COLOR CHANGE: 0
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
## 2023-03-24 ENCOUNTER — TELEPHONE (OUTPATIENT)
Dept: PSYCHIATRY | Age: 53
End: 2023-03-24

## 2023-03-24 NOTE — TELEPHONE ENCOUNTER
Called pt for appointment reminder.     -Pt confirmed      Electronically signed by Dontae Ferro MA on 3/24/2023 at 3:57 PM

## 2023-03-27 ENCOUNTER — OFFICE VISIT (OUTPATIENT)
Dept: PSYCHIATRY | Age: 53
End: 2023-03-27

## 2023-03-27 VITALS
HEART RATE: 85 BPM | DIASTOLIC BLOOD PRESSURE: 92 MMHG | TEMPERATURE: 98.3 F | OXYGEN SATURATION: 99 % | HEIGHT: 76 IN | BODY MASS INDEX: 21.63 KG/M2 | WEIGHT: 177.6 LBS | SYSTOLIC BLOOD PRESSURE: 153 MMHG

## 2023-03-27 DIAGNOSIS — F41.1 GENERALIZED ANXIETY DISORDER: ICD-10-CM

## 2023-03-27 DIAGNOSIS — F32.A DEPRESSION, UNSPECIFIED DEPRESSION TYPE: Primary | ICD-10-CM

## 2023-03-27 DIAGNOSIS — F12.10 MARIJUANA ABUSE: ICD-10-CM

## 2023-03-27 DIAGNOSIS — F10.10 CHRONIC ALCOHOL ABUSE: ICD-10-CM

## 2023-03-27 DIAGNOSIS — F17.200 TOBACCO USE DISORDER: ICD-10-CM

## 2023-03-27 RX ORDER — LAMOTRIGINE 150 MG/1
300 TABLET ORAL DAILY
Qty: 180 TABLET | Refills: 3 | Status: SHIPPED | OUTPATIENT
Start: 2023-03-27 | End: 2023-06-25

## 2023-03-27 ASSESSMENT — COLUMBIA-SUICIDE SEVERITY RATING SCALE - C-SSRS
1. WITHIN THE PAST MONTH, HAVE YOU WISHED YOU WERE DEAD OR WISHED YOU COULD GO TO SLEEP AND NOT WAKE UP?: NO
2. HAVE YOU ACTUALLY HAD ANY THOUGHTS OF KILLING YOURSELF?: NO
6. HAVE YOU EVER DONE ANYTHING, STARTED TO DO ANYTHING, OR PREPARED TO DO ANYTHING TO END YOUR LIFE?: NO

## 2023-03-27 ASSESSMENT — PATIENT HEALTH QUESTIONNAIRE - PHQ9
4. FEELING TIRED OR HAVING LITTLE ENERGY: 1
SUM OF ALL RESPONSES TO PHQ QUESTIONS 1-9: 12
3. TROUBLE FALLING OR STAYING ASLEEP: 2
SUM OF ALL RESPONSES TO PHQ QUESTIONS 1-9: 12
9. THOUGHTS THAT YOU WOULD BE BETTER OFF DEAD, OR OF HURTING YOURSELF: 1
5. POOR APPETITE OR OVEREATING: 1
8. MOVING OR SPEAKING SO SLOWLY THAT OTHER PEOPLE COULD HAVE NOTICED. OR THE OPPOSITE, BEING SO FIGETY OR RESTLESS THAT YOU HAVE BEEN MOVING AROUND A LOT MORE THAN USUAL: 1
1. LITTLE INTEREST OR PLEASURE IN DOING THINGS: 2
10. IF YOU CHECKED OFF ANY PROBLEMS, HOW DIFFICULT HAVE THESE PROBLEMS MADE IT FOR YOU TO DO YOUR WORK, TAKE CARE OF THINGS AT HOME, OR GET ALONG WITH OTHER PEOPLE: 1
SUM OF ALL RESPONSES TO PHQ QUESTIONS 1-9: 11
2. FEELING DOWN, DEPRESSED OR HOPELESS: 1
SUM OF ALL RESPONSES TO PHQ9 QUESTIONS 1 & 2: 3
6. FEELING BAD ABOUT YOURSELF - OR THAT YOU ARE A FAILURE OR HAVE LET YOURSELF OR YOUR FAMILY DOWN: 2
SUM OF ALL RESPONSES TO PHQ QUESTIONS 1-9: 12
7. TROUBLE CONCENTRATING ON THINGS, SUCH AS READING THE NEWSPAPER OR WATCHING TELEVISION: 1

## 2023-03-27 NOTE — PROGRESS NOTES
APPENDECTOMY  1986    CHOLECYSTECTOMY  05/18/2006    [facility]    COLONOSCOPY  2022    Polyps, told to RETURN IN TWO YEARS    ENDOSCOPY, COLON, DIAGNOSTIC  2020    KNEE ARTHROSCOPY Right 1998    NECK SURGERY  07/15/2008    [facility]         Family History   Problem Relation Age of Onset    Osteoarthritis Mother     Thyroid Disease Mother     Heart Failure Father     No Known Problems Sister     Heart Failure Maternal Grandmother     Cancer Maternal Grandfather     Heart Failure Paternal Grandmother     No Known Problems Paternal Grandfather          Psychiatric Review of Systems    Mood:  positive for sadness, poor sleep, poor energy level    Jyoti: negative    (impulsivity, grandiosity, recklessness, excessive energy, decreased need for sleep, increased spending beyond means, hyperverbal, grandiose, racing thoughts, hypersexuality)    Other: negative    (Irritability, lability, anger)    Anxiety:  present, generalized    Panic Disorder symptoms: negative    (Palpitations, racing heart beat, sweating, sense of impending doom, fear of recurrence, shortness of breath)    OCD symptoms:  negative    (checking, cleaning, organizing, rituals, hang-ups, obsessive thoughts, counting, rational vs. Irrational beliefs)    PTSD symptoms:  negative    (nightmares, flashbacks, startle response, avoidance)    Social anxiety symptoms:  negative    Simple phobias: negative    (heights, planes, spiders, etc.)    Psychosis: negative    (hallucinations, auditory, visual, tactile, olfactory)    Paranoia: negative    Delusions:  negative    (TV, radio, thought broadcasting, mind control, referential thinking)    (persecutory delusion - e.g., believing one is being followed and harassed by gangs)    (grandiose delusion - e.g., believing one is a billionaire  who owns casinos around the world)    (erotomanic delusion - e.g., believing a famous  is in love with them)    (somatic delusion - e.g., believing one's sinuses have been

## 2023-04-06 ENCOUNTER — APPOINTMENT (OUTPATIENT)
Dept: CT IMAGING | Age: 53
End: 2023-04-06
Payer: MEDICAID

## 2023-04-06 ENCOUNTER — TELEPHONE (OUTPATIENT)
Dept: INTERNAL MEDICINE | Age: 53
End: 2023-04-06

## 2023-04-06 ENCOUNTER — HOSPITAL ENCOUNTER (EMERGENCY)
Age: 53
Discharge: HOME | End: 2023-04-06
Payer: MEDICAID

## 2023-04-06 VITALS
WEIGHT: 188 LBS | OXYGEN SATURATION: 98 % | BODY MASS INDEX: 22.89 KG/M2 | HEART RATE: 101 BPM | DIASTOLIC BLOOD PRESSURE: 95 MMHG | RESPIRATION RATE: 18 BRPM | TEMPERATURE: 98.6 F | HEIGHT: 76 IN | SYSTOLIC BLOOD PRESSURE: 160 MMHG

## 2023-04-06 DIAGNOSIS — M54.2 NECK PAIN: Primary | ICD-10-CM

## 2023-04-06 PROCEDURE — 72125 CT NECK SPINE W/O DYE: CPT

## 2023-04-06 PROCEDURE — 99284 EMERGENCY DEPT VISIT MOD MDM: CPT

## 2023-04-06 ASSESSMENT — ENCOUNTER SYMPTOMS
BACK PAIN: 0
SHORTNESS OF BREATH: 0
NAUSEA: 0
VOMITING: 0
ABDOMINAL PAIN: 0
DIARRHEA: 0
COUGH: 0

## 2023-04-06 ASSESSMENT — PAIN DESCRIPTION - LOCATION: LOCATION: NECK

## 2023-04-06 ASSESSMENT — PAIN DESCRIPTION - DESCRIPTORS: DESCRIPTORS: SHOOTING

## 2023-04-06 ASSESSMENT — PAIN - FUNCTIONAL ASSESSMENT: PAIN_FUNCTIONAL_ASSESSMENT: 0-10

## 2023-04-06 ASSESSMENT — PAIN SCALES - GENERAL: PAINLEVEL_OUTOF10: 8

## 2023-04-06 NOTE — ED PROVIDER NOTES
Problems Paternal Grandfather           SOCIAL HISTORY       Social History     Socioeconomic History    Marital status:      Spouse name: has had mulitple divorces    Number of children: 0    Years of education: None    Highest education level: None   Occupational History    Occupation: cuts wire     Employer: DISABLED     Comment: [employer]   Tobacco Use    Smoking status: Every Day     Packs/day: 1.00     Years: 41.00     Pack years: 41.00     Types: Cigarettes    Smokeless tobacco: Former     Types: Chew    Tobacco comments:     Started at age 13 years, 37 years at 2PPD for 80 pack years     Used to dip when he played bseball, played for 11 years at a can a day   Vaping Use    Vaping Use: Never used   Substance and Sexual Activity    Alcohol use:  Yes     Alcohol/week: 6.0 standard drinks     Types: 6 Cans of beer per week     Comment: occasional    Drug use: Not Currently     Frequency: 1.0 times per week     Types: Marijuana Avery Shoemaker)    Sexual activity: Yes     Partners: Female     Comment: has no known kids   Social History Narrative    CODE STATUS: Full Code    HEALTH CARE PROXY: Mrs. Camille Sloan, [phone]    AMBULATES: independently    DOMICILED: has no stairs in the home, lives alone, has no stairs to enter the home, has no pets     Social Determinants of Health     Financial Resource Strain: Low Risk     Difficulty of Paying Living Expenses: Not hard at all   Food Insecurity: No Food Insecurity    Worried About Running Out of Food in the Last Year: Never true    Ran Out of Food in the Last Year: Never true   Transportation Needs: Unknown    Lack of Transportation (Non-Medical): No   Housing Stability: Unknown    Unstable Housing in the Last Year: No       SCREENINGS         [place] Coma Scale  Eye Opening: Spontaneous  Best Verbal Response: Oriented  Best Motor Response: Obeys commands  Jaime Coma Scale Score: 15                     [place] Assessment  BP: (!) 160/95  Heart Rate: (!) 101

## 2023-04-06 NOTE — TELEPHONE ENCOUNTER
S/w pt, states he has a cage in neck and believes something came loose. Clinical staff advised that pt needs to be seen by ER. Pt voiced understanding.

## 2023-04-17 ENCOUNTER — TELEPHONE (OUTPATIENT)
Dept: INTERNAL MEDICINE | Age: 53
End: 2023-04-17

## 2023-04-17 NOTE — TELEPHONE ENCOUNTER
Pt states he saw Dr. Janis Odom and she prescribed him lamotrigine, he states he needs to know who is going to refill his lorazepam? I see in his med list he takes clonazepam not lorazepam, so I am thinking he wants to know if you will fill his clonazepam or if he has to get that from Dr. Janis Odom?

## 2023-04-18 NOTE — TELEPHONE ENCOUNTER
This [facility] called and spoke with the nurse about the clonazepam. She will return our call after she speaks to Dr. Anthony Carpio.

## 2023-04-18 NOTE — TELEPHONE ENCOUNTER
Chip Lopez returned our call and stated that Dr. Tenzin Cole is going to prescribe the clonazepam or the pt. He was told this at his appointment along with if he shows up to her office or the hospital drunk all medication will be stopped. Pt was also told he would have to pass drug testing to keep his medications. Patient voiced understanding when he was told this. So Dr. Tenzin Cole has sent this medication in for him.

## 2023-04-19 DIAGNOSIS — F34.9 PERSISTENT MOOD (AFFECTIVE) DISORDER, UNSPECIFIED (HCC): ICD-10-CM

## 2023-04-19 DIAGNOSIS — F41.1 GENERALIZED ANXIETY DISORDER: ICD-10-CM

## 2023-04-19 DIAGNOSIS — F10.10 ALCOHOL ABUSE: ICD-10-CM

## 2023-04-19 RX ORDER — GABAPENTIN 400 MG/1
400 CAPSULE ORAL 3 TIMES DAILY
Qty: 90 CAPSULE | Refills: 1 | Status: SHIPPED | OUTPATIENT
Start: 2023-04-19 | End: 2023-05-19

## 2023-04-19 RX ORDER — CLONAZEPAM 0.5 MG/1
0.5 TABLET ORAL 2 TIMES DAILY PRN
Qty: 60 TABLET | Refills: 1 | Status: SHIPPED | OUTPATIENT
Start: 2023-04-19 | End: 2023-05-19
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
## 2023-05-25 ENCOUNTER — TELEPHONE (OUTPATIENT)
Dept: PSYCHIATRY | Age: 53
End: 2023-05-25

## 2023-05-25 NOTE — TELEPHONE ENCOUNTER
Called pt for appointment reminder.   -left voicemail, requesting a return call      Electronically signed by Elsa Brock MA on 5/25/2023 at 10:14 AM

## 2023-05-26 ENCOUNTER — TELEPHONE (OUTPATIENT)
Dept: PSYCHIATRY | Age: 53
End: 2023-05-26

## 2023-05-26 NOTE — TELEPHONE ENCOUNTER
Called pt about missed appt for 5/26 @ 8 AM    Pt will call back to reschedule     Electronically signed by Saulo Blake on 5/26/2023 at 2:17 PM

## 2023-05-31 ENCOUNTER — TELEPHONE (OUTPATIENT)
Dept: PSYCHIATRY | Age: 53
End: 2023-05-31

## 2023-05-31 ENCOUNTER — TELEPHONE (OUTPATIENT)
Dept: INTERNAL MEDICINE | Age: 53
End: 2023-05-31

## 2023-05-31 NOTE — TELEPHONE ENCOUNTER
Pt called stating that he needed to talk to Dr. Hayde Loco because he isn't doing too good. MA told pt that Dr. Hayde Loco is not in the office this week and that she won't be back till next week. Pt stated that he has been throwing up every morning for a year. His blood pressure will michele rocket and then drop, michele rocket and then drop. Pt stated that he went to the ER in [place] and when he went there he told them that he was having chest pains and a headache. Pt stated that they told him that they had to do a stool sample. Pt stated that he needed to talk to Dr. Hayde Loco because he trusts her. MA told pt that if he is still having chest pains and a headache to come to [facility] ER and have them check him out. Also, scheduled an appt with Dr. Hayde Loco for 06/09/23 @ 10:30. MA told pt that if his symptoms or pain gets worse that he needs to go to the ER. Pt stated that he understood and stated that if he didn't go to the ER that he would see us at his appt on 06/09/23.     Electronically signed by Sabina Esqueda MA on 5/31/2023 at 2:03 PM

## 2023-06-05 ENCOUNTER — HOSPITAL ENCOUNTER (OUTPATIENT)
Age: 53
Setting detail: OBSERVATION
Discharge: HOME | End: 2023-06-06
Attending: INTERNAL MEDICINE | Admitting: INTERNAL MEDICINE
Payer: MEDICAID

## 2023-06-05 ENCOUNTER — APPOINTMENT (OUTPATIENT)
Dept: GENERAL RADIOLOGY | Age: 53
End: 2023-06-05
Payer: MEDICAID

## 2023-06-05 DIAGNOSIS — R07.9 ACUTE CHEST PAIN: Primary | ICD-10-CM

## 2023-06-05 DIAGNOSIS — I10 ESSENTIAL HYPERTENSION: ICD-10-CM

## 2023-06-05 PROBLEM — I20.0 UNSTABLE ANGINA (HCC): Status: ACTIVE | Noted: 2023-06-05

## 2023-06-05 LAB
ALBUMIN SERPL-MCNC: 4 G/DL (ref 3.5–5.2)
ALP SERPL-CCNC: 87 U/L (ref 40–130)
ALT SERPL-CCNC: 24 U/L (ref 5–41)
ANION GAP SERPL CALCULATED.3IONS-SCNC: 10 MMOL/L (ref 7–19)
AST SERPL-CCNC: 22 U/L (ref 5–40)
BASOPHILS # BLD: 0 K/UL (ref 0–0.2)
BASOPHILS NFR BLD: 0.4 % (ref 0–1)
BILIRUB SERPL-MCNC: 0.3 MG/DL (ref 0.2–1.2)
BNP BLD-MCNC: 344 PG/ML (ref 0–124)
BUN SERPL-MCNC: 12 MG/DL (ref 6–20)
CALCIUM SERPL-MCNC: 9 MG/DL (ref 8.6–10)
CHLORIDE SERPL-SCNC: 99 MMOL/L (ref 98–111)
CO2 SERPL-SCNC: 26 MMOL/L (ref 22–29)
CREAT SERPL-MCNC: 1 MG/DL (ref 0.5–1.2)
EOSINOPHIL # BLD: 0 K/UL (ref 0–0.6)
EOSINOPHIL NFR BLD: 0.2 % (ref 0–5)
ERYTHROCYTE [DISTWIDTH] IN BLOOD BY AUTOMATED COUNT: 13.3 % (ref 11.5–14.5)
GLUCOSE SERPL-MCNC: 116 MG/DL (ref 74–109)
HCT VFR BLD AUTO: 43.5 % (ref 42–52)
HGB BLD-MCNC: 14.6 G/DL (ref 14–18)
IMM GRANULOCYTES # BLD: 0 K/UL
LV EF: 50 %
LVEF MODALITY: NORMAL
LYMPHOCYTES # BLD: 0.9 K/UL (ref 1.1–4.5)
LYMPHOCYTES NFR BLD: 10.9 % (ref 20–40)
MCH RBC QN AUTO: 31.4 PG (ref 27–31)
MCHC RBC AUTO-ENTMCNC: 33.6 G/DL (ref 33–37)
MCV RBC AUTO: 93.5 FL (ref 80–94)
MONOCYTES # BLD: 0.4 K/UL (ref 0–0.9)
MONOCYTES NFR BLD: 4.2 % (ref 0–10)
NEUTROPHILS # BLD: 6.9 K/UL (ref 1.5–7.5)
NEUTS SEG NFR BLD: 84.2 % (ref 50–65)
PLATELET # BLD AUTO: 252 K/UL (ref 130–400)
PMV BLD AUTO: 9.1 FL (ref 9.4–12.4)
POTASSIUM SERPL-SCNC: 4.3 MMOL/L (ref 3.5–5)
PROT SERPL-MCNC: 6.8 G/DL (ref 6.6–8.7)
RBC # BLD AUTO: 4.65 M/UL (ref 4.7–6.1)
REASON FOR REJECTION: NORMAL
REASON FOR REJECTION: NORMAL
REJECTED TEST: NORMAL
REJECTED TEST: NORMAL
SODIUM SERPL-SCNC: 135 MMOL/L (ref 136–145)
TROPONIN T SERPL-MCNC: <0.01 NG/ML (ref 0–0.03)
TSH SERPL DL<=0.005 MIU/L-ACNC: 0.47 UIU/ML (ref 0.35–5.5)
WBC # BLD AUTO: 8.3 K/UL (ref 4.8–10.8)

## 2023-06-05 PROCEDURE — 84443 ASSAY THYROID STIM HORMONE: CPT

## 2023-06-05 PROCEDURE — 93005 ELECTROCARDIOGRAM TRACING: CPT | Performed by: INTERNAL MEDICINE

## 2023-06-05 PROCEDURE — G0378 HOSPITAL OBSERVATION PER HR: HCPCS

## 2023-06-05 PROCEDURE — 99285 EMERGENCY DEPT VISIT HI MDM: CPT

## 2023-06-05 PROCEDURE — 36415 COLL VENOUS BLD VENIPUNCTURE: CPT

## 2023-06-05 PROCEDURE — 80053 COMPREHEN METABOLIC PANEL: CPT

## 2023-06-05 PROCEDURE — 96372 THER/PROPH/DIAG INJ SC/IM: CPT

## 2023-06-05 PROCEDURE — 85025 COMPLETE CBC W/AUTO DIFF WBC: CPT

## 2023-06-05 PROCEDURE — 99204 OFFICE O/P NEW MOD 45 MIN: CPT | Performed by: INTERNAL MEDICINE

## 2023-06-05 PROCEDURE — 6370000000 HC RX 637 (ALT 250 FOR IP)

## 2023-06-05 PROCEDURE — 6370000000 HC RX 637 (ALT 250 FOR IP): Performed by: INTERNAL MEDICINE

## 2023-06-05 PROCEDURE — 94760 N-INVAS EAR/PLS OXIMETRY 1: CPT

## 2023-06-05 PROCEDURE — 6370000000 HC RX 637 (ALT 250 FOR IP): Performed by: PHYSICIAN ASSISTANT

## 2023-06-05 PROCEDURE — 83880 ASSAY OF NATRIURETIC PEPTIDE: CPT

## 2023-06-05 PROCEDURE — 71045 X-RAY EXAM CHEST 1 VIEW: CPT

## 2023-06-05 PROCEDURE — 6360000002 HC RX W HCPCS

## 2023-06-05 PROCEDURE — 84484 ASSAY OF TROPONIN QUANT: CPT

## 2023-06-05 PROCEDURE — 2580000003 HC RX 258

## 2023-06-05 PROCEDURE — 93350 STRESS TTE ONLY: CPT

## 2023-06-05 RX ORDER — ENOXAPARIN SODIUM 100 MG/ML
1 INJECTION SUBCUTANEOUS 2 TIMES DAILY
Status: DISCONTINUED | OUTPATIENT
Start: 2023-06-05 | End: 2023-06-05

## 2023-06-05 RX ORDER — MAGNESIUM SULFATE IN WATER 40 MG/ML
2000 INJECTION, SOLUTION INTRAVENOUS PRN
Status: DISCONTINUED | OUTPATIENT
Start: 2023-06-05 | End: 2023-06-06 | Stop reason: HOSPADM

## 2023-06-05 RX ORDER — POLYETHYLENE GLYCOL 3350 17 G/17G
17 POWDER, FOR SOLUTION ORAL DAILY PRN
Status: DISCONTINUED | OUTPATIENT
Start: 2023-06-05 | End: 2023-06-06 | Stop reason: HOSPADM

## 2023-06-05 RX ORDER — SODIUM CHLORIDE 0.9 % (FLUSH) 0.9 %
5-40 SYRINGE (ML) INJECTION PRN
Status: ACTIVE | OUTPATIENT
Start: 2023-06-05 | End: 2023-06-05

## 2023-06-05 RX ORDER — POTASSIUM CHLORIDE 7.45 MG/ML
10 INJECTION INTRAVENOUS PRN
Status: DISCONTINUED | OUTPATIENT
Start: 2023-06-05 | End: 2023-06-06 | Stop reason: HOSPADM

## 2023-06-05 RX ORDER — NITROGLYCERIN 0.4 MG/1
0.4 TABLET SUBLINGUAL EVERY 5 MIN PRN
Status: DISCONTINUED | OUTPATIENT
Start: 2023-06-05 | End: 2023-06-05

## 2023-06-05 RX ORDER — NITROGLYCERIN 0.4 MG/1
0.4 TABLET SUBLINGUAL EVERY 5 MIN PRN
Status: DISCONTINUED | OUTPATIENT
Start: 2023-06-05 | End: 2023-06-06 | Stop reason: HOSPADM

## 2023-06-05 RX ORDER — SODIUM CHLORIDE 9 MG/ML
500 INJECTION, SOLUTION INTRAVENOUS CONTINUOUS PRN
Status: DISCONTINUED | OUTPATIENT
Start: 2023-06-05 | End: 2023-06-05 | Stop reason: SDUPTHER

## 2023-06-05 RX ORDER — LOSARTAN POTASSIUM 50 MG/1
50 TABLET ORAL 2 TIMES DAILY
Status: DISCONTINUED | OUTPATIENT
Start: 2023-06-05 | End: 2023-06-06 | Stop reason: HOSPADM

## 2023-06-05 RX ORDER — CLONIDINE HYDROCHLORIDE 0.1 MG/1
0.1 TABLET ORAL 2 TIMES DAILY PRN
Status: DISCONTINUED | OUTPATIENT
Start: 2023-06-05 | End: 2023-06-06 | Stop reason: HOSPADM

## 2023-06-05 RX ORDER — NITROGLYCERIN 0.4 MG/1
0.4 TABLET SUBLINGUAL EVERY 5 MIN PRN
Status: DISCONTINUED | OUTPATIENT
Start: 2023-06-05 | End: 2023-06-05 | Stop reason: SDUPTHER

## 2023-06-05 RX ORDER — SODIUM CHLORIDE 9 MG/ML
500 INJECTION, SOLUTION INTRAVENOUS CONTINUOUS PRN
Status: ACTIVE | OUTPATIENT
Start: 2023-06-05 | End: 2023-06-05

## 2023-06-05 RX ORDER — ASPIRIN 81 MG/1
81 TABLET, CHEWABLE ORAL DAILY
Status: DISCONTINUED | OUTPATIENT
Start: 2023-06-06 | End: 2023-06-06 | Stop reason: HOSPADM

## 2023-06-05 RX ORDER — CARVEDILOL 12.5 MG/1
12.5 TABLET ORAL 2 TIMES DAILY WITH MEALS
Status: DISCONTINUED | OUTPATIENT
Start: 2023-06-05 | End: 2023-06-06 | Stop reason: HOSPADM

## 2023-06-05 RX ORDER — ATROPINE SULFATE 0.1 MG/ML
0.5 INJECTION INTRAVENOUS EVERY 5 MIN PRN
Status: DISCONTINUED | OUTPATIENT
Start: 2023-06-05 | End: 2023-06-05 | Stop reason: SDUPTHER

## 2023-06-05 RX ORDER — ALBUTEROL SULFATE 90 UG/1
2 AEROSOL, METERED RESPIRATORY (INHALATION) PRN
Status: DISCONTINUED | OUTPATIENT
Start: 2023-06-05 | End: 2023-06-05

## 2023-06-05 RX ORDER — LAMOTRIGINE 150 MG/1
300 TABLET ORAL DAILY
Status: DISCONTINUED | OUTPATIENT
Start: 2023-06-05 | End: 2023-06-06 | Stop reason: HOSPADM

## 2023-06-05 RX ORDER — ATROPINE SULFATE 0.1 MG/ML
0.5 INJECTION INTRAVENOUS EVERY 5 MIN PRN
Status: DISCONTINUED | OUTPATIENT
Start: 2023-06-05 | End: 2023-06-05

## 2023-06-05 RX ORDER — SODIUM CHLORIDE 0.9 % (FLUSH) 0.9 %
5-40 SYRINGE (ML) INJECTION EVERY 12 HOURS SCHEDULED
Status: DISCONTINUED | OUTPATIENT
Start: 2023-06-05 | End: 2023-06-06 | Stop reason: HOSPADM

## 2023-06-05 RX ORDER — ASPIRIN 325 MG
325 TABLET, DELAYED RELEASE (ENTERIC COATED) ORAL ONCE
Status: COMPLETED | OUTPATIENT
Start: 2023-06-05 | End: 2023-06-05

## 2023-06-05 RX ORDER — ACETAMINOPHEN 325 MG/1
650 TABLET ORAL EVERY 6 HOURS PRN
Status: DISCONTINUED | OUTPATIENT
Start: 2023-06-05 | End: 2023-06-06 | Stop reason: HOSPADM

## 2023-06-05 RX ORDER — SODIUM CHLORIDE 9 MG/ML
INJECTION, SOLUTION INTRAVENOUS PRN
Status: DISCONTINUED | OUTPATIENT
Start: 2023-06-05 | End: 2023-06-06 | Stop reason: HOSPADM

## 2023-06-05 RX ORDER — METOPROLOL TARTRATE 5 MG/5ML
5 INJECTION INTRAVENOUS EVERY 5 MIN PRN
Status: DISCONTINUED | OUTPATIENT
Start: 2023-06-05 | End: 2023-06-05 | Stop reason: SDUPTHER

## 2023-06-05 RX ORDER — AMLODIPINE BESYLATE 10 MG/1
10 TABLET ORAL NIGHTLY
Status: DISCONTINUED | OUTPATIENT
Start: 2023-06-05 | End: 2023-06-06 | Stop reason: HOSPADM

## 2023-06-05 RX ORDER — ENOXAPARIN SODIUM 100 MG/ML
40 INJECTION SUBCUTANEOUS 2 TIMES DAILY
Status: DISCONTINUED | OUTPATIENT
Start: 2023-06-05 | End: 2023-06-06 | Stop reason: HOSPADM

## 2023-06-05 RX ORDER — METOPROLOL TARTRATE 5 MG/5ML
5 INJECTION INTRAVENOUS EVERY 5 MIN PRN
Status: DISCONTINUED | OUTPATIENT
Start: 2023-06-05 | End: 2023-06-05

## 2023-06-05 RX ORDER — ATORVASTATIN CALCIUM 80 MG/1
80 TABLET, FILM COATED ORAL NIGHTLY
Status: DISCONTINUED | OUTPATIENT
Start: 2023-06-05 | End: 2023-06-06 | Stop reason: HOSPADM

## 2023-06-05 RX ORDER — PANTOPRAZOLE SODIUM 40 MG/1
40 TABLET, DELAYED RELEASE ORAL
Status: DISCONTINUED | OUTPATIENT
Start: 2023-06-06 | End: 2023-06-06

## 2023-06-05 RX ORDER — SODIUM CHLORIDE 0.9 % (FLUSH) 0.9 %
10 SYRINGE (ML) INJECTION PRN
Status: ACTIVE | OUTPATIENT
Start: 2023-06-05 | End: 2023-06-05

## 2023-06-05 RX ORDER — SODIUM CHLORIDE 0.9 % (FLUSH) 0.9 %
5-40 SYRINGE (ML) INJECTION PRN
Status: DISCONTINUED | OUTPATIENT
Start: 2023-06-05 | End: 2023-06-06 | Stop reason: HOSPADM

## 2023-06-05 RX ADMIN — AMLODIPINE BESYLATE 10 MG: 10 TABLET ORAL at 20:53

## 2023-06-05 RX ADMIN — LAMOTRIGINE 300 MG: 150 TABLET ORAL at 17:11

## 2023-06-05 RX ADMIN — ENOXAPARIN SODIUM 80 MG: 100 INJECTION SUBCUTANEOUS at 17:11

## 2023-06-05 RX ADMIN — LOSARTAN POTASSIUM 50 MG: 50 TABLET, FILM COATED ORAL at 20:53

## 2023-06-05 RX ADMIN — ASPIRIN 325 MG: 325 TABLET, COATED ORAL at 09:35

## 2023-06-05 RX ADMIN — CARVEDILOL 12.5 MG: 12.5 TABLET, FILM COATED ORAL at 20:53

## 2023-06-05 RX ADMIN — SODIUM CHLORIDE, PRESERVATIVE FREE 10 ML: 5 INJECTION INTRAVENOUS at 20:52

## 2023-06-05 RX ADMIN — LOSARTAN POTASSIUM 50 MG: 50 TABLET, FILM COATED ORAL at 17:11

## 2023-06-05 RX ADMIN — ACETAMINOPHEN 650 MG: 325 TABLET ORAL at 12:29

## 2023-06-05 RX ADMIN — ATORVASTATIN CALCIUM 80 MG: 80 TABLET, FILM COATED ORAL at 20:53

## 2023-06-05 RX ADMIN — NITROGLYCERIN 0.4 MG: 0.4 TABLET, ORALLY DISINTEGRATING SUBLINGUAL at 09:35

## 2023-06-05 SDOH — ECONOMIC STABILITY: FOOD INSECURITY: WITHIN THE PAST 12 MONTHS, THE FOOD YOU BOUGHT JUST DIDN'T LAST AND YOU DIDN'T HAVE MONEY TO GET MORE.: NEVER TRUE

## 2023-06-05 SDOH — ECONOMIC STABILITY: TRANSPORTATION INSECURITY
IN THE PAST 12 MONTHS, HAS THE LACK OF TRANSPORTATION KEPT YOU FROM MEDICAL APPOINTMENTS OR FROM GETTING MEDICATIONS?: NO

## 2023-06-05 SDOH — HEALTH STABILITY: PHYSICAL HEALTH: ON AVERAGE, HOW MANY DAYS PER WEEK DO YOU ENGAGE IN MODERATE TO STRENUOUS EXERCISE (LIKE A BRISK WALK)?: 5 DAYS

## 2023-06-05 SDOH — HEALTH STABILITY: PHYSICAL HEALTH: ON AVERAGE, HOW MANY MINUTES DO YOU ENGAGE IN EXERCISE AT THIS LEVEL?: 60 MIN

## 2023-06-05 SDOH — ECONOMIC STABILITY: INCOME INSECURITY: IN THE LAST 12 MONTHS, WAS THERE A TIME WHEN YOU WERE NOT ABLE TO PAY THE MORTGAGE OR RENT ON TIME?: YES

## 2023-06-05 SDOH — ECONOMIC STABILITY: HOUSING INSECURITY: IN THE LAST 12 MONTHS, HOW MANY PLACES HAVE YOU LIVED?: 2

## 2023-06-05 SDOH — ECONOMIC STABILITY: FOOD INSECURITY: WITHIN THE PAST 12 MONTHS, YOU WORRIED THAT YOUR FOOD WOULD RUN OUT BEFORE YOU GOT MONEY TO BUY MORE.: NEVER TRUE

## 2023-06-05 SDOH — ECONOMIC STABILITY: TRANSPORTATION INSECURITY
IN THE PAST 12 MONTHS, HAS LACK OF TRANSPORTATION KEPT YOU FROM MEETINGS, WORK, OR FROM GETTING THINGS NEEDED FOR DAILY LIVING?: NO

## 2023-06-05 ASSESSMENT — PATIENT HEALTH QUESTIONNAIRE - PHQ9
SUM OF ALL RESPONSES TO PHQ9 QUESTIONS 1 & 2: 3
8. MOVING OR SPEAKING SO SLOWLY THAT OTHER PEOPLE COULD HAVE NOTICED. OR THE OPPOSITE, BEING SO FIGETY OR RESTLESS THAT YOU HAVE BEEN MOVING AROUND A LOT MORE THAN USUAL: NOT AT ALL
3. TROUBLE FALLING OR STAYING ASLEEP: NOT AT ALL
4. FEELING TIRED OR HAVING LITTLE ENERGY: MORE THAN HALF THE DAYS
2. FEELING DOWN, DEPRESSED OR HOPELESS: SEVERAL DAYS
5. POOR APPETITE OR OVEREATING: NOT AT ALL
7. TROUBLE CONCENTRATING ON THINGS, SUCH AS READING THE NEWSPAPER OR WATCHING TELEVISION: NOT AT ALL
SUM OF ALL RESPONSES TO PHQ QUESTIONS 1-9: 5
10. IF YOU CHECKED OFF ANY PROBLEMS, HOW DIFFICULT HAVE THESE PROBLEMS MADE IT FOR YOU TO DO YOUR WORK, TAKE CARE OF THINGS AT HOME, OR GET ALONG WITH OTHER PEOPLE: SOMEWHAT DIFFICULT
6. FEELING BAD ABOUT YOURSELF - OR THAT YOU ARE A FAILURE OR HAVE LET YOURSELF OR YOUR FAMILY DOWN: NOT AT ALL
1. LITTLE INTEREST OR PLEASURE IN DOING THINGS: MORE THAN HALF THE DAYS
9. THOUGHTS THAT YOU WOULD BE BETTER OFF DEAD, OR OF HURTING YOURSELF: NOT AT ALL

## 2023-06-05 ASSESSMENT — SOCIAL DETERMINANTS OF HEALTH (SDOH)
IN A TYPICAL WEEK, HOW MANY TIMES DO YOU TALK ON THE PHONE WITH FAMILY, FRIENDS, OR NEIGHBORS?: MORE THAN THREE TIMES A WEEK
HOW OFTEN DO YOU ATTENT MEETINGS OF THE CLUB OR ORGANIZATION YOU BELONG TO?: NEVER
WITHIN THE LAST YEAR, HAVE YOU BEEN AFRAID OF YOUR PARTNER OR EX-PARTNER?: NO
HOW HARD IS IT FOR YOU TO PAY FOR THE VERY BASICS LIKE FOOD, HOUSING, MEDICAL CARE, AND HEATING?: NOT VERY HARD
WITHIN THE LAST YEAR, HAVE TO BEEN RAPED OR FORCED TO HAVE ANY KIND OF SEXUAL ACTIVITY BY YOUR PARTNER OR EX-PARTNER?: NO
HOW OFTEN DO YOU GET TOGETHER WITH FRIENDS OR RELATIVES?: ONCE A WEEK
WITHIN THE LAST YEAR, HAVE YOU BEEN HUMILIATED OR EMOTIONALLY ABUSED IN OTHER WAYS BY YOUR PARTNER OR EX-PARTNER?: NO
HOW OFTEN DO YOU ATTEND CHURCH OR RELIGIOUS SERVICES?: MORE THAN 4 TIMES PER YEAR
WITHIN THE LAST YEAR, HAVE YOU BEEN KICKED, HIT, SLAPPED, OR OTHERWISE PHYSICALLY HURT BY YOUR PARTNER OR EX-PARTNER?: NO
DO YOU BELONG TO ANY CLUBS OR ORGANIZATIONS SUCH AS CHURCH GROUPS UNIONS, FRATERNAL OR ATHLETIC GROUPS, OR SCHOOL GROUPS?: NO

## 2023-06-05 ASSESSMENT — ENCOUNTER SYMPTOMS
BLOOD IN STOOL: 0
EYES NEGATIVE: 1
ABDOMINAL PAIN: 0
SHORTNESS OF BREATH: 1
ABDOMINAL DISTENTION: 0
BACK PAIN: 1
ALLERGIC/IMMUNOLOGIC NEGATIVE: 1
EYE ITCHING: 0
NAUSEA: 1
PHOTOPHOBIA: 0
VOMITING: 1
CONSTIPATION: 0
APNEA: 0
EYE DISCHARGE: 0
COLOR CHANGE: 0
SHORTNESS OF BREATH: 0
COUGH: 0
BACK PAIN: 0
CHEST TIGHTNESS: 1
DIARRHEA: 0
WHEEZING: 0

## 2023-06-05 ASSESSMENT — LIFESTYLE VARIABLES
HOW OFTEN DO YOU HAVE A DRINK CONTAINING ALCOHOL: MONTHLY OR LESS
HOW MANY STANDARD DRINKS CONTAINING ALCOHOL DO YOU HAVE ON A TYPICAL DAY: PATIENT DOES NOT DRINK

## 2023-06-05 ASSESSMENT — PAIN DESCRIPTION - DESCRIPTORS: DESCRIPTORS: POUNDING

## 2023-06-05 ASSESSMENT — PAIN DESCRIPTION - ORIENTATION: ORIENTATION: RIGHT

## 2023-06-05 ASSESSMENT — PAIN DESCRIPTION - LOCATION: LOCATION: HEAD

## 2023-06-05 ASSESSMENT — PAIN SCALES - GENERAL: PAINLEVEL_OUTOF10: 3

## 2023-06-05 NOTE — CONSULTS
education: Not on file    Highest education level: Not on file   Occupational History    Occupation: cuts wire     Employer: DISABLED     Comment: [employer]   Tobacco Use    Smoking status: Every Day     Packs/day: 1.00     Years: 41.00     Pack years: 41.00     Types: Cigarettes    Smokeless tobacco: Former     Types: Chew    Tobacco comments:     Started at age 13 years, 37 years at 2PPD for 80 pack years     Used to dip when he played bseball, played for 11 years at a can a day   Vaping Use    Vaping Use: Never used   Substance and Sexual Activity    Alcohol use: Yes     Alcohol/week: 6.0 standard drinks     Types: 6 Cans of beer per week     Comment: occasional    Drug use: Not Currently     Frequency: 1.0 times per week     Types: Marijuana Jurline Faye)    Sexual activity: Yes     Partners: Female     Comment: has no known kids   Other Topics Concern    Not on file   Social History Narrative    CODE STATUS: Full Code    HEALTH CARE PROXY: Mrs. Vandana Montilla, [phone]    AMBULATES: independently    DOMICILED: has no stairs in the home, lives alone, has no stairs to enter the home, has no pets     Social Determinants of Health     Financial Resource Strain: Low Risk     Difficulty of Paying Living Expenses: Not very hard   Food Insecurity: No Food Insecurity    Worried About Running Out of Food in the Last Year: Never true    Ran Out of Food in the Last Year: Never true   Transportation Needs: No Transportation Needs    Lack of Transportation (Medical): No    Lack of Transportation (Non-Medical): No   Physical Activity: Sufficiently Active    Days of Exercise per Week: 5 days    Minutes of Exercise per Session: 60 min   Stress: Stress Concern Present    Feeling of Stress : Rather much   Social Connections:  Moderately Isolated    Frequency of Communication with Friends and Family: More than three times a week    Frequency of Social Gatherings with Friends and Family: Once a week    Attends [employer] Services:

## 2023-06-05 NOTE — ED PROVIDER NOTES
[facility] ONCOLOGY UNIT  eMERGENCYdEPARTMENT eNCOUnter      Pt Name: Robel Sim  MRN: [id number]  Armslindseygfurt 1970  Date of evaluation: 6/5/2023  Provider:CHRIS Carrion    CHIEF COMPLAINT       Chief Complaint   Patient presents with    Chest Pain     CP and dizziness on and off for a while         HISTORY OF PRESENT ILLNESS  (Location/Symptom, Timing/Onset, Context/Setting, Quality, Duration, Modifying Factors, Severity.)   Robel Sim is a 52 y.o. male who presents to the emergency department with complaints of chest pain shoots to neck hx of poorly controlled HTN father had  maker in his 46s he is not overweight he uses tobacco. Leaning forward hurts more. Last stress over a year ago. Unsure if prior echo. Standing up creates HA and light headed. 6/10 pain at rest aspirin and nitro has been ordered. HPI    Nursing Notes were reviewed and I agree. REVIEW OF SYSTEMS    (2-9 systems for level 4, 10 or more for level 5)     Review of Systems   Constitutional:  Negative for activity change, appetite change, chills and fever. HENT:  Negative for congestion and dental problem. Eyes:  Negative for photophobia, discharge and itching. Respiratory:  Negative for apnea, cough and shortness of breath. Cardiovascular:  Positive for chest pain. Musculoskeletal:  Negative for arthralgias, back pain, gait problem, myalgias and neck pain. Skin:  Negative for color change, pallor and rash. Neurological:  Positive for light-headedness and headaches. Negative for dizziness, seizures and syncope. Psychiatric/Behavioral:  Negative for agitation. The patient is not nervous/anxious. Except as noted above the remainder of the review of systems was reviewed and negative.        PAST MEDICAL HISTORY     Past Medical History:   Diagnosis Date    Alcoholism [facility])     history of alcohol overdose, denies history of seizure    Anxiety     CAD (coronary artery disease)     old MI on a prior EKG, but no

## 2023-06-05 NOTE — H&P
Date:     06/05/2023 Time:    09:41       Assessment/Plan:  Principal Problem:    Chest pain  Active Problems:    Unstable angina ([address]  Resolved Problems:    * No resolved hospital problems. *     Unstable angina      -Admit   -Telemetry   -Cardiology Consult, recommendation   -Stress test/ECHO, completed Incomplete right bundle branch block. Occasional PVC's. No chest pain with exercise. Following exercise, there was noted to be uniform increase in contractility without echocardiographic evidence of myocardial ischemia. No significant ST-T wave changes to suggest myocardial ischemia.    -EKG PRN chest pain   -ASA 81mg daily   -Lipitor 80mg nighlty   -Trend troponin, <0.01   -Lipids panel in the AM   -CMP, CBC daily moitor and trend   -TSH reflex FT4 0.47, normal   -pro , elevated   -Continue home blood pressure medication        Signed:  KWASI Grace - CNP, 6/5/2023 6:44 PM

## 2023-06-06 ENCOUNTER — TELEPHONE (OUTPATIENT)
Dept: PRIMARY CARE CLINIC | Age: 53
End: 2023-06-06

## 2023-06-06 VITALS
RESPIRATION RATE: 18 BRPM | WEIGHT: 171.06 LBS | DIASTOLIC BLOOD PRESSURE: 89 MMHG | HEART RATE: 72 BPM | OXYGEN SATURATION: 99 % | BODY MASS INDEX: 20.83 KG/M2 | SYSTOLIC BLOOD PRESSURE: 133 MMHG | TEMPERATURE: 97.1 F | HEIGHT: 76 IN

## 2023-06-06 LAB
ALBUMIN SERPL-MCNC: 3.8 G/DL (ref 3.5–5.2)
ALP SERPL-CCNC: 94 U/L (ref 40–130)
ALT SERPL-CCNC: 33 U/L (ref 5–41)
ANION GAP SERPL CALCULATED.3IONS-SCNC: 9 MMOL/L (ref 7–19)
AST SERPL-CCNC: 41 U/L (ref 5–40)
BILIRUB SERPL-MCNC: 0.4 MG/DL (ref 0.2–1.2)
BUN SERPL-MCNC: 12 MG/DL (ref 6–20)
CALCIUM SERPL-MCNC: 9 MG/DL (ref 8.6–10)
CHLORIDE SERPL-SCNC: 102 MMOL/L (ref 98–111)
CHOLEST SERPL-MCNC: 186 MG/DL (ref 160–199)
CO2 SERPL-SCNC: 27 MMOL/L (ref 22–29)
CREAT SERPL-MCNC: 1.1 MG/DL (ref 0.5–1.2)
EKG P AXIS: 64 DEGREES
EKG P-R INTERVAL: 182 MS
EKG Q-T INTERVAL: 398 MS
EKG QRS DURATION: 96 MS
EKG QTC CALCULATION (BAZETT): 419 MS
EKG T AXIS: 79 DEGREES
ERYTHROCYTE [DISTWIDTH] IN BLOOD BY AUTOMATED COUNT: 13.3 % (ref 11.5–14.5)
GLUCOSE SERPL-MCNC: 107 MG/DL (ref 74–109)
HCT VFR BLD AUTO: 40.3 % (ref 42–52)
HDLC SERPL-MCNC: 46 MG/DL (ref 55–121)
HGB BLD-MCNC: 13.6 G/DL (ref 14–18)
LDLC SERPL CALC-MCNC: 107 MG/DL
MCH RBC QN AUTO: 31.1 PG (ref 27–31)
MCHC RBC AUTO-ENTMCNC: 33.7 G/DL (ref 33–37)
MCV RBC AUTO: 92.2 FL (ref 80–94)
PLATELET # BLD AUTO: 227 K/UL (ref 130–400)
PMV BLD AUTO: 9.4 FL (ref 9.4–12.4)
POTASSIUM SERPL-SCNC: 4.2 MMOL/L (ref 3.5–5)
PROT SERPL-MCNC: 6.5 G/DL (ref 6.6–8.7)
RBC # BLD AUTO: 4.37 M/UL (ref 4.7–6.1)
SODIUM SERPL-SCNC: 138 MMOL/L (ref 136–145)
TRIGL SERPL-MCNC: 163 MG/DL (ref 0–149)
WBC # BLD AUTO: 5.1 K/UL (ref 4.8–10.8)

## 2023-06-06 PROCEDURE — 94760 N-INVAS EAR/PLS OXIMETRY 1: CPT

## 2023-06-06 PROCEDURE — G0378 HOSPITAL OBSERVATION PER HR: HCPCS

## 2023-06-06 PROCEDURE — 85027 COMPLETE CBC AUTOMATED: CPT

## 2023-06-06 PROCEDURE — 6360000002 HC RX W HCPCS

## 2023-06-06 PROCEDURE — 80053 COMPREHEN METABOLIC PANEL: CPT

## 2023-06-06 PROCEDURE — 6370000000 HC RX 637 (ALT 250 FOR IP): Performed by: INTERNAL MEDICINE

## 2023-06-06 PROCEDURE — 80061 LIPID PANEL: CPT

## 2023-06-06 PROCEDURE — 96372 THER/PROPH/DIAG INJ SC/IM: CPT

## 2023-06-06 PROCEDURE — 93010 ELECTROCARDIOGRAM REPORT: CPT | Performed by: INTERNAL MEDICINE

## 2023-06-06 PROCEDURE — 6370000000 HC RX 637 (ALT 250 FOR IP)

## 2023-06-06 PROCEDURE — 36415 COLL VENOUS BLD VENIPUNCTURE: CPT

## 2023-06-06 PROCEDURE — 2580000003 HC RX 258

## 2023-06-06 RX ORDER — CARVEDILOL 12.5 MG/1
12.5 TABLET ORAL 2 TIMES DAILY WITH MEALS
Qty: 60 TABLET | Refills: 3 | OUTPATIENT
Start: 2023-06-06

## 2023-06-06 RX ORDER — FAMOTIDINE 40 MG/1
40 TABLET, FILM COATED ORAL DAILY
Qty: 7 TABLET | Refills: 0 | Status: CANCELLED | OUTPATIENT
Start: 2023-06-06 | End: 2023-06-13

## 2023-06-06 RX ORDER — FAMOTIDINE 20 MG/1
40 TABLET, FILM COATED ORAL DAILY
Status: DISCONTINUED | OUTPATIENT
Start: 2023-06-06 | End: 2023-06-06

## 2023-06-06 RX ORDER — ATORVASTATIN CALCIUM 80 MG/1
80 TABLET, FILM COATED ORAL NIGHTLY
Qty: 30 TABLET | Refills: 3 | OUTPATIENT
Start: 2023-06-06

## 2023-06-06 RX ORDER — FAMOTIDINE 40 MG/1
40 TABLET, FILM COATED ORAL DAILY
Qty: 21 TABLET | Refills: 0 | Status: SHIPPED | OUTPATIENT
Start: 2023-06-07 | End: 2023-06-28

## 2023-06-06 RX ORDER — CARVEDILOL 12.5 MG/1
12.5 TABLET ORAL 2 TIMES DAILY WITH MEALS
Qty: 60 TABLET | Refills: 0 | Status: SHIPPED | OUTPATIENT
Start: 2023-06-06

## 2023-06-06 RX ORDER — PANTOPRAZOLE SODIUM 40 MG/1
40 TABLET, DELAYED RELEASE ORAL NIGHTLY
Qty: 30 TABLET | Refills: 1 | OUTPATIENT
Start: 2023-06-14 | End: 2023-08-13

## 2023-06-06 RX ORDER — FAMOTIDINE 20 MG/1
40 TABLET, FILM COATED ORAL DAILY
Status: DISCONTINUED | OUTPATIENT
Start: 2023-06-07 | End: 2023-06-06 | Stop reason: HOSPADM

## 2023-06-06 RX ORDER — ASPIRIN 81 MG/1
81 TABLET, CHEWABLE ORAL DAILY
Qty: 30 TABLET | Refills: 3 | OUTPATIENT
Start: 2023-06-06

## 2023-06-06 RX ORDER — BUPROPION HYDROCHLORIDE 75 MG/1
150 TABLET ORAL DAILY
Qty: 60 TABLET | Refills: 3 | Status: SHIPPED | OUTPATIENT
Start: 2023-06-06

## 2023-06-06 RX ORDER — BUPROPION HYDROCHLORIDE 75 MG/1
150 TABLET ORAL DAILY
Status: DISCONTINUED | OUTPATIENT
Start: 2023-06-06 | End: 2023-06-06 | Stop reason: HOSPADM

## 2023-06-06 RX ORDER — ATORVASTATIN CALCIUM 80 MG/1
80 TABLET, FILM COATED ORAL NIGHTLY
Qty: 30 TABLET | Refills: 3 | Status: SHIPPED | OUTPATIENT
Start: 2023-06-06

## 2023-06-06 RX ADMIN — ASPIRIN 81 MG: 81 TABLET, CHEWABLE ORAL at 09:36

## 2023-06-06 RX ADMIN — SODIUM CHLORIDE, PRESERVATIVE FREE 10 ML: 5 INJECTION INTRAVENOUS at 09:37

## 2023-06-06 RX ADMIN — LOSARTAN POTASSIUM 50 MG: 50 TABLET, FILM COATED ORAL at 09:36

## 2023-06-06 RX ADMIN — LAMOTRIGINE 300 MG: 150 TABLET ORAL at 09:36

## 2023-06-06 RX ADMIN — ENOXAPARIN SODIUM 40 MG: 100 INJECTION SUBCUTANEOUS at 09:36

## 2023-06-06 RX ADMIN — PANTOPRAZOLE SODIUM 40 MG: 40 TABLET, DELAYED RELEASE ORAL at 05:12

## 2023-06-06 RX ADMIN — CARVEDILOL 12.5 MG: 12.5 TABLET, FILM COATED ORAL at 09:36

## 2023-06-06 RX ADMIN — FAMOTIDINE 40 MG: 20 TABLET ORAL at 09:36

## 2023-06-06 NOTE — PLAN OF CARE
Problem: ABCDS Injury Assessment  Goal: Absence of physical injury  Outcome: Progressing  Flowsheets (Taken 6/6/2023 0153)  Absence of Physical Injury: Implement safety measures based on patient assessment

## 2023-06-06 NOTE — DISCHARGE SUMMARY
normal limits. No acute radiographic abnormality. ______________________________________ Electronically signed by: Liz Andres D.O. Date:     06/05/2023 Time:    09:41       Pertinent Labs:   CBC:   Recent Labs     06/05/23  0924 06/06/23  0314   WBC 8.3 5.1   HGB 14.6 13.6*    227     BMP:    Recent Labs     06/05/23  1023 06/06/23  0314   * 138   K 4.3 4.2   CL 99 102   CO2 26 27   BUN 12 12   CREATININE 1.0 1.1   GLUCOSE 116* 107     INR: No results for input(s): INR in the last 72 hours. Physical Exam:  Vital Signs: /89   Pulse 72   Temp 97.1 °F (36.2 °C) (Temporal)   Resp 18   Ht 6' 4\" (1.93 m)   Wt 171 lb 1 oz (77.6 kg)   SpO2 99%   BMI 20.82 kg/m²       Physical Exam  Vitals and nursing note reviewed. Constitutional:       Appearance: Normal appearance. He is not ill-appearing. HENT:      Head: Normocephalic. Mouth/Throat:      Mouth: Mucous membranes are moist.   Cardiovascular:      Rate and Rhythm: Normal rate and regular rhythm. Pulses: Normal pulses. Heart sounds: Normal heart sounds. Pulmonary:      Effort: Pulmonary effort is normal.      Breath sounds: Normal breath sounds. Abdominal:      General: Bowel sounds are normal. There is no distension. Palpations: Abdomen is soft. Tenderness: There is no abdominal tenderness. Musculoskeletal:      Cervical back: Normal range of motion. Right lower leg: No edema. Left lower leg: No edema. Skin:     General: Skin is warm and dry. Neurological:      Mental Status: He is alert and oriented to person, place, and time.    Psychiatric:         Mood and Affect: Mood normal.         Behavior: Behavior normal.       Discharge Medications:         Medication List        START taking these medications      atorvastatin 80 MG tablet  Commonly known as: LIPITOR  Take 1 tablet by mouth nightly     buPROPion 75 MG tablet  Commonly known as: WELLBUTRIN  Take 2 tablets by mouth daily

## 2023-06-06 NOTE — DISCHARGE INSTRUCTIONS
Follow up with Dolores Degroot MD in 3-5 days. Take medications as directed. Resume activity as tolerated.

## 2023-06-06 NOTE — PLAN OF CARE
Problem: ABCDS Injury Assessment  Goal: Absence of physical injury  6/6/2023 0943 by Kikr Roberts LPN  Outcome: Progressing  6/6/2023 0943 by Kirk Roberts LPN  Outcome: Progressing  6/6/2023 0155 by Joy Cole RN  Outcome: Progressing  Flowsheets (Taken 6/6/2023 0153)  Absence of Physical Injury: Implement safety measures based on patient assessment

## 2023-06-06 NOTE — PROGRESS NOTES
Arvin Mora arrived to room # 419. Presented with: chest pain  Mental Status: Patient is oriented, alert, coherent, logical, thought processes intact, and able to concentrate and follow conversation. Vitals:    06/05/23 1315   BP:    Pulse: 64   Resp:    Temp:    SpO2:      Patient safety contract and falls prevention contract reviewed with patient Yes. Oriented Patient to room. Call light within reach. Yes.   Needs, issues or concerns expressed at this time: no.      Electronically signed by Chelsy Singh RN on 6/5/2023 at 1:25 PM
No    Utilities  In the past 12 months, has the electric, gas, oil or water company threatened to shut off services in your home? No    Education  Do you want help with school or training? For example, starting or completing job training or getting a high school diploma, GED, or equivalent? No  Do you speak a language other than English at home? No    Employment  Do you want help finding or keeping work or a job? I do not need or want help    Safety  How often does anyone including family and friends, physically hurt you? Never  How often does anyone including family and friends, insult or talk down to you? Never  How often does anyone including family and friends, threaten you with harm? Never  How often does anyone including family and friends, scream or curse at you? Never    Basic Daily Needs  Think about the place you live. Do you have problems with any of the following? None of the above    Family & Community Support  How often do you feel lonely or isolated from those around you? Never    Care Management consult needed?  No

## 2023-06-06 NOTE — TELEPHONE ENCOUNTER
Patient is discharging from [facility] today, was in overnight for chest pain. TCM was going to be scheduled for 6/12 @10:00 but he does have a physical scheduled on the 14th. Would you rather convert tir CPPX to a TCM?  Reynaldo SAUCEDO

## 2023-06-07 ENCOUNTER — TELEPHONE (OUTPATIENT)
Dept: PRIMARY CARE CLINIC | Age: 53
End: 2023-06-07

## 2023-06-07 NOTE — TELEPHONE ENCOUNTER
Care Transitions Initial Follow Up Call    Outreach made within 2 business days of discharge: Yes    Patient: Ward Billings   Patient : 1970   MRN: [id number]    Reason for Admission: Chest Pain  Discharge Date: 23       Spoke with: Patient    Discharge department/facility: [facility] Interactive Patient Contact:  Was patient able to fill all prescriptions: Yes  Was patient instructed to bring all medications to the follow-up visit: Yes  Is patient taking all medications as directed in the discharge summary? Yes  Does patient understand their discharge instructions: Yes  Does patient have questions or concerns that need addressed prior to 7-14 day follow up office visit: no    Spoke with Weston Luna. He states he is feeling well. Has not had any further chest pain. He noted that he did not vomit this morning which he usually does. He denies fever. His appetite is good. He has normal bladder and bowel function. He will bring new meds with him to his visit.     Scheduled appointment with PCP within 7-14 days    Follow Up  Future Appointments   Date Time Provider Abdi Black   2023  9:00 AM MD MEGAN Reveles [facility]   2023  2:30 PM Gentry Turner MD [address] [place] [address]   2023 11:00 AM KWASI Martinez [facility]   2023  9:45 AM KWASI Valdez NP N MEGAN [facility]       Boley SAUNDRA Li

## 2023-06-19 ENCOUNTER — OFFICE VISIT (OUTPATIENT)
Dept: PSYCHIATRY | Age: 53
End: 2023-06-19
Payer: MEDICAID

## 2023-06-19 ENCOUNTER — TELEPHONE (OUTPATIENT)
Dept: PSYCHIATRY | Age: 53
End: 2023-06-19

## 2023-06-19 VITALS
TEMPERATURE: 97.3 F | DIASTOLIC BLOOD PRESSURE: 88 MMHG | HEIGHT: 76 IN | SYSTOLIC BLOOD PRESSURE: 162 MMHG | OXYGEN SATURATION: 100 % | HEART RATE: 80 BPM | WEIGHT: 163.8 LBS | BODY MASS INDEX: 19.95 KG/M2

## 2023-06-19 DIAGNOSIS — F41.1 GENERALIZED ANXIETY DISORDER: ICD-10-CM

## 2023-06-19 DIAGNOSIS — F34.9 PERSISTENT MOOD (AFFECTIVE) DISORDER, UNSPECIFIED (HCC): Primary | ICD-10-CM

## 2023-06-19 DIAGNOSIS — F10.10 ALCOHOL ABUSE: ICD-10-CM

## 2023-06-19 DIAGNOSIS — G47.10 HYPERSOMNIA: ICD-10-CM

## 2023-06-19 DIAGNOSIS — F17.200 TOBACCO USE DISORDER: ICD-10-CM

## 2023-06-19 PROCEDURE — G8428 CUR MEDS NOT DOCUMENT: HCPCS | Performed by: PSYCHIATRY & NEUROLOGY

## 2023-06-19 PROCEDURE — 99214 OFFICE O/P EST MOD 30 MIN: CPT | Performed by: PSYCHIATRY & NEUROLOGY

## 2023-06-19 PROCEDURE — 3077F SYST BP >= 140 MM HG: CPT | Performed by: PSYCHIATRY & NEUROLOGY

## 2023-06-19 PROCEDURE — 3079F DIAST BP 80-89 MM HG: CPT | Performed by: PSYCHIATRY & NEUROLOGY

## 2023-06-19 PROCEDURE — 3017F COLORECTAL CA SCREEN DOC REV: CPT | Performed by: PSYCHIATRY & NEUROLOGY

## 2023-06-19 PROCEDURE — G8420 CALC BMI NORM PARAMETERS: HCPCS | Performed by: PSYCHIATRY & NEUROLOGY

## 2023-06-19 PROCEDURE — 4004F PT TOBACCO SCREEN RCVD TLK: CPT | Performed by: PSYCHIATRY & NEUROLOGY

## 2023-06-19 RX ORDER — GABAPENTIN 400 MG/1
400 CAPSULE ORAL 2 TIMES DAILY
Qty: 180 CAPSULE | Refills: 3 | Status: SHIPPED | OUTPATIENT
Start: 2023-06-19 | End: 2023-09-17

## 2023-06-19 RX ORDER — CLONAZEPAM 0.5 MG/1
0.5 TABLET ORAL 2 TIMES DAILY PRN
Qty: 60 TABLET | Refills: 1 | Status: SHIPPED | OUTPATIENT
Start: 2023-06-19 | End: 2023-07-19

## 2023-06-19 ASSESSMENT — PATIENT HEALTH QUESTIONNAIRE - PHQ9
SUM OF ALL RESPONSES TO PHQ QUESTIONS 1-9: 13
SUM OF ALL RESPONSES TO PHQ QUESTIONS 1-9: 13
9. THOUGHTS THAT YOU WOULD BE BETTER OFF DEAD, OR OF HURTING YOURSELF: 0
SUM OF ALL RESPONSES TO PHQ QUESTIONS 1-9: 13
10. IF YOU CHECKED OFF ANY PROBLEMS, HOW DIFFICULT HAVE THESE PROBLEMS MADE IT FOR YOU TO DO YOUR WORK, TAKE CARE OF THINGS AT HOME, OR GET ALONG WITH OTHER PEOPLE: 1
4. FEELING TIRED OR HAVING LITTLE ENERGY: 2
8. MOVING OR SPEAKING SO SLOWLY THAT OTHER PEOPLE COULD HAVE NOTICED. OR THE OPPOSITE, BEING SO FIGETY OR RESTLESS THAT YOU HAVE BEEN MOVING AROUND A LOT MORE THAN USUAL: 0
3. TROUBLE FALLING OR STAYING ASLEEP: 2
2. FEELING DOWN, DEPRESSED OR HOPELESS: 2
7. TROUBLE CONCENTRATING ON THINGS, SUCH AS READING THE NEWSPAPER OR WATCHING TELEVISION: 1
6. FEELING BAD ABOUT YOURSELF - OR THAT YOU ARE A FAILURE OR HAVE LET YOURSELF OR YOUR FAMILY DOWN: 2
5. POOR APPETITE OR OVEREATING: 2
SUM OF ALL RESPONSES TO PHQ9 QUESTIONS 1 & 2: 4
1. LITTLE INTEREST OR PLEASURE IN DOING THINGS: 2
SUM OF ALL RESPONSES TO PHQ QUESTIONS 1-9: 13

## 2023-06-19 NOTE — PROGRESS NOTES
6/19/2023 2:48 PM   Progress Note          Thersa Code 1970      Chief Complaint   Patient presents with    Medication Check         Subjective:    26-year-old white male with history of mood disorder, anxiety, alcohol disorder, cannabis use disorder, tobacco use disorder, hypertension, who presents for follow up. Currently on lamotrigine and Klonopin. Wellbutrin added for smoking cessation by another provider. Hypertensive - advised to go to urgent care. Asymptomatic. Patient is anxious. She was recently in the hospital due to chest pain. Seeing a cardiologist.  Worried about his health. Very high anxiety level and jessica attacks. We discussed the need for him to lose stop smoking. Patient states he is unable to quit smoking right now. He has not been drinking. 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  States he is looking for a new job. Sleeping poorly. He is CPAP is broken. States his last sleep study was many years ago. Agreed to get another study. Goes to [facility] and [facility]. He has not started seeing a therapist yet - will make sure to set up his appointment today.      BP: BP (!) 162/88   Pulse 80   Temp 97.3 °F (36.3 °C)   Ht 6' 4\" (1.93 m)   Wt 163 lb 12.8 oz (74.3 kg)   SpO2 100%   BMI 19.94 kg/m²       Review of Systems - 14 point review:  Negative except for    Constitutional: (fevers, chills, night sweats, wt loss/gain, change in appetite, fatigue, somnolence)    HEENT: (ear pain or discharge, hearing loss, ear ringing, sinus pressure, nosebleed, nasal discharge, sore throat, oral sores, tooth pain, bleeding gums, hoarse voice, neck pain)      Cardiovascular: (HTN, chest pain, elevated cholesterol/lipids, palpitations, leg swelling, leg pain with walking)    Respiratory: (cough, wheezing, snoring, SOB with activity (dyspnea), SOB while lying flat (orthopnea), awakening with severe SOB (paroxysmal nocturnal dyspnea))    Gastrointestinal: (NVD, constipation, abdominal pain,

## 2023-06-19 NOTE — TELEPHONE ENCOUNTER
Informed Dr. Tenzin Cole of manual blood pressure. Per Dr. Tenzin Cole let pt know to either go to urgent care or call his primary to schedule an appt about his blood pressure.     Electronically signed by Flaca Odonnell on 6/19/2023 at 2:39 PM

## 2023-06-20 ENCOUNTER — TELEPHONE (OUTPATIENT)
Dept: INTERNAL MEDICINE | Age: 53
End: 2023-06-20

## 2023-06-20 NOTE — TELEPHONE ENCOUNTER
Pt called the office stating his bp is 208/121. I advised the patient to go to the ER immediately but he refused. I spoke with the clinical staff and they said he needs to go to the ER; if he will not then we can get him in this afternoon. I took patient off of hold and advised him of this. I eventually got pt to agree to go to the ER. He said his systolic did drop to 013. Pt states he has an appt at 3:00 to get his job back but he will drive to [place] to go to the ER right after (he does not want to go back to [facility] ER). I will call the patient later this afternoon to ensure that he did make it to the emergency room. Pt has a [employer] bp machine that keeps a hx of recent readings and will take that with him.

## 2023-06-20 NOTE — TELEPHONE ENCOUNTER
Pt called back stating his boss is in a meeting so he has not met with him yet. He is scheduled to see Dr. David Jacobsen tomorrow morning. States his bp dropped to 140/89 and will go to ER if it gets worse.

## 2023-06-21 ENCOUNTER — OFFICE VISIT (OUTPATIENT)
Dept: INTERNAL MEDICINE | Age: 53
End: 2023-06-21
Payer: MEDICAID

## 2023-06-21 VITALS
SYSTOLIC BLOOD PRESSURE: 160 MMHG | HEART RATE: 81 BPM | DIASTOLIC BLOOD PRESSURE: 90 MMHG | RESPIRATION RATE: 20 BRPM | WEIGHT: 175.3 LBS | OXYGEN SATURATION: 98 % | HEIGHT: 76 IN | BODY MASS INDEX: 21.35 KG/M2

## 2023-06-21 DIAGNOSIS — G47.00 INSOMNIA, UNSPECIFIED TYPE: ICD-10-CM

## 2023-06-21 DIAGNOSIS — I10 PRIMARY HYPERTENSION: ICD-10-CM

## 2023-06-21 DIAGNOSIS — F41.9 ANXIETY AND DEPRESSION: ICD-10-CM

## 2023-06-21 DIAGNOSIS — Z53.20 LUNG CANCER SCREENING DECLINED BY PATIENT: ICD-10-CM

## 2023-06-21 DIAGNOSIS — M54.50 CHRONIC LOW BACK PAIN, UNSPECIFIED BACK PAIN LATERALITY, UNSPECIFIED WHETHER SCIATICA PRESENT: ICD-10-CM

## 2023-06-21 DIAGNOSIS — G43.809 OTHER MIGRAINE WITHOUT STATUS MIGRAINOSUS, NOT INTRACTABLE: ICD-10-CM

## 2023-06-21 DIAGNOSIS — F32.A ANXIETY AND DEPRESSION: ICD-10-CM

## 2023-06-21 DIAGNOSIS — Z87.891 SMOKING HISTORY: ICD-10-CM

## 2023-06-21 DIAGNOSIS — Z87.891 PERSONAL HISTORY OF TOBACCO USE: ICD-10-CM

## 2023-06-21 DIAGNOSIS — F33.9 EPISODE OF RECURRENT MAJOR DEPRESSIVE DISORDER, UNSPECIFIED DEPRESSION EPISODE SEVERITY (HCC): ICD-10-CM

## 2023-06-21 DIAGNOSIS — F10.94 ALCOHOL-INDUCED MOOD DISORDER WITH DEPRESSIVE SYMPTOMS (HCC): ICD-10-CM

## 2023-06-21 DIAGNOSIS — G89.29 CHRONIC LOW BACK PAIN, UNSPECIFIED BACK PAIN LATERALITY, UNSPECIFIED WHETHER SCIATICA PRESENT: ICD-10-CM

## 2023-06-21 DIAGNOSIS — E78.5 HYPERLIPIDEMIA, UNSPECIFIED HYPERLIPIDEMIA TYPE: ICD-10-CM

## 2023-06-21 DIAGNOSIS — K21.9 GASTROESOPHAGEAL REFLUX DISEASE WITHOUT ESOPHAGITIS: ICD-10-CM

## 2023-06-21 DIAGNOSIS — G62.9 PERIPHERAL POLYNEUROPATHY: ICD-10-CM

## 2023-06-21 DIAGNOSIS — Z00.00 ROUTINE HEALTH MAINTENANCE: Primary | ICD-10-CM

## 2023-06-21 DIAGNOSIS — F34.9 PERSISTENT MOOD (AFFECTIVE) DISORDER, UNSPECIFIED (HCC): ICD-10-CM

## 2023-06-21 PROCEDURE — 99396 PREV VISIT EST AGE 40-64: CPT | Performed by: INTERNAL MEDICINE

## 2023-06-21 PROCEDURE — 3078F DIAST BP <80 MM HG: CPT | Performed by: INTERNAL MEDICINE

## 2023-06-21 PROCEDURE — G0296 VISIT TO DETERM LDCT ELIG: HCPCS | Performed by: INTERNAL MEDICINE

## 2023-06-21 PROCEDURE — 3074F SYST BP LT 130 MM HG: CPT | Performed by: INTERNAL MEDICINE

## 2023-06-21 RX ORDER — PALIPERIDONE 9 MG/1
TABLET, EXTENDED RELEASE ORAL
COMMUNITY
Start: 2018-05-24

## 2023-06-21 RX ORDER — CLONIDINE HYDROCHLORIDE 0.1 MG/1
0.1 TABLET ORAL 2 TIMES DAILY PRN
Qty: 60 TABLET | Refills: 3 | Status: SHIPPED | OUTPATIENT
Start: 2023-06-21

## 2023-06-21 RX ORDER — VALSARTAN 80 MG/1
80 TABLET ORAL 2 TIMES DAILY
Qty: 60 TABLET | Refills: 3 | Status: SHIPPED | OUTPATIENT
Start: 2023-06-21

## 2023-06-21 RX ORDER — TRAZODONE HYDROCHLORIDE 150 MG/1
TABLET ORAL
COMMUNITY
Start: 2017-07-13

## 2023-06-21 RX ORDER — BUSPIRONE HYDROCHLORIDE 5 MG/1
5 TABLET ORAL 3 TIMES DAILY
Qty: 90 TABLET | Refills: 0 | Status: SHIPPED | OUTPATIENT
Start: 2023-06-21 | End: 2023-07-21

## 2023-06-21 RX ORDER — FLUVOXAMINE MALEATE 50 MG/1
TABLET, COATED ORAL
COMMUNITY
Start: 2018-05-31

## 2023-06-27 ASSESSMENT — ENCOUNTER SYMPTOMS
EYE DISCHARGE: 0
RHINORRHEA: 0
EYE ITCHING: 0
STRIDOR: 0
CHOKING: 0
SINUS PAIN: 0
SINUS PRESSURE: 0
VOMITING: 0
SORE THROAT: 0
ABDOMINAL DISTENTION: 0
COUGH: 0
COLOR CHANGE: 0
BACK PAIN: 1
ABDOMINAL PAIN: 0
CONSTIPATION: 0
WHEEZING: 0
SHORTNESS OF BREATH: 0
NAUSEA: 0
TROUBLE SWALLOWING: 0
DIARRHEA: 0
BLOOD IN STOOL: 0

## 2023-07-05 ENCOUNTER — TELEPHONE (OUTPATIENT)
Dept: CARDIOLOGY CLINIC | Age: 53
End: 2023-07-05

## 2023-07-05 NOTE — TELEPHONE ENCOUNTER
Patient needs rescheduled his HFU appointment he said he  can come in later today. Please called the patient.       Thank you

## 2023-07-13 ENCOUNTER — TELEPHONE (OUTPATIENT)
Dept: INTERNAL MEDICINE | Age: 53
End: 2023-07-13

## 2023-07-13 NOTE — TELEPHONE ENCOUNTER
Mother called stating that Patient has been to 2 different hospital [address] [place] [address]. States they did not do anything except give him Toradol. Requested Dr. Blanco Brightly to call ER and tell them to do x-rays. Explained to Mrs. Ayoub that is not an option. She stated several times that he has not been drinking. Also, stated that he was so sick that she had to go pick him up and have him stay at her last night. Please advise.

## 2023-07-14 ENCOUNTER — OFFICE VISIT (OUTPATIENT)
Dept: INTERNAL MEDICINE | Age: 53
End: 2023-07-14

## 2023-07-14 VITALS
OXYGEN SATURATION: 97 % | HEART RATE: 88 BPM | BODY MASS INDEX: 19.82 KG/M2 | HEIGHT: 76 IN | DIASTOLIC BLOOD PRESSURE: 100 MMHG | SYSTOLIC BLOOD PRESSURE: 180 MMHG | WEIGHT: 162.8 LBS

## 2023-07-14 DIAGNOSIS — M54.2 CERVICAL SPINE PAIN: Primary | ICD-10-CM

## 2023-07-14 RX ORDER — METHYLPREDNISOLONE 4 MG/1
TABLET ORAL
Qty: 1 KIT | Refills: 0 | Status: SHIPPED | OUTPATIENT
Start: 2023-07-14 | End: 2023-07-20

## 2023-07-14 RX ORDER — METHYLPREDNISOLONE ACETATE 40 MG/ML
40 INJECTION, SUSPENSION INTRA-ARTICULAR; INTRALESIONAL; INTRAMUSCULAR; SOFT TISSUE ONCE
Status: COMPLETED | OUTPATIENT
Start: 2023-07-14 | End: 2023-07-14

## 2023-07-14 RX ORDER — GABAPENTIN 100 MG/1
100 CAPSULE ORAL 3 TIMES DAILY PRN
Qty: 15 CAPSULE | Refills: 0 | Status: SHIPPED | OUTPATIENT
Start: 2023-07-14 | End: 2023-07-19

## 2023-07-14 RX ORDER — OXYCODONE HYDROCHLORIDE AND ACETAMINOPHEN 5; 325 MG/1; MG/1
1 TABLET ORAL EVERY 6 HOURS PRN
Qty: 12 TABLET | Refills: 0 | Status: SHIPPED | OUTPATIENT
Start: 2023-07-14 | End: 2023-07-20 | Stop reason: SDUPTHER

## 2023-07-14 RX ORDER — PROMETHAZINE HYDROCHLORIDE 25 MG/1
25 TABLET ORAL 4 TIMES DAILY PRN
Qty: 20 TABLET | Refills: 0 | Status: SHIPPED | OUTPATIENT
Start: 2023-07-14 | End: 2023-07-21

## 2023-07-14 RX ADMIN — METHYLPREDNISOLONE ACETATE 40 MG: 40 INJECTION, SUSPENSION INTRA-ARTICULAR; INTRALESIONAL; INTRAMUSCULAR; SOFT TISSUE at 10:44

## 2023-07-18 ENCOUNTER — TELEPHONE (OUTPATIENT)
Dept: PSYCHIATRY | Age: 53
End: 2023-07-18
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
## 2023-07-19 ENCOUNTER — TELEPHONE (OUTPATIENT)
Dept: INTERNAL MEDICINE | Age: 53
End: 2023-07-19

## 2023-07-19 NOTE — TELEPHONE ENCOUNTER
Prior authorization obtained for CT cervical spine 7/19/2023. Approved 7/20/223-9/21/2023  Approval code [id number]  [address] [place] [address] notified.

## 2023-07-20 ENCOUNTER — TELEPHONE (OUTPATIENT)
Dept: PSYCHIATRY | Age: 53
End: 2023-07-20

## 2023-07-20 DIAGNOSIS — M54.2 CERVICAL SPINE PAIN: Primary | ICD-10-CM

## 2023-07-20 DIAGNOSIS — M54.2 CERVICAL SPINE PAIN: ICD-10-CM

## 2023-07-20 RX ORDER — OXYCODONE HYDROCHLORIDE AND ACETAMINOPHEN 5; 325 MG/1; MG/1
1 TABLET ORAL EVERY 6 HOURS PRN
Qty: 120 TABLET | Refills: 0 | Status: SHIPPED | OUTPATIENT
Start: 2023-07-20 | End: 2023-07-20 | Stop reason: ALTCHOICE

## 2023-07-20 RX ORDER — OXYCODONE HYDROCHLORIDE AND ACETAMINOPHEN 5; 325 MG/1; MG/1
1 TABLET ORAL EVERY 6 HOURS PRN
Qty: 40 TABLET | Refills: 0 | Status: SHIPPED | OUTPATIENT
Start: 2023-07-20 | End: 2023-07-30

## 2023-07-20 NOTE — TELEPHONE ENCOUNTER
Spoke with Cuca Liao at [facility] this am who stated that the Pt got Gabapentin #180 filled on 6/19/23 so refill now would be way early. Pt had reported on Tuesday that he ran out of the Gabapentin 400 mg on Thursday which would be 7/13/23. Dr Cal Jackson given the above info and per her direction Cuca Liao was informed to cancel the RX sent 7/19/23 for the Gabapentin 400 mg one TID-pharmacist voided the RX. Pharmacist reported that if pt is taking the med TID then it would be due for refill around 8/19/23-Dr Cal Jackson made aware. Pt informed that Dr Cal Jackson is not going the refill the RX until due around 8/19/23-pt encouraged to call back to request refill at that time. Stressed again that the Gabapentin is a controlled med and must be taken as prescribed. Pt verbalized understanding of the above info and plans to follow.

## 2023-07-20 NOTE — TELEPHONE ENCOUNTER
Sw Patient states he is severe pain and he is tired of vomiting. He is wanting something done today and \"not pushed off\". I explained to him that as a PCP we are limited on things that can be done and that we got him imaging, medications as quickly as possible. He stated \"I don't care if you want to open me up int he office, I am hurting that bad. \" Advised patient if the pain is that severe he would need to go to ER. He asked what they would do. I expressed that Dr. Mignon Krabbe does not have prescribing privileges and can not order the doctors to do anything. He stated just what I thought. CT scan obtained from [facility] and given to MD for review. Per Dr. Mignon Krabbe stable CT scan. Hardware is intact. Advised for patient to go to Pain management. Agreed to give patient a 30 day supply of Percocet to get into pain management. Patient notified. Agreed with treatment plan.

## 2023-07-21 DIAGNOSIS — M54.2 CERVICAL SPINE PAIN: ICD-10-CM

## 2023-07-23 SDOH — ECONOMIC STABILITY: FOOD INSECURITY: WITHIN THE PAST 12 MONTHS, THE FOOD YOU BOUGHT JUST DIDN'T LAST AND YOU DIDN'T HAVE MONEY TO GET MORE.: NEVER TRUE

## 2023-07-23 SDOH — ECONOMIC STABILITY: INCOME INSECURITY: HOW HARD IS IT FOR YOU TO PAY FOR THE VERY BASICS LIKE FOOD, HOUSING, MEDICAL CARE, AND HEATING?: NOT HARD AT ALL

## 2023-07-23 SDOH — ECONOMIC STABILITY: FOOD INSECURITY: WITHIN THE PAST 12 MONTHS, YOU WORRIED THAT YOUR FOOD WOULD RUN OUT BEFORE YOU GOT MONEY TO BUY MORE.: NEVER TRUE

## 2023-07-23 ASSESSMENT — ENCOUNTER SYMPTOMS
BLOOD IN STOOL: 0
CHOKING: 0
SORE THROAT: 0
ABDOMINAL DISTENTION: 0
COUGH: 0
TROUBLE SWALLOWING: 0
WHEEZING: 0
STRIDOR: 0
DIARRHEA: 0
SINUS PAIN: 0
CONSTIPATION: 0
NAUSEA: 0
BACK PAIN: 0
RHINORRHEA: 0
EYE DISCHARGE: 0
ABDOMINAL PAIN: 0
VOMITING: 0
SINUS PRESSURE: 0
EYE ITCHING: 0
COLOR CHANGE: 0
SHORTNESS OF BREATH: 0

## 2023-07-24 ENCOUNTER — TELEPHONE (OUTPATIENT)
Dept: PSYCHIATRY | Age: 53
End: 2023-07-24

## 2023-07-24 NOTE — TELEPHONE ENCOUNTER
Called pt for appointment reminder.   -left voicemail, requesting a return call    Called pt to cancel/reschedule appt for 08/14/23 with Dr. Rafiq Belle because the provider will not be in the office that day. No answer and LVM.     Electronically signed by Priscila Coleman MA on 7/24/2023 at 9:30 AM

## 2023-07-24 NOTE — TELEPHONE ENCOUNTER
Called pt for appointment reminder.   -Pt confirmed    Electronically signed by Shazia Spain MA on 7/24/2023 at 2:44 PM

## 2023-07-25 ENCOUNTER — TELEPHONE (OUTPATIENT)
Dept: PSYCHIATRY | Age: 53
End: 2023-07-25

## 2023-07-25 NOTE — TELEPHONE ENCOUNTER
Called and lvm asking pt to return phone call to reschedule missed appt on 7/25 @ 10 AM    Electronically signed by Xenia Ramirez on 7/25/2023 at 4:50 PM

## 2023-07-28 ENCOUNTER — TELEPHONE (OUTPATIENT)
Dept: INTERNAL MEDICINE | Age: 53
End: 2023-07-28

## 2023-07-28 NOTE — TELEPHONE ENCOUNTER
Pt called and left VM stating   \"Yesterday I was vomiting and I felt my rectal giving away. I never deal with Hemorids but I don't know what to do. When I go too sit on the toilet, feels more like my insides are trying to come out and not what supposed to come out. Do I go to the ER or make an appointment to come to you. I don't know. Please give me a call back. \"

## 2023-08-08 ENCOUNTER — TELEPHONE (OUTPATIENT)
Dept: PSYCHIATRY | Age: 53
End: 2023-08-08

## 2023-08-08 ENCOUNTER — HOSPITAL ENCOUNTER (INPATIENT)
Age: 53
LOS: 2 days | Discharge: HOME | End: 2023-08-10
Attending: EMERGENCY MEDICINE | Admitting: INTERNAL MEDICINE
Payer: MEDICAID

## 2023-08-08 DIAGNOSIS — R44.3 HALLUCINATIONS: ICD-10-CM

## 2023-08-08 DIAGNOSIS — E87.1 HYPONATREMIA: Primary | ICD-10-CM

## 2023-08-08 DIAGNOSIS — F10.10 ETOH ABUSE: ICD-10-CM

## 2023-08-08 LAB
ALBUMIN SERPL-MCNC: 5.1 G/DL (ref 3.5–5.2)
ALP SERPL-CCNC: 124 U/L (ref 40–130)
ALT SERPL-CCNC: 33 U/L (ref 5–41)
AMPHET UR QL SCN: NEGATIVE
ANION GAP SERPL CALCULATED.3IONS-SCNC: 13 MMOL/L (ref 7–19)
APAP SERPL-MCNC: <5 UG/ML (ref 10–30)
AST SERPL-CCNC: 29 U/L (ref 5–40)
BARBITURATES UR QL SCN: NEGATIVE
BASOPHILS # BLD: 0 K/UL (ref 0–0.2)
BASOPHILS NFR BLD: 0.5 % (ref 0–1)
BENZODIAZ UR QL SCN: POSITIVE
BILIRUB SERPL-MCNC: 0.6 MG/DL (ref 0.2–1.2)
BILIRUB UR QL STRIP: NEGATIVE
BUN SERPL-MCNC: 15 MG/DL (ref 6–20)
BUPRENORPHINE URINE: NEGATIVE
CALCIUM SERPL-MCNC: 9.3 MG/DL (ref 8.6–10)
CANNABINOIDS UR QL SCN: NEGATIVE
CHLORIDE SERPL-SCNC: 88 MMOL/L (ref 98–111)
CLARITY UR: ABNORMAL
CO2 SERPL-SCNC: 23 MMOL/L (ref 22–29)
COCAINE UR QL SCN: NEGATIVE
COLOR UR: YELLOW
CREAT SERPL-MCNC: 1 MG/DL (ref 0.5–1.2)
DRUG SCREEN COMMENT UR-IMP: ABNORMAL
EOSINOPHIL # BLD: 0.1 K/UL (ref 0–0.6)
EOSINOPHIL NFR BLD: 0.7 % (ref 0–5)
ERYTHROCYTE [DISTWIDTH] IN BLOOD BY AUTOMATED COUNT: 14.4 % (ref 11.5–14.5)
ETHANOLAMINE SERPL-MCNC: 79 MG/DL (ref 0–0.08)
GLUCOSE SERPL-MCNC: 83 MG/DL (ref 74–109)
GLUCOSE UR STRIP.AUTO-MCNC: NEGATIVE MG/DL
HCT VFR BLD AUTO: 39.7 % (ref 42–52)
HGB BLD-MCNC: 14 G/DL (ref 14–18)
HGB UR STRIP.AUTO-MCNC: NEGATIVE MG/L
IMM GRANULOCYTES # BLD: 0 K/UL
KETONES UR STRIP.AUTO-MCNC: ABNORMAL MG/DL
LEUKOCYTE ESTERASE UR QL STRIP.AUTO: NEGATIVE
LYMPHOCYTES # BLD: 1.5 K/UL (ref 1.1–4.5)
LYMPHOCYTES NFR BLD: 16.9 % (ref 20–40)
MAGNESIUM SERPL-MCNC: 1.8 MG/DL (ref 1.6–2.6)
MCH RBC QN AUTO: 32.8 PG (ref 27–31)
MCHC RBC AUTO-ENTMCNC: 35.3 G/DL (ref 33–37)
MCV RBC AUTO: 93 FL (ref 80–94)
METHADONE UR QL SCN: NEGATIVE
METHAMPHETAMINE, URINE: NEGATIVE
MONOCYTES # BLD: 0.7 K/UL (ref 0–0.9)
MONOCYTES NFR BLD: 8.4 % (ref 0–10)
NEUTROPHILS # BLD: 6.5 K/UL (ref 1.5–7.5)
NEUTS SEG NFR BLD: 73.2 % (ref 50–65)
NITRITE UR QL STRIP.AUTO: NEGATIVE
OPIATES UR QL SCN: NEGATIVE
OSMOLALITY SERPL CALC.SUM OF ELEC: 280 MOSM/KG (ref 275–300)
OSMOLALITY URINE: 354 MOSM/KG (ref 250–1200)
OXYCODONE UR QL SCN: NEGATIVE
PCP UR QL SCN: NEGATIVE
PH UR STRIP.AUTO: 5 [PH] (ref 5–8)
PLATELET # BLD AUTO: 278 K/UL (ref 130–400)
PMV BLD AUTO: 8.1 FL (ref 9.4–12.4)
POTASSIUM SERPL-SCNC: 4.3 MMOL/L (ref 3.5–5)
PROPOXYPH UR QL SCN: NEGATIVE
PROT SERPL-MCNC: 8.2 G/DL (ref 6.6–8.7)
PROT UR STRIP.AUTO-MCNC: NEGATIVE MG/DL
RBC # BLD AUTO: 4.27 M/UL (ref 4.7–6.1)
SALICYLATES SERPL-MCNC: 0.6 MG/DL (ref 3–10)
SODIUM SERPL-SCNC: 124 MMOL/L (ref 136–145)
SODIUM SERPL-SCNC: 127 MMOL/L (ref 136–145)
SODIUM UR-SCNC: 25 MMOL/L
SP GR UR STRIP.AUTO: 1.01 (ref 1–1.03)
TRICYCLIC, URINE: NEGATIVE
TSH SERPL DL<=0.005 MIU/L-ACNC: 0.4 UIU/ML (ref 0.27–4.2)
UROBILINOGEN UR STRIP.AUTO-MCNC: 0.2 E.U./DL
WBC # BLD AUTO: 8.8 K/UL (ref 4.8–10.8)

## 2023-08-08 PROCEDURE — 83930 ASSAY OF BLOOD OSMOLALITY: CPT

## 2023-08-08 PROCEDURE — 1210000000 HC MED SURG R&B

## 2023-08-08 PROCEDURE — 80143 DRUG ASSAY ACETAMINOPHEN: CPT

## 2023-08-08 PROCEDURE — 82077 ASSAY SPEC XCP UR&BREATH IA: CPT

## 2023-08-08 PROCEDURE — 84295 ASSAY OF SERUM SODIUM: CPT

## 2023-08-08 PROCEDURE — 84443 ASSAY THYROID STIM HORMONE: CPT

## 2023-08-08 PROCEDURE — 6360000002 HC RX W HCPCS

## 2023-08-08 PROCEDURE — 80306 DRUG TEST PRSMV INSTRMNT: CPT

## 2023-08-08 PROCEDURE — 85025 COMPLETE CBC W/AUTO DIFF WBC: CPT

## 2023-08-08 PROCEDURE — 80053 COMPREHEN METABOLIC PANEL: CPT

## 2023-08-08 PROCEDURE — 83935 ASSAY OF URINE OSMOLALITY: CPT

## 2023-08-08 PROCEDURE — 84300 ASSAY OF URINE SODIUM: CPT

## 2023-08-08 PROCEDURE — 81003 URINALYSIS AUTO W/O SCOPE: CPT

## 2023-08-08 PROCEDURE — 83735 ASSAY OF MAGNESIUM: CPT

## 2023-08-08 PROCEDURE — 99285 EMERGENCY DEPT VISIT HI MDM: CPT

## 2023-08-08 PROCEDURE — 36415 COLL VENOUS BLD VENIPUNCTURE: CPT

## 2023-08-08 PROCEDURE — 6370000000 HC RX 637 (ALT 250 FOR IP)

## 2023-08-08 PROCEDURE — 2580000003 HC RX 258

## 2023-08-08 PROCEDURE — 80179 DRUG ASSAY SALICYLATE: CPT

## 2023-08-08 PROCEDURE — 2580000003 HC RX 258: Performed by: EMERGENCY MEDICINE

## 2023-08-08 RX ORDER — ENOXAPARIN SODIUM 100 MG/ML
40 INJECTION SUBCUTANEOUS DAILY
Status: DISCONTINUED | OUTPATIENT
Start: 2023-08-08 | End: 2023-08-10 | Stop reason: HOSPADM

## 2023-08-08 RX ORDER — VALSARTAN 80 MG/1
80 TABLET ORAL 2 TIMES DAILY
Status: DISCONTINUED | OUTPATIENT
Start: 2023-08-08 | End: 2023-08-10 | Stop reason: HOSPADM

## 2023-08-08 RX ORDER — FOLIC ACID 1 MG/1
1 TABLET ORAL DAILY
Status: DISCONTINUED | OUTPATIENT
Start: 2023-08-08 | End: 2023-08-10 | Stop reason: HOSPADM

## 2023-08-08 RX ORDER — LORAZEPAM 1 MG/1
1 TABLET ORAL
Status: DISCONTINUED | OUTPATIENT
Start: 2023-08-08 | End: 2023-08-10 | Stop reason: HOSPADM

## 2023-08-08 RX ORDER — SODIUM CHLORIDE 9 MG/ML
INJECTION, SOLUTION INTRAVENOUS PRN
Status: DISCONTINUED | OUTPATIENT
Start: 2023-08-08 | End: 2023-08-10 | Stop reason: HOSPADM

## 2023-08-08 RX ORDER — GAUZE BANDAGE 2" X 2"
100 BANDAGE TOPICAL DAILY
Status: DISCONTINUED | OUTPATIENT
Start: 2023-08-08 | End: 2023-08-10 | Stop reason: HOSPADM

## 2023-08-08 RX ORDER — BUPROPION HYDROCHLORIDE 75 MG/1
150 TABLET ORAL DAILY
Status: DISCONTINUED | OUTPATIENT
Start: 2023-08-08 | End: 2023-08-10 | Stop reason: HOSPADM

## 2023-08-08 RX ORDER — MULTIVITAMIN WITH IRON
1 TABLET ORAL DAILY
Status: DISCONTINUED | OUTPATIENT
Start: 2023-08-08 | End: 2023-08-10 | Stop reason: HOSPADM

## 2023-08-08 RX ORDER — LORAZEPAM 2 MG/ML
4 INJECTION INTRAMUSCULAR
Status: DISCONTINUED | OUTPATIENT
Start: 2023-08-08 | End: 2023-08-10 | Stop reason: HOSPADM

## 2023-08-08 RX ORDER — SODIUM CHLORIDE 9 MG/ML
INJECTION, SOLUTION INTRAVENOUS CONTINUOUS
Status: DISCONTINUED | OUTPATIENT
Start: 2023-08-08 | End: 2023-08-10 | Stop reason: HOSPADM

## 2023-08-08 RX ORDER — LORAZEPAM 2 MG/ML
2 INJECTION INTRAMUSCULAR
Status: DISCONTINUED | OUTPATIENT
Start: 2023-08-08 | End: 2023-08-10 | Stop reason: HOSPADM

## 2023-08-08 RX ORDER — SODIUM CHLORIDE 0.9 % (FLUSH) 0.9 %
5-40 SYRINGE (ML) INJECTION EVERY 12 HOURS SCHEDULED
Status: DISCONTINUED | OUTPATIENT
Start: 2023-08-08 | End: 2023-08-10 | Stop reason: HOSPADM

## 2023-08-08 RX ORDER — FAMOTIDINE 20 MG/1
20 TABLET, FILM COATED ORAL DAILY
Status: DISCONTINUED | OUTPATIENT
Start: 2023-08-08 | End: 2023-08-10 | Stop reason: HOSPADM

## 2023-08-08 RX ORDER — ATORVASTATIN CALCIUM 80 MG/1
80 TABLET, FILM COATED ORAL NIGHTLY
Status: DISCONTINUED | OUTPATIENT
Start: 2023-08-08 | End: 2023-08-10 | Stop reason: HOSPADM

## 2023-08-08 RX ORDER — AMLODIPINE BESYLATE 10 MG/1
10 TABLET ORAL NIGHTLY
Status: DISCONTINUED | OUTPATIENT
Start: 2023-08-08 | End: 2023-08-10 | Stop reason: HOSPADM

## 2023-08-08 RX ORDER — PALIPERIDONE 3 MG/1
9 TABLET, EXTENDED RELEASE ORAL DAILY
Status: DISCONTINUED | OUTPATIENT
Start: 2023-08-08 | End: 2023-08-10 | Stop reason: HOSPADM

## 2023-08-08 RX ORDER — GABAPENTIN 400 MG/1
400 CAPSULE ORAL 2 TIMES DAILY
Status: DISCONTINUED | OUTPATIENT
Start: 2023-08-08 | End: 2023-08-10 | Stop reason: HOSPADM

## 2023-08-08 RX ORDER — SODIUM CHLORIDE 0.9 % (FLUSH) 0.9 %
5-40 SYRINGE (ML) INJECTION PRN
Status: DISCONTINUED | OUTPATIENT
Start: 2023-08-08 | End: 2023-08-10 | Stop reason: HOSPADM

## 2023-08-08 RX ORDER — LORAZEPAM 2 MG/ML
3 INJECTION INTRAMUSCULAR
Status: DISCONTINUED | OUTPATIENT
Start: 2023-08-08 | End: 2023-08-10 | Stop reason: HOSPADM

## 2023-08-08 RX ORDER — 0.9 % SODIUM CHLORIDE 0.9 %
1000 INTRAVENOUS SOLUTION INTRAVENOUS ONCE
Status: COMPLETED | OUTPATIENT
Start: 2023-08-08 | End: 2023-08-08

## 2023-08-08 RX ORDER — LORAZEPAM 2 MG/1
2 TABLET ORAL
Status: DISCONTINUED | OUTPATIENT
Start: 2023-08-08 | End: 2023-08-10 | Stop reason: HOSPADM

## 2023-08-08 RX ORDER — CARVEDILOL 12.5 MG/1
12.5 TABLET ORAL 2 TIMES DAILY WITH MEALS
Status: DISCONTINUED | OUTPATIENT
Start: 2023-08-08 | End: 2023-08-10 | Stop reason: HOSPADM

## 2023-08-08 RX ORDER — LORAZEPAM 2 MG/ML
1 INJECTION INTRAMUSCULAR
Status: DISCONTINUED | OUTPATIENT
Start: 2023-08-08 | End: 2023-08-10 | Stop reason: HOSPADM

## 2023-08-08 RX ORDER — CHLORDIAZEPOXIDE HYDROCHLORIDE 25 MG/1
25 CAPSULE, GELATIN COATED ORAL 4 TIMES DAILY
Status: DISCONTINUED | OUTPATIENT
Start: 2023-08-08 | End: 2023-08-10 | Stop reason: HOSPADM

## 2023-08-08 RX ORDER — CLONAZEPAM 1 MG/1
0.5 TABLET ORAL 2 TIMES DAILY PRN
Status: DISCONTINUED | OUTPATIENT
Start: 2023-08-08 | End: 2023-08-10 | Stop reason: HOSPADM

## 2023-08-08 RX ORDER — LORAZEPAM 2 MG/1
4 TABLET ORAL
Status: DISCONTINUED | OUTPATIENT
Start: 2023-08-08 | End: 2023-08-10 | Stop reason: HOSPADM

## 2023-08-08 RX ADMIN — AMLODIPINE BESYLATE 10 MG: 10 TABLET ORAL at 20:34

## 2023-08-08 RX ADMIN — FOLIC ACID 1 MG: 1 TABLET ORAL at 20:48

## 2023-08-08 RX ADMIN — Medication 100 MG: at 20:48

## 2023-08-08 RX ADMIN — FAMOTIDINE 20 MG: 20 TABLET, FILM COATED ORAL at 20:59

## 2023-08-08 RX ADMIN — VALSARTAN 80 MG: 80 TABLET, FILM COATED ORAL at 20:33

## 2023-08-08 RX ADMIN — BUPROPION HYDROCHLORIDE 150 MG: 75 TABLET, FILM COATED ORAL at 20:34

## 2023-08-08 RX ADMIN — ENOXAPARIN SODIUM 40 MG: 100 INJECTION SUBCUTANEOUS at 20:48

## 2023-08-08 RX ADMIN — SODIUM CHLORIDE 1000 ML: 9 INJECTION, SOLUTION INTRAVENOUS at 15:26

## 2023-08-08 RX ADMIN — PALIPERIDONE 9 MG: 3 TABLET, EXTENDED RELEASE ORAL at 20:48

## 2023-08-08 RX ADMIN — SODIUM CHLORIDE, PRESERVATIVE FREE 10 ML: 5 INJECTION INTRAVENOUS at 20:35

## 2023-08-08 RX ADMIN — THERA TABS 1 TABLET: TAB at 20:48

## 2023-08-08 RX ADMIN — CHLORDIAZEPOXIDE HYDROCHLORIDE 25 MG: 25 CAPSULE ORAL at 18:41

## 2023-08-08 RX ADMIN — LORAZEPAM 2 MG: 2 TABLET ORAL at 20:48

## 2023-08-08 RX ADMIN — BREXPIPRAZOLE 1 MG: 1 TABLET ORAL at 20:47

## 2023-08-08 RX ADMIN — SODIUM CHLORIDE: 9 INJECTION, SOLUTION INTRAVENOUS at 21:04

## 2023-08-08 RX ADMIN — GABAPENTIN 400 MG: 400 CAPSULE ORAL at 20:33

## 2023-08-08 RX ADMIN — CARVEDILOL 12.5 MG: 12.5 TABLET, FILM COATED ORAL at 20:48

## 2023-08-08 RX ADMIN — ATORVASTATIN CALCIUM 80 MG: 80 TABLET, FILM COATED ORAL at 20:33

## 2023-08-08 ASSESSMENT — ENCOUNTER SYMPTOMS
RESPIRATORY NEGATIVE: 1
SHORTNESS OF BREATH: 0
ABDOMINAL PAIN: 0
GASTROINTESTINAL NEGATIVE: 1

## 2023-08-08 NOTE — TELEPHONE ENCOUNTER
Pt called and stated that he is at [address] [place] [address]. [address] was notified and stated she was aware. Called ER and asked for the charge nurse who was not available. Spoke with Chanel LABOY and informed her that pt had called here stating he needed detox, had hallucination last night and that he initially stated he had suicidal ideation. She said they would get him back in the ER soon.

## 2023-08-08 NOTE — ED NOTES
Report called to [facility].  Pt ready for transfer to the floor     Eduardo Espinosa RN  08/08/23 2194
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
## 2023-08-08 NOTE — TELEPHONE ENCOUNTER
Pt called stating that he was in the [place] and on his way to the [facility].

## 2023-08-08 NOTE — TELEPHONE ENCOUNTER
Pt called stating that he wanted Dr Diandra Vital to know that he was taking a cab from [facility] to [facility] ER to be treated for \"detox I had hallucinations last night and mental health\". Pt initially reported that he had suicidal thoughts, then stated no thoughts to harm himself at this time, he denied intent to harm himself. \"I promise I will be at the hospital\". I have already paid a cab 130 dollars to take me\". Pt refused to go to local ER stating that he trusted Dr Diandra Vital. Pt refused ambulance again stating that he was going to go by cab and would call staff when he got to the parking lot. Dr Diandra Vital was contacted, given the above info. She also heard part of the phone conversation. She is agreeable for pt to come to ER by cab. CHASITY de guzman given the above info. Jorge Heck RN [facility] intake given above info per CHASITY Alcazar.
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
## 2023-08-09 LAB
ANION GAP SERPL CALCULATED.3IONS-SCNC: 12 MMOL/L (ref 7–19)
ANION GAP SERPL CALCULATED.3IONS-SCNC: 7 MMOL/L (ref 7–19)
BUN SERPL-MCNC: 15 MG/DL (ref 6–20)
BUN SERPL-MCNC: 17 MG/DL (ref 6–20)
CALCIUM SERPL-MCNC: 8.5 MG/DL (ref 8.6–10)
CALCIUM SERPL-MCNC: 8.8 MG/DL (ref 8.6–10)
CHLORIDE SERPL-SCNC: 101 MMOL/L (ref 98–111)
CHLORIDE SERPL-SCNC: 97 MMOL/L (ref 98–111)
CHOLEST SERPL-MCNC: 158 MG/DL (ref 160–199)
CO2 SERPL-SCNC: 17 MMOL/L (ref 22–29)
CO2 SERPL-SCNC: 24 MMOL/L (ref 22–29)
CREAT SERPL-MCNC: 1.1 MG/DL (ref 0.5–1.2)
CREAT SERPL-MCNC: 1.1 MG/DL (ref 0.5–1.2)
ERYTHROCYTE [DISTWIDTH] IN BLOOD BY AUTOMATED COUNT: 14.6 % (ref 11.5–14.5)
ETHANOLAMINE SERPL-MCNC: <10 MG/DL (ref 0–0.08)
GLUCOSE SERPL-MCNC: 115 MG/DL (ref 74–109)
GLUCOSE SERPL-MCNC: 95 MG/DL (ref 74–109)
HCT VFR BLD AUTO: 38.8 % (ref 42–52)
HDLC SERPL-MCNC: 57 MG/DL (ref 55–121)
HGB BLD-MCNC: 12.3 G/DL (ref 14–18)
LDLC SERPL CALC-MCNC: 78 MG/DL
MCH RBC QN AUTO: 33.2 PG (ref 27–31)
MCHC RBC AUTO-ENTMCNC: 31.7 G/DL (ref 33–37)
MCV RBC AUTO: 104.9 FL (ref 80–94)
PLATELET # BLD AUTO: 180 K/UL (ref 130–400)
PMV BLD AUTO: 8.4 FL (ref 9.4–12.4)
POTASSIUM SERPL-SCNC: 4.6 MMOL/L (ref 3.5–5)
POTASSIUM SERPL-SCNC: 5.2 MMOL/L (ref 3.5–5)
RBC # BLD AUTO: 3.7 M/UL (ref 4.7–6.1)
SODIUM SERPL-SCNC: 126 MMOL/L (ref 136–145)
SODIUM SERPL-SCNC: 130 MMOL/L (ref 136–145)
SODIUM SERPL-SCNC: 130 MMOL/L (ref 136–145)
SODIUM SERPL-SCNC: 132 MMOL/L (ref 136–145)
SODIUM SERPL-SCNC: 132 MMOL/L (ref 136–145)
TRIGL SERPL-MCNC: 113 MG/DL (ref 0–149)
WBC # BLD AUTO: 4.4 K/UL (ref 4.8–10.8)

## 2023-08-09 PROCEDURE — 6370000000 HC RX 637 (ALT 250 FOR IP)

## 2023-08-09 PROCEDURE — 36415 COLL VENOUS BLD VENIPUNCTURE: CPT

## 2023-08-09 PROCEDURE — 84295 ASSAY OF SERUM SODIUM: CPT

## 2023-08-09 PROCEDURE — 80061 LIPID PANEL: CPT

## 2023-08-09 PROCEDURE — 1210000000 HC MED SURG R&B

## 2023-08-09 PROCEDURE — 82077 ASSAY SPEC XCP UR&BREATH IA: CPT

## 2023-08-09 PROCEDURE — 85027 COMPLETE CBC AUTOMATED: CPT

## 2023-08-09 PROCEDURE — 6360000002 HC RX W HCPCS

## 2023-08-09 PROCEDURE — 80048 BASIC METABOLIC PNL TOTAL CA: CPT

## 2023-08-09 PROCEDURE — 2580000003 HC RX 258: Performed by: NURSE PRACTITIONER

## 2023-08-09 RX ADMIN — BUPROPION HYDROCHLORIDE 150 MG: 75 TABLET, FILM COATED ORAL at 20:07

## 2023-08-09 RX ADMIN — PALIPERIDONE 9 MG: 3 TABLET, EXTENDED RELEASE ORAL at 09:00

## 2023-08-09 RX ADMIN — CHLORDIAZEPOXIDE HYDROCHLORIDE 25 MG: 25 CAPSULE ORAL at 12:06

## 2023-08-09 RX ADMIN — GABAPENTIN 400 MG: 400 CAPSULE ORAL at 08:59

## 2023-08-09 RX ADMIN — ATORVASTATIN CALCIUM 80 MG: 80 TABLET, FILM COATED ORAL at 20:07

## 2023-08-09 RX ADMIN — CARVEDILOL 12.5 MG: 12.5 TABLET, FILM COATED ORAL at 08:59

## 2023-08-09 RX ADMIN — CARVEDILOL 12.5 MG: 12.5 TABLET, FILM COATED ORAL at 17:09

## 2023-08-09 RX ADMIN — BREXPIPRAZOLE 1 MG: 1 TABLET ORAL at 09:00

## 2023-08-09 RX ADMIN — CHLORDIAZEPOXIDE HYDROCHLORIDE 25 MG: 25 CAPSULE ORAL at 20:07

## 2023-08-09 RX ADMIN — FAMOTIDINE 20 MG: 20 TABLET, FILM COATED ORAL at 08:59

## 2023-08-09 RX ADMIN — SODIUM CHLORIDE: 9 INJECTION, SOLUTION INTRAVENOUS at 09:06

## 2023-08-09 RX ADMIN — AMLODIPINE BESYLATE 10 MG: 10 TABLET ORAL at 20:07

## 2023-08-09 RX ADMIN — LORAZEPAM 2 MG: 2 TABLET ORAL at 00:18

## 2023-08-09 RX ADMIN — CHLORDIAZEPOXIDE HYDROCHLORIDE 25 MG: 25 CAPSULE ORAL at 08:59

## 2023-08-09 RX ADMIN — CHLORDIAZEPOXIDE HYDROCHLORIDE 25 MG: 25 CAPSULE ORAL at 17:10

## 2023-08-09 RX ADMIN — THERA TABS 1 TABLET: TAB at 08:59

## 2023-08-09 RX ADMIN — FOLIC ACID 1 MG: 1 TABLET ORAL at 08:59

## 2023-08-09 RX ADMIN — GABAPENTIN 400 MG: 400 CAPSULE ORAL at 20:07

## 2023-08-09 RX ADMIN — ENOXAPARIN SODIUM 40 MG: 100 INJECTION SUBCUTANEOUS at 09:00

## 2023-08-09 RX ADMIN — Medication 100 MG: at 08:59

## 2023-08-09 ASSESSMENT — ENCOUNTER SYMPTOMS
NAUSEA: 0
COLOR CHANGE: 0
COUGH: 0
ABDOMINAL PAIN: 0
SHORTNESS OF BREATH: 0
VOMITING: 0
DIARRHEA: 0
ABDOMINAL DISTENTION: 0
CONSTIPATION: 0
BLOOD IN STOOL: 0
WHEEZING: 0

## 2023-08-09 NOTE — PLAN OF CARE
Problem: Self Harm/Suicidality  Goal: Will have no self-injury during hospital stay  Description: INTERVENTIONS:  1. Ensure constant observer at bedside with Q15M safety checks  2. Maintain a safe environment  3. Secure patient belongings  4. Ensure family/visitors adhere to safety recommendations  5. Ensure safety tray has been added to patient's diet order  6.   Every shift and PRN: Re-assess suicidal risk via Frequent Screener    Outcome: Progressing     Problem: Discharge Planning  Goal: Discharge to home or other facility with appropriate resources  Outcome: Progressing  Flowsheets (Taken 8/8/2023 2310)  Discharge to home or other facility with appropriate resources: Identify barriers to discharge with patient and caregiver     Problem: Safety - Adult  Goal: Free from fall injury  Outcome: Progressing     Problem: Chronic Conditions and Co-morbidities  Goal: Patient's chronic conditions and co-morbidity symptoms are monitored and maintained or improved  Outcome: Progressing

## 2023-08-09 NOTE — PROGRESS NOTES
Physician Progress Note      Hernán Bah  SHAHRAM #:                  [id number]  :                       1970  ADMIT DATE:       2023 2:35 PM  [facility] DATE:  RESPONDING  PROVIDER #:        Rosalinda Nieves        QUERY TEXT:    Stage of Chronic Kidney Disease: Please provide further specificity, if known. Clinical indicators include: chronic kidney disease, bun, creatinine  Options provided:  -- Chronic kidney disease stage 1  -- Chronic kidney disease stage 2  -- Chronic kidney disease stage 3  -- Chronic kidney disease stage 3a  -- Chronic kidney disease stage 3b  -- Chronic kidney disease stage 4  -- Chronic kidney disease stage 5  -- Chronic kidney disease stage 5, requiring dialysis  -- End stage renal disease  -- Other - I will add my own diagnosis  -- Disagree - Not applicable / Not valid  -- Disagree - Clinically Unable to determine / Unknown        PROVIDER RESPONSE TEXT:    Provider dismissed this query because it was not applicable to the patient or   not a valid query.   no ckd evidence      Electronically signed by:  Rosalinda Nieves 2023 3:39 PM

## 2023-08-09 NOTE — PROGRESS NOTES
edema. Left lower leg: No edema. Skin:     General: Skin is warm and dry. Findings: No bruising or lesion. Neurological:      Mental Status: He is alert and oriented to person, place, and time. Psychiatric:         Mood and Affect: Mood normal.         Behavior: Behavior normal.         Thought Content: Thought content normal.          Medications:      sodium chloride      sodium chloride 100 mL/hr at 08/09/23 0906      amLODIPine  10 mg Oral Nightly    atorvastatin  80 mg Oral Nightly    brexpiprazole  1 mg Oral Daily    buPROPion  150 mg Oral Daily    carvedilol  12.5 mg Oral BID WC    famotidine  20 mg Oral Daily    gabapentin  400 mg Oral BID    nicotine  1 patch TransDERmal Daily    paliperidone  9 mg Oral Daily    [Held by provider] valsartan  80 mg Oral BID    sodium chloride flush  5-40 mL IntraVENous 2 times per day    thiamine  100 mg Oral Daily    enoxaparin  40 mg SubCUTAneous Daily    multivitamin  1 tablet Oral Daily    folic acid  1 mg Oral Daily    chlordiazePOXIDE  25 mg Oral 4x Daily     clonazePAM, sodium chloride flush, sodium chloride, LORazepam **OR** LORazepam **OR** LORazepam **OR** LORazepam **OR** LORazepam **OR** LORazepam **OR** LORazepam **OR** LORazepam  ADULT DIET; Regular     Lab and other Data:     Recent Labs     08/08/23  1448 08/09/23  0459   WBC 8.8 4.4*   HGB 14.0 12.3*    180     Recent Labs     08/08/23  1448 08/08/23  2230 08/09/23  0459 08/09/23  1051 08/09/23  1336   *   < > 126* 132* 132*   K 4.3  --  5.2*  --  4.6   CL 88*  --  97*  --  101   CO2 23  --  17*  --  24   BUN 15  --  15  --  17   CREATININE 1.0  --  1.1  --  1.1   GLUCOSE 83  --  95  --  115*    < > = values in this interval not displayed. Recent Labs     08/08/23  1448   AST 29   ALT 33   BILITOT 0.6   ALKPHOS 124     Troponin T: No results for input(s): TROPONINI in the last 72 hours. Pro-BNP: No results for input(s): BNP in the last 72 hours.   INR: No results for Dragon/Transcription disclaimer:   Much of this encounter note is an electronic transcription/translation of spoken language to printed text.  The electronic translation of spoken language may permit erroneous, or at times, nonsensical words or phrases to be inadvertently transcribed; although attempts have made to review the note for such errors, some may still exist.

## 2023-08-09 NOTE — PLAN OF CARE
Problem: Self Harm/Suicidality  Goal: Will have no self-injury during hospital stay  Description: INTERVENTIONS:  1. Ensure constant observer at bedside with Q15M safety checks  2. Maintain a safe environment  3. Secure patient belongings  4. Ensure family/visitors adhere to safety recommendations  5. Ensure safety tray has been added to patient's diet order  6.   Every shift and PRN: Re-assess suicidal risk via Frequent Screener    8/9/2023 1039 by Bentley Shaikh RN  Outcome: Progressing  Flowsheets (Taken 8/9/2023 1033)  Will have no self-injury during hospital stay: Ensure constant observer at bedside with Q15M safety checks  8/9/2023 0404 by [address] [place] [address]  Outcome: Progressing     Problem: Discharge Planning  Goal: Discharge to home or other facility with appropriate resources  8/9/2023 1039 by Bentley Shaikh RN  Outcome: Progressing  Flowsheets  Taken 8/9/2023 1033 by Bentley Shaikh RN  Discharge to home or other facility with appropriate resources: Identify barriers to discharge with patient and caregiver  Taken 8/9/2023 0405 by [address] [place] [address]  Discharge to home or other facility with appropriate resources: Identify barriers to discharge with patient and caregiver  8/9/2023 0404 by [address] [place] [address]  Outcome: Progressing  Flowsheets (Taken 8/8/2023 2310)  Discharge to home or other facility with appropriate resources: Identify barriers to discharge with patient and caregiver     Problem: Safety - Adult  Goal: Free from fall injury  8/9/2023 1039 by Bentley Shaikh RN  Outcome: Progressing  8/9/2023 0404 by [address] [place] [address]  Outcome: Progressing     Problem: Chronic Conditions and Co-morbidities  Goal: Patient's chronic conditions and co-morbidity symptoms are monitored and maintained or improved  8/9/2023 1039 by Bentley Shaikh RN  Outcome: Progressing  Flowsheets (Taken 8/9/2023 1033)  Care Plan - Patient's Chronic Conditions and Co-Morbidity

## 2023-08-09 NOTE — CARE COORDINATION
Per notes, pt has been accepted to [facility] unit when medically cleared.   Electronically signed by Leandro Clemons RN on 8/9/2023 at 2:42 PM

## 2023-08-09 NOTE — PROGRESS NOTES
Social Determinants of Health     Tobacco Use: High Risk    Smoking Tobacco Use: Every Day    Smokeless Tobacco Use: Former    Passive Exposure: Not on file   Alcohol Use: Not At Risk    Frequency of Alcohol Consumption: Monthly or less    Average Number of Drinks: Patient does not drink    Frequency of Binge Drinking: Less than monthly   Financial Resource Strain: Low Risk     Difficulty of Paying Living Expenses: Not hard at all   Food Insecurity: No Food Insecurity    Worried About [place] in the Last Year: Never true    [address] in the Last Year: Never true   Transportation Needs: No Transportation Needs    Lack of Transportation (Medical): No    Lack of Transportation (Non-Medical): No   Physical Activity: Sufficiently Active    Days of Exercise per Week: 5 days    Minutes of Exercise per Session: 60 min   Stress: Stress Concern Present    Feeling of Stress : Rather much   Social Connections: Moderately Isolated    Frequency of Communication with Friends and Family: More than three times a week    Frequency of Social Gatherings with Friends and Family: Once a week    Attends [employer] Services: More than 4 times per year    Active Member of [employer] Group or Organizations: No    Attends Club or Organization Meetings: Never    Marital Status:    Intimate Partner Violence: Not At Risk    Fear of Current or Ex-Partner: No    Emotionally Abused: No    Physically Abused: No    Sexually Abused: No   Depression: At risk    PHQ-2 Score: 13   Housing Stability: High Risk    Unable to Pay for Housing in the Last Year: Yes    Number of Places Lived in the Last Year: 2    Unstable Housing in the Last Year: No     Depression screening from [facility] wheel:         PHQ-9 Score (if applicable):         SDOH: Patient Able to answer Social Determinant of Health screening questions.      History    Tobacco Use      Smoking status: Every Day        Packs/day: 1.00        Years: 41.00        Pack years: 39

## 2023-08-09 NOTE — CONSULTS
[facility]  Psychiatry Consult    Reason for Consult: Concern   Suicidal ideations    The primary source(s) of information include(s):  patient    The patient is a 52 y.o. male with previous psychiatric history of depression, anxiety, uncomplicated alcohol use disorder, cannabis use disorder, substance-induced mood disorder, who has been admitted to medical services secondary to alcohol intoxication, blood alcohol level 79, and hyponatremia. Patient's sodium level at time of admission was 124    Patient is well-known to psychiatry due to multiple previous admissions to our psychiatric unit with same clinical presentation, as at present time. Patient has been seen in his room with presence of one-to-one sitter. He reported that he was doing relatively well recently and was sober for a month, however, stated that he started experiencing \"neck trouble\", reported having neck pain and as a result had poor quality of sleep, stated that he was not able to sleep for a week and he started drinking alcohol, said \"it really helped me with pain\". Patient reported that he was drinking 16 beers a day, daily during the weekdays, and 20-24 beers on weekends. He continued to experience poor quality of sleep, stated that he was not sleeping for last 5 days, from last Friday until last night, started that he had \"very bad thoughts\", [facility] and thought to shoot himself. However, he decided to come to the hospital and ask for help. Today during the interview, patient endorses mild withdrawal symptoms from alcohol - mild anxiety, low energy level, body shakes, mild headache. In regards of affective symptomatology, patient reported mild depression, decreased appetite, decreased quality of sleep, poor motivation, and poor concentration. He denies feeling of hopelessness, helplessness and worthlessness.   Patient denies current active suicidal or homicidal

## 2023-08-10 VITALS
HEART RATE: 83 BPM | BODY MASS INDEX: 20.7 KG/M2 | OXYGEN SATURATION: 100 % | WEIGHT: 170 LBS | HEIGHT: 76 IN | DIASTOLIC BLOOD PRESSURE: 90 MMHG | SYSTOLIC BLOOD PRESSURE: 139 MMHG | RESPIRATION RATE: 16 BRPM | TEMPERATURE: 97.3 F

## 2023-08-10 LAB
ANION GAP SERPL CALCULATED.3IONS-SCNC: 11 MMOL/L (ref 7–19)
BUN SERPL-MCNC: 18 MG/DL (ref 6–20)
CALCIUM SERPL-MCNC: 8.8 MG/DL (ref 8.6–10)
CHLORIDE SERPL-SCNC: 100 MMOL/L (ref 98–111)
CO2 SERPL-SCNC: 21 MMOL/L (ref 22–29)
CREAT SERPL-MCNC: 1.1 MG/DL (ref 0.5–1.2)
ERYTHROCYTE [DISTWIDTH] IN BLOOD BY AUTOMATED COUNT: 14.5 % (ref 11.5–14.5)
GLUCOSE SERPL-MCNC: 101 MG/DL (ref 74–109)
HCT VFR BLD AUTO: 32.7 % (ref 42–52)
HGB BLD-MCNC: 11.4 G/DL (ref 14–18)
MCH RBC QN AUTO: 32.9 PG (ref 27–31)
MCHC RBC AUTO-ENTMCNC: 34.9 G/DL (ref 33–37)
MCV RBC AUTO: 94.2 FL (ref 80–94)
PLATELET # BLD AUTO: 196 K/UL (ref 130–400)
PMV BLD AUTO: 8.8 FL (ref 9.4–12.4)
POTASSIUM SERPL-SCNC: 4.4 MMOL/L (ref 3.5–5)
RBC # BLD AUTO: 3.47 M/UL (ref 4.7–6.1)
SODIUM SERPL-SCNC: 132 MMOL/L (ref 136–145)
SODIUM SERPL-SCNC: 133 MMOL/L (ref 136–145)
WBC # BLD AUTO: 4.3 K/UL (ref 4.8–10.8)

## 2023-08-10 PROCEDURE — 85027 COMPLETE CBC AUTOMATED: CPT

## 2023-08-10 PROCEDURE — 80048 BASIC METABOLIC PNL TOTAL CA: CPT

## 2023-08-10 PROCEDURE — 84295 ASSAY OF SERUM SODIUM: CPT

## 2023-08-10 PROCEDURE — 36415 COLL VENOUS BLD VENIPUNCTURE: CPT

## 2023-08-10 PROCEDURE — 6370000000 HC RX 637 (ALT 250 FOR IP)

## 2023-08-10 PROCEDURE — 2580000003 HC RX 258

## 2023-08-10 RX ORDER — BUSPIRONE HYDROCHLORIDE 5 MG/1
5 TABLET ORAL 3 TIMES DAILY
COMMUNITY
End: 2023-08-15

## 2023-08-10 RX ORDER — MULTIVITAMIN WITH IRON
1 TABLET ORAL DAILY
Qty: 30 TABLET | Refills: 0 | Status: SHIPPED | OUTPATIENT
Start: 2023-08-11

## 2023-08-10 RX ORDER — PROMETHAZINE HYDROCHLORIDE 25 MG/1
25 TABLET ORAL EVERY 6 HOURS PRN
COMMUNITY

## 2023-08-10 RX ORDER — THIAMINE MONONITRATE (VIT B1) 100 MG
100 TABLET ORAL DAILY
Qty: 30 TABLET | Refills: 0 | Status: SHIPPED | OUTPATIENT
Start: 2023-08-11

## 2023-08-10 RX ORDER — FOLIC ACID 1 MG/1
1 TABLET ORAL DAILY
Qty: 30 TABLET | Refills: 0 | Status: SHIPPED | OUTPATIENT
Start: 2023-08-11

## 2023-08-10 RX ORDER — GABAPENTIN 100 MG/1
100 CAPSULE ORAL 3 TIMES DAILY PRN
Status: ON HOLD | COMMUNITY
End: 2023-08-10 | Stop reason: HOSPADM

## 2023-08-10 RX ORDER — GABAPENTIN 400 MG/1
400 CAPSULE ORAL 2 TIMES DAILY
Status: ON HOLD | COMMUNITY
End: 2023-08-10 | Stop reason: HOSPADM

## 2023-08-10 RX ADMIN — Medication 100 MG: at 07:49

## 2023-08-10 RX ADMIN — FOLIC ACID 1 MG: 1 TABLET ORAL at 07:49

## 2023-08-10 RX ADMIN — SODIUM CHLORIDE, PRESERVATIVE FREE 10 ML: 5 INJECTION INTRAVENOUS at 07:50

## 2023-08-10 RX ADMIN — CHLORDIAZEPOXIDE HYDROCHLORIDE 25 MG: 25 CAPSULE ORAL at 07:49

## 2023-08-10 RX ADMIN — THERA TABS 1 TABLET: TAB at 07:49

## 2023-08-10 RX ADMIN — GABAPENTIN 400 MG: 400 CAPSULE ORAL at 07:49

## 2023-08-10 RX ADMIN — PALIPERIDONE 9 MG: 3 TABLET, EXTENDED RELEASE ORAL at 07:49

## 2023-08-10 RX ADMIN — CARVEDILOL 12.5 MG: 12.5 TABLET, FILM COATED ORAL at 07:49

## 2023-08-10 RX ADMIN — FAMOTIDINE 20 MG: 20 TABLET, FILM COATED ORAL at 07:49

## 2023-08-10 RX ADMIN — BREXPIPRAZOLE 1 MG: 1 TABLET ORAL at 07:49

## 2023-08-10 NOTE — PROGRESS NOTES
Per Dr. Burleson Daily, patient can wait until follow up with Dr. Bettie Tubbs to start [facility] and [address] as an outpatient. RIMA Daley with hospitalist made aware and AVS updated.

## 2023-08-10 NOTE — PROGRESS NOTES
Spoke with behavioral health intake nurse regarding patient's discharge. Per Otilio Benitez RN, Dr. Tyree Newell is ok with patient being discharged home with an outpatient follow up with Dr. Cassy Saab.  Will notifiy hospitalist.

## 2023-08-10 NOTE — PROGRESS NOTES
Patient states he will call and schedule a hospital follow up appointment for one week from discharge.

## 2023-08-10 NOTE — PROGRESS NOTES
CLINICAL PHARMACY NOTE: MEDS TO BEDS    Total # of Prescriptions Filled: 3   The following medications were delivered to the patient:  Current Discharge Medication List        START taking these medications    Details   thiamine mononitrate (THIAMINE) 100 MG tablet Take 1 tablet by mouth daily  Qty: 30 tablet, Refills: 0      Multiple Vitamin (MULTIVITAMIN) TABS tablet Take 1 tablet by mouth daily  Qty: 30 tablet, Refills: 0      folic acid (FOLVITE) 1 MG tablet Take 1 tablet by mouth daily  Qty: 30 tablet, Refills: 0               Additional Documentation:    Handed scripts to patient. Zero copay.

## 2023-08-10 NOTE — PROGRESS NOTES
Home medications clarified with [facility], which patient states is the only pharmacy he uses. Invega and Rexulti were continued on admission, which were medications that patient has not filled and does not take at home. Call made to Dr. Jose Rodriguez office per request by Porsha Lewis to verify that patient would need to be discharged on these medications or to follow up with her at office visit on 8/15. Awaiiting call back.

## 2023-08-10 NOTE — CARE COORDINATION
[employer] set up. They will call once ride gets here. Pt will need to go to the [facility] for . Confirmation # [id number].   Electronically signed by Nina Mckinney RN on 8/10/2023 at 2:10 PM

## 2023-08-10 NOTE — PLAN OF CARE
Problem: Self Harm/Suicidality  Goal: Will have no self-injury during hospital stay  8/10/2023 1219 by Juan Rios RN  Outcome: Adequate for Discharge  8/10/2023 0204 by [address] [place] [address], LPN  Outcome: Progressing     Problem: Discharge Planning  Goal: Discharge to home or other facility with appropriate resources  8/10/2023 1219 by Juan Rios RN  Outcome: Adequate for Discharge  8/10/2023 0204 by [address] [place] [address], LPN  Outcome: Ty Kashmirjuliene (Taken 8/9/2023 2136)  Discharge to home or other facility with appropriate resources: Identify barriers to discharge with patient and caregiver     Problem: Safety - Adult  Goal: Free from fall injury  8/10/2023 1219 by Juan Rios RN  Outcome: Adequate for Discharge  8/10/2023 0204 by [address] [place] [address] Road, LPN  Outcome: Progressing  Flowsheets (Taken 8/10/2023 0203)  Free From Fall Injury: Instruct family/caregiver on patient safety     Problem: Chronic Conditions and Co-morbidities  Goal: Patient's chronic conditions and co-morbidity symptoms are monitored and maintained or improved  8/10/2023 1219 by Juan Rios RN  Outcome: Adequate for Discharge  8/10/2023 0204 by [address] [place] [address] Road, LPN  Outcome: Progressing  Flowsheets (Taken 8/9/2023 2136)  Care Plan - Patient's Chronic Conditions and Co-Morbidity Symptoms are Monitored and Maintained or Improved: Monitor and assess patient's chronic conditions and comorbid symptoms for stability, deterioration, or improvement     Problem: Pain  Goal: Verbalizes/displays adequate comfort level or baseline comfort level  8/10/2023 1219 by Juan Rios RN  Outcome: Adequate for Discharge  8/10/2023 0204 by [address] [place] [address] Road, LPN  Outcome: Progressing

## 2023-08-10 NOTE — PLAN OF CARE
Problem: Self Harm/Suicidality  Goal: Will have no self-injury during hospital stay  Description: INTERVENTIONS:  1. Ensure constant observer at bedside with Q15M safety checks  2. Maintain a safe environment  3. Secure patient belongings  4. Ensure family/visitors adhere to safety recommendations  5. Ensure safety tray has been added to patient's diet order  6.   Every shift and PRN: Re-assess suicidal risk via Frequent Screener    Outcome: Progressing     Problem: Discharge Planning  Goal: Discharge to home or other facility with appropriate resources  Outcome: Progressing  Flowsheets (Taken 8/9/2023 2136)  Discharge to home or other facility with appropriate resources: Identify barriers to discharge with patient and caregiver     Problem: Safety - Adult  Goal: Free from fall injury  Outcome: Progressing  Flowsheets (Taken 8/10/2023 0203)  Free From Fall Injury: Instruct family/caregiver on patient safety     Problem: Chronic Conditions and Co-morbidities  Goal: Patient's chronic conditions and co-morbidity symptoms are monitored and maintained or improved  Outcome: Progressing  Flowsheets (Taken 8/9/2023 2136)  Care Plan - Patient's Chronic Conditions and Co-Morbidity Symptoms are Monitored and Maintained or Improved: Monitor and assess patient's chronic conditions and comorbid symptoms for stability, deterioration, or improvement     Problem: Pain  Goal: Verbalizes/displays adequate comfort level or baseline comfort level  Outcome: Progressing

## 2023-08-10 NOTE — PROGRESS NOTES
Per Danika at Dr. Chente Lisa office, verification on medications would need to be done by Dr. Quincy Mckeon because he has seen him this hospital admission.

## 2023-08-10 NOTE — CARE COORDINATION
Pt has decided to go home, [id number] [facility] agreeable for pt to be seen as outpt. PT normally sees Dr. Emmanuel Glez as outpt and has an appt already set up. Pt will need a ride, will see if he has transport benefits.   Electronically signed by Aure Hahn RN on 8/10/2023 at 11:28 AM

## 2023-08-11 ENCOUNTER — TELEPHONE (OUTPATIENT)
Dept: INTERNAL MEDICINE | Age: 53
End: 2023-08-11

## 2023-08-11 NOTE — TELEPHONE ENCOUNTER
[address] [facility],[address] Transitions Initial Follow Up Call    Outreach made within 2 business days of discharge: Yes    Patient: Syd Ruiz   Patient : 1970 MRN: [id number]    Reason for Admission: hallucinations    Discharge Date: 8/10/23      Discharge Diagnoses:  Principal Problem:    Hyponatremia  Active Problems:    Alcohol use disorder, severe, dependence ([address])    Suicidal ideation    Tobacco abuse disorder  Resolved Problems:    * No resolved hospital problems. *      Spoke with: Attempted to make contact with patient/caregiver for an initial transitions of care follow up call post discharge without success. I will reach out again at a later time. Any previously scheduled hospital follow up appointments noted below.         Discharge department/facility: [address] [place] [address]    RECORDS IN CHART MH PT    Scheduled appointment with PCP within 7-14 days    Follow Up  Future Appointments   Date Time Provider [facility] [address]   8/15/2023 11:00 AM Gage Woo MD [address] [facility]   2023  4:00 PM King Jamila MD [facility]       [place], [place]

## 2023-08-14 ENCOUNTER — TELEPHONE (OUTPATIENT)
Dept: INTERNAL MEDICINE | Age: 53
End: 2023-08-14

## 2023-08-14 ENCOUNTER — TELEPHONE (OUTPATIENT)
Dept: PSYCHIATRY | Age: 53
End: 2023-08-14

## 2023-08-14 NOTE — TELEPHONE ENCOUNTER
Called pt for appointment reminder.   -Pt confirmed    Electronically signed by Marcin Spaulding MA on 8/14/2023 at 3:09 PM

## 2023-08-14 NOTE — TELEPHONE ENCOUNTER
[address] [facility],[address] Floor Transitions Initial Follow Up Call    Outreach made within 2 business days of discharge: Yes    Patient: Emmie Helms   Patient : 1970 MRN: [id number]    Reason for Admission: hallucinations    Discharge Date: 8/10/23      Discharge Diagnoses:  Principal Problem:    Hyponatremia  Active Problems:    Alcohol use disorder, severe, dependence ([address])    Suicidal ideation    Tobacco abuse disorder  Resolved Problems:    * No resolved hospital problems.  *     Spoke with: 2nd message left for pt to call us back to do the TCM not and schedule a TCM visit    Discharge department/facility: [address] [place] [address] PT    Scheduled appointment with PCP within 7-14 days    Follow Up  Future Appointments   Date Time Provider [address] [place] [address]   8/15/2023 11:00 AM Christine Guaman MD [address] [facility]   2023  4:00 PM Warden Simba MD [facility]       [facility] [place]

## 2023-08-15 ENCOUNTER — APPOINTMENT (OUTPATIENT)
Dept: CT IMAGING | Age: 53
End: 2023-08-15
Payer: MEDICAID

## 2023-08-15 ENCOUNTER — OFFICE VISIT (OUTPATIENT)
Dept: PSYCHIATRY | Age: 53
End: 2023-08-15
Payer: MEDICAID

## 2023-08-15 ENCOUNTER — TELEPHONE (OUTPATIENT)
Dept: PSYCHIATRY | Age: 53
End: 2023-08-15

## 2023-08-15 ENCOUNTER — HOSPITAL ENCOUNTER (EMERGENCY)
Age: 53
Discharge: HOME | End: 2023-08-15
Payer: MEDICAID

## 2023-08-15 VITALS
HEART RATE: 111 BPM | BODY MASS INDEX: 20.7 KG/M2 | SYSTOLIC BLOOD PRESSURE: 189 MMHG | DIASTOLIC BLOOD PRESSURE: 97 MMHG | TEMPERATURE: 97 F | HEIGHT: 76 IN | WEIGHT: 170 LBS | OXYGEN SATURATION: 91 %

## 2023-08-15 VITALS
DIASTOLIC BLOOD PRESSURE: 82 MMHG | OXYGEN SATURATION: 96 % | SYSTOLIC BLOOD PRESSURE: 142 MMHG | TEMPERATURE: 98.1 F | RESPIRATION RATE: 18 BRPM | HEART RATE: 86 BPM

## 2023-08-15 DIAGNOSIS — G44.209 TENSION HEADACHE: Primary | ICD-10-CM

## 2023-08-15 DIAGNOSIS — F41.1 GENERALIZED ANXIETY DISORDER: ICD-10-CM

## 2023-08-15 DIAGNOSIS — F34.9 PERSISTENT MOOD (AFFECTIVE) DISORDER, UNSPECIFIED (HCC): Primary | ICD-10-CM

## 2023-08-15 DIAGNOSIS — G47.00 INSOMNIA, UNSPECIFIED TYPE: ICD-10-CM

## 2023-08-15 DIAGNOSIS — F10.10 ALCOHOL ABUSE: ICD-10-CM

## 2023-08-15 DIAGNOSIS — G47.10 HYPERSOMNIA: ICD-10-CM

## 2023-08-15 DIAGNOSIS — F17.200 TOBACCO USE DISORDER: ICD-10-CM

## 2023-08-15 LAB
ALBUMIN SERPL-MCNC: 5 G/DL (ref 3.5–5.2)
ALP SERPL-CCNC: 96 U/L (ref 40–130)
ALT SERPL-CCNC: 53 U/L (ref 5–41)
ANION GAP SERPL CALCULATED.3IONS-SCNC: 14 MMOL/L (ref 7–19)
AST SERPL-CCNC: 33 U/L (ref 5–40)
BACTERIA URNS QL MICRO: NEGATIVE /HPF
BASOPHILS # BLD: 0 K/UL (ref 0–0.2)
BASOPHILS NFR BLD: 0.4 % (ref 0–1)
BILIRUB SERPL-MCNC: 0.3 MG/DL (ref 0.2–1.2)
BILIRUB UR QL STRIP: NEGATIVE
BUN SERPL-MCNC: 14 MG/DL (ref 6–20)
CALCIUM SERPL-MCNC: 9.7 MG/DL (ref 8.6–10)
CHLORIDE SERPL-SCNC: 95 MMOL/L (ref 98–111)
CLARITY UR: CLEAR
CO2 SERPL-SCNC: 24 MMOL/L (ref 22–29)
COLOR UR: YELLOW
CREAT SERPL-MCNC: 0.9 MG/DL (ref 0.5–1.2)
CRYSTALS URNS MICRO: ABNORMAL /HPF
EOSINOPHIL # BLD: 0 K/UL (ref 0–0.6)
EOSINOPHIL NFR BLD: 0.4 % (ref 0–5)
EPI CELLS #/AREA URNS AUTO: 0 /HPF (ref 0–5)
ERYTHROCYTE [DISTWIDTH] IN BLOOD BY AUTOMATED COUNT: 14.3 % (ref 11.5–14.5)
GLUCOSE SERPL-MCNC: 107 MG/DL (ref 74–109)
GLUCOSE UR STRIP.AUTO-MCNC: NEGATIVE MG/DL
HCT VFR BLD AUTO: 39.2 % (ref 42–52)
HGB BLD-MCNC: 13.8 G/DL (ref 14–18)
HGB UR STRIP.AUTO-MCNC: NEGATIVE MG/L
HYALINE CASTS #/AREA URNS AUTO: 0 /HPF (ref 0–8)
IMM GRANULOCYTES # BLD: 0 K/UL
KETONES UR STRIP.AUTO-MCNC: ABNORMAL MG/DL
LEUKOCYTE ESTERASE UR QL STRIP.AUTO: ABNORMAL
LYMPHOCYTES # BLD: 1.2 K/UL (ref 1.1–4.5)
LYMPHOCYTES NFR BLD: 12.6 % (ref 20–40)
MCH RBC QN AUTO: 33.1 PG (ref 27–31)
MCHC RBC AUTO-ENTMCNC: 35.2 G/DL (ref 33–37)
MCV RBC AUTO: 94 FL (ref 80–94)
MONOCYTES # BLD: 0.5 K/UL (ref 0–0.9)
MONOCYTES NFR BLD: 5.2 % (ref 0–10)
NEUTROPHILS # BLD: 7.7 K/UL (ref 1.5–7.5)
NEUTS SEG NFR BLD: 81 % (ref 50–65)
NITRITE UR QL STRIP.AUTO: NEGATIVE
PH UR STRIP.AUTO: 5.5 [PH] (ref 5–8)
PLATELET # BLD AUTO: 309 K/UL (ref 130–400)
PMV BLD AUTO: 8.5 FL (ref 9.4–12.4)
POTASSIUM SERPL-SCNC: 4.9 MMOL/L (ref 3.5–5)
PROT SERPL-MCNC: 8 G/DL (ref 6.6–8.7)
PROT UR STRIP.AUTO-MCNC: 30 MG/DL
RBC # BLD AUTO: 4.17 M/UL (ref 4.7–6.1)
RBC #/AREA URNS AUTO: 0 /HPF (ref 0–4)
SODIUM SERPL-SCNC: 133 MMOL/L (ref 136–145)
SP GR UR STRIP.AUTO: 1.02 (ref 1–1.03)
UROBILINOGEN UR STRIP.AUTO-MCNC: 0.2 E.U./DL
WBC # BLD AUTO: 9.5 K/UL (ref 4.8–10.8)
WBC #/AREA URNS AUTO: 1 /HPF (ref 0–5)

## 2023-08-15 PROCEDURE — 3080F DIAST BP >= 90 MM HG: CPT | Performed by: PSYCHIATRY & NEUROLOGY

## 2023-08-15 PROCEDURE — 4004F PT TOBACCO SCREEN RCVD TLK: CPT | Performed by: PSYCHIATRY & NEUROLOGY

## 2023-08-15 PROCEDURE — 70450 CT HEAD/BRAIN W/O DYE: CPT

## 2023-08-15 PROCEDURE — 81001 URINALYSIS AUTO W/SCOPE: CPT

## 2023-08-15 PROCEDURE — 96374 THER/PROPH/DIAG INJ IV PUSH: CPT

## 2023-08-15 PROCEDURE — 96375 TX/PRO/DX INJ NEW DRUG ADDON: CPT

## 2023-08-15 PROCEDURE — 6360000002 HC RX W HCPCS: Performed by: PHYSICIAN ASSISTANT

## 2023-08-15 PROCEDURE — 99214 OFFICE O/P EST MOD 30 MIN: CPT | Performed by: PSYCHIATRY & NEUROLOGY

## 2023-08-15 PROCEDURE — G8420 CALC BMI NORM PARAMETERS: HCPCS | Performed by: PSYCHIATRY & NEUROLOGY

## 2023-08-15 PROCEDURE — 2580000003 HC RX 258: Performed by: PHYSICIAN ASSISTANT

## 2023-08-15 PROCEDURE — 85025 COMPLETE CBC W/AUTO DIFF WBC: CPT

## 2023-08-15 PROCEDURE — G8428 CUR MEDS NOT DOCUMENT: HCPCS | Performed by: PSYCHIATRY & NEUROLOGY

## 2023-08-15 PROCEDURE — 99284 EMERGENCY DEPT VISIT MOD MDM: CPT

## 2023-08-15 PROCEDURE — 3017F COLORECTAL CA SCREEN DOC REV: CPT | Performed by: PSYCHIATRY & NEUROLOGY

## 2023-08-15 PROCEDURE — 1111F DSCHRG MED/CURRENT MED MERGE: CPT | Performed by: PSYCHIATRY & NEUROLOGY

## 2023-08-15 PROCEDURE — 36415 COLL VENOUS BLD VENIPUNCTURE: CPT

## 2023-08-15 PROCEDURE — 3077F SYST BP >= 140 MM HG: CPT | Performed by: PSYCHIATRY & NEUROLOGY

## 2023-08-15 PROCEDURE — 80053 COMPREHEN METABOLIC PANEL: CPT

## 2023-08-15 RX ORDER — 0.9 % SODIUM CHLORIDE 0.9 %
500 INTRAVENOUS SOLUTION INTRAVENOUS ONCE
Status: COMPLETED | OUTPATIENT
Start: 2023-08-15 | End: 2023-08-15

## 2023-08-15 RX ORDER — ORPHENADRINE CITRATE 30 MG/ML
60 INJECTION INTRAMUSCULAR; INTRAVENOUS ONCE
Status: COMPLETED | OUTPATIENT
Start: 2023-08-15 | End: 2023-08-15

## 2023-08-15 RX ORDER — PROCHLORPERAZINE EDISYLATE 5 MG/ML
10 INJECTION INTRAMUSCULAR; INTRAVENOUS ONCE
Status: COMPLETED | OUTPATIENT
Start: 2023-08-15 | End: 2023-08-15

## 2023-08-15 RX ORDER — MORPHINE SULFATE 4 MG/ML
2 INJECTION, SOLUTION INTRAMUSCULAR; INTRAVENOUS ONCE
Status: COMPLETED | OUTPATIENT
Start: 2023-08-15 | End: 2023-08-15

## 2023-08-15 RX ORDER — KETOROLAC TROMETHAMINE 30 MG/ML
30 INJECTION, SOLUTION INTRAMUSCULAR; INTRAVENOUS ONCE
Status: COMPLETED | OUTPATIENT
Start: 2023-08-15 | End: 2023-08-15

## 2023-08-15 RX ADMIN — ORPHENADRINE CITRATE 60 MG: 60 INJECTION INTRAMUSCULAR; INTRAVENOUS at 12:53

## 2023-08-15 RX ADMIN — MORPHINE SULFATE 2 MG: 4 INJECTION, SOLUTION INTRAMUSCULAR; INTRAVENOUS at 14:21

## 2023-08-15 RX ADMIN — PROCHLORPERAZINE EDISYLATE 10 MG: 5 INJECTION INTRAMUSCULAR; INTRAVENOUS at 12:55

## 2023-08-15 RX ADMIN — KETOROLAC TROMETHAMINE 30 MG: 30 INJECTION, SOLUTION INTRAMUSCULAR; INTRAVENOUS at 14:20

## 2023-08-15 RX ADMIN — SODIUM CHLORIDE 500 ML: 9 INJECTION, SOLUTION INTRAVENOUS at 12:52

## 2023-08-15 ASSESSMENT — ENCOUNTER SYMPTOMS
COUGH: 0
EYE ITCHING: 0
EYE DISCHARGE: 0
BACK PAIN: 0
APNEA: 0
SHORTNESS OF BREATH: 0
PHOTOPHOBIA: 0
COLOR CHANGE: 0

## 2023-08-15 ASSESSMENT — PATIENT HEALTH QUESTIONNAIRE - PHQ9
SUM OF ALL RESPONSES TO PHQ QUESTIONS 1-9: 19
SUM OF ALL RESPONSES TO PHQ QUESTIONS 1-9: 18
SUM OF ALL RESPONSES TO PHQ9 QUESTIONS 1 & 2: 4
3. TROUBLE FALLING OR STAYING ASLEEP: 3
1. LITTLE INTEREST OR PLEASURE IN DOING THINGS: 2
9. THOUGHTS THAT YOU WOULD BE BETTER OFF DEAD, OR OF HURTING YOURSELF: 1
SUM OF ALL RESPONSES TO PHQ QUESTIONS 1-9: 19
2. FEELING DOWN, DEPRESSED OR HOPELESS: 2
SUM OF ALL RESPONSES TO PHQ QUESTIONS 1-9: 19
8. MOVING OR SPEAKING SO SLOWLY THAT OTHER PEOPLE COULD HAVE NOTICED. OR THE OPPOSITE, BEING SO FIGETY OR RESTLESS THAT YOU HAVE BEEN MOVING AROUND A LOT MORE THAN USUAL: 1
5. POOR APPETITE OR OVEREATING: 2
6. FEELING BAD ABOUT YOURSELF - OR THAT YOU ARE A FAILURE OR HAVE LET YOURSELF OR YOUR FAMILY DOWN: 3
4. FEELING TIRED OR HAVING LITTLE ENERGY: 3
7. TROUBLE CONCENTRATING ON THINGS, SUCH AS READING THE NEWSPAPER OR WATCHING TELEVISION: 2
10. IF YOU CHECKED OFF ANY PROBLEMS, HOW DIFFICULT HAVE THESE PROBLEMS MADE IT FOR YOU TO DO YOUR WORK, TAKE CARE OF THINGS AT HOME, OR GET ALONG WITH OTHER PEOPLE: 2

## 2023-08-15 ASSESSMENT — COLUMBIA-SUICIDE SEVERITY RATING SCALE - C-SSRS
6. HAVE YOU EVER DONE ANYTHING, STARTED TO DO ANYTHING, OR PREPARED TO DO ANYTHING TO END YOUR LIFE?: NO
2. HAVE YOU ACTUALLY HAD ANY THOUGHTS OF KILLING YOURSELF?: NO
1. WITHIN THE PAST MONTH, HAVE YOU WISHED YOU WERE DEAD OR WISHED YOU COULD GO TO SLEEP AND NOT WAKE UP?: NO

## 2023-08-15 ASSESSMENT — PAIN - FUNCTIONAL ASSESSMENT: PAIN_FUNCTIONAL_ASSESSMENT: NONE - DENIES PAIN

## 2023-08-15 NOTE — TELEPHONE ENCOUNTER
Pt left  stating that he was being discharged from ER. Marquis Hoffman gave me some stuff, did a CT of my brain-it was [facility] I guess and a morphine shot\". Pt asked if Dr Nevaeh Geiger sent in any RXs as he was unsure. Dr Nevaeh Geiger given the above info and she reported no new RXs sent in and she is only prescribing Lamotrigine for the pt at this time. Called [facility] as requested by Dr Nevaeh Geiger to see when his Gabapentin would be due for a refill. Gerardo Gibson at pharmacy reported that he got #180 of the Gabapentin 400 mg on 6/19/23 and if he was to take it BID it could be refilled on 9/16/23. Attempted to contact pt x 2-left  with the above info and pt encouraged to notify the office around 9/10/23 that he will need a Gabapentin RX sent in. Pt encouraged to call back so can see if he has any questions about the above info left on .

## 2023-08-16 RX ORDER — CLONAZEPAM 0.5 MG/1
TABLET ORAL
Qty: 60 TABLET | Refills: 1 | OUTPATIENT
Start: 2023-08-16

## 2023-08-17 ENCOUNTER — TELEPHONE (OUTPATIENT)
Dept: PSYCHIATRY | Age: 53
End: 2023-08-17

## 2023-08-17 NOTE — TELEPHONE ENCOUNTER
Pt informed that writer spoke with Dr Wesly Espinosa and she reported that they discussed in his last appt that she was no longer going to prescribe the Klonopin. \"I don't remember that, but I believe her\". Dr Wesly Espinosa reported that no taper of the Klonopin was needed.

## 2023-08-17 NOTE — TELEPHONE ENCOUNTER
Contacted pt and he stated that he got the VM left regarding Gabapentin cannot be refilled until 9/16/23. \"Well, that is my fault. I didn't read the bottle the last time\". Pt encouraged to call back around 9/11/23 and request the Gabapentin refill. Pt informed that he is to continue the Lamotrigine. He stated that he needed a new RX for Klonopin. He was informed that per Dr Parminder Lux office note it looks like that she is no longer going to prescribe it. \"I didn't know that. Well I have a few left anyway\". Pt informed will speak with Dr Minh Vogel and call him back.

## 2023-09-29 ENCOUNTER — TELEPHONE (OUTPATIENT)
Dept: INTERNAL MEDICINE | Age: 53
End: 2023-09-29

## 2023-09-29 NOTE — TELEPHONE ENCOUNTER
Patient called stating he has bilateral lower extremity swelling. States that it is something out of a horror movie. Advised him that we would have to see him in office for this type of issue. Patient recently no showed appointment as well. Patient offered 3 different times today and stated he would call back. Requested something just be called in for him. Advised him that without evaluating him we could not call anything in for this type of problem as multiple issues can contribute to this. Patient voiced understanding and stated he would call back.

## 2023-10-03 ENCOUNTER — TELEPHONE (OUTPATIENT)
Dept: PSYCHIATRY | Age: 53
End: 2023-10-03

## 2023-10-03 DIAGNOSIS — I10 PRIMARY HYPERTENSION: Primary | ICD-10-CM

## 2023-10-03 NOTE — TELEPHONE ENCOUNTER
Pt was asking for medication for his panic attacks. Pt stated that they don't happen to often but would like 5 pills a month just in case. Per Dr. Mir Has let pt know that she will no prescribe him benzo but she can give him sonething else. The provider will discuss this with pt at his next appt. Pt was ok with that.       Electronically signed by Majo Hester on 10/3/2023 at 3:03 PM

## 2023-10-03 NOTE — TELEPHONE ENCOUNTER
Pt came in to the office wanting to talk to Dr. Collette Ferdinand for 5 mins. MA went out and talked to him because Dr. Collette Ferdinand had back to back pts and she couldn't stop to talk to him. Pt stated that Dr. Collette Ferdinand stopped his Clonazepam.  On Reji 10/01/23, pt stated that he had a real bad panic attack, like he wanted to go and lock himself in a closet. Pt stated that he called his friend, well his ex-girlfriend, and she came over and gave him two valium. Pt stated that he felt better after that. Provider was notified and Rupal Smith MA took over on it for medications.     Electronically signed by Leny Haley MA on 10/3/2023 at 2:44 PM

## 2023-10-05 RX ORDER — LOSARTAN POTASSIUM 100 MG/1
TABLET ORAL
Qty: 30 TABLET | Refills: 1 | Status: SHIPPED | OUTPATIENT
Start: 2023-10-05

## 2023-10-09 ENCOUNTER — HOSPITAL ENCOUNTER (INPATIENT)
Age: 53
LOS: 2 days | Discharge: HOME | DRG: 897 | End: 2023-10-11
Attending: EMERGENCY MEDICINE | Admitting: STUDENT IN AN ORGANIZED HEALTH CARE EDUCATION/TRAINING PROGRAM
Payer: MEDICAID

## 2023-10-09 DIAGNOSIS — R45.851 DEPRESSION WITH SUICIDAL IDEATION: Primary | ICD-10-CM

## 2023-10-09 DIAGNOSIS — F10.929 ACUTE ALCOHOLIC INTOXICATION WITH COMPLICATION (HCC): ICD-10-CM

## 2023-10-09 DIAGNOSIS — F10.930 ALCOHOL WITHDRAWAL SYNDROME WITHOUT COMPLICATION (HCC): ICD-10-CM

## 2023-10-09 DIAGNOSIS — F32.A DEPRESSION WITH SUICIDAL IDEATION: Primary | ICD-10-CM

## 2023-10-09 PROBLEM — F10.939 ALCOHOL WITHDRAWAL (HCC): Status: ACTIVE | Noted: 2023-10-09

## 2023-10-09 PROBLEM — F48.9 MENTAL HEALTH PROBLEM: Status: ACTIVE | Noted: 2023-10-09

## 2023-10-09 LAB
ALBUMIN SERPL-MCNC: 4.4 G/DL (ref 3.5–5.2)
ALP SERPL-CCNC: 104 U/L (ref 40–130)
ALT SERPL-CCNC: 31 U/L (ref 5–41)
AMPHET UR QL SCN: NEGATIVE
ANION GAP SERPL CALCULATED.3IONS-SCNC: 14 MMOL/L (ref 7–19)
AST SERPL-CCNC: 24 U/L (ref 5–40)
BACTERIA URNS QL MICRO: NEGATIVE /HPF
BARBITURATES UR QL SCN: NEGATIVE
BASOPHILS # BLD: 0 K/UL (ref 0–0.2)
BASOPHILS NFR BLD: 0.3 % (ref 0–1)
BENZODIAZ UR QL SCN: POSITIVE
BILIRUB SERPL-MCNC: <0.2 MG/DL (ref 0.2–1.2)
BILIRUB UR QL STRIP: NEGATIVE
BUN SERPL-MCNC: 12 MG/DL (ref 6–20)
BUPRENORPHINE URINE: NEGATIVE
CALCIUM SERPL-MCNC: 9.3 MG/DL (ref 8.6–10)
CANNABINOIDS UR QL SCN: NEGATIVE
CHLORIDE SERPL-SCNC: 91 MMOL/L (ref 98–111)
CLARITY UR: CLEAR
CO2 SERPL-SCNC: 27 MMOL/L (ref 22–29)
COCAINE UR QL SCN: NEGATIVE
COLOR UR: YELLOW
CREAT SERPL-MCNC: 0.9 MG/DL (ref 0.5–1.2)
CRYSTALS URNS MICRO: ABNORMAL /HPF
DRUG SCREEN COMMENT UR-IMP: ABNORMAL
EOSINOPHIL # BLD: 0 K/UL (ref 0–0.6)
EOSINOPHIL NFR BLD: 0.1 % (ref 0–5)
EPI CELLS #/AREA URNS AUTO: 1 /HPF (ref 0–5)
ERYTHROCYTE [DISTWIDTH] IN BLOOD BY AUTOMATED COUNT: 12.1 % (ref 11.5–14.5)
ETHANOLAMINE SERPL-MCNC: 287 MG/DL (ref 0–0.08)
FENTANYL SCREEN, URINE: NEGATIVE
GLUCOSE SERPL-MCNC: 95 MG/DL (ref 74–109)
GLUCOSE UR STRIP.AUTO-MCNC: NEGATIVE MG/DL
HCT VFR BLD AUTO: 39.8 % (ref 42–52)
HGB BLD-MCNC: 14.1 G/DL (ref 14–18)
HGB UR STRIP.AUTO-MCNC: NEGATIVE MG/L
HYALINE CASTS #/AREA URNS AUTO: 2 /HPF (ref 0–8)
IMM GRANULOCYTES # BLD: 0 K/UL
KETONES UR STRIP.AUTO-MCNC: NEGATIVE MG/DL
LEUKOCYTE ESTERASE UR QL STRIP.AUTO: ABNORMAL
LYMPHOCYTES # BLD: 1.3 K/UL (ref 1.1–4.5)
LYMPHOCYTES NFR BLD: 11.8 % (ref 20–40)
MCH RBC QN AUTO: 32.6 PG (ref 27–31)
MCHC RBC AUTO-ENTMCNC: 35.4 G/DL (ref 33–37)
MCV RBC AUTO: 91.9 FL (ref 80–94)
METHADONE UR QL SCN: NEGATIVE
METHAMPHETAMINE, URINE: NEGATIVE
MONOCYTES # BLD: 0.6 K/UL (ref 0–0.9)
MONOCYTES NFR BLD: 5.3 % (ref 0–10)
NEUTROPHILS # BLD: 9.2 K/UL (ref 1.5–7.5)
NEUTS SEG NFR BLD: 82.1 % (ref 50–65)
NITRITE UR QL STRIP.AUTO: NEGATIVE
OPIATES UR QL SCN: NEGATIVE
OXYCODONE UR QL SCN: NEGATIVE
PCP UR QL SCN: NEGATIVE
PH UR STRIP.AUTO: 6 [PH] (ref 5–8)
PLATELET # BLD AUTO: 476 K/UL (ref 130–400)
PMV BLD AUTO: 8.3 FL (ref 9.4–12.4)
POTASSIUM SERPL-SCNC: 4 MMOL/L (ref 3.5–5)
PROT SERPL-MCNC: 7.5 G/DL (ref 6.6–8.7)
PROT UR STRIP.AUTO-MCNC: 30 MG/DL
RBC # BLD AUTO: 4.33 M/UL (ref 4.7–6.1)
RBC #/AREA URNS AUTO: 1 /HPF (ref 0–4)
SARS-COV-2 RDRP RESP QL NAA+PROBE: NOT DETECTED
SODIUM SERPL-SCNC: 132 MMOL/L (ref 136–145)
SP GR UR STRIP.AUTO: 1.02 (ref 1–1.03)
TRICYCLIC, URINE: NEGATIVE
UROBILINOGEN UR STRIP.AUTO-MCNC: 0.2 E.U./DL
WBC # BLD AUTO: 11.2 K/UL (ref 4.8–10.8)
WBC #/AREA URNS AUTO: 2 /HPF (ref 0–5)

## 2023-10-09 PROCEDURE — 1210000000 HC MED SURG R&B

## 2023-10-09 PROCEDURE — 96375 TX/PRO/DX INJ NEW DRUG ADDON: CPT

## 2023-10-09 PROCEDURE — 6360000002 HC RX W HCPCS: Performed by: HOSPITALIST

## 2023-10-09 PROCEDURE — 2580000003 HC RX 258: Performed by: EMERGENCY MEDICINE

## 2023-10-09 PROCEDURE — 80307 DRUG TEST PRSMV CHEM ANLYZR: CPT

## 2023-10-09 PROCEDURE — 2580000003 HC RX 258

## 2023-10-09 PROCEDURE — 6360000002 HC RX W HCPCS: Performed by: EMERGENCY MEDICINE

## 2023-10-09 PROCEDURE — 80053 COMPREHEN METABOLIC PANEL: CPT

## 2023-10-09 PROCEDURE — 96374 THER/PROPH/DIAG INJ IV PUSH: CPT

## 2023-10-09 PROCEDURE — 82077 ASSAY SPEC XCP UR&BREATH IA: CPT

## 2023-10-09 PROCEDURE — 36415 COLL VENOUS BLD VENIPUNCTURE: CPT

## 2023-10-09 PROCEDURE — 6370000000 HC RX 637 (ALT 250 FOR IP): Performed by: EMERGENCY MEDICINE

## 2023-10-09 PROCEDURE — 87635 SARS-COV-2 COVID-19 AMP PRB: CPT

## 2023-10-09 PROCEDURE — 6360000002 HC RX W HCPCS

## 2023-10-09 PROCEDURE — 81001 URINALYSIS AUTO W/SCOPE: CPT

## 2023-10-09 PROCEDURE — 2580000003 HC RX 258: Performed by: HOSPITALIST

## 2023-10-09 PROCEDURE — 6370000000 HC RX 637 (ALT 250 FOR IP)

## 2023-10-09 PROCEDURE — C9113 INJ PANTOPRAZOLE SODIUM, VIA: HCPCS | Performed by: HOSPITALIST

## 2023-10-09 PROCEDURE — 80175 DRUG SCREEN QUAN LAMOTRIGINE: CPT

## 2023-10-09 PROCEDURE — 85025 COMPLETE CBC W/AUTO DIFF WBC: CPT

## 2023-10-09 PROCEDURE — 99285 EMERGENCY DEPT VISIT HI MDM: CPT

## 2023-10-09 RX ORDER — OXYCODONE AND ACETAMINOPHEN 7.5; 325 MG/1; MG/1
1 TABLET ORAL EVERY 8 HOURS PRN
COMMUNITY

## 2023-10-09 RX ORDER — POLYETHYLENE GLYCOL 3350 17 G/17G
17 POWDER, FOR SOLUTION ORAL DAILY PRN
Status: DISCONTINUED | OUTPATIENT
Start: 2023-10-09 | End: 2023-10-11 | Stop reason: HOSPADM

## 2023-10-09 RX ORDER — SODIUM CHLORIDE, SODIUM LACTATE, POTASSIUM CHLORIDE, CALCIUM CHLORIDE 600; 310; 30; 20 MG/100ML; MG/100ML; MG/100ML; MG/100ML
INJECTION, SOLUTION INTRAVENOUS CONTINUOUS
Status: DISCONTINUED | OUTPATIENT
Start: 2023-10-09 | End: 2023-10-11

## 2023-10-09 RX ORDER — PROMETHAZINE HYDROCHLORIDE 25 MG/ML
6.25 INJECTION, SOLUTION INTRAMUSCULAR; INTRAVENOUS EVERY 6 HOURS PRN
Status: DISCONTINUED | OUTPATIENT
Start: 2023-10-09 | End: 2023-10-11 | Stop reason: HOSPADM

## 2023-10-09 RX ORDER — GAUZE BANDAGE 2" X 2"
100 BANDAGE TOPICAL DAILY
Status: DISCONTINUED | OUTPATIENT
Start: 2023-10-09 | End: 2023-10-11 | Stop reason: HOSPADM

## 2023-10-09 RX ORDER — FOLIC ACID 1 MG/1
1 TABLET ORAL ONCE
Status: COMPLETED | OUTPATIENT
Start: 2023-10-09 | End: 2023-10-09

## 2023-10-09 RX ORDER — ACETAMINOPHEN 650 MG/1
650 SUPPOSITORY RECTAL EVERY 6 HOURS PRN
Status: DISCONTINUED | OUTPATIENT
Start: 2023-10-09 | End: 2023-10-11 | Stop reason: HOSPADM

## 2023-10-09 RX ORDER — POTASSIUM CHLORIDE 7.45 MG/ML
10 INJECTION INTRAVENOUS PRN
Status: DISCONTINUED | OUTPATIENT
Start: 2023-10-09 | End: 2023-10-11 | Stop reason: HOSPADM

## 2023-10-09 RX ORDER — SODIUM CHLORIDE 0.9 % (FLUSH) 0.9 %
5-40 SYRINGE (ML) INJECTION EVERY 12 HOURS SCHEDULED
Status: DISCONTINUED | OUTPATIENT
Start: 2023-10-09 | End: 2023-10-11 | Stop reason: HOSPADM

## 2023-10-09 RX ORDER — ATORVASTATIN CALCIUM 40 MG/1
80 TABLET, FILM COATED ORAL NIGHTLY
Status: DISCONTINUED | OUTPATIENT
Start: 2023-10-09 | End: 2023-10-11 | Stop reason: HOSPADM

## 2023-10-09 RX ORDER — ACETAMINOPHEN 325 MG/1
650 TABLET ORAL EVERY 6 HOURS PRN
Status: DISCONTINUED | OUTPATIENT
Start: 2023-10-09 | End: 2023-10-11 | Stop reason: HOSPADM

## 2023-10-09 RX ORDER — SODIUM CHLORIDE 0.9 % (FLUSH) 0.9 %
5-40 SYRINGE (ML) INJECTION PRN
Status: DISCONTINUED | OUTPATIENT
Start: 2023-10-09 | End: 2023-10-11 | Stop reason: HOSPADM

## 2023-10-09 RX ORDER — LORAZEPAM 2 MG/1
2 TABLET ORAL
Status: DISCONTINUED | OUTPATIENT
Start: 2023-10-09 | End: 2023-10-10

## 2023-10-09 RX ORDER — SODIUM CHLORIDE 9 MG/ML
INJECTION, SOLUTION INTRAVENOUS PRN
Status: DISCONTINUED | OUTPATIENT
Start: 2023-10-09 | End: 2023-10-11 | Stop reason: HOSPADM

## 2023-10-09 RX ORDER — LORAZEPAM 2 MG/ML
4 INJECTION INTRAMUSCULAR
Status: DISCONTINUED | OUTPATIENT
Start: 2023-10-09 | End: 2023-10-09

## 2023-10-09 RX ORDER — DIAZEPAM 5 MG/1
10 TABLET ORAL ONCE
Status: COMPLETED | OUTPATIENT
Start: 2023-10-09 | End: 2023-10-09

## 2023-10-09 RX ORDER — LORAZEPAM 2 MG/ML
2 INJECTION INTRAMUSCULAR
Status: DISCONTINUED | OUTPATIENT
Start: 2023-10-09 | End: 2023-10-10

## 2023-10-09 RX ORDER — BUPROPION HYDROCHLORIDE 75 MG/1
150 TABLET ORAL DAILY
Status: DISCONTINUED | OUTPATIENT
Start: 2023-10-09 | End: 2023-10-11 | Stop reason: HOSPADM

## 2023-10-09 RX ORDER — CALCIUM CARBONATE 500 MG/1
2 TABLET, CHEWABLE ORAL PRN
COMMUNITY

## 2023-10-09 RX ORDER — LORAZEPAM 2 MG/ML
3 INJECTION INTRAMUSCULAR
Status: DISCONTINUED | OUTPATIENT
Start: 2023-10-09 | End: 2023-10-10

## 2023-10-09 RX ORDER — LORAZEPAM 2 MG/ML
1 INJECTION INTRAMUSCULAR
Status: DISCONTINUED | OUTPATIENT
Start: 2023-10-09 | End: 2023-10-10

## 2023-10-09 RX ORDER — THIAMINE HYDROCHLORIDE 100 MG/ML
100 INJECTION, SOLUTION INTRAMUSCULAR; INTRAVENOUS ONCE
Status: COMPLETED | OUTPATIENT
Start: 2023-10-09 | End: 2023-10-09

## 2023-10-09 RX ORDER — CARVEDILOL 12.5 MG/1
12.5 TABLET ORAL 2 TIMES DAILY WITH MEALS
Status: DISCONTINUED | OUTPATIENT
Start: 2023-10-09 | End: 2023-10-11 | Stop reason: HOSPADM

## 2023-10-09 RX ORDER — ENOXAPARIN SODIUM 100 MG/ML
40 INJECTION SUBCUTANEOUS DAILY
Status: DISCONTINUED | OUTPATIENT
Start: 2023-10-09 | End: 2023-10-11 | Stop reason: HOSPADM

## 2023-10-09 RX ORDER — SODIUM CHLORIDE, SODIUM LACTATE, POTASSIUM CHLORIDE, AND CALCIUM CHLORIDE .6; .31; .03; .02 G/100ML; G/100ML; G/100ML; G/100ML
1000 INJECTION, SOLUTION INTRAVENOUS ONCE
Status: COMPLETED | OUTPATIENT
Start: 2023-10-09 | End: 2023-10-09

## 2023-10-09 RX ORDER — LOSARTAN POTASSIUM 50 MG/1
100 TABLET ORAL EVERY MORNING
Status: DISCONTINUED | OUTPATIENT
Start: 2023-10-10 | End: 2023-10-11 | Stop reason: HOSPADM

## 2023-10-09 RX ORDER — POTASSIUM CHLORIDE 20 MEQ/1
40 TABLET, EXTENDED RELEASE ORAL PRN
Status: DISCONTINUED | OUTPATIENT
Start: 2023-10-09 | End: 2023-10-11 | Stop reason: HOSPADM

## 2023-10-09 RX ORDER — LORAZEPAM 2 MG/ML
1 INJECTION INTRAMUSCULAR ONCE
Status: COMPLETED | OUTPATIENT
Start: 2023-10-09 | End: 2023-10-09

## 2023-10-09 RX ORDER — LORAZEPAM 2 MG/1
4 TABLET ORAL
Status: DISCONTINUED | OUTPATIENT
Start: 2023-10-09 | End: 2023-10-09

## 2023-10-09 RX ORDER — MULTIVITAMIN WITH IRON
1 TABLET ORAL DAILY
Status: DISCONTINUED | OUTPATIENT
Start: 2023-10-09 | End: 2023-10-11 | Stop reason: HOSPADM

## 2023-10-09 RX ORDER — LORAZEPAM 1 MG/1
1 TABLET ORAL
Status: DISCONTINUED | OUTPATIENT
Start: 2023-10-09 | End: 2023-10-10

## 2023-10-09 RX ORDER — MULTIVITAMIN WITH IRON
1 TABLET ORAL ONCE
Status: COMPLETED | OUTPATIENT
Start: 2023-10-09 | End: 2023-10-09

## 2023-10-09 RX ORDER — AMLODIPINE BESYLATE 5 MG/1
5 TABLET ORAL NIGHTLY
Status: DISCONTINUED | OUTPATIENT
Start: 2023-10-09 | End: 2023-10-11 | Stop reason: HOSPADM

## 2023-10-09 RX ORDER — MAGNESIUM SULFATE IN WATER 40 MG/ML
2000 INJECTION, SOLUTION INTRAVENOUS PRN
Status: DISCONTINUED | OUTPATIENT
Start: 2023-10-09 | End: 2023-10-11 | Stop reason: HOSPADM

## 2023-10-09 RX ADMIN — FOLIC ACID 1 MG: 1 TABLET ORAL at 15:27

## 2023-10-09 RX ADMIN — SODIUM CHLORIDE, PRESERVATIVE FREE 10 ML: 5 INJECTION INTRAVENOUS at 21:44

## 2023-10-09 RX ADMIN — LORAZEPAM 1 MG: 2 INJECTION INTRAMUSCULAR; INTRAVENOUS at 15:26

## 2023-10-09 RX ADMIN — THERA TABS 1 TABLET: TAB at 15:27

## 2023-10-09 RX ADMIN — SODIUM CHLORIDE, POTASSIUM CHLORIDE, SODIUM LACTATE AND CALCIUM CHLORIDE 1000 ML: 600; 310; 30; 20 INJECTION, SOLUTION INTRAVENOUS at 15:22

## 2023-10-09 RX ADMIN — BUPROPION HYDROCHLORIDE 150 MG: 75 TABLET, FILM COATED ORAL at 21:42

## 2023-10-09 RX ADMIN — DIAZEPAM 10 MG: 5 TABLET ORAL at 15:27

## 2023-10-09 RX ADMIN — AMLODIPINE BESYLATE 5 MG: 5 TABLET ORAL at 21:42

## 2023-10-09 RX ADMIN — ENOXAPARIN SODIUM 40 MG: 100 INJECTION SUBCUTANEOUS at 18:08

## 2023-10-09 RX ADMIN — PROMETHAZINE HYDROCHLORIDE 6.25 MG: 25 INJECTION INTRAMUSCULAR; INTRAVENOUS at 22:14

## 2023-10-09 RX ADMIN — LORAZEPAM 3 MG: 2 TABLET ORAL at 19:37

## 2023-10-09 RX ADMIN — ACETAMINOPHEN 650 MG: 325 TABLET ORAL at 21:42

## 2023-10-09 RX ADMIN — ATORVASTATIN CALCIUM 80 MG: 40 TABLET, FILM COATED ORAL at 21:42

## 2023-10-09 RX ADMIN — LORAZEPAM 2 MG: 2 TABLET ORAL at 21:42

## 2023-10-09 RX ADMIN — SODIUM CHLORIDE, SODIUM LACTATE, POTASSIUM CHLORIDE, AND CALCIUM CHLORIDE: 600; 310; 30; 20 INJECTION, SOLUTION INTRAVENOUS at 17:49

## 2023-10-09 RX ADMIN — THIAMINE HYDROCHLORIDE 100 MG: 100 INJECTION, SOLUTION INTRAMUSCULAR; INTRAVENOUS at 15:26

## 2023-10-09 RX ADMIN — SODIUM CHLORIDE, PRESERVATIVE FREE 40 MG: 5 INJECTION INTRAVENOUS at 21:44

## 2023-10-09 SDOH — ECONOMIC STABILITY: FOOD INSECURITY: WITHIN THE PAST 12 MONTHS, YOU WORRIED THAT YOUR FOOD WOULD RUN OUT BEFORE YOU GOT MONEY TO BUY MORE.: SOMETIMES TRUE

## 2023-10-09 SDOH — ECONOMIC STABILITY: INCOME INSECURITY: HOW HARD IS IT FOR YOU TO PAY FOR THE VERY BASICS LIKE FOOD, HOUSING, MEDICAL CARE, AND HEATING?: SOMEWHAT HARD

## 2023-10-09 SDOH — ECONOMIC STABILITY: INCOME INSECURITY: IN THE PAST 12 MONTHS, HAS THE ELECTRIC, GAS, OIL, OR WATER COMPANY THREATENED TO SHUT OFF SERVICE IN YOUR HOME?: NO

## 2023-10-09 ASSESSMENT — ENCOUNTER SYMPTOMS
DIARRHEA: 0
RESPIRATORY NEGATIVE: 1
ABDOMINAL DISTENTION: 0
VOMITING: 1
GASTROINTESTINAL NEGATIVE: 1
SHORTNESS OF BREATH: 0
CONSTIPATION: 0
ABDOMINAL PAIN: 0
EYES NEGATIVE: 1
NAUSEA: 0

## 2023-10-09 ASSESSMENT — PATIENT HEALTH QUESTIONNAIRE - PHQ9
SUM OF ALL RESPONSES TO PHQ QUESTIONS 1-9: 4
1. LITTLE INTEREST OR PLEASURE IN DOING THINGS: 2
2. FEELING DOWN, DEPRESSED OR HOPELESS: 2
SUM OF ALL RESPONSES TO PHQ QUESTIONS 1-9: 4
SUM OF ALL RESPONSES TO PHQ9 QUESTIONS 1 & 2: 4

## 2023-10-09 NOTE — H&P
\"LABA1C\" in the last 72 hours. ABG:No results for input(s): \"PHART\", \"JRV9MMH\", \"PO2ART\", \"FWK0QQP\", \"BEART\", \"HGBAE\", \"J0RWGJPX\", \"CARBOXHGBART\" in the last 72 hours. No results found. Assessment/Plan:  Principal Problem:    Mental health problem  Active Problems:    Generalized anxiety disorder    Recurrent major depression ([address])    Hypertension    Suicidal ideation    Alcohol withdrawal ([address])  Resolved Problems:    * No resolved hospital problems.  *     Mental health problem     Suicidal ideation'      Alcohol withdrawal ([address])   -Admit   -Sitter   -Suicide precautions   -Psych Consult   -CIWA Protocol   -Multivit   -Thiamine   -IVF   -Regular diet   - consult   -Telemetry   -Daily CMP, CBC    Generalized anxiety disorder    Recurrent major depression ([address])    Hypertension   - Continue current home medications      Signed:  KWASI Fink - CNP, 10/9/2023 4:07 PM
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
## 2023-10-10 LAB
ALBUMIN SERPL-MCNC: 3.7 G/DL (ref 3.5–5.2)
ALP SERPL-CCNC: 88 U/L (ref 40–130)
ALT SERPL-CCNC: 27 U/L (ref 5–41)
ANION GAP SERPL CALCULATED.3IONS-SCNC: 13 MMOL/L (ref 7–19)
AST SERPL-CCNC: 24 U/L (ref 5–40)
BASOPHILS # BLD: 0 K/UL (ref 0–0.2)
BASOPHILS NFR BLD: 0.4 % (ref 0–1)
BILIRUB SERPL-MCNC: 0.3 MG/DL (ref 0.2–1.2)
BUN SERPL-MCNC: 14 MG/DL (ref 6–20)
CALCIUM SERPL-MCNC: 8.9 MG/DL (ref 8.6–10)
CHLORIDE SERPL-SCNC: 98 MMOL/L (ref 98–111)
CO2 SERPL-SCNC: 24 MMOL/L (ref 22–29)
CREAT SERPL-MCNC: 1 MG/DL (ref 0.5–1.2)
EOSINOPHIL # BLD: 0.1 K/UL (ref 0–0.6)
EOSINOPHIL NFR BLD: 1 % (ref 0–5)
ERYTHROCYTE [DISTWIDTH] IN BLOOD BY AUTOMATED COUNT: 12.1 % (ref 11.5–14.5)
ETHANOLAMINE SERPL-MCNC: <10 MG/DL (ref 0–0.08)
GLUCOSE SERPL-MCNC: 87 MG/DL (ref 74–109)
HCT VFR BLD AUTO: 34.8 % (ref 42–52)
HGB BLD-MCNC: 11.9 G/DL (ref 14–18)
IMM GRANULOCYTES # BLD: 0 K/UL
LYMPHOCYTES # BLD: 1.6 K/UL (ref 1.1–4.5)
LYMPHOCYTES NFR BLD: 23.9 % (ref 20–40)
MCH RBC QN AUTO: 32.4 PG (ref 27–31)
MCHC RBC AUTO-ENTMCNC: 34.2 G/DL (ref 33–37)
MCV RBC AUTO: 94.8 FL (ref 80–94)
MONOCYTES # BLD: 0.6 K/UL (ref 0–0.9)
MONOCYTES NFR BLD: 8.9 % (ref 0–10)
NEUTROPHILS # BLD: 4.5 K/UL (ref 1.5–7.5)
NEUTS SEG NFR BLD: 65.5 % (ref 50–65)
PLATELET # BLD AUTO: 347 K/UL (ref 130–400)
PMV BLD AUTO: 8.5 FL (ref 9.4–12.4)
POTASSIUM SERPL-SCNC: 4.1 MMOL/L (ref 3.5–5)
PROT SERPL-MCNC: 6.1 G/DL (ref 6.6–8.7)
RBC # BLD AUTO: 3.67 M/UL (ref 4.7–6.1)
REASON FOR REJECTION: NORMAL
REJECTED TEST: NORMAL
SODIUM SERPL-SCNC: 135 MMOL/L (ref 136–145)
TSH SERPL DL<=0.005 MIU/L-ACNC: 1.83 UIU/ML (ref 0.27–4.2)
VIT B12 SERPL-MCNC: 464 PG/ML (ref 211–946)
WBC # BLD AUTO: 6.9 K/UL (ref 4.8–10.8)

## 2023-10-10 PROCEDURE — 2580000003 HC RX 258

## 2023-10-10 PROCEDURE — C9113 INJ PANTOPRAZOLE SODIUM, VIA: HCPCS | Performed by: HOSPITALIST

## 2023-10-10 PROCEDURE — 6360000002 HC RX W HCPCS: Performed by: HOSPITALIST

## 2023-10-10 PROCEDURE — 94760 N-INVAS EAR/PLS OXIMETRY 1: CPT

## 2023-10-10 PROCEDURE — 80053 COMPREHEN METABOLIC PANEL: CPT

## 2023-10-10 PROCEDURE — 36415 COLL VENOUS BLD VENIPUNCTURE: CPT

## 2023-10-10 PROCEDURE — 6370000000 HC RX 637 (ALT 250 FOR IP)

## 2023-10-10 PROCEDURE — 6360000002 HC RX W HCPCS

## 2023-10-10 PROCEDURE — 1210000000 HC MED SURG R&B

## 2023-10-10 PROCEDURE — 82607 VITAMIN B-12: CPT

## 2023-10-10 PROCEDURE — 84443 ASSAY THYROID STIM HORMONE: CPT

## 2023-10-10 PROCEDURE — 82077 ASSAY SPEC XCP UR&BREATH IA: CPT

## 2023-10-10 PROCEDURE — 2580000003 HC RX 258: Performed by: HOSPITALIST

## 2023-10-10 PROCEDURE — 85025 COMPLETE CBC W/AUTO DIFF WBC: CPT

## 2023-10-10 RX ORDER — NICOTINE 21 MG/24HR
1 PATCH, TRANSDERMAL 24 HOURS TRANSDERMAL DAILY
Status: DISCONTINUED | OUTPATIENT
Start: 2023-10-10 | End: 2023-10-11 | Stop reason: HOSPADM

## 2023-10-10 RX ORDER — OXYCODONE AND ACETAMINOPHEN 7.5; 325 MG/1; MG/1
1 TABLET ORAL EVERY 8 HOURS PRN
Status: DISCONTINUED | OUTPATIENT
Start: 2023-10-10 | End: 2023-10-11 | Stop reason: HOSPADM

## 2023-10-10 RX ORDER — CALCIUM CARBONATE 500 MG/1
2 TABLET, CHEWABLE ORAL PRN
Status: DISCONTINUED | OUTPATIENT
Start: 2023-10-10 | End: 2023-10-11 | Stop reason: HOSPADM

## 2023-10-10 RX ORDER — GABAPENTIN 400 MG/1
400 CAPSULE ORAL 2 TIMES DAILY
COMMUNITY
Start: 2023-09-23

## 2023-10-10 RX ORDER — CHLORDIAZEPOXIDE HYDROCHLORIDE 25 MG/1
25 CAPSULE, GELATIN COATED ORAL 3 TIMES DAILY
Status: DISCONTINUED | OUTPATIENT
Start: 2023-10-10 | End: 2023-10-11 | Stop reason: HOSPADM

## 2023-10-10 RX ADMIN — CHLORDIAZEPOXIDE HYDROCHLORIDE 25 MG: 25 CAPSULE ORAL at 13:45

## 2023-10-10 RX ADMIN — ENOXAPARIN SODIUM 40 MG: 100 INJECTION SUBCUTANEOUS at 08:15

## 2023-10-10 RX ADMIN — SODIUM CHLORIDE, SODIUM LACTATE, POTASSIUM CHLORIDE, AND CALCIUM CHLORIDE: 600; 310; 30; 20 INJECTION, SOLUTION INTRAVENOUS at 10:12

## 2023-10-10 RX ADMIN — SODIUM CHLORIDE, PRESERVATIVE FREE 10 ML: 5 INJECTION INTRAVENOUS at 20:07

## 2023-10-10 RX ADMIN — AMLODIPINE BESYLATE 5 MG: 5 TABLET ORAL at 20:06

## 2023-10-10 RX ADMIN — LORAZEPAM 2 MG: 2 TABLET ORAL at 02:50

## 2023-10-10 RX ADMIN — LORAZEPAM 2 MG: 2 TABLET ORAL at 08:30

## 2023-10-10 RX ADMIN — LORAZEPAM 3 MG: 2 TABLET ORAL at 06:16

## 2023-10-10 RX ADMIN — Medication 100 MG: at 08:16

## 2023-10-10 RX ADMIN — BUPROPION HYDROCHLORIDE 150 MG: 75 TABLET, FILM COATED ORAL at 20:06

## 2023-10-10 RX ADMIN — CHLORDIAZEPOXIDE HYDROCHLORIDE 25 MG: 25 CAPSULE ORAL at 10:16

## 2023-10-10 RX ADMIN — OXYCODONE HYDROCHLORIDE AND ACETAMINOPHEN 1 TABLET: 7.5; 325 TABLET ORAL at 23:19

## 2023-10-10 RX ADMIN — CHLORDIAZEPOXIDE HYDROCHLORIDE 25 MG: 25 CAPSULE ORAL at 20:06

## 2023-10-10 RX ADMIN — SODIUM CHLORIDE, PRESERVATIVE FREE 40 MG: 5 INJECTION INTRAVENOUS at 10:16

## 2023-10-10 RX ADMIN — SODIUM CHLORIDE, PRESERVATIVE FREE 40 MG: 5 INJECTION INTRAVENOUS at 20:05

## 2023-10-10 RX ADMIN — PROMETHAZINE HYDROCHLORIDE 6.25 MG: 25 INJECTION INTRAMUSCULAR; INTRAVENOUS at 13:45

## 2023-10-10 RX ADMIN — ATORVASTATIN CALCIUM 80 MG: 40 TABLET, FILM COATED ORAL at 20:05

## 2023-10-10 RX ADMIN — SODIUM CHLORIDE, SODIUM LACTATE, POTASSIUM CHLORIDE, AND CALCIUM CHLORIDE: 600; 310; 30; 20 INJECTION, SOLUTION INTRAVENOUS at 02:29

## 2023-10-10 RX ADMIN — PROMETHAZINE HYDROCHLORIDE 6.25 MG: 25 INJECTION INTRAMUSCULAR; INTRAVENOUS at 23:19

## 2023-10-10 RX ADMIN — THERA TABS 1 TABLET: TAB at 08:16

## 2023-10-10 RX ADMIN — LOSARTAN POTASSIUM 100 MG: 50 TABLET, FILM COATED ORAL at 08:16

## 2023-10-10 RX ADMIN — OXYCODONE HYDROCHLORIDE AND ACETAMINOPHEN 1 TABLET: 7.5; 325 TABLET ORAL at 15:12

## 2023-10-10 ASSESSMENT — PAIN DESCRIPTION - LOCATION
LOCATION: NECK;BACK
LOCATION: NECK

## 2023-10-10 ASSESSMENT — ENCOUNTER SYMPTOMS
SHORTNESS OF BREATH: 0
CHEST TIGHTNESS: 0
ABDOMINAL DISTENTION: 0
NAUSEA: 1
VOMITING: 0
DIARRHEA: 0
ABDOMINAL PAIN: 0
BACK PAIN: 0
CONSTIPATION: 0

## 2023-10-10 ASSESSMENT — PAIN - FUNCTIONAL ASSESSMENT: PAIN_FUNCTIONAL_ASSESSMENT: ACTIVITIES ARE NOT PREVENTED

## 2023-10-10 ASSESSMENT — PAIN DESCRIPTION - ORIENTATION: ORIENTATION: MID;UPPER

## 2023-10-10 ASSESSMENT — PAIN SCALES - GENERAL
PAINLEVEL_OUTOF10: 8
PAINLEVEL_OUTOF10: 10

## 2023-10-10 ASSESSMENT — PAIN DESCRIPTION - PAIN TYPE: TYPE: CHRONIC PAIN

## 2023-10-10 ASSESSMENT — PAIN DESCRIPTION - DESCRIPTORS: DESCRIPTORS: ACHING;SHOOTING

## 2023-10-10 ASSESSMENT — PAIN DESCRIPTION - ONSET: ONSET: ON-GOING

## 2023-10-10 ASSESSMENT — PAIN DESCRIPTION - FREQUENCY: FREQUENCY: CONTINUOUS

## 2023-10-10 NOTE — CONSULTS
[facility]  Psychiatry Consult    Reason for Consult: Concern   Thoughts of self harm    The primary source(s) of information include(s):  patient    The patient is a 52 y.o. male with previous psychiatric history of depression, anxiety, alcohol use disorder, cannabis use disorder, substance-induced mood disorder, who has been admitted to medical services, secondary to treatment noncompliance, alcohol intoxication, blood alcohol level 287, and suicidal ideations. Patient's UDS in ER was positive for prescribed benzodiazepines. Patient is well-known to psychiatry due to multiple previous admissions to our psychiatric unit with same clinical presentation, as at present time, and also patient follows with psychiatrist in [facility] outpatient psychiatric clinic. Patient has been seen in his room with presence of one-to-one sitter. He was lying down on the bed and was wearing hospital attire. Patient reported that after last follow-up appointment in psychiatric clinic in August he was taking his medications only for a month and stopped taking medications. Patient stated \"I do not know why I did it\". He reported that he stayed sober for 4 months, however, started progressively feel more depressed and 5 days ago, on Friday, he relapsed in drinking alcohol and has been drinking \"tall boys\" beers with high content of the alcohol every day with his last consumption of alcohol at the day of admission to the hospital.  He reported that yesterday he started experiencing suicidal ideations and decided to come to the hospital and asked for help. Review of the patient's chart indicated that he actually held a gun to his mouth yesterday and he decided not to pull the trigger and came to the hospital.    Today during the interview, patient endorses withdrawal symptoms from alcohol-headache, hand tremor, body shakes, general muscle weakness, abdominal discomfort, and diarrhea.     In

## 2023-10-11 VITALS
SYSTOLIC BLOOD PRESSURE: 174 MMHG | WEIGHT: 175 LBS | HEART RATE: 71 BPM | RESPIRATION RATE: 18 BRPM | BODY MASS INDEX: 21.31 KG/M2 | HEIGHT: 76 IN | DIASTOLIC BLOOD PRESSURE: 88 MMHG | OXYGEN SATURATION: 100 % | TEMPERATURE: 97 F

## 2023-10-11 LAB
ALBUMIN SERPL-MCNC: 4 G/DL (ref 3.5–5.2)
ALP SERPL-CCNC: 108 U/L (ref 40–130)
ALT SERPL-CCNC: 29 U/L (ref 5–41)
ANION GAP SERPL CALCULATED.3IONS-SCNC: 15 MMOL/L (ref 7–19)
AST SERPL-CCNC: 33 U/L (ref 5–40)
BASOPHILS # BLD: 0 K/UL (ref 0–0.2)
BASOPHILS NFR BLD: 1.2 % (ref 0–1)
BILIRUB SERPL-MCNC: 0.6 MG/DL (ref 0.2–1.2)
BUN SERPL-MCNC: 9 MG/DL (ref 6–20)
CALCIUM SERPL-MCNC: 9.2 MG/DL (ref 8.6–10)
CHLORIDE SERPL-SCNC: 97 MMOL/L (ref 98–111)
CO2 SERPL-SCNC: 23 MMOL/L (ref 22–29)
CREAT SERPL-MCNC: 1 MG/DL (ref 0.5–1.2)
EOSINOPHIL # BLD: 0.1 K/UL (ref 0–0.6)
EOSINOPHIL NFR BLD: 2 % (ref 0–5)
ERYTHROCYTE [DISTWIDTH] IN BLOOD BY AUTOMATED COUNT: 12 % (ref 11.5–14.5)
GLUCOSE SERPL-MCNC: 103 MG/DL (ref 74–109)
HCT VFR BLD AUTO: 35.8 % (ref 42–52)
HGB BLD-MCNC: 12.6 G/DL (ref 14–18)
IMM GRANULOCYTES # BLD: 0 K/UL
LYMPHOCYTES # BLD: 1.1 K/UL (ref 1.1–4.5)
LYMPHOCYTES NFR BLD: 30.7 % (ref 20–40)
MAGNESIUM SERPL-MCNC: 1.6 MG/DL (ref 1.6–2.6)
MCH RBC QN AUTO: 32.6 PG (ref 27–31)
MCHC RBC AUTO-ENTMCNC: 35.2 G/DL (ref 33–37)
MCV RBC AUTO: 92.5 FL (ref 80–94)
MONOCYTES # BLD: 0.6 K/UL (ref 0–0.9)
MONOCYTES NFR BLD: 18.4 % (ref 0–10)
NEUTROPHILS # BLD: 1.6 K/UL (ref 1.5–7.5)
NEUTS SEG NFR BLD: 47.1 % (ref 50–65)
PLATELET # BLD AUTO: 301 K/UL (ref 130–400)
PMV BLD AUTO: 8.6 FL (ref 9.4–12.4)
POTASSIUM SERPL-SCNC: 3.4 MMOL/L (ref 3.5–5)
PROT SERPL-MCNC: 6.7 G/DL (ref 6.6–8.7)
RBC # BLD AUTO: 3.87 M/UL (ref 4.7–6.1)
SODIUM SERPL-SCNC: 135 MMOL/L (ref 136–145)
WBC # BLD AUTO: 3.4 K/UL (ref 4.8–10.8)

## 2023-10-11 PROCEDURE — 80053 COMPREHEN METABOLIC PANEL: CPT

## 2023-10-11 PROCEDURE — 6370000000 HC RX 637 (ALT 250 FOR IP)

## 2023-10-11 PROCEDURE — 6360000002 HC RX W HCPCS

## 2023-10-11 PROCEDURE — 94760 N-INVAS EAR/PLS OXIMETRY 1: CPT

## 2023-10-11 PROCEDURE — C9113 INJ PANTOPRAZOLE SODIUM, VIA: HCPCS | Performed by: HOSPITALIST

## 2023-10-11 PROCEDURE — 83735 ASSAY OF MAGNESIUM: CPT

## 2023-10-11 PROCEDURE — 85025 COMPLETE CBC W/AUTO DIFF WBC: CPT

## 2023-10-11 PROCEDURE — 2580000003 HC RX 258: Performed by: HOSPITALIST

## 2023-10-11 PROCEDURE — 36415 COLL VENOUS BLD VENIPUNCTURE: CPT

## 2023-10-11 PROCEDURE — 6360000002 HC RX W HCPCS: Performed by: HOSPITALIST

## 2023-10-11 RX ORDER — AMLODIPINE BESYLATE 10 MG/1
5 TABLET ORAL NIGHTLY
Qty: 15 TABLET | Refills: 0 | Status: SHIPPED | OUTPATIENT
Start: 2023-10-11 | End: 2023-11-10

## 2023-10-11 RX ORDER — NICOTINE 21 MG/24HR
1 PATCH, TRANSDERMAL 24 HOURS TRANSDERMAL DAILY
Qty: 30 PATCH | Refills: 3 | Status: SHIPPED | OUTPATIENT
Start: 2023-10-12

## 2023-10-11 RX ADMIN — CHLORDIAZEPOXIDE HYDROCHLORIDE 25 MG: 25 CAPSULE ORAL at 08:19

## 2023-10-11 RX ADMIN — CARVEDILOL 12.5 MG: 12.5 TABLET, FILM COATED ORAL at 08:21

## 2023-10-11 RX ADMIN — POTASSIUM CHLORIDE 40 MEQ: 1500 TABLET, EXTENDED RELEASE ORAL at 08:21

## 2023-10-11 RX ADMIN — OXYCODONE HYDROCHLORIDE AND ACETAMINOPHEN 1 TABLET: 7.5; 325 TABLET ORAL at 08:19

## 2023-10-11 RX ADMIN — Medication 100 MG: at 08:19

## 2023-10-11 RX ADMIN — ENOXAPARIN SODIUM 40 MG: 100 INJECTION SUBCUTANEOUS at 08:21

## 2023-10-11 RX ADMIN — LOSARTAN POTASSIUM 100 MG: 50 TABLET, FILM COATED ORAL at 08:19

## 2023-10-11 RX ADMIN — SODIUM CHLORIDE, PRESERVATIVE FREE 40 MG: 5 INJECTION INTRAVENOUS at 08:21

## 2023-10-11 RX ADMIN — THERA TABS 1 TABLET: TAB at 08:21

## 2023-10-11 ASSESSMENT — PAIN SCALES - GENERAL
PAINLEVEL_OUTOF10: 5
PAINLEVEL_OUTOF10: 9

## 2023-10-11 ASSESSMENT — PAIN DESCRIPTION - DESCRIPTORS: DESCRIPTORS: ACHING

## 2023-10-11 ASSESSMENT — PAIN DESCRIPTION - LOCATION: LOCATION: OTHER (COMMENT)

## 2023-10-11 ASSESSMENT — PAIN DESCRIPTION - ORIENTATION: ORIENTATION: MID

## 2023-10-11 NOTE — DISCHARGE SUMMARY
Lanita Bosworth  :  1970  MRN:  [id number]    Admit date:  10/9/2023 Discharge date:  10/11/2023    Discharging Physician:  Dr Jenny Aaron    Advance Directive: Full Code    Consults: IP CONSULT TO SOCIAL WORK  IP CONSULT TO PSYCHIATRY     Primary Care Physician:  Hui Rodriguez MD    Discharge Diagnoses:  Principal Problem:    Mental health problem  Active Problems:    Generalized anxiety disorder    Recurrent major depression ([address])    Hypertension    Suicidal ideation    Alcohol withdrawal ([address])  Resolved Problems:    * No resolved hospital problems. *      Portions of this note have been copied forward, however, changed to reflect the most current clinical status of this patient. Hospital Course:   Lanita Bosworth is a 52 y.o. male who presents to the emergency department for evaluation after having suicidal thoughts today. Says that he actually held a gun to his mouth and then decided not to pull the trigger and came here. Patient reports history of chronic alcohol abuse. Had quit drinking for a while but then recently started back drinking again. Reports 4 drinks today up until about an hour prior to arrival.     Patient admitted to the Hospital service for medical management. Psych consulted, evaluated, stable to discharge at this time and instructed to follow-up with his out patient Psychiatry appointment next week. No signs of withdrawal at this time, patient reports he is having no tremors or withdrawal symptoms. Significant Diagnostic Studies:   No results found. Pertinent Labs:   CBC:   Recent Labs     10/09/23  1149 10/10/23  0217 10/11/23  0327   WBC 11.2* 6.9 3.4*   HGB 14.1 11.9* 12.6*   * 347 301     BMP:    Recent Labs     10/09/23  1149 10/10/23  0217 10/11/23  0327   * 135* 135*   K 4.0 4.1 3.4*   CL 91* 98 97*   CO2 27 24 23   BUN 12 14 9   CREATININE 0.9 1.0 1.0   GLUCOSE 95 87 103     INR: No results for input(s): \"INR\" in the last 72 hours.     Physical Exam:  Vital

## 2023-10-11 NOTE — PROGRESS NOTES
4 Eyes Skin Assessment     NAME:  Abigail Leal  YOB: 1970  MEDICAL RECORD NUMBER:  [id number]    The patient is being assessed for  Admission    I agree that at least one RN has performed a thorough Head to Toe Skin Assessment on the patient. ALL assessment sites listed below have been assessed. Areas assessed by both nurses:    Head, Face, Ears, Shoulders, Back, Chest, Arms, Elbows, Hands, Sacrum. Buttock, Coccyx, Ischium, and Legs. Feet and Heels        Does the Patient have a Wound?  No noted wound(s)       Wu Prevention initiated by RN: No  Wound Care Orders initiated by RN: No    Pressure Injury (Stage 3,4, Unstageable, DTI, NWPT, and Complex wounds) if present, place Wound referral order by RN under : No    New Ostomies, if present place, Ostomy referral order under : No     Nurse 1 eSignature: Electronically signed by Chandrika Becker RN on 10/9/23 at 4:56 PM CDT    **SHARE this note so that the co-signing nurse can place an eSignature**    Nurse 2 eSignature: Electronically signed by Adry Oliveira RN on 10/9/23 at 5:12 PM CDT
Per Dr Pallavi Scott on unit, pt not SI at this time. May DC 1:1 sitter.
Pt is awake, alert, CIWA 0. Cooperative with nursing care however is refusing bed alarm and is insisting that nursing keep his door closed despite nursing teaching safety with emphasis on fall prevention. Pt has call light in reach and non skid socks on. Sitting up in bed, clear liquid tray in front of him. Patient says he is being licked up today at 5304. Nursing notified him that as of now there is no discharge order. Nursing has requested that diet be advanced.
acetaminophen **OR** acetaminophen, potassium chloride **OR** potassium alternative oral replacement **OR** potassium chloride, magnesium sulfate, LORazepam **OR** LORazepam **OR** LORazepam **OR** LORazepam **OR** LORazepam **OR** LORazepam **OR** [DISCONTINUED] LORazepam **OR** [DISCONTINUED] LORazepam, promethazine  Diet NPO     Lab and other Data:     Recent Labs     10/09/23  1149 10/10/23  0217   WBC 11.2* 6.9   HGB 14.1 11.9*   * 347     Recent Labs     10/09/23  1149 10/10/23  0217   * 135*   K 4.0 4.1   CL 91* 98   CO2 27 24   BUN 12 14   CREATININE 0.9 1.0   GLUCOSE 95 87     Recent Labs     10/09/23  1149 10/10/23  0217   AST 24 24   ALT 31 27   BILITOT <0.2 0.3   ALKPHOS 104 88     Troponin T: No results for input(s): \"TROPONINI\" in the last 72 hours. Pro-BNP: No results for input(s): \"BNP\" in the last 72 hours. INR: No results for input(s): \"INR\" in the last 72 hours. UA:  Recent Labs     10/09/23  1122   COLORU YELLOW   PHUR 6.0   WBCUA 2   RBCUA 1   BACTERIA NEGATIVE*   CLARITYU Clear   SPECGRAV 1.023   LEUKOCYTESUR TRACE*   UROBILINOGEN 0.2   BILIRUBINUR Negative   BLOODU Negative   GLUCOSEU Negative     A1C: No results for input(s): \"LABA1C\" in the last 72 hours. ABG:No results for input(s): \"PHART\", \"ZIF0OHD\", \"PO2ART\", \"XRO2UCE\", \"BEART\", \"HGBAE\", \"E6SMHGFA\", \"CARBOXHGBART\" in the last 72 hours. RAD:   No results found. Assessment/Plan   Principal Problem:    Mental health problem  Active Problems:    Generalized anxiety disorder    Recurrent major depression ([address])    Hypertension    Suicidal ideation    Alcohol withdrawal ([address])  Resolved Problems:    * No resolved hospital problems.  *    Mental health problem     Suicidal ideation'      Alcohol withdrawal ([address])              -Sitter, discontinued              -Suicide precautions, discontinued              -Psych Consult, recommended to In-patient admission for medication management              -[facility] Protocol,

## 2023-10-12 ENCOUNTER — TELEPHONE (OUTPATIENT)
Dept: PRIMARY CARE CLINIC | Age: 53
End: 2023-10-12

## 2023-10-12 LAB — LAMOTRIGINE SERPL-MCNC: 1.1 UG/ML (ref 3–15)

## 2023-10-12 NOTE — TELEPHONE ENCOUNTER
Care Transitions Initial Follow Up Call    Outreach made within 2 business days of discharge: Yes    Patient: Galina Amos   Patient : 1970   MRN: [id number]    Reason for Admission: Mental health problem    Generalized anxiety disorder    Recurrent major depression ([address] [facility])    Hypertension    Suicidal ideation    Alcohol withdrawal   Discharge Date: 10/11/23       Spoke with: Unable to reach. Left a message to call back on his identified voicemail.  First attempt    Discharge department/facility: [facility]    Scheduled appointment with PCP within 7-14 days    Follow Up  Future Appointments   Date Time Provider [address]   10/16/2023  8:30 AM Sheryl Quintero MD [address] [facility]   10/17/2023  8:15 AM Austin Easton MD [facility]   2023  9:15 AM MD MEGAN Ratliff [address] [place] [address]

## 2023-10-13 ENCOUNTER — TELEPHONE (OUTPATIENT)
Dept: PSYCHIATRY | Age: 53
End: 2023-10-13

## 2023-10-13 NOTE — TELEPHONE ENCOUNTER
Called pt for appointment reminder.   -left voicemail, requesting a return call    Electronically signed by Ermelinda Mirza MA on 10/13/2023 at 10:20 AM

## 2023-10-16 ENCOUNTER — HOSPITAL ENCOUNTER (EMERGENCY)
Facility: HOSPITAL | Age: 53
Discharge: HOME | End: 2023-10-17
Attending: EMERGENCY MEDICINE | Admitting: EMERGENCY MEDICINE
Payer: COMMERCIAL

## 2023-10-16 ENCOUNTER — APPOINTMENT (OUTPATIENT)
Dept: CT IMAGING | Facility: HOSPITAL | Age: 53
End: 2023-10-16
Payer: COMMERCIAL

## 2023-10-16 ENCOUNTER — TELEPHONE (OUTPATIENT)
Dept: PSYCHIATRY | Age: 53
End: 2023-10-16

## 2023-10-16 ENCOUNTER — APPOINTMENT (OUTPATIENT)
Dept: GENERAL RADIOLOGY | Facility: HOSPITAL | Age: 53
End: 2023-10-16
Payer: COMMERCIAL

## 2023-10-16 DIAGNOSIS — R07.9 CHEST PAIN, UNSPECIFIED TYPE: ICD-10-CM

## 2023-10-16 DIAGNOSIS — R51.9 ACUTE NONINTRACTABLE HEADACHE, UNSPECIFIED HEADACHE TYPE: Primary | ICD-10-CM

## 2023-10-16 LAB
ALBUMIN SERPL-MCNC: 4.6 G/DL (ref 3.5–5.2)
ALBUMIN/GLOB SERPL: 1.6 G/DL
ALP SERPL-CCNC: 96 U/L (ref 39–117)
ALT SERPL W P-5'-P-CCNC: 25 U/L (ref 1–41)
ANION GAP SERPL CALCULATED.3IONS-SCNC: 10 MMOL/L (ref 5–15)
AST SERPL-CCNC: 18 U/L (ref 1–40)
BASOPHILS # BLD AUTO: 0.03 10*3/MM3 (ref 0–0.2)
BASOPHILS NFR BLD AUTO: 0.4 % (ref 0–1.5)
BILIRUB SERPL-MCNC: 0.5 MG/DL (ref 0–1.2)
BUN SERPL-MCNC: 15 MG/DL (ref 6–20)
BUN/CREAT SERPL: 14 (ref 7–25)
CALCIUM SPEC-SCNC: 9.5 MG/DL (ref 8.6–10.5)
CHLORIDE SERPL-SCNC: 96 MMOL/L (ref 98–107)
CO2 SERPL-SCNC: 27 MMOL/L (ref 22–29)
CREAT SERPL-MCNC: 1.07 MG/DL (ref 0.76–1.27)
DEPRECATED RDW RBC AUTO: 43.1 FL (ref 37–54)
EGFRCR SERPLBLD CKD-EPI 2021: 82 ML/MIN/1.73
EOSINOPHIL # BLD AUTO: 0.17 10*3/MM3 (ref 0–0.4)
EOSINOPHIL NFR BLD AUTO: 2.2 % (ref 0.3–6.2)
ERYTHROCYTE [DISTWIDTH] IN BLOOD BY AUTOMATED COUNT: 12.5 % (ref 12.3–15.4)
GLOBULIN UR ELPH-MCNC: 2.8 GM/DL
GLUCOSE SERPL-MCNC: 105 MG/DL (ref 65–99)
HCT VFR BLD AUTO: 36.9 % (ref 37.5–51)
HGB BLD-MCNC: 12.3 G/DL (ref 13–17.7)
IMM GRANULOCYTES # BLD AUTO: 0.02 10*3/MM3 (ref 0–0.05)
IMM GRANULOCYTES NFR BLD AUTO: 0.3 % (ref 0–0.5)
LYMPHOCYTES # BLD AUTO: 1.7 10*3/MM3 (ref 0.7–3.1)
LYMPHOCYTES NFR BLD AUTO: 21.5 % (ref 19.6–45.3)
MCH RBC QN AUTO: 31.4 PG (ref 26.6–33)
MCHC RBC AUTO-ENTMCNC: 33.3 G/DL (ref 31.5–35.7)
MCV RBC AUTO: 94.1 FL (ref 79–97)
MONOCYTES # BLD AUTO: 0.55 10*3/MM3 (ref 0.1–0.9)
MONOCYTES NFR BLD AUTO: 7 % (ref 5–12)
NEUTROPHILS NFR BLD AUTO: 5.42 10*3/MM3 (ref 1.7–7)
NEUTROPHILS NFR BLD AUTO: 68.6 % (ref 42.7–76)
NRBC BLD AUTO-RTO: 0 /100 WBC (ref 0–0.2)
PLATELET # BLD AUTO: 254 10*3/MM3 (ref 140–450)
PMV BLD AUTO: 9.1 FL (ref 6–12)
POTASSIUM SERPL-SCNC: 4.5 MMOL/L (ref 3.5–5.2)
PROT SERPL-MCNC: 7.4 G/DL (ref 6–8.5)
RBC # BLD AUTO: 3.92 10*6/MM3 (ref 4.14–5.8)
SODIUM SERPL-SCNC: 133 MMOL/L (ref 136–145)
TROPONIN T SERPL HS-MCNC: <6 NG/L
WBC NRBC COR # BLD: 7.89 10*3/MM3 (ref 3.4–10.8)

## 2023-10-16 PROCEDURE — 70450 CT HEAD/BRAIN W/O DYE: CPT

## 2023-10-16 PROCEDURE — 93005 ELECTROCARDIOGRAM TRACING: CPT | Performed by: EMERGENCY MEDICINE

## 2023-10-16 PROCEDURE — 25810000003 SODIUM CHLORIDE 0.9 % SOLUTION: Performed by: EMERGENCY MEDICINE

## 2023-10-16 PROCEDURE — 25010000002 PROCHLORPERAZINE 10 MG/2ML SOLUTION: Performed by: EMERGENCY MEDICINE

## 2023-10-16 PROCEDURE — 93010 ELECTROCARDIOGRAM REPORT: CPT | Performed by: INTERNAL MEDICINE

## 2023-10-16 PROCEDURE — 71045 X-RAY EXAM CHEST 1 VIEW: CPT

## 2023-10-16 PROCEDURE — 85025 COMPLETE CBC W/AUTO DIFF WBC: CPT | Performed by: EMERGENCY MEDICINE

## 2023-10-16 PROCEDURE — 25010000002 DIPHENHYDRAMINE PER 50 MG: Performed by: EMERGENCY MEDICINE

## 2023-10-16 PROCEDURE — 96374 THER/PROPH/DIAG INJ IV PUSH: CPT

## 2023-10-16 PROCEDURE — 93005 ELECTROCARDIOGRAM TRACING: CPT

## 2023-10-16 PROCEDURE — 96375 TX/PRO/DX INJ NEW DRUG ADDON: CPT

## 2023-10-16 PROCEDURE — 36415 COLL VENOUS BLD VENIPUNCTURE: CPT

## 2023-10-16 PROCEDURE — 84484 ASSAY OF TROPONIN QUANT: CPT | Performed by: EMERGENCY MEDICINE

## 2023-10-16 PROCEDURE — 99284 EMERGENCY DEPT VISIT MOD MDM: CPT

## 2023-10-16 PROCEDURE — 80053 COMPREHEN METABOLIC PANEL: CPT | Performed by: EMERGENCY MEDICINE

## 2023-10-16 RX ORDER — SODIUM CHLORIDE 0.9 % (FLUSH) 0.9 %
10 SYRINGE (ML) INJECTION AS NEEDED
Status: DISCONTINUED | OUTPATIENT
Start: 2023-10-16 | End: 2023-10-17 | Stop reason: HOSPADM

## 2023-10-16 RX ORDER — DIPHENHYDRAMINE HYDROCHLORIDE 50 MG/ML
25 INJECTION INTRAMUSCULAR; INTRAVENOUS ONCE
Status: COMPLETED | OUTPATIENT
Start: 2023-10-16 | End: 2023-10-16

## 2023-10-16 RX ORDER — PROCHLORPERAZINE EDISYLATE 5 MG/ML
10 INJECTION INTRAMUSCULAR; INTRAVENOUS ONCE
Status: COMPLETED | OUTPATIENT
Start: 2023-10-16 | End: 2023-10-16

## 2023-10-16 RX ADMIN — SODIUM CHLORIDE 1000 ML: 9 INJECTION, SOLUTION INTRAVENOUS at 23:58

## 2023-10-16 RX ADMIN — PROCHLORPERAZINE EDISYLATE 10 MG: 5 INJECTION INTRAMUSCULAR; INTRAVENOUS at 23:42

## 2023-10-16 RX ADMIN — DIPHENHYDRAMINE HYDROCHLORIDE 25 MG: 50 INJECTION, SOLUTION INTRAMUSCULAR; INTRAVENOUS at 23:41

## 2023-10-16 NOTE — TELEPHONE ENCOUNTER
Patient no showed to their appointment in the [facility]. Office called the patient to check on them and to reschedule their appointment. Left voicemail for patient to call the office back at [phone] Option 3.     Electronically signed by Lb Mora MA on 10/16/2023 at 1:39 PM

## 2023-10-17 VITALS
TEMPERATURE: 97.6 F | HEIGHT: 76 IN | BODY MASS INDEX: 22.53 KG/M2 | RESPIRATION RATE: 16 BRPM | DIASTOLIC BLOOD PRESSURE: 74 MMHG | HEART RATE: 69 BPM | OXYGEN SATURATION: 99 % | SYSTOLIC BLOOD PRESSURE: 146 MMHG | WEIGHT: 185 LBS

## 2023-10-17 LAB
BILIRUB UR QL STRIP: NEGATIVE
CLARITY UR: CLEAR
COLOR UR: YELLOW
GEN 5 2HR TROPONIN T REFLEX: <6 NG/L
GLUCOSE UR STRIP-MCNC: NEGATIVE MG/DL
HGB UR QL STRIP.AUTO: NEGATIVE
HOLD SPECIMEN: NORMAL
HOLD SPECIMEN: NORMAL
KETONES UR QL STRIP: NEGATIVE
LEUKOCYTE ESTERASE UR QL STRIP.AUTO: NEGATIVE
NITRITE UR QL STRIP: NEGATIVE
PH UR STRIP.AUTO: 6.5 [PH] (ref 5–8)
PROT UR QL STRIP: NEGATIVE
QT INTERVAL: 428 MS
QTC INTERVAL: 430 MS
SP GR UR STRIP: 1.01 (ref 1–1.03)
TROPONIN T DELTA: NORMAL
UROBILINOGEN UR QL STRIP: NORMAL
WHOLE BLOOD HOLD COAG: NORMAL
WHOLE BLOOD HOLD SPECIMEN: NORMAL

## 2023-10-17 PROCEDURE — 81003 URINALYSIS AUTO W/O SCOPE: CPT | Performed by: EMERGENCY MEDICINE

## 2023-10-17 PROCEDURE — 84484 ASSAY OF TROPONIN QUANT: CPT | Performed by: EMERGENCY MEDICINE

## 2023-10-17 NOTE — ED PROVIDER NOTES
Subjective   History of Present Illness  Pt presents to the  with report of HA and chest pain - reports he noted his BP was elevated tonight - has had issues with this and has been having medication changes, etc.  Reports that he has had L sided HA tonight - + hx of the same - states less severe but has increased.  No vision changes.  No n/v. No neck/back pain. No speech issues.  Pt reports that he also had some chest discomfort.  Had some Sob with this.  No cough/congestion.  No abdominal pain.          Review of Systems   Constitutional:  Negative for chills and fever.   HENT:  Negative for congestion and ear pain.    Eyes:  Negative for photophobia and visual disturbance.   Respiratory:  Positive for shortness of breath. Negative for cough.    Cardiovascular:  Positive for chest pain. Negative for palpitations and leg swelling.   Gastrointestinal:  Negative for abdominal pain, nausea and vomiting.   Genitourinary:  Negative for dysuria.   Musculoskeletal:  Negative for back pain and neck pain.   Skin:  Negative for rash and wound.   Neurological:  Positive for headaches. Negative for dizziness, tremors, seizures, syncope, speech difficulty, weakness, light-headedness and numbness.   Psychiatric/Behavioral:  Negative for confusion.    All other systems reviewed and are negative.      No past medical history on file.    Allergies   Allergen Reactions    Zofran [Ondansetron Hcl] Headache       No past surgical history on file.    No family history on file.    Social History     Socioeconomic History    Marital status: Single           Objective   Physical Exam  Vitals and nursing note reviewed.   Constitutional:       General: He is not in acute distress.     Appearance: He is well-developed.   HENT:      Head: Normocephalic and atraumatic.   Eyes:      Extraocular Movements: Extraocular movements intact.      Pupils: Pupils are equal, round, and reactive to light.   Cardiovascular:      Rate and Rhythm: Normal  rate and regular rhythm.      Heart sounds: Normal heart sounds.   Pulmonary:      Effort: Pulmonary effort is normal.      Breath sounds: Normal breath sounds.   Abdominal:      General: Bowel sounds are normal.      Palpations: Abdomen is soft.   Musculoskeletal:      Cervical back: Normal range of motion and neck supple.      Right lower leg: No edema.      Left lower leg: No edema.   Skin:     General: Skin is warm and dry.   Neurological:      General: No focal deficit present.      Mental Status: He is alert and oriented to person, place, and time.      Cranial Nerves: No cranial nerve deficit.      Motor: No weakness.         Procedures           ED Course      Labs Reviewed   COMPREHENSIVE METABOLIC PANEL - Abnormal; Notable for the following components:       Result Value    Glucose 105 (*)     Sodium 133 (*)     Chloride 96 (*)     All other components within normal limits    Narrative:     GFR Normal >60  Chronic Kidney Disease <60  Kidney Failure <15     CBC WITH AUTO DIFFERENTIAL - Abnormal; Notable for the following components:    RBC 3.92 (*)     Hemoglobin 12.3 (*)     Hematocrit 36.9 (*)     All other components within normal limits   URINALYSIS W/ MICROSCOPIC IF INDICATED (NO CULTURE) - Normal    Narrative:     Urine microscopic not indicated.   TROPONIN - Normal    Narrative:     High Sensitive Troponin T Reference Range:  <10.0 ng/L- Negative Female for AMI  <15.0 ng/L- Negative Male for AMI  >=10 - Abnormal Female indicating possible myocardial injury.  >=15 - Abnormal Male indicating possible myocardial injury.   Clinicians would have to utilize clinical acumen, EKG, Troponin, and serial changes to determine if it is an Acute Myocardial Infarction or myocardial injury due to an underlying chronic condition.        RAINBOW DRAW    Narrative:     The following orders were created for panel order [place] Draw.  Procedure                               Abnormality         Status                      ---------                               -----------         ------                     Green Top (Gel)[[id number]]                                  Final result               Lavender Top[[id number]]                                     Final result               Red Top[[id number]]                                          Final result               Light Blue Top[[id number]]                                   Final result                 Please view results for these tests on the individual orders.   HIGH SENSITIVITIY TROPONIN T 2HR    Narrative:     High Sensitive Troponin T Reference Range:  <10.0 ng/L- Negative Female for AMI  <15.0 ng/L- Negative Male for AMI  >=10 - Abnormal Female indicating possible myocardial injury.  >=15 - Abnormal Male indicating possible myocardial injury.   Clinicians would have to utilize clinical acumen, EKG, Troponin, and serial changes to determine if it is an Acute Myocardial Infarction or myocardial injury due to an underlying chronic condition.        GREEN TOP   LAVENDER TOP   RED TOP   LIGHT BLUE TOP   CBC AND DIFFERENTIAL    Narrative:     The following orders were created for panel order CBC & Differential.  Procedure                               Abnormality         Status                     ---------                               -----------         ------                     CBC Auto Differential[[id number]]        Abnormal            Final result                 Please view results for these tests on the individual orders.                                          Medical Decision Making  Pt stable in EC - resting comfortably and in NAD. No evid of SBI/sepsis.  No evid of stroke/ICH.  HA resolved att.  Troponin undetectable X 2 - dbt ACS. HEART score 2.  Dbt PE/TAD.  Pt's BP improved with treatment of HA.  Will d/c to home att and recommend continued outpt f/u.      Amount and/or Complexity of Data Reviewed  Labs: ordered.  Radiology: ordered and independent interpretation  performed.     Details: CT head - nothing acute    CXR - nothing acute  ECG/medicine tests: ordered and independent interpretation performed.     Details: NSR, Incomplete RBBB, LPFB, PVC, No acute STT changes    Risk  Prescription drug management.        Final diagnoses:   Acute nonintractable headache, unspecified headache type   Chest pain, unspecified type       ED Disposition  ED Disposition       ED Disposition   Discharge    Condition   Stable    Comment   --               Gena Mackenzie MD  [address] [place] [address] [address]  [facility] [id number] [phone]               Medication List      No changes were made to your prescriptions during this visit.            Luís Gongora,   10/16/23 2334       Luís Gongora,   10/17/23 0211

## 2023-11-13 ENCOUNTER — APPOINTMENT (OUTPATIENT)
Dept: CT IMAGING | Age: 53
DRG: 897 | End: 2023-11-13
Payer: MEDICAID

## 2023-11-13 ENCOUNTER — HOSPITAL ENCOUNTER (INPATIENT)
Age: 53
LOS: 1 days | Discharge: HOME | DRG: 897 | End: 2023-11-14
Attending: EMERGENCY MEDICINE | Admitting: HOSPITALIST
Payer: MEDICAID

## 2023-11-13 DIAGNOSIS — I10 PRIMARY HYPERTENSION: ICD-10-CM

## 2023-11-13 DIAGNOSIS — R45.851 DEPRESSION WITH SUICIDAL IDEATION: Primary | ICD-10-CM

## 2023-11-13 DIAGNOSIS — F10.921 ACUTE ALCOHOLIC INTOXICATION WITH DELIRIUM (HCC): ICD-10-CM

## 2023-11-13 DIAGNOSIS — R51.9 ACUTE NONINTRACTABLE HEADACHE, UNSPECIFIED HEADACHE TYPE: ICD-10-CM

## 2023-11-13 DIAGNOSIS — Z91.89 AT RISK FOR WITHDRAWAL: ICD-10-CM

## 2023-11-13 DIAGNOSIS — F10.239 ALCOHOL DEPENDENCE WITH WITHDRAWAL WITH COMPLICATION (HCC): ICD-10-CM

## 2023-11-13 DIAGNOSIS — F32.A DEPRESSION WITH SUICIDAL IDEATION: Primary | ICD-10-CM

## 2023-11-13 LAB
ALBUMIN SERPL-MCNC: 4.7 G/DL (ref 3.5–5.2)
ALP SERPL-CCNC: 124 U/L (ref 40–130)
ALT SERPL-CCNC: 42 U/L (ref 5–41)
AMPHET UR QL SCN: NEGATIVE
ANION GAP SERPL CALCULATED.3IONS-SCNC: 14 MMOL/L (ref 7–19)
AST SERPL-CCNC: 37 U/L (ref 5–40)
BACTERIA URNS QL MICRO: NEGATIVE /HPF
BARBITURATES UR QL SCN: NEGATIVE
BASOPHILS # BLD: 0 K/UL (ref 0–0.2)
BASOPHILS NFR BLD: 0.6 % (ref 0–1)
BENZODIAZ UR QL SCN: POSITIVE
BILIRUB SERPL-MCNC: 0.6 MG/DL (ref 0.2–1.2)
BILIRUB UR QL STRIP: NEGATIVE
BUN SERPL-MCNC: 7 MG/DL (ref 6–20)
BUPRENORPHINE URINE: NEGATIVE
CALCIUM SERPL-MCNC: 9 MG/DL (ref 8.6–10)
CANNABINOIDS UR QL SCN: NEGATIVE
CHLORIDE SERPL-SCNC: 94 MMOL/L (ref 98–111)
CLARITY UR: CLEAR
CO2 SERPL-SCNC: 27 MMOL/L (ref 22–29)
COCAINE UR QL SCN: NEGATIVE
COLOR UR: YELLOW
CREAT SERPL-MCNC: 0.8 MG/DL (ref 0.5–1.2)
CRYSTALS URNS MICRO: ABNORMAL /HPF
DRUG SCREEN COMMENT UR-IMP: ABNORMAL
EOSINOPHIL # BLD: 0 K/UL (ref 0–0.6)
EOSINOPHIL NFR BLD: 0.4 % (ref 0–5)
EPI CELLS #/AREA URNS AUTO: 0 /HPF (ref 0–5)
ERYTHROCYTE [DISTWIDTH] IN BLOOD BY AUTOMATED COUNT: 12.2 % (ref 11.5–14.5)
ETHANOLAMINE SERPL-MCNC: 196 MG/DL (ref 0–0.08)
FENTANYL SCREEN, URINE: NEGATIVE
GLUCOSE SERPL-MCNC: 101 MG/DL (ref 74–109)
GLUCOSE UR STRIP.AUTO-MCNC: NEGATIVE MG/DL
HCT VFR BLD AUTO: 42.8 % (ref 42–52)
HGB BLD-MCNC: 15.3 G/DL (ref 14–18)
HGB UR STRIP.AUTO-MCNC: NEGATIVE MG/L
HYALINE CASTS #/AREA URNS AUTO: 1 /HPF (ref 0–8)
IMM GRANULOCYTES # BLD: 0 K/UL
KETONES UR STRIP.AUTO-MCNC: NEGATIVE MG/DL
LEUKOCYTE ESTERASE UR QL STRIP.AUTO: NEGATIVE
LYMPHOCYTES # BLD: 1.3 K/UL (ref 1.1–4.5)
LYMPHOCYTES NFR BLD: 26.5 % (ref 20–40)
MCH RBC QN AUTO: 32.4 PG (ref 27–31)
MCHC RBC AUTO-ENTMCNC: 35.7 G/DL (ref 33–37)
MCV RBC AUTO: 90.7 FL (ref 80–94)
METHADONE UR QL SCN: NEGATIVE
METHAMPHETAMINE, URINE: NEGATIVE
MONOCYTES # BLD: 0.4 K/UL (ref 0–0.9)
MONOCYTES NFR BLD: 7.2 % (ref 0–10)
NEUTROPHILS # BLD: 3.2 K/UL (ref 1.5–7.5)
NEUTS SEG NFR BLD: 64.9 % (ref 50–65)
NITRITE UR QL STRIP.AUTO: NEGATIVE
OPIATES UR QL SCN: NEGATIVE
OXYCODONE UR QL SCN: NEGATIVE
PCP UR QL SCN: NEGATIVE
PH UR STRIP.AUTO: 7 [PH] (ref 5–8)
PLATELET # BLD AUTO: 252 K/UL (ref 130–400)
PMV BLD AUTO: 9 FL (ref 9.4–12.4)
POTASSIUM SERPL-SCNC: 4 MMOL/L (ref 3.5–5)
PROT SERPL-MCNC: 7.2 G/DL (ref 6.6–8.7)
PROT UR STRIP.AUTO-MCNC: 30 MG/DL
RBC # BLD AUTO: 4.72 M/UL (ref 4.7–6.1)
RBC #/AREA URNS AUTO: 0 /HPF (ref 0–4)
SARS-COV-2 RDRP RESP QL NAA+PROBE: NOT DETECTED
SODIUM SERPL-SCNC: 135 MMOL/L (ref 136–145)
SP GR UR STRIP.AUTO: 1.01 (ref 1–1.03)
TRICYCLIC, URINE: NEGATIVE
UROBILINOGEN UR STRIP.AUTO-MCNC: 1 E.U./DL
WBC # BLD AUTO: 4.9 K/UL (ref 4.8–10.8)
WBC #/AREA URNS AUTO: 0 /HPF (ref 0–5)

## 2023-11-13 PROCEDURE — 1210000000 HC MED SURG R&B

## 2023-11-13 PROCEDURE — 82077 ASSAY SPEC XCP UR&BREATH IA: CPT

## 2023-11-13 PROCEDURE — 85025 COMPLETE CBC W/AUTO DIFF WBC: CPT

## 2023-11-13 PROCEDURE — 80053 COMPREHEN METABOLIC PANEL: CPT

## 2023-11-13 PROCEDURE — 80307 DRUG TEST PRSMV CHEM ANLYZR: CPT

## 2023-11-13 PROCEDURE — 70450 CT HEAD/BRAIN W/O DYE: CPT

## 2023-11-13 PROCEDURE — 99285 EMERGENCY DEPT VISIT HI MDM: CPT

## 2023-11-13 PROCEDURE — 36415 COLL VENOUS BLD VENIPUNCTURE: CPT

## 2023-11-13 PROCEDURE — 81001 URINALYSIS AUTO W/SCOPE: CPT

## 2023-11-13 PROCEDURE — 87635 SARS-COV-2 COVID-19 AMP PRB: CPT

## 2023-11-13 PROCEDURE — 6360000002 HC RX W HCPCS: Performed by: EMERGENCY MEDICINE

## 2023-11-13 RX ORDER — DROPERIDOL 2.5 MG/ML
0.62 INJECTION, SOLUTION INTRAMUSCULAR; INTRAVENOUS ONCE
Status: COMPLETED | OUTPATIENT
Start: 2023-11-13 | End: 2023-11-13

## 2023-11-13 RX ADMIN — DROPERIDOL 0.62 MG: 2.5 INJECTION, SOLUTION INTRAMUSCULAR; INTRAVENOUS at 22:27

## 2023-11-13 ASSESSMENT — ENCOUNTER SYMPTOMS
GASTROINTESTINAL NEGATIVE: 1
EYES NEGATIVE: 1
RESPIRATORY NEGATIVE: 1

## 2023-11-14 VITALS
RESPIRATION RATE: 18 BRPM | HEIGHT: 76 IN | HEART RATE: 90 BPM | BODY MASS INDEX: 21.92 KG/M2 | WEIGHT: 180 LBS | TEMPERATURE: 98.6 F | SYSTOLIC BLOOD PRESSURE: 164 MMHG | OXYGEN SATURATION: 96 % | DIASTOLIC BLOOD PRESSURE: 103 MMHG

## 2023-11-14 LAB
ANION GAP SERPL CALCULATED.3IONS-SCNC: 14 MMOL/L (ref 7–19)
BASOPHILS # BLD: 0 K/UL (ref 0–0.2)
BASOPHILS NFR BLD: 0.8 % (ref 0–1)
BUN SERPL-MCNC: 9 MG/DL (ref 6–20)
CALCIUM SERPL-MCNC: 8.7 MG/DL (ref 8.6–10)
CHLORIDE SERPL-SCNC: 100 MMOL/L (ref 98–111)
CO2 SERPL-SCNC: 25 MMOL/L (ref 22–29)
CREAT SERPL-MCNC: 1 MG/DL (ref 0.5–1.2)
EOSINOPHIL # BLD: 0.1 K/UL (ref 0–0.6)
EOSINOPHIL NFR BLD: 1.1 % (ref 0–5)
ERYTHROCYTE [DISTWIDTH] IN BLOOD BY AUTOMATED COUNT: 11.9 % (ref 11.5–14.5)
ETHANOLAMINE SERPL-MCNC: 85 MG/DL (ref 0–0.08)
GLUCOSE SERPL-MCNC: 94 MG/DL (ref 74–109)
HCT VFR BLD AUTO: 39.4 % (ref 42–52)
HGB BLD-MCNC: 14 G/DL (ref 14–18)
IMM GRANULOCYTES # BLD: 0 K/UL
LYMPHOCYTES # BLD: 1.7 K/UL (ref 1.1–4.5)
LYMPHOCYTES NFR BLD: 32.3 % (ref 20–40)
MCH RBC QN AUTO: 32.2 PG (ref 27–31)
MCHC RBC AUTO-ENTMCNC: 35.5 G/DL (ref 33–37)
MCV RBC AUTO: 90.6 FL (ref 80–94)
MONOCYTES # BLD: 0.6 K/UL (ref 0–0.9)
MONOCYTES NFR BLD: 12.1 % (ref 0–10)
NEUTROPHILS # BLD: 2.8 K/UL (ref 1.5–7.5)
NEUTS SEG NFR BLD: 53.5 % (ref 50–65)
PLATELET # BLD AUTO: 254 K/UL (ref 130–400)
PMV BLD AUTO: 9 FL (ref 9.4–12.4)
POTASSIUM SERPL-SCNC: 3.6 MMOL/L (ref 3.5–5)
RBC # BLD AUTO: 4.35 M/UL (ref 4.7–6.1)
SODIUM SERPL-SCNC: 139 MMOL/L (ref 136–145)
WBC # BLD AUTO: 5.3 K/UL (ref 4.8–10.8)

## 2023-11-14 PROCEDURE — 80048 BASIC METABOLIC PNL TOTAL CA: CPT

## 2023-11-14 PROCEDURE — 99254 IP/OBS CNSLTJ NEW/EST MOD 60: CPT | Performed by: NURSE PRACTITIONER

## 2023-11-14 PROCEDURE — 6370000000 HC RX 637 (ALT 250 FOR IP): Performed by: HOSPITALIST

## 2023-11-14 PROCEDURE — 2580000003 HC RX 258: Performed by: HOSPITALIST

## 2023-11-14 PROCEDURE — 94760 N-INVAS EAR/PLS OXIMETRY 1: CPT

## 2023-11-14 PROCEDURE — 36415 COLL VENOUS BLD VENIPUNCTURE: CPT

## 2023-11-14 PROCEDURE — 82077 ASSAY SPEC XCP UR&BREATH IA: CPT

## 2023-11-14 PROCEDURE — 6360000002 HC RX W HCPCS: Performed by: HOSPITALIST

## 2023-11-14 PROCEDURE — 85025 COMPLETE CBC W/AUTO DIFF WBC: CPT

## 2023-11-14 RX ORDER — ACETAMINOPHEN 650 MG/1
650 SUPPOSITORY RECTAL EVERY 4 HOURS PRN
Status: DISCONTINUED | OUTPATIENT
Start: 2023-11-14 | End: 2023-11-14 | Stop reason: HOSPADM

## 2023-11-14 RX ORDER — FOLIC ACID 1 MG/1
1 TABLET ORAL DAILY
Status: DISCONTINUED | OUTPATIENT
Start: 2023-11-14 | End: 2023-11-14 | Stop reason: HOSPADM

## 2023-11-14 RX ORDER — SODIUM CHLORIDE 0.9 % (FLUSH) 0.9 %
5-40 SYRINGE (ML) INJECTION EVERY 12 HOURS SCHEDULED
Status: DISCONTINUED | OUTPATIENT
Start: 2023-11-14 | End: 2023-11-14 | Stop reason: HOSPADM

## 2023-11-14 RX ORDER — CALCIUM CARBONATE 500 MG/1
500 TABLET, CHEWABLE ORAL 3 TIMES DAILY PRN
Status: DISCONTINUED | OUTPATIENT
Start: 2023-11-14 | End: 2023-11-14 | Stop reason: HOSPADM

## 2023-11-14 RX ORDER — LORAZEPAM 2 MG/ML
1 INJECTION INTRAMUSCULAR
Status: DISCONTINUED | OUTPATIENT
Start: 2023-11-14 | End: 2023-11-14 | Stop reason: HOSPADM

## 2023-11-14 RX ORDER — LORAZEPAM 2 MG/1
2 TABLET ORAL 3 TIMES DAILY
Status: DISCONTINUED | OUTPATIENT
Start: 2023-11-14 | End: 2023-11-14

## 2023-11-14 RX ORDER — CARVEDILOL 12.5 MG/1
12.5 TABLET ORAL 2 TIMES DAILY WITH MEALS
Qty: 60 TABLET | Refills: 0 | Status: SHIPPED | OUTPATIENT
Start: 2023-11-14

## 2023-11-14 RX ORDER — GABAPENTIN 400 MG/1
400 CAPSULE ORAL 2 TIMES DAILY
Status: DISCONTINUED | OUTPATIENT
Start: 2023-11-14 | End: 2023-11-14 | Stop reason: HOSPADM

## 2023-11-14 RX ORDER — NICOTINE 21 MG/24HR
1 PATCH, TRANSDERMAL 24 HOURS TRANSDERMAL DAILY
Status: DISCONTINUED | OUTPATIENT
Start: 2023-11-14 | End: 2023-11-14 | Stop reason: HOSPADM

## 2023-11-14 RX ORDER — LORAZEPAM 2 MG/1
2 TABLET ORAL
Status: DISCONTINUED | OUTPATIENT
Start: 2023-11-14 | End: 2023-11-14 | Stop reason: HOSPADM

## 2023-11-14 RX ORDER — ATORVASTATIN CALCIUM 80 MG/1
80 TABLET, FILM COATED ORAL NIGHTLY
Status: DISCONTINUED | OUTPATIENT
Start: 2023-11-14 | End: 2023-11-14 | Stop reason: HOSPADM

## 2023-11-14 RX ORDER — CHLORDIAZEPOXIDE HYDROCHLORIDE 25 MG/1
25 CAPSULE, GELATIN COATED ORAL 3 TIMES DAILY
Qty: 9 CAPSULE | Refills: 0 | Status: SHIPPED | OUTPATIENT
Start: 2023-11-14 | End: 2023-11-17

## 2023-11-14 RX ORDER — LORAZEPAM 2 MG/ML
3 INJECTION INTRAMUSCULAR
Status: DISCONTINUED | OUTPATIENT
Start: 2023-11-14 | End: 2023-11-14 | Stop reason: HOSPADM

## 2023-11-14 RX ORDER — LORAZEPAM 2 MG/ML
4 INJECTION INTRAMUSCULAR
Status: DISCONTINUED | OUTPATIENT
Start: 2023-11-14 | End: 2023-11-14 | Stop reason: HOSPADM

## 2023-11-14 RX ORDER — MAGNESIUM SULFATE IN WATER 40 MG/ML
2000 INJECTION, SOLUTION INTRAVENOUS PRN
Status: DISCONTINUED | OUTPATIENT
Start: 2023-11-14 | End: 2023-11-14 | Stop reason: HOSPADM

## 2023-11-14 RX ORDER — SODIUM CHLORIDE 9 MG/ML
INJECTION, SOLUTION INTRAVENOUS PRN
Status: DISCONTINUED | OUTPATIENT
Start: 2023-11-14 | End: 2023-11-14 | Stop reason: HOSPADM

## 2023-11-14 RX ORDER — PANTOPRAZOLE SODIUM 40 MG/1
40 TABLET, DELAYED RELEASE ORAL
Qty: 30 TABLET | Refills: 1 | Status: SHIPPED | OUTPATIENT
Start: 2023-11-14 | End: 2023-12-14

## 2023-11-14 RX ORDER — LORAZEPAM 2 MG/ML
2 INJECTION INTRAMUSCULAR
Status: DISCONTINUED | OUTPATIENT
Start: 2023-11-14 | End: 2023-11-14 | Stop reason: HOSPADM

## 2023-11-14 RX ORDER — POLYETHYLENE GLYCOL 3350 17 G/17G
17 POWDER, FOR SOLUTION ORAL DAILY PRN
Status: DISCONTINUED | OUTPATIENT
Start: 2023-11-14 | End: 2023-11-14 | Stop reason: HOSPADM

## 2023-11-14 RX ORDER — CARVEDILOL 12.5 MG/1
12.5 TABLET ORAL 2 TIMES DAILY WITH MEALS
Status: DISCONTINUED | OUTPATIENT
Start: 2023-11-14 | End: 2023-11-14 | Stop reason: HOSPADM

## 2023-11-14 RX ORDER — LORAZEPAM 1 MG/1
1 TABLET ORAL
Status: DISCONTINUED | OUTPATIENT
Start: 2023-11-14 | End: 2023-11-14 | Stop reason: HOSPADM

## 2023-11-14 RX ORDER — FOLIC ACID 1 MG/1
1 TABLET ORAL DAILY
Qty: 30 TABLET | Refills: 0 | Status: SHIPPED | OUTPATIENT
Start: 2023-11-14

## 2023-11-14 RX ORDER — POTASSIUM CHLORIDE 7.45 MG/ML
10 INJECTION INTRAVENOUS PRN
Status: DISCONTINUED | OUTPATIENT
Start: 2023-11-14 | End: 2023-11-14 | Stop reason: HOSPADM

## 2023-11-14 RX ORDER — ENOXAPARIN SODIUM 100 MG/ML
40 INJECTION SUBCUTANEOUS DAILY
Status: DISCONTINUED | OUTPATIENT
Start: 2023-11-14 | End: 2023-11-14 | Stop reason: HOSPADM

## 2023-11-14 RX ORDER — POTASSIUM CHLORIDE 20 MEQ/1
40 TABLET, EXTENDED RELEASE ORAL PRN
Status: DISCONTINUED | OUTPATIENT
Start: 2023-11-14 | End: 2023-11-14 | Stop reason: HOSPADM

## 2023-11-14 RX ORDER — ACETAMINOPHEN 325 MG/1
650 TABLET ORAL EVERY 4 HOURS PRN
Status: DISCONTINUED | OUTPATIENT
Start: 2023-11-14 | End: 2023-11-14 | Stop reason: HOSPADM

## 2023-11-14 RX ORDER — GAUZE BANDAGE 2" X 2"
100 BANDAGE TOPICAL DAILY
Status: DISCONTINUED | OUTPATIENT
Start: 2023-11-14 | End: 2023-11-14 | Stop reason: HOSPADM

## 2023-11-14 RX ORDER — LOSARTAN POTASSIUM 100 MG/1
100 TABLET ORAL EVERY MORNING
Status: DISCONTINUED | OUTPATIENT
Start: 2023-11-14 | End: 2023-11-14 | Stop reason: HOSPADM

## 2023-11-14 RX ORDER — AMLODIPINE BESYLATE 5 MG/1
5 TABLET ORAL NIGHTLY
Status: DISCONTINUED | OUTPATIENT
Start: 2023-11-14 | End: 2023-11-14 | Stop reason: HOSPADM

## 2023-11-14 RX ORDER — LORAZEPAM 2 MG/1
4 TABLET ORAL
Status: DISCONTINUED | OUTPATIENT
Start: 2023-11-14 | End: 2023-11-14 | Stop reason: HOSPADM

## 2023-11-14 RX ORDER — MULTIVITAMIN WITH IRON
1 TABLET ORAL DAILY
Status: DISCONTINUED | OUTPATIENT
Start: 2023-11-14 | End: 2023-11-14 | Stop reason: HOSPADM

## 2023-11-14 RX ORDER — SODIUM CHLORIDE 0.9 % (FLUSH) 0.9 %
5-40 SYRINGE (ML) INJECTION PRN
Status: DISCONTINUED | OUTPATIENT
Start: 2023-11-14 | End: 2023-11-14 | Stop reason: HOSPADM

## 2023-11-14 RX ORDER — LOSARTAN POTASSIUM 100 MG/1
100 TABLET ORAL EVERY MORNING
Qty: 30 TABLET | Refills: 0 | Status: SHIPPED | OUTPATIENT
Start: 2023-11-14

## 2023-11-14 RX ORDER — AMLODIPINE BESYLATE 10 MG/1
5 TABLET ORAL NIGHTLY
Qty: 15 TABLET | Refills: 0 | Status: SHIPPED | OUTPATIENT
Start: 2023-11-14 | End: 2023-12-14

## 2023-11-14 RX ORDER — MECOBALAMIN 5000 MCG
5 TABLET,DISINTEGRATING ORAL NIGHTLY PRN
Status: DISCONTINUED | OUTPATIENT
Start: 2023-11-14 | End: 2023-11-14 | Stop reason: HOSPADM

## 2023-11-14 RX ORDER — LORAZEPAM 2 MG/1
2 TABLET ORAL 2 TIMES DAILY
Status: DISCONTINUED | OUTPATIENT
Start: 2023-11-15 | End: 2023-11-14

## 2023-11-14 RX ADMIN — GABAPENTIN 400 MG: 400 CAPSULE ORAL at 00:39

## 2023-11-14 RX ADMIN — ACETAMINOPHEN 650 MG: 325 TABLET ORAL at 00:39

## 2023-11-14 RX ADMIN — ACETAMINOPHEN 650 MG: 325 TABLET ORAL at 09:30

## 2023-11-14 RX ADMIN — GABAPENTIN 400 MG: 400 CAPSULE ORAL at 09:26

## 2023-11-14 RX ADMIN — ATORVASTATIN CALCIUM 80 MG: 80 TABLET, FILM COATED ORAL at 00:39

## 2023-11-14 RX ADMIN — AMLODIPINE BESYLATE 5 MG: 5 TABLET ORAL at 00:39

## 2023-11-14 RX ADMIN — THERA TABS 1 TABLET: TAB at 09:26

## 2023-11-14 RX ADMIN — Medication 5 ML: at 09:00

## 2023-11-14 RX ADMIN — Medication 100 MG: at 09:26

## 2023-11-14 RX ADMIN — CARVEDILOL 12.5 MG: 12.5 TABLET, FILM COATED ORAL at 09:26

## 2023-11-14 RX ADMIN — FOLIC ACID 1 MG: 1 TABLET ORAL at 09:27

## 2023-11-14 RX ADMIN — ENOXAPARIN SODIUM 40 MG: 100 INJECTION SUBCUTANEOUS at 09:26

## 2023-11-14 RX ADMIN — ACETAMINOPHEN 650 MG: 325 TABLET ORAL at 14:38

## 2023-11-14 RX ADMIN — LORAZEPAM 2 MG: 2 TABLET ORAL at 00:39

## 2023-11-14 RX ADMIN — LOSARTAN POTASSIUM 100 MG: 100 TABLET, FILM COATED ORAL at 09:27

## 2023-11-14 SDOH — ECONOMIC STABILITY: INCOME INSECURITY: IN THE PAST 12 MONTHS, HAS THE ELECTRIC, GAS, OIL, OR WATER COMPANY THREATENED TO SHUT OFF SERVICE IN YOUR HOME?: NO

## 2023-11-14 SDOH — ECONOMIC STABILITY: INCOME INSECURITY: HOW HARD IS IT FOR YOU TO PAY FOR THE VERY BASICS LIKE FOOD, HOUSING, MEDICAL CARE, AND HEATING?: SOMEWHAT HARD

## 2023-11-14 SDOH — ECONOMIC STABILITY: FOOD INSECURITY: WITHIN THE PAST 12 MONTHS, YOU WORRIED THAT YOUR FOOD WOULD RUN OUT BEFORE YOU GOT MONEY TO BUY MORE.: SOMETIMES TRUE

## 2023-11-14 ASSESSMENT — PAIN - FUNCTIONAL ASSESSMENT
PAIN_FUNCTIONAL_ASSESSMENT: ACTIVITIES ARE NOT PREVENTED

## 2023-11-14 ASSESSMENT — PAIN DESCRIPTION - ORIENTATION
ORIENTATION: RIGHT
ORIENTATION: RIGHT
ORIENTATION: MID

## 2023-11-14 ASSESSMENT — ENCOUNTER SYMPTOMS
PHOTOPHOBIA: 1
VOMITING: 0
NAUSEA: 1
SHORTNESS OF BREATH: 1

## 2023-11-14 ASSESSMENT — PAIN DESCRIPTION - PAIN TYPE: TYPE: ACUTE PAIN

## 2023-11-14 ASSESSMENT — PAIN DESCRIPTION - DESCRIPTORS
DESCRIPTORS: STABBING
DESCRIPTORS: ACHING
DESCRIPTORS: STABBING;BURNING

## 2023-11-14 ASSESSMENT — PAIN SCALES - GENERAL
PAINLEVEL_OUTOF10: 6
PAINLEVEL_OUTOF10: 6
PAINLEVEL_OUTOF10: 5

## 2023-11-14 ASSESSMENT — PAIN DESCRIPTION - LOCATION
LOCATION: HEAD

## 2023-11-14 ASSESSMENT — PATIENT HEALTH QUESTIONNAIRE - PHQ9
SUM OF ALL RESPONSES TO PHQ QUESTIONS 1-9: 5
1. LITTLE INTEREST OR PLEASURE IN DOING THINGS: 2
SUM OF ALL RESPONSES TO PHQ QUESTIONS 1-9: 5
SUM OF ALL RESPONSES TO PHQ QUESTIONS 1-9: 5
SUM OF ALL RESPONSES TO PHQ9 QUESTIONS 1 & 2: 5
SUM OF ALL RESPONSES TO PHQ QUESTIONS 1-9: 5
2. FEELING DOWN, DEPRESSED OR HOPELESS: 3

## 2023-11-14 NOTE — H&P
for evalutation and Txx as indicated: not indicated  ADULT DIET; Regular; Low Fat/Low Chol/High Fiber/2 gm Na; Safety Tray; Safety Tray (Disposables No Utensils)  calcium carbonate, sodium chloride flush, sodium chloride, potassium chloride **OR** potassium alternative oral replacement **OR** potassium chloride, magnesium sulfate, acetaminophen **OR** acetaminophen, sodium phosphate 13.05 mmol in sodium chloride 0.9 % 250 mL IVPB **OR** sodium phosphate 26.1 mmol in sodium chloride 0.9 % 250 mL IVPB, polyethylene glycol, melatonin, LORazepam **OR** LORazepam **OR** LORazepam **OR** LORazepam **OR** LORazepam **OR** LORazepam **OR** LORazepam **OR** LORazepam      Care time of 35 minutes  Pt seen/examined and admitted to inpatient status. Inpatient status is used for patients with an expected LOS extending past two midnights due to medical therapy and or critical care needs, otherwise patients are placed to OBServation status. Signed:  Electronically signed by Kevin Sarmiento MD on 11/14/23 at 2:55 AM CST.

## 2023-11-14 NOTE — PROGRESS NOTES
4 Eyes Skin Assessment     NAME:  Deyvi Diaz  YOB: 1970  MEDICAL RECORD NUMBER:  [id number]    The patient is being assessed for  Admission    I agree that at least one RN has performed a thorough Head to Toe Skin Assessment on the patient. ALL assessment sites listed below have been assessed. Areas assessed by both nurses:    Head, Face, Ears, Shoulders, Back, Chest, Arms, Elbows, Hands, Sacrum. Buttock, Coccyx, Ischium, Legs. Feet and Heels, and Under Medical Devices         Does the Patient have a Wound?  No noted wound(s)       Wu Prevention initiated by RN: No  Wound Care Orders initiated by RN: No    Pressure Injury (Stage 3,4, Unstageable, DTI, NWPT, and Complex wounds) if present, place Wound referral order by RN under : No    New Ostomies, if present place, Ostomy referral order under : No     Nurse 1 eSignature: Electronically signed by Sita Cameron RN on 11/14/23 at 1:25 AM CST    **SHARE this note so that the co-signing nurse can place an eSignature**    Nurse 2 eSignature: Electronically signed by Skyler Colin RN on 11/14/23 at 1:27 AM CST

## 2023-11-14 NOTE — ED PROVIDER NOTES
high  Diagnostic procedures: moderate  Management options: high    Patient Progress  Patient progress: stable         Based on the evaluation and work-up here patient is felt to require further monitoring, work-up, or treatment that is available in the emergency department. Case was discussed with hospitalist who agrees for observation or admission for further management. Treatment and stabilization as necessary were provided in the emergency department prior to transfer of care to the medicine service. CONSULTS:  IP CONSULT TO SOCIAL WORK    PROCEDURES:  Unless otherwise notedbelow, none     Procedures      FINAL IMPRESSION     1. Depression with suicidal ideation    2. At risk for withdrawal    3. Acute alcoholic intoxication with delirium ([address])    4. Acute nonintractable headache, unspecified headache type          DISPOSITION/PLAN   DISPOSITION Admitted 11/13/2023 10:05:39 PM      No notes of EC Admission Criteria type on file. PATIENT REFERRED TO:  No follow-up provider specified.     DISCHARGE MEDICATIONS:  Current Discharge Medication List             (Please note that portions of this note were completed with a voice recognition program.  Efforts were made to edit the dictations butoccasionally words are mis-transcribed.)    Raji Rolon MD (electronically signed)  AttendingEmerB[facility] Physician          Raji Herrera MD  11/14/23 3104

## 2023-11-14 NOTE — CONSULTS
[facility]    Psychiatric Initial Consult    Date of Evaluation:  11/14/2023  Session Type:  Psychiatric Initial Consult  Name:  Gaspar Sever  Age:  52 y.o. Sex:  male  Ethnicity:   Primary Care Physician:  Judge Martin MD   Patient Care Team:  Patient Care Team:  Judge Martin MD as PCP - General (Internal Medicine)  Judge Martin MD as PCP - Empaneled Provider  Blake Yates MD as Consulting Physician (Cardiology)  Chief Complaint: \" I am doing fine. \"    History of Present Illness      Patient is a 52year old c/m who presented with alcohol intoxication and suicidal ideation. Previous psychiatric history of depression, anxiety, uncomplicated alcohol use disorder, cannabis use disorder, substance-induced mood disorder, who has been admitted to medical services secondary to alcohol intoxication, . UDS positive for benzodiazapine's. Today he denies suicidal ideation, homicidal ideation and psychosis. He reports that he has been having migraines that are \"extremely painful. \" He reports that he told someone yesterday that he wanted to \"blow his brains if it would make his head feel better. \" Today he reports he does not have any access to any weapons. He reports he was intoxicated when he made that statement and did not mean to say that. Reports he drank 35 beers on Friday night, 12 beers on Saturday and Sunday and 5 on Monday. He denies any current alcohol withdrawal symptoms at this time. Admits that he missed his last few appointments with mental health however plans to keep his next appointment. He denies any current depressive symptoms. He denies feels helpless, hopeless, or worthless. He denies any prior nona or hypomania. He is currently future oriented and is looking forward to his outpatient appointments. Allergies:    Allergies as of 11/13/2023 - Fully Reviewed 11/13/2023   Allergen Reaction Noted    Zofran [ondansetron] Other (See

## 2023-11-14 NOTE — DISCHARGE SUMMARY
tablet  Commonly known as: PERCOCET     pantoprazole 40 MG tablet  Commonly known as: PROTONIX  Take 1 tablet by mouth every morning (before breakfast)     vitamin B-1 100 MG tablet  Commonly known as: THIAMINE  Take 1 tablet by mouth daily     vitamin D 1.25 MG (87534 UT) Caps capsule  Commonly known as: ERGOCALCIFEROL  Take 1 capsule by mouth once a week for 11 doses               Discharge Instructions: Follow up with Samira Salcedo MD in 7 days. Take medications as directed. Resume activity as tolerated. Followup Appointments Scheduled at Time of Discharge:  Future Appointments   Date Time Provider [address]   11/16/2023  9:15 AM Samira Salcedo MD [facility]        Diet: ADULT DIET; Regular; Low Fat/Low Chol/High Fiber/2 gm Na; Safety Tray; Safety Tray (Disposables No Utensils)     Disposition: Patient is medically stable and will be discharged today. Time spent on discharge 38 minutes spent in assessing patient, reviewing medications, discussion with nursing, confirming safe discharge plan and preparation of discharge summary. Signed:  Electronically signed by KWASI Vergara CNP on 11/14/23 at 1:17 PM CST     EMR Dragon/Transcription disclaimer:   Much of this encounter note is an electronic transcription/translation of spoken language to printed text.  The electronic translation of spoken language may permit erroneous, or at times, nonsensical words or phrases to be inadvertently transcribed; although attempts have made to review the note for such errors, some may still exist.

## 2023-11-14 NOTE — DISCHARGE INSTRUCTIONS
Keep follow up appointments   Take medications as directed   follow-up with psych on an OP basis for continued therapy.  Would also recommend that patient follow up   With local alcohol rehab facilities and  recommend that patient follow up with Neurology for evaluation of his chronic headaches as his head CT was negative  Would also recommend that he restart taking his BP medications as this could be contributing to his recurrent headaches as well

## 2023-11-14 NOTE — DISCHARGE INSTR - DIET

## 2023-11-15 ENCOUNTER — TELEPHONE (OUTPATIENT)
Dept: NEUROLOGY | Age: 53
End: 2023-11-15

## 2023-11-15 ENCOUNTER — TELEPHONE (OUTPATIENT)
Dept: PRIMARY CARE CLINIC | Age: 53
End: 2023-11-15

## 2023-11-15 NOTE — TELEPHONE ENCOUNTER
Care Transitions Initial Follow Up Call    Outreach made within 2 business days of discharge: Yes    Patient: Mirtha Amado   Patient : 1970   MRN: [id number]    Reason for Admission:    Alcohol Problem       Pt reports he is detoxing off of alcohol, last drink 3 hours PTA    Mental Health Problem       \"If I had a fucking gun I would shoot myself\"    Concern For COVID-19       Exposure to covid per pt     Discharge Date: 23       Spoke with: did not reach patient. I left a message on his identified voicemail  with a reminder about his appointment tomorrow morning. I left my direct number for call back. I will not make another call to him as he is being seen in less that 48 hours post discharge.

## 2023-11-15 NOTE — TELEPHONE ENCOUNTER
Received a referral for this patient. Called patient to see about getting him scheduled an appointment with Dr. Leandra Lynn. Patient stated that at this time patient does not need this appointment at this time. He is trying to get his BP under control first. He will call our office back to schedule an appointment.

## 2023-11-20 ENCOUNTER — TELEPHONE (OUTPATIENT)
Dept: PSYCHIATRY | Age: 53
End: 2023-11-20

## 2023-11-20 NOTE — TELEPHONE ENCOUNTER
Called pt for appointment reminder.   -left voicemail, requesting a return call    Electronically signed by Kike Palmer, [facility] on 11/20/2023 at 12:58 PM

## 2023-11-21 ENCOUNTER — HOSPITAL ENCOUNTER (OUTPATIENT)
Age: 53
Setting detail: OBSERVATION
Discharge: HOME | End: 2023-11-22
Attending: EMERGENCY MEDICINE | Admitting: STUDENT IN AN ORGANIZED HEALTH CARE EDUCATION/TRAINING PROGRAM
Payer: MEDICAID

## 2023-11-21 ENCOUNTER — APPOINTMENT (OUTPATIENT)
Dept: CT IMAGING | Age: 53
End: 2023-11-21
Payer: MEDICAID

## 2023-11-21 DIAGNOSIS — S32.2XXA CLOSED FRACTURE OF COCCYX, INITIAL ENCOUNTER (HCC): ICD-10-CM

## 2023-11-21 DIAGNOSIS — R45.851 DEPRESSION WITH SUICIDAL IDEATION: Primary | ICD-10-CM

## 2023-11-21 DIAGNOSIS — F10.920 ACUTE ALCOHOLIC INTOXICATION WITHOUT COMPLICATION (HCC): ICD-10-CM

## 2023-11-21 DIAGNOSIS — F32.A DEPRESSION WITH SUICIDAL IDEATION: Primary | ICD-10-CM

## 2023-11-21 PROBLEM — S32.10XA CLOSED FRACTURE OF SACRUM AND COCCYX (HCC): Status: ACTIVE | Noted: 2023-11-21

## 2023-11-21 PROBLEM — F10.220 ALCOHOL INTOXICATION IN ALCOHOLISM WITH BLOOD LEVEL OVER 0.3 WITHOUT COMPLICATION (HCC): Status: ACTIVE | Noted: 2023-11-21

## 2023-11-21 LAB
ALBUMIN SERPL-MCNC: 4.2 G/DL (ref 3.5–5.2)
ALP SERPL-CCNC: 159 U/L (ref 40–130)
ALT SERPL-CCNC: 56 U/L (ref 5–41)
AMPHET UR QL SCN: NEGATIVE
ANION GAP SERPL CALCULATED.3IONS-SCNC: 15 MMOL/L (ref 7–19)
AST SERPL-CCNC: 92 U/L (ref 5–40)
BARBITURATES UR QL SCN: NEGATIVE
BASOPHILS # BLD: 0 K/UL (ref 0–0.2)
BASOPHILS NFR BLD: 0.5 % (ref 0–1)
BENZODIAZ UR QL SCN: POSITIVE
BILIRUB SERPL-MCNC: 0.6 MG/DL (ref 0.2–1.2)
BILIRUB UR QL STRIP: NEGATIVE
BUN SERPL-MCNC: 7 MG/DL (ref 6–20)
BUPRENORPHINE URINE: NEGATIVE
CALCIUM SERPL-MCNC: 8.6 MG/DL (ref 8.6–10)
CANNABINOIDS UR QL SCN: NEGATIVE
CHLORIDE SERPL-SCNC: 95 MMOL/L (ref 98–111)
CLARITY UR: CLEAR
CO2 SERPL-SCNC: 23 MMOL/L (ref 22–29)
COCAINE UR QL SCN: NEGATIVE
COLOR UR: YELLOW
CREAT SERPL-MCNC: 1 MG/DL (ref 0.5–1.2)
DRUG SCREEN COMMENT UR-IMP: ABNORMAL
EOSINOPHIL # BLD: 0.1 K/UL (ref 0–0.6)
EOSINOPHIL NFR BLD: 2.2 % (ref 0–5)
ERYTHROCYTE [DISTWIDTH] IN BLOOD BY AUTOMATED COUNT: 12.5 % (ref 11.5–14.5)
ETHANOLAMINE SERPL-MCNC: 309 MG/DL (ref 0–0.08)
FENTANYL SCREEN, URINE: NEGATIVE
GLUCOSE SERPL-MCNC: 94 MG/DL (ref 74–109)
GLUCOSE UR STRIP.AUTO-MCNC: NEGATIVE MG/DL
HCT VFR BLD AUTO: 39.3 % (ref 42–52)
HGB BLD-MCNC: 13.4 G/DL (ref 14–18)
HGB UR STRIP.AUTO-MCNC: NEGATIVE MG/L
IMM GRANULOCYTES # BLD: 0 K/UL
KETONES UR STRIP.AUTO-MCNC: NEGATIVE MG/DL
LEUKOCYTE ESTERASE UR QL STRIP.AUTO: NEGATIVE
LYMPHOCYTES # BLD: 1.5 K/UL (ref 1.1–4.5)
LYMPHOCYTES NFR BLD: 27.2 % (ref 20–40)
MAGNESIUM SERPL-MCNC: 1.9 MG/DL (ref 1.6–2.6)
MCH RBC QN AUTO: 31.8 PG (ref 27–31)
MCHC RBC AUTO-ENTMCNC: 34.1 G/DL (ref 33–37)
MCV RBC AUTO: 93.3 FL (ref 80–94)
METHADONE UR QL SCN: NEGATIVE
METHAMPHETAMINE, URINE: NEGATIVE
MONOCYTES # BLD: 0.6 K/UL (ref 0–0.9)
MONOCYTES NFR BLD: 10.1 % (ref 0–10)
NEUTROPHILS # BLD: 3.3 K/UL (ref 1.5–7.5)
NEUTS SEG NFR BLD: 59.5 % (ref 50–65)
NITRITE UR QL STRIP.AUTO: NEGATIVE
OPIATES UR QL SCN: NEGATIVE
OXYCODONE UR QL SCN: NEGATIVE
PCP UR QL SCN: NEGATIVE
PH UR STRIP.AUTO: 5.5 [PH] (ref 5–8)
PLATELET # BLD AUTO: 227 K/UL (ref 130–400)
PMV BLD AUTO: 8.9 FL (ref 9.4–12.4)
POTASSIUM SERPL-SCNC: 4.3 MMOL/L (ref 3.5–5)
PROT SERPL-MCNC: 7.1 G/DL (ref 6.6–8.7)
PROT UR STRIP.AUTO-MCNC: NEGATIVE MG/DL
RBC # BLD AUTO: 4.21 M/UL (ref 4.7–6.1)
SARS-COV-2 RDRP RESP QL NAA+PROBE: NOT DETECTED
SODIUM SERPL-SCNC: 133 MMOL/L (ref 136–145)
SP GR UR STRIP.AUTO: 1 (ref 1–1.03)
TRICYCLIC, URINE: NEGATIVE
UROBILINOGEN UR STRIP.AUTO-MCNC: 0.2 E.U./DL
WBC # BLD AUTO: 5.6 K/UL (ref 4.8–10.8)

## 2023-11-21 PROCEDURE — 2580000003 HC RX 258: Performed by: NURSE PRACTITIONER

## 2023-11-21 PROCEDURE — 96374 THER/PROPH/DIAG INJ IV PUSH: CPT

## 2023-11-21 PROCEDURE — 6360000002 HC RX W HCPCS: Performed by: EMERGENCY MEDICINE

## 2023-11-21 PROCEDURE — 87635 SARS-COV-2 COVID-19 AMP PRB: CPT

## 2023-11-21 PROCEDURE — 99285 EMERGENCY DEPT VISIT HI MDM: CPT

## 2023-11-21 PROCEDURE — 96372 THER/PROPH/DIAG INJ SC/IM: CPT

## 2023-11-21 PROCEDURE — 6360000002 HC RX W HCPCS: Performed by: NURSE PRACTITIONER

## 2023-11-21 PROCEDURE — 81003 URINALYSIS AUTO W/O SCOPE: CPT

## 2023-11-21 PROCEDURE — G0480 DRUG TEST DEF 1-7 CLASSES: HCPCS

## 2023-11-21 PROCEDURE — 85025 COMPLETE CBC W/AUTO DIFF WBC: CPT

## 2023-11-21 PROCEDURE — 80307 DRUG TEST PRSMV CHEM ANLYZR: CPT

## 2023-11-21 PROCEDURE — 96375 TX/PRO/DX INJ NEW DRUG ADDON: CPT

## 2023-11-21 PROCEDURE — 6360000002 HC RX W HCPCS: Performed by: STUDENT IN AN ORGANIZED HEALTH CARE EDUCATION/TRAINING PROGRAM

## 2023-11-21 PROCEDURE — 96361 HYDRATE IV INFUSION ADD-ON: CPT

## 2023-11-21 PROCEDURE — G0378 HOSPITAL OBSERVATION PER HR: HCPCS

## 2023-11-21 PROCEDURE — 6370000000 HC RX 637 (ALT 250 FOR IP): Performed by: EMERGENCY MEDICINE

## 2023-11-21 PROCEDURE — 6370000000 HC RX 637 (ALT 250 FOR IP): Performed by: NURSE PRACTITIONER

## 2023-11-21 PROCEDURE — 82077 ASSAY SPEC XCP UR&BREATH IA: CPT

## 2023-11-21 PROCEDURE — 80053 COMPREHEN METABOLIC PANEL: CPT

## 2023-11-21 PROCEDURE — 83735 ASSAY OF MAGNESIUM: CPT

## 2023-11-21 PROCEDURE — 72192 CT PELVIS W/O DYE: CPT

## 2023-11-21 PROCEDURE — 36415 COLL VENOUS BLD VENIPUNCTURE: CPT

## 2023-11-21 RX ORDER — POTASSIUM CHLORIDE 20 MEQ/1
40 TABLET, EXTENDED RELEASE ORAL PRN
Status: DISCONTINUED | OUTPATIENT
Start: 2023-11-21 | End: 2023-11-22 | Stop reason: HOSPADM

## 2023-11-21 RX ORDER — ENOXAPARIN SODIUM 100 MG/ML
40 INJECTION SUBCUTANEOUS DAILY
Status: DISCONTINUED | OUTPATIENT
Start: 2023-11-21 | End: 2023-11-22 | Stop reason: HOSPADM

## 2023-11-21 RX ORDER — LORAZEPAM 2 MG/ML
1 INJECTION INTRAMUSCULAR
Status: DISCONTINUED | OUTPATIENT
Start: 2023-11-21 | End: 2023-11-22 | Stop reason: HOSPADM

## 2023-11-21 RX ORDER — MAGNESIUM SULFATE IN WATER 40 MG/ML
2000 INJECTION, SOLUTION INTRAVENOUS PRN
Status: DISCONTINUED | OUTPATIENT
Start: 2023-11-21 | End: 2023-11-22 | Stop reason: HOSPADM

## 2023-11-21 RX ORDER — PANTOPRAZOLE SODIUM 40 MG/1
40 TABLET, DELAYED RELEASE ORAL
Status: DISCONTINUED | OUTPATIENT
Start: 2023-11-22 | End: 2023-11-22 | Stop reason: HOSPADM

## 2023-11-21 RX ORDER — PROMETHAZINE HYDROCHLORIDE 25 MG/ML
12.5 INJECTION, SOLUTION INTRAMUSCULAR; INTRAVENOUS EVERY 6 HOURS PRN
Status: DISCONTINUED | OUTPATIENT
Start: 2023-11-21 | End: 2023-11-22 | Stop reason: HOSPADM

## 2023-11-21 RX ORDER — HYDROCODONE BITARTRATE AND ACETAMINOPHEN 5; 325 MG/1; MG/1
1 TABLET ORAL
Status: COMPLETED | OUTPATIENT
Start: 2023-11-21 | End: 2023-11-21

## 2023-11-21 RX ORDER — POLYETHYLENE GLYCOL 3350 17 G/17G
17 POWDER, FOR SOLUTION ORAL DAILY PRN
Status: DISCONTINUED | OUTPATIENT
Start: 2023-11-21 | End: 2023-11-22 | Stop reason: HOSPADM

## 2023-11-21 RX ORDER — LORAZEPAM 2 MG/ML
3 INJECTION INTRAMUSCULAR
Status: DISCONTINUED | OUTPATIENT
Start: 2023-11-21 | End: 2023-11-22 | Stop reason: HOSPADM

## 2023-11-21 RX ORDER — ACETAMINOPHEN 325 MG/1
650 TABLET ORAL EVERY 6 HOURS PRN
Status: DISCONTINUED | OUTPATIENT
Start: 2023-11-21 | End: 2023-11-22 | Stop reason: HOSPADM

## 2023-11-21 RX ORDER — GABAPENTIN 400 MG/1
400 CAPSULE ORAL 2 TIMES DAILY
Status: DISCONTINUED | OUTPATIENT
Start: 2023-11-21 | End: 2023-11-22 | Stop reason: HOSPADM

## 2023-11-21 RX ORDER — POTASSIUM CHLORIDE 7.45 MG/ML
10 INJECTION INTRAVENOUS PRN
Status: DISCONTINUED | OUTPATIENT
Start: 2023-11-21 | End: 2023-11-22 | Stop reason: HOSPADM

## 2023-11-21 RX ORDER — OXYCODONE AND ACETAMINOPHEN 7.5; 325 MG/1; MG/1
1 TABLET ORAL EVERY 8 HOURS PRN
Status: DISCONTINUED | OUTPATIENT
Start: 2023-11-21 | End: 2023-11-22 | Stop reason: HOSPADM

## 2023-11-21 RX ORDER — SODIUM CHLORIDE 9 MG/ML
INJECTION, SOLUTION INTRAVENOUS CONTINUOUS
Status: DISCONTINUED | OUTPATIENT
Start: 2023-11-21 | End: 2023-11-22 | Stop reason: HOSPADM

## 2023-11-21 RX ORDER — LAMOTRIGINE 150 MG/1
300 TABLET ORAL NIGHTLY
COMMUNITY

## 2023-11-21 RX ORDER — AMLODIPINE BESYLATE 5 MG/1
5 TABLET ORAL NIGHTLY
Status: DISCONTINUED | OUTPATIENT
Start: 2023-11-21 | End: 2023-11-22 | Stop reason: HOSPADM

## 2023-11-21 RX ORDER — MULTIVITAMIN WITH IRON
1 TABLET ORAL DAILY
Status: DISCONTINUED | OUTPATIENT
Start: 2023-11-21 | End: 2023-11-22 | Stop reason: HOSPADM

## 2023-11-21 RX ORDER — LORAZEPAM 2 MG/1
2 TABLET ORAL
Status: DISCONTINUED | OUTPATIENT
Start: 2023-11-21 | End: 2023-11-22 | Stop reason: HOSPADM

## 2023-11-21 RX ORDER — LORAZEPAM 2 MG/ML
2 INJECTION INTRAMUSCULAR
Status: DISCONTINUED | OUTPATIENT
Start: 2023-11-21 | End: 2023-11-22 | Stop reason: HOSPADM

## 2023-11-21 RX ORDER — LORAZEPAM 2 MG/1
4 TABLET ORAL
Status: DISCONTINUED | OUTPATIENT
Start: 2023-11-21 | End: 2023-11-22 | Stop reason: HOSPADM

## 2023-11-21 RX ORDER — SODIUM CHLORIDE 0.9 % (FLUSH) 0.9 %
5-40 SYRINGE (ML) INJECTION PRN
Status: DISCONTINUED | OUTPATIENT
Start: 2023-11-21 | End: 2023-11-22 | Stop reason: HOSPADM

## 2023-11-21 RX ORDER — SODIUM CHLORIDE 9 MG/ML
INJECTION, SOLUTION INTRAVENOUS PRN
Status: DISCONTINUED | OUTPATIENT
Start: 2023-11-21 | End: 2023-11-22 | Stop reason: HOSPADM

## 2023-11-21 RX ORDER — CARVEDILOL 12.5 MG/1
12.5 TABLET ORAL 2 TIMES DAILY WITH MEALS
Status: DISCONTINUED | OUTPATIENT
Start: 2023-11-21 | End: 2023-11-22 | Stop reason: HOSPADM

## 2023-11-21 RX ORDER — GAUZE BANDAGE 2" X 2"
100 BANDAGE TOPICAL DAILY
Status: DISCONTINUED | OUTPATIENT
Start: 2023-11-21 | End: 2023-11-22 | Stop reason: HOSPADM

## 2023-11-21 RX ORDER — KETOROLAC TROMETHAMINE 30 MG/ML
30 INJECTION, SOLUTION INTRAMUSCULAR; INTRAVENOUS ONCE
Status: COMPLETED | OUTPATIENT
Start: 2023-11-21 | End: 2023-11-21

## 2023-11-21 RX ORDER — SODIUM CHLORIDE 0.9 % (FLUSH) 0.9 %
5-40 SYRINGE (ML) INJECTION EVERY 12 HOURS SCHEDULED
Status: DISCONTINUED | OUTPATIENT
Start: 2023-11-21 | End: 2023-11-22 | Stop reason: HOSPADM

## 2023-11-21 RX ORDER — LORAZEPAM 2 MG/ML
1 INJECTION INTRAMUSCULAR ONCE
Status: COMPLETED | OUTPATIENT
Start: 2023-11-21 | End: 2023-11-21

## 2023-11-21 RX ORDER — ACETAMINOPHEN 650 MG/1
650 SUPPOSITORY RECTAL EVERY 6 HOURS PRN
Status: DISCONTINUED | OUTPATIENT
Start: 2023-11-21 | End: 2023-11-22 | Stop reason: HOSPADM

## 2023-11-21 RX ORDER — LORAZEPAM 2 MG/ML
4 INJECTION INTRAMUSCULAR
Status: DISCONTINUED | OUTPATIENT
Start: 2023-11-21 | End: 2023-11-22 | Stop reason: HOSPADM

## 2023-11-21 RX ORDER — FOLIC ACID 1 MG/1
1 TABLET ORAL DAILY
Status: DISCONTINUED | OUTPATIENT
Start: 2023-11-21 | End: 2023-11-22 | Stop reason: HOSPADM

## 2023-11-21 RX ORDER — LOSARTAN POTASSIUM 100 MG/1
100 TABLET ORAL EVERY MORNING
Status: DISCONTINUED | OUTPATIENT
Start: 2023-11-22 | End: 2023-11-22 | Stop reason: HOSPADM

## 2023-11-21 RX ORDER — NALOXONE HYDROCHLORIDE 0.4 MG/ML
0.4 INJECTION, SOLUTION INTRAMUSCULAR; INTRAVENOUS; SUBCUTANEOUS PRN
Status: DISCONTINUED | OUTPATIENT
Start: 2023-11-21 | End: 2023-11-22 | Stop reason: HOSPADM

## 2023-11-21 RX ORDER — LORAZEPAM 1 MG/1
1 TABLET ORAL
Status: DISCONTINUED | OUTPATIENT
Start: 2023-11-21 | End: 2023-11-22 | Stop reason: HOSPADM

## 2023-11-21 RX ORDER — LIDOCAINE 4 G/G
1 PATCH TOPICAL DAILY
Status: DISCONTINUED | OUTPATIENT
Start: 2023-11-21 | End: 2023-11-22 | Stop reason: HOSPADM

## 2023-11-21 RX ADMIN — FOLIC ACID 1 MG: 1 TABLET ORAL at 17:47

## 2023-11-21 RX ADMIN — PROMETHAZINE HYDROCHLORIDE 12.5 MG: 25 INJECTION INTRAMUSCULAR; INTRAVENOUS at 19:48

## 2023-11-21 RX ADMIN — Medication 100 MG: at 17:47

## 2023-11-21 RX ADMIN — SODIUM CHLORIDE: 9 INJECTION, SOLUTION INTRAVENOUS at 22:06

## 2023-11-21 RX ADMIN — LORAZEPAM 1 MG: 2 INJECTION INTRAMUSCULAR; INTRAVENOUS at 14:04

## 2023-11-21 RX ADMIN — LORAZEPAM 1 MG: 1 TABLET ORAL at 18:28

## 2023-11-21 RX ADMIN — THERA TABS 1 TABLET: TAB at 17:54

## 2023-11-21 RX ADMIN — HYDROCODONE BITARTRATE AND ACETAMINOPHEN 1 TABLET: 5; 325 TABLET ORAL at 14:04

## 2023-11-21 RX ADMIN — CARVEDILOL 12.5 MG: 12.5 TABLET, FILM COATED ORAL at 17:47

## 2023-11-21 RX ADMIN — ENOXAPARIN SODIUM 40 MG: 100 INJECTION SUBCUTANEOUS at 17:49

## 2023-11-21 RX ADMIN — KETOROLAC TROMETHAMINE 30 MG: 30 INJECTION, SOLUTION INTRAMUSCULAR; INTRAVENOUS at 10:24

## 2023-11-21 RX ADMIN — OXYCODONE HYDROCHLORIDE AND ACETAMINOPHEN 1 TABLET: 7.5; 325 TABLET ORAL at 21:59

## 2023-11-21 SDOH — ECONOMIC STABILITY: FOOD INSECURITY: WITHIN THE PAST 12 MONTHS, YOU WORRIED THAT YOUR FOOD WOULD RUN OUT BEFORE YOU GOT MONEY TO BUY MORE.: SOMETIMES TRUE

## 2023-11-21 SDOH — ECONOMIC STABILITY: INCOME INSECURITY: HOW HARD IS IT FOR YOU TO PAY FOR THE VERY BASICS LIKE FOOD, HOUSING, MEDICAL CARE, AND HEATING?: SOMEWHAT HARD

## 2023-11-21 SDOH — ECONOMIC STABILITY: INCOME INSECURITY: IN THE PAST 12 MONTHS, HAS THE ELECTRIC, GAS, OIL, OR WATER COMPANY THREATENED TO SHUT OFF SERVICE IN YOUR HOME?: NO

## 2023-11-21 ASSESSMENT — ENCOUNTER SYMPTOMS
SORE THROAT: 0
ABDOMINAL PAIN: 0
CONSTIPATION: 0
APNEA: 0
EYE DISCHARGE: 0
DIARRHEA: 0
VOICE CHANGE: 0
BLOOD IN STOOL: 0
SINUS PRESSURE: 0
FACIAL SWELLING: 0
CHOKING: 0
SHORTNESS OF BREATH: 0
NAUSEA: 0

## 2023-11-21 ASSESSMENT — PAIN DESCRIPTION - ORIENTATION: ORIENTATION: MID

## 2023-11-21 ASSESSMENT — PATIENT HEALTH QUESTIONNAIRE - PHQ9
SUM OF ALL RESPONSES TO PHQ QUESTIONS 1-9: 5
2. FEELING DOWN, DEPRESSED OR HOPELESS: 3
1. LITTLE INTEREST OR PLEASURE IN DOING THINGS: 2
SUM OF ALL RESPONSES TO PHQ QUESTIONS 1-9: 5
SUM OF ALL RESPONSES TO PHQ9 QUESTIONS 1 & 2: 5

## 2023-11-21 ASSESSMENT — PAIN SCALES - GENERAL
PAINLEVEL_OUTOF10: 7
PAINLEVEL_OUTOF10: 8

## 2023-11-21 ASSESSMENT — PAIN DESCRIPTION - LOCATION
LOCATION: HEAD;COCCYX
LOCATION: BACK;NECK

## 2023-11-21 ASSESSMENT — PAIN DESCRIPTION - DESCRIPTORS: DESCRIPTORS: ACHING

## 2023-11-21 NOTE — H&P
the following: Automated exposure control, adjustment of the mA and/or kV according to size, and the use of iterative reconstruction technique. ______________________________________ Electronically signed by: Jeet Quiroga M.D. Date:     11/21/2023 Time:    10:46       Assessment/Plan:  Principal Problem:    Alcohol intoxication in alcoholism with blood level over 0.3 without complication ([address])  Active Problems:    Hypertension    Suicidal thoughts  Resolved Problems:    * No resolved hospital problems. *     Principal Problem:    Alcohol intoxication in alcoholism/Suicidal thoughts  -ns at 125cc/hr  -pt has 72hr hold in place  -flight risk precautions  -suicide precautions  -fall precautions   -seizure precautions   -ciwa protocol  -ativan prn  -daily thiamine/folic acid/mvi  -follow lytes  -social work consult  Active Problems:    Hypertension  -monitor blood pressure  -continue antihypertensive meds  -avoid hypotension    Fracture sacrum/coccyx  -continue home percocet  -continue home neurontin  -narcan 0.4mg iv prn  -lidoderm patch apply and remove q12hrs   Resolved Problems:    * No resolved hospital problems.  *  Signed:  KWASI Donnelly - CNP, 11/21/2023 2:57 PM

## 2023-11-21 NOTE — ED NOTES
Pt standing outside of room stating he wants to leave. Provider notified. Provider stated he will place pt on 72 hour hold due to pt stating he wanted to hurt himself. Pt is to be admitted to hospital medically and will be cleared by psych.      Fredrick Alvarado RN  11/21/23 4360

## 2023-11-21 NOTE — ED NOTES
Patient changed into scrubs, patient items where given to security.      Randy Galvez  11/21/23 6804

## 2023-11-22 ENCOUNTER — TELEPHONE (OUTPATIENT)
Dept: PSYCHIATRY | Age: 53
End: 2023-11-22

## 2023-11-22 VITALS
BODY MASS INDEX: 21.92 KG/M2 | SYSTOLIC BLOOD PRESSURE: 157 MMHG | TEMPERATURE: 97.3 F | HEART RATE: 71 BPM | OXYGEN SATURATION: 98 % | RESPIRATION RATE: 16 BRPM | DIASTOLIC BLOOD PRESSURE: 91 MMHG | WEIGHT: 180 LBS | HEIGHT: 76 IN

## 2023-11-22 LAB
ANION GAP SERPL CALCULATED.3IONS-SCNC: 7 MMOL/L (ref 7–19)
BUN SERPL-MCNC: 10 MG/DL (ref 6–20)
CALCIUM SERPL-MCNC: 8.6 MG/DL (ref 8.6–10)
CHLORIDE SERPL-SCNC: 104 MMOL/L (ref 98–111)
CO2 SERPL-SCNC: 26 MMOL/L (ref 22–29)
CREAT SERPL-MCNC: 1 MG/DL (ref 0.5–1.2)
ERYTHROCYTE [DISTWIDTH] IN BLOOD BY AUTOMATED COUNT: 12.6 % (ref 11.5–14.5)
ETHANOLAMINE SERPL-MCNC: <10 MG/DL (ref 0–0.08)
GLUCOSE SERPL-MCNC: 104 MG/DL (ref 74–109)
HCT VFR BLD AUTO: 33.6 % (ref 42–52)
HGB BLD-MCNC: 11.6 G/DL (ref 14–18)
MCH RBC QN AUTO: 32 PG (ref 27–31)
MCHC RBC AUTO-ENTMCNC: 34.5 G/DL (ref 33–37)
MCV RBC AUTO: 92.6 FL (ref 80–94)
PLATELET # BLD AUTO: 181 K/UL (ref 130–400)
PMV BLD AUTO: 9.4 FL (ref 9.4–12.4)
POTASSIUM SERPL-SCNC: 4.3 MMOL/L (ref 3.5–5)
RBC # BLD AUTO: 3.63 M/UL (ref 4.7–6.1)
SODIUM SERPL-SCNC: 137 MMOL/L (ref 136–145)
WBC # BLD AUTO: 3.3 K/UL (ref 4.8–10.8)

## 2023-11-22 PROCEDURE — 6360000002 HC RX W HCPCS: Performed by: NURSE PRACTITIONER

## 2023-11-22 PROCEDURE — 2580000003 HC RX 258: Performed by: NURSE PRACTITIONER

## 2023-11-22 PROCEDURE — 85027 COMPLETE CBC AUTOMATED: CPT

## 2023-11-22 PROCEDURE — 36415 COLL VENOUS BLD VENIPUNCTURE: CPT

## 2023-11-22 PROCEDURE — 80048 BASIC METABOLIC PNL TOTAL CA: CPT

## 2023-11-22 PROCEDURE — 82077 ASSAY SPEC XCP UR&BREATH IA: CPT

## 2023-11-22 PROCEDURE — 96376 TX/PRO/DX INJ SAME DRUG ADON: CPT

## 2023-11-22 PROCEDURE — G0378 HOSPITAL OBSERVATION PER HR: HCPCS

## 2023-11-22 PROCEDURE — 6370000000 HC RX 637 (ALT 250 FOR IP): Performed by: NURSE PRACTITIONER

## 2023-11-22 PROCEDURE — 96361 HYDRATE IV INFUSION ADD-ON: CPT

## 2023-11-22 PROCEDURE — 94760 N-INVAS EAR/PLS OXIMETRY 1: CPT

## 2023-11-22 RX ORDER — LIDOCAINE 4 G/G
1 PATCH TOPICAL DAILY
Status: CANCELLED | OUTPATIENT
Start: 2023-11-23

## 2023-11-22 RX ORDER — FAMOTIDINE 20 MG/1
20 TABLET, FILM COATED ORAL 2 TIMES DAILY
Qty: 60 TABLET | Refills: 0 | Status: SHIPPED | OUTPATIENT
Start: 2023-11-22

## 2023-11-22 RX ADMIN — Medication 100 MG: at 09:40

## 2023-11-22 RX ADMIN — THERA TABS 1 TABLET: TAB at 09:40

## 2023-11-22 RX ADMIN — SODIUM CHLORIDE: 9 INJECTION, SOLUTION INTRAVENOUS at 05:45

## 2023-11-22 RX ADMIN — LORAZEPAM 1 MG: 1 TABLET ORAL at 09:42

## 2023-11-22 RX ADMIN — PANTOPRAZOLE SODIUM 40 MG: 40 TABLET, DELAYED RELEASE ORAL at 06:00

## 2023-11-22 RX ADMIN — LORAZEPAM 1 MG: 2 INJECTION INTRAMUSCULAR; INTRAVENOUS at 01:52

## 2023-11-22 RX ADMIN — CARVEDILOL 12.5 MG: 12.5 TABLET, FILM COATED ORAL at 09:40

## 2023-11-22 RX ADMIN — GABAPENTIN 400 MG: 400 CAPSULE ORAL at 09:40

## 2023-11-22 RX ADMIN — FOLIC ACID 1 MG: 1 TABLET ORAL at 09:40

## 2023-11-22 RX ADMIN — OXYCODONE HYDROCHLORIDE AND ACETAMINOPHEN 1 TABLET: 7.5; 325 TABLET ORAL at 06:00

## 2023-11-22 RX ADMIN — OXYCODONE HYDROCHLORIDE AND ACETAMINOPHEN 1 TABLET: 7.5; 325 TABLET ORAL at 13:53

## 2023-11-22 RX ADMIN — LOSARTAN POTASSIUM 100 MG: 100 TABLET, FILM COATED ORAL at 09:40

## 2023-11-22 RX ADMIN — ACETAMINOPHEN 650 MG: 325 TABLET ORAL at 04:03

## 2023-11-22 ASSESSMENT — PAIN DESCRIPTION - ORIENTATION: ORIENTATION: MID

## 2023-11-22 ASSESSMENT — PAIN DESCRIPTION - DESCRIPTORS: DESCRIPTORS: ACHING

## 2023-11-22 ASSESSMENT — PAIN DESCRIPTION - LOCATION: LOCATION: BACK;NECK

## 2023-11-22 ASSESSMENT — PAIN SCALES - GENERAL: PAINLEVEL_OUTOF10: 6

## 2023-11-22 NOTE — TELEPHONE ENCOUNTER
Pt had an appt on 11/21/23 with Dr. Kori Rodriguez. Pt was in the ED at the time for his appt. MA notified his provider. Per his provider, she does not want the pt to be put back on her schedule. MA cancelled pt's appt. MA notified , Azael Parrish.     Electronically signed by Christopher Swanson MA on 11/22/2023 at 4:27 PM

## 2023-11-22 NOTE — CARE COORDINATION
Patient states he was just discharged from [facility] for Fx coccyx. He states his last drink was prior to admission here. He states he has been having thoughts of killing himself.     11/22/23 0651   Readmission Assessment   Number of Days since last admission? 8-30 days   Previous Disposition Outpatient Rehab   Who is being Interviewed Patient  (medical record)   What was the patient's/caregiver's perception as to why they think they needed to return back to the hospital? Other (Comment)  (having suicidal thoughts, etoh)   Did you visit your Primary Care Physician after you left the hospital, before you returned this time? No   Why weren't you able to visit your PCP? Did not want to go (Comment)   Did you see a specialist, such as Cardiac, Pulmonary, Orthopedic Physician, etc. after you left the hospital? No   Who advised the patient to return to the hospital? Self-referral   Does the patient report anything that got in the way of taking their medications? No   In our efforts to provide the best possible care to you and others like you, can you think of anything that we could have done to help you after you left the hospital the first time, so that you might not have needed to return so soon?  Other (Comment)  (he states he needs help with Alcohol and Depression)     Electronically signed by Amy Byrne RN, BSN on 11/22/2023 at 6:54 AM

## 2023-11-22 NOTE — DISCHARGE SUMMARY
Discharge Summary    NAME: Bisi Crabtree  :  1970  MRN:  [id number]    Admit date:  2023  Discharge date:  2023    Admitting Physician:  Layla Simmons MD    Advance Directive: Prior    Consults: psychiatry    Primary Care Physician:  Lydia Damon MD    HOSPITAL COURSE: The patient is a 52 y.o. male who presented to [facility] ED for evaluation of suicidal thoughts. Pt has history of suicidal ideations, CAD, HTN, alcoholism and chronic pain. Pt tells me that he slipped and fell at home landing on buttocks having initially presented to [facility] having had imaging of pelvis with noted fracture. He reported thoughts of suicide without plan relating that he has been in much pain related to coccyx fracture. He tells me that he is followed by pain management for chronic neck and back pain receives injections and reports being prescribed percocet 7.5mg three times daily. In ED, patient placed on 72hr emergency psychiatric hold, etoh 309, alk phos 159, ast 92, alt 56, covid test negative, uds positive for benzodiazepine, wbc 5.6k,h gb 13.4, platelets 383L, sodium 133, potassium 4.3, creatinine 1.0/bun 7, glucose 94, CT pelvis-Fracture of the sacrococcygeal junction. Pt is admitted observation to hospitalist.     is no suicidal homicidal but refused any inpatient psych for medication management also refused external drug rehab program , cleared by psychiatry DC one-to-one sitter. Seen and evaluated along with RN denied any kind of constipation, but had been stomach pain with reflux and takes Tums over-the-counter 4-5 times a day. With recent fracture over the sacrococcygeal area on lidocaine patch cream but it is not helping him, discussed about NSAIDs and local application, he also follow-up with pain management as outpatient for his chronic back pain.        DISCHARGE DIAGNOSES WITH COURSE OF MANAGEMENT :   Principal Problem:    Alcohol intoxication in alcoholism with

## 2023-11-22 NOTE — PROGRESS NOTES
CLINICAL PHARMACY NOTE: MEDS TO BEDS    Total # of Prescriptions Filled: 1   The following medications were delivered to the patient:    Famotidine       Additional Documentation:     Delivered Rx's to patients room. Gave Rx's to patient. Paid $0.00. Did not fill diclofenac (out of stock) patient can get OTC or transfer to another pharmacy.

## 2023-11-22 NOTE — PROGRESS NOTES
IV removed. DC instructions reviewed with patient. He verbalized understanding. His ride was in route. The patient declined wheelchair to the exit. Patient independently ambulatory.

## 2023-11-22 NOTE — CONSULTS
[facility]  Psychiatry Consult    Reason for Consult: Concern   Suicidal ideations    The primary source(s) of information include(s):  patient    The patient is a 52 y.o. male with previous psychiatric history of depression, anxiety, alcohol use disorder, cannabis use disorder, substance-induced mood disorder, who has been admitted to medical services, secondary to alcohol intoxication, blood alcohol level 309, and suicidal ideations. Patient is well-known to psychiatry due to multiple previous admissions to our psychiatric unit, as well as consults, when patient was admitted to medical services with same clinical presentation, as at present time. Patient has been seen in his room with presence of one-to-one sitter. He reported that after last discharge from the hospital a week ago he returned back home and started drinking alcohol at the same day, and has been drinking alcohol daily. Patient stated that he has been drinking beer  with high content of alcohol. Patient denies history of withdrawal seizures or DTs when he stopped drinking alcohol. During the interview patient denies any withdrawal symptoms from alcohol. He reported that he is dealing with chronic pain on his lower back related to coccyx fracture and drinks alcohol to decrease level of his pain. In regards of affective symptomatology, patient endorses feeling of depression, mild anxiety, decreased motivation, decreased concentration, decreased pleasure in previously enjoyed activities. He reported that his major life stress is \"loneliness\", stated \"I need to rearrange my life situation, I need some companionship\". Patient denies feeling of hopelessness, helplessness and worthlessness. He denies current active suicidal and homicidal ideations, denies any plans. Also he denies auditory and visual hallucinations. Patient did not endorse any delusions or paranoid thoughts.     He reported treatment

## 2023-11-27 ENCOUNTER — HOSPITAL ENCOUNTER (OUTPATIENT)
Age: 53
Setting detail: OBSERVATION
Discharge: HOME | End: 2023-11-29
Attending: EMERGENCY MEDICINE | Admitting: INTERNAL MEDICINE
Payer: MEDICAID

## 2023-11-27 DIAGNOSIS — R45.851 DEPRESSION WITH SUICIDAL IDEATION: Primary | ICD-10-CM

## 2023-11-27 DIAGNOSIS — F32.A DEPRESSION WITH SUICIDAL IDEATION: Primary | ICD-10-CM

## 2023-11-27 DIAGNOSIS — R44.3 HALLUCINATIONS: ICD-10-CM

## 2023-11-27 DIAGNOSIS — F10.920 ACUTE ALCOHOLIC INTOXICATION WITHOUT COMPLICATION (HCC): ICD-10-CM

## 2023-11-27 LAB
ALBUMIN SERPL-MCNC: 4 G/DL (ref 3.5–5.2)
ALP SERPL-CCNC: 130 U/L (ref 40–130)
ALT SERPL-CCNC: 37 U/L (ref 5–41)
AMPHET UR QL SCN: NEGATIVE
ANION GAP SERPL CALCULATED.3IONS-SCNC: 13 MMOL/L (ref 7–19)
AST SERPL-CCNC: 45 U/L (ref 5–40)
BACTERIA URNS QL MICRO: NEGATIVE /HPF
BARBITURATES UR QL SCN: NEGATIVE
BASOPHILS # BLD: 0 K/UL (ref 0–0.2)
BASOPHILS NFR BLD: 0.4 % (ref 0–1)
BENZODIAZ UR QL SCN: POSITIVE
BILIRUB SERPL-MCNC: 0.3 MG/DL (ref 0.2–1.2)
BILIRUB UR QL STRIP: NEGATIVE
BUN SERPL-MCNC: 9 MG/DL (ref 6–20)
BUPRENORPHINE URINE: NEGATIVE
CALCIUM SERPL-MCNC: 8.4 MG/DL (ref 8.6–10)
CANNABINOIDS UR QL SCN: POSITIVE
CHLORIDE SERPL-SCNC: 99 MMOL/L (ref 98–111)
CLARITY UR: CLEAR
CO2 SERPL-SCNC: 23 MMOL/L (ref 22–29)
COCAINE UR QL SCN: POSITIVE
COLOR UR: YELLOW
CREAT SERPL-MCNC: 0.9 MG/DL (ref 0.5–1.2)
CRYSTALS URNS MICRO: ABNORMAL /HPF
DRUG SCREEN COMMENT UR-IMP: ABNORMAL
EOSINOPHIL # BLD: 0.1 K/UL (ref 0–0.6)
EOSINOPHIL NFR BLD: 1.5 % (ref 0–5)
EPI CELLS #/AREA URNS AUTO: 1 /HPF (ref 0–5)
ERYTHROCYTE [DISTWIDTH] IN BLOOD BY AUTOMATED COUNT: 13.2 % (ref 11.5–14.5)
ETHANOLAMINE SERPL-MCNC: 156 MG/DL (ref 0–0.08)
ETHANOLAMINE SERPL-MCNC: 37 MG/DL (ref 0–0.08)
FENTANYL SCREEN, URINE: NEGATIVE
GLUCOSE SERPL-MCNC: 113 MG/DL (ref 74–109)
GLUCOSE UR STRIP.AUTO-MCNC: NEGATIVE MG/DL
HCT VFR BLD AUTO: 40.5 % (ref 42–52)
HGB BLD-MCNC: 14.2 G/DL (ref 14–18)
HGB UR STRIP.AUTO-MCNC: NEGATIVE MG/L
HYALINE CASTS #/AREA URNS AUTO: 3 /HPF (ref 0–8)
IMM GRANULOCYTES # BLD: 0 K/UL
KETONES UR STRIP.AUTO-MCNC: NEGATIVE MG/DL
LEUKOCYTE ESTERASE UR QL STRIP.AUTO: NEGATIVE
LYMPHOCYTES # BLD: 1.7 K/UL (ref 1.1–4.5)
LYMPHOCYTES NFR BLD: 31.6 % (ref 20–40)
MCH RBC QN AUTO: 32.3 PG (ref 27–31)
MCHC RBC AUTO-ENTMCNC: 35.1 G/DL (ref 33–37)
MCV RBC AUTO: 92.3 FL (ref 80–94)
METHADONE UR QL SCN: NEGATIVE
METHAMPHETAMINE, URINE: NEGATIVE
MONOCYTES # BLD: 0.4 K/UL (ref 0–0.9)
MONOCYTES NFR BLD: 8 % (ref 0–10)
NEUTROPHILS # BLD: 3.1 K/UL (ref 1.5–7.5)
NEUTS SEG NFR BLD: 58.3 % (ref 50–65)
NITRITE UR QL STRIP.AUTO: NEGATIVE
OPIATES UR QL SCN: NEGATIVE
OXYCODONE UR QL SCN: NEGATIVE
PCP UR QL SCN: NEGATIVE
PH UR STRIP.AUTO: 5.5 [PH] (ref 5–8)
PLATELET # BLD AUTO: 255 K/UL (ref 130–400)
PMV BLD AUTO: 9.1 FL (ref 9.4–12.4)
POTASSIUM SERPL-SCNC: 4.9 MMOL/L (ref 3.5–5)
PROT SERPL-MCNC: 7 G/DL (ref 6.6–8.7)
PROT UR STRIP.AUTO-MCNC: 30 MG/DL
RBC # BLD AUTO: 4.39 M/UL (ref 4.7–6.1)
RBC #/AREA URNS AUTO: 0 /HPF (ref 0–4)
REASON FOR REJECTION: NORMAL
REJECTED TEST: NORMAL
SARS-COV-2 RDRP RESP QL NAA+PROBE: NOT DETECTED
SODIUM SERPL-SCNC: 135 MMOL/L (ref 136–145)
SP GR UR STRIP.AUTO: 1.02 (ref 1–1.03)
TRICYCLIC, URINE: NEGATIVE
UROBILINOGEN UR STRIP.AUTO-MCNC: 0.2 E.U./DL
WBC # BLD AUTO: 5.3 K/UL (ref 4.8–10.8)
WBC #/AREA URNS AUTO: 1 /HPF (ref 0–5)

## 2023-11-27 PROCEDURE — 82077 ASSAY SPEC XCP UR&BREATH IA: CPT

## 2023-11-27 PROCEDURE — 94760 N-INVAS EAR/PLS OXIMETRY 1: CPT

## 2023-11-27 PROCEDURE — 85025 COMPLETE CBC W/AUTO DIFF WBC: CPT

## 2023-11-27 PROCEDURE — 80307 DRUG TEST PRSMV CHEM ANLYZR: CPT

## 2023-11-27 PROCEDURE — G0378 HOSPITAL OBSERVATION PER HR: HCPCS

## 2023-11-27 PROCEDURE — 6360000002 HC RX W HCPCS: Performed by: NURSE PRACTITIONER

## 2023-11-27 PROCEDURE — 99285 EMERGENCY DEPT VISIT HI MDM: CPT

## 2023-11-27 PROCEDURE — 80053 COMPREHEN METABOLIC PANEL: CPT

## 2023-11-27 PROCEDURE — 96376 TX/PRO/DX INJ SAME DRUG ADON: CPT

## 2023-11-27 PROCEDURE — G0480 DRUG TEST DEF 1-7 CLASSES: HCPCS

## 2023-11-27 PROCEDURE — 6360000002 HC RX W HCPCS: Performed by: EMERGENCY MEDICINE

## 2023-11-27 PROCEDURE — 87635 SARS-COV-2 COVID-19 AMP PRB: CPT

## 2023-11-27 PROCEDURE — 36415 COLL VENOUS BLD VENIPUNCTURE: CPT

## 2023-11-27 PROCEDURE — 96374 THER/PROPH/DIAG INJ IV PUSH: CPT

## 2023-11-27 PROCEDURE — 81001 URINALYSIS AUTO W/SCOPE: CPT

## 2023-11-27 PROCEDURE — 6370000000 HC RX 637 (ALT 250 FOR IP): Performed by: NURSE PRACTITIONER

## 2023-11-27 PROCEDURE — 2580000003 HC RX 258: Performed by: NURSE PRACTITIONER

## 2023-11-27 PROCEDURE — 96372 THER/PROPH/DIAG INJ SC/IM: CPT

## 2023-11-27 RX ORDER — LOSARTAN POTASSIUM 100 MG/1
100 TABLET ORAL EVERY MORNING
Status: DISCONTINUED | OUTPATIENT
Start: 2023-11-28 | End: 2023-11-29 | Stop reason: HOSPADM

## 2023-11-27 RX ORDER — LORAZEPAM 2 MG/ML
3 INJECTION INTRAMUSCULAR
Status: DISCONTINUED | OUTPATIENT
Start: 2023-11-27 | End: 2023-11-29

## 2023-11-27 RX ORDER — LORAZEPAM 2 MG/1
2 TABLET ORAL
Status: DISCONTINUED | OUTPATIENT
Start: 2023-11-27 | End: 2023-11-29

## 2023-11-27 RX ORDER — SODIUM CHLORIDE 9 MG/ML
INJECTION, SOLUTION INTRAVENOUS PRN
Status: DISCONTINUED | OUTPATIENT
Start: 2023-11-27 | End: 2023-11-29 | Stop reason: HOSPADM

## 2023-11-27 RX ORDER — LORAZEPAM 2 MG/ML
1 INJECTION INTRAMUSCULAR
Status: DISCONTINUED | OUTPATIENT
Start: 2023-11-27 | End: 2023-11-29

## 2023-11-27 RX ORDER — SODIUM CHLORIDE 0.9 % (FLUSH) 0.9 %
5-40 SYRINGE (ML) INJECTION EVERY 12 HOURS SCHEDULED
Status: DISCONTINUED | OUTPATIENT
Start: 2023-11-27 | End: 2023-11-29 | Stop reason: HOSPADM

## 2023-11-27 RX ORDER — POTASSIUM CHLORIDE 20 MEQ/1
40 TABLET, EXTENDED RELEASE ORAL PRN
Status: DISCONTINUED | OUTPATIENT
Start: 2023-11-27 | End: 2023-11-29 | Stop reason: HOSPADM

## 2023-11-27 RX ORDER — LORAZEPAM 1 MG/1
1 TABLET ORAL
Status: DISCONTINUED | OUTPATIENT
Start: 2023-11-27 | End: 2023-11-29

## 2023-11-27 RX ORDER — LORAZEPAM 2 MG/ML
4 INJECTION INTRAMUSCULAR
Status: DISCONTINUED | OUTPATIENT
Start: 2023-11-27 | End: 2023-11-29

## 2023-11-27 RX ORDER — ENOXAPARIN SODIUM 100 MG/ML
40 INJECTION SUBCUTANEOUS DAILY
Status: DISCONTINUED | OUTPATIENT
Start: 2023-11-27 | End: 2023-11-29 | Stop reason: HOSPADM

## 2023-11-27 RX ORDER — POLYETHYLENE GLYCOL 3350 17 G/17G
17 POWDER, FOR SOLUTION ORAL DAILY PRN
Status: DISCONTINUED | OUTPATIENT
Start: 2023-11-27 | End: 2023-11-29 | Stop reason: HOSPADM

## 2023-11-27 RX ORDER — LORAZEPAM 2 MG/1
4 TABLET ORAL
Status: DISCONTINUED | OUTPATIENT
Start: 2023-11-27 | End: 2023-11-29

## 2023-11-27 RX ORDER — LORAZEPAM 2 MG/ML
2 INJECTION INTRAMUSCULAR ONCE
Status: COMPLETED | OUTPATIENT
Start: 2023-11-27 | End: 2023-11-27

## 2023-11-27 RX ORDER — MAGNESIUM SULFATE IN WATER 40 MG/ML
2000 INJECTION, SOLUTION INTRAVENOUS PRN
Status: DISCONTINUED | OUTPATIENT
Start: 2023-11-27 | End: 2023-11-29

## 2023-11-27 RX ORDER — SODIUM CHLORIDE, SODIUM LACTATE, POTASSIUM CHLORIDE, CALCIUM CHLORIDE 600; 310; 30; 20 MG/100ML; MG/100ML; MG/100ML; MG/100ML
INJECTION, SOLUTION INTRAVENOUS CONTINUOUS
Status: DISCONTINUED | OUTPATIENT
Start: 2023-11-27 | End: 2023-11-28

## 2023-11-27 RX ORDER — OXYCODONE AND ACETAMINOPHEN 7.5; 325 MG/1; MG/1
1 TABLET ORAL EVERY 8 HOURS PRN
Status: DISCONTINUED | OUTPATIENT
Start: 2023-11-27 | End: 2023-11-29 | Stop reason: HOSPADM

## 2023-11-27 RX ORDER — GAUZE BANDAGE 2" X 2"
100 BANDAGE TOPICAL DAILY
Status: DISCONTINUED | OUTPATIENT
Start: 2023-11-27 | End: 2023-11-29 | Stop reason: HOSPADM

## 2023-11-27 RX ORDER — LORAZEPAM 2 MG/ML
2 INJECTION INTRAMUSCULAR
Status: DISCONTINUED | OUTPATIENT
Start: 2023-11-27 | End: 2023-11-29

## 2023-11-27 RX ORDER — SODIUM CHLORIDE 0.9 % (FLUSH) 0.9 %
5-40 SYRINGE (ML) INJECTION PRN
Status: DISCONTINUED | OUTPATIENT
Start: 2023-11-27 | End: 2023-11-29 | Stop reason: HOSPADM

## 2023-11-27 RX ORDER — ACETAMINOPHEN 325 MG/1
650 TABLET ORAL EVERY 6 HOURS PRN
Status: DISCONTINUED | OUTPATIENT
Start: 2023-11-27 | End: 2023-11-29 | Stop reason: HOSPADM

## 2023-11-27 RX ORDER — POTASSIUM CHLORIDE 7.45 MG/ML
10 INJECTION INTRAVENOUS PRN
Status: DISCONTINUED | OUTPATIENT
Start: 2023-11-27 | End: 2023-11-29 | Stop reason: HOSPADM

## 2023-11-27 RX ORDER — MULTIVITAMIN WITH IRON
1 TABLET ORAL DAILY
Status: DISCONTINUED | OUTPATIENT
Start: 2023-11-27 | End: 2023-11-29 | Stop reason: HOSPADM

## 2023-11-27 RX ORDER — FAMOTIDINE 20 MG/1
20 TABLET, FILM COATED ORAL 2 TIMES DAILY
Status: DISCONTINUED | OUTPATIENT
Start: 2023-11-27 | End: 2023-11-29 | Stop reason: HOSPADM

## 2023-11-27 RX ORDER — LAMOTRIGINE 100 MG/1
300 TABLET ORAL NIGHTLY
Status: DISCONTINUED | OUTPATIENT
Start: 2023-11-27 | End: 2023-11-29 | Stop reason: HOSPADM

## 2023-11-27 RX ORDER — AMLODIPINE BESYLATE 5 MG/1
5 TABLET ORAL NIGHTLY
Status: DISCONTINUED | OUTPATIENT
Start: 2023-11-27 | End: 2023-11-29 | Stop reason: HOSPADM

## 2023-11-27 RX ADMIN — OXYCODONE HYDROCHLORIDE AND ACETAMINOPHEN 1 TABLET: 7.5; 325 TABLET ORAL at 20:57

## 2023-11-27 RX ADMIN — SODIUM CHLORIDE, POTASSIUM CHLORIDE, SODIUM LACTATE AND CALCIUM CHLORIDE: 600; 310; 30; 20 INJECTION, SOLUTION INTRAVENOUS at 11:29

## 2023-11-27 RX ADMIN — FAMOTIDINE 20 MG: 20 TABLET ORAL at 11:26

## 2023-11-27 RX ADMIN — SODIUM CHLORIDE, PRESERVATIVE FREE 10 ML: 5 INJECTION INTRAVENOUS at 19:40

## 2023-11-27 RX ADMIN — LORAZEPAM 3 MG: 2 INJECTION INTRAMUSCULAR; INTRAVENOUS at 18:03

## 2023-11-27 RX ADMIN — THERA TABS 1 TABLET: TAB at 11:26

## 2023-11-27 RX ADMIN — LORAZEPAM 2 MG: 2 INJECTION INTRAMUSCULAR; INTRAVENOUS at 04:23

## 2023-11-27 RX ADMIN — FAMOTIDINE 20 MG: 20 TABLET ORAL at 20:56

## 2023-11-27 RX ADMIN — Medication 100 MG: at 11:26

## 2023-11-27 RX ADMIN — AMLODIPINE BESYLATE 5 MG: 5 TABLET ORAL at 20:57

## 2023-11-27 RX ADMIN — LORAZEPAM 3 MG: 2 INJECTION INTRAMUSCULAR; INTRAVENOUS at 20:57

## 2023-11-27 RX ADMIN — LORAZEPAM 3 MG: 2 INJECTION INTRAMUSCULAR; INTRAVENOUS at 23:49

## 2023-11-27 RX ADMIN — SODIUM CHLORIDE, PRESERVATIVE FREE 10 ML: 5 INJECTION INTRAVENOUS at 11:27

## 2023-11-27 RX ADMIN — LAMOTRIGINE 300 MG: 100 TABLET ORAL at 20:57

## 2023-11-27 RX ADMIN — SODIUM CHLORIDE, POTASSIUM CHLORIDE, SODIUM LACTATE AND CALCIUM CHLORIDE: 600; 310; 30; 20 INJECTION, SOLUTION INTRAVENOUS at 19:41

## 2023-11-27 RX ADMIN — ENOXAPARIN SODIUM 40 MG: 100 INJECTION SUBCUTANEOUS at 11:26

## 2023-11-27 SDOH — ECONOMIC STABILITY: FOOD INSECURITY: WITHIN THE PAST 12 MONTHS, YOU WORRIED THAT YOUR FOOD WOULD RUN OUT BEFORE YOU GOT MONEY TO BUY MORE.: NEVER TRUE

## 2023-11-27 SDOH — ECONOMIC STABILITY: INCOME INSECURITY: IN THE PAST 12 MONTHS, HAS THE ELECTRIC, GAS, OIL, OR WATER COMPANY THREATENED TO SHUT OFF SERVICE IN YOUR HOME?: NO

## 2023-11-27 SDOH — ECONOMIC STABILITY: INCOME INSECURITY: HOW HARD IS IT FOR YOU TO PAY FOR THE VERY BASICS LIKE FOOD, HOUSING, MEDICAL CARE, AND HEATING?: VERY HARD

## 2023-11-27 ASSESSMENT — ENCOUNTER SYMPTOMS
ABDOMINAL PAIN: 0
BACK PAIN: 1
SHORTNESS OF BREATH: 0

## 2023-11-27 ASSESSMENT — PATIENT HEALTH QUESTIONNAIRE - PHQ9
SUM OF ALL RESPONSES TO PHQ QUESTIONS 1-9: 6
1. LITTLE INTEREST OR PLEASURE IN DOING THINGS: 3
SUM OF ALL RESPONSES TO PHQ QUESTIONS 1-9: 6
SUM OF ALL RESPONSES TO PHQ QUESTIONS 1-9: 6
SUM OF ALL RESPONSES TO PHQ9 QUESTIONS 1 & 2: 6
2. FEELING DOWN, DEPRESSED OR HOPELESS: 3
SUM OF ALL RESPONSES TO PHQ QUESTIONS 1-9: 6

## 2023-11-27 ASSESSMENT — PAIN DESCRIPTION - LOCATION: LOCATION: BACK

## 2023-11-27 ASSESSMENT — LIFESTYLE VARIABLES
HOW OFTEN DO YOU HAVE A DRINK CONTAINING ALCOHOL: 4 OR MORE TIMES A WEEK
HOW MANY STANDARD DRINKS CONTAINING ALCOHOL DO YOU HAVE ON A TYPICAL DAY: 10 OR MORE
HOW OFTEN DO YOU HAVE A DRINK CONTAINING ALCOHOL: 4 OR MORE TIMES A WEEK
HOW MANY STANDARD DRINKS CONTAINING ALCOHOL DO YOU HAVE ON A TYPICAL DAY: 10 OR MORE

## 2023-11-27 ASSESSMENT — PAIN - FUNCTIONAL ASSESSMENT: PAIN_FUNCTIONAL_ASSESSMENT: 0-10

## 2023-11-27 ASSESSMENT — PAIN SCALES - GENERAL
PAINLEVEL_OUTOF10: 9
PAINLEVEL_OUTOF10: 9

## 2023-11-27 ASSESSMENT — PAIN DESCRIPTION - ORIENTATION: ORIENTATION: LOWER

## 2023-11-27 NOTE — CARE COORDINATION
11/27/23 1109   Readmission Assessment   Number of Days since last admission? 1-7 days   Previous Disposition Outpatient Rehab   Who is being Interviewed Caregiver  (medical records)   What was the patient's/caregiver's perception as to why they think they needed to return back to the hospital? Other (Comment)  (Alcohol, suicidal, hallucinations)   Why weren't you able to visit your PCP? Other (Comment)  (d/c two days ago, not enough time)   Did you see a specialist, such as Cardiac, Pulmonary, Orthopedic Physician, etc. after you left the hospital? No   Who advised the patient to return to the hospital? Self-referral   Does the patient report anything that got in the way of taking their medications? No   In our efforts to provide the best possible care to you and others like you, can you think of anything that we could have done to help you after you left the hospital the first time, so that you might not have needed to return so soon?  Other (Comment)  (Alcohol withdraw, suicidal, seizures, and hallucinations, uncertain if this could have been avoidable)     Electronically signed by SHANNAN Carrasco on 11/27/2023 at 11:13 AM

## 2023-11-27 NOTE — ED NOTES
[address] to let them know patient was ready for [facility] to come evaluate patient.      Kandace Elias  11/27/23 0213

## 2023-11-27 NOTE — H&P
[facility] - History & Physical      PCP: Jessica You MD    Date of Admission: 11/27/2023    Date of Service: 11/27/2023    Chief Complaint:  SI    History Of Present Illness: The patient is a 52 y.o. male with a PMH of anxiety disorder, depression, alcohol abuse, HTN and mood disorder who presented to [facility] ED on 11/27/2023 complaining of suicidal ideation. He states that he's \"really been going through it lately\" and had plans to \"end it all\". He states that he was going to \"slit his wrists vertically so I would bleed to death. \"  He became concerned about his mental health and states [facility] so that he can get help for his suicidal ideation. Reports his last drink was the afternoon prior to his admission. He reports that he drinks between 15-20 beers per day. Of note he was previously admitted to this facility on 11/22/2023 for similar complaints and was discharged on 11/24/2023 after being cleared by psychiatry to be discharged. He had no signs or symptoms of withdrawal throughout his hospitalization despite his ethanol level being less than 10. He states that immediately after leaving the hospital he was went to the gas station across the street from the hospital and bought beer and took a cab home. Additionally complains of feelings of helplessness and hopelessness. He complains of coccyx pain from a recently diagnosed left fracture of the sacrum and coccyx. Further ED workup revealed , K4.9, BUN 9 creatinine 0.9. Additional ethanol level-156 and repeat-37. UDS positive for benzodiazepines, cannabis and cocaine. Covid-19 negative. There was some concern for the patient having withdrawal, therefore hospital medicine has been requested to admit the patient for alcohol abuse without withdrawal symptoms and suicidal ideation with a plan.     Past Medical History:        Diagnosis Date    Alcoholism [facility])     history of

## 2023-11-28 LAB
ALBUMIN SERPL-MCNC: 4.1 G/DL (ref 3.5–5.2)
ALP SERPL-CCNC: 150 U/L (ref 40–130)
ALT SERPL-CCNC: 40 U/L (ref 5–41)
ANION GAP SERPL CALCULATED.3IONS-SCNC: 10 MMOL/L (ref 7–19)
AST SERPL-CCNC: 40 U/L (ref 5–40)
BASOPHILS # BLD: 0 K/UL (ref 0–0.2)
BASOPHILS NFR BLD: 0.9 % (ref 0–1)
BILIRUB SERPL-MCNC: 0.7 MG/DL (ref 0.2–1.2)
BUN SERPL-MCNC: 8 MG/DL (ref 6–20)
CALCIUM SERPL-MCNC: 9.3 MG/DL (ref 8.6–10)
CHLORIDE SERPL-SCNC: 100 MMOL/L (ref 98–111)
CO2 SERPL-SCNC: 27 MMOL/L (ref 22–29)
CREAT SERPL-MCNC: 1 MG/DL (ref 0.5–1.2)
EOSINOPHIL # BLD: 0.1 K/UL (ref 0–0.6)
EOSINOPHIL NFR BLD: 2.6 % (ref 0–5)
ERYTHROCYTE [DISTWIDTH] IN BLOOD BY AUTOMATED COUNT: 12.4 % (ref 11.5–14.5)
ETHANOLAMINE SERPL-MCNC: <10 MG/DL (ref 0–0.08)
GLUCOSE SERPL-MCNC: 96 MG/DL (ref 74–109)
HCT VFR BLD AUTO: 35.5 % (ref 42–52)
HGB BLD-MCNC: 12.4 G/DL (ref 14–18)
IMM GRANULOCYTES # BLD: 0 K/UL
LYMPHOCYTES # BLD: 1.2 K/UL (ref 1.1–4.5)
LYMPHOCYTES NFR BLD: 34.8 % (ref 20–40)
MCH RBC QN AUTO: 31.9 PG (ref 27–31)
MCHC RBC AUTO-ENTMCNC: 34.9 G/DL (ref 33–37)
MCV RBC AUTO: 91.3 FL (ref 80–94)
MONOCYTES # BLD: 0.5 K/UL (ref 0–0.9)
MONOCYTES NFR BLD: 13.7 % (ref 0–10)
NEUTROPHILS # BLD: 1.6 K/UL (ref 1.5–7.5)
NEUTS SEG NFR BLD: 47.4 % (ref 50–65)
PLATELET # BLD AUTO: 192 K/UL (ref 130–400)
PMV BLD AUTO: 9 FL (ref 9.4–12.4)
POTASSIUM SERPL-SCNC: 3.8 MMOL/L (ref 3.5–5)
PROT SERPL-MCNC: 7 G/DL (ref 6.6–8.7)
RBC # BLD AUTO: 3.89 M/UL (ref 4.7–6.1)
SODIUM SERPL-SCNC: 137 MMOL/L (ref 136–145)
WBC # BLD AUTO: 3.4 K/UL (ref 4.8–10.8)

## 2023-11-28 PROCEDURE — 99254 IP/OBS CNSLTJ NEW/EST MOD 60: CPT | Performed by: PSYCHIATRY & NEUROLOGY

## 2023-11-28 PROCEDURE — 96372 THER/PROPH/DIAG INJ SC/IM: CPT

## 2023-11-28 PROCEDURE — 36415 COLL VENOUS BLD VENIPUNCTURE: CPT

## 2023-11-28 PROCEDURE — 2580000003 HC RX 258: Performed by: NURSE PRACTITIONER

## 2023-11-28 PROCEDURE — 85025 COMPLETE CBC W/AUTO DIFF WBC: CPT

## 2023-11-28 PROCEDURE — 82077 ASSAY SPEC XCP UR&BREATH IA: CPT

## 2023-11-28 PROCEDURE — 6370000000 HC RX 637 (ALT 250 FOR IP): Performed by: NURSE PRACTITIONER

## 2023-11-28 PROCEDURE — 80053 COMPREHEN METABOLIC PANEL: CPT

## 2023-11-28 PROCEDURE — 96375 TX/PRO/DX INJ NEW DRUG ADDON: CPT

## 2023-11-28 PROCEDURE — G0378 HOSPITAL OBSERVATION PER HR: HCPCS

## 2023-11-28 PROCEDURE — 6360000002 HC RX W HCPCS: Performed by: NURSE PRACTITIONER

## 2023-11-28 PROCEDURE — 6370000000 HC RX 637 (ALT 250 FOR IP): Performed by: HOSPITALIST

## 2023-11-28 PROCEDURE — 6360000002 HC RX W HCPCS: Performed by: INTERNAL MEDICINE

## 2023-11-28 PROCEDURE — 96376 TX/PRO/DX INJ SAME DRUG ADON: CPT

## 2023-11-28 RX ORDER — PROCHLORPERAZINE EDISYLATE 5 MG/ML
10 INJECTION INTRAMUSCULAR; INTRAVENOUS EVERY 6 HOURS PRN
Status: DISCONTINUED | OUTPATIENT
Start: 2023-11-28 | End: 2023-11-29 | Stop reason: HOSPADM

## 2023-11-28 RX ORDER — NICOTINE 21 MG/24HR
1 PATCH, TRANSDERMAL 24 HOURS TRANSDERMAL DAILY
Status: DISCONTINUED | OUTPATIENT
Start: 2023-11-28 | End: 2023-11-29 | Stop reason: HOSPADM

## 2023-11-28 RX ADMIN — ENOXAPARIN SODIUM 40 MG: 100 INJECTION SUBCUTANEOUS at 09:10

## 2023-11-28 RX ADMIN — FAMOTIDINE 20 MG: 20 TABLET ORAL at 09:09

## 2023-11-28 RX ADMIN — AMLODIPINE BESYLATE 5 MG: 5 TABLET ORAL at 21:15

## 2023-11-28 RX ADMIN — LORAZEPAM 1 MG: 2 INJECTION INTRAMUSCULAR; INTRAVENOUS at 11:28

## 2023-11-28 RX ADMIN — FAMOTIDINE 20 MG: 20 TABLET ORAL at 21:15

## 2023-11-28 RX ADMIN — LOSARTAN POTASSIUM 100 MG: 100 TABLET, FILM COATED ORAL at 09:09

## 2023-11-28 RX ADMIN — LORAZEPAM 4 MG: 2 INJECTION INTRAMUSCULAR; INTRAVENOUS at 02:44

## 2023-11-28 RX ADMIN — LORAZEPAM 1 MG: 2 INJECTION INTRAMUSCULAR; INTRAVENOUS at 15:36

## 2023-11-28 RX ADMIN — SODIUM CHLORIDE, PRESERVATIVE FREE 10 ML: 5 INJECTION INTRAVENOUS at 09:10

## 2023-11-28 RX ADMIN — THERA TABS 1 TABLET: TAB at 09:10

## 2023-11-28 RX ADMIN — LORAZEPAM 1 MG: 1 TABLET ORAL at 04:31

## 2023-11-28 RX ADMIN — LAMOTRIGINE 300 MG: 100 TABLET ORAL at 21:15

## 2023-11-28 RX ADMIN — SODIUM CHLORIDE, PRESERVATIVE FREE 10 ML: 5 INJECTION INTRAVENOUS at 21:15

## 2023-11-28 RX ADMIN — OXYCODONE HYDROCHLORIDE AND ACETAMINOPHEN 1 TABLET: 7.5; 325 TABLET ORAL at 18:21

## 2023-11-28 RX ADMIN — LORAZEPAM 1 MG: 2 INJECTION INTRAMUSCULAR; INTRAVENOUS at 07:11

## 2023-11-28 RX ADMIN — Medication 100 MG: at 09:09

## 2023-11-28 RX ADMIN — LORAZEPAM 3 MG: 2 INJECTION INTRAMUSCULAR; INTRAVENOUS at 21:27

## 2023-11-28 RX ADMIN — PROCHLORPERAZINE EDISYLATE 10 MG: 5 INJECTION INTRAMUSCULAR; INTRAVENOUS at 18:22

## 2023-11-28 RX ADMIN — OXYCODONE HYDROCHLORIDE AND ACETAMINOPHEN 1 TABLET: 7.5; 325 TABLET ORAL at 07:11

## 2023-11-28 RX ADMIN — SODIUM CHLORIDE, POTASSIUM CHLORIDE, SODIUM LACTATE AND CALCIUM CHLORIDE: 600; 310; 30; 20 INJECTION, SOLUTION INTRAVENOUS at 11:27

## 2023-11-28 ASSESSMENT — PAIN DESCRIPTION - LOCATION
LOCATION: HEAD;BACK
LOCATION: PELVIS

## 2023-11-28 ASSESSMENT — PAIN SCALES - GENERAL
PAINLEVEL_OUTOF10: 8
PAINLEVEL_OUTOF10: 8

## 2023-11-28 ASSESSMENT — PAIN DESCRIPTION - ORIENTATION: ORIENTATION: MID

## 2023-11-28 ASSESSMENT — PAIN DESCRIPTION - DESCRIPTORS
DESCRIPTORS: ACHING;THROBBING
DESCRIPTORS: ACHING;THROBBING

## 2023-11-28 NOTE — PLAN OF CARE
Problem: Pain  Goal: Verbalizes/displays adequate comfort level or baseline comfort level  11/28/2023 1431 by Jonah Singh RN  Outcome: Progressing  11/28/2023 0350 by Edmond Lyons RN  Outcome: Progressing     Problem: Safety - Adult  Goal: Free from fall injury  11/28/2023 1431 by Jonah Singh RN  Outcome: Progressing  11/28/2023 0350 by Edmond Lyons RN  Outcome: Progressing     Problem: ABCDS Injury Assessment  Goal: Absence of physical injury  11/28/2023 1431 by Jonah Singh RN  Outcome: Progressing  11/28/2023 0350 by Edmond Lyons RN  Outcome: Progressing

## 2023-11-28 NOTE — CONSULTS
affective symptomatology, patient reported feeling of depression, anxiety, poor motivation, poor concentration, decreased pleasure in previously enjoyed activities, decreased appetite, decreased quality of sleep. He endorses feeling of hopelessness, helplessness and worthlessness. Patient continues to endorse suicidal ideations, denies suicidal plans today. He denies auditory and visual hallucinations today, stated that at home he experienced visual hallucinations and saw cats and hummingbirds. He did not endorse any delusions or paranoid thoughts. Discussed with patient possibility to provide him with referral to rehab facility for alcohol or drug use disorder, however, patient stated that he would like to be \"physically and mentally stable\" before transfer to rehab facility. PSYCHIATRIC HISTORY:  Diagnoses: Depression, anxiety, alcohol use disorder, cannabis use disorder, substance-induced mood disorder  Suicide attempts/gestures: Denies   Prior hospitalizations: Multiple to [facility] psychiatric unit with last admission in April 2022  Medication trials: Lamictal, Seroquel, Prozac, Zoloft, trazodone, Risperdal, naltrexone, Vivitrol, Klonopin  Mental health contact: Previously in [facility] outpatient psychiatric clinic  Head trauma: Denies    Allergies:  Zofran [ondansetron]    Mental Status  Appearance: Poorly groomed, wearing hospital attire. Made limited eye contact. Behavior: Anxious, partially cooperative with interview. Mild to moderate psychomotor agitation appreciated. Gait was not evaluated, as patient was sitting on the bed. Speech: Normal in tone, volume, and quality. Mood: \"not good\"   Affect: Mood congruent. Range is labile  Thought Process: Mostly circumstantial, sometimes linear and goal oriented  Thought Content: Patient does have current active suicidal ideations. He does not have any suicidal plans or homicidal ideations.   No other delusions or paranoia
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
## 2023-11-29 VITALS
TEMPERATURE: 97.4 F | SYSTOLIC BLOOD PRESSURE: 145 MMHG | WEIGHT: 170.6 LBS | HEIGHT: 76 IN | RESPIRATION RATE: 14 BRPM | HEART RATE: 92 BPM | DIASTOLIC BLOOD PRESSURE: 95 MMHG | OXYGEN SATURATION: 100 % | BODY MASS INDEX: 20.77 KG/M2

## 2023-11-29 LAB
ALBUMIN SERPL-MCNC: 4 G/DL (ref 3.5–5.2)
ALP SERPL-CCNC: 136 U/L (ref 40–130)
ALT SERPL-CCNC: 40 U/L (ref 5–41)
ANION GAP SERPL CALCULATED.3IONS-SCNC: 11 MMOL/L (ref 7–19)
AST SERPL-CCNC: 38 U/L (ref 5–40)
BASOPHILS # BLD: 0 K/UL (ref 0–0.2)
BASOPHILS NFR BLD: 0.5 % (ref 0–1)
BILIRUB SERPL-MCNC: 0.4 MG/DL (ref 0.2–1.2)
BUN SERPL-MCNC: 6 MG/DL (ref 6–20)
CALCIUM SERPL-MCNC: 9.2 MG/DL (ref 8.6–10)
CHLORIDE SERPL-SCNC: 103 MMOL/L (ref 98–111)
CO2 SERPL-SCNC: 27 MMOL/L (ref 22–29)
CREAT SERPL-MCNC: 1.2 MG/DL (ref 0.5–1.2)
EOSINOPHIL # BLD: 0.1 K/UL (ref 0–0.6)
EOSINOPHIL NFR BLD: 2.4 % (ref 0–5)
ERYTHROCYTE [DISTWIDTH] IN BLOOD BY AUTOMATED COUNT: 12.6 % (ref 11.5–14.5)
GLUCOSE SERPL-MCNC: 132 MG/DL (ref 74–109)
HCT VFR BLD AUTO: 35.1 % (ref 42–52)
HGB BLD-MCNC: 12.2 G/DL (ref 14–18)
IMM GRANULOCYTES # BLD: 0 K/UL
LYMPHOCYTES # BLD: 1 K/UL (ref 1.1–4.5)
LYMPHOCYTES NFR BLD: 28.2 % (ref 20–40)
MAGNESIUM SERPL-MCNC: 1.8 MG/DL (ref 1.6–2.6)
MCH RBC QN AUTO: 31.9 PG (ref 27–31)
MCHC RBC AUTO-ENTMCNC: 34.8 G/DL (ref 33–37)
MCV RBC AUTO: 91.9 FL (ref 80–94)
MONOCYTES # BLD: 0.4 K/UL (ref 0–0.9)
MONOCYTES NFR BLD: 11.1 % (ref 0–10)
NEUTROPHILS # BLD: 2.1 K/UL (ref 1.5–7.5)
NEUTS SEG NFR BLD: 57.5 % (ref 50–65)
PLATELET # BLD AUTO: 193 K/UL (ref 130–400)
PMV BLD AUTO: 9.2 FL (ref 9.4–12.4)
POTASSIUM SERPL-SCNC: 3.5 MMOL/L (ref 3.5–5)
PROT SERPL-MCNC: 6.6 G/DL (ref 6.6–8.7)
RBC # BLD AUTO: 3.82 M/UL (ref 4.7–6.1)
SODIUM SERPL-SCNC: 141 MMOL/L (ref 136–145)
WBC # BLD AUTO: 3.7 K/UL (ref 4.8–10.8)

## 2023-11-29 PROCEDURE — 36415 COLL VENOUS BLD VENIPUNCTURE: CPT

## 2023-11-29 PROCEDURE — 80053 COMPREHEN METABOLIC PANEL: CPT

## 2023-11-29 PROCEDURE — 99232 SBSQ HOSP IP/OBS MODERATE 35: CPT | Performed by: PSYCHIATRY & NEUROLOGY

## 2023-11-29 PROCEDURE — 85025 COMPLETE CBC W/AUTO DIFF WBC: CPT

## 2023-11-29 PROCEDURE — 94760 N-INVAS EAR/PLS OXIMETRY 1: CPT

## 2023-11-29 PROCEDURE — 83735 ASSAY OF MAGNESIUM: CPT

## 2023-11-29 PROCEDURE — 96376 TX/PRO/DX INJ SAME DRUG ADON: CPT

## 2023-11-29 PROCEDURE — 96372 THER/PROPH/DIAG INJ SC/IM: CPT

## 2023-11-29 PROCEDURE — 2580000003 HC RX 258: Performed by: NURSE PRACTITIONER

## 2023-11-29 PROCEDURE — 6360000002 HC RX W HCPCS: Performed by: NURSE PRACTITIONER

## 2023-11-29 PROCEDURE — 6370000000 HC RX 637 (ALT 250 FOR IP): Performed by: HOSPITALIST

## 2023-11-29 PROCEDURE — 6370000000 HC RX 637 (ALT 250 FOR IP): Performed by: NURSE PRACTITIONER

## 2023-11-29 PROCEDURE — G0378 HOSPITAL OBSERVATION PER HR: HCPCS

## 2023-11-29 RX ADMIN — ENOXAPARIN SODIUM 40 MG: 100 INJECTION SUBCUTANEOUS at 09:50

## 2023-11-29 RX ADMIN — OXYCODONE HYDROCHLORIDE AND ACETAMINOPHEN 1 TABLET: 7.5; 325 TABLET ORAL at 05:27

## 2023-11-29 RX ADMIN — SODIUM CHLORIDE, PRESERVATIVE FREE 10 ML: 5 INJECTION INTRAVENOUS at 09:50

## 2023-11-29 RX ADMIN — LORAZEPAM 3 MG: 2 INJECTION INTRAMUSCULAR; INTRAVENOUS at 01:12

## 2023-11-29 RX ADMIN — Medication 100 MG: at 09:50

## 2023-11-29 RX ADMIN — THERA TABS 1 TABLET: TAB at 09:50

## 2023-11-29 RX ADMIN — FAMOTIDINE 20 MG: 20 TABLET ORAL at 09:50

## 2023-11-29 RX ADMIN — LOSARTAN POTASSIUM 100 MG: 100 TABLET, FILM COATED ORAL at 09:50

## 2023-11-29 ASSESSMENT — PAIN SCALES - GENERAL: PAINLEVEL_OUTOF10: 8

## 2023-11-29 NOTE — PLAN OF CARE
Problem: Pain  Goal: Verbalizes/displays adequate comfort level or baseline comfort level  11/29/2023 1130 by Cesar Argueta RN  Outcome: Adequate for Discharge  11/29/2023 0246 by Cinda Gaucher, RN  Outcome: Progressing     Problem: Safety - Adult  Goal: Free from fall injury  11/29/2023 1130 by Cesar Argueta RN  Outcome: Adequate for Discharge  11/29/2023 0246 by Cinda Gaucher, RN  Outcome: Progressing     Problem: ABCDS Injury Assessment  Goal: Absence of physical injury  11/29/2023 1130 by Cesar Argueta RN  Outcome: Adequate for Discharge  11/29/2023 0246 by Cinda Gaucher, RN  Outcome: Progressing

## 2023-11-29 NOTE — PLAN OF CARE
Problem: Pain  Goal: Verbalizes/displays adequate comfort level or baseline comfort level  11/29/2023 0246 by Tia Medeiros RN  Outcome: Progressing  11/28/2023 1431 by Fina Richards RN  Outcome: Progressing     Problem: Safety - Adult  Goal: Free from fall injury  11/29/2023 0246 by Tia Medeiros RN  Outcome: Progressing  11/28/2023 1431 by Fina Richards RN  Outcome: Progressing     Problem: ABCDS Injury Assessment  Goal: Absence of physical injury  11/29/2023 0246 by Tia Medeiros RN  Outcome: Progressing  11/28/2023 1431 by Fina Richards RN  Outcome: Progressing

## 2023-11-29 NOTE — BH NOTE
sponsor and attend AA meetings after discharge from the hospital.  Patient requested to be discharged from the hospital today. 4.  Patient can be discharged from the hospital when he is medically stable. 5.  Psychiatry will sign off today.

## 2023-11-30 ENCOUNTER — TELEPHONE (OUTPATIENT)
Dept: PRIMARY CARE CLINIC | Age: 53
End: 2023-11-30

## 2023-11-30 NOTE — TELEPHONE ENCOUNTER
Care Transitions Initial Follow Up Call    Outreach made within 2 business days of discharge: Yes    Patient: Marylee Peoples   Patient : 1970   MRN: [id number]    Reason for Admission: Suicidal Ideation  Discharge Date: 23       Spoke with:Unable to reach. I left a message on his identified voicemail with appointment time and details. Asked that he call back my direct line so we can review his discharge instructions.      Discharge department/facility: [facility]

## 2024-04-18 LAB
25(OH)D3 SERPL-MCNC: 25.5 NG/ML
ALBUMIN SERPL-MCNC: 4.8 G/DL (ref 3.5–5.2)
ALP SERPL-CCNC: 106 U/L (ref 40–130)
ALT SERPL-CCNC: 33 U/L (ref 5–41)
ANION GAP SERPL CALCULATED.3IONS-SCNC: 13 MMOL/L (ref 7–19)
AST SERPL-CCNC: 23 U/L (ref 5–40)
BACTERIA URNS QL MICRO: NEGATIVE /HPF
BASOPHILS # BLD: 0.1 K/UL (ref 0–0.2)
BASOPHILS NFR BLD: 0.4 % (ref 0–1)
BILIRUB SERPL-MCNC: 0.4 MG/DL (ref 0.2–1.2)
BILIRUB UR QL STRIP: NEGATIVE
BUN SERPL-MCNC: 13 MG/DL (ref 6–20)
CALCIUM SERPL-MCNC: 9.7 MG/DL (ref 8.6–10)
CHLORIDE SERPL-SCNC: 94 MMOL/L (ref 98–111)
CLARITY UR: CLEAR
CO2 SERPL-SCNC: 26 MMOL/L (ref 22–29)
COLOR UR: YELLOW
CREAT SERPL-MCNC: 1.2 MG/DL (ref 0.5–1.2)
CREAT UR-MCNC: 266.2 MG/DL (ref 39–259)
CRYSTALS URNS MICRO: ABNORMAL /HPF
EOSINOPHIL # BLD: 0.1 K/UL (ref 0–0.6)
EOSINOPHIL NFR BLD: 0.8 % (ref 0–5)
EPI CELLS #/AREA URNS AUTO: 1 /HPF (ref 0–5)
ERYTHROCYTE [DISTWIDTH] IN BLOOD BY AUTOMATED COUNT: 13 % (ref 11.5–14.5)
GLUCOSE SERPL-MCNC: 93 MG/DL (ref 74–109)
GLUCOSE UR STRIP.AUTO-MCNC: NEGATIVE MG/DL
HCT VFR BLD AUTO: 45.1 % (ref 42–52)
HGB BLD-MCNC: 15.3 G/DL (ref 14–18)
HGB UR STRIP.AUTO-MCNC: NEGATIVE MG/L
HYALINE CASTS #/AREA URNS AUTO: 11 /HPF (ref 0–8)
IMM GRANULOCYTES # BLD: 0 K/UL
KETONES UR STRIP.AUTO-MCNC: ABNORMAL MG/DL
LEUKOCYTE ESTERASE UR QL STRIP.AUTO: ABNORMAL
LYMPHOCYTES # BLD: 1.6 K/UL (ref 1.1–4.5)
LYMPHOCYTES NFR BLD: 13.5 % (ref 20–40)
MAGNESIUM SERPL-MCNC: 2 MG/DL (ref 1.6–2.6)
MCH RBC QN AUTO: 31.6 PG (ref 27–31)
MCHC RBC AUTO-ENTMCNC: 33.9 G/DL (ref 33–37)
MCV RBC AUTO: 93.2 FL (ref 80–94)
MONOCYTES # BLD: 0.9 K/UL (ref 0–0.9)
MONOCYTES NFR BLD: 7.5 % (ref 0–10)
NEUTROPHILS # BLD: 9.1 K/UL (ref 1.5–7.5)
NEUTS SEG NFR BLD: 77.5 % (ref 50–65)
NITRITE UR QL STRIP.AUTO: NEGATIVE
PH UR STRIP.AUTO: 5.5 [PH] (ref 5–8)
PHOSPHATE SERPL-MCNC: 3.7 MG/DL (ref 2.5–4.5)
PLATELET # BLD AUTO: 281 K/UL (ref 130–400)
PMV BLD AUTO: 9.6 FL (ref 9.4–12.4)
POTASSIUM SERPL-SCNC: 4.2 MMOL/L (ref 3.5–5)
PROT SERPL-MCNC: 7.6 G/DL (ref 6.6–8.7)
PROT UR STRIP.AUTO-MCNC: ABNORMAL MG/DL
PROT UR-MCNC: 25 MG/DL (ref 15–45)
PTH-INTACT SERPL-MCNC: 33.5 PG/ML (ref 15–65)
RBC # BLD AUTO: 4.84 M/UL (ref 4.7–6.1)
RBC #/AREA URNS AUTO: 1 /HPF (ref 0–4)
SODIUM SERPL-SCNC: 133 MMOL/L (ref 136–145)
SP GR UR STRIP.AUTO: 1.02 (ref 1–1.03)
URATE SERPL-MCNC: 6.4 MG/DL (ref 3.4–7)
UROBILINOGEN UR STRIP.AUTO-MCNC: 1 E.U./DL
WBC # BLD AUTO: 11.8 K/UL (ref 4.8–10.8)
WBC #/AREA URNS AUTO: 5 /HPF (ref 0–5)

## 2024-04-19 ENCOUNTER — HOSPITAL ENCOUNTER (OUTPATIENT)
Age: 54
Setting detail: OBSERVATION
Discharge: HOME | End: 2024-04-20
Attending: EMERGENCY MEDICINE | Admitting: STUDENT IN AN ORGANIZED HEALTH CARE EDUCATION/TRAINING PROGRAM
Payer: MEDICAID

## 2024-04-19 ENCOUNTER — APPOINTMENT (OUTPATIENT)
Dept: CT IMAGING | Age: 54
End: 2024-04-19
Payer: MEDICAID

## 2024-04-19 ENCOUNTER — APPOINTMENT (OUTPATIENT)
Dept: GENERAL RADIOLOGY | Age: 54
End: 2024-04-19
Payer: MEDICAID

## 2024-04-19 DIAGNOSIS — R10.9 ABDOMINAL PAIN, UNSPECIFIED ABDOMINAL LOCATION: ICD-10-CM

## 2024-04-19 DIAGNOSIS — R11.2 NAUSEA AND VOMITING, UNSPECIFIED VOMITING TYPE: Primary | ICD-10-CM

## 2024-04-19 DIAGNOSIS — F19.10 SUBSTANCE ABUSE (HCC): ICD-10-CM

## 2024-04-19 LAB
ALBUMIN SERPL-MCNC: 4.4 G/DL (ref 3.5–5.2)
ALP SERPL-CCNC: 100 U/L (ref 40–130)
ALT SERPL-CCNC: 26 U/L (ref 5–41)
AMPHET UR QL SCN: NEGATIVE
ANION GAP SERPL CALCULATED.3IONS-SCNC: 12 MMOL/L (ref 7–19)
APTT PPP: 32.5 SEC (ref 26–36.2)
AST SERPL-CCNC: 20 U/L (ref 5–40)
B PARAP IS1001 DNA NPH QL NAA+NON-PROBE: NOT DETECTED
B PERT.PT PRMT NPH QL NAA+NON-PROBE: NOT DETECTED
BACTERIA URNS QL MICRO: NEGATIVE /HPF
BARBITURATES UR QL SCN: NEGATIVE
BASOPHILS # BLD: 0 K/UL (ref 0–0.2)
BASOPHILS NFR BLD: 0.3 % (ref 0–1)
BENZODIAZ UR QL SCN: POSITIVE
BILIRUB SERPL-MCNC: 0.4 MG/DL (ref 0.2–1.2)
BILIRUB UR QL STRIP: NEGATIVE
BUN SERPL-MCNC: 10 MG/DL (ref 6–20)
BUPRENORPHINE URINE: NEGATIVE
C PNEUM DNA NPH QL NAA+NON-PROBE: NOT DETECTED
CALCIUM SERPL-MCNC: 9.4 MG/DL (ref 8.6–10)
CANNABINOIDS UR QL SCN: POSITIVE
CHLORIDE SERPL-SCNC: 100 MMOL/L (ref 98–111)
CLARITY UR: CLEAR
CO2 SERPL-SCNC: 25 MMOL/L (ref 22–29)
COCAINE UR QL SCN: NEGATIVE
COLOR UR: YELLOW
CREAT SERPL-MCNC: 1.1 MG/DL (ref 0.5–1.2)
CRYSTALS URNS MICRO: NORMAL /HPF
DRUG SCREEN COMMENT UR-IMP: ABNORMAL
EOSINOPHIL # BLD: 0 K/UL (ref 0–0.6)
EOSINOPHIL NFR BLD: 0.3 % (ref 0–5)
EPI CELLS #/AREA URNS AUTO: 0 /HPF (ref 0–5)
ERYTHROCYTE [DISTWIDTH] IN BLOOD BY AUTOMATED COUNT: 13 % (ref 11.5–14.5)
ETHANOLAMINE SERPL-MCNC: <10 MG/DL (ref 0–0.08)
FENTANYL SCREEN, URINE: NEGATIVE
FLUAV RNA NPH QL NAA+NON-PROBE: NOT DETECTED
FLUBV RNA NPH QL NAA+NON-PROBE: NOT DETECTED
GLUCOSE BLD-MCNC: 143 MG/DL (ref 70–99)
GLUCOSE SERPL-MCNC: 114 MG/DL (ref 74–109)
GLUCOSE UR STRIP.AUTO-MCNC: NEGATIVE MG/DL
HADV DNA NPH QL NAA+NON-PROBE: NOT DETECTED
HCOV 229E RNA NPH QL NAA+NON-PROBE: NOT DETECTED
HCOV HKU1 RNA NPH QL NAA+NON-PROBE: NOT DETECTED
HCOV NL63 RNA NPH QL NAA+NON-PROBE: NOT DETECTED
HCOV OC43 RNA NPH QL NAA+NON-PROBE: NOT DETECTED
HCT VFR BLD AUTO: 44.3 % (ref 42–52)
HGB BLD-MCNC: 15.1 G/DL (ref 14–18)
HGB UR STRIP.AUTO-MCNC: NEGATIVE MG/L
HMPV RNA NPH QL NAA+NON-PROBE: NOT DETECTED
HPIV1 RNA NPH QL NAA+NON-PROBE: NOT DETECTED
HPIV2 RNA NPH QL NAA+NON-PROBE: NOT DETECTED
HPIV3 RNA NPH QL NAA+NON-PROBE: NOT DETECTED
HPIV4 RNA NPH QL NAA+NON-PROBE: NOT DETECTED
HYALINE CASTS #/AREA URNS AUTO: 1 /HPF (ref 0–8)
IMM GRANULOCYTES # BLD: 0 K/UL
INR PPP: 0.99 (ref 0.88–1.18)
KETONES UR STRIP.AUTO-MCNC: NEGATIVE MG/DL
LEUKOCYTE ESTERASE UR QL STRIP.AUTO: NEGATIVE
LIPASE SERPL-CCNC: 22 U/L (ref 13–60)
LYMPHOCYTES # BLD: 0.7 K/UL (ref 1.1–4.5)
LYMPHOCYTES NFR BLD: 9.1 % (ref 20–40)
M PNEUMO DNA NPH QL NAA+NON-PROBE: NOT DETECTED
MCH RBC QN AUTO: 30.8 PG (ref 27–31)
MCHC RBC AUTO-ENTMCNC: 34.1 G/DL (ref 33–37)
MCV RBC AUTO: 90.2 FL (ref 80–94)
METHADONE UR QL SCN: NEGATIVE
METHAMPHETAMINE, URINE: NEGATIVE
MONOCYTES # BLD: 0.2 K/UL (ref 0–0.9)
MONOCYTES NFR BLD: 2.3 % (ref 0–10)
NEUTROPHILS # BLD: 6.7 K/UL (ref 1.5–7.5)
NEUTS SEG NFR BLD: 87.7 % (ref 50–65)
NITRITE UR QL STRIP.AUTO: NEGATIVE
OPIATES UR QL SCN: NEGATIVE
OXYCODONE UR QL SCN: POSITIVE
PCP UR QL SCN: NEGATIVE
PERFORMED ON: ABNORMAL
PH UR STRIP.AUTO: 8 [PH] (ref 5–8)
PLATELET # BLD AUTO: 263 K/UL (ref 130–400)
PMV BLD AUTO: 9.6 FL (ref 9.4–12.4)
POTASSIUM SERPL-SCNC: 4.1 MMOL/L (ref 3.5–5)
PROT SERPL-MCNC: 7.1 G/DL (ref 6.6–8.7)
PROT UR STRIP.AUTO-MCNC: 100 MG/DL
PROTHROMBIN TIME: 12.8 SEC (ref 12–14.6)
RBC # BLD AUTO: 4.91 M/UL (ref 4.7–6.1)
RBC #/AREA URNS AUTO: 1 /HPF (ref 0–4)
REASON FOR REJECTION: NORMAL
REASON FOR REJECTION: NORMAL
REJECTED TEST: NORMAL
REJECTED TEST: NORMAL
RSV RNA NPH QL NAA+NON-PROBE: NOT DETECTED
RV+EV RNA NPH QL NAA+NON-PROBE: NOT DETECTED
SARS-COV-2 RNA NPH QL NAA+NON-PROBE: NOT DETECTED
SODIUM SERPL-SCNC: 137 MMOL/L (ref 136–145)
SP GR UR STRIP.AUTO: 1.02 (ref 1–1.03)
TRICYCLIC, URINE: NEGATIVE
TROPONIN, HIGH SENSITIVITY: <6 NG/L (ref 0–22)
UROBILINOGEN UR STRIP.AUTO-MCNC: 0.2 E.U./DL
WBC # BLD AUTO: 7.7 K/UL (ref 4.8–10.8)
WBC #/AREA URNS AUTO: 1 /HPF (ref 0–5)

## 2024-04-19 PROCEDURE — 0202U NFCT DS 22 TRGT SARS-COV-2: CPT

## 2024-04-19 PROCEDURE — G0480 DRUG TEST DEF 1-7 CLASSES: HCPCS

## 2024-04-19 PROCEDURE — 82077 ASSAY SPEC XCP UR&BREATH IA: CPT

## 2024-04-19 PROCEDURE — C9113 INJ PANTOPRAZOLE SODIUM, VIA: HCPCS | Performed by: NURSE PRACTITIONER

## 2024-04-19 PROCEDURE — 99285 EMERGENCY DEPT VISIT HI MDM: CPT

## 2024-04-19 PROCEDURE — 6360000002 HC RX W HCPCS: Performed by: EMERGENCY MEDICINE

## 2024-04-19 PROCEDURE — 2580000003 HC RX 258: Performed by: NURSE PRACTITIONER

## 2024-04-19 PROCEDURE — 6360000004 HC RX CONTRAST MEDICATION: Performed by: EMERGENCY MEDICINE

## 2024-04-19 PROCEDURE — 85730 THROMBOPLASTIN TIME PARTIAL: CPT

## 2024-04-19 PROCEDURE — 6360000002 HC RX W HCPCS: Performed by: NURSE PRACTITIONER

## 2024-04-19 PROCEDURE — 74177 CT ABD & PELVIS W/CONTRAST: CPT

## 2024-04-19 PROCEDURE — 96361 HYDRATE IV INFUSION ADD-ON: CPT

## 2024-04-19 PROCEDURE — 84484 ASSAY OF TROPONIN QUANT: CPT

## 2024-04-19 PROCEDURE — 96375 TX/PRO/DX INJ NEW DRUG ADDON: CPT

## 2024-04-19 PROCEDURE — 83690 ASSAY OF LIPASE: CPT

## 2024-04-19 PROCEDURE — G0378 HOSPITAL OBSERVATION PER HR: HCPCS

## 2024-04-19 PROCEDURE — 85025 COMPLETE CBC W/AUTO DIFF WBC: CPT

## 2024-04-19 PROCEDURE — 96372 THER/PROPH/DIAG INJ SC/IM: CPT

## 2024-04-19 PROCEDURE — 96374 THER/PROPH/DIAG INJ IV PUSH: CPT

## 2024-04-19 PROCEDURE — 85610 PROTHROMBIN TIME: CPT

## 2024-04-19 PROCEDURE — 71045 X-RAY EXAM CHEST 1 VIEW: CPT

## 2024-04-19 PROCEDURE — 2580000003 HC RX 258: Performed by: EMERGENCY MEDICINE

## 2024-04-19 PROCEDURE — 80307 DRUG TEST PRSMV CHEM ANLYZR: CPT

## 2024-04-19 PROCEDURE — 80053 COMPREHEN METABOLIC PANEL: CPT

## 2024-04-19 PROCEDURE — 36415 COLL VENOUS BLD VENIPUNCTURE: CPT

## 2024-04-19 PROCEDURE — 82962 GLUCOSE BLOOD TEST: CPT

## 2024-04-19 PROCEDURE — 81001 URINALYSIS AUTO W/SCOPE: CPT

## 2024-04-19 RX ORDER — OXYCODONE AND ACETAMINOPHEN 7.5; 325 MG/1; MG/1
1 TABLET ORAL EVERY 8 HOURS PRN
Status: DISCONTINUED | OUTPATIENT
Start: 2024-04-19 | End: 2024-04-20 | Stop reason: HOSPADM

## 2024-04-19 RX ORDER — PROMETHAZINE HYDROCHLORIDE 25 MG/ML
25 INJECTION, SOLUTION INTRAMUSCULAR; INTRAVENOUS ONCE
Status: COMPLETED | OUTPATIENT
Start: 2024-04-19 | End: 2024-04-19

## 2024-04-19 RX ORDER — AMLODIPINE BESYLATE 5 MG/1
5 TABLET ORAL NIGHTLY
Status: DISCONTINUED | OUTPATIENT
Start: 2024-04-19 | End: 2024-04-20 | Stop reason: HOSPADM

## 2024-04-19 RX ORDER — GAUZE BANDAGE 2" X 2"
100 BANDAGE TOPICAL DAILY
Status: DISCONTINUED | OUTPATIENT
Start: 2024-04-19 | End: 2024-04-20 | Stop reason: HOSPADM

## 2024-04-19 RX ORDER — LORAZEPAM 2 MG/ML
3 INJECTION INTRAMUSCULAR
Status: DISCONTINUED | OUTPATIENT
Start: 2024-04-19 | End: 2024-04-20

## 2024-04-19 RX ORDER — LORAZEPAM 2 MG/ML
1 INJECTION INTRAMUSCULAR
Status: DISCONTINUED | OUTPATIENT
Start: 2024-04-19 | End: 2024-04-20

## 2024-04-19 RX ORDER — ONDANSETRON 4 MG/1
4 TABLET, ORALLY DISINTEGRATING ORAL EVERY 8 HOURS PRN
Status: DISCONTINUED | OUTPATIENT
Start: 2024-04-19 | End: 2024-04-20 | Stop reason: HOSPADM

## 2024-04-19 RX ORDER — LOSARTAN POTASSIUM 100 MG/1
100 TABLET ORAL EVERY MORNING
Status: DISCONTINUED | OUTPATIENT
Start: 2024-04-20 | End: 2024-04-20 | Stop reason: HOSPADM

## 2024-04-19 RX ORDER — ACETAMINOPHEN 325 MG/1
650 TABLET ORAL EVERY 6 HOURS PRN
Status: DISCONTINUED | OUTPATIENT
Start: 2024-04-19 | End: 2024-04-20 | Stop reason: HOSPADM

## 2024-04-19 RX ORDER — SODIUM CHLORIDE 0.9 % (FLUSH) 0.9 %
5-40 SYRINGE (ML) INJECTION PRN
Status: DISCONTINUED | OUTPATIENT
Start: 2024-04-19 | End: 2024-04-20 | Stop reason: HOSPADM

## 2024-04-19 RX ORDER — PROMETHAZINE HYDROCHLORIDE 25 MG/1
25 TABLET ORAL EVERY 6 HOURS PRN
Qty: 12 TABLET | Refills: 0 | Status: SHIPPED | OUTPATIENT
Start: 2024-04-19 | End: 2024-04-26

## 2024-04-19 RX ORDER — LORAZEPAM 2 MG/ML
4 INJECTION INTRAMUSCULAR
Status: DISCONTINUED | OUTPATIENT
Start: 2024-04-19 | End: 2024-04-20

## 2024-04-19 RX ORDER — PROMETHAZINE HYDROCHLORIDE 25 MG/ML
6.25 INJECTION, SOLUTION INTRAMUSCULAR; INTRAVENOUS EVERY 6 HOURS PRN
Status: DISCONTINUED | OUTPATIENT
Start: 2024-04-19 | End: 2024-04-20 | Stop reason: HOSPADM

## 2024-04-19 RX ORDER — LORAZEPAM 1 MG/1
1 TABLET ORAL
Status: DISCONTINUED | OUTPATIENT
Start: 2024-04-19 | End: 2024-04-20 | Stop reason: HOSPADM

## 2024-04-19 RX ORDER — POTASSIUM CHLORIDE 20 MEQ/1
40 TABLET, EXTENDED RELEASE ORAL PRN
Status: DISCONTINUED | OUTPATIENT
Start: 2024-04-19 | End: 2024-04-20 | Stop reason: HOSPADM

## 2024-04-19 RX ORDER — MAGNESIUM SULFATE IN WATER 40 MG/ML
2000 INJECTION, SOLUTION INTRAVENOUS PRN
Status: DISCONTINUED | OUTPATIENT
Start: 2024-04-19 | End: 2024-04-20 | Stop reason: HOSPADM

## 2024-04-19 RX ORDER — MULTIVITAMIN WITH IRON
1 TABLET ORAL DAILY
Status: DISCONTINUED | OUTPATIENT
Start: 2024-04-19 | End: 2024-04-20 | Stop reason: HOSPADM

## 2024-04-19 RX ORDER — PROMETHAZINE HYDROCHLORIDE 25 MG/1
25 SUPPOSITORY RECTAL EVERY 6 HOURS PRN
Qty: 12 SUPPOSITORY | Refills: 0 | Status: SHIPPED | OUTPATIENT
Start: 2024-04-19 | End: 2024-04-26

## 2024-04-19 RX ORDER — LAMOTRIGINE 150 MG/1
300 TABLET ORAL NIGHTLY
Status: DISCONTINUED | OUTPATIENT
Start: 2024-04-19 | End: 2024-04-20 | Stop reason: HOSPADM

## 2024-04-19 RX ORDER — METOCLOPRAMIDE HYDROCHLORIDE 5 MG/ML
10 INJECTION INTRAMUSCULAR; INTRAVENOUS ONCE
Status: COMPLETED | OUTPATIENT
Start: 2024-04-19 | End: 2024-04-19

## 2024-04-19 RX ORDER — SODIUM CHLORIDE, SODIUM LACTATE, POTASSIUM CHLORIDE, AND CALCIUM CHLORIDE .6; .31; .03; .02 G/100ML; G/100ML; G/100ML; G/100ML
1000 INJECTION, SOLUTION INTRAVENOUS ONCE
Status: COMPLETED | OUTPATIENT
Start: 2024-04-19 | End: 2024-04-19

## 2024-04-19 RX ORDER — ENOXAPARIN SODIUM 100 MG/ML
40 INJECTION SUBCUTANEOUS DAILY
Status: DISCONTINUED | OUTPATIENT
Start: 2024-04-19 | End: 2024-04-20 | Stop reason: HOSPADM

## 2024-04-19 RX ORDER — SODIUM CHLORIDE, SODIUM LACTATE, POTASSIUM CHLORIDE, CALCIUM CHLORIDE 600; 310; 30; 20 MG/100ML; MG/100ML; MG/100ML; MG/100ML
INJECTION, SOLUTION INTRAVENOUS CONTINUOUS
Status: DISCONTINUED | OUTPATIENT
Start: 2024-04-19 | End: 2024-04-20 | Stop reason: HOSPADM

## 2024-04-19 RX ORDER — SODIUM CHLORIDE 0.9 % (FLUSH) 0.9 %
5-40 SYRINGE (ML) INJECTION EVERY 12 HOURS SCHEDULED
Status: DISCONTINUED | OUTPATIENT
Start: 2024-04-19 | End: 2024-04-20 | Stop reason: HOSPADM

## 2024-04-19 RX ORDER — CLONIDINE HYDROCHLORIDE 0.1 MG/1
0.1 TABLET ORAL EVERY 6 HOURS PRN
Status: DISCONTINUED | OUTPATIENT
Start: 2024-04-19 | End: 2024-04-20 | Stop reason: HOSPADM

## 2024-04-19 RX ORDER — FOLIC ACID 1 MG/1
1 TABLET ORAL DAILY
Status: DISCONTINUED | OUTPATIENT
Start: 2024-04-19 | End: 2024-04-20 | Stop reason: HOSPADM

## 2024-04-19 RX ORDER — LORAZEPAM 2 MG/1
4 TABLET ORAL
Status: DISCONTINUED | OUTPATIENT
Start: 2024-04-19 | End: 2024-04-20 | Stop reason: HOSPADM

## 2024-04-19 RX ORDER — POTASSIUM CHLORIDE 7.45 MG/ML
10 INJECTION INTRAVENOUS PRN
Status: DISCONTINUED | OUTPATIENT
Start: 2024-04-19 | End: 2024-04-20 | Stop reason: HOSPADM

## 2024-04-19 RX ORDER — LORAZEPAM 2 MG/ML
2 INJECTION INTRAMUSCULAR
Status: DISCONTINUED | OUTPATIENT
Start: 2024-04-19 | End: 2024-04-20

## 2024-04-19 RX ORDER — SODIUM CHLORIDE 9 MG/ML
INJECTION, SOLUTION INTRAVENOUS PRN
Status: DISCONTINUED | OUTPATIENT
Start: 2024-04-19 | End: 2024-04-20 | Stop reason: HOSPADM

## 2024-04-19 RX ORDER — POLYETHYLENE GLYCOL 3350 17 G/17G
17 POWDER, FOR SOLUTION ORAL DAILY PRN
Status: DISCONTINUED | OUTPATIENT
Start: 2024-04-19 | End: 2024-04-20 | Stop reason: HOSPADM

## 2024-04-19 RX ORDER — ONDANSETRON 2 MG/ML
4 INJECTION INTRAMUSCULAR; INTRAVENOUS EVERY 6 HOURS PRN
Status: DISCONTINUED | OUTPATIENT
Start: 2024-04-19 | End: 2024-04-20 | Stop reason: HOSPADM

## 2024-04-19 RX ORDER — LORAZEPAM 2 MG/1
2 TABLET ORAL
Status: DISCONTINUED | OUTPATIENT
Start: 2024-04-19 | End: 2024-04-20 | Stop reason: HOSPADM

## 2024-04-19 RX ADMIN — SODIUM CHLORIDE, POTASSIUM CHLORIDE, SODIUM LACTATE AND CALCIUM CHLORIDE: 600; 310; 30; 20 INJECTION, SOLUTION INTRAVENOUS at 21:29

## 2024-04-19 RX ADMIN — SODIUM CHLORIDE, POTASSIUM CHLORIDE, SODIUM LACTATE AND CALCIUM CHLORIDE 1000 ML: 600; 310; 30; 20 INJECTION, SOLUTION INTRAVENOUS at 12:46

## 2024-04-19 RX ADMIN — PROMETHAZINE HYDROCHLORIDE 25 MG: 25 INJECTION INTRAMUSCULAR; INTRAVENOUS at 18:33

## 2024-04-19 RX ADMIN — SODIUM CHLORIDE, PRESERVATIVE FREE 10 ML: 5 INJECTION INTRAVENOUS at 21:33

## 2024-04-19 RX ADMIN — SODIUM CHLORIDE, PRESERVATIVE FREE 40 MG: 5 INJECTION INTRAVENOUS at 21:33

## 2024-04-19 RX ADMIN — ENOXAPARIN SODIUM 40 MG: 100 INJECTION SUBCUTANEOUS at 21:33

## 2024-04-19 RX ADMIN — PROMETHAZINE HYDROCHLORIDE 25 MG: 25 INJECTION INTRAMUSCULAR; INTRAVENOUS at 12:46

## 2024-04-19 RX ADMIN — IOPAMIDOL 70 ML: 755 INJECTION, SOLUTION INTRAVENOUS at 14:01

## 2024-04-19 RX ADMIN — METOCLOPRAMIDE 10 MG: 5 INJECTION, SOLUTION INTRAMUSCULAR; INTRAVENOUS at 15:34

## 2024-04-19 SDOH — ECONOMIC STABILITY: FOOD INSECURITY: WITHIN THE PAST 12 MONTHS, YOU WORRIED THAT YOUR FOOD WOULD RUN OUT BEFORE YOU GOT MONEY TO BUY MORE.: NEVER TRUE

## 2024-04-19 SDOH — ECONOMIC STABILITY: INCOME INSECURITY: HOW HARD IS IT FOR YOU TO PAY FOR THE VERY BASICS LIKE FOOD, HOUSING, MEDICAL CARE, AND HEATING?: NOT VERY HARD

## 2024-04-19 SDOH — ECONOMIC STABILITY: INCOME INSECURITY: IN THE PAST 12 MONTHS, HAS THE ELECTRIC, GAS, OIL, OR WATER COMPANY THREATENED TO SHUT OFF SERVICE IN YOUR HOME?: NO

## 2024-04-19 ASSESSMENT — PATIENT HEALTH QUESTIONNAIRE - PHQ9
1. LITTLE INTEREST OR PLEASURE IN DOING THINGS: NOT AT ALL
SUM OF ALL RESPONSES TO PHQ QUESTIONS 1-9: 0
2. FEELING DOWN, DEPRESSED OR HOPELESS: NOT AT ALL
SUM OF ALL RESPONSES TO PHQ9 QUESTIONS 1 & 2: 0
SUM OF ALL RESPONSES TO PHQ QUESTIONS 1-9: 0

## 2024-04-19 ASSESSMENT — ENCOUNTER SYMPTOMS
BACK PAIN: 0
CONSTIPATION: 0
DIARRHEA: 0
SHORTNESS OF BREATH: 0
VOMITING: 1
RHINORRHEA: 0
NAUSEA: 1
COUGH: 1
SORE THROAT: 0
ABDOMINAL PAIN: 1
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
## 2024-04-19 NOTE — ED PROVIDER NOTES
[facility] EMERGENCY DEPT  eMERGENCY dEPARTMENT eNCOUnter      Pt Name: Fidencio Ayoub  MRN: [id number]  Birthdate 1970  Date of evaluation: 4/19/2024  Provider: Penny Gonzalez MD    CHIEF COMPLAINT       Chief Complaint   Patient presents with    Emesis     Since last night with generalized abdominal burning         HISTORY OF PRESENT ILLNESS   (Location/Symptom, Timing/Onset,Context/Setting, Quality, Duration, Modifying Factors, Severity)  Note limiting factors.   Fidencio Ayoub is a 53 y.o. male who presents to the emergency department with abdominal pain nausea and vomiting.  Patient states that his symptoms started last night.  He has been unable to hold anything down.  He is symptoms are worse when he lies on his side.  He said he was admitted to the hospital about 3 or 4 years ago with similar symptoms.  He said he does not feel well.  He is having chills but no definite fever.  He has had a cough and congestion    HPI    NursingNotes were reviewed.    REVIEW OF SYSTEMS    (2-9 systems for level 4, 10 or more for level 5)     Review of Systems   Constitutional:  Positive for activity change, appetite change and chills. Negative for fever.   HENT:  Positive for congestion. Negative for rhinorrhea and sore throat.    Respiratory:  Positive for cough. Negative for shortness of breath.    Cardiovascular:  Negative for chest pain and leg swelling.   Gastrointestinal:  Positive for abdominal pain, nausea and vomiting. Negative for constipation and diarrhea.   Genitourinary:  Negative for difficulty urinating.   Musculoskeletal:  Negative for back pain and neck pain.   Skin:  Negative for rash.   Neurological:  Negative for weakness and headaches.   Psychiatric/Behavioral:  Negative for confusion.        A complete review of systems was performed and is negative except as noted above in the HPI.       PAST MEDICAL HISTORY     Past Medical History:   Diagnosis Date    Alcoholism (HCC)     history of alcohol overdose, denies  following components:    Glucose 114 (*)     All other components within normal limits   DRUG SCRN, BUPRENORPHINE - Abnormal; Notable for the following components:    Benzodiazepine Screen, Urine POSITIVE (*)     Cannabinoid Scrn, Ur POSITIVE (*)     Oxycodone Urine POSITIVE (*)     All other components within normal limits   RESPIRATORY PANEL, MOLECULAR, WITH COVID-19   APTT   PROTIME-INR   SPECIMEN REJECTION   LIPASE   ETHANOL   MICROSCOPIC URINALYSIS   SPECIMEN REJECTION    Narrative:     ORDER WAS CANCELLED 04/19/2024 16:24, NEW DRAW.   TROPONIN       All other labs were within normal range or not returned as of this dictation.    EMERGENCY DEPARTMENT COURSE and DIFFERENTIALDIAGNOSIS/MDM:   Vitals:    Vitals:    04/19/24 1149 04/19/24 1534   BP: (!) 156/94 (!) 154/79   Pulse: 75 62   Resp: 16 20   Temp: 97.8 °F (36.6 °C)    SpO2: 100% 100%   Weight: 77.1 kg (170 lb)    Height: 1.93 m (6' 4\")        MDM    54 yo male presents with abdominal pain and nausea and vomiting. Ct negative. Pt has continued to vomit despite phenergan and reglan. Pt lined up to dc and started vomiting again. D/w hospitalist for admission for intractable vomiting.     CONSULTS:  None    PROCEDURES:  Unless otherwise notedbelow, none     Procedures    FINAL IMPRESSION     1. Nausea and vomiting, unspecified vomiting type    2. Abdominal pain, unspecified abdominal location    3. Substance abuse (HCC)          DISPOSITION/PLAN   DISPOSITION Admitted 04/19/2024 06:11:47 PM      PATIENT REFERRED TO:  @FUP@    DISCHARGE MEDICATIONS:  New Prescriptions    PROMETHAZINE (PHENERGAN) 25 MG TABLET    Take 1 tablet by mouth every 6 hours as needed for Nausea    PROMETHAZINE (PROMETHEGAN) 25 MG SUPPOSITORY    Place 1 suppository rectally every 6 hours as needed for Nausea          (Please note that portions of this note were completed with a voice recognition program.  Efforts were made to edit the dictations butoccasionally words are

## 2024-04-19 NOTE — ED NOTES
Lab called again regarding troponin.  Lab states that it will have to be re ran due to machine not working.

## 2024-04-20 VITALS
DIASTOLIC BLOOD PRESSURE: 97 MMHG | WEIGHT: 178.13 LBS | TEMPERATURE: 98.4 F | HEART RATE: 87 BPM | HEIGHT: 76 IN | SYSTOLIC BLOOD PRESSURE: 137 MMHG | OXYGEN SATURATION: 98 % | RESPIRATION RATE: 18 BRPM | BODY MASS INDEX: 21.69 KG/M2

## 2024-04-20 LAB
ALBUMIN SERPL-MCNC: 4.2 G/DL (ref 3.5–5.2)
ALP SERPL-CCNC: 97 U/L (ref 40–130)
ALT SERPL-CCNC: 22 U/L (ref 5–41)
ANION GAP SERPL CALCULATED.3IONS-SCNC: 16 MMOL/L (ref 7–19)
AST SERPL-CCNC: 22 U/L (ref 5–40)
BASOPHILS # BLD: 0 K/UL (ref 0–0.2)
BASOPHILS NFR BLD: 0.2 % (ref 0–1)
BILIRUB SERPL-MCNC: 0.5 MG/DL (ref 0.2–1.2)
BUN SERPL-MCNC: 12 MG/DL (ref 6–20)
CALCIUM SERPL-MCNC: 9.4 MG/DL (ref 8.6–10)
CHLORIDE SERPL-SCNC: 99 MMOL/L (ref 98–111)
CO2 SERPL-SCNC: 24 MMOL/L (ref 22–29)
CREAT SERPL-MCNC: 1.2 MG/DL (ref 0.5–1.2)
EOSINOPHIL # BLD: 0 K/UL (ref 0–0.6)
EOSINOPHIL NFR BLD: 0.1 % (ref 0–5)
ERYTHROCYTE [DISTWIDTH] IN BLOOD BY AUTOMATED COUNT: 12.8 % (ref 11.5–14.5)
GLUCOSE SERPL-MCNC: 113 MG/DL (ref 74–109)
HCT VFR BLD AUTO: 43 % (ref 42–52)
HGB BLD-MCNC: 15 G/DL (ref 14–18)
IMM GRANULOCYTES # BLD: 0 K/UL
LYMPHOCYTES # BLD: 1.7 K/UL (ref 1.1–4.5)
LYMPHOCYTES NFR BLD: 13.8 % (ref 20–40)
MCH RBC QN AUTO: 31.5 PG (ref 27–31)
MCHC RBC AUTO-ENTMCNC: 34.9 G/DL (ref 33–37)
MCV RBC AUTO: 90.3 FL (ref 80–94)
MONOCYTES # BLD: 0.9 K/UL (ref 0–0.9)
MONOCYTES NFR BLD: 7.5 % (ref 0–10)
NEUTROPHILS # BLD: 9.4 K/UL (ref 1.5–7.5)
NEUTS SEG NFR BLD: 78.2 % (ref 50–65)
PLATELET # BLD AUTO: 266 K/UL (ref 130–400)
PMV BLD AUTO: 9.5 FL (ref 9.4–12.4)
POTASSIUM SERPL-SCNC: 4.1 MMOL/L (ref 3.5–5)
PROT SERPL-MCNC: 7.3 G/DL (ref 6.6–8.7)
RBC # BLD AUTO: 4.76 M/UL (ref 4.7–6.1)
SODIUM SERPL-SCNC: 139 MMOL/L (ref 136–145)
WBC # BLD AUTO: 12 K/UL (ref 4.8–10.8)

## 2024-04-20 PROCEDURE — 96361 HYDRATE IV INFUSION ADD-ON: CPT

## 2024-04-20 PROCEDURE — 2580000003 HC RX 258: Performed by: NURSE PRACTITIONER

## 2024-04-20 PROCEDURE — 36415 COLL VENOUS BLD VENIPUNCTURE: CPT

## 2024-04-20 PROCEDURE — 96372 THER/PROPH/DIAG INJ SC/IM: CPT

## 2024-04-20 PROCEDURE — G0378 HOSPITAL OBSERVATION PER HR: HCPCS

## 2024-04-20 PROCEDURE — 6370000000 HC RX 637 (ALT 250 FOR IP)

## 2024-04-20 PROCEDURE — 85025 COMPLETE CBC W/AUTO DIFF WBC: CPT

## 2024-04-20 PROCEDURE — 96376 TX/PRO/DX INJ SAME DRUG ADON: CPT

## 2024-04-20 PROCEDURE — 6370000000 HC RX 637 (ALT 250 FOR IP): Performed by: NURSE PRACTITIONER

## 2024-04-20 PROCEDURE — 6360000002 HC RX W HCPCS: Performed by: NURSE PRACTITIONER

## 2024-04-20 PROCEDURE — 80053 COMPREHEN METABOLIC PANEL: CPT

## 2024-04-20 PROCEDURE — C9113 INJ PANTOPRAZOLE SODIUM, VIA: HCPCS | Performed by: NURSE PRACTITIONER

## 2024-04-20 RX ORDER — PANTOPRAZOLE SODIUM 40 MG/1
40 TABLET, DELAYED RELEASE ORAL
Qty: 30 TABLET | Refills: 0 | Status: SHIPPED | OUTPATIENT
Start: 2024-04-20

## 2024-04-20 RX ADMIN — SODIUM CHLORIDE, POTASSIUM CHLORIDE, SODIUM LACTATE AND CALCIUM CHLORIDE: 600; 310; 30; 20 INJECTION, SOLUTION INTRAVENOUS at 05:52

## 2024-04-20 RX ADMIN — SODIUM CHLORIDE, PRESERVATIVE FREE 10 ML: 5 INJECTION INTRAVENOUS at 08:46

## 2024-04-20 RX ADMIN — PROMETHAZINE HYDROCHLORIDE 6.25 MG: 25 INJECTION INTRAMUSCULAR; INTRAVENOUS at 08:51

## 2024-04-20 RX ADMIN — SODIUM CHLORIDE, PRESERVATIVE FREE 40 MG: 5 INJECTION INTRAVENOUS at 08:45

## 2024-04-20 RX ADMIN — Medication 100 MG: at 08:45

## 2024-04-20 RX ADMIN — LOSARTAN POTASSIUM 100 MG: 100 TABLET, FILM COATED ORAL at 08:45

## 2024-04-20 RX ADMIN — THERA TABS 1 TABLET: TAB at 08:45

## 2024-04-20 RX ADMIN — FOLIC ACID 1 MG: 1 TABLET ORAL at 08:45

## 2024-04-20 NOTE — PLAN OF CARE
Problem: Safety - Adult  Goal: Free from fall injury  Outcome: Progressing     Problem: ABCDS Injury Assessment  Goal: Absence of physical injury  Outcome: Progressing     Problem: Risk for Elopement  Goal: Patient will not exit the unit/facility without proper excort  Outcome: Progressing  Flowsheets (Taken 4/19/2024 2036 by Fide Collazo, RN)  Nursing Interventions for Elopement Risk:   Make sure patient has all necessary personal care items   Assist with personal care needs such as toileting, eating, dressing, as needed to reduce the risk of wandering

## 2024-04-20 NOTE — CARE COORDINATION
Sw met with pt at bedside. Sw provided information on local rehab facilities and the contact information for [facility]. Pt said that he really needs mental health assistance. Sw provided information on some of the local mental health clinics.

## 2024-04-20 NOTE — PLAN OF CARE
Problem: Safety - Adult  Goal: Free from fall injury  4/20/2024 1129 by Sherine Whitaker RN  Outcome: Adequate for Discharge  4/20/2024 0031 by Tian Reynaga RN  Outcome: Progressing     Problem: ABCDS Injury Assessment  Goal: Absence of physical injury  4/20/2024 1129 by Sherine Whitaker RN  Outcome: Adequate for Discharge  4/20/2024 0031 by Tian Reynaga RN  Outcome: Progressing     Problem: Risk for Elopement  Goal: Patient will not exit the unit/facility without proper excort  4/20/2024 1129 by Sherine Whitaker RN  Outcome: Adequate for Discharge  4/20/2024 0031 by Tian Reynaga RN  Outcome: Progressing  Flowsheets (Taken 4/19/2024 2036 by Fide Collazo, RN)  Nursing Interventions for Elopement Risk:   Make sure patient has all necessary personal care items   Assist with personal care needs such as toileting, eating, dressing, as needed to reduce the risk of wandering
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
## 2024-04-20 NOTE — DISCHARGE SUMMARY
Fidencio Ayoub  :  1970  MRN:  [id number]    Admit date:  2024  Discharge date:  2024    Discharging Physician:  Dr. Tim Collins    Advance Directive: Full Code    Consults: IP CONSULT TO SOCIAL WORK     Primary Care Physician:  Chanel Dupont MD    Discharge Diagnoses:  Principal Problem:    Intractable nausea and vomiting  Active Problems:    Alcohol abuse, daily use  Resolved Problems:    * No resolved hospital problems. *      Portions of this note have been copied forward, however, changed to reflect the most current clinical status of this patient.    Hospital Course:   The patient is a 53 y.o. male with a PMH of alcohol abuse, and HTN, who presented to [facility] ED on 2024 complaining of intractable nausea and vomiting.  He stated he began vomiting day prior to admission.  He reported he had not been able to keep any p.o. intake down since that time.  He denied fever, chills, or diarrhea.  He reported that his symptoms worsened when he would lay on either side.  He did admit to drinking a sixpack of beer per day.  He reported that he had been smoking marijuana daily in an attempt to cut back on his alcohol use, however, he did not like the way it makes him feel.  Denied any suspicious foods or recent ill contacts. Further ED workup revealed , K4.1, BUN 10 creatinine 1.1. Glucose 114. Ethanol level less than 10. UDS positive for benzodiazepines, cannabis and opiates. WBC 7.7, H&H 5.1/43.3 with a platelet count of 263. Receiving multiple rounds of antiemetics patient continued to have intractable vomiting.   He was given aggressive IV fluid resuscitation overnight with as needed antiemetics.  His nausea has now resolved.  He has been advised to avoid tobacco use, EtOH use and marijuana use in order to reduce his nausea.  Pantoprazole will be prescribed at discharge.  His vital signs are currently stable.  He is medically stable for discharge.  He is to follow-up with his PCP within 1  GENITOURINARY STRUCTURES:  The prostate is unremarkable.  OSSEOUS STRUCTURES: Normal for age.       1.  No acute intra-abdominal abnormalities.  No free fluid or free air, or inflammatory process. 2.  No evidence of bowel obstruction.  All CT scans are performed using dose optimization techniques as appropriate to the performed exam and include at least one of the following: Automated exposure control, adjustment of the mA and/or kV according to size, and the use of iterative reconstruction technique.  ______________________________________ Electronically signed by: JEANINE TORRES M.D. Date:     04/19/2024 Time:    14:04     XR CHEST PORTABLE    Result Date: 4/19/2024  EXAM: CHEST RADIOGRAPH  TECHNIQUE: Single frontal chest radiograph.  HISTORY: Cough.  COMPARISON: 06/05/2023  FINDINGS: Lungs show no consolidation, pleural effusion or pneumothorax. The cardiomediastinal silhouette and pulmonary vessels are within normal limits. Upper abdomen is unremarkable. No acute bony abnormality.  Postsurgical change lower cervical spine without sequelae.      1.  No acute cardiopulmonary disease.    ______________________________________ Electronically signed by: VALENTINE KAPOOR M.D. Date:     04/19/2024 Time:    13:03       Pertinent Labs:   CBC:   Recent Labs     04/18/24  1617 04/19/24  1219 04/20/24  0450   WBC 11.8* 7.7 12.0*   HGB 15.3 15.1 15.0    263 266     BMP:    Recent Labs     04/18/24  1617 04/19/24  1315 04/20/24  0450   * 137 139   K 4.2 4.1 4.1   CL 94* 100 99   CO2 26 25 24   BUN 13 10 12   CREATININE 1.2 1.1 1.2   GLUCOSE 93 114* 113*     INR:   Recent Labs     04/19/24  1219   INR 0.99       Physical Exam:  Vital Signs: BP (!) 137/97   Pulse 87   Temp 98.4 °F (36.9 °C) (Temporal)   Resp 18   Ht 1.93 m (6' 4\")   Wt 80.8 kg (178 lb 2 oz)   SpO2 98%   BMI 21.68 kg/m²   Physical Exam  HENT:      Head: Normocephalic and atraumatic.      Nose: Nose normal.      Mouth/Throat:      Mouth: Mucous

## 2024-04-22 ENCOUNTER — TELEPHONE (OUTPATIENT)
Dept: INTERNAL MEDICINE | Age: 54
End: 2024-04-22

## 2024-04-22 NOTE — TELEPHONE ENCOUNTER
Care Transitions Initial Follow Up Call    Outreach made within 2 business days of discharge: Yes  Workman's comp claim?No  Patient: Fidencio Ayoub   Patient : 1970   MRN: [id number]    Reason for Admission: Intractable nausea/ vomiting  Discharge Date: 24       Spoke with: I left a detailed voicemail asking the patient to call my office. First attempt    Discharge department/facility: [facility]        Follow Up  No future appointments.    Mecca Melendez MA

## 2024-04-24 ENCOUNTER — TELEPHONE (OUTPATIENT)
Dept: INTERNAL MEDICINE | Age: 54
End: 2024-04-24

## 2024-04-24 NOTE — TELEPHONE ENCOUNTER
Patient discharged from hospital on 4/20. Needing a HFU. Does not qualify for TCM. Please advise.

## 2024-04-24 NOTE — TELEPHONE ENCOUNTER
I called the patient and left him a detailed voicemail letting him know I would like to schedule him an appointment with Dr. Dupont on 7/1/24. I asked the patient to call my office to schedule an appointment.

## 2024-05-04 ENCOUNTER — HOSPITAL ENCOUNTER (EMERGENCY)
Age: 54
Discharge: HOME | End: 2024-05-04
Payer: MEDICAID

## 2024-05-04 VITALS
OXYGEN SATURATION: 98 % | WEIGHT: 180 LBS | SYSTOLIC BLOOD PRESSURE: 132 MMHG | BODY MASS INDEX: 21.91 KG/M2 | RESPIRATION RATE: 18 BRPM | TEMPERATURE: 97.8 F | DIASTOLIC BLOOD PRESSURE: 82 MMHG | HEART RATE: 72 BPM

## 2024-05-04 DIAGNOSIS — N39.0 URINARY TRACT INFECTION IN MALE: Primary | ICD-10-CM

## 2024-05-04 LAB
ALBUMIN SERPL-MCNC: 4.3 G/DL (ref 3.5–5.2)
ALP SERPL-CCNC: 89 U/L (ref 40–130)
ALT SERPL-CCNC: 20 U/L (ref 5–41)
ANION GAP SERPL CALCULATED.3IONS-SCNC: 11 MMOL/L (ref 7–19)
AST SERPL-CCNC: 20 U/L (ref 5–40)
BACTERIA URNS QL MICRO: NEGATIVE /HPF
BASOPHILS # BLD: 0.1 K/UL (ref 0–0.2)
BASOPHILS NFR BLD: 0.8 % (ref 0–1)
BILIRUB SERPL-MCNC: 0.4 MG/DL (ref 0.2–1.2)
BILIRUB UR QL STRIP: NEGATIVE
BUN SERPL-MCNC: 14 MG/DL (ref 6–20)
CALCIUM SERPL-MCNC: 10.1 MG/DL (ref 8.6–10)
CHLORIDE SERPL-SCNC: 99 MMOL/L (ref 98–111)
CLARITY UR: CLEAR
CO2 SERPL-SCNC: 25 MMOL/L (ref 22–29)
COLOR UR: YELLOW
CREAT SERPL-MCNC: 1.2 MG/DL (ref 0.5–1.2)
CRYSTALS URNS MICRO: ABNORMAL /HPF
EOSINOPHIL # BLD: 0.2 K/UL (ref 0–0.6)
EOSINOPHIL NFR BLD: 2.4 % (ref 0–5)
EPI CELLS #/AREA URNS AUTO: 1 /HPF (ref 0–5)
ERYTHROCYTE [DISTWIDTH] IN BLOOD BY AUTOMATED COUNT: 13.2 % (ref 11.5–14.5)
GLUCOSE SERPL-MCNC: 126 MG/DL (ref 74–109)
GLUCOSE UR STRIP.AUTO-MCNC: NEGATIVE MG/DL
HCT VFR BLD AUTO: 40.7 % (ref 42–52)
HGB BLD-MCNC: 13.7 G/DL (ref 14–18)
HGB UR STRIP.AUTO-MCNC: NEGATIVE MG/L
HYALINE CASTS #/AREA URNS AUTO: 1 /HPF (ref 0–8)
IMM GRANULOCYTES # BLD: 0 K/UL
KETONES UR STRIP.AUTO-MCNC: ABNORMAL MG/DL
LEUKOCYTE ESTERASE UR QL STRIP.AUTO: ABNORMAL
LYMPHOCYTES # BLD: 2 K/UL (ref 1.1–4.5)
LYMPHOCYTES NFR BLD: 31.7 % (ref 20–40)
MCH RBC QN AUTO: 31.1 PG (ref 27–31)
MCHC RBC AUTO-ENTMCNC: 33.7 G/DL (ref 33–37)
MCV RBC AUTO: 92.5 FL (ref 80–94)
MONOCYTES # BLD: 0.5 K/UL (ref 0–0.9)
MONOCYTES NFR BLD: 8.5 % (ref 0–10)
NEUTROPHILS # BLD: 3.5 K/UL (ref 1.5–7.5)
NEUTS SEG NFR BLD: 56.4 % (ref 50–65)
NITRITE UR QL STRIP.AUTO: NEGATIVE
PH UR STRIP.AUTO: 5.5 [PH] (ref 5–8)
PLATELET # BLD AUTO: 252 K/UL (ref 130–400)
PMV BLD AUTO: 9 FL (ref 9.4–12.4)
POTASSIUM SERPL-SCNC: 4.6 MMOL/L (ref 3.5–5)
PROT SERPL-MCNC: 7.1 G/DL (ref 6.6–8.7)
PROT UR STRIP.AUTO-MCNC: ABNORMAL MG/DL
RBC # BLD AUTO: 4.4 M/UL (ref 4.7–6.1)
RBC #/AREA URNS AUTO: 1 /HPF (ref 0–4)
SODIUM SERPL-SCNC: 135 MMOL/L (ref 136–145)
SP GR UR STRIP.AUTO: 1.02 (ref 1–1.03)
UROBILINOGEN UR STRIP.AUTO-MCNC: 1 E.U./DL
WBC # BLD AUTO: 6.2 K/UL (ref 4.8–10.8)
WBC #/AREA URNS AUTO: 11 /HPF (ref 0–5)

## 2024-05-04 PROCEDURE — 80053 COMPREHEN METABOLIC PANEL: CPT

## 2024-05-04 PROCEDURE — 36415 COLL VENOUS BLD VENIPUNCTURE: CPT

## 2024-05-04 PROCEDURE — 85025 COMPLETE CBC W/AUTO DIFF WBC: CPT

## 2024-05-04 PROCEDURE — 81001 URINALYSIS AUTO W/SCOPE: CPT

## 2024-05-04 PROCEDURE — 87086 URINE CULTURE/COLONY COUNT: CPT

## 2024-05-04 PROCEDURE — 99283 EMERGENCY DEPT VISIT LOW MDM: CPT

## 2024-05-04 RX ORDER — LEVOFLOXACIN 500 MG/1
500 TABLET, FILM COATED ORAL DAILY
Qty: 3 TABLET | Refills: 0 | Status: SHIPPED | OUTPATIENT
Start: 2024-05-04 | End: 2024-05-07

## 2024-05-04 ASSESSMENT — ENCOUNTER SYMPTOMS
GASTROINTESTINAL NEGATIVE: 1
RESPIRATORY NEGATIVE: 1
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
## 2024-05-05 LAB — BACTERIA UR CULT: NO GROWTH

## 2024-05-06 LAB — BACTERIA UR CULT: NORMAL

## 2024-05-13 ENCOUNTER — TELEPHONE (OUTPATIENT)
Dept: NEUROLOGY | Age: 54
End: 2024-05-13

## 2024-05-13 NOTE — TELEPHONE ENCOUNTER
Called the patient to schedule their appointment with Dr Serna for the referral we received to the office. The patient didn't answer the call and left a voicemail explaining to the patient to give us a call back to schedule their appointment.

## 2024-05-15 ENCOUNTER — HOSPITAL ENCOUNTER (INPATIENT)
Age: 54
LOS: 1 days | Discharge: HOME | DRG: 641 | End: 2024-05-16
Attending: EMERGENCY MEDICINE | Admitting: HOSPITALIST
Payer: MEDICAID

## 2024-05-15 DIAGNOSIS — F32.A DEPRESSION WITH SUICIDAL IDEATION: Primary | ICD-10-CM

## 2024-05-15 DIAGNOSIS — Z91.89 AT RISK FOR ALCOHOL WITHDRAWAL: ICD-10-CM

## 2024-05-15 DIAGNOSIS — R45.851 DEPRESSION WITH SUICIDAL IDEATION: Primary | ICD-10-CM

## 2024-05-15 DIAGNOSIS — F10.10 CHRONIC ALCOHOL ABUSE: ICD-10-CM

## 2024-05-15 DIAGNOSIS — E87.1 HYPONATREMIA: ICD-10-CM

## 2024-05-15 LAB
ALBUMIN SERPL-MCNC: 5.2 G/DL (ref 3.5–5.2)
ALP SERPL-CCNC: 118 U/L (ref 40–130)
ALT SERPL-CCNC: 41 U/L (ref 5–41)
AMPHET UR QL SCN: NEGATIVE
ANION GAP SERPL CALCULATED.3IONS-SCNC: 17 MMOL/L (ref 7–19)
AST SERPL-CCNC: 40 U/L (ref 5–40)
BARBITURATES UR QL SCN: NEGATIVE
BASOPHILS # BLD: 0 K/UL (ref 0–0.2)
BASOPHILS NFR BLD: 0.3 % (ref 0–1)
BENZODIAZ UR QL SCN: POSITIVE
BILIRUB SERPL-MCNC: 0.8 MG/DL (ref 0.2–1.2)
BILIRUB UR QL STRIP: NEGATIVE
BUN SERPL-MCNC: 14 MG/DL (ref 6–20)
BUPRENORPHINE URINE: NEGATIVE
CALCIUM SERPL-MCNC: 10 MG/DL (ref 8.6–10)
CANNABINOIDS UR QL SCN: POSITIVE
CHLORIDE SERPL-SCNC: 83 MMOL/L (ref 98–111)
CLARITY UR: CLEAR
CO2 SERPL-SCNC: 26 MMOL/L (ref 22–29)
COCAINE UR QL SCN: NEGATIVE
COLOR UR: YELLOW
CREAT SERPL-MCNC: 0.9 MG/DL (ref 0.5–1.2)
DRUG SCREEN COMMENT UR-IMP: ABNORMAL
EOSINOPHIL # BLD: 0 K/UL (ref 0–0.6)
EOSINOPHIL NFR BLD: 0.3 % (ref 0–5)
ERYTHROCYTE [DISTWIDTH] IN BLOOD BY AUTOMATED COUNT: 13.1 % (ref 11.5–14.5)
ETHANOLAMINE SERPL-MCNC: <10 MG/DL (ref 0–0.08)
FENTANYL SCREEN, URINE: NEGATIVE
GLUCOSE SERPL-MCNC: 96 MG/DL (ref 74–109)
GLUCOSE UR STRIP.AUTO-MCNC: NEGATIVE MG/DL
HCT VFR BLD AUTO: 46.4 % (ref 42–52)
HGB BLD-MCNC: 16.6 G/DL (ref 14–18)
HGB UR STRIP.AUTO-MCNC: NEGATIVE MG/L
IMM GRANULOCYTES # BLD: 0 K/UL
KETONES UR STRIP.AUTO-MCNC: NEGATIVE MG/DL
LEUKOCYTE ESTERASE UR QL STRIP.AUTO: NEGATIVE
LYMPHOCYTES # BLD: 1.1 K/UL (ref 1.1–4.5)
LYMPHOCYTES NFR BLD: 14.8 % (ref 20–40)
MAGNESIUM SERPL-MCNC: 2.2 MG/DL (ref 1.6–2.6)
MCH RBC QN AUTO: 32 PG (ref 27–31)
MCHC RBC AUTO-ENTMCNC: 35.8 G/DL (ref 33–37)
MCV RBC AUTO: 89.6 FL (ref 80–94)
METHADONE UR QL SCN: NEGATIVE
METHAMPHETAMINE, URINE: NEGATIVE
MONOCYTES # BLD: 0.6 K/UL (ref 0–0.9)
MONOCYTES NFR BLD: 7.9 % (ref 0–10)
NEUTROPHILS # BLD: 5.5 K/UL (ref 1.5–7.5)
NEUTS SEG NFR BLD: 76.6 % (ref 50–65)
NITRITE UR QL STRIP.AUTO: NEGATIVE
OPIATES UR QL SCN: NEGATIVE
OSMOLALITY URINE: 282 MOSM/KG (ref 250–1200)
OXYCODONE UR QL SCN: NEGATIVE
PCP UR QL SCN: NEGATIVE
PH UR STRIP.AUTO: 6 [PH] (ref 5–8)
PHOSPHATE SERPL-MCNC: 3 MG/DL (ref 2.5–4.5)
PLATELET # BLD AUTO: 278 K/UL (ref 130–400)
PMV BLD AUTO: 9.1 FL (ref 9.4–12.4)
POTASSIUM SERPL-SCNC: 4.3 MMOL/L (ref 3.5–5)
PROT SERPL-MCNC: 8.9 G/DL (ref 6.6–8.7)
PROT UR STRIP.AUTO-MCNC: ABNORMAL MG/DL
RBC # BLD AUTO: 5.18 M/UL (ref 4.7–6.1)
SARS-COV-2 RDRP RESP QL NAA+PROBE: NOT DETECTED
SODIUM SERPL-SCNC: 126 MMOL/L (ref 136–145)
SODIUM UR-SCNC: <20 MMOL/L
SP GR UR STRIP.AUTO: 1.01 (ref 1–1.03)
TRICYCLIC ANTIDEPRESSANTS, UR: NEGATIVE
TSH SERPL DL<=0.005 MIU/L-ACNC: 0.87 UIU/ML (ref 0.27–4.2)
URATE SERPL-MCNC: 5.5 MG/DL (ref 3.4–7)
UROBILINOGEN UR STRIP.AUTO-MCNC: 0.2 E.U./DL
WBC # BLD AUTO: 7.2 K/UL (ref 4.8–10.8)

## 2024-05-15 PROCEDURE — 36415 COLL VENOUS BLD VENIPUNCTURE: CPT

## 2024-05-15 PROCEDURE — 81003 URINALYSIS AUTO W/O SCOPE: CPT

## 2024-05-15 PROCEDURE — 87635 SARS-COV-2 COVID-19 AMP PRB: CPT

## 2024-05-15 PROCEDURE — 80307 DRUG TEST PRSMV CHEM ANLYZR: CPT

## 2024-05-15 PROCEDURE — 82077 ASSAY SPEC XCP UR&BREATH IA: CPT

## 2024-05-15 PROCEDURE — 84100 ASSAY OF PHOSPHORUS: CPT

## 2024-05-15 PROCEDURE — 84550 ASSAY OF BLOOD/URIC ACID: CPT

## 2024-05-15 PROCEDURE — 2580000003 HC RX 258

## 2024-05-15 PROCEDURE — 85025 COMPLETE CBC W/AUTO DIFF WBC: CPT

## 2024-05-15 PROCEDURE — 80053 COMPREHEN METABOLIC PANEL: CPT

## 2024-05-15 PROCEDURE — 99285 EMERGENCY DEPT VISIT HI MDM: CPT

## 2024-05-15 PROCEDURE — G0480 DRUG TEST DEF 1-7 CLASSES: HCPCS

## 2024-05-15 PROCEDURE — 83735 ASSAY OF MAGNESIUM: CPT

## 2024-05-15 PROCEDURE — 6360000002 HC RX W HCPCS

## 2024-05-15 PROCEDURE — 6370000000 HC RX 637 (ALT 250 FOR IP)

## 2024-05-15 PROCEDURE — 1200000000 HC SEMI PRIVATE

## 2024-05-15 PROCEDURE — 94760 N-INVAS EAR/PLS OXIMETRY 1: CPT

## 2024-05-15 PROCEDURE — 84443 ASSAY THYROID STIM HORMONE: CPT

## 2024-05-15 PROCEDURE — 83935 ASSAY OF URINE OSMOLALITY: CPT

## 2024-05-15 PROCEDURE — 2580000003 HC RX 258: Performed by: EMERGENCY MEDICINE

## 2024-05-15 PROCEDURE — 84300 ASSAY OF URINE SODIUM: CPT

## 2024-05-15 RX ORDER — LORAZEPAM 2 MG/1
4 TABLET ORAL
Status: DISCONTINUED | OUTPATIENT
Start: 2024-05-15 | End: 2024-05-16

## 2024-05-15 RX ORDER — METOCLOPRAMIDE HYDROCHLORIDE 5 MG/ML
10 INJECTION INTRAMUSCULAR; INTRAVENOUS EVERY 6 HOURS PRN
Status: DISCONTINUED | OUTPATIENT
Start: 2024-05-15 | End: 2024-05-16 | Stop reason: HOSPADM

## 2024-05-15 RX ORDER — LORAZEPAM 1 MG/1
1 TABLET ORAL
Status: DISCONTINUED | OUTPATIENT
Start: 2024-05-15 | End: 2024-05-16 | Stop reason: HOSPADM

## 2024-05-15 RX ORDER — AMLODIPINE BESYLATE 5 MG/1
5 TABLET ORAL NIGHTLY
Status: DISCONTINUED | OUTPATIENT
Start: 2024-05-15 | End: 2024-05-16 | Stop reason: HOSPADM

## 2024-05-15 RX ORDER — LOSARTAN POTASSIUM 100 MG/1
100 TABLET ORAL EVERY MORNING
Status: DISCONTINUED | OUTPATIENT
Start: 2024-05-16 | End: 2024-05-16

## 2024-05-15 RX ORDER — MULTIVITAMIN WITH IRON
1 TABLET ORAL DAILY
Status: DISCONTINUED | OUTPATIENT
Start: 2024-05-15 | End: 2024-05-16 | Stop reason: HOSPADM

## 2024-05-15 RX ORDER — LORAZEPAM 2 MG/1
2 TABLET ORAL
Status: DISCONTINUED | OUTPATIENT
Start: 2024-05-15 | End: 2024-05-16 | Stop reason: HOSPADM

## 2024-05-15 RX ORDER — SODIUM CHLORIDE 0.9 % (FLUSH) 0.9 %
5-40 SYRINGE (ML) INJECTION PRN
Status: DISCONTINUED | OUTPATIENT
Start: 2024-05-15 | End: 2024-05-16 | Stop reason: HOSPADM

## 2024-05-15 RX ORDER — ACETAMINOPHEN 325 MG/1
650 TABLET ORAL EVERY 6 HOURS PRN
Status: DISCONTINUED | OUTPATIENT
Start: 2024-05-15 | End: 2024-05-16 | Stop reason: HOSPADM

## 2024-05-15 RX ORDER — POTASSIUM CHLORIDE 7.45 MG/ML
10 INJECTION INTRAVENOUS PRN
Status: DISCONTINUED | OUTPATIENT
Start: 2024-05-15 | End: 2024-05-16 | Stop reason: HOSPADM

## 2024-05-15 RX ORDER — LORAZEPAM 2 MG/ML
1 INJECTION INTRAMUSCULAR
Status: DISCONTINUED | OUTPATIENT
Start: 2024-05-15 | End: 2024-05-16 | Stop reason: HOSPADM

## 2024-05-15 RX ORDER — SODIUM CHLORIDE 0.9 % (FLUSH) 0.9 %
5-40 SYRINGE (ML) INJECTION EVERY 12 HOURS SCHEDULED
Status: DISCONTINUED | OUTPATIENT
Start: 2024-05-15 | End: 2024-05-16 | Stop reason: HOSPADM

## 2024-05-15 RX ORDER — NICOTINE 21 MG/24HR
1 PATCH, TRANSDERMAL 24 HOURS TRANSDERMAL DAILY
Status: DISCONTINUED | OUTPATIENT
Start: 2024-05-15 | End: 2024-05-16 | Stop reason: HOSPADM

## 2024-05-15 RX ORDER — MAGNESIUM SULFATE IN WATER 40 MG/ML
2000 INJECTION, SOLUTION INTRAVENOUS PRN
Status: DISCONTINUED | OUTPATIENT
Start: 2024-05-15 | End: 2024-05-16 | Stop reason: HOSPADM

## 2024-05-15 RX ORDER — ACETAMINOPHEN 650 MG/1
650 SUPPOSITORY RECTAL EVERY 6 HOURS PRN
Status: DISCONTINUED | OUTPATIENT
Start: 2024-05-15 | End: 2024-05-16 | Stop reason: HOSPADM

## 2024-05-15 RX ORDER — 0.9 % SODIUM CHLORIDE 0.9 %
1000 INTRAVENOUS SOLUTION INTRAVENOUS ONCE
Status: COMPLETED | OUTPATIENT
Start: 2024-05-15 | End: 2024-05-15

## 2024-05-15 RX ORDER — POLYETHYLENE GLYCOL 3350 17 G/17G
17 POWDER, FOR SOLUTION ORAL DAILY PRN
Status: DISCONTINUED | OUTPATIENT
Start: 2024-05-15 | End: 2024-05-16 | Stop reason: HOSPADM

## 2024-05-15 RX ORDER — POTASSIUM CHLORIDE 20 MEQ/1
40 TABLET, EXTENDED RELEASE ORAL PRN
Status: DISCONTINUED | OUTPATIENT
Start: 2024-05-15 | End: 2024-05-16 | Stop reason: HOSPADM

## 2024-05-15 RX ORDER — LORAZEPAM 2 MG/ML
3 INJECTION INTRAMUSCULAR
Status: DISCONTINUED | OUTPATIENT
Start: 2024-05-15 | End: 2024-05-16

## 2024-05-15 RX ORDER — LORAZEPAM 2 MG/ML
4 INJECTION INTRAMUSCULAR
Status: DISCONTINUED | OUTPATIENT
Start: 2024-05-15 | End: 2024-05-16

## 2024-05-15 RX ORDER — LORAZEPAM 2 MG/ML
2 INJECTION INTRAMUSCULAR
Status: DISCONTINUED | OUTPATIENT
Start: 2024-05-15 | End: 2024-05-16

## 2024-05-15 RX ORDER — ENOXAPARIN SODIUM 100 MG/ML
40 INJECTION SUBCUTANEOUS EVERY 24 HOURS
Status: DISCONTINUED | OUTPATIENT
Start: 2024-05-15 | End: 2024-05-16 | Stop reason: HOSPADM

## 2024-05-15 RX ORDER — FOLIC ACID 1 MG/1
1 TABLET ORAL DAILY
Status: DISCONTINUED | OUTPATIENT
Start: 2024-05-15 | End: 2024-05-16 | Stop reason: HOSPADM

## 2024-05-15 RX ORDER — SODIUM CHLORIDE 9 MG/ML
INJECTION, SOLUTION INTRAVENOUS PRN
Status: DISCONTINUED | OUTPATIENT
Start: 2024-05-15 | End: 2024-05-16 | Stop reason: HOSPADM

## 2024-05-15 RX ORDER — LAMOTRIGINE 100 MG/1
300 TABLET ORAL NIGHTLY
Status: DISCONTINUED | OUTPATIENT
Start: 2024-05-15 | End: 2024-05-16 | Stop reason: HOSPADM

## 2024-05-15 RX ORDER — SODIUM CHLORIDE 9 MG/ML
INJECTION, SOLUTION INTRAVENOUS CONTINUOUS
Status: DISCONTINUED | OUTPATIENT
Start: 2024-05-15 | End: 2024-05-16 | Stop reason: HOSPADM

## 2024-05-15 RX ORDER — FAMOTIDINE 20 MG/1
20 TABLET, FILM COATED ORAL 2 TIMES DAILY
Status: DISCONTINUED | OUTPATIENT
Start: 2024-05-15 | End: 2024-05-16 | Stop reason: HOSPADM

## 2024-05-15 RX ORDER — GAUZE BANDAGE 2" X 2"
100 BANDAGE TOPICAL DAILY
Status: DISCONTINUED | OUTPATIENT
Start: 2024-05-15 | End: 2024-05-16 | Stop reason: HOSPADM

## 2024-05-15 RX ADMIN — FOLIC ACID 1 MG: 1 TABLET ORAL at 17:37

## 2024-05-15 RX ADMIN — LORAZEPAM 2 MG: 2 TABLET ORAL at 17:37

## 2024-05-15 RX ADMIN — AMLODIPINE BESYLATE 5 MG: 5 TABLET ORAL at 20:32

## 2024-05-15 RX ADMIN — THERA TABS 1 TABLET: TAB at 17:37

## 2024-05-15 RX ADMIN — ENOXAPARIN SODIUM 40 MG: 100 INJECTION SUBCUTANEOUS at 17:37

## 2024-05-15 RX ADMIN — SODIUM CHLORIDE, PRESERVATIVE FREE 10 ML: 5 INJECTION INTRAVENOUS at 20:32

## 2024-05-15 RX ADMIN — LAMOTRIGINE 300 MG: 100 TABLET ORAL at 20:32

## 2024-05-15 RX ADMIN — SODIUM CHLORIDE: 9 INJECTION, SOLUTION INTRAVENOUS at 17:37

## 2024-05-15 RX ADMIN — LORAZEPAM 2 MG: 2 TABLET ORAL at 20:31

## 2024-05-15 RX ADMIN — SODIUM CHLORIDE 1000 ML: 9 INJECTION, SOLUTION INTRAVENOUS at 13:40

## 2024-05-15 RX ADMIN — FAMOTIDINE 20 MG: 20 TABLET ORAL at 20:31

## 2024-05-15 RX ADMIN — Medication 100 MG: at 17:37

## 2024-05-15 RX ADMIN — METOCLOPRAMIDE 10 MG: 5 INJECTION, SOLUTION INTRAMUSCULAR; INTRAVENOUS at 20:30

## 2024-05-15 SDOH — ECONOMIC STABILITY: INCOME INSECURITY: IN THE PAST 12 MONTHS, HAS THE ELECTRIC, GAS, OIL, OR WATER COMPANY THREATENED TO SHUT OFF SERVICE IN YOUR HOME?: NO

## 2024-05-15 SDOH — ECONOMIC STABILITY: INCOME INSECURITY: HOW HARD IS IT FOR YOU TO PAY FOR THE VERY BASICS LIKE FOOD, HOUSING, MEDICAL CARE, AND HEATING?: SOMEWHAT HARD

## 2024-05-15 SDOH — ECONOMIC STABILITY: FOOD INSECURITY: WITHIN THE PAST 12 MONTHS, YOU WORRIED THAT YOUR FOOD WOULD RUN OUT BEFORE YOU GOT MONEY TO BUY MORE.: SOMETIMES TRUE

## 2024-05-15 ASSESSMENT — ENCOUNTER SYMPTOMS
SHORTNESS OF BREATH: 0
ABDOMINAL PAIN: 0
COLOR CHANGE: 0
CONSTIPATION: 0
NAUSEA: 1
ABDOMINAL DISTENTION: 0
GASTROINTESTINAL NEGATIVE: 1
EYES NEGATIVE: 1
WHEEZING: 0
VOMITING: 1
COUGH: 0
RESPIRATORY NEGATIVE: 1

## 2024-05-15 ASSESSMENT — PATIENT HEALTH QUESTIONNAIRE - PHQ9
SUM OF ALL RESPONSES TO PHQ QUESTIONS 1-9: 27
10. IF YOU CHECKED OFF ANY PROBLEMS, HOW DIFFICULT HAVE THESE PROBLEMS MADE IT FOR YOU TO DO YOUR WORK, TAKE CARE OF THINGS AT HOME, OR GET ALONG WITH OTHER PEOPLE: VERY DIFFICULT
2. FEELING DOWN, DEPRESSED OR HOPELESS: NEARLY EVERY DAY
1. LITTLE INTEREST OR PLEASURE IN DOING THINGS: NEARLY EVERY DAY
7. TROUBLE CONCENTRATING ON THINGS, SUCH AS READING THE NEWSPAPER OR WATCHING TELEVISION: NEARLY EVERY DAY
8. MOVING OR SPEAKING SO SLOWLY THAT OTHER PEOPLE COULD HAVE NOTICED. OR THE OPPOSITE, BEING SO FIGETY OR RESTLESS THAT YOU HAVE BEEN MOVING AROUND A LOT MORE THAN USUAL: NEARLY EVERY DAY
5. POOR APPETITE OR OVEREATING: NEARLY EVERY DAY
7. TROUBLE CONCENTRATING ON THINGS, SUCH AS READING THE NEWSPAPER OR WATCHING TELEVISION: NEARLY EVERY DAY
SUM OF ALL RESPONSES TO PHQ QUESTIONS 1-9: 24
9. THOUGHTS THAT YOU WOULD BE BETTER OFF DEAD, OR OF HURTING YOURSELF: NEARLY EVERY DAY
10. IF YOU CHECKED OFF ANY PROBLEMS, HOW DIFFICULT HAVE THESE PROBLEMS MADE IT FOR YOU TO DO YOUR WORK, TAKE CARE OF THINGS AT HOME, OR GET ALONG WITH OTHER PEOPLE: VERY DIFFICULT
3. TROUBLE FALLING OR STAYING ASLEEP: NEARLY EVERY DAY
1. LITTLE INTEREST OR PLEASURE IN DOING THINGS: NEARLY EVERY DAY
8. MOVING OR SPEAKING SO SLOWLY THAT OTHER PEOPLE COULD HAVE NOTICED. OR THE OPPOSITE, BEING SO FIGETY OR RESTLESS THAT YOU HAVE BEEN MOVING AROUND A LOT MORE THAN USUAL: NEARLY EVERY DAY
9. THOUGHTS THAT YOU WOULD BE BETTER OFF DEAD, OR OF HURTING YOURSELF: SEVERAL DAYS
SUM OF ALL RESPONSES TO PHQ QUESTIONS 1-9: 24
2. FEELING DOWN, DEPRESSED OR HOPELESS: NEARLY EVERY DAY
6. FEELING BAD ABOUT YOURSELF - OR THAT YOU ARE A FAILURE OR HAVE LET YOURSELF OR YOUR FAMILY DOWN: NEARLY EVERY DAY
SUM OF ALL RESPONSES TO PHQ9 QUESTIONS 1 & 2: 6
4. FEELING TIRED OR HAVING LITTLE ENERGY: NEARLY EVERY DAY
3. TROUBLE FALLING OR STAYING ASLEEP: MORE THAN HALF THE DAYS
SUM OF ALL RESPONSES TO PHQ QUESTIONS 1-9: 24
SUM OF ALL RESPONSES TO PHQ QUESTIONS 1-9: 27
4. FEELING TIRED OR HAVING LITTLE ENERGY: NEARLY EVERY DAY
SUM OF ALL RESPONSES TO PHQ9 QUESTIONS 1 & 2: 6
SUM OF ALL RESPONSES TO PHQ QUESTIONS 1-9: 23
SUM OF ALL RESPONSES TO PHQ QUESTIONS 1-9: 24
SUM OF ALL RESPONSES TO PHQ QUESTIONS 1-9: 27
5. POOR APPETITE OR OVEREATING: NEARLY EVERY DAY
6. FEELING BAD ABOUT YOURSELF - OR THAT YOU ARE A FAILURE OR HAVE LET YOURSELF OR YOUR FAMILY DOWN: NEARLY EVERY DAY

## 2024-05-15 ASSESSMENT — LIFESTYLE VARIABLES
HOW MANY STANDARD DRINKS CONTAINING ALCOHOL DO YOU HAVE ON A TYPICAL DAY: 10 OR MORE
HOW OFTEN DO YOU HAVE A DRINK CONTAINING ALCOHOL: 4 OR MORE TIMES A WEEK
HOW MANY STANDARD DRINKS CONTAINING ALCOHOL DO YOU HAVE ON A TYPICAL DAY: 10 OR MORE
HOW OFTEN DO YOU HAVE A DRINK CONTAINING ALCOHOL: 4 OR MORE TIMES A WEEK

## 2024-05-15 ASSESSMENT — PAIN - FUNCTIONAL ASSESSMENT: PAIN_FUNCTIONAL_ASSESSMENT: NONE - DENIES PAIN

## 2024-05-15 NOTE — H&P
Neurological:  Negative for tremors, syncope, weakness, light-headedness, numbness and headaches.   Psychiatric/Behavioral:  Positive for suicidal ideas. Negative for agitation, behavioral problems and confusion.         Physical Examination:  /83   Pulse 68   Temp 98.2 °F (36.8 °C)   Resp 15   SpO2 99%   Physical Exam  Vitals and nursing note reviewed.   Constitutional:       Appearance: He is ill-appearing (chronically).   HENT:      Mouth/Throat:      Mouth: Mucous membranes are moist.      Pharynx: Oropharynx is clear.   Eyes:      Extraocular Movements: Extraocular movements intact.      Conjunctiva/sclera: Conjunctivae normal.      Pupils: Pupils are equal, round, and reactive to light.   Cardiovascular:      Rate and Rhythm: Normal rate and regular rhythm.      Pulses: Normal pulses.      Heart sounds: Normal heart sounds. No murmur heard.  Pulmonary:      Effort: Pulmonary effort is normal. No respiratory distress.      Breath sounds: Normal breath sounds.   Abdominal:      General: Bowel sounds are normal. There is no distension.      Palpations: Abdomen is soft.      Tenderness: There is no abdominal tenderness. There is no guarding or rebound.   Musculoskeletal:         General: No swelling. Normal range of motion.      Cervical back: Normal range of motion and neck supple. No rigidity or tenderness.      Right lower leg: No edema.      Left lower leg: No edema.   Skin:     General: Skin is warm and dry.      Capillary Refill: Capillary refill takes less than 2 seconds.   Neurological:      General: No focal deficit present.      Mental Status: He is alert and oriented to person, place, and time.      Cranial Nerves: No cranial nerve deficit.      Motor: Weakness (generalized) present.   Psychiatric:         Mood and Affect: Mood is anxious.         Behavior: Behavior normal. Behavior is cooperative.         Thought Content: Thought content includes suicidal ideation. Thought content includes

## 2024-05-15 NOTE — ED PROVIDER NOTES
[facility]  EMERGENCY DEPARTMENT ENCOUNTER      Pt Name: Fidencio Ayoub  MRN: [id number]  Birthdate 1970  Date of evaluation: 5/15/2024  Provider: Gurinder Cruz Jr, MD    CHIEF COMPLAINT       Chief Complaint   Patient presents with    Suicidal     Pt presents to ED with c/o SI states he has tried to drink himself to death, pt states that he was going to shoot himself.          HISTORY OF PRESENT ILLNESS   (Location/Symptom, Timing/Onset,Context/Setting, Quality, Duration, Modifying Factors, Severity)  Note limiting factors.   Fidencio Ayoub is a 54 y.o. male who presents to the emergency department for evaluation after having suicidal thoughts recently with plan to drink himself to death or shoot himself.  Patient well-known to this emergency department with similar prior visits.  Was hospitalized here just under a month ago with nausea and vomiting.  Has history of chronic alcohol use and marijuana use  Reports last drink of alcohol was 7 PM last night.  Says that he started having withdrawal type symptoms around 3am      HPI    NursingNotes were reviewed.    REVIEW OF SYSTEMS    (2-9 systems for level 4, 10 or more for level 5)     Review of Systems   Constitutional: Negative.    HENT: Negative.     Eyes: Negative.    Respiratory: Negative.     Cardiovascular: Negative.    Gastrointestinal: Negative.    Genitourinary: Negative.    Musculoskeletal: Negative.    Skin: Negative.    Neurological: Negative.    Hematological: Negative.        A complete review of systems was performed and is negative except as noted above in the HPI.       PAST MEDICAL HISTORY     Past Medical History:   Diagnosis Date    Alcoholism (HCC)     history of alcohol overdose, denies history of seizure    Anxiety     CAD (coronary artery disease)     old MI on a prior EKG, but no other problmes/    Chest pain 12/12/2011    Chronic kidney disease     Chronic midline low back pain without sciatica     Cigarette smoker 12/12/2011
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
## 2024-05-15 NOTE — PROGRESS NOTES
Fidencio Ayoub arrived to room # 313.   Presented with: etoh abuse SI  Mental Status: Patient is oriented, alert, coherent, logical, thought processes intact, and able to concentrate and follow conversation.   Vitals:    05/15/24 1721   BP: (!) 149/98   Pulse: 78   Resp: 16   Temp: 98.4 °F (36.9 °C)   SpO2: 97%     Patient safety contract and falls prevention contract reviewed with patient Yes.  Oriented Patient to room.  Call light within reach. Yes.  Needs, issues or concerns expressed at this time: no.    Sitter at bedside  Electronically signed by Wilbert Rosales RN on 5/15/2024 at 6:04 PM

## 2024-05-15 NOTE — PROGRESS NOTES
4 Eyes Skin Assessment     NAME:  Fidencio Ayoub  YOB: 1970  MEDICAL RECORD NUMBER:  [id number]    The patient is being assessed for  Admission    I agree that at least one RN has performed a thorough Head to Toe Skin Assessment on the patient. ALL assessment sites listed below have been assessed.      Areas assessed by both nurses:    Head, Face, Ears, Shoulders, Back, Chest, Arms, Elbows, Hands, Sacrum. Buttock, Coccyx, Ischium, and Legs. Feet and Heels        Does the Patient have a Wound? No noted wound(s)       Wu Prevention initiated by RN: No  Wound Care Orders initiated by RN: No    Pressure Injury (Stage 3,4, Unstageable, DTI, NWPT, and Complex wounds) if present, place Wound referral order by RN under : No    New Ostomies, if present place, Ostomy referral order under : No     Nurse 1 eSignature: Electronically signed by Wilbert Rosales RN on 5/15/24 at 6:05 PM CDT    **SHARE this note so that the co-signing nurse can place an eSignature**    Nurse 2 eSignature: {Esignature:[id number]}

## 2024-05-16 VITALS
WEIGHT: 164.44 LBS | TEMPERATURE: 98.2 F | HEART RATE: 65 BPM | BODY MASS INDEX: 20.45 KG/M2 | SYSTOLIC BLOOD PRESSURE: 139 MMHG | RESPIRATION RATE: 18 BRPM | DIASTOLIC BLOOD PRESSURE: 89 MMHG | OXYGEN SATURATION: 99 % | HEIGHT: 75 IN

## 2024-05-16 LAB
ANION GAP SERPL CALCULATED.3IONS-SCNC: 12 MMOL/L (ref 7–19)
BASOPHILS # BLD: 0 K/UL (ref 0–0.2)
BASOPHILS NFR BLD: 0.7 % (ref 0–1)
BUN SERPL-MCNC: 14 MG/DL (ref 6–20)
CALCIUM SERPL-MCNC: 8.6 MG/DL (ref 8.6–10)
CHLORIDE SERPL-SCNC: 94 MMOL/L (ref 98–111)
CO2 SERPL-SCNC: 25 MMOL/L (ref 22–29)
CREAT SERPL-MCNC: 1 MG/DL (ref 0.5–1.2)
EOSINOPHIL # BLD: 0.1 K/UL (ref 0–0.6)
EOSINOPHIL NFR BLD: 1.5 % (ref 0–5)
ERYTHROCYTE [DISTWIDTH] IN BLOOD BY AUTOMATED COUNT: 13 % (ref 11.5–14.5)
GLUCOSE SERPL-MCNC: 92 MG/DL (ref 74–109)
HCT VFR BLD AUTO: 39.2 % (ref 42–52)
HGB BLD-MCNC: 13.8 G/DL (ref 14–18)
IMM GRANULOCYTES # BLD: 0 K/UL
LYMPHOCYTES # BLD: 1.3 K/UL (ref 1.1–4.5)
LYMPHOCYTES NFR BLD: 28.9 % (ref 20–40)
MAGNESIUM SERPL-MCNC: 2 MG/DL (ref 1.6–2.6)
MCH RBC QN AUTO: 31.5 PG (ref 27–31)
MCHC RBC AUTO-ENTMCNC: 35.2 G/DL (ref 33–37)
MCV RBC AUTO: 89.5 FL (ref 80–94)
MONOCYTES # BLD: 0.5 K/UL (ref 0–0.9)
MONOCYTES NFR BLD: 11.2 % (ref 0–10)
NEUTROPHILS # BLD: 2.6 K/UL (ref 1.5–7.5)
NEUTS SEG NFR BLD: 57.3 % (ref 50–65)
PHOSPHATE SERPL-MCNC: 2.8 MG/DL (ref 2.5–4.5)
PLATELET # BLD AUTO: 204 K/UL (ref 130–400)
PMV BLD AUTO: 9.1 FL (ref 9.4–12.4)
POTASSIUM SERPL-SCNC: 4.2 MMOL/L (ref 3.5–5)
RBC # BLD AUTO: 4.38 M/UL (ref 4.7–6.1)
SODIUM SERPL-SCNC: 131 MMOL/L (ref 136–145)
WBC # BLD AUTO: 4.6 K/UL (ref 4.8–10.8)

## 2024-05-16 PROCEDURE — 6370000000 HC RX 637 (ALT 250 FOR IP)

## 2024-05-16 PROCEDURE — 6370000000 HC RX 637 (ALT 250 FOR IP): Performed by: HOSPITALIST

## 2024-05-16 PROCEDURE — 80048 BASIC METABOLIC PNL TOTAL CA: CPT

## 2024-05-16 PROCEDURE — 84100 ASSAY OF PHOSPHORUS: CPT

## 2024-05-16 PROCEDURE — 85025 COMPLETE CBC W/AUTO DIFF WBC: CPT

## 2024-05-16 PROCEDURE — 6360000002 HC RX W HCPCS

## 2024-05-16 PROCEDURE — 94760 N-INVAS EAR/PLS OXIMETRY 1: CPT

## 2024-05-16 PROCEDURE — 36415 COLL VENOUS BLD VENIPUNCTURE: CPT

## 2024-05-16 PROCEDURE — 83735 ASSAY OF MAGNESIUM: CPT

## 2024-05-16 RX ORDER — CHLORDIAZEPOXIDE HYDROCHLORIDE 25 MG/1
25 CAPSULE, GELATIN COATED ORAL 4 TIMES DAILY
Status: DISCONTINUED | OUTPATIENT
Start: 2024-05-16 | End: 2024-05-16 | Stop reason: HOSPADM

## 2024-05-16 RX ORDER — LOSARTAN POTASSIUM 25 MG/1
25 TABLET ORAL EVERY MORNING
Status: DISCONTINUED | OUTPATIENT
Start: 2024-05-16 | End: 2024-05-16 | Stop reason: HOSPADM

## 2024-05-16 RX ADMIN — Medication 100 MG: at 08:27

## 2024-05-16 RX ADMIN — CHLORDIAZEPOXIDE HYDROCHLORIDE 25 MG: 25 CAPSULE ORAL at 08:27

## 2024-05-16 RX ADMIN — THERA TABS 1 TABLET: TAB at 08:27

## 2024-05-16 RX ADMIN — LOSARTAN POTASSIUM 25 MG: 25 TABLET, FILM COATED ORAL at 08:27

## 2024-05-16 RX ADMIN — FAMOTIDINE 20 MG: 20 TABLET ORAL at 08:27

## 2024-05-16 RX ADMIN — FOLIC ACID 1 MG: 1 TABLET ORAL at 08:27

## 2024-05-16 RX ADMIN — METOCLOPRAMIDE 10 MG: 5 INJECTION, SOLUTION INTRAMUSCULAR; INTRAVENOUS at 08:27

## 2024-05-16 RX ADMIN — METOCLOPRAMIDE 10 MG: 5 INJECTION, SOLUTION INTRAMUSCULAR; INTRAVENOUS at 02:32

## 2024-05-16 NOTE — DISCHARGE SUMMARY
Discharge Summary    Date:5/16/2024        Patient Name:Fidencio Ayoub     YOB: 1970     Age:54 y.o.    Admit Date:5/15/2024   Admission Condition:fair   Discharged Condition:stable  Discharge Date: 05/16/24       Discharge Diagnoses   Principal Problem:    Hyponatremia  Active Problems:    Hypertension    Alcohol use disorder, severe, dependence (HCC)    Suicidal ideation  Resolved Problems:    * No resolved hospital problems. *      Hospital Stay   Narrative of Hospital Course:     54 year old male with history of ETOH use disorder, CAD, Depression and Htn who presented with suicidal thoughts. On admission was noted to have hyponatremia hence admitted to medicine service. In house was initiated on IV fluids, CIWA protocol for possible withdrawal symptoms. Responded to IV fluids with improvement in sodium levels. No overt withdrawal symptoms noted. Seen by psychiatry and cleared for discharge home as stated patient not suicidal, homicidal and competent to make his own decision. Evaluated by SW for outpt rehab resources. Patient adamant of discharging today as he has an appointment at 2:45pm in [place] that he needs to make. To follow up with PCP for continuous management of his chronic medical problems.       Physical Examination:  General: Well-developed, no acute distress lying comfortably in bed.  HEENT: Atraumatic normocephalic, range of motion normal, no JVD, no tracheal deviation noted.  Cardiac: Normal S1-S2 no murmurs rub or gallop.  Respiratory: clear To auscultation bilaterally, no rhonchi or rales, no wheezing  Abdomen: Soft, positive bowel sounds in all quadrants, no distention, nontender to palpation, no rebound noted.    extremities: no tenderness, no edema, moves all extremities  Psych: Affect normal and good eye contact, behavioral normal.      Consultants:   IP CONSULT TO PSYCHIATRY  IP CONSULT TO SOCIAL WORK    Time Spent on Discharge:  35 minutes were spent in patient examination,

## 2024-05-16 NOTE — CARE COORDINATION
05/16/24 1412   Readmission Assessment   Number of Days since last admission? 8-30 days   Previous Disposition Home Alone   Who is being Interviewed Patient   What was the patient's/caregiver's perception as to why they think they needed to return back to the hospital? Other (Comment)  (Pt came in for SI and alcohol use)   Did you visit your Primary Care Physician after you left the hospital, before you returned this time? No   Why weren't you able to visit your PCP? Other (Comment)  (Provider tried to set up appt with patient but never answered phone. PCP left voicemails.)   Did you see a specialist, such as Cardiac, Pulmonary, Orthopedic Physician, etc. after you left the hospital? No   Who advised the patient to return to the hospital? Self-referral   Does the patient report anything that got in the way of taking their medications? No   In our efforts to provide the best possible care to you and others like you, can you think of anything that we could have done to help you after you left the hospital the first time, so that you might not have needed to return so soon? Additional Community resources available for illness support;Teaching during hospitalization regarding your illness;Other (Comment)  (Pt had SI and alcohol relapse. He denied to go to rehab the last time.)

## 2024-05-16 NOTE — CARE COORDINATION
Pt stated they would like to go to [place], [place] to see his psychiatrist,  Dr. Helms. He would also like to clear his stuff out of his apartment before rehab tx but is fearful of going home. Pt states he is not suicidal but states \"he doesn't have much to live for\". After he does the things above, he would like to go to tx.     Pt has been set up with [facility] and was given a resource of [facility] AA list,

## 2024-05-16 NOTE — DISCHARGE INSTRUCTIONS
Patient position supine. Patient prepped and draped per unit standard.    Safety straps applied:N/A   [facility]  [address] [place], [place] [address]  [phone]  [facility]  [address] [place], [place] [address]   [facility]  [address] [place], [place] [address]  [phone]    [facility]  Typically serve a daily lunch during the week- contact for meal times.  [facility]   [address]  [place], [place] [address]  [phone]    [facility]   Typically serve a daily meal and serve as point of contact for Meals on Wheels program  [facility]  [address] [place], [place] [address]  [phone]    Food Pantry  Food distribution. Most require person to bring ID/documentation. Contact for information.    [facility]  [address] [place], [place] [address]  [phone]  [facility]  [address] [place], [place] [address]  [phone]  [facility]   [address] [place], [place] [address]  [phone]  [address] [place] [address]  [phone]  [facility]  [address] [place] [address]  [phone]  [facility]   [phone]  [facility]   [phone]    [facility]   Typically serve a daily meal and serve as point of contact for Meals on Wheels program  [facility]  [address] [place] [address]  [phone]    Food Pantry  Food distribution. Most require person to bring ID/documentation. Contact for information.    [facility]  [address] [place] [address]  [phone]  [facility]  [address] [place] [address]  [phone]     [facility]  Typically serve a daily lunch during the week- contact for meal times.  [employer]  [address] [place], [place] [address]  [phone]  Serving a free

## 2024-05-16 NOTE — PLAN OF CARE
Problem: Discharge Planning  Goal: Discharge to home or other facility with appropriate resources  Outcome: Progressing  Flowsheets (Taken 5/15/2024 1802 by Wilbert Rosales, RN)  Discharge to home or other facility with appropriate resources: Identify barriers to discharge with patient and caregiver     Problem: Neurosensory - Adult  Goal: Achieves stable or improved neurological status  Outcome: Progressing  Goal: Absence of seizures  Outcome: Progressing  Goal: Remains free of injury related to seizures activity  Outcome: Progressing  Goal: Achieves maximal functionality and self care  Outcome: Progressing     Problem: Respiratory - Adult  Goal: Achieves optimal ventilation and oxygenation  Outcome: Progressing     Problem: Cardiovascular - Adult  Goal: Maintains optimal cardiac output and hemodynamic stability  Outcome: Progressing  Goal: Absence of cardiac dysrhythmias or at baseline  Outcome: Progressing     Problem: Skin/Tissue Integrity - Adult  Goal: Skin integrity remains intact  Outcome: Progressing  Goal: Incisions, wounds, or drain sites healing without S/S of infection  Outcome: Progressing  Goal: Oral mucous membranes remain intact  Outcome: Progressing     Problem: Musculoskeletal - Adult  Goal: Return mobility to safest level of function  Outcome: Progressing  Goal: Maintain proper alignment of affected body part  Outcome: Progressing  Goal: Return ADL status to a safe level of function  Outcome: Progressing     Problem: Gastrointestinal - Adult  Goal: Minimal or absence of nausea and vomiting  Outcome: Progressing  Goal: Maintains or returns to baseline bowel function  Outcome: Progressing  Goal: Maintains adequate nutritional intake  Outcome: Progressing  Goal: Establish and maintain optimal ostomy function  Outcome: Progressing     Problem: Genitourinary - Adult  Goal: Absence of urinary retention  Outcome: Progressing  Goal: Urinary catheter remains patent  Outcome: Progressing     Problem: 
needed  5/15/2024 2347 by Mary Chin RN  Outcome: Progressing     Problem: Hematologic - Adult  Goal: Maintains hematologic stability  5/16/2024 0953 by Nat Coy LPN  Outcome: Adequate for Discharge  Flowsheets (Taken 5/15/2024 2355 by Mary Chin, RN)  Maintains hematologic stability:   Assess for signs and symptoms of bleeding or hemorrhage   Monitor labs for bleeding or clotting disorders   Administer blood products/factors as ordered  5/15/2024 2347 by Mary Chin RN  Outcome: Progressing     Problem: Chronic Conditions and Co-morbidities  Goal: Patient's chronic conditions and co-morbidity symptoms are monitored and maintained or improved  5/16/2024 0953 by Nat Coy LPN  Outcome: Adequate for Discharge  Flowsheets (Taken 5/15/2024 2355 by Mary Chin, RN)  Care Plan - Patient's Chronic Conditions and Co-Morbidity Symptoms are Monitored and Maintained or Improved:   Monitor and assess patient's chronic conditions and comorbid symptoms for stability, deterioration, or improvement   Collaborate with multidisciplinary team to address chronic and comorbid conditions and prevent exacerbation or deterioration   Update acute care plan with appropriate goals if chronic or comorbid symptoms are exacerbated and prevent overall improvement and discharge  5/15/2024 2347 by Mary Chin RN  Outcome: Progressing     Problem: Safety - Adult  Goal: Free from fall injury  5/16/2024 0953 by Nat Coy LPN  Outcome: Adequate for Discharge  Flowsheets (Taken 5/15/2024 2359 by Mary Chin, RN)  Free From Fall Injury:   Instruct family/caregiver on patient safety   Based on caregiver fall risk screen, instruct family/caregiver to ask for assistance with transferring infant if caregiver noted to have fall risk factors  5/15/2024 2347 by Mary Chin RN  Outcome: Progressing     Problem: ABCDS Injury Assessment  Goal: Absence of physical injury  5/16/2024 0953 by Nat Coy LPN  Outcome:

## 2024-05-16 NOTE — CONSULTS
[facility]  Psychiatry Consult    Reason for Consult: Concern   Suicidal ideations    The primary source(s) of information include(s):  patient    The patient is a 54 y.o. male with previous psychiatric history of major depressive disorder, anxiety, alcohol use disorder, stimulants abuse, cannabis use disorder, substance-induced mood disorder, who has been admitted to medical services, secondary to suicidal ideations.  At time of admission to the hospital patient's UDS was positive for cannabinoids and benzodiazepines.  Patient's sodium level was 126.    Patient is well-known to psychiatry due to multiple previous admissions to our psychiatric unit, as well as consults, when patient was admitted to medical services, secondary to alcohol intoxication and suicidal ideations.    Patient has been seen in his room with presence of one-to-one sitter.  He reported that he was not compliant with follow-up appointments and prescribed psychotropic medications.  Patient stated that he continues to drink alcohol and beer is his drink of choice.  Patient stated that the reason for drinking alcohol is \"I come home, close the door. I am by myself staring on TV.  I feel like I am in frozen condition.  I drink alcohol to get some actions\".  Patient reported that he was heavily drinking alcohol during the last Saturday and Sunday, 4-5 days ago,  and during the next 2 days was trying to decrease the amount of drinking alcohol to deal with his withdrawal symptoms.  However, he stated that he was experiencing suicidal ideations and decided to come to the hospital and ask for help.  Patient denies significant withdrawal symptoms from alcohol today, with an exception of mild headache.    In regards of affective symptomatology, patient reported feeling of depression and anxiety, and rated level of his depression as 6 out of 10 and level of anxiety as 5 out of 10.  He endorses decreased motivation, decreased

## 2024-05-20 ENCOUNTER — TELEPHONE (OUTPATIENT)
Dept: NEUROLOGY | Age: 54
End: 2024-05-20

## 2024-05-20 NOTE — TELEPHONE ENCOUNTER
Called the patient to schedule their appointment with Doni Mcneal for the referral we received to the office. The patient didn't answer the call and left a voicemail explaining to the patient to give us a call back to schedule their appointment.

## 2024-08-12 ENCOUNTER — HOSPITAL ENCOUNTER (OUTPATIENT)
Dept: GENERAL RADIOLOGY | Age: 54
Discharge: HOME | End: 2024-08-12
Payer: MEDICAID

## 2024-08-12 ENCOUNTER — OFFICE VISIT (OUTPATIENT)
Age: 54
End: 2024-08-12

## 2024-08-12 VITALS
HEART RATE: 89 BPM | WEIGHT: 175 LBS | HEIGHT: 76 IN | RESPIRATION RATE: 20 BRPM | OXYGEN SATURATION: 99 % | BODY MASS INDEX: 21.31 KG/M2 | TEMPERATURE: 97.7 F | DIASTOLIC BLOOD PRESSURE: 78 MMHG | SYSTOLIC BLOOD PRESSURE: 140 MMHG

## 2024-08-12 DIAGNOSIS — R06.2 WHEEZING: Primary | ICD-10-CM

## 2024-08-12 DIAGNOSIS — R05.1 ACUTE COUGH: ICD-10-CM

## 2024-08-12 DIAGNOSIS — R06.2 WHEEZING: ICD-10-CM

## 2024-08-12 DIAGNOSIS — Z11.52 ENCOUNTER FOR SCREENING FOR COVID-19: ICD-10-CM

## 2024-08-12 DIAGNOSIS — R52 GENERALIZED BODY ACHES: ICD-10-CM

## 2024-08-12 DIAGNOSIS — I10 HYPERTENSION, UNSPECIFIED TYPE: ICD-10-CM

## 2024-08-12 LAB
INFLUENZA A ANTIBODY: NEGATIVE
INFLUENZA B ANTIBODY: NEGATIVE
Lab: NORMAL
QC PASS/FAIL: NORMAL
SARS-COV-2, POC: NORMAL

## 2024-08-12 PROCEDURE — 71046 X-RAY EXAM CHEST 2 VIEWS: CPT

## 2024-08-12 RX ORDER — ALBUTEROL SULFATE 2.5 MG/3ML
2.5 SOLUTION RESPIRATORY (INHALATION) EVERY 6 HOURS PRN
Qty: 120 EACH | Refills: 0 | Status: SHIPPED | OUTPATIENT
Start: 2024-08-12

## 2024-08-12 RX ORDER — BENZONATATE 200 MG/1
200 CAPSULE ORAL 3 TIMES DAILY PRN
Qty: 30 CAPSULE | Refills: 0 | Status: SHIPPED | OUTPATIENT
Start: 2024-08-12 | End: 2024-08-22

## 2024-08-12 ASSESSMENT — ENCOUNTER SYMPTOMS
CHEST TIGHTNESS: 0
CONSTIPATION: 0
STRIDOR: 0
RHINORRHEA: 0
VOMITING: 0
COUGH: 1
SORE THROAT: 0
ABDOMINAL PAIN: 0
DIARRHEA: 0
WHEEZING: 1
SHORTNESS OF BREATH: 1

## 2024-08-12 NOTE — PATIENT INSTRUCTIONS
- CXR pending, will call once results are available.  - Benzonatate as needed for cough.  - Albuterol neb solution sent to the pharmacy, administer as directed.  - Neb machine DME sent home with patient.  - OTC Flonase and Mucinex as discussed.  - Rest.  - Maintain adequate fluid intake.  - May use saline nasal washes.  - Follow up with PCP in 24-48 hours if symptoms do not improve.    Hypertension:  - BP readings are considered elevated (greater than 130/80) during clinic visit.  - Initiate exercise (30 minute periods 3-4 times per week).  - Start DASH diet: vegetables, fruits, low-fat dairy, whole grains, poultry/fish.  - Limit administration of NSAID.  - Monitor intake of salt, sweet, sugar-sweetened beverages and red meats.  - Limit intake of alcohol (men: no more than 2 drinks, women: no more than 1 drink daily).  - Keep a log by checking BP morning and night, take with you to f/u appointment.   - Make appointment with PCP for chronic maintenance and treatment of BP.
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
## 2025-02-11 NOTE — ED NOTES
.Lindsay Lino RN  06/12/19 8418
Pt placed in paper scrubs. Personal belongs removed and given to security. Suicidal Flowsheet Initiated. Sitter at bedside.       Drea Krueger RN  06/11/19 1944
normal...

## 2025-03-05 ENCOUNTER — OFFICE VISIT (OUTPATIENT)
Age: 55
End: 2025-03-05
Payer: MEDICAID

## 2025-03-05 VITALS
OXYGEN SATURATION: 98 % | TEMPERATURE: 97.2 F | WEIGHT: 194 LBS | SYSTOLIC BLOOD PRESSURE: 126 MMHG | HEART RATE: 84 BPM | HEIGHT: 76 IN | DIASTOLIC BLOOD PRESSURE: 78 MMHG | BODY MASS INDEX: 23.62 KG/M2 | RESPIRATION RATE: 18 BRPM

## 2025-03-05 DIAGNOSIS — H66.005 RECURRENT ACUTE SUPPURATIVE OTITIS MEDIA WITHOUT SPONTANEOUS RUPTURE OF LEFT TYMPANIC MEMBRANE: Primary | ICD-10-CM

## 2025-03-05 DIAGNOSIS — H65.93 FLUID LEVEL BEHIND TYMPANIC MEMBRANE OF BOTH EARS: ICD-10-CM

## 2025-03-05 PROCEDURE — G8427 DOCREV CUR MEDS BY ELIG CLIN: HCPCS

## 2025-03-05 PROCEDURE — 3017F COLORECTAL CA SCREEN DOC REV: CPT

## 2025-03-05 PROCEDURE — 99213 OFFICE O/P EST LOW 20 MIN: CPT

## 2025-03-05 PROCEDURE — 4004F PT TOBACCO SCREEN RCVD TLK: CPT

## 2025-03-05 PROCEDURE — 3074F SYST BP LT 130 MM HG: CPT

## 2025-03-05 PROCEDURE — G8420 CALC BMI NORM PARAMETERS: HCPCS

## 2025-03-05 PROCEDURE — 3078F DIAST BP <80 MM HG: CPT

## 2025-03-05 RX ORDER — LISDEXAMFETAMINE DIMESYLATE 40 MG/1
CAPSULE ORAL
COMMUNITY
Start: 2025-02-13

## 2025-03-05 RX ORDER — MIRTAZAPINE 30 MG/1
30 TABLET, FILM COATED ORAL NIGHTLY
COMMUNITY
Start: 2025-02-28

## 2025-03-05 ASSESSMENT — ENCOUNTER SYMPTOMS
SINUS PRESSURE: 1
SHORTNESS OF BREATH: 0
TROUBLE SWALLOWING: 0
COUGH: 0
WHEEZING: 0
SINUS PAIN: 0
FACIAL SWELLING: 0
COLOR CHANGE: 0
SORE THROAT: 0
NAUSEA: 1
DIARRHEA: 0
VOMITING: 0

## 2025-03-05 NOTE — PATIENT INSTRUCTIONS
- Antibiotic Augmentin called to pharmacy, take full course even if you begin to feel better  - Use flonase morning and night  - Take an OTC antihistamine such as Zyrtec daily  - Saline nasal spray or spencer pot rinses with distilled water as tolerated  - OTC Mucinex daily  - Follow-up if symptoms worsen or do not improve in the next few days or if developing fever, chills, or drainage or blood from the ear.  - Work note provided, return tomorrow    What is Otitis Media?  Otitis media is an infection or inflammation of the middle ear, which is the space behind the eardrum. It can cause ear pain, fever, irritability, and sometimes fluid drainage from the ear.    Treatment and Care:  Medication: Your healthcare provider has prescribed antibiotics to treat the infection. It is important to follow the dosage instructions carefully and complete the full course of antibiotics, even if you start to feel better before finishing the medication.  Always monitor for signs of allergic reaction such as itching, hives or any difficulty swallowing or breathing STOP THE MEDICATION IMMEDIATELY.  If difficulty breathing, call 911 and go to the ER. If hives and itching, stop the medication, take OTC Benadryl, and contact provider for alternative antibiotics or to be seen if necessary.  Pain Management: Use over-the-counter pain relievers such as acetaminophen or ibuprofen to help manage pain and reduce fever. Follow the dosing instructions on the package or as advised by your healthcare provider.  May use OTC antihistamine such as Zyrtec to help dry secretions and flonase nasal spray.  Hydration and Rest: Ensure you stay well-hydrated and get plenty of rest to help their body fight the infection.  May return to work or school when feeling well and fever free. Ear infections are not contagious.    Monitoring and Follow-Up:  Improvement should be noted within 48 to 72 hours. If symptoms persist or worsen after this period, contact your

## 2025-03-05 NOTE — PROGRESS NOTES
If symptoms persist or worsen after this period, contact your healthcare provider for reassessment.  Schedule a follow-up appointment if symptoms do not improve or if new symptoms develop.    When to Seek Immediate Medical Attention:  Difficulty breathing  Severe headache or neck stiffness  Significant change in behavior or responsiveness        Patient provided educational materials- see patient instructions.  Discussed administration, benefit, and side effects of any prescribed or OTC medications.  All patient questions answered appropriately.  Patient voiced understanding.     No follow-ups on file.    Urgent Care evaluation today is not a substitute for PCP visit. Follow up care is the responsibility of the patient to discuss and review this Urgent Care visit.    No orders of the defined types were placed in this encounter.      No results found for this visit on 03/05/25.    Orders Placed This Encounter   Medications    amoxicillin-clavulanate (AUGMENTIN) 875-125 MG per tablet     Sig: Take 1 tablet by mouth 2 times daily for 7 days     Dispense:  14 tablet     Refill:  0        There are no Patient Instructions on file for this visit.      Electronically signed by KWASI Campoverde CNP on 3/5/2025 at 5:42 PM    EMR Dragon/transcription disclaimer:  Much of this encounter note is electronic transcription/translation of spoken language to printed texts.  The electronic translation of spoken language may be erroneous, or at times, nonsensical words or phrases may be inadvertently transcribed.  Although I have reviewed the note for such errors, some may still exist.

## 2025-03-19 ENCOUNTER — APPOINTMENT (OUTPATIENT)
Dept: CT IMAGING | Age: 55
DRG: 392 | End: 2025-03-19
Payer: MEDICAID

## 2025-03-19 ENCOUNTER — HOSPITAL ENCOUNTER (INPATIENT)
Age: 55
LOS: 2 days | Discharge: HOME | DRG: 392 | End: 2025-03-21
Attending: HOSPITALIST | Admitting: HOSPITALIST
Payer: MEDICAID

## 2025-03-19 PROBLEM — R10.9 ABDOMINAL PAIN: Status: ACTIVE | Noted: 2025-03-19

## 2025-03-19 SDOH — ECONOMIC STABILITY: INCOME INSECURITY: IN THE LAST 12 MONTHS, WAS THERE A TIME WHEN YOU WERE NOT ABLE TO PAY THE MORTGAGE OR RENT ON TIME?: NO

## 2025-03-19 SDOH — HEALTH STABILITY: PHYSICAL HEALTH: ON AVERAGE, HOW MANY MINUTES DO YOU ENGAGE IN EXERCISE AT THIS LEVEL?: 0 MIN

## 2025-03-19 SDOH — ECONOMIC STABILITY: FOOD INSECURITY: WITHIN THE PAST 12 MONTHS, YOU WORRIED THAT YOUR FOOD WOULD RUN OUT BEFORE YOU GOT MONEY TO BUY MORE.: NEVER TRUE

## 2025-03-19 SDOH — ECONOMIC STABILITY: INCOME INSECURITY: HOW HARD IS IT FOR YOU TO PAY FOR THE VERY BASICS LIKE FOOD, HOUSING, MEDICAL CARE, AND HEATING?: NOT HARD AT ALL

## 2025-03-19 SDOH — HEALTH STABILITY: PHYSICAL HEALTH: ON AVERAGE, HOW MANY DAYS PER WEEK DO YOU ENGAGE IN MODERATE TO STRENUOUS EXERCISE (LIKE A BRISK WALK)?: 0 DAYS

## 2025-03-19 SDOH — ECONOMIC STABILITY: INCOME INSECURITY: IN THE PAST 12 MONTHS, HAS THE ELECTRIC, GAS, OIL, OR WATER COMPANY THREATENED TO SHUT OFF SERVICE IN YOUR HOME?: NO

## 2025-03-19 ASSESSMENT — ENCOUNTER SYMPTOMS
COUGH: 0
CONSTIPATION: 0
DIARRHEA: 0
COLOR CHANGE: 0
BACK PAIN: 0
NAUSEA: 1
VOMITING: 1
WHEEZING: 0
SHORTNESS OF BREATH: 0
ABDOMINAL PAIN: 1
CHEST TIGHTNESS: 0

## 2025-03-19 ASSESSMENT — PATIENT HEALTH QUESTIONNAIRE - PHQ9
SUM OF ALL RESPONSES TO PHQ QUESTIONS 1-9: 2
2. FEELING DOWN, DEPRESSED OR HOPELESS: SEVERAL DAYS
1. LITTLE INTEREST OR PLEASURE IN DOING THINGS: SEVERAL DAYS

## 2025-03-19 ASSESSMENT — PAIN SCALES - GENERAL: PAINLEVEL_OUTOF10: 4

## 2025-03-19 NOTE — ED PROVIDER NOTES
[facility] EMERGENCY DEPARTMENT  EMERGENCY DEPARTMENT ENCOUNTER      Pt Name: Fidencio Ayoub  MRN: [id number]  Birthdate 1970  Date of evaluation: 3/19/2025  Provider: KWASI Conte CNP  5:28 PM    CHIEF COMPLAINT       Chief Complaint   Patient presents with    Abdominal Pain     Left side abd pain, worse since Sunday    Vomiting         HISTORY OF PRESENT ILLNESS    Fidencio Ayoub is a 54 y.o. male who presents to the emergency department with left sided abdominal pain, worse since Sunday. He feels like when he eats his food digests until a certain point in his left abdomen where it feels like it gets stuck and he gets a mass. He has pain and discomfort until it finally passes. He reports nausea and vomiting. Some chills last night. No known fever. No diarrhea. No urinary symptoms. Reports nightly marijuana for anxiety. Reports one similar episode years ago seen at [facility] and had scopes at the time, but none in a long time.     Chart review shows hx of alcohol dependence. No current signs of withdrawal. Chart reviews shows colonoscopy in 2017 at [facility] with colon polyp removed.     HPI    Nursing Notes were reviewed.    REVIEW OF SYSTEMS       Review of Systems   Constitutional:  Positive for chills. Negative for fever.   HENT:  Negative for congestion.    Respiratory:  Negative for cough, chest tightness and shortness of breath.    Cardiovascular:  Negative for chest pain, palpitations and leg swelling.   Gastrointestinal:  Positive for abdominal pain, nausea and vomiting. Negative for diarrhea.   Genitourinary:  Negative for dysuria, flank pain, frequency and urgency.   Musculoskeletal:  Negative for back pain and neck pain.   Neurological:  Negative for dizziness, syncope, weakness, light-headedness and headaches.             PAST MEDICAL HISTORY     Past Medical History:   Diagnosis Date    Alcoholism (HCC)     history of alcohol overdose, denies history of seizure     ULTRASOUND:   Performed by ED Physician - none    LABS:  Labs Reviewed   CBC WITH AUTO DIFFERENTIAL - Abnormal; Notable for the following components:       Result Value    Hematocrit 41.8 (*)     MCH 31.6 (*)     MPV 9.0 (*)     Neutrophils % 85.9 (*)     Lymphocytes % 9.2 (*)     Lymphocytes Absolute 0.8 (*)     All other components within normal limits   COMPREHENSIVE METABOLIC PANEL - Abnormal; Notable for the following components:    Chloride 96 (*)     Glucose 124 (*)     BUN 21 (*)     Alkaline Phosphatase 130 (*)     ALT 56 (*)     All other components within normal limits   URINALYSIS WITH REFLEX TO CULTURE - Abnormal; Notable for the following components:    Protein, UA TRACE (*)     Leukocyte Esterase, Urine TRACE (*)     All other components within normal limits   LIPASE   ETHANOL   MICROSCOPIC URINALYSIS   CEA   CANCER ANTIGEN 19-9       All other labs were within normal range or not returned as of this dictation.    EMERGENCY DEPARTMENT COURSE and DIFFERENTIAL DIAGNOSIS/MDM:   Vitals:    Vitals:    03/19/25 1433 03/19/25 1500 03/19/25 1530 03/19/25 1630   BP: (!) 172/102 (!) 155/98 (!) 154/86 (!) 165/90   Pulse: 99      Resp: 18      Temp: 98.3 °F (36.8 °C)      TempSrc: Oral      SpO2: 96% 96% 95%    Weight: 88.5 kg (195 lb)              Medical Decision Making  Amount and/or Complexity of Data Reviewed  Labs: ordered. Decision-making details documented in ED Course.  Radiology: ordered. Decision-making details documented in ED Course.  ECG/medicine tests: ordered and independent interpretation performed. Decision-making details documented in ED Course.    Risk  Prescription drug management.  Decision regarding hospitalization.            REASSESSMENT     ED Course as of 03/19/25 1728   Wed Mar 19, 2025   1705 Case discussed with Dr Nina/onc who recommends admit to hospitalist with consults to GI and Dr Gamino for possible hemicolectomy. Requests CEA and CA 19-9 [BW]   1720 Case discussed with  Sonia Kessler NAYAN of hospitalist group who accepts admission. Okay with clear/soft diet until midnight.  [BW]      ED Course User Index  [BW] Asiya Florian APRN - CNP         CRITICAL CARE TIME       CONSULTS:  IP CONSULT TO GI  IP CONSULT TO GENERAL SURGERY    PROCEDURES:  Unless otherwise noted below, none     Procedures         FINAL IMPRESSION      1. Colonic mass    2. Nausea and vomiting, unspecified vomiting type          DISPOSITION/PLAN   DISPOSITION Decision To Admit 03/19/2025 05:07:54 PM   DISPOSITION CONDITION Stable           PATIENT REFERRED TO:  No follow-up provider specified.    DISCHARGE MEDICATIONS:  New Prescriptions    No medications on file     Controlled Substances Monitoring:          No data to display                (Please note that portions of this note were completed with a voice recognition program.  Efforts were made to edit the dictations but occasionally words are mis-transcribed.)    KWASI Conte CNP (electronically signed)  Attending Emergency Physician            Asiya Florian APRN - CNP  03/19/25 6130

## 2025-03-19 NOTE — ED NOTES
ED TO INPATIENT SBAR HANDOFF    Patient Name: Fidencio Ayoub   : 1970  54 y.o.   Family/Caregiver Present: No  Code Status Order: Prior    C-SSRS: Risk of Suicide: No Risk  Sitter No  Restraints:         Situation  Chief Complaint:   Chief Complaint   Patient presents with    Abdominal Pain     Left side abd pain, worse since     Vomiting     Patient Diagnosis: Abdominal pain [R10.9]     Brief Description of Patient's Condition: 54 y.o. male who presents to the emergency department with left sided abdominal pain, worse since . He feels like when he eats his food digests until a certain point in his left abdomen where it feels like it gets stuck and he gets a mass. He has pain and discomfort until it finally passes. He reports nausea and vomiting. Some chills last night. No known fever. No diarrhea. No urinary symptoms. Reports nightly marijuana for anxiety. Reports one similar episode years ago seen at [facility] and had scopes at the time, but none in a long time.      Chart review shows hx of alcohol dependence. No current signs of withdrawal. Chart reviews shows colonoscopy in 2017 at [facility] with colon polyp removed.   Mental Status: oriented, alert, coherent, logical, thought processes intact, and able to concentrate and follow conversation  Arrived from: home    Imaging:   CT ABDOMEN PELVIS W IV CONTRAST Additional Contrast? None   Final Result   1.  Wall thickening or possible mass in the ascending colon.  No obstruction or pericolonic inflammation.        All CT scans are performed using dose optimization techniques as appropriate to the performed exam and include    at least one of the following: Automated exposure control, adjustment of the mA and/or kV according to size, and the use of iterative reconstruction technique.        ______________________________________    Electronically signed by: SUMMER ARREOLA M.D.   Date:     2025   Time:    16:15         COVID-19  Quality 130: Documentation Of Current Medications In The Medical Record: Current Medications Documented Quality 226: Preventive Care And Screening: Tobacco Use: Screening And Cessation Intervention: Patient screened for tobacco use and is an ex/non-smoker Detail Level: Detailed Quality 431: Preventive Care And Screening: Unhealthy Alcohol Use - Screening: Patient not identified as an unhealthy alcohol user when screened for unhealthy alcohol use using a systematic screening method
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
## 2025-03-19 NOTE — ED NOTES
RN on 4th called asking Irma ER RN if the GI and general surgery consults had been completed. Irma asked me, the night shift HUC. I called our day shift HUC and she was told by Asiya that the hospitalist would be doing those consults, we would not be doing them here in the ER.

## 2025-03-20 PROBLEM — R93.3 ABNORMAL CT SCAN, GASTROINTESTINAL TRACT: Status: ACTIVE | Noted: 2025-03-20

## 2025-03-20 PROBLEM — K63.89 COLONIC MASS: Status: ACTIVE | Noted: 2025-03-20

## 2025-03-20 PROBLEM — R93.3 ABNORMAL CT SCAN, COLON: Status: ACTIVE | Noted: 2025-03-19

## 2025-03-20 PROBLEM — R11.2 NAUSEA AND VOMITING: Status: ACTIVE | Noted: 2025-03-20

## 2025-03-20 PROBLEM — R10.12 LEFT UPPER QUADRANT ABDOMINAL PAIN: Status: ACTIVE | Noted: 2025-03-19

## 2025-03-20 ASSESSMENT — ENCOUNTER SYMPTOMS: ABDOMINAL PAIN: 1

## 2025-03-20 NOTE — PROGRESS NOTES
[facility]      Patient:  Fidencio Ayoub  YOB: 1970  Date of Service: 3/20/2025  MRN: [id number]   Acct: [id number]   Primary Care Physician: Chanel Dupont MD  Advance Directive: Full Code  Admit Date: 3/19/2025       Hospital Day: 1  Portions of this note have been copied forward, however, changed to reflect the most current clinical status of this patient.    CHIEF COMPLAINT abdominal pain    SUBJECTIVE:  He states that his abdomen is \"sore\". He has not had any vomiting. Mildly hypertensive. Afebrile.     CUMULATIVE HOSPITAL STAY:  The patient is a 74-year-old male with PMH of alcohol abuse currently in remission, anxiety, CAD, depression, and HTN who presented to [facility] ED on 3/19/2025 with complaints of abdominal pain.  He reported that he had had generalized abdominal pain for approximately 2 weeks, however, it had gotten significantly worse 3 days prior to his admission.  He had subjective fevers and chills 3 days prior to admission.  He states that his food digests until it gets to a certain spot in his left abdomen where it feels like it gets \"stuck\" and feels like he has a mass to his left side.  Pain would eventually resolve without intervention.  He denies a change in bowel habits or diarrhea.  He reports he does have occasions where he will have intractable nausea vomiting that eventually resolves on its own.  Previous colonoscopy at [facility] in 2017 with a colon polyp removed.    Further ED workup revealed , K4.3, CL 96, CO2 26, BUN 21 creatinine 1.0.  Glucose 124.  Alk phos 130, ALT 56.  WBC 8.7, H&H 14.9/41.8 with a platelet count of 286.  CA 19-9 16, CEA 5.8 UA negative for infection. CT of the abdomen and pelvis with contrast revealed wall thickening or possible mass in the ascending colon.  No obstruction or pericolonic infection.  The patient was admitted to hospital medicine for abnormal abdominal CT and abdominal pain with a GI, oncology and general  non-specific   -CT of the abdomen and pelvis shows wall thickening or possible mass in the ascending colon   -GI consulted   -Hemo/onc consulted   -General surgery consulted   -Daily labs   -Clear liquid diet today   -Monitor vital signs    Active Problems:    Insomnia   -Remeron resumed      Hypertension   -Cozaar and Norvasc resumed   -Monitor      Persistent mood (affective) disorder, unspecified   -Lamictal and Remeron continued      Abnormal CT scan, gastrointestinal tract   -CT scan on 3/19/2025 shows wall thickening or possible mass in the ascending colon   -CEA-5.8   -CA 19-9-16   -oncology consulted-recommendations appreciated   -GI consulted for probable colonoscopy-recommendations appreciated   -General surgery consulted      Nausea and vomiting   -Denies nausea currently   -PRN antiemetics available   -GI consulted    Resolved Problems:    * No resolved hospital problems. *      DVT Prophylaxis: Lovenox    Disposition: JUANITA Lee, KWASI, 3/20/2025 12:25 PM

## 2025-03-20 NOTE — PROGRESS NOTES
03/20/25 1617   Encounter Summary   Encounter Overview/Reason Loneliness/Social Isolation   Encounter Code  Counseling, Individual, by  services   Service Provided For Patient   Referral/Consult From Rounding   Support System Family members;[employer]/ju[employer] community   Complexity of Encounter Low   Begin Time 0245   End Time  0300   Total Time Calculated 15 min   Crisis   Type Emotional distress   Spiritual/Emotional needs   Type Spiritual Support   Grief, Loss, and Adjustments   Type Adjustment to illness   Assessment/Intervention/Outcome   Assessment Calm;Hopeful;Loneliness   Intervention Nurtured Hope;Empowerment;Discussed relationship with God;Discussed meaning/purpose   Outcome Receptive;Expressed feelings, needs, and concerns;Encouraged   Plan and Referrals   Plan/Referrals No future visits requested   Does the patient have a [facility] PCP? Yes    to follow up after discharge? No     Electronically signed by Nav RoldanSiria Jin. on 3/20/2025 at 4:20 PM

## 2025-03-20 NOTE — CONSULTS
Pt Name: Fidencio Ayoub  MRN: [id number]  [id number]  YOB: 1970  Admit Date: 3/19/2025  2:30 PM  Date of evaluation: 3/20/2025  Primary Care Physician: Chanel Dupont MD   [id number]       Requesting Provider: Dr. Sydni Pierre    GI Consult    Indication: Abdominal pain.  Nausea and vomiting.    History:  The patient is a 54 y.o. male admitted to the hospital for abdominal pain.  According the patient he has been sick for years.  He used to drink alcohol heavily but now he does not drink.  He has intermittent history of nausea and vomiting.  He feels sick to his stomach mostly in the left upper quadrant area.  This followed by nausea and then he vomits.  These symptoms last for a day or 2 and then usually resolves and then he has again few weeks later.  In last few days he having more symptoms of nausea and retching and dry heaves.  He was having abdominal pain again the pain is mostly in the left upper quadrant area.  All the symptoms been going on for a long period time.  He did not have any significant change in bowel habit.  Typically he is having normal bowel movements sometimes get constipation.  He has similar episode of symptoms in 2020 or 2021.  He underwent upper GI endoscopy at that time which was normal.  He had a colonoscopy done also in [facility], in 2021 which revealed few polyps.  He denies any hematochezia or melanotic stool.  No hematemesis.  No anorexia or weight loss.  Rather he gained weight in last few weeks.  He started smoking marijuana about 5 months back in the nighttime.  He claims that his symptoms are nausea and intermittent vomiting is going on for years.  He has never been diagnosed with cirrhosis of liver.  CT scan revealed thickening of the right colon.      Past Medical History:        Diagnosis Date    Alcoholism (HCC)     history of alcohol overdose, denies history of seizure    Anxiety     CAD (coronary artery disease)     old MI on a prior EKG,
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
## 2025-03-20 NOTE — DISCHARGE INSTRUCTIONS
[facility]   Utility - Financial Resources*  (Call 211/[facility] for additional resources)    Utility:  Low Income Home Energy Assistance Program (LIHEAP)  Federally-funded program to help eligible, low-income households meet their heating/cooling needs.   Website: https://www.[facility].ky.gov/agencies/dcbs/dfs/pdb/Pages/liheap.aspx  6.094.082.9360      Crisis Resources    [facility] Family Support Office  [address] [place], [place] [address]  [phone]  [facility] Allied Services   [address], [place] [address]  Website: https://www.[employer]-ky.org/  [phone]  [employer]   [address], [place] [address]  Website: https://www.[employer]/  [phone]  [facility]  [address], [place] [address]  Website: https://[facility].org/  [phone]  [facility]  [address], [place] [address]  [phone]  [facility]  [address], [place] [address]  [phone]  [facility]  [address], [place] [address]  [phone]    [facility] Family Support Office  [address] [address]  [phone] or [phone]  [facility] Allied Services  [address], [place] [address]  Website: https://www.[employer]-ky.org/  [phone]  [facility]  [address], [place] [address]  [phone]    [facility] Allied Services  [address] [place], [place] [address]  Website: https://www.[employer]-ky.org/  [phone]  [facility] Family Support Office  [address] [address]  [phone]    [facility] Allied Services  [address] [place], [place] [address]  Website: https://www.wkas-ky.org/  [phone]  [facility] Family Support Office  [address] [place], [place] [address]  [phone]    [facility]  psychiatrist, counselors, doctors, social workers, nurses, and a . A  helps plan and manage your treatment.  Follow-up care is a key part of your treatment and safety. Be sure to make and go to all appointments, and call your doctor if you are having problems. It's also a good idea to know your test results and keep a list of the medicines you take.  Where can you learn more?  Go to https://www.Price[employer].com.net/patientEd and enter H758 to learn more about \"Learning About Alcohol Use Disorder.\"  Current as of: August 20, 2024  Content Version: 14.4  © 0770-6955 [employer].   Care instructions adapted under license by [employer]. If you have questions about a medical condition or this instruction, always ask your healthcare professional. [employer], disclaims any warranty or liability for your use of this information.
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
## 2025-03-20 NOTE — PROGRESS NOTES
4 Eyes Skin Assessment     NAME:  Fidencio Ayoub  YOB: 1970  MEDICAL RECORD NUMBER:  [id number]    The patient is being assessed for  Admission    I agree that at least one RN has performed a thorough Head to Toe Skin Assessment on the patient. ALL assessment sites listed below have been assessed.      Areas assessed by both nurses:    Head, Face, Ears, Shoulders, Back, Chest, Arms, Elbows, Hands, Sacrum. Buttock, Coccyx, Ischium, Legs. Feet and Heels, and Under Medical Devices         Does the Patient have a Wound? No noted wound(s)       Wu Prevention initiated by RN: Yes  Wound Care Orders initiated by RN: No    Pressure Injury (Stage 3,4, Unstageable, DTI, NWPT, and Complex wounds) if present, place Wound referral order by RN under : No    New Ostomies, if present place, Ostomy referral order under : No     Nurse 1 eSignature: Electronically signed by Breanne Hernandez RN on 3/20/25 at 12:02 AM CDT    **SHARE this note so that the co-signing nurse can place an eSignature**    Nurse 2 eSignature: Electronically signed by Cookie Mcneil RN on 3/20/25 at 12:12 AM CDT

## 2025-03-21 ENCOUNTER — TELEPHONE (OUTPATIENT)
Dept: INTERNAL MEDICINE | Age: 55
End: 2025-03-21

## 2025-03-21 ENCOUNTER — ANESTHESIA (OUTPATIENT)
Dept: ENDOSCOPY | Age: 55
End: 2025-03-21
Payer: MEDICAID

## 2025-03-21 ENCOUNTER — ANCILLARY PROCEDURE (OUTPATIENT)
Dept: ENDOSCOPY | Age: 55
DRG: 392 | End: 2025-03-21
Attending: INTERNAL MEDICINE
Payer: MEDICAID

## 2025-03-21 ENCOUNTER — TELEPHONE (OUTPATIENT)
Dept: HEMATOLOGY | Age: 55
End: 2025-03-21

## 2025-03-21 ENCOUNTER — ANESTHESIA EVENT (OUTPATIENT)
Dept: ENDOSCOPY | Age: 55
End: 2025-03-21
Payer: MEDICAID

## 2025-03-21 PROCEDURE — 6360000002 HC RX W HCPCS: Performed by: NURSE ANESTHETIST, CERTIFIED REGISTERED

## 2025-03-21 RX ORDER — MIDAZOLAM HYDROCHLORIDE 1 MG/ML
INJECTION, SOLUTION INTRAMUSCULAR; INTRAVENOUS
Status: DISCONTINUED | OUTPATIENT
Start: 2025-03-21 | End: 2025-03-21 | Stop reason: SDUPTHER

## 2025-03-21 RX ORDER — PROPOFOL 10 MG/ML
INJECTION, EMULSION INTRAVENOUS
Status: DISCONTINUED | OUTPATIENT
Start: 2025-03-21 | End: 2025-03-21 | Stop reason: SDUPTHER

## 2025-03-21 RX ORDER — LIDOCAINE HYDROCHLORIDE 10 MG/ML
INJECTION, SOLUTION EPIDURAL; INFILTRATION; INTRACAUDAL; PERINEURAL
Status: DISCONTINUED | OUTPATIENT
Start: 2025-03-21 | End: 2025-03-21 | Stop reason: SDUPTHER

## 2025-03-21 RX ORDER — FENTANYL CITRATE 50 UG/ML
INJECTION, SOLUTION INTRAMUSCULAR; INTRAVENOUS
Status: DISCONTINUED | OUTPATIENT
Start: 2025-03-21 | End: 2025-03-21 | Stop reason: SDUPTHER

## 2025-03-21 RX ADMIN — LIDOCAINE HYDROCHLORIDE 50 MG: 10 INJECTION, SOLUTION EPIDURAL; INFILTRATION; INTRACAUDAL; PERINEURAL at 10:22

## 2025-03-21 RX ADMIN — PROPOFOL 30 MG: 10 INJECTION, EMULSION INTRAVENOUS at 10:28

## 2025-03-21 RX ADMIN — FENTANYL CITRATE 50 MCG: 50 INJECTION INTRAMUSCULAR; INTRAVENOUS at 10:19

## 2025-03-21 RX ADMIN — PROPOFOL 30 MG: 10 INJECTION, EMULSION INTRAVENOUS at 10:30

## 2025-03-21 RX ADMIN — PROPOFOL 30 MG: 10 INJECTION, EMULSION INTRAVENOUS at 10:26

## 2025-03-21 RX ADMIN — PROPOFOL 100 MG: 10 INJECTION, EMULSION INTRAVENOUS at 10:23

## 2025-03-21 RX ADMIN — MIDAZOLAM 2 MG: 1 INJECTION INTRAMUSCULAR; INTRAVENOUS at 10:19

## 2025-03-21 NOTE — PLAN OF CARE
Problem: Risk for Elopement  Goal: Patient will not exit the unit/facility without proper excort  Outcome: Progressing  Flowsheets (Taken 3/20/2025 2225)  Nursing Interventions for Elopement Risk: Assist with personal care needs such as toileting, eating, dressing, as needed to reduce the risk of wandering     Problem: Safety - Adult  Goal: Free from fall injury  Outcome: Progressing     Problem: Pain  Goal: Verbalizes/displays adequate comfort level or baseline comfort level  Outcome: Progressing

## 2025-03-21 NOTE — PROGRESS NOTES
Not currently  Resides: [place], [place]    Family History:   Family History   Problem Relation Age of Onset    Osteoarthritis Mother     Thyroid Disease Mother     Heart Failure Father     No Known Problems Sister     Heart Failure Maternal Grandmother     Cancer Maternal Grandfather     Heart Failure Paternal Grandmother     No Known Problems Paternal Grandfather        Current Hospital Medications:    Current Facility-Administered Medications   Medication Dose Route Frequency Provider Last Rate Last Admin    amLODIPine (NORVASC) tablet 5 mg  5 mg Oral Nightly Sonia Kessler APRN - CNP   5 mg at 03/20/25 2054    lamoTRIgine (LAMICTAL) tablet 300 mg  300 mg Oral Nightly Sonia Kessler APRN - CNP   300 mg at 03/20/25 2054    losartan (COZAAR) tablet 100 mg  100 mg Oral QAM Sonia Kessler APRN - CNP   100 mg at 03/20/25 0841    mirtazapine (REMERON) tablet 30 mg  30 mg Oral Nightly Sonia Kessler APRN - CNP   30 mg at 03/20/25 2225    sodium chloride flush 0.9 % injection 5-40 mL  5-40 mL IntraVENous 2 times per day Sonia Kessler APRN - CNP   10 mL at 03/20/25 2054    sodium chloride flush 0.9 % injection 5-40 mL  5-40 mL IntraVENous PRN Sonia Kessler APRN - CNP        0.9 % sodium chloride infusion   IntraVENous PRN Sonia Kessler APRN - CNP        potassium chloride (KLOR-CON M) extended release tablet 40 mEq  40 mEq Oral PRN Sonia Kessler APRN - CNP        Or    potassium bicarb-citric acid (EFFER-K) effervescent tablet 40 mEq  40 mEq Oral PRN Sonia Kessler APRN - CNP        Or    potassium chloride 10 mEq/100 mL IVPB (Peripheral Line)  10 mEq IntraVENous PRN Sonia Kessler APRN - CNP        magnesium sulfate 2000 mg in 50 mL IVPB premix  2,000 mg IntraVENous PRN Sonia Kessler APRN - CNP        enoxaparin (LOVENOX) injection 40 mg  40 mg SubCUTAneous Q24H Sonia Kessler APRN - CNP   40 mg at 03/19/25 2038    polyethylene glycol (GLYCOLAX) packet 17 g  17 g Oral Daily PRN Sonia Kessler  CHASITY APRN - CNP        acetaminophen (TYLENOL) tablet 650 mg  650 mg Oral Q6H PRN Sonia Kessler APRN - CNP        Or    acetaminophen (TYLENOL) suppository 650 mg  650 mg Rectal Q6H PRN Sonia Kesselr APRN - CNP           Allergies:   Allergies   Allergen Reactions    Zofran [Ondansetron] Other (See Comments)     \"It gives me real massive head aches\" grabbed both Temperofrontal areas as he stated this       Objective   /68   Pulse 86   Temp 97.2 °F (36.2 °C) (Temporal)   Resp 18   Ht 1.93 m (6' 4\")   Wt 89.8 kg (198 lb)   SpO2 98%   BMI 24.10 kg/m²   PHYSICAL EXAM:  CONSTITUTIONAL: Alert, appropriate, no acute distress  EYES: Non icteric, EOM intact, pupils equal round   ENT: Mucus membranes moist,external inspection of ears and nose are normal  NECK: Supple, no masses.  No palpable thyroid mass  CHEST/LUNGS: CTA bilaterally, normal respiratory effort   CARDIOVASCULAR: RRR, no murmurs.  No lower extremity edema  ABDOMEN: soft non-tender, active bowel sounds, no HSM.  No palpable masses  EXTREMITIES: warm, full ROM in all 4 extremities, no focal weakness.  SKIN: warm, dry with no rashes or lesions  NEUROLOGIC: follows commands, non focal        LABORATORY RESULTS REVIEWED/ANALYZED BY ME:  Recent Labs     03/21/25  0136 03/20/25  0216 03/19/25  1452   WBC 7.0 8.8 8.7   HGB 13.2* 13.7* 14.9   HCT 39.2* 39.9* 41.8*   MCV 92.0 90.3 88.7    251 286       Lab Results   Component Value Date     03/21/2025    K 3.9 03/21/2025     03/21/2025    CO2 24 03/21/2025    BUN 16 03/21/2025    CREATININE 1.1 03/21/2025    GLUCOSE 98 03/21/2025    CALCIUM 8.5 (L) 03/21/2025    BILITOT 0.6 03/19/2025    ALKPHOS 130 (H) 03/19/2025    AST 37 03/19/2025    ALT 56 (H) 03/19/2025    LABGLOM 78 03/21/2025    GFRAA >59 08/18/2022       Lab Results   Component Value Date    INR 0.99 04/19/2024    INR 1.04 11/19/2022    INR 1.07 04/02/2022    PROTIME 12.8 04/19/2024    PROTIME 13.5 11/19/2022    PROTIME 13.9  04/02/2022       RADIOLOGY STUDIES REPORT/REVIEWED AND INTERPRETED BY ME:    3/19/2025 CT abdomen/pelvis with contrast, [facility]: Normal liver.  Status postcholecystectomy.  Cortical cyst. Wall thickening or possible mass of the ascending colon (coronal 29).  No ascites. No free air. No fluid collection.  Scattered small nodes but no adenopathy. Spondylosis and degenerative disc disease at L5-S1.  Mild bony spurring at L1-2.        My assessment-abnormal colonic thickening of the ascending colon      ASSESSMENT:  Abnormal ascending colonic thickening-GI planning colonoscopy today.  Colorectal surgery awaiting results of colonoscopy.  CEA mild elevated.  Normocytic anemia-hemoglobin 13.7->13.2.  Mild.  Continue to monitor    The patient presents with abdominal pain.  Abnormal imaging findings suggest thickening of the ascending colon.  A colonic mass cannot be ruled out.  GI planning colonoscopy.    PLAN:  Reviewed GI/CRS evaluation,   Colonoscopy today  Follow-up tomorrow to see results of colonoscopy and arrange outpatient follow-up    (Please note that portions of this note were completed with a voice recognition program. Efforts were made to edit the dictations but occasionally words are mis-transcribed.)        Scotty Gonzáles MD    03/21/25  5:36 AM

## 2025-03-21 NOTE — ANESTHESIA PRE PROCEDURE
I  TM distance: >3 FB   Neck ROM: limited  Comment: Cervical spine stenosis, receives injections for pain  Mouth opening: > = 3 FB   Dental:          Pulmonary:                              Cardiovascular:    (+) hypertension:, angina:, CAD:                  Neuro/Psych:   (+) psychiatric history:depression/anxiety             GI/Hepatic/Renal:   (+) bowel prep         ROS comment: Alcohol dependence, +drug screen for benzos and THC.   Endo/Other:                     Abdominal:             Vascular:          Other Findings:         Anesthesia Plan      general and TIVA     ASA 3       Induction: intravenous.      Anesthetic plan and risks discussed with patient.                      KWASI Maurice - CRNA   3/21/2025

## 2025-03-21 NOTE — PROCEDURES
Endoscopic Procedure Note    Patient: Fidencio Ayoub : 1970  Med Rec#: [id number] Acc#: [id number]     Primary Care Provider Chanel Dupont MD    Referring Provider: Dr. Sydni Pierre    Endoscopist: Sophia Bacon MD    Date of Procedure: 3/21/2025    Pre-Op Diagnosis: Rule out peptic ulcer disease.    Post-Op Diagnosis: Normal upper GI endoscopy examination.       Procedure(s):  ESOPHAGOGASTRODUODENOSCOPY  COLONOSCOPY POLYPECTOMY SNARE/BIOPSY      Indications:   Intermittent nausea and vomiting.  Left upper quadrant pain.    Anesthesia:  Sedation was administered by anesthesia who monitored the patient during the procedure.    Estimated Blood Loss: none    Procedure:   After reviewing the patient's chart and obtaining informed consent, the patient was placed in the left lateral decubitus position.  A forward-viewing Olympus endoscope was lubricated and inserted through the mouth into the oropharynx. Under direct visualization, the upper esophagus was intubated. The scope was advanced to the level of the second portion of duodenum without any difficulty. Scope was slowly withdrawn with careful inspection of the mucosal surfaces. The scope was retroflexed for inspection of the gastric fundus and incisura.  The patient tolerated the procedure well. The scope was removed.     Findings:     Esophagus: The esophageal mucosa was normal.  There was no evidence of inflammation, ulceration or any masses.  No evidence of any Monk's esophagus, hiatal hernia or stricture.  No esophageal varices are noted.  The scope was passed without difficulty in the stomach.    Stomach: The gastric mucosa in the lower body and the antrum appears to be normal and was free of inflammation, ulceration or any masses.  No evidence of any gastric outlet obstruction.  Retroflexion was done.  The gastric fundus cardia and the upper part of gastric body was normal.  There was no evidence of any hiatal hernia or gastric  varices.      Duodenum: Duodenal bulb, first and second portion was normal.  There was no evidence of any ulceration, inflammation or any masses.  The scope was slowly withdrawn.  Multiple pictures taken.    Specimens:   ID Type Source Tests Collected by Time Destination   A : descending colon polyp Tissue Colon SURGICAL PATHOLOGY Sophia Bacon MD 3/21/2025 1041        Complications: None    Impression:  Normal upper GI endoscopy examination.     Recommendations:    1.  Antireflux measures.  2.  Patient's symptoms are again suggesting cyclical vomiting syndrome.  3.  I will proceed with a colonoscopy examination.      Sophia Bacon MD  3/21/2025, 11:08 AM

## 2025-03-21 NOTE — ANESTHESIA POSTPROCEDURE EVALUATION
Department of Anesthesiology  Postprocedure Note    Patient: Fidencio Ayoub  MRN: [id number]  YOB: 1970  Date of evaluation: 3/21/2025    Procedure Summary       Date: 03/21/25 Room / Location: [place] [address] / [facility]    Anesthesia Start: 1018 Anesthesia Stop: 1047    Procedures:       ESOPHAGOGASTRODUODENOSCOPY      COLONOSCOPY POLYPECTOMY SNARE/BIOPSY Diagnosis:       Nausea and vomiting, unspecified vomiting type      Left upper quadrant abdominal pain      Abnormal CT scan, colon      (Nausea and vomiting, unspecified vomiting type [R11.2])      (Left upper quadrant abdominal pain [R10.12])      (Abnormal CT scan, colon [R93.3])    Surgeons: Sophia Bacon MD Responsible Provider: Shantanu Huertas APRN - CRNA    Anesthesia Type: general, TIVA ASA Status: 3            Anesthesia Type: No value filed.    Carlos Phase I:      Carlos Phase II:      Anesthesia Post Evaluation    Patient location during evaluation: bedside  Patient participation: waiting for patient participation  Level of consciousness: responsive to physical stimuli  Pain score: 0  Airway patency: patent  Nausea & Vomiting: no nausea and no vomiting  Cardiovascular status: hemodynamically stable  Respiratory status: acceptable, spontaneous ventilation and room air  Hydration status: euvolemic  Pain management: adequate    No notable events documented.

## 2025-03-21 NOTE — TELEPHONE ENCOUNTER
I have attempted to call patient to follow-up with  in clinic in 4 weeks after being discharged from the hospital. I have left voicemail of time and date for patient and have requested for patient to contact our office if time or date provided conflicts with schedule.

## 2025-03-21 NOTE — TELEPHONE ENCOUNTER
Patient last seen in office on 7/2023. Hospital is requesting HFU appointment. Advised to check with patient and confirm PCP as parents have also switched providers. She will call back if needs appointment. Ok to schedule if he does not have another pcp?

## 2025-03-21 NOTE — PROCEDURES
Patient: Fidencio Ayoub : 1970  Med Rec#: [id number] Acc#: [id number]   Primary Care Provider Chanel Dupont MD    Endoscopist: Sophia Bacon MD    Referring Provider: Chanel Dupont MD,     Dr. Sydni Pierre    Date of Procedure: 3/21/2025    Pre-Op Diagnosis: Rule out colonic malignancy.    Post-Op Diagnosis: 1.  Diminutive colon polyp, status post polypectomy.  2.  Internal hemorrhoids.  3.  No masses were noted in the colon.  Exam otherwise was normal.       Procedure(s):  ESOPHAGOGASTRODUODENOSCOPY  COLONOSCOPY POLYPECTOMY SNARE/BIOPSY    Indications: Left upper quadrant pain.  Abnormal imaging study.    Anesthesia:  Sedation was administered by anesthesia who monitored the patient during the procedure.    I met with Fidencio Ayoub prior to procedure. We discussed the procedure itself, and I have discussed the risks of endoscopy (including-- but not limited to-- pain, discomfort, bleeding potentially requiring second endoscopic procedure and/or blood transfusion, organ perforation requiring operative repair, damage to organs near the colon, infection, aspiration, cardiopulmonary/allergic reaction), benefits, indications to endoscopy. Additionally, we discussed options other than colonoscopy. The patient expressed understanding. All questions answered. The patient decided to proceed with the procedure.  Signed informed consent was placed on the chart.        DESCRIPTION OF PROCEDURE:     A time out was performed. After written informed consent was obtained, the patient was placed in the left lateral position.  Digital rectal examination was done.  The videocolonoscope was then introduced in the rectum advanced under direct vision up to the cecum without any difficulty.         Findings:   Digital rectal examination revealed, normal sphincter tone, normal mucosa and there were no masses palpable.  Examination of the colon was done up to cecum.  The colonic mucosa was normal.  There was no  evidence of any ulceration, inflammation or any masses.  No masses were noted in the ascending colon area seen in CAT scan.   A 4 to 5 mm sessile polyp was noted in the descending colon area.  This polyp was removed completely by using a polypectomy snare without cautery.  No evidence of any diverticulosis or angiodysplasia.  No evidence of inflammatory bowel disease.  The scope was slowly withdrawn into the rectum and retroflexion was done.  No intrarectal masses seen and small internal hemorrhoids were noted.  The bowel preparation was excellent and the withdrawal time was 9 minutes    Specimens:   ID Type Source Tests Collected by Time Destination   A : descending colon polyp Tissue Colon SURGICAL PATHOLOGY Sophia Bacon MD 3/21/2025 1041        Complications: no immediate complications    Blood Loss: Minimal    Final colonoscopy diagnosis: 1.  Diminutive colon polyp, status post polypectomy.  2.  Internal hemorrhoids.  3.  No masses were noted in the colon.  Exam otherwise was normal.    Recommendations:  1.  I will resume patient's diet.  2.  I will check the biopsy result.  3.  If patient clinically stable he can be discharged home today.  4.  Follow-up in GI clinic for biopsy result.  5.  Repeat colonoscopy in 5 to 7 years.        Sophia Bacon MD  3/21/2025, 11:09 AM

## 2025-03-21 NOTE — DISCHARGE SUMMARY
Fidencio Ayoub  :  1970  MRN:  [id number]    Admit date:  3/19/2025  Discharge date:  3/21/2025    Discharging Physician:  Dr. Sydni Pierre    Advance Directive: Full Code    Consults: IP CONSULT TO GI  IP CONSULT TO GENERAL SURGERY  IP CONSULT TO ONCOLOGY     Primary Care Physician:  Chanel Dupont MD    Discharge Diagnoses:  Principal Problem:    Abdominal pain  Active Problems:    Insomnia    Hypertension    Persistent mood (affective) disorder, unspecified    Abnormal CT scan, gastrointestinal tract    Nausea and vomiting    Colonic mass    Left upper quadrant abdominal pain    Abnormal CT scan, colon  Resolved Problems:    * No resolved hospital problems. *      Portions of this note have been copied forward, however, changed to reflect the most current clinical status of this patient.    Hospital Course:   The patient is a 74-year-old male with PMH of alcohol abuse currently in remission, anxiety, CAD, depression, and HTN who presented to [facility] ED on 3/19/2025 with complaints of abdominal pain.  He reported that he had had generalized abdominal pain for approximately 2 weeks, however, it had gotten significantly worse 3 days prior to his admission.  He had subjective fevers and chills 3 days prior to admission.  He states that his food digests until it gets to a certain spot in his left abdomen where it feels like it gets \"stuck\" and feels like he has a mass to his left side.  Pain would eventually resolve without intervention.  He denies a change in bowel habits or diarrhea.  He reports he does have occasions where he will have intractable nausea vomiting that eventually resolves on its own.  Previous colonoscopy at [facility] in 2017 with a colon polyp removed.     Further ED workup revealed , K4.3, CL 96, CO2 26, BUN 21 creatinine 1.0.  Glucose 124.  Alk phos 130, ALT 56.  WBC 8.7, H&H 14.9/41.8 with a platelet count of 286.  CA 19-9 16, CEA 5.8 UA negative for infection. CT of  pancreatitis. No ductal dilation.  Spleen: No splenomegaly.  Adrenals: No mass.  Right kidney: No stones or hydronephrosis. Cortical cyst.  Left kidney: No stones or hydronephrosis.  Bowel and mesentery: Wall thickening or possible mass of the ascending colon (coronal 29). No significant pericolonic inflammation.  No obstruction.  Appendix: No evidence for acute appendicitis.  Peritoneal Cavity: No ascites. No free air. No fluid collection.  Bladder: The bladder is physiologically distended without wall abnormality.  Vasculature: Mild aortic atherosclerotic calcifications. No aneurysm.  Lymph Nodes: Scattered small nodes but no adenopathy.  Soft Tissues:  Unremarkable.  Reproductive: Reproductive organs are within normal limits.  Bones: Spondylosis and degenerative disc disease at L5-S1.  Mild bony spurring at L1-2.        1.  Wall thickening or possible mass in the ascending colon.  No obstruction or pericolonic inflammation.  All CT scans are performed using dose optimization techniques as appropriate to the performed exam and include at least one of the following: Automated exposure control, adjustment of the mA and/or kV according to size, and the use of iterative reconstruction technique.  ______________________________________ Electronically signed by: SUMMER ARREOLA M.D. Date:     03/19/2025 Time:    16:15       Pertinent Labs:   CBC:   Recent Labs     03/19/25  1452 03/20/25  0216 03/21/25  0136   WBC 8.7 8.8 7.0   HGB 14.9 13.7* 13.2*    251 240     BMP:    Recent Labs     03/19/25  1452 03/20/25  0216 03/21/25  0136    139 137   K 4.3 3.4* 3.9   CL 96* 100 102   CO2 26 28 24   BUN 21* 20 16   CREATININE 1.0 1.1 1.1   GLUCOSE 124* 100* 98     INR: No results for input(s): \"INR\" in the last 72 hours.    Physical Exam:  Vital Signs: BP (!) 156/89   Pulse 66   Temp 98.2 °F (36.8 °C)   Resp 16   Ht 1.93 m (6' 4\")   Wt 89.8 kg (198 lb)   SpO2 100%   BMI 24.10 kg/m²   Physical Exam  Vitals

## 2025-03-24 ENCOUNTER — TELEPHONE (OUTPATIENT)
Dept: GASTROENTEROLOGY | Age: 55
End: 2025-03-24

## 2025-03-24 NOTE — TELEPHONE ENCOUNTER
03- Patient called and stated that he had a colon & EGD on Friday  at the Hospital with Dr Sophia Bacon       He is calling for the path results       Called the patient notified that the Path are still in processing       Routed to BRENDA Barlow MA

## 2025-03-25 ENCOUNTER — RESULTS FOLLOW-UP (OUTPATIENT)
Dept: GASTROENTEROLOGY | Age: 55
End: 2025-03-25

## 2025-03-27 NOTE — TELEPHONE ENCOUNTER
Pt called again today asking for path results. I seen where he called on Monday, so I spoke to Elisabeth and she is still waiting to hear back from Dr Bacon. If he doesn't give an answer soon, she will see if one of our APRN's can read the path result.    Pt is very eager to hear back - wants to know if its cancer or not - and what he needs to do moving fwd.     I told him as soon as we have the results, we will call him.

## 2025-04-15 ENCOUNTER — TELEPHONE (OUTPATIENT)
Dept: HEMATOLOGY | Age: 55
End: 2025-04-15

## 2025-04-15 NOTE — TELEPHONE ENCOUNTER
I called patient and left detailed voicemail about their appointment on 04/18/2025. I made patient aware not to arrive any earlier than the appointment time and to come at the time of the follow up not the time of the lab appointment if it is different than the follow up appt time. I also made patient aware to eat and drink plenty of water to hydrate properly before coming to these appointments because this will make their lab draw much easier. Made patient aware that we are now located at the [facility] at [address] [place] [address]. Located between [facility] and the [facility].

## 2025-04-17 NOTE — PROGRESS NOTES
appropriate, no acute distress  EYES: Non icteric, EOM intact, pupils equal round   ENT: Mucus membranes moist, no oral pharyngeal lesions, external inspection of ears and nose are normal.  NECK: Supple, no masses.  No palpable thyroid mass  CHEST/LUNGS: CTA bilaterally, normal respiratory effort   CARDIOVASCULAR: RRR, no murmurs.  No lower extremity edema  ABDOMEN: soft non-tender, active bowel sounds, no HSM.  No palpable masses  EXTREMITIES: warm, full ROM in all 4 extremities, no focal weakness.  SKIN: warm, dry with no rashes or lesions  LYMPH: No cervical, clavicular, axillary, or inguinal lymphadenopathy  NEUROLOGIC: follows commands, non focal     Relevant Lab findings/reviewed by me:  3/21/25 Colonoscopy by Dr. Bacon: Digital rectal examination revealed, normal sphincter tone, normal mucosa and there were no masses palpable.  Examination of the colon was done up to cecum.  The colonic mucosa was normal.  There was no evidence of any ulceration, inflammation or any masses.  No masses were noted in the ascending colon area seen in CAT scan.   A 4 to 5 mm sessile polyp was noted in the descending colon area.  This polyp was removed completely by using a polypectomy snare without cautery.  No evidence of any diverticulosis or angiodysplasia.  No evidence of inflammatory bowel disease.  The scope was slowly withdrawn into the rectum and retroflexion was done.  No intrarectal masses seen and small internal hemorrhoids were noted.  The bowel preparation was excellent and the withdrawal time was 9 minutes Polyp, descending, polypectomy: Tubular adenoma, negative for high-grade dysplasia.   3/21/25 Upper EGD by Dr. Bacon/[facility]:Esophagus: The esophageal mucosa was normal.  There was no evidence of inflammation, ulceration or any masses.  No evidence of any Monk's esophagus, hiatal hernia or stricture.  No esophageal varices are noted.  The scope was passed without difficulty in the stomach.Stomach: The gastric

## 2025-04-22 ENCOUNTER — HOSPITAL ENCOUNTER (OUTPATIENT)
Dept: INFUSION THERAPY | Age: 55
Discharge: HOME | End: 2025-04-22
Payer: MEDICAID

## 2025-04-22 ENCOUNTER — OFFICE VISIT (OUTPATIENT)
Dept: HEMATOLOGY | Age: 55
End: 2025-04-22
Payer: MEDICAID

## 2025-04-22 VITALS
TEMPERATURE: 98.6 F | SYSTOLIC BLOOD PRESSURE: 140 MMHG | OXYGEN SATURATION: 92 % | BODY MASS INDEX: 23.9 KG/M2 | WEIGHT: 196.3 LBS | DIASTOLIC BLOOD PRESSURE: 90 MMHG | HEIGHT: 76 IN | HEART RATE: 81 BPM

## 2025-04-22 DIAGNOSIS — D36.9 TUBULAR ADENOMA: Primary | ICD-10-CM

## 2025-04-22 PROCEDURE — 3077F SYST BP >= 140 MM HG: CPT | Performed by: INTERNAL MEDICINE

## 2025-04-22 PROCEDURE — 99212 OFFICE O/P EST SF 10 MIN: CPT

## 2025-04-22 PROCEDURE — 99212 OFFICE O/P EST SF 10 MIN: CPT | Performed by: INTERNAL MEDICINE

## 2025-04-22 PROCEDURE — G8427 DOCREV CUR MEDS BY ELIG CLIN: HCPCS | Performed by: INTERNAL MEDICINE

## 2025-04-22 PROCEDURE — 3080F DIAST BP >= 90 MM HG: CPT | Performed by: INTERNAL MEDICINE

## 2025-04-22 PROCEDURE — G8420 CALC BMI NORM PARAMETERS: HCPCS | Performed by: INTERNAL MEDICINE

## 2025-04-22 PROCEDURE — 4004F PT TOBACCO SCREEN RCVD TLK: CPT | Performed by: INTERNAL MEDICINE

## 2025-04-22 PROCEDURE — 3017F COLORECTAL CA SCREEN DOC REV: CPT | Performed by: INTERNAL MEDICINE

## 2025-04-22 RX ORDER — GABAPENTIN 300 MG/1
300 CAPSULE ORAL 2 TIMES DAILY
COMMUNITY
Start: 2025-02-27

## 2025-04-22 RX ORDER — LISDEXAMFETAMINE DIMESYLATE 40 MG/1
1 CAPSULE ORAL EVERY MORNING
COMMUNITY
Start: 2025-04-10 | End: 2025-05-10